# Patient Record
Sex: MALE | Race: WHITE | NOT HISPANIC OR LATINO | Employment: OTHER | URBAN - METROPOLITAN AREA
[De-identification: names, ages, dates, MRNs, and addresses within clinical notes are randomized per-mention and may not be internally consistent; named-entity substitution may affect disease eponyms.]

---

## 2017-01-31 ENCOUNTER — HOSPITAL ENCOUNTER (OUTPATIENT)
Dept: RADIOLOGY | Facility: OTHER | Age: 67
Discharge: HOME | End: 2017-01-31
Attending: ORTHOPAEDIC SURGERY
Payer: MEDICARE

## 2017-01-31 DIAGNOSIS — M17.11 UNILATERAL PRIMARY OSTEOARTHRITIS, RIGHT KNEE: ICD-10-CM

## 2017-01-31 DIAGNOSIS — S83.231A COMPLEX TEAR OF MEDIAL MENISCUS OF RIGHT KNEE AS CURRENT INJURY, INITIAL ENCOUNTER: ICD-10-CM

## 2017-01-31 DIAGNOSIS — M25.561 PAIN IN RIGHT KNEE: ICD-10-CM

## 2017-01-31 DIAGNOSIS — M25.461 EFFUSION, RIGHT KNEE: ICD-10-CM

## 2017-01-31 PROCEDURE — 73721 MRI JNT OF LWR EXTRE W/O DYE: CPT | Mod: TC,RT

## 2017-01-31 PROCEDURE — 73721 MRI JNT OF LWR EXTRE W/O DYE: CPT | Mod: 26,RT,, | Performed by: RADIOLOGY

## 2017-06-02 ENCOUNTER — OFFICE VISIT (OUTPATIENT)
Dept: INTERNAL MEDICINE | Facility: CLINIC | Age: 67
End: 2017-06-02
Payer: MEDICARE

## 2017-06-02 VITALS
SYSTOLIC BLOOD PRESSURE: 112 MMHG | BODY MASS INDEX: 31.04 KG/M2 | HEIGHT: 67 IN | HEART RATE: 70 BPM | DIASTOLIC BLOOD PRESSURE: 67 MMHG | WEIGHT: 197.75 LBS

## 2017-06-02 DIAGNOSIS — G62.9 NEUROPATHY: ICD-10-CM

## 2017-06-02 DIAGNOSIS — M54.50 ACUTE LEFT-SIDED LOW BACK PAIN WITHOUT SCIATICA: ICD-10-CM

## 2017-06-02 DIAGNOSIS — R26.9 ABNORMALITY OF GAIT AND MOBILITY: ICD-10-CM

## 2017-06-02 DIAGNOSIS — M54.9 ACUTE UPPER BACK PAIN: Primary | ICD-10-CM

## 2017-06-02 DIAGNOSIS — R27.9 LACK OF COORDINATION: ICD-10-CM

## 2017-06-02 DIAGNOSIS — Z86.39 PERSONAL HISTORY OF OTHER ENDOCRINE, NUTRITIONAL AND METABOLIC DISEASE: ICD-10-CM

## 2017-06-02 PROBLEM — Z86.19 HISTORY OF HEPATITIS C: Status: ACTIVE | Noted: 2017-06-02

## 2017-06-02 PROBLEM — E78.5 HLD (HYPERLIPIDEMIA): Status: ACTIVE | Noted: 2017-06-02

## 2017-06-02 PROCEDURE — 99999 PR PBB SHADOW E&M-EST. PATIENT-LVL III: CPT | Mod: PBBFAC,,, | Performed by: INTERNAL MEDICINE

## 2017-06-02 PROCEDURE — 99204 OFFICE O/P NEW MOD 45 MIN: CPT | Mod: S$GLB,,, | Performed by: INTERNAL MEDICINE

## 2017-06-02 PROCEDURE — 1159F MED LIST DOCD IN RCRD: CPT | Mod: S$GLB,,, | Performed by: INTERNAL MEDICINE

## 2017-06-02 PROCEDURE — 1125F AMNT PAIN NOTED PAIN PRSNT: CPT | Mod: S$GLB,,, | Performed by: INTERNAL MEDICINE

## 2017-06-02 RX ORDER — TRIAMCINOLONE ACETONIDE 1 MG/ML
LOTION TOPICAL 2 TIMES DAILY
Status: ON HOLD | COMMUNITY
End: 2017-07-07 | Stop reason: HOSPADM

## 2017-06-02 RX ORDER — ASPIRIN 81 MG/1
81 TABLET ORAL DAILY
COMMUNITY
End: 2018-02-19 | Stop reason: SDUPTHER

## 2017-06-02 RX ORDER — ATORVASTATIN CALCIUM 10 MG/1
10 TABLET, FILM COATED ORAL DAILY
COMMUNITY
End: 2018-02-19 | Stop reason: SDUPTHER

## 2017-06-02 RX ORDER — ACETAMINOPHEN 500 MG/1
CAPSULE, LIQUID FILLED ORAL
Status: ON HOLD | COMMUNITY
End: 2017-07-07 | Stop reason: HOSPADM

## 2017-06-02 RX ORDER — IBUPROFEN 400 MG/1
400 TABLET ORAL EVERY 6 HOURS PRN
Status: ON HOLD | COMMUNITY
End: 2017-07-07 | Stop reason: HOSPADM

## 2017-06-02 RX ORDER — CHOLECALCIFEROL (VITAMIN D3) 25 MCG
2000 TABLET ORAL DAILY
COMMUNITY
End: 2018-10-26 | Stop reason: SDUPTHER

## 2017-06-02 RX ORDER — FINASTERIDE 5 MG/1
5 TABLET, FILM COATED ORAL DAILY
COMMUNITY
End: 2018-02-19 | Stop reason: SDUPTHER

## 2017-06-02 RX ORDER — CYCLOBENZAPRINE HCL 10 MG
10 TABLET ORAL 3 TIMES DAILY PRN
COMMUNITY
End: 2017-06-13 | Stop reason: ALTCHOICE

## 2017-06-02 NOTE — Clinical Note
SUSANNA, This is the patient who wants to see you to establish care.  You saw him at [facility] and he loves you.  He called to schedule with you and they told him you do not accept [employer].  He has an intake appointment with a resident next week but wants to see you instead. Maria Esther
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
## 2017-06-05 ENCOUNTER — TELEPHONE (OUTPATIENT)
Dept: INTERNAL MEDICINE | Facility: CLINIC | Age: 67
End: 2017-06-05

## 2017-06-05 DIAGNOSIS — R17 ELEVATED BILIRUBIN: Primary | ICD-10-CM

## 2017-06-05 NOTE — TELEPHONE ENCOUNTER
Pt called back and would like to come in ASAP , Pt is going to call back to see about coming in tomorrow .    Pt has been scheduled for an appt.

## 2017-06-05 NOTE — TELEPHONE ENCOUNTER
----- Message from Jada Spaulding MD sent at 6/2/2017  1:50 PM CDT -----  Could you reach out to this patient to get him on our schedule? It is not urgent, but to establish care. Perhaps in early July would be best, pending the schedule.    Thanks,  KJ

## 2017-06-05 NOTE — TELEPHONE ENCOUNTER
Please call patient and let him know that recent labs showed that one of his liver numbers (bilirubin) is elevated.  Please ask him if he has been told about this in the past.  If not, we should repeat some labs (orders in).

## 2017-06-06 ENCOUNTER — OFFICE VISIT (OUTPATIENT)
Dept: INTERNAL MEDICINE | Facility: CLINIC | Age: 67
End: 2017-06-06
Payer: MEDICARE

## 2017-06-06 ENCOUNTER — LAB VISIT (OUTPATIENT)
Dept: LAB | Facility: HOSPITAL | Age: 67
End: 2017-06-06
Attending: INTERNAL MEDICINE
Payer: MEDICARE

## 2017-06-06 VITALS
DIASTOLIC BLOOD PRESSURE: 72 MMHG | HEIGHT: 67 IN | WEIGHT: 177.25 LBS | HEART RATE: 67 BPM | OXYGEN SATURATION: 99 % | SYSTOLIC BLOOD PRESSURE: 118 MMHG | BODY MASS INDEX: 27.82 KG/M2

## 2017-06-06 DIAGNOSIS — Z11.4 ENCOUNTER FOR SCREENING FOR HIV: ICD-10-CM

## 2017-06-06 DIAGNOSIS — N40.0 BENIGN NON-NODULAR PROSTATIC HYPERPLASIA WITHOUT LOWER URINARY TRACT SYMPTOMS: ICD-10-CM

## 2017-06-06 DIAGNOSIS — G62.9 NEUROPATHY: ICD-10-CM

## 2017-06-06 DIAGNOSIS — E78.5 HYPERLIPIDEMIA, UNSPECIFIED HYPERLIPIDEMIA TYPE: ICD-10-CM

## 2017-06-06 DIAGNOSIS — Z86.19 HISTORY OF HEPATITIS C: ICD-10-CM

## 2017-06-06 DIAGNOSIS — R17 ELEVATED BILIRUBIN: ICD-10-CM

## 2017-06-06 DIAGNOSIS — R26.9 ABNORMALITY OF GAIT AND MOBILITY: ICD-10-CM

## 2017-06-06 DIAGNOSIS — D69.2 SENILE PURPURA: ICD-10-CM

## 2017-06-06 DIAGNOSIS — E55.9 HYPOVITAMINOSIS D: ICD-10-CM

## 2017-06-06 LAB
BILIRUB DIRECT SERPL-MCNC: 0.5 MG/DL
BILIRUB SERPL-MCNC: 1.2 MG/DL
CRP SERPL-MCNC: 1.8 MG/L
ERYTHROCYTE [SEDIMENTATION RATE] IN BLOOD BY WESTERGREN METHOD: 40 MM/HR
VIT B12 SERPL-MCNC: 1219 PG/ML

## 2017-06-06 PROCEDURE — 1126F AMNT PAIN NOTED NONE PRSNT: CPT | Mod: S$GLB,,, | Performed by: INTERNAL MEDICINE

## 2017-06-06 PROCEDURE — 1159F MED LIST DOCD IN RCRD: CPT | Mod: S$GLB,,, | Performed by: INTERNAL MEDICINE

## 2017-06-06 PROCEDURE — 99215 OFFICE O/P EST HI 40 MIN: CPT | Mod: S$GLB,,, | Performed by: INTERNAL MEDICINE

## 2017-06-06 PROCEDURE — 99999 PR PBB SHADOW E&M-EST. PATIENT-LVL III: CPT | Mod: PBBFAC,,, | Performed by: INTERNAL MEDICINE

## 2017-06-06 NOTE — ASSESSMENT & PLAN NOTE
Patient with elevated bilirubin on 6/6. Treated with ribavarin and interferon in 2012  · Check Hep C viral load
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
## 2017-06-06 NOTE — ASSESSMENT & PLAN NOTE
New onset neuropathy in LE with acute back pain. History of hep C treated in 2009. Differential: HIV, hep C, multiple myeloma, tertiary syphillis  · Check HIV, copper, hep C viral load, SPEP/UPEP, RPR  · F/u bilirubin and inflammatory markers

## 2017-06-07 ENCOUNTER — TELEPHONE (OUTPATIENT)
Dept: INTERNAL MEDICINE | Facility: CLINIC | Age: 67
End: 2017-06-07

## 2017-06-07 NOTE — TELEPHONE ENCOUNTER
Please call patient and let him know that his liver number (bilirubin) is improved from last time we checked.  Please ask him how his back pain is doing.  I see that he has an appointment on Tuesday to establish care with Dr. Spaulding.

## 2017-06-07 NOTE — TELEPHONE ENCOUNTER
Spoke with pt, notified. Pt states that he still is experiencing discomfort in his back. He has MRI scheduled.

## 2017-06-09 ENCOUNTER — TELEPHONE (OUTPATIENT)
Dept: INTERNAL MEDICINE | Facility: CLINIC | Age: 67
End: 2017-06-09

## 2017-06-13 ENCOUNTER — HOSPITAL ENCOUNTER (OUTPATIENT)
Dept: RADIOLOGY | Facility: HOSPITAL | Age: 67
Discharge: HOME | End: 2017-06-13
Attending: INTERNAL MEDICINE
Payer: MEDICARE

## 2017-06-13 ENCOUNTER — OFFICE VISIT (OUTPATIENT)
Dept: INTERNAL MEDICINE | Facility: CLINIC | Age: 67
End: 2017-06-13
Payer: MEDICARE

## 2017-06-13 VITALS — HEART RATE: 78 BPM | DIASTOLIC BLOOD PRESSURE: 70 MMHG | SYSTOLIC BLOOD PRESSURE: 104 MMHG | OXYGEN SATURATION: 98 %

## 2017-06-13 DIAGNOSIS — R74.02 NONSPECIFIC ELEVATION OF LEVELS OF TRANSAMINASE AND LACTIC ACID DEHYDROGENASE (LDH): ICD-10-CM

## 2017-06-13 DIAGNOSIS — M54.50 ACUTE LEFT-SIDED LOW BACK PAIN WITHOUT SCIATICA: ICD-10-CM

## 2017-06-13 DIAGNOSIS — R74.01 NONSPECIFIC ELEVATION OF LEVELS OF TRANSAMINASE AND LACTIC ACID DEHYDROGENASE (LDH): ICD-10-CM

## 2017-06-13 DIAGNOSIS — R29.898 WEAKNESS OF BOTH LOWER EXTREMITIES: ICD-10-CM

## 2017-06-13 DIAGNOSIS — M54.9 ACUTE UPPER BACK PAIN: ICD-10-CM

## 2017-06-13 DIAGNOSIS — G62.9 NEUROPATHY: ICD-10-CM

## 2017-06-13 DIAGNOSIS — Z86.19 HISTORY OF HEPATITIS C: ICD-10-CM

## 2017-06-13 PROCEDURE — 72141 MRI NECK SPINE W/O DYE: CPT | Mod: TC

## 2017-06-13 PROCEDURE — 72141 MRI NECK SPINE W/O DYE: CPT | Mod: 26,,, | Performed by: RADIOLOGY

## 2017-06-13 PROCEDURE — 72148 MRI LUMBAR SPINE W/O DYE: CPT | Mod: TC

## 2017-06-13 PROCEDURE — 72146 MRI CHEST SPINE W/O DYE: CPT | Mod: 26,,, | Performed by: RADIOLOGY

## 2017-06-13 PROCEDURE — 72148 MRI LUMBAR SPINE W/O DYE: CPT | Mod: 26,,, | Performed by: RADIOLOGY

## 2017-06-13 PROCEDURE — 1126F AMNT PAIN NOTED NONE PRSNT: CPT | Mod: S$GLB,,, | Performed by: INTERNAL MEDICINE

## 2017-06-13 PROCEDURE — 99999 PR PBB SHADOW E&M-EST. PATIENT-LVL III: CPT | Mod: PBBFAC,,, | Performed by: INTERNAL MEDICINE

## 2017-06-13 PROCEDURE — 99215 OFFICE O/P EST HI 40 MIN: CPT | Mod: S$GLB,,, | Performed by: INTERNAL MEDICINE

## 2017-06-13 PROCEDURE — 1159F MED LIST DOCD IN RCRD: CPT | Mod: S$GLB,,, | Performed by: INTERNAL MEDICINE

## 2017-06-13 PROCEDURE — 72146 MRI CHEST SPINE W/O DYE: CPT | Mod: TC

## 2017-06-13 NOTE — PATIENT INSTRUCTIONS
TODAY:  - MRI of spine today  - labs & urine studies if MRI normal  - urine studies and labs friday

## 2017-06-13 NOTE — ASSESSMENT & PLAN NOTE
New onset neuropathy in LE with acute back pain. History of hep C treated in 2009. Neg work-up for HIV, hep C viral load, multiple myeloma, tertiary syphillis, copper, B12 defiency. ESR 40, CRP normal  · MRI C, T, L-spine pending  · Further work-up myositis, vasculitis, inflammatory myopathies, connective tissue disease to be performed after MRI  · TISH, anti-SM/RNP Ab, Anti-Ariadne, SSB, SSA, ANCA, cyoglobulins, LDH, CK  · Urinalysis today  · Will order rollator based on results

## 2017-06-13 NOTE — PROGRESS NOTES
"Primary Care Provider Appointment    Subjective:      Patient ID: Anthony Miguel is a 66 y.o. male with LE weakness, h/o hep C    Chief Complaint: Follow-up and Extremity Weakness  Patient with acute complaints of LE weakness and discomfort for 6 weeks (since 5/1/17). Weakness started in hands then progressively spread to shoulders now spread to entire body. He is now feeling tightness at waste that he feels is "affecting my spine."  A MRI of  C,T, L-spines ordered by previous provider at [facility]. He has no recent history of URI one week ago. He does report allergies for the past few months. He also endorses low energy, with small periods of increased injury. Work-up thus far has been negative: neg RPR, neg HIV, no viral load for hep C, normal TSH, neg SPEP, normal copper. B12 was elevated, CMP and CBC were normal.    He has been taking flexeril for muscle stiffening and endorses fatigue associated with this. He is also taking ibuprofen for "discomfort", but has no specific description of the sensation. Otherwise no alcohol or illicit drug use lately.    His colleague is with him who corroborates his story of progressive history of weakness.    Past Surgical History:   Procedure Laterality Date    Larangoscopy         Past Medical History:   Diagnosis Date    Hepatitis C        Review of Systems   Constitutional: Positive for activity change and appetite change.   Cardiovascular: Negative for leg swelling.   Gastrointestinal:        Bilateral flank pain   Musculoskeletal: Positive for gait problem, neck pain and neck stiffness.   Neurological: Positive for weakness and light-headedness.   Psychiatric/Behavioral: Positive for behavioral problems.       Objective:   /70   Pulse 78   SpO2 98%     Physical Exam   Constitutional: He is oriented to person, place, and time. He appears well-developed and well-nourished.   HENT:   Head: Normocephalic and atraumatic.   Eyes: Pupils are equal, round, and reactive " to light. Right eye exhibits no discharge. Left eye exhibits discharge.   Cardiovascular: Normal rate and regular rhythm.    Pulmonary/Chest: Effort normal and breath sounds normal.   Musculoskeletal: He exhibits no edema or deformity.   3/5 strength in RUE and RLE  3/5 strength in LUE  2/5 strength in LLE   Neurological: He is alert and oriented to person, place, and time. He displays abnormal reflex. Coordination and gait abnormal.   Reflex Scores:       Bicep reflexes are 3+ on the right side and 3+ on the left side.       Patellar reflexes are 3+ on the right side and 3+ on the left side.  Hyper-reflexic DTR 2+          Lab Results   Component Value Date    WBC 9.04 06/02/2017    HGB 15.1 06/02/2017    HCT 43.6 06/02/2017     06/02/2017    CHOL 138 06/06/2017    TRIG 118 06/06/2017    HDL 51 06/06/2017    ALT 16 06/02/2017    AST 24 06/02/2017     06/02/2017    K 4.0 06/02/2017     06/02/2017    CREATININE 1.1 06/02/2017    BUN 21 06/02/2017    CO2 23 06/02/2017    TSH 3.029 06/02/2017    HGBA1C 5.3 06/02/2017         Assessment:   66 y.o. male with multiple co-morbid illnesses here to continue work-up of chronic issues notably LE weakness, h/o hep C.     Plan:     Problem List Items Addressed This Visit        Neuro    Neuropathy     New onset neuropathy in LE with acute back pain. History of hep C treated in 2009. Neg work-up for HIV, hep C viral load, multiple myeloma, tertiary syphillis, copper, B12 defiency. ESR 40, CRP normal  · MRI C, T, L-spine pending  · Further work-up myositis, vasculitis, inflammatory myopathies, connective tissue disease to be performed after MRI  · TISH, anti-SM/RNP Ab, Anti-Sánchez, SSB, SSA, ANCA, cyoglobulins, LDH, CK  · Urinalysis today  · Will order rollator based on results         Relevant Orders    TISH    CK isoenzymes    Lactate dehydrogenase    URINALYSIS    ANTI-NEUTROPHILIC CYTOPLASMIC ANTIBODY    CRYOGLOBULIN    Hepatitis panel, acute    ANTI- SÁNCHEZ-1  ANTIBODY    ANTI SM/RNP ANTIBODY    ANTI -SSA ANTIBODY    ANTI-SSB ANTIBODY    Comprehensive metabolic panel    Weakness of both lower extremities     New onset neuropathy in LE with acute back pain. History of hep C treated in 2009. Neg work-up for HIV, hep C viral load, multiple myeloma, tertiary syphillis, copper, B12 defiency. ESR 40, CRP normal  · MRI C, T, L-spine pending  · Further work-up myositis, vasculitis, inflammatory myopathies, connective tissue disease to be performed after MRI  · TISH, anti-SM/RNP Ab, Anti-Ariadne, SSB, SSA, ANCA, cyoglobulins, LDH, CK  · Urinalysis today  · Will order rollator based on results         Relevant Orders    Comprehensive metabolic panel       ID    History of hepatitis C (completed treatment)     Patient with elevated bilirubin on 6/6. Treated with ribavarin and interferon in 2012  · Hep C viral load neg  · monitor         Relevant Orders    CRYOGLOBULIN    Hepatitis panel, acute    Comprehensive metabolic panel      Other Visit Diagnoses     Nonspecific elevation of levels of transaminase and lactic acid dehydrogenase (LDH)         Relevant Orders    Hepatitis panel, acute          Health Maintenance       Date Due Completion Date    Hepatitis C Screening 1950 ---done    TETANUS VACCINE 12/03/1967 ---    Colonoscopy 12/03/1999 ---    Zoster Vaccine 12/03/2009 ---    Pneumococcal (65+) (1 of 2 - PCV13) 12/03/2014 ---    Abdominal Aortic Aneurysm Screening 12/03/2014 ---    Influenza Vaccine 08/01/2017 ---    Lipid Panel 06/06/2022 6/6/2017          Return in about 3 weeks (around 7/4/2017) for established patient follow-up. . One hour spent with this patient today, half of that in counseling.    Jada Spaulding MD/MPH  Internal Medicine  [facility]  [phone]

## 2017-06-13 NOTE — ASSESSMENT & PLAN NOTE
Patient with elevated bilirubin on 6/6. Treated with ribavarin and interferon in 2012  · Hep C viral load neg  · monitor

## 2017-06-14 ENCOUNTER — TELEPHONE (OUTPATIENT)
Dept: INTERNAL MEDICINE | Facility: CLINIC | Age: 67
End: 2017-06-14

## 2017-06-14 DIAGNOSIS — R53.1 WEAKNESS: Primary | ICD-10-CM

## 2017-06-14 NOTE — TELEPHONE ENCOUNTER
Pt has been advised that results are not in yet and that his walker has been set to [facility] .      Thanks Dr. Spaulding

## 2017-06-15 ENCOUNTER — TELEPHONE (OUTPATIENT)
Dept: INTERNAL MEDICINE | Facility: CLINIC | Age: 67
End: 2017-06-15

## 2017-06-15 NOTE — TELEPHONE ENCOUNTER
Please call patient and let him know that recent MRI of his spine showed degenerative changes throughout his spine.  There were some areas of severe narrowing in his cervical spine (C3-C4) and his lumbar spine (L4-L5).  Tell him that I will notify Dr. Spaulding so that they can come up with a plan moving forward.

## 2017-06-16 ENCOUNTER — TELEPHONE (OUTPATIENT)
Dept: INTERNAL MEDICINE | Facility: CLINIC | Age: 67
End: 2017-06-16

## 2017-06-16 DIAGNOSIS — M48.00 SPINAL STENOSIS, UNSPECIFIED SPINAL REGION: Primary | ICD-10-CM

## 2017-06-16 NOTE — TELEPHONE ENCOUNTER
Please let patient know that there is spinal stenosis in his neck and low back spine found on MRI. I placed a referral [facility] and they are able to see him on Tuesday at 9am with JENSEN Ware (but he needs to arrive at 8am to get Xrays). They will make a plan for him at that time. Please provide him with all other clinic logistical information.    He does not need to do the labs we talked about at his last appointment, please cancel those.    Thanks,  KJ

## 2017-06-16 NOTE — TELEPHONE ENCOUNTER
Pt has been advised about MRI and informed about appt at [facility] . Pt verbalized great understanding of appt time and date , I has also been mailed out an copy of appt.      Thanks Dr. Spaulding

## 2017-06-19 NOTE — PROGRESS NOTES
Subjective:      Patient ID: Anthony Miguel is a 66 y.o. male.    Chief Complaint: Low-back Pain and Neck Pain      HPI     Referral from PCP (Chelly) for cervical and lumbar spinal stenosis. Seen recently with complaints of LE weakness and discomfort since 5/1/17. Weakness started in hands then progressively spread to shoulders now spread to entire body. He can no longer support his body weight and cannot walk. Prior to this, he was able to walk and stand on his own without difficulty.     He complains of diffuse weakness, numbness, and tingling. He admits to balance issues, changes in handwriting, problems with hand dexterity, and frequent dropping of items in the last month. He is getting progressively worse. He has minimal pain. He rates his pain as a 4 on a scale of 1-10. He has numbness, tingling, and weakness in both arms. Minimal numbness/tingling in his legs. His arms/hands are worse than his legs. No significant injury prior to onset. He notes a fall in May once symptoms started. History of intermittent LBP in the past. Never seen for his back or neck previously.     History of hep C, BPH.  He was given motrin, trazodone, and flexeril. Some relief with motrin- he takes 1-2 per day. He has not taken trazodone. No PT, injections, or surgery on his spine.     Patient denies fevers, chills, night sweats, nausea, vomiting, and weight loss. He also denies bowel/bladder dysfunction, sexual dysfunction, and any saddle anesthesia.       Review of Systems   Constitution: Positive for weakness and malaise/fatigue. Negative for fever, night sweats, weight gain and weight loss.   HENT: Positive for headaches and tinnitus. Negative for hearing loss, nosebleeds and odynophagia.    Eyes: Negative for blurred vision and double vision.   Cardiovascular: Negative for chest pain, irregular heartbeat and palpitations.   Respiratory: Positive for shortness of breath. Negative for cough, hemoptysis and wheezing.     Endocrine: Negative for cold intolerance and polydipsia.   Hematologic/Lymphatic: Does not bruise/bleed easily.   Skin: Negative for dry skin, poor wound healing, rash and suspicious lesions.   Musculoskeletal: Positive for back pain, muscle cramps and neck pain.        See HPI for pertinent positives.   Gastrointestinal: Negative for bloating, abdominal pain, constipation, diarrhea, hematochezia, melena, nausea and vomiting.   Genitourinary: Negative for bladder incontinence, dysuria, hematuria, hesitancy and incomplete emptying.   Neurological: Positive for numbness and paresthesias. Negative for disturbances in coordination, dizziness, focal weakness, loss of balance and seizures.        Positive for loss of control of arms/legs, poor coordination, loss of muscle mass, abnormal arm/leg sensations.    Psychiatric/Behavioral: Negative for depression and hallucinations. The patient is not nervous/anxious.            Objective:        General: Anthony is well-developed, well-nourished, appears stated age, in no acute distress, alert and oriented to time, place and person.     General    Vitals reviewed.  Constitutional: He is oriented to person, place, and time. He appears well-developed and well-nourished.   Pulmonary/Chest: Effort normal.   Abdominal: He exhibits no distension.   Neurological: He is alert and oriented to person, place, and time.   Psychiatric: He has a normal mood and affect. His behavior is normal. Judgment and thought content normal.           Gait: he is in WC. Gait not tested due to fall risk. Heel/toe/tandem walking not tested. Rombergs not tested.     On exam of the cervical spine, pt has no posterior cervical or bilateral trapezial tenderness.     Skin in cervical region is warm to the touch without visible rashes.     Patient has reasonable ROM of cervical spine with no pain.     Strength Testing of Bilateral UEs shows  Right :  +3/5   Left :  +3/5  Right deltoid:  5/5   Left  deltoid:  5/5  Right biceps:  5/5   Left biceps:  5/5  Right triceps:  5/5   Left triceps:  5/5  Right wrist extension:  4-/5  Left wrist extension:  4-/5  Right wrist flexion:  4-/5  Left wrist flexion:  4-/5  Right interosseus:  +3/5  Left interosseus:  +3/5    Sensation is grossly intact to light touch in C5, C6, C7, C8, T1 distribution.     Right shoulder has no pain with IR/ER. Good ROM of shoulder passively and no tenderness.   Left shoulder has no pain with IR/ER. Good ROM of shoulder passively and no tenderness.     1-2 beats clonus in bilateral LEs.    positive hoffmans bilateral UEs.    DTRs:  Right biceps:  +2     Left biceps:  +2   Right brachioradialis:  +2  Left brachioradialis:  +2    On exam of the lumbar spine, Inspection of back is normal, No tenderness noted    Skin in the lumbar region is warm to the touch without visible rashes.     Strength testing of the bilateral LEs shows  Right hip abduction:  +5/5  Left hip abduction:  +5/5  Right hip flexion:  +5/5  Left hip flexion:  +5/5  Right hip extensors:  +5/5  Left hip extensors:  +5/5  Right quadriceps:  +5/5  Left quadriceps:  +5/5  Right hamstring:  +5/5  Left hamstring:  +5/5  Right dorsiflexion:  +5/5  Left dorsiflexion:  +5/5  Right plantar flexion:  +5/5  Left plantar flexion:  +5/5   Right EHL:  +5/5   Left EHL:  +5/5    negative straight leg raise on bilateral LEs.     DTRs:  Right patellar:  2+     Left patellar:  2+  Right achilles:  2+   Left achilles:  2+    Sensation is grossly intact in L2, L3, L4, L5, and S1 distribution.    Right hip has no pain with IR/ER. Left hip has no pain with IR/ER.        XRAY INTERPRETATION:  MRI of cervical spine dated 6/13/17 is personally reviewed and shows multilevel spondylosis with reversal of normal cervical kyphosis and slip C5-C6 and C6-C7. Spur/disc C2-C3 with mild central stenosis. Spur/disc C3-C4 with severe central stenosis. Mild central stenosis C4-C5 with severe right/mild left foraminal  stenosis. Moderate central stenosis C5-C6 and mild central stenosis C6-C7.     MRI of thoracic spine dated 6/13/17 is personally reviewed and shows no significant central or foraminal stenosis.     MRI of lumbar spine dated 6/13/17 is personally reviewed and shows disc bulge/facet hypertrophy L3-L4 with mild central stenosis. Disc bulge/facet hypertrophy L4-L5 with moderate central and bilateral foraminal stenosis. Retrolisthesis L5-S1 with disc bulge/facet hypertrophy and moderate bilateral foraminal stenosis.         Assessment:       1. Gait disorder    2. Bilateral arm weakness    3. Cervical spondylosis with myelopathy    4. Degeneration of cervical intervertebral disc    5. Cervical stenosis of spinal canal    6. Neck pain    7. Acquired spondylolisthesis    8. Spondylosis without myelopathy    9. DDD (degenerative disc disease), lumbosacral    10. Spinal stenosis of lumbar region without neurogenic claudication           Plan:            1 month history of progressive gait imbalance, falling, and weakness in UEs > LEs. Known severe central stenosis at C3-C4 which is causing myelopathic symptoms. Very little complaints of neck or arm pain. Only discomfort in the neck. He lives alone and a friend has been helping him. He had a bad fall last night. Dr. Malin was in to review treatment options with patient. Above imaging reviewed with patient as well. Following plan made:     - Direct admission to [facility] for tentative plan for posterior cervical laminectomy C3-C4.   - He is unsafe to be at home alone due to weakness and balance issues. He is high risk for falls.   - Dr. Malin reviewed all options with the patient and recommend surgery specifically posterior cervical laminectomy C3-C4.   - Discussed cervical myelopathy with patient at length. He understands he will not see immediate improvement in symptoms after surgery.   - He likely will need rehab or placement postop.     Follow-up: Return if  symptoms worsen or fail to improve. If there are any questions prior to this, the patient was instructed to contact the office.

## 2017-06-20 ENCOUNTER — HOSPITAL ENCOUNTER (INPATIENT)
Facility: HOSPITAL | Age: 67
LOS: 9 days | Discharge: NURSING, REHAB OR LONG-TERM CARE | DRG: 472 | End: 2017-06-29
Attending: NEUROLOGICAL SURGERY | Admitting: NEUROLOGICAL SURGERY
Payer: MEDICARE

## 2017-06-20 ENCOUNTER — ANESTHESIA EVENT (OUTPATIENT)
Dept: SURGERY | Facility: HOSPITAL | Age: 67
DRG: 472 | End: 2017-06-20
Payer: MEDICARE

## 2017-06-20 ENCOUNTER — OFFICE VISIT (OUTPATIENT)
Dept: SPINE | Facility: CLINIC | Age: 67
DRG: 472 | End: 2017-06-20
Payer: MEDICARE

## 2017-06-20 VITALS
HEART RATE: 73 BPM | WEIGHT: 176 LBS | HEIGHT: 67 IN | DIASTOLIC BLOOD PRESSURE: 65 MMHG | SYSTOLIC BLOOD PRESSURE: 134 MMHG | BODY MASS INDEX: 27.62 KG/M2

## 2017-06-20 DIAGNOSIS — Z01.818 PREOPERATIVE EVALUATION TO RULE OUT SURGICAL CONTRAINDICATION: ICD-10-CM

## 2017-06-20 DIAGNOSIS — M47.12 CERVICAL SPONDYLOSIS WITH MYELOPATHY: ICD-10-CM

## 2017-06-20 DIAGNOSIS — M48.02 CERVICAL STENOSIS OF SPINAL CANAL: ICD-10-CM

## 2017-06-20 DIAGNOSIS — M47.819 SPONDYLOSIS WITHOUT MYELOPATHY: ICD-10-CM

## 2017-06-20 DIAGNOSIS — R29.898 WEAKNESS OF BOTH LOWER EXTREMITIES: ICD-10-CM

## 2017-06-20 DIAGNOSIS — M50.30 DEGENERATION OF CERVICAL INTERVERTEBRAL DISC: ICD-10-CM

## 2017-06-20 DIAGNOSIS — M48.061 SPINAL STENOSIS OF LUMBAR REGION WITHOUT NEUROGENIC CLAUDICATION: ICD-10-CM

## 2017-06-20 DIAGNOSIS — N40.0 BENIGN NON-NODULAR PROSTATIC HYPERPLASIA WITHOUT LOWER URINARY TRACT SYMPTOMS: ICD-10-CM

## 2017-06-20 DIAGNOSIS — E78.5 HYPERLIPIDEMIA, UNSPECIFIED HYPERLIPIDEMIA TYPE: ICD-10-CM

## 2017-06-20 DIAGNOSIS — R29.898 BILATERAL ARM WEAKNESS: ICD-10-CM

## 2017-06-20 DIAGNOSIS — M43.10 ACQUIRED SPONDYLOLISTHESIS: ICD-10-CM

## 2017-06-20 DIAGNOSIS — G95.9 CERVICAL MYELOPATHY: ICD-10-CM

## 2017-06-20 DIAGNOSIS — R26.9 GAIT DISORDER: Primary | ICD-10-CM

## 2017-06-20 DIAGNOSIS — M51.37 DDD (DEGENERATIVE DISC DISEASE), LUMBOSACRAL: ICD-10-CM

## 2017-06-20 DIAGNOSIS — M54.2 NECK PAIN: ICD-10-CM

## 2017-06-20 PROCEDURE — 4A11X4G MONITORING OF PERIPHERAL NERVOUS ELECTRICAL ACTIVITY, INTRAOPERATIVE, EXTERNAL APPROACH: ICD-10-PCS | Performed by: NEUROLOGICAL SURGERY

## 2017-06-20 PROCEDURE — 0RG20Z1: ICD-10-PCS | Performed by: NEUROLOGICAL SURGERY

## 2017-06-20 PROCEDURE — 1125F AMNT PAIN NOTED PAIN PRSNT: CPT | Mod: S$GLB,,, | Performed by: PHYSICIAN ASSISTANT

## 2017-06-20 PROCEDURE — 99999 PR PBB SHADOW E&M-EST. PATIENT-LVL III: CPT | Mod: PBBFAC,,, | Performed by: PHYSICIAN ASSISTANT

## 2017-06-20 PROCEDURE — 99222 1ST HOSP IP/OBS MODERATE 55: CPT | Mod: GC,,, | Performed by: INTERNAL MEDICINE

## 2017-06-20 PROCEDURE — 1159F MED LIST DOCD IN RCRD: CPT | Mod: S$GLB,,, | Performed by: PHYSICIAN ASSISTANT

## 2017-06-20 PROCEDURE — 20600001 HC STEP DOWN PRIVATE ROOM

## 2017-06-20 PROCEDURE — 25000003 PHARM REV CODE 250: Performed by: STUDENT IN AN ORGANIZED HEALTH CARE EDUCATION/TRAINING PROGRAM

## 2017-06-20 PROCEDURE — 99204 OFFICE O/P NEW MOD 45 MIN: CPT | Mod: S$GLB,,, | Performed by: PHYSICIAN ASSISTANT

## 2017-06-20 PROCEDURE — 99223 1ST HOSP IP/OBS HIGH 75: CPT | Mod: ,,, | Performed by: PHYSICIAN ASSISTANT

## 2017-06-20 PROCEDURE — 00NW0ZZ RELEASE CERVICAL SPINAL CORD, OPEN APPROACH: ICD-10-PCS | Performed by: NEUROLOGICAL SURGERY

## 2017-06-20 RX ORDER — GLUCAGON 1 MG
1 KIT INJECTION
Status: DISCONTINUED | OUTPATIENT
Start: 2017-06-20 | End: 2017-06-29 | Stop reason: HOSPADM

## 2017-06-20 RX ORDER — IBUPROFEN 200 MG
16 TABLET ORAL
Status: DISCONTINUED | OUTPATIENT
Start: 2017-06-20 | End: 2017-06-29 | Stop reason: HOSPADM

## 2017-06-20 RX ORDER — HYDROCODONE BITARTRATE AND ACETAMINOPHEN 5; 325 MG/1; MG/1
1 TABLET ORAL EVERY 4 HOURS PRN
Status: DISCONTINUED | OUTPATIENT
Start: 2017-06-20 | End: 2017-06-21

## 2017-06-20 RX ORDER — FINASTERIDE 5 MG/1
5 TABLET, FILM COATED ORAL DAILY
Status: DISCONTINUED | OUTPATIENT
Start: 2017-06-21 | End: 2017-06-29 | Stop reason: HOSPADM

## 2017-06-20 RX ORDER — IBUPROFEN 200 MG
24 TABLET ORAL
Status: DISCONTINUED | OUTPATIENT
Start: 2017-06-20 | End: 2017-06-29 | Stop reason: HOSPADM

## 2017-06-20 RX ORDER — ATORVASTATIN CALCIUM 10 MG/1
10 TABLET, FILM COATED ORAL DAILY
Status: DISCONTINUED | OUTPATIENT
Start: 2017-06-21 | End: 2017-06-29 | Stop reason: HOSPADM

## 2017-06-20 RX ORDER — TRAZODONE HYDROCHLORIDE 50 MG/1
50 TABLET ORAL NIGHTLY PRN
Status: DISCONTINUED | OUTPATIENT
Start: 2017-06-20 | End: 2017-06-29 | Stop reason: HOSPADM

## 2017-06-20 RX ADMIN — HYDROCODONE BITARTRATE AND ACETAMINOPHEN 1 TABLET: 5; 325 TABLET ORAL at 08:06

## 2017-06-20 NOTE — ASSESSMENT & PLAN NOTE
- Neurologically stable with s/s of myelopathy on exam.  -  consulted for preoperative clearance. Appreciate clearance.  - Will obtain c spine xrays with flexion extension and CT c spine for surgical planning.  - OR tomorrow for posterior cervical fusion.  - NPO at midnight.

## 2017-06-20 NOTE — HOSPITAL COURSE
6/20: Directly admitted from [facility].  6/21: OR for C3-6 posterior fusion with Dr. Wolff  6/22: POD#1, hemovac drain in place  6/23: drain remains in place, xrays look appropriate  6/24: NAEON  6/26: Drain Dc'd  6/27: Pt remains stable with improving weakness. Rehab denied. Awaiting peer to peer. Will begin SNF process   6/28: [employer] approved SNF. Plan for DC today to [facility]

## 2017-06-20 NOTE — HPI
Mr. Anthony Miguel is a 67 yo pleasant gentleman with PMHx of HCV (s/p IFN tx completed in 2013) and newly dx severe cervical and lumbar spinal stenosis who is admitted due to worsening lower and upper extremity weakness. Medicine is consulted for pre-op evaluation in anticipation for posterior cervical laminectomy on 6/21/17.     He reports bilateral upper extremity weakness starting in May 2017. Weakness first began in hands and then rapidly spread to bilateral arms. He has been a  for 40 years and noticed worsening weakness/numbness/tingling, worsening handwriting, decreasing  strength and dexterity rapidly in May; he was fully functional in April 2017. Denies any arm pain at all, only paresthesias and paresis. Also noticed decreased lower extremity strength with weakness worse on the right leg compared to the left. He had been falling since then, including two falls last week. Last night, he fell and could no longer get back up himself. He had to call his friend to help him up as he lives alone. Again denies bilateral leg pain, only paresthesias and paresis.     Denies any recent chest pain on exertion, chest pain at rest, chest pressure, tightness, shortness of breath with activity, palpitations, sudden onset unilateral weakness or numbness, or syncope.     Denies any personal history of heart attacks, heart failure or any cardiac history. No family history of heart disease or heart attacks. No history of diabetes mellitus, hypertension, or impaired renal function. Uses 1-2 ibuprofen pills daily for back pain regularly. Remote history of smoking (8 years smoking 1 pack per week, quit > 20 years ago). Very rare alcohol use. 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

## 2017-06-20 NOTE — NURSING
Received patient as a direct admit per wheelchair. Alert and oriented x 4. Denies pain, appears comfortable.  Oriented to room/ unit. Call light placed within easy reach.

## 2017-06-20 NOTE — ANESTHESIA PREPROCEDURE EVALUATION
06/20/2017    Pre-operative evaluation for LAMINECTOMY-CERVICAL/FUSION-POSTERIOR/depuy (N/A)    Chief Complaint:    Anthony Miguel is a 66 y.o. male with a pmhx of hepatitis C who presented for cervical and lumbar spinal stenosis. Weakness started in hands then progressively spread to shoulders now spread to entire body. He can no longer support his body weight and cannot walk. Prior to this, he was able to walk and stand on his own without difficulty.     Past Medical History:   Diagnosis Date    Hepatitis C      Past Surgical History:   Procedure Laterality Date    Larangoscopy       Vital Signs Range (Last 24H):  Temp:  [36.4 °C (97.6 °F)]   Pulse:  [70-73]   Resp:  [16]   BP: (121-134)/(65-67)   SpO2:  [97 %]       CBC:     Recent Labs  Lab 06/02/17  1155   WBC 9.04   RBC 4.78   HGB 15.1   HCT 43.6      MCV 91   MCH 31.6*   MCHC 34.6       CMP:   Recent Labs  Lab 06/02/17  1155 06/06/17  1027     --    K 4.0  --      --    CO2 23  --    BUN 21  --    CREATININE 1.1  --    *  --    CALCIUM 9.6  --    ALBUMIN 4.2  --    PROT 8.1  --    ALKPHOS 87  --    ALT 16  --    AST 24  --    BILITOT 2.4* 1.2*       INR:  No results for input(s): INR, PROTIME, APTT in the last 720 hours.    Invalid input(s): PT      Anesthesia Evaluation    I have reviewed the Patient Summary Reports.     I have reviewed the Medications.     Review of Systems  Anesthesia Hx:  No problems with previous Anesthesia    Cardiovascular:  Cardiovascular Normal     Pulmonary:  Pulmonary Normal    Renal/:  Renal/ Normal     Hepatic/GI:   Liver Disease, Hepatitis, C HepC cleared with medical therapy years ago   Neurological:  Neurology Normal    Endocrine:  Endocrine Normal        Physical Exam  General:  Well nourished    Airway/Jaw/Neck:  Airway Findings: Mouth Opening: Normal Tongue: Normal  Mallampati: I   TM Distance: Normal, at least 6 cm     Eyes/Ears/Nose:  EYES/EARS/NOSE FINDINGS: Normal   Dental:  Dental Findings: In tact   Chest/Lungs:  Chest/Lungs Findings: Normal Respiratory Rate     Heart/Vascular:  Heart Findings: Rate: Normal  Rhythm: Regular Rhythm        Mental Status:  Mental Status Findings: Normal        Anesthesia Plan  Type of Anesthesia, risks & benefits discussed:  Anesthesia Type:  general  Patient's Preference:   Intra-op Monitoring Plan: standard ASA monitors  Intra-op Monitoring Plan Comments:   Post Op Pain Control Plan:   Post Op Pain Control Plan Comments:   Induction:   IV  Beta Blocker:  Patient is not currently on a Beta-Blocker (No further documentation required).       Informed Consent: Patient understands risks and agrees with Anesthesia plan.  Questions answered. Anesthesia consent signed with patient.  ASA Score: 3     Day of Surgery Review of History & Physical:    H&P update referred to the surgeon.         Ready For Surgery From Anesthesia Perspective.

## 2017-06-20 NOTE — CONSULTS
[facility] Hospital Medicine  Consult Note    Patient Name: Anthony Miguel  MRN: [id number]  Admission Date: 6/20/2017  Hospital Length of Stay: 0 days  Attending Physician: Dr. Pierson  Primary Care Provider: Jada Spaulding MD     [facility] Medicine Team: Networked reference to record PCT  Ktahleen Quijano MD      Patient information was obtained from patient, past medical records and ER records.     Inpatient consult to Hospital Medicine-General  Consult performed by: KATHLEEN QUIJANO  Consult ordered by: JAYCEE SAM        Subjective:     Principal Problem: Cervical myelopathy    Chief Complaint: weakness of hands and legs    HPI: Mr. Anthony Miguel is a 67 yo pleasant gentleman with PMHx of HCV (s/p IFN tx completed in 2013) and newly dx severe cervical and lumbar spinal stenosis who is admitted due to worsening lower and upper extremity weakness. Medicine is consulted for pre-op evaluation in anticipation for posterior cervical laminectomy on 6/21/17.     He reports bilateral upper extremity weakness starting in May 2017. Weakness first began in hands and then rapidly spread to bilateral arms. He has been a  for 40 years and noticed worsening weakness/numbness/tingling, worsening handwriting, decreasing  strength and dexterity rapidly in May; he was fully functional in April 2017. Denies any arm pain at all, only paresthesias and paresis. Also noticed decreased lower extremity strength with weakness worse on the right leg compared to the left. He had been falling since then, including two falls last week. Last night, he fell and could no longer get back up himself. He had to call his friend to help him up as he lives alone. Again denies bilateral leg pain, only paresthesias and paresis.     Denies any recent chest pain on exertion, chest pain at rest, chest pressure, tightness, shortness of breath with activity, palpitations, sudden onset unilateral weakness  or numbness, or syncope.     Denies any personal history of heart attacks, heart failure or any cardiac history. No family history of heart disease or heart attacks. No history of diabetes mellitus, hypertension, or impaired renal function. Uses 1-2 ibuprofen pills daily for back pain regularly. Remote history of smoking (8 years smoking 1 pack per week, quit > 20 years ago). Very rare alcohol use. 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     Past Medical History:   Diagnosis Date    Hepatitis C        Past Surgical History:   Procedure Laterality Date    Larangoscopy         Review of patient's allergies indicates:  No Known Allergies    No current facility-administered medications on file prior to encounter.      Current Outpatient Prescriptions on File Prior to Encounter   Medication Sig    acetaminophen (TYLENOL) 500 mg Cap Take by mouth.    aspirin (ECOTRIN) 81 MG EC tablet Take 81 mg by mouth once daily.    atorvastatin (LIPITOR) 10 MG tablet Take 10 mg by mouth once daily.    finasteride (PROSCAR) 5 mg tablet Take 5 mg by mouth once daily.    ibuprofen (ADVIL,MOTRIN) 400 MG tablet Take 400 mg by mouth every 6 (six) hours as needed for Other.    loratadine (CLARITIN) 10 mg tablet Take 10 mg by mouth once daily.    TRAZODONE HCL (TRAZODONE ORAL) Take by mouth.    triamcinolone acetonide 0.1% (KENALOG) 0.1 % Lotn Apply topically 2 (two) times daily.    vitamin D 1000 units Tab Take 1,000 Units by mouth once daily.    walker (ULTRA-LIGHT ROLLATOR) Misc 1 Units by Misc.(Non-Drug; Combo Route) route once daily at 6am.     Family History     Problem Relation (Age of Onset)    Cancer Mother    No Known Problems Father        Social History Main Topics    Smoking status: Former Smoker     Packs/day: 1.00     Quit date: 02/2000    Smokeless tobacco: Not on file    Alcohol use No    Drug  use: No    Sexual activity: Not Currently     Review of Systems   Constitutional: Positive for activity change. Negative for fever.   HENT: Negative for sore throat and trouble swallowing.    Eyes: Negative for visual disturbance.   Respiratory: Negative for chest tightness and shortness of breath.    Cardiovascular: Negative for chest pain.   Gastrointestinal: Negative for abdominal pain, nausea and vomiting.   Genitourinary: Negative for decreased urine volume.   Musculoskeletal: Positive for back pain, gait problem and neck pain. Negative for neck stiffness.   Neurological: Positive for weakness and numbness. Negative for dizziness, syncope and light-headedness.   Psychiatric/Behavioral: Negative for dysphoric mood. The patient is not nervous/anxious.      Objective:     Vital Signs (Most Recent):  Temp: 97.6 °F (36.4 °C) (06/20/17 1422)  Pulse: 70 (06/20/17 1422)  Resp: 16 (06/20/17 1422)  BP: 121/67 (06/20/17 1422)  SpO2: 97 % (06/20/17 1422) Vital Signs (24h Range):  Temp:  [97.6 °F (36.4 °C)] 97.6 °F (36.4 °C)  Pulse:  [70-73] 70  Resp:  [16] 16  SpO2:  [97 %] 97 %  BP: (121-134)/(65-67) 121/67     Weight: 77.2 kg (170 lb 2 oz)  Body mass index is 26.65 kg/m².    Physical Exam   Constitutional: He is oriented to person, place, and time. No distress.   HENT:   Head: Normocephalic and atraumatic.   Eyes: EOM are normal. Pupils are equal, round, and reactive to light. No scleral icterus.   Neck: No JVD present.   Cardiovascular: Normal rate, regular rhythm, normal heart sounds and intact distal pulses.  Exam reveals no gallop and no friction rub.    No murmur heard.  Pulmonary/Chest: Effort normal and breath sounds normal. No respiratory distress. He has no wheezes. He has no rales. He exhibits no tenderness.   Abdominal: Soft. Bowel sounds are normal. He exhibits no distension and no mass. There is no tenderness. There is no guarding.   Musculoskeletal: Normal range of motion. He exhibits no edema, tenderness  or deformity.   Neurological: He is alert and oriented to person, place, and time. He displays no tremor.   Reflex Scores:       Patellar reflexes are 3+ on the right side and 3+ on the left side.  Skin: Skin is warm and dry. Capillary refill takes less than 2 seconds. He is not diaphoretic.   Psychiatric: He has a normal mood and affect. His behavior is normal. Judgment and thought content normal.       Significant Labs: BMP: No results for input(s): GLU, NA, K, CL, CO2, BUN, CREATININE, CALCIUM, MG in the last 48 hours.  CBC: No results for input(s): WBC, HGB, HCT, PLT in the last 48 hours.  CMP: No results for input(s): NA, K, CL, CO2, GLU, BUN, CREATININE, CALCIUM, PROT, ALBUMIN, BILITOT, ALKPHOS, AST, ALT, ANIONGAP, EGFRNONAA in the last 48 hours.    Invalid input(s): ESTGFAFR[place]    Significant Imaging: I have reviewed all pertinent imaging results/findings within the past 24 hours.      MRI Cervical, Thoracic and Lumbar Spine Without Contrast  Impression       1. Multilevel degenerative change of the cervical spine with varying degrees of spinal canal and neural foraminal narrowing, most pronounced at the C3-C4 level there is there is severe spinal canal stenosis as detailed above.    2.  Mild degenerative change of the thoracic spine without evidence of significant spinal canal or neural foraminal narrowing.    3.  Multilevel degenerative change of the lumbar spine resulting in varying degrees of spinal canal or neuroforaminal narrowing, most pronounced at the L4-L5 level where there is severe spinal canal stenosis and moderate bilateral neural foraminal narrowing.         Assessment/Plan:     65 yo man with severe cervical spinal and lumbar spinal stenosis admitted after recent multiple falls due to myelopathy in preparation for posterior cervical laminectomy C3-4.     Recommendations:     1. Obtain baseline EKG   2. Repeat BMP to monitor renal function given his regular use of NSAIDs for chronic back pain.    3. RCRI score of 0 points which places him at 0.4% risk of perioperative cardiac events. No further testing is recommended, proceed with surgery.       Revised Cardiac Risk Index   High risk surgery (intraperitoneal, intrathoracic, or suprainguinal vascular): No  History of ischemic heart disease: No  History of congestive heart failure: No  History of stroke: No  Insulin dependent diabetes: No  Creatinine > 2: No  0 points, class I risk, 0.4% risk of cardiac event   2014 ACC/AHA Perioperative Guidelines  Known or risk factors for CAD: Yes  High risk of MACE: No  Functional capacity < 4 METs: No, proceed without further testing       VTE Risk Mitigation         Ordered     Medium Risk of VTE  Once      06/20/17 1427     Place DENEEN hose  Until discontinued      06/20/17 1427     Place sequential compression device  Until discontinued      06/20/17 1427        Thank you for your consult. I will sign off. Please contact us if you have any additional questions.    Deirdre Torres MD  Department of Hospital Medicine   [facility]

## 2017-06-20 NOTE — PLAN OF CARE
Problem: Patient Care Overview  Goal: Plan of Care Review  Patient resting in bed. Alert and oriented x 4. Able to turn/ reposition in bed. Unable to bear weight to BLE. Fall precautions maintained at all times.  NPO post MN instructed to patient for surgery tomorrow.

## 2017-06-20 NOTE — HPI
Anthony Miguel is a 66 y.o. male directly admitted from [facility] PA-C's clinic for cervical myelopathy. He has experienced progressive weakness and paresthesias in his arms and legs for approximately one month. He is typically independent with ADLs, but has recently been unable to ambulate and has experienced difficulty with fine motor movements, such as playing piano, writing, etc. He endorses paresthesias throughout his upper extremities, and occasionally in his lower extremities. He has minimal neck pain. He denies b/b dysfunction or saddle anesthesia.

## 2017-06-20 NOTE — LETTER
June 20, 2017      Jada Spaulding MD  [address]  [facility] [address]           [facility]  [address], [address]  [facility] [address]  Phone: [phone]  Fax: [phone]          Patient: Anthony Miguel   MR Number: [id number]   YOB: 1950   Date of Visit: 6/20/2017       Dear Dr. Jada Spaulding:    Thank you for referring Anthony Miguel to me for evaluation. Attached you will find relevant portions of my assessment and plan of care.    If you have questions, please do not hesitate to call me. I look forward to following Anthony Miguel along with you.    Sincerely,    NEERAJ Ramososure  CC:  No Recipients    If you would like to receive this communication electronically, please contact [email or web address] or [phone] to request more information on [facility] Link access.    For providers and/or their staff who would like to refer a patient to [facility], please contact us through our one-stop-shop provider referral line, [facility], at [phone].    If you feel you have received this communication in error or would no longer like to receive these types of communications, please e-mail [email or web address]

## 2017-06-20 NOTE — H&P
[facility]  History and Physical     Patient Name: Anthony Miguel  MRN: [id number]  Admission Date: 6/20/2017  Attending Physician: David Wolff MD   Primary Care Physician: Jada Spaulding MD    Patient information was obtained from patient.     Subjective:     Chief Complaint/Reason for Admission: cervical myelopathy    HPI:  Anthony Miugel is a 66 y.o. male directly admitted from Daiys Rivera PA-C's clinic for cervical myelopathy. He has experienced progressive weakness and paresthesias in his arms and legs for approximately one month. He is typically independent with ADLs, but has recently been unable to ambulate and has experienced difficulty with fine motor movements, such as playing piano, writing, etc. He endorses paresthesias throughout his upper extremities, and occasionally in his lower extremities. He has minimal neck pain. He denies b/b dysfunction or saddle anesthesia.     Prescriptions Prior to Admission   Medication Sig Dispense Refill Last Dose    acetaminophen (TYLENOL) 500 mg Cap Take by mouth.   Not Taking    aspirin (ECOTRIN) 81 MG EC tablet Take 81 mg by mouth once daily.   Taking    atorvastatin (LIPITOR) 10 MG tablet Take 10 mg by mouth once daily.   Not Taking    finasteride (PROSCAR) 5 mg tablet Take 5 mg by mouth once daily.   Taking    ibuprofen (ADVIL,MOTRIN) 400 MG tablet Take 400 mg by mouth every 6 (six) hours as needed for Other.   Taking    loratadine (CLARITIN) 10 mg tablet Take 10 mg by mouth once daily.   Not Taking    TRAZODONE HCL (TRAZODONE ORAL) Take by mouth.   Taking    triamcinolone acetonide 0.1% (KENALOG) 0.1 % Lotn Apply topically 2 (two) times daily.   Not Taking    vitamin D 1000 units Tab Take 1,000 Units by mouth once daily.   Not Taking    walker (ULTRA-LIGHT ROLLATOR) Misc 1 Units by Misc.(Non-Drug; Combo Route) route once daily at 6am. 1 each 0 Not Taking       Review of patient's allergies indicates:  No  Known Allergies    Past Medical History:   Diagnosis Date    Hepatitis C      Past Surgical History:   Procedure Laterality Date    Larangoscopy       Family History     Problem Relation (Age of Onset)    Cancer Mother    No Known Problems Father        Social History Main Topics    Smoking status: Former Smoker     Packs/day: 1.00     Quit date: 2000    Smokeless tobacco: Not on file    Alcohol use No    Drug use: No    Sexual activity: Not Currently     Review of Systems  Objective:     Weight: 77.2 kg (170 lb 2 oz)  Body mass index is 26.65 kg/m².  Vital Signs (Most Recent):  Temp: 97.6 °F (36.4 °C) (17 1422)  Pulse: 70 (17 1422)  Resp: 16 (17 1422)  BP: 121/67 (17 1422)  SpO2: 97 % (17 1422) Vital Signs (24h Range):  Temp:  [97.6 °F (36.4 °C)] 97.6 °F (36.4 °C)  Pulse:  [70-73] 70  Resp:  [16] 16  SpO2:  [97 %] 97 %  BP: (121-134)/(65-67) 121/67            Temp (24hrs), Av.6 °F (36.4 °C), Min:97.6 °F (36.4 °C), Max:97.6 °F (36.4 °C)                 Neurosurgery Physical Exam  General: well developed, well nourished, no distress.   Head: normocephalic, atraumatic  Neurologic: Alert and oriented. Thought content appropriate.  GCS: Motor: 6/Verbal: 5/Eyes: 4 GCS Total: 15  Mental Status: Awake, Alert, Oriented x 4  Language: No aphasia  Speech: No dysarthria  Cranial nerves: face symmetric, tongue midline, CN II-XII grossly intact.   Eyes: pupils equal, round, reactive to light with accomodation, EOMI.  Pulmonary: normal respirations, no signs of respiratory distress  Abdomen: soft, non-distended, not tender to palpation  Sensory: intact to light touch throughout    Motor Strength: Moves all extremities spontaneously with good tone.  Full strength upper and lower extremities. No abnormal movements seen.     Strength  Deltoids Triceps Biceps Wrist Extension Wrist Flexion Hand    Upper: R 4+/5 4-/5 4-/5 4-/5 4-/5 4-/5    L      Iliopsoas  Quadriceps Knee  Flexion Tibialis  anterior Gastro- cnemius EHL   Lower: R 3/5 4-/5 4-/5 5/5 5/5 5/5    L 4-/5 4/5 4/5 4+/5 5/5 5/5     DTR's: 4 + and symmetric in UE and LE  Pronator Drift: no drift noted  Finger-to-nose: Intact bilaterally  Mcdonough: present on the left  Clonus: sustained on right, 8 beats on left  Babinski: absent  Pulses: 2+ and symmetric radial and dorsalis pedis.  Skin: Skin is warm, dry and intact.    Significant Labs:  No results for input(s): GLU, NA, K, CL, CO2, BUN, CREATININE, CALCIUM, MG in the last 48 hours.  No results for input(s): WBC, HGB, HCT, PLT in the last 48 hours.  No results for input(s): LABPT, INR, APTT in the last 48 hours.  Microbiology Results (last 7 days)     ** No results found for the last 168 hours. **        Significant Diagnostics:  MRI C spine reviewed. There is multilevel spondylosis with multiple disc herniations, namely of C3-4 which results in severe central and nf stenosis.     Assessment and Plan:     * Cervical myelopathy    - Neurologically stable with s/s of myelopathy on exam.  - HM consulted for preoperative clearance. Appreciate clearance.  - Will obtain c spine xrays with flexion extension and CT c spine for surgical planning.  - OR tomorrow for posterior cervical fusion.  - NPO at midnight.        HLD (hyperlipidemia)    - Restarted home atorvastatin.            Discussed with Dr. Wolff.    JENSEN Metz-C  Neurosurgery  [facility]

## 2017-06-20 NOTE — SUBJECTIVE & OBJECTIVE
Prescriptions Prior to Admission   Medication Sig Dispense Refill Last Dose    acetaminophen (TYLENOL) 500 mg Cap Take by mouth.   Not Taking    aspirin (ECOTRIN) 81 MG EC tablet Take 81 mg by mouth once daily.   Taking    atorvastatin (LIPITOR) 10 MG tablet Take 10 mg by mouth once daily.   Not Taking    finasteride (PROSCAR) 5 mg tablet Take 5 mg by mouth once daily.   Taking    ibuprofen (ADVIL,MOTRIN) 400 MG tablet Take 400 mg by mouth every 6 (six) hours as needed for Other.   Taking    loratadine (CLARITIN) 10 mg tablet Take 10 mg by mouth once daily.   Not Taking    TRAZODONE HCL (TRAZODONE ORAL) Take by mouth.   Taking    triamcinolone acetonide 0.1% (KENALOG) 0.1 % Lotn Apply topically 2 (two) times daily.   Not Taking    vitamin D 1000 units Tab Take 1,000 Units by mouth once daily.   Not Taking    walker (ULTRA-LIGHT ROLLATOR) Misc 1 Units by Misc.(Non-Drug; Combo Route) route once daily at 6am. 1 each 0 Not Taking       Review of patient's allergies indicates:  No Known Allergies    Past Medical History:   Diagnosis Date    Hepatitis C      Past Surgical History:   Procedure Laterality Date    Larangoscopy       Family History     Problem Relation (Age of Onset)    Cancer Mother    No Known Problems Father        Social History Main Topics    Smoking status: Former Smoker     Packs/day: 1.00     Quit date: 2000    Smokeless tobacco: Not on file    Alcohol use No    Drug use: No    Sexual activity: Not Currently     Review of Systems  Objective:     Weight: 77.2 kg (170 lb 2 oz)  Body mass index is 26.65 kg/m².  Vital Signs (Most Recent):  Temp: 97.6 °F (36.4 °C) (17 1422)  Pulse: 70 (17 1422)  Resp: 16 (17 1422)  BP: 121/67 (17 1422)  SpO2: 97 % (17 1422) Vital Signs (24h Range):  Temp:  [97.6 °F (36.4 °C)] 97.6 °F (36.4 °C)  Pulse:  [70-73] 70  Resp:  [16] 16  SpO2:  [97 %] 97 %  BP: (121-134)/(65-67) 121/67            Temp (24hrs), Av.6 °F (36.4  °C), Min:97.6 °F (36.4 °C), Max:97.6 °F (36.4 °C)                 Neurosurgery Physical Exam  General: well developed, well nourished, no distress.   Head: normocephalic, atraumatic  Neurologic: Alert and oriented. Thought content appropriate.  GCS: Motor: 6/Verbal: 5/Eyes: 4 GCS Total: 15  Mental Status: Awake, Alert, Oriented x 4  Language: No aphasia  Speech: No dysarthria  Cranial nerves: face symmetric, tongue midline, CN II-XII grossly intact.   Eyes: pupils equal, round, reactive to light with accomodation, EOMI.  Pulmonary: normal respirations, no signs of respiratory distress  Abdomen: soft, non-distended, not tender to palpation  Sensory: intact to light touch throughout    Motor Strength: Moves all extremities spontaneously with good tone.  Full strength upper and lower extremities. No abnormal movements seen.     Strength  Deltoids Triceps Biceps Wrist Extension Wrist Flexion Hand    Upper: R 4+/5 4-/5 4-/5 4-/5 4-/5 4-/5    L 5/5 4/5 4/5 4/5 4/5 4/5     Iliopsoas Quadriceps Knee  Flexion Tibialis  anterior Gastro- cnemius EHL   Lower: R 3/5 4-/5 4-/5 5/5 5/5 5/5    L 4-/5 4/5 4/5 4+/5 5/5 5/5     DTR's: 4 + and symmetric in UE and LE  Pronator Drift: no drift noted  Finger-to-nose: Intact bilaterally  Mcdonough: present on the left  Clonus: sustained on right, 8 beats on left  Babinski: absent  Pulses: 2+ and symmetric radial and dorsalis pedis.  Skin: Skin is warm, dry and intact.    Significant Labs:  No results for input(s): GLU, NA, K, CL, CO2, BUN, CREATININE, CALCIUM, MG in the last 48 hours.  No results for input(s): WBC, HGB, HCT, PLT in the last 48 hours.  No results for input(s): LABPT, INR, APTT in the last 48 hours.  Microbiology Results (last 7 days)     ** No results found for the last 168 hours. **        Significant Diagnostics:  MRI C spine reviewed. There is multilevel spondylosis with multiple disc herniations, namely of C3-4 which results in severe central and nf stenosis.

## 2017-06-20 NOTE — SUBJECTIVE & OBJECTIVE
Past Medical History:   Diagnosis Date    Hepatitis C        Past Surgical History:   Procedure Laterality Date    Larangoscopy         Review of patient's allergies indicates:  No Known Allergies    No current facility-administered medications on file prior to encounter.      Current Outpatient Prescriptions on File Prior to Encounter   Medication Sig    acetaminophen (TYLENOL) 500 mg Cap Take by mouth.    aspirin (ECOTRIN) 81 MG EC tablet Take 81 mg by mouth once daily.    atorvastatin (LIPITOR) 10 MG tablet Take 10 mg by mouth once daily.    finasteride (PROSCAR) 5 mg tablet Take 5 mg by mouth once daily.    ibuprofen (ADVIL,MOTRIN) 400 MG tablet Take 400 mg by mouth every 6 (six) hours as needed for Other.    loratadine (CLARITIN) 10 mg tablet Take 10 mg by mouth once daily.    TRAZODONE HCL (TRAZODONE ORAL) Take by mouth.    triamcinolone acetonide 0.1% (KENALOG) 0.1 % Lotn Apply topically 2 (two) times daily.    vitamin D 1000 units Tab Take 1,000 Units by mouth once daily.    walker (ULTRA-LIGHT ROLLATOR) Misc 1 Units by Misc.(Non-Drug; Combo Route) route once daily at 6am.     Family History     Problem Relation (Age of Onset)    Cancer Mother    No Known Problems Father        Social History Main Topics    Smoking status: Former Smoker     Packs/day: 1.00     Quit date: 02/2000    Smokeless tobacco: Not on file    Alcohol use No    Drug use: No    Sexual activity: Not Currently     Review of Systems   Constitutional: Positive for activity change. Negative for fever.   HENT: Negative for sore throat and trouble swallowing.    Eyes: Negative for visual disturbance.   Respiratory: Negative for chest tightness and shortness of breath.    Cardiovascular: Negative for chest pain.   Gastrointestinal: Negative for abdominal pain, nausea and vomiting.   Genitourinary: Negative for decreased urine volume.   Musculoskeletal: Positive for back pain, gait problem and neck pain. Negative for neck  stiffness.   Neurological: Positive for weakness and numbness. Negative for dizziness, syncope and light-headedness.   Psychiatric/Behavioral: Negative for dysphoric mood. The patient is not nervous/anxious.      Objective:     Vital Signs (Most Recent):  Temp: 97.6 °F (36.4 °C) (06/20/17 1422)  Pulse: 70 (06/20/17 1422)  Resp: 16 (06/20/17 1422)  BP: 121/67 (06/20/17 1422)  SpO2: 97 % (06/20/17 1422) Vital Signs (24h Range):  Temp:  [97.6 °F (36.4 °C)] 97.6 °F (36.4 °C)  Pulse:  [70-73] 70  Resp:  [16] 16  SpO2:  [97 %] 97 %  BP: (121-134)/(65-67) 121/67     Weight: 77.2 kg (170 lb 2 oz)  Body mass index is 26.65 kg/m².    Physical Exam   Constitutional: He is oriented to person, place, and time. No distress.   HENT:   Head: Normocephalic and atraumatic.   Eyes: EOM are normal. Pupils are equal, round, and reactive to light. No scleral icterus.   Neck: No JVD present.   Cardiovascular: Normal rate, regular rhythm, normal heart sounds and intact distal pulses.  Exam reveals no gallop and no friction rub.    No murmur heard.  Pulmonary/Chest: Effort normal and breath sounds normal. No respiratory distress. He has no wheezes. He has no rales. He exhibits no tenderness.   Abdominal: Soft. Bowel sounds are normal. He exhibits no distension and no mass. There is no tenderness. There is no guarding.   Musculoskeletal: Normal range of motion. He exhibits no edema, tenderness or deformity.   Neurological: He is alert and oriented to person, place, and time. He displays no tremor.   Reflex Scores:       Patellar reflexes are 3+ on the right side and 3+ on the left side.  Skin: Skin is warm and dry. Capillary refill takes less than 2 seconds. He is not diaphoretic.   Psychiatric: He has a normal mood and affect. His behavior is normal. Judgment and thought content normal.       Significant Labs: BMP: No results for input(s): GLU, NA, K, CL, CO2, BUN, CREATININE, CALCIUM, MG in the last 48 hours.  CBC: No results for input(s):  WBC, HGB, HCT, PLT in the last 48 hours.  CMP: No results for input(s): NA, K, CL, CO2, GLU, BUN, CREATININE, CALCIUM, PROT, ALBUMIN, BILITOT, ALKPHOS, AST, ALT, ANIONGAP, EGFRNONAA in the last 48 hours.    Invalid input(s): ESTGFAFRICA    Significant Imaging: I have reviewed all pertinent imaging results/findings within the past 24 hours.

## 2017-06-21 ENCOUNTER — ANESTHESIA (OUTPATIENT)
Dept: SURGERY | Facility: HOSPITAL | Age: 67
DRG: 472 | End: 2017-06-21
Payer: MEDICARE

## 2017-06-21 LAB
ABO + RH BLD: NORMAL
ANION GAP SERPL CALC-SCNC: 9 MMOL/L
BASOPHILS # BLD AUTO: 0.03 K/UL
BASOPHILS NFR BLD: 0.4 %
BLD GP AB SCN CELLS X3 SERPL QL: NORMAL
BUN SERPL-MCNC: 21 MG/DL
CALCIUM SERPL-MCNC: 8.5 MG/DL
CHLORIDE SERPL-SCNC: 108 MMOL/L
CO2 SERPL-SCNC: 23 MMOL/L
CREAT SERPL-MCNC: 0.9 MG/DL
DIFFERENTIAL METHOD: ABNORMAL
EOSINOPHIL # BLD AUTO: 0.5 K/UL
EOSINOPHIL NFR BLD: 6.8 %
ERYTHROCYTE [DISTWIDTH] IN BLOOD BY AUTOMATED COUNT: 12.9 %
EST. GFR  (AFRICAN AMERICAN): >60 ML/MIN/1.73 M^2
EST. GFR  (NON AFRICAN AMERICAN): >60 ML/MIN/1.73 M^2
GLUCOSE SERPL-MCNC: 90 MG/DL
HCT VFR BLD AUTO: 39.3 %
HGB BLD-MCNC: 13.5 G/DL
INR PPP: 1
LYMPHOCYTES # BLD AUTO: 2.1 K/UL
LYMPHOCYTES NFR BLD: 29.1 %
MCH RBC QN AUTO: 31.5 PG
MCHC RBC AUTO-ENTMCNC: 34.4 %
MCV RBC AUTO: 92 FL
MONOCYTES # BLD AUTO: 0.6 K/UL
MONOCYTES NFR BLD: 8.8 %
NEUTROPHILS # BLD AUTO: 4 K/UL
NEUTROPHILS NFR BLD: 54.9 %
PLATELET # BLD AUTO: 265 K/UL
PMV BLD AUTO: 11.2 FL
POTASSIUM SERPL-SCNC: 3 MMOL/L
PROTHROMBIN TIME: 10.8 SEC
RBC # BLD AUTO: 4.29 M/UL
SODIUM SERPL-SCNC: 140 MMOL/L
WBC # BLD AUTO: 7.31 K/UL

## 2017-06-21 PROCEDURE — 63600175 PHARM REV CODE 636 W HCPCS: Performed by: NURSE ANESTHETIST, CERTIFIED REGISTERED

## 2017-06-21 PROCEDURE — 85025 COMPLETE CBC W/AUTO DIFF WBC: CPT

## 2017-06-21 PROCEDURE — 63600175 PHARM REV CODE 636 W HCPCS: Performed by: STUDENT IN AN ORGANIZED HEALTH CARE EDUCATION/TRAINING PROGRAM

## 2017-06-21 PROCEDURE — D9220A PRA ANESTHESIA: Mod: ANES,,, | Performed by: ANESTHESIOLOGY

## 2017-06-21 PROCEDURE — 27000221 HC OXYGEN, UP TO 24 HOURS

## 2017-06-21 PROCEDURE — 85610 PROTHROMBIN TIME: CPT

## 2017-06-21 PROCEDURE — C1713 ANCHOR/SCREW BN/BN,TIS/BN: HCPCS | Performed by: NEUROLOGICAL SURGERY

## 2017-06-21 PROCEDURE — 25000003 PHARM REV CODE 250: Performed by: ANESTHESIOLOGY

## 2017-06-21 PROCEDURE — 27201423 OPTIME MED/SURG SUP & DEVICES STERILE SUPPLY: Performed by: NEUROLOGICAL SURGERY

## 2017-06-21 PROCEDURE — 37000008 HC ANESTHESIA 1ST 15 MINUTES: Performed by: NEUROLOGICAL SURGERY

## 2017-06-21 PROCEDURE — 25000003 PHARM REV CODE 250: Performed by: STUDENT IN AN ORGANIZED HEALTH CARE EDUCATION/TRAINING PROGRAM

## 2017-06-21 PROCEDURE — 27100025 HC TUBING, SET FLUID WARMER: Performed by: ANESTHESIOLOGY

## 2017-06-21 PROCEDURE — 36000710: Performed by: NEUROLOGICAL SURGERY

## 2017-06-21 PROCEDURE — 99024 POSTOP FOLLOW-UP VISIT: CPT | Mod: ,,, | Performed by: PHYSICIAN ASSISTANT

## 2017-06-21 PROCEDURE — 63600175 PHARM REV CODE 636 W HCPCS: Performed by: ANESTHESIOLOGY

## 2017-06-21 PROCEDURE — 27200677 HC TRANSDUCER MONITOR KIT SINGLE: Performed by: ANESTHESIOLOGY

## 2017-06-21 PROCEDURE — C1751 CATH, INF, PER/CENT/MIDLINE: HCPCS | Performed by: ANESTHESIOLOGY

## 2017-06-21 PROCEDURE — 25000003 PHARM REV CODE 250: Performed by: NURSE ANESTHETIST, CERTIFIED REGISTERED

## 2017-06-21 PROCEDURE — C1729 CATH, DRAINAGE: HCPCS | Performed by: NEUROLOGICAL SURGERY

## 2017-06-21 PROCEDURE — 37000009 HC ANESTHESIA EA ADD 15 MINS: Performed by: NEUROLOGICAL SURGERY

## 2017-06-21 PROCEDURE — 71000033 HC RECOVERY, INTIAL HOUR: Performed by: NEUROLOGICAL SURGERY

## 2017-06-21 PROCEDURE — 63600175 PHARM REV CODE 636 W HCPCS: Performed by: NEUROLOGICAL SURGERY

## 2017-06-21 PROCEDURE — 71000039 HC RECOVERY, EACH ADD'L HOUR: Performed by: NEUROLOGICAL SURGERY

## 2017-06-21 PROCEDURE — 25000003 PHARM REV CODE 250: Performed by: PHYSICIAN ASSISTANT

## 2017-06-21 PROCEDURE — 86901 BLOOD TYPING SEROLOGIC RH(D): CPT

## 2017-06-21 PROCEDURE — 86900 BLOOD TYPING SEROLOGIC ABO: CPT

## 2017-06-21 PROCEDURE — 36000711: Performed by: NEUROLOGICAL SURGERY

## 2017-06-21 PROCEDURE — 36415 COLL VENOUS BLD VENIPUNCTURE: CPT

## 2017-06-21 PROCEDURE — 80048 BASIC METABOLIC PNL TOTAL CA: CPT

## 2017-06-21 PROCEDURE — 20600001 HC STEP DOWN PRIVATE ROOM

## 2017-06-21 PROCEDURE — 25000003 PHARM REV CODE 250: Performed by: NEUROLOGICAL SURGERY

## 2017-06-21 PROCEDURE — D9220A PRA ANESTHESIA: Mod: CRNA,,, | Performed by: NURSE ANESTHETIST, CERTIFIED REGISTERED

## 2017-06-21 PROCEDURE — 94761 N-INVAS EAR/PLS OXIMETRY MLT: CPT

## 2017-06-21 PROCEDURE — 27201037 HC PRESSURE MONITORING SET UP

## 2017-06-21 DEVICE — SET SCREW SPINAL INNER NUT: Type: IMPLANTABLE DEVICE | Site: SPINE CERVICAL | Status: FUNCTIONAL

## 2017-06-21 DEVICE — SCREW BONE SPINAL 3.5 X 14MM: Type: IMPLANTABLE DEVICE | Site: SPINE CERVICAL | Status: FUNCTIONAL

## 2017-06-21 DEVICE — NUT SPINAL OUTER CONNECTOR: Type: IMPLANTABLE DEVICE | Site: SPINE CERVICAL | Status: FUNCTIONAL

## 2017-06-21 DEVICE — ROD SPINAL OCT 3.5 X 60MM: Type: IMPLANTABLE DEVICE | Site: SPINE CERVICAL | Status: FUNCTIONAL

## 2017-06-21 DEVICE — IMPLANTABLE DEVICE: Type: IMPLANTABLE DEVICE | Site: SPINE CERVICAL | Status: FUNCTIONAL

## 2017-06-21 DEVICE — SET SCREW BONE SPINAL CROSS: Type: IMPLANTABLE DEVICE | Site: SPINE CERVICAL | Status: FUNCTIONAL

## 2017-06-21 DEVICE — ROD SPINAL MOUNTAINEER 3.5X55: Type: IMPLANTABLE DEVICE | Site: SPINE CERVICAL | Status: FUNCTIONAL

## 2017-06-21 RX ORDER — KETAMINE HYDROCHLORIDE 100 MG/ML
INJECTION, SOLUTION INTRAMUSCULAR; INTRAVENOUS
Status: DISCONTINUED | OUTPATIENT
Start: 2017-06-21 | End: 2017-06-21

## 2017-06-21 RX ORDER — REMIFENTANIL HYDROCHLORIDE 1 MG/ML
INJECTION, POWDER, LYOPHILIZED, FOR SOLUTION INTRAVENOUS CONTINUOUS PRN
Status: DISCONTINUED | OUTPATIENT
Start: 2017-06-21 | End: 2017-06-21

## 2017-06-21 RX ORDER — ONDANSETRON 2 MG/ML
4 INJECTION INTRAMUSCULAR; INTRAVENOUS DAILY PRN
Status: DISCONTINUED | OUTPATIENT
Start: 2017-06-21 | End: 2017-06-21 | Stop reason: HOSPADM

## 2017-06-21 RX ORDER — HEPARIN SODIUM 5000 [USP'U]/ML
5000 INJECTION, SOLUTION INTRAVENOUS; SUBCUTANEOUS EVERY 8 HOURS
Status: DISCONTINUED | OUTPATIENT
Start: 2017-06-22 | End: 2017-06-29 | Stop reason: HOSPADM

## 2017-06-21 RX ORDER — SODIUM CHLORIDE 0.9 % (FLUSH) 0.9 %
3 SYRINGE (ML) INJECTION EVERY 8 HOURS
Status: DISCONTINUED | OUTPATIENT
Start: 2017-06-21 | End: 2017-06-21 | Stop reason: HOSPADM

## 2017-06-21 RX ORDER — ONDANSETRON 2 MG/ML
INJECTION INTRAMUSCULAR; INTRAVENOUS
Status: DISCONTINUED | OUTPATIENT
Start: 2017-06-21 | End: 2017-06-21

## 2017-06-21 RX ORDER — HYDROMORPHONE HYDROCHLORIDE 1 MG/ML
1 INJECTION, SOLUTION INTRAMUSCULAR; INTRAVENOUS; SUBCUTANEOUS EVERY 4 HOURS PRN
Status: DISCONTINUED | OUTPATIENT
Start: 2017-06-21 | End: 2017-06-29 | Stop reason: HOSPADM

## 2017-06-21 RX ORDER — SODIUM CHLORIDE 0.9 % (FLUSH) 0.9 %
3 SYRINGE (ML) INJECTION
Status: DISCONTINUED | OUTPATIENT
Start: 2017-06-21 | End: 2017-06-29 | Stop reason: HOSPADM

## 2017-06-21 RX ORDER — LABETALOL HYDROCHLORIDE 5 MG/ML
10 INJECTION, SOLUTION INTRAVENOUS EVERY 10 MIN PRN
Status: DISCONTINUED | OUTPATIENT
Start: 2017-06-21 | End: 2017-06-29 | Stop reason: HOSPADM

## 2017-06-21 RX ORDER — PHENYLEPHRINE HYDROCHLORIDE 10 MG/ML
INJECTION INTRAVENOUS
Status: DISCONTINUED | OUTPATIENT
Start: 2017-06-21 | End: 2017-06-21

## 2017-06-21 RX ORDER — LIDOCAINE HCL/PF 100 MG/5ML
SYRINGE (ML) INTRAVENOUS
Status: DISCONTINUED | OUTPATIENT
Start: 2017-06-21 | End: 2017-06-21

## 2017-06-21 RX ORDER — SODIUM CHLORIDE 9 MG/ML
INJECTION, SOLUTION INTRAVENOUS CONTINUOUS PRN
Status: DISCONTINUED | OUTPATIENT
Start: 2017-06-21 | End: 2017-06-21

## 2017-06-21 RX ORDER — BACITRACIN 50000 [IU]/1
INJECTION, POWDER, FOR SOLUTION INTRAMUSCULAR
Status: DISCONTINUED | OUTPATIENT
Start: 2017-06-21 | End: 2017-06-21

## 2017-06-21 RX ORDER — CEFAZOLIN SODIUM 1 G/3ML
INJECTION, POWDER, FOR SOLUTION INTRAMUSCULAR; INTRAVENOUS
Status: DISCONTINUED | OUTPATIENT
Start: 2017-06-21 | End: 2017-06-21

## 2017-06-21 RX ORDER — HYDROMORPHONE HYDROCHLORIDE 1 MG/ML
0.2 INJECTION, SOLUTION INTRAMUSCULAR; INTRAVENOUS; SUBCUTANEOUS EVERY 5 MIN PRN
Status: DISCONTINUED | OUTPATIENT
Start: 2017-06-21 | End: 2017-06-21 | Stop reason: HOSPADM

## 2017-06-21 RX ORDER — LORAZEPAM 2 MG/ML
0.25 INJECTION INTRAMUSCULAR EVERY 10 MIN PRN
Status: DISCONTINUED | OUTPATIENT
Start: 2017-06-21 | End: 2017-06-21 | Stop reason: HOSPADM

## 2017-06-21 RX ORDER — DEXAMETHASONE SODIUM PHOSPHATE 4 MG/ML
INJECTION, SOLUTION INTRA-ARTICULAR; INTRALESIONAL; INTRAMUSCULAR; INTRAVENOUS; SOFT TISSUE
Status: DISCONTINUED | OUTPATIENT
Start: 2017-06-21 | End: 2017-06-21

## 2017-06-21 RX ORDER — DIAZEPAM 5 MG/1
5 TABLET ORAL EVERY 6 HOURS PRN
Status: DISCONTINUED | OUTPATIENT
Start: 2017-06-21 | End: 2017-06-29 | Stop reason: HOSPADM

## 2017-06-21 RX ORDER — GLYCOPYRROLATE 0.2 MG/ML
INJECTION INTRAMUSCULAR; INTRAVENOUS
Status: DISCONTINUED | OUTPATIENT
Start: 2017-06-21 | End: 2017-06-21

## 2017-06-21 RX ORDER — HYDRALAZINE HYDROCHLORIDE 20 MG/ML
20 INJECTION INTRAMUSCULAR; INTRAVENOUS EVERY 6 HOURS PRN
Status: DISCONTINUED | OUTPATIENT
Start: 2017-06-21 | End: 2017-06-29 | Stop reason: HOSPADM

## 2017-06-21 RX ORDER — NICARDIPINE HYDROCHLORIDE 2.5 MG/ML
INJECTION INTRAVENOUS
Status: DISCONTINUED | OUTPATIENT
Start: 2017-06-21 | End: 2017-06-21

## 2017-06-21 RX ORDER — CEFAZOLIN SODIUM 1 G/50ML
1 SOLUTION INTRAVENOUS
Status: DISCONTINUED | OUTPATIENT
Start: 2017-06-21 | End: 2017-06-26

## 2017-06-21 RX ORDER — BUPIVACAINE HYDROCHLORIDE AND EPINEPHRINE 2.5; 5 MG/ML; UG/ML
INJECTION, SOLUTION EPIDURAL; INFILTRATION; INTRACAUDAL; PERINEURAL
Status: DISCONTINUED | OUTPATIENT
Start: 2017-06-21 | End: 2017-06-21

## 2017-06-21 RX ORDER — LIDOCAINE HYDROCHLORIDE AND EPINEPHRINE 10; 10 MG/ML; UG/ML
INJECTION, SOLUTION INFILTRATION; PERINEURAL
Status: DISCONTINUED | OUTPATIENT
Start: 2017-06-21 | End: 2017-06-21

## 2017-06-21 RX ORDER — BACITRACIN ZINC 500 UNIT/G
OINTMENT (GRAM) TOPICAL
Status: DISCONTINUED | OUTPATIENT
Start: 2017-06-21 | End: 2017-06-21

## 2017-06-21 RX ORDER — DIAZEPAM 10 MG/2ML
5 INJECTION INTRAMUSCULAR EVERY 6 HOURS
Status: COMPLETED | OUTPATIENT
Start: 2017-06-21 | End: 2017-06-22

## 2017-06-21 RX ORDER — PROPOFOL 10 MG/ML
VIAL (ML) INTRAVENOUS CONTINUOUS PRN
Status: DISCONTINUED | OUTPATIENT
Start: 2017-06-21 | End: 2017-06-21

## 2017-06-21 RX ORDER — MEPERIDINE HYDROCHLORIDE 50 MG/ML
12.5 INJECTION INTRAMUSCULAR; INTRAVENOUS; SUBCUTANEOUS EVERY 10 MIN PRN
Status: DISCONTINUED | OUTPATIENT
Start: 2017-06-21 | End: 2017-06-21 | Stop reason: HOSPADM

## 2017-06-21 RX ORDER — OXYCODONE AND ACETAMINOPHEN 7.5; 325 MG/1; MG/1
1 TABLET ORAL EVERY 4 HOURS PRN
Status: DISCONTINUED | OUTPATIENT
Start: 2017-06-21 | End: 2017-06-27

## 2017-06-21 RX ORDER — VANCOMYCIN HYDROCHLORIDE 1 G/20ML
INJECTION, POWDER, LYOPHILIZED, FOR SOLUTION INTRAVENOUS
Status: DISCONTINUED | OUTPATIENT
Start: 2017-06-21 | End: 2017-06-21

## 2017-06-21 RX ORDER — HYDROMORPHONE HYDROCHLORIDE 1 MG/ML
0.2 INJECTION, SOLUTION INTRAMUSCULAR; INTRAVENOUS; SUBCUTANEOUS EVERY 5 MIN PRN
Status: DISCONTINUED | OUTPATIENT
Start: 2017-06-21 | End: 2017-06-21

## 2017-06-21 RX ORDER — FENTANYL CITRATE 50 UG/ML
INJECTION, SOLUTION INTRAMUSCULAR; INTRAVENOUS
Status: DISCONTINUED | OUTPATIENT
Start: 2017-06-21 | End: 2017-06-21

## 2017-06-21 RX ORDER — PROPOFOL 10 MG/ML
VIAL (ML) INTRAVENOUS
Status: DISCONTINUED | OUTPATIENT
Start: 2017-06-21 | End: 2017-06-21

## 2017-06-21 RX ADMIN — MIDAZOLAM HYDROCHLORIDE 2 MG: 1 INJECTION, SOLUTION INTRAMUSCULAR; INTRAVENOUS at 11:06

## 2017-06-21 RX ADMIN — ONDANSETRON 4 MG: 2 INJECTION INTRAMUSCULAR; INTRAVENOUS at 03:06

## 2017-06-21 RX ADMIN — ATORVASTATIN CALCIUM 10 MG: 10 TABLET, FILM COATED ORAL at 10:06

## 2017-06-21 RX ADMIN — EPHEDRINE SULFATE 15 MG: 50 INJECTION, SOLUTION INTRAMUSCULAR; INTRAVENOUS; SUBCUTANEOUS at 12:06

## 2017-06-21 RX ADMIN — Medication 0.3 MCG/KG/MIN: at 12:06

## 2017-06-21 RX ADMIN — CEFAZOLIN 2 G: 1 INJECTION, POWDER, FOR SOLUTION INTRAVENOUS at 01:06

## 2017-06-21 RX ADMIN — PROPOFOL 150 MG: 10 INJECTION, EMULSION INTRAVENOUS at 12:06

## 2017-06-21 RX ADMIN — DEXAMETHASONE SODIUM PHOSPHATE 12 MG: 4 INJECTION, SOLUTION INTRAMUSCULAR; INTRAVENOUS at 01:06

## 2017-06-21 RX ADMIN — DIAZEPAM 5 MG: 5 INJECTION, SOLUTION INTRAMUSCULAR; INTRAVENOUS at 06:06

## 2017-06-21 RX ADMIN — HYDROCODONE BITARTRATE AND ACETAMINOPHEN 1 TABLET: 5; 325 TABLET ORAL at 02:06

## 2017-06-21 RX ADMIN — PROPOFOL: 10 INJECTION, EMULSION INTRAVENOUS at 01:06

## 2017-06-21 RX ADMIN — EPHEDRINE SULFATE 10 MG: 50 INJECTION, SOLUTION INTRAMUSCULAR; INTRAVENOUS; SUBCUTANEOUS at 12:06

## 2017-06-21 RX ADMIN — PROPOFOL 50 MG: 10 INJECTION, EMULSION INTRAVENOUS at 01:06

## 2017-06-21 RX ADMIN — PHENYLEPHRINE HYDROCHLORIDE 100 MCG: 10 INJECTION INTRAVENOUS at 01:06

## 2017-06-21 RX ADMIN — FENTANYL CITRATE 50 MCG: 50 INJECTION, SOLUTION INTRAMUSCULAR; INTRAVENOUS at 03:06

## 2017-06-21 RX ADMIN — SODIUM CHLORIDE, SODIUM GLUCONATE, SODIUM ACETATE, POTASSIUM CHLORIDE, MAGNESIUM CHLORIDE, SODIUM PHOSPHATE, DIBASIC, AND POTASSIUM PHOSPHATE: .53; .5; .37; .037; .03; .012; .00082 INJECTION, SOLUTION INTRAVENOUS at 01:06

## 2017-06-21 RX ADMIN — NICARDIPINE HYDROCHLORIDE 100 MCG: 2.5 INJECTION INTRAVENOUS at 01:06

## 2017-06-21 RX ADMIN — FENTANYL CITRATE 150 MCG: 50 INJECTION, SOLUTION INTRAMUSCULAR; INTRAVENOUS at 12:06

## 2017-06-21 RX ADMIN — EPHEDRINE SULFATE 10 MG: 50 INJECTION, SOLUTION INTRAMUSCULAR; INTRAVENOUS; SUBCUTANEOUS at 01:06

## 2017-06-21 RX ADMIN — LIDOCAINE HYDROCHLORIDE 100 MG: 20 INJECTION, SOLUTION INTRAVENOUS at 12:06

## 2017-06-21 RX ADMIN — PROPOFOL 100 MCG/KG/MIN: 10 INJECTION, EMULSION INTRAVENOUS at 12:06

## 2017-06-21 RX ADMIN — FINASTERIDE 5 MG: 5 TABLET, FILM COATED ORAL at 10:06

## 2017-06-21 RX ADMIN — CEFAZOLIN SODIUM 1 G: 1 SOLUTION INTRAVENOUS at 09:06

## 2017-06-21 RX ADMIN — SODIUM CHLORIDE: 0.9 INJECTION, SOLUTION INTRAVENOUS at 12:06

## 2017-06-21 RX ADMIN — PHENYLEPHRINE HYDROCHLORIDE 0.25 MCG/KG/MIN: 10 INJECTION INTRAVENOUS at 01:06

## 2017-06-21 RX ADMIN — GLYCOPYRROLATE 0.2 MG: 0.2 INJECTION, SOLUTION INTRAMUSCULAR; INTRAVENOUS at 12:06

## 2017-06-21 RX ADMIN — OXYCODONE AND ACETAMINOPHEN 1 TABLET: 7.5; 325 TABLET ORAL at 09:06

## 2017-06-21 RX ADMIN — GLYCOPYRROLATE 0.2 MG: 0.2 INJECTION, SOLUTION INTRAMUSCULAR; INTRAVENOUS at 01:06

## 2017-06-21 RX ADMIN — OXYCODONE AND ACETAMINOPHEN 1 TABLET: 7.5; 325 TABLET ORAL at 05:06

## 2017-06-21 RX ADMIN — SODIUM CHLORIDE: 0.9 INJECTION, SOLUTION INTRAVENOUS at 11:06

## 2017-06-21 RX ADMIN — SODIUM CHLORIDE, SODIUM GLUCONATE, SODIUM ACETATE, POTASSIUM CHLORIDE, MAGNESIUM CHLORIDE, SODIUM PHOSPHATE, DIBASIC, AND POTASSIUM PHOSPHATE: .53; .5; .37; .037; .03; .012; .00082 INJECTION, SOLUTION INTRAVENOUS at 03:06

## 2017-06-21 RX ADMIN — DIAZEPAM 5 MG: 5 INJECTION, SOLUTION INTRAMUSCULAR; INTRAVENOUS at 11:06

## 2017-06-21 RX ADMIN — KETAMINE HYDROCHLORIDE 30 MG: 100 INJECTION, SOLUTION, CONCENTRATE INTRAMUSCULAR; INTRAVENOUS at 01:06

## 2017-06-21 NOTE — PLAN OF CARE
Pt lives alone w/ no family close by  Pt scheduled to go to OR today. Explained to pt that Physical Therapy will eval pt post op and determine best option upon d/c   Pt requested SNF/Rehab since he does live alone. Instructed again on PT eval and also Insurance auth.  Pt states understanding    Pt can call a friend for transportation if needed    Jada Spaulding MD    [facility] [employer] [address] [place], [place] - [address] [facility]  [address]  [facility] [address]  Phone: [phone] Fax: [phone]    [employer] [address] [place], [place] - [address]  [address]  [facility] [address]  Phone: [phone] Fax: [phone]       06/21/17 1133   Discharge Assessment   Assessment Type Discharge Planning Assessment   Confirmed/corrected address and phone number on facesheet? Yes   Assessment information obtained from? Patient   Expected Length of Stay (days) 3   Communicated expected length of stay with patient/caregiver yes   Prior to hospitilization cognitive status: Alert/Oriented   Prior to hospitalization functional status: Independent   Current cognitive status: Alert/Oriented   Current Functional Status: Independent   Arrived From home or self-care   Lives With alone   Able to Return to Prior Arrangements unable to determine at this time (comments)   Is patient able to care for self after discharge? Unable to determine at this time (comments)   How many people do you have in your home that can help with your care after discharge? 0   Who are your caregiver(s) and their phone number(s)? Chu Worthington  friend  [phone]   Patient's perception of discharge disposition home health;skilled nursing facility   Readmission Within The Last 30 Days no previous admission in last 30 days   Patient currently being followed by outpatient case management? No   Patient currently receives home  health services? No   Does the patient currently use HME? Yes   Equipment Currently Used at Home cane, straight;rollator;walker, rolling   Do you have any problems affording any of your prescribed medications? No   Is the patient taking medications as prescribed? yes   Do you have any financial concerns preventing you from receiving the healthcare you need? No   Does the patient have transportation to healthcare appointments? Yes   Transportation Available family or friend will provide   On Dialysis? No   Does the patient receive services at the Coumadin Clinic? No   Are there any open cases? No   Discharge Plan A Skilled Nursing Facility   Discharge Plan B Home with family   Patient/Family In Agreement With Plan yes

## 2017-06-21 NOTE — PROGRESS NOTES
[facility]  Neurosurgery  Progress Note    Subjective:     History of Present Illness: Anthony Miguel is a 66 y.o. male directly admitted from Daisy Rivera PA-C's clinic for cervical myelopathy. He has experienced progressive weakness and paresthesias in his arms and legs for approximately one month. He is typically independent with ADLs, but has recently been unable to ambulate and has experienced difficulty with fine motor movements, such as playing piano, writing, etc. He endorses paresthesias throughout his upper extremities, and occasionally in his lower extremities. He has minimal neck pain. He denies b/b dysfunction or saddle anesthesia.     Post-Op Info:  Procedure(s) (LRB):  LAMINECTOMY-CERVICAL/FUSION-POSTERIOR; C3-6 (N/A)   Day of Surgery     Interval History: NAEON. NPO since MN for OR today. Patient continues to have posterior neck discomfort and BUE numbness/tingling. Denies and b/b incontinence.     Medications:  Continuous Infusions:   Scheduled Meds:   atorvastatin  10 mg Oral Daily    finasteride  5 mg Oral Daily     PRN Meds:dextrose 50%, dextrose 50%, glucagon (human recombinant), glucose, glucose, glucose, hydrocodone-acetaminophen 5-325mg, trazodone     Review of Systems   + posterior neck discomfort and BUE numbness/tingling  Objective:     Weight: 77.2 kg (170 lb 2 oz)  Body mass index is 26.65 kg/m².  Vital Signs (Most Recent):  Temp: 97.7 °F (36.5 °C) (17 1117)  Pulse: 67 (17 1117)  Resp: 18 (17 1117)  BP: 114/76 (17 0819)  SpO2: 100 % (17 1117) Vital Signs (24h Range):  Temp:  [96.8 °F (36 °C)-97.9 °F (36.6 °C)] 97.7 °F (36.5 °C)  Pulse:  [62-82] 67  Resp:  [16-18] 18  SpO2:  [96 %-100 %] 100 %  BP: (111-141)/(61-86) 114/76              Temp (24hrs), Av.5 °F (36.4 °C), Min:96.8 °F (36 °C), Max:97.9 °F (36.6 °C)           Date 17 0700 - 17 0659   Shift [id number] 1938-4248 3585-1462 24 Hour Total   I  N  T  A  K  E    I.V.  (mL/kg) 1000  (13)   1000  (13)    Shift Total  (mL/kg) 1000  (13)   1000  (13)   O  U  T  P  U  T   Urine  (mL/kg/hr) 375   375    Shift Total  (mL/kg) 375  (4.9)   375  (4.9)   Weight (kg) 77.2 77.2 77.2 77.2          Neurosurgery Physical Exam  General: well developed, well nourished, no distress.   Head: normocephalic, atraumatic  Neurologic: Alert and oriented. Thought content appropriate.  GCS: Motor: 6/Verbal: 5/Eyes: 4 GCS Total: 15  Mental Status: Awake, Alert, Oriented x 4  Language: No aphasia  Speech: No dysarthria  Cranial nerves: face symmetric, tongue midline, CN II-XII grossly intact.   Eyes: pupils equal, round, reactive to light with accomodation, EOMI.  Pulmonary: normal respirations, no signs of respiratory distress  Abdomen: soft, non-distended, not tender to palpation  Sensory: intact to light touch throughout  Motor Strength: Moves all extremities spontaneously with good tone. Full strength upper and lower extremities. No abnormal movements seen.      Strength   Deltoids Triceps Biceps Wrist Extension Wrist Flexion Hand    Upper: R 4+/5 4-/5 4-/5 4-/5 4-/5 4-/5     L 5/5 4/5 4/5 4/5 4/5 4/5       Iliopsoas Quadriceps Knee  Flexion Tibialis  anterior Gastro- cnemius EHL   Lower: R 3/5 4-/5 4-/5 5/5 5/5 5/5     L 4-/5 4/5 4/5 4+/5 5/5 5/5      DTR's: 4 + and symmetric in UE and LE  Pronator Drift: no drift noted  Finger-to-nose: Intact bilaterally  Mcdonough: present on the left  Clonus: present bilaterally  Babinski: absent  Pulses: palpable distal pulses  Skin: Skin is warm, dry and intact.    Significant Labs:    Recent Labs  Lab 06/21/17  0422   GLU 90      K 3.0*      CO2 23   BUN 21   CREATININE 0.9   CALCIUM 8.5*       Recent Labs  Lab 06/21/17  0422   WBC 7.31   HGB 13.5*   HCT 39.3*          Recent Labs  Lab 06/21/17  1016   INR 1.0     Microbiology Results (last 7 days)     ** No results found for the last 168 hours. **          Significant Diagnostics:  CT C  spine and flex/ex C spine: personally reviewed and agree with findings  Degenerative spondylosis throughout. Worst at C5-7. mild anterolisthesis of C4 on C5 and C7 on T1. Additionally, there is minimal retrolisthesis of C5 on C6 and C6 on C7, likely degenerative in nature    Assessment/Plan:     HLD (hyperlipidemia)    - Restarted home atorvastatin.        * Cervical myelopathy    - Neurologically stable with s/s of myelopathy on exam.  - HM consulted for preoperative clearance. Appreciate clearance.  - Reviewed CT c spine and flex/ex xrays  - OR today for posterior cervical fusion.  - Coags and Type/screen ordered  - Will obtain consents.    Discussed with JENSEN Greenwood-C  Neurosurgery  [facility]

## 2017-06-21 NOTE — SUBJECTIVE & OBJECTIVE
Interval History: NAEON. NPO since MN for OR today. Patient continues to have posterior neck discomfort and BUE numbness/tingling. Denies and b/b incontinence.     Medications:  Continuous Infusions:   Scheduled Meds:   atorvastatin  10 mg Oral Daily    finasteride  5 mg Oral Daily     PRN Meds:dextrose 50%, dextrose 50%, glucagon (human recombinant), glucose, glucose, glucose, hydrocodone-acetaminophen 5-325mg, trazodone     Review of Systems   + posterior neck discomfort and BUE numbness/tingling  Objective:     Weight: 77.2 kg (170 lb 2 oz)  Body mass index is 26.65 kg/m².  Vital Signs (Most Recent):  Temp: 97.7 °F (36.5 °C) (17 1117)  Pulse: 67 (17 111)  Resp: 18 (17 111)  BP: 114/76 (17 0819)  SpO2: 100 % (17 111) Vital Signs (24h Range):  Temp:  [96.8 °F (36 °C)-97.9 °F (36.6 °C)] 97.7 °F (36.5 °C)  Pulse:  [62-82] 67  Resp:  [16-18] 18  SpO2:  [96 %-100 %] 100 %  BP: (111-141)/(61-86) 114/76              Temp (24hrs), Av.5 °F (36.4 °C), Min:96.8 °F (36 °C), Max:97.9 °F (36.6 °C)           Date 17 07 - 17 0659   Shift 4752-5704 3534-8647 1584-7845 24 Hour Total   I  N  T  A  K  E   I.V.  (mL/kg) 1000  (13)   1000  (13)    Shift Total  (mL/kg) 1000  (13)   1000  (13)   O  U  T  P  U  T   Urine  (mL/kg/hr) 375   375    Shift Total  (mL/kg) 375  (4.9)   375  (4.9)   Weight (kg) 77.2 77.2 77.2 77.2          Neurosurgery Physical Exam  General: well developed, well nourished, no distress.   Head: normocephalic, atraumatic  Neurologic: Alert and oriented. Thought content appropriate.  GCS: Motor: 6/Verbal: 5/Eyes: 4 GCS Total: 15  Mental Status: Awake, Alert, Oriented x 4  Language: No aphasia  Speech: No dysarthria  Cranial nerves: face symmetric, tongue midline, CN II-XII grossly intact.   Eyes: pupils equal, round, reactive to light with accomodation, EOMI.  Pulmonary: normal respirations, no signs of respiratory distress  Abdomen: soft, non-distended, not  tender to palpation  Sensory: intact to light touch throughout  Motor Strength: Moves all extremities spontaneously with good tone. Full strength upper and lower extremities. No abnormal movements seen.      Strength   Deltoids Triceps Biceps Wrist Extension Wrist Flexion Hand    Upper: R 4+/5 4-/5 4-/5 4-/5 4-/5 4-/5     L 5/5 4/5 4/5 4/5 4/5 4/5       Iliopsoas Quadriceps Knee  Flexion Tibialis  anterior Gastro- cnemius EHL   Lower: R 3/5 4-/5 4-/5 5/5 5/5 5/5     L 4-/5 4/5 4/5 4+/5 5/5 5/5      DTR's: 4 + and symmetric in UE and LE  Pronator Drift: no drift noted  Finger-to-nose: Intact bilaterally  Mcdonough: present on the left  Clonus: present bilaterally  Babinski: absent  Pulses: palpable distal pulses  Skin: Skin is warm, dry and intact.    Significant Labs:    Recent Labs  Lab 06/21/17  0422   GLU 90      K 3.0*      CO2 23   BUN 21   CREATININE 0.9   CALCIUM 8.5*       Recent Labs  Lab 06/21/17  0422   WBC 7.31   HGB 13.5*   HCT 39.3*          Recent Labs  Lab 06/21/17  1016   INR 1.0     Microbiology Results (last 7 days)     ** No results found for the last 168 hours. **          Significant Diagnostics:  CT C spine and flex/ex C spine: personally reviewed and agree with findings  Degenerative spondylosis throughout. Worst at C5-7. mild anterolisthesis of C4 on C5 and C7 on T1. Additionally, there is minimal retrolisthesis of C5 on C6 and C6 on C7, likely degenerative in nature

## 2017-06-21 NOTE — TRANSFER OF CARE
"Anesthesia Transfer of Care Note    Patient: Anthony Miguel    Procedure(s) Performed: Procedure(s) (LRB):  LAMINECTOMY-CERVICAL/FUSION-POSTERIOR; C3-6 (N/A)    Patient location: PACU    Anesthesia Type: general    Transport from OR: Transported from OR on 6-10 L/min O2 by face mask with adequate spontaneous ventilation    Post pain: adequate analgesia    Post assessment: no apparent anesthetic complications and tolerated procedure well    Post vital signs: stable    Level of consciousness: awake and alert    Nausea/Vomiting: no nausea/vomiting    Complications: none    Transfer of care protocol was followed      Last vitals:   Visit Vitals  /61 (BP Location: Right arm, Patient Position: Lying, BP Method: Automatic)   Pulse 97   Temp 36.5 °C (97.7 °F) (Axillary)   Resp 16   Ht 5' 7" (1.702 m)   Wt 77.2 kg (170 lb 2 oz)   SpO2 99%   BMI 26.65 kg/m²     "

## 2017-06-21 NOTE — ANESTHESIA POSTPROCEDURE EVALUATION
"Anesthesia Post Evaluation    Patient: Anthony Miguel    Procedure(s) Performed: Procedure(s) (LRB):  LAMINECTOMY-CERVICAL/FUSION-POSTERIOR; C3-6 (N/A)    Final Anesthesia Type: general  Patient location during evaluation: PACU  Patient participation: Yes- Able to Participate  Level of consciousness: awake and alert  Post-procedure vital signs: reviewed and stable  Pain management: adequate  Airway patency: patent  PONV status at discharge: No PONV  Anesthetic complications: no      Cardiovascular status: hemodynamically stable  Respiratory status: unassisted, spontaneous ventilation and room air  Hydration status: euvolemic  Follow-up not needed.        Visit Vitals  /75 (BP Location: Right arm, Patient Position: Lying, BP Method: Automatic)   Pulse 90   Temp 36.5 °C (97.7 °F) (Axillary)   Resp 18   Ht 5' 7" (1.702 m)   Wt 77.2 kg (170 lb 2 oz)   SpO2 98%   BMI 26.65 kg/m²       Pain/Paulette Score: Pain Assessment Performed: Yes (6/21/2017  4:30 PM)  Presence of Pain: complains of pain/discomfort (6/21/2017  4:30 PM)  Pain Rating Prior to Med Admin: 4 (6/21/2017  5:03 PM)  Pain Rating Post Med Admin: 0 (6/20/2017  9:14 PM)  Paulette Score: 10 (6/21/2017  4:30 PM)      "

## 2017-06-21 NOTE — PROGRESS NOTES
Pt arrived back from surgery. C-Collar in place. Dressing CDI. Hemovac drain to full suction. Pt resting comfortably in bed. VSS. NAD.

## 2017-06-21 NOTE — ANESTHESIA RELEASE NOTE
"Anesthesia Release from PACU Note    Patient: Anthony Miguel    Procedure(s) Performed: Procedure(s) (LRB):  LAMINECTOMY-CERVICAL/FUSION-POSTERIOR; C3-6 (N/A)    Anesthesia type: general    Post pain: Adequate analgesia    Post assessment: no apparent anesthetic complications, tolerated procedure well and no evidence of recall    Last Vitals:   Visit Vitals  /75 (BP Location: Right arm, Patient Position: Lying, BP Method: Automatic)   Pulse 90   Temp 36.5 °C (97.7 °F) (Axillary)   Resp 18   Ht 5' 7" (1.702 m)   Wt 77.2 kg (170 lb 2 oz)   SpO2 98%   BMI 26.65 kg/m²       Post vital signs: stable    Level of consciousness: awake, alert  and oriented    Nausea/Vomiting: no nausea/no vomiting    Complications: none    Airway Patency: patent    Respiratory: unassisted    Cardiovascular: stable and blood pressure at baseline    Hydration: euvolemic  "

## 2017-06-21 NOTE — ASSESSMENT & PLAN NOTE
- Neurologically stable with s/s of myelopathy on exam.  -  consulted for preoperative clearance. Appreciate clearance.  - Reviewed CT c spine and flex/ex xrays  - OR today for posterior cervical fusion.  - Coags and Type/screen ordered  - Will obtain consents.    Discussed with Dr. Wolff

## 2017-06-21 NOTE — ANESTHESIA PROCEDURE NOTES
Arterial    Diagnosis: cervical fusion    Patient location during procedure: done in OR  Procedure start time: 6/21/2017 12:15 PM  Timeout: 6/21/2017 12:15 PM  Procedure end time: 6/21/2017 12:20 PM  Staffing  Anesthesiologist: MARTINEZ MCCRAY  Performed: anesthesiologist   Anesthesiologist was present at the time of the procedure.Arterial  Skin Prep: chlorhexidine gluconate  Orientation: left  Location: radial  Catheter Size: 20 GInsertion Attempts: 2  Assessment  Patient: Tolerated well

## 2017-06-22 LAB
ANION GAP SERPL CALC-SCNC: 9 MMOL/L
BASOPHILS # BLD AUTO: 0 K/UL
BASOPHILS NFR BLD: 0 %
BUN SERPL-MCNC: 13 MG/DL
CALCIUM SERPL-MCNC: 8.4 MG/DL
CHLORIDE SERPL-SCNC: 109 MMOL/L
CO2 SERPL-SCNC: 20 MMOL/L
CREAT SERPL-MCNC: 0.8 MG/DL
DIFFERENTIAL METHOD: ABNORMAL
EOSINOPHIL # BLD AUTO: 0 K/UL
EOSINOPHIL NFR BLD: 0 %
ERYTHROCYTE [DISTWIDTH] IN BLOOD BY AUTOMATED COUNT: 12.9 %
EST. GFR  (AFRICAN AMERICAN): >60 ML/MIN/1.73 M^2
EST. GFR  (NON AFRICAN AMERICAN): >60 ML/MIN/1.73 M^2
GLUCOSE SERPL-MCNC: 120 MG/DL
HCT VFR BLD AUTO: 36.5 %
HGB BLD-MCNC: 12.4 G/DL
LYMPHOCYTES # BLD AUTO: 0.8 K/UL
LYMPHOCYTES NFR BLD: 6.4 %
MCH RBC QN AUTO: 31.1 PG
MCHC RBC AUTO-ENTMCNC: 34 %
MCV RBC AUTO: 92 FL
MONOCYTES # BLD AUTO: 1 K/UL
MONOCYTES NFR BLD: 7.3 %
NEUTROPHILS # BLD AUTO: 11.2 K/UL
NEUTROPHILS NFR BLD: 86.1 %
PLATELET # BLD AUTO: 270 K/UL
PMV BLD AUTO: 11.4 FL
POTASSIUM SERPL-SCNC: 3.7 MMOL/L
RBC # BLD AUTO: 3.99 M/UL
SODIUM SERPL-SCNC: 138 MMOL/L
WBC # BLD AUTO: 13.03 K/UL

## 2017-06-22 PROCEDURE — 20600001 HC STEP DOWN PRIVATE ROOM

## 2017-06-22 PROCEDURE — G8978 MOBILITY CURRENT STATUS: HCPCS | Mod: CK

## 2017-06-22 PROCEDURE — 36415 COLL VENOUS BLD VENIPUNCTURE: CPT

## 2017-06-22 PROCEDURE — G8980 MOBILITY D/C STATUS: HCPCS | Mod: CK

## 2017-06-22 PROCEDURE — 25000003 PHARM REV CODE 250: Performed by: PHYSICIAN ASSISTANT

## 2017-06-22 PROCEDURE — 63600175 PHARM REV CODE 636 W HCPCS: Performed by: STUDENT IN AN ORGANIZED HEALTH CARE EDUCATION/TRAINING PROGRAM

## 2017-06-22 PROCEDURE — 97165 OT EVAL LOW COMPLEX 30 MIN: CPT

## 2017-06-22 PROCEDURE — 97116 GAIT TRAINING THERAPY: CPT

## 2017-06-22 PROCEDURE — 25000003 PHARM REV CODE 250: Performed by: ANESTHESIOLOGY

## 2017-06-22 PROCEDURE — G8979 MOBILITY GOAL STATUS: HCPCS | Mod: CK

## 2017-06-22 PROCEDURE — 85025 COMPLETE CBC W/AUTO DIFF WBC: CPT

## 2017-06-22 PROCEDURE — 99024 POSTOP FOLLOW-UP VISIT: CPT | Mod: ,,, | Performed by: PHYSICIAN ASSISTANT

## 2017-06-22 PROCEDURE — 97161 PT EVAL LOW COMPLEX 20 MIN: CPT

## 2017-06-22 PROCEDURE — 25000003 PHARM REV CODE 250: Performed by: STUDENT IN AN ORGANIZED HEALTH CARE EDUCATION/TRAINING PROGRAM

## 2017-06-22 PROCEDURE — 80048 BASIC METABOLIC PNL TOTAL CA: CPT

## 2017-06-22 RX ORDER — MIDAZOLAM HYDROCHLORIDE 1 MG/ML
INJECTION, SOLUTION INTRAMUSCULAR; INTRAVENOUS
Status: DISCONTINUED | OUTPATIENT
Start: 2017-06-21 | End: 2017-06-22

## 2017-06-22 RX ORDER — DEXAMETHASONE SODIUM PHOSPHATE 4 MG/ML
4 INJECTION, SOLUTION INTRA-ARTICULAR; INTRALESIONAL; INTRAMUSCULAR; INTRAVENOUS; SOFT TISSUE EVERY 6 HOURS
Status: COMPLETED | OUTPATIENT
Start: 2017-06-22 | End: 2017-06-23

## 2017-06-22 RX ADMIN — OXYCODONE AND ACETAMINOPHEN 1 TABLET: 7.5; 325 TABLET ORAL at 12:06

## 2017-06-22 RX ADMIN — OXYCODONE AND ACETAMINOPHEN 1 TABLET: 7.5; 325 TABLET ORAL at 05:06

## 2017-06-22 RX ADMIN — DOCUSATE SODIUM 50 MG: 50 CAPSULE, LIQUID FILLED ORAL at 09:06

## 2017-06-22 RX ADMIN — OXYCODONE AND ACETAMINOPHEN 1 TABLET: 7.5; 325 TABLET ORAL at 09:06

## 2017-06-22 RX ADMIN — HEPARIN SODIUM 5000 UNITS: 5000 INJECTION, SOLUTION INTRAVENOUS; SUBCUTANEOUS at 05:06

## 2017-06-22 RX ADMIN — DOCUSATE SODIUM 50 MG: 50 CAPSULE, LIQUID FILLED ORAL at 11:06

## 2017-06-22 RX ADMIN — CEFAZOLIN SODIUM 1 G: 1 SOLUTION INTRAVENOUS at 12:06

## 2017-06-22 RX ADMIN — OXYCODONE AND ACETAMINOPHEN 1 TABLET: 7.5; 325 TABLET ORAL at 01:06

## 2017-06-22 RX ADMIN — FINASTERIDE 5 MG: 5 TABLET, FILM COATED ORAL at 09:06

## 2017-06-22 RX ADMIN — DIAZEPAM 5 MG: 5 INJECTION, SOLUTION INTRAMUSCULAR; INTRAVENOUS at 05:06

## 2017-06-22 RX ADMIN — ATORVASTATIN CALCIUM 10 MG: 10 TABLET, FILM COATED ORAL at 09:06

## 2017-06-22 RX ADMIN — HEPARIN SODIUM 5000 UNITS: 5000 INJECTION, SOLUTION INTRAVENOUS; SUBCUTANEOUS at 09:06

## 2017-06-22 RX ADMIN — DEXAMETHASONE SODIUM PHOSPHATE 4 MG: 4 INJECTION, SOLUTION INTRAMUSCULAR; INTRAVENOUS at 11:06

## 2017-06-22 RX ADMIN — CEFAZOLIN SODIUM 1 G: 1 SOLUTION INTRAVENOUS at 05:06

## 2017-06-22 RX ADMIN — CEFAZOLIN SODIUM 1 G: 1 SOLUTION INTRAVENOUS at 09:06

## 2017-06-22 RX ADMIN — DIAZEPAM 5 MG: 5 INJECTION, SOLUTION INTRAMUSCULAR; INTRAVENOUS at 11:06

## 2017-06-22 RX ADMIN — DEXAMETHASONE SODIUM PHOSPHATE 4 MG: 4 INJECTION, SOLUTION INTRAMUSCULAR; INTRAVENOUS at 09:06

## 2017-06-22 RX ADMIN — SODIUM CHLORIDE, PRESERVATIVE FREE 3 ML: 5 INJECTION INTRAVENOUS at 05:06

## 2017-06-22 RX ADMIN — HEPARIN SODIUM 5000 UNITS: 5000 INJECTION, SOLUTION INTRAVENOUS; SUBCUTANEOUS at 01:06

## 2017-06-22 RX ADMIN — OXYCODONE AND ACETAMINOPHEN 1 TABLET: 7.5; 325 TABLET ORAL at 06:06

## 2017-06-22 RX ADMIN — DEXAMETHASONE SODIUM PHOSPHATE 4 MG: 4 INJECTION, SOLUTION INTRAMUSCULAR; INTRAVENOUS at 05:06

## 2017-06-22 NOTE — PT/OT/SLP EVAL
Physical Therapy  Evaluation    Anthony Miguel   MRN: [id number]   Admitting Diagnosis: Cervical myelopathy    PT Received On: 06/22/17  PT Start Time: 0800     PT Stop Time: 0821    PT Total Time (min): 21 min       Billable Minutes:  Evaluation 10 Min and Gait [id number] Min    Diagnosis: Cervical myelopathy      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    Overview:  Right side dx update      Past Surgical History:   Procedure Laterality Date    Larangoscopy         Referring physician: Sandor Walker MD  Date referred to PT: 6/21/17    General Precautions: Standard, fall  Orthopedic Precautions: N/A   Braces: Cervical collar       Do you have any cultural, spiritual, Mormon conflicts, given your current situation?:  (none stated)    Patient History:  Lives With: alone  Living Arrangements: house  Home Accessibility: stairs to enter home  Number of Stairs to Enter Home: 4  Stair Railings at Home: outside, present at both sides  Transportation Available: family or friend will provide (Patient unable to drive. He has occasional help from neighbors when available. )  Living Environment Comment: Patient lives alone in a 1 story home w/ 4STE. Patient states that prior to admission, he was unable to move or transfer w/o the assist of 1-2 people. He states that hsi closes relatives are in [place] and that his neighbors help him when available. Prior to his onset of symptoms before admission, he was independent in all activities.   Equipment Currently Used at Home: wheelchair, walker, rolling, rollator, cane, straight, bath bench  DME owned (not currently used): rolling walker, single point cane, shower  chair, transfer tub bench and wheelchair and rollator    Previous Level of Function:  Ambulation Skills: needs assist  Transfer Skills: needs assist  ADL Skills: needs assist  Work/Leisure Activity: needs assist    Subjective:  Communicated with nurse prior to session.    Chief Complaint: Decreased gross and fine motor coordination of limbs prior to surgery. Weakness, decreased coordination and decreased functional mobility following surgery.   Patient goals: Return to PLOF as described prior to onset of symptoms, play the piano again.    Pain/Comfort  Pain Rating 1: 2/10  Location - Orientation 1: posterior  Location 1: neck (incision site)      Objective:   Patient found with: SCD     Cognitive Exam:  Oriented to: Person, Place, Time and Situation    Follows Commands/attention: Follows multistep  commands  Communication: clear/fluent  Safety awareness/insight to disability: intact    Physical Exam:  Postural examination/scapula alignment: No postural abnormalities identified    Skin integrity: Visible skin intact  Edema: None noted     Sensation:   Intact    Lower Extremity Range of Motion:  Right Lower Extremity: WFL  Left Lower Extremity: WFL    Lower Extremity Strength: Grossly 3+/5  Right Lower Extremity: WFL  Left Lower Extremity: WFL     Fine motor coordination:  Intact   Patient able to perform finger to thumb opposition sequence.     Gross motor coordination: WFL    Functional Mobility:  Bed Mobility:  Rolling/Turning to Left:  (not performed)  Rolling/Turning Right: Minimum assistance  Scooting/Bridging: Contact Guard Assistance (increased time)  Supine to Sit: Minimum Assistance (from R sidelying)  Sit to Supine:  (patient left up in chair)    Transfers:  Sit <> Stand Assistance: Minimum Assistance  Sit <> Stand Assistive Device: Rolling Walker  Bed <> Chair Technique: Stand Pivot  Bed <> Chair Assistance: Contact Guard Assistance    Gait:   Gait Distance: ~60' (Patient displays decreased foot clearance  of RLE. His steps are also small and slow. )  Assistance 1: Contact Guard Assistance  Gait Assistive Device: Rolling walker  Gait Pattern: swing-to gait  Gait Deviation(s): decreased sean, increased time in double stance, decreased velocity of limb motion, decreased step length, decreased stride length, decreased toe-to-floor clearance, decreased weight-shifting ability    Stairs:  Did not perform today    Balance:   Static Sit: NORMAL: No deviations seen in posture held statically  Dynamic Sit: FAIR+: Maintains balance through MINIMAL excursions of active trunk motion  Static Stand: POOR+: Needs MINIMAL assist to maintain  Dynamic stand: FAIR: Needs CONTACT GUARD during gait    Therapeutic Activities and Exercises:  Patient assisted in supine to sit transfer while wearing cervical collar. Patient then ambulated down the hallway then back to the chair. He was left up in chair.     AM-PAC 6 CLICK MOBILITY  How much help from another person does this patient currently need?   1 = Unable, Total/Dependent Assistance  2 = A lot, Maximum/Moderate Assistance  3 = A little, Minimum/Contact Guard/Supervision  4 = None, Modified [place]itus/Independent    Turning over in bed (including adjusting bedclothes, sheets and blankets)?: 3  Sitting down on and standing up from a chair with arms (e.g., wheelchair, bedside commode, etc.): 3  Moving from lying on back to sitting on the side of the bed?: 3  Moving to and from a bed to a chair (including a wheelchair)?: 3  Need to walk in hospital room?: 3  Climbing 3-5 steps with a railing?: 2  Total Score: 17     AM-PAC Raw Score CMS G-Code Modifier Level of Impairment Assistance   6 % Total / Unable   7 - 9 CM 80 - 100% Maximal Assist   10 - 14 CL 60 - 80% Moderate Assist   15 - 19 CK 40 - 60% Moderate Assist   20 - 22 CJ 20 - 40% Minimal Assist   23 CI 1-20% SBA / CGA   24 CH 0% Independent/ Mod I     Patient left up in chair with all lines intact and call button in  reach.    Assessment:   Anthony Miguel is a 66 y.o. male with a medical diagnosis of Cervical myelopathy and presents with weakness, decreased functional mobility, gait instability and impaired coordination. Patient demonstrates good return of motor control (both gross and fine) following surgery. Patient will benefit from skilled PT services to address the listed impairments and help his return to PLOF. It is my clinical suggestion that this patient would benefit most in the IPR setting.     Rehab identified problem list/impairments: Rehab identified problem list/impairments: weakness, impaired endurance, impaired functional mobilty, gait instability, impaired balance, pain, decreased lower extremity function    Rehab potential is good.    Activity tolerance: Good    Discharge recommendations: Discharge Facility/Level Of Care Needs: rehabilitation facility     Barriers to discharge: Barriers to Discharge: Inaccessible home environment, Decreased caregiver support    Equipment recommendations: Equipment Needed After Discharge: none     GOALS:    Physical Therapy Goals        Problem: Physical Therapy Goal    Goal Priority Disciplines Outcome Goal Variances Interventions   Physical Therapy Goal     PT/OT, PT Ongoing (interventions implemented as appropriate)     Description:  Goals to be met by: 17    Patient will increase functional independence with mobility by performin. Supine to sit with Stand-by Assistance- Not Met  2. Sit to supine with Stand-by Assistance- Not Met  3. Rolling to Left and Right with Stand-by Assistance.-Not Met  4. Sit to stand transfer with Stand-by Assistance-Not Met  5. Bed to chair transfer with Stand-by Assistance using Rolling Walker.Not Met  6. Gait  x 100 feet with Stand-by Assistance using Rolling Walker.-Not Met  7. Ascend/descend 4 stair with bilateral Handrails Contact Guard Assistance using Rolling Walker or no AD.-Not Met  8. Stand for 10 minutes with Stand-by  Assistance using Rolling Walker.-Not Met  9. Lower extremity exercise program x15 reps per handout, with independence.-Not Met    6/22/2017  Cliff Mark, PT                        PLAN:    Patient to be seen 5 x/week to address the above listed problems via gait training, therapeutic activities, therapeutic exercises  Plan of Care expires: 07/22/17  Plan of Care reviewed with: patient          Cliff Mark, PT  06/22/2017

## 2017-06-22 NOTE — PLAN OF CARE
Problem: Physical Therapy Goal  Goal: Physical Therapy Goal  Goals to be met by: 17    Patient will increase functional independence with mobility by performin. Supine to sit with Stand-by Assistance- Not Met  2. Sit to supine with Stand-by Assistance- Not Met  3. Rolling to Left and Right with Stand-by Assistance.-Not Met  4. Sit to stand transfer with Stand-by Assistance-Not Met  5. Bed to chair transfer with Stand-by Assistance using Rolling Walker.Not Met  6. Gait  x 100 feet with Stand-by Assistance using Rolling Walker.-Not Met  7. Ascend/descend 4 stair with bilateral Handrails Contact Guard Assistance using Rolling Walker or no AD.-Not Met  8. Stand for 10 minutes with Stand-by Assistance using Rolling Walker.-Not Met  9. Lower extremity exercise program x15 reps per handout, with independence.-Not Met    2017  Cliff Mark, PT      Outcome: Ongoing (interventions implemented as appropriate)  Goals set as appropriate following initial eval.     2017  Cliff Mark, PT

## 2017-06-22 NOTE — PROGRESS NOTES
[facility]  Neurosurgery  Progress Note    Subjective:     History of Present Illness: Anthony Miguel is a 66 y.o. male directly admitted from [facility] PA-C's clinic for cervical myelopathy. He has experienced progressive weakness and paresthesias in his arms and legs for approximately one month. He is typically independent with ADLs, but has recently been unable to ambulate and has experienced difficulty with fine motor movements, such as playing piano, writing, etc. He endorses paresthesias throughout his upper extremities, and occasionally in his lower extremities. He has minimal neck pain. He denies b/b dysfunction or saddle anesthesia.     Post-Op Info:  Procedure(s) (LRB):  LAMINECTOMY-CERVICAL/FUSION-POSTERIOR; C3-6 (N/A)   1 Day Post-Op     Interval History: MASSIEL. S/p C3-6 PCF, POD#1. Patient reports of significant improvements in BUE strength and paresthesias. He ambulated with PT and rolling walker today. Voiding appropriately. Tolerating diet.     Medications:  Continuous Infusions:   Scheduled Meds:   atorvastatin  10 mg Oral Daily    ceFAZolin (ANCEF) IVPB  1 g Intravenous Q8H    dexamethasone  4 mg Intravenous Q6H    diazePAM  5 mg Intravenous Q6H    finasteride  5 mg Oral Daily    heparin (porcine)  5,000 Units Subcutaneous Q8H     PRN Meds:dextrose 50%, dextrose 50%, diazePAM, glucagon (human recombinant), glucose, glucose, glucose, hydrALAZINE, HYDROmorphone, labetalol, oxycodone-acetaminophen, sodium chloride 0.9%, trazodone     Review of Systems  Objective:     Weight: 77.2 kg (170 lb 2 oz)  Body mass index is 26.65 kg/m².  Vital Signs (Most Recent):  Temp: 97.8 °F (36.6 °C) (06/22/17 0759)  Pulse: 61 (06/22/17 0759)  Resp: 18 (06/22/17 0759)  BP: 131/84 (06/22/17 0759)  SpO2: 97 % (06/22/17 0759) Vital Signs (24h Range):  Temp:  [97.3 °F (36.3 °C)-97.9 °F (36.6 °C)] 97.8 °F (36.6 °C)  Pulse:  [] 61  Resp:  [14-20] 18  SpO2:  [95 %-100 %] 97 %  BP:  (104-142)/(61-88) 131/84              Temp (24hrs), Av.6 °F (36.4 °C), Min:97.3 °F (36.3 °C), Max:97.9 °F (36.6 °C)                 Closed/Suction Drain 17 1514 Posterior Neck Accordion 10 Fr. (Active)   Site Description Unable to view 2017  7:59 AM   Dressing Type [place] 2017  7:59 AM   Dressing Status Clean;Dry;Intact 2017  7:59 AM   Dressing Intervention Other (Comment) 2017  8:04 PM   Drainage Serosanguineous 2017  7:59 AM   Status Other (Comment) 2017  7:59 AM   Output (mL) 30 mL 2017  6:00 AM       Neurosurgery Physical Exam  General: well developed, well nourished, no distress.   Head: normocephalic, atraumatic  Neurologic: Alert and oriented. Thought content appropriate.  GCS: Motor: 6/Verbal: 5/Eyes: 4 GCS Total: 15  Mental Status: Awake, Alert, Oriented x 4  Language: No aphasia  Speech: No dysarthria  Cranial nerves: face symmetric, tongue midline, CN II-XII grossly intact.   Eyes: pupils equal, round, reactive to light with accomodation, EOMI.  Pulmonary: normal respirations, no signs of respiratory distress  Abdomen: soft, non-distended, not tender to palpation  Sensory: intact to light touch throughout  Motor Strength: Moves all extremities spontaneously with good tone. Full strength upper and lower extremities. No abnormal movements seen.      Strength   Deltoids Triceps Biceps Wrist Extension Wrist Flexion Hand    Upper: R 5/5 4+/5 4-/5 4-/5 4+/5 4+/5     L 5/5 4+/5 4/5 4/5 4+/5 4+/5       Iliopsoas Quadriceps Knee  Flexion Tibialis  anterior Gastro- cnemius EHL   Lower: R 4/5 4-/5 4-/5 5/5 5/5 5/5     L 4/5 4/5 4/5 4+/5 5/5 5/5      DTR's: 4 + and symmetric in UE and LE  Mcdonough: present on the left  Clonus: present bilaterally  Babinski: absent  Pulses: palpable distal pulses  Skin: Skin is warm, dry and intact.       Significant Labs:    Recent Labs  Lab 17  0424   *      K 3.7      CO2 20*   BUN 13   CREATININE 0.8    CALCIUM 8.4*       Recent Labs  Lab 06/21/17  0422 06/22/17  0424   WBC 7.31 13.03*   HGB 13.5* 12.4*   HCT 39.3* 36.5*    270       Recent Labs  Lab 06/21/17  1016   INR 1.0     Microbiology Results (last 7 days)     ** No results found for the last 168 hours. **            Significant Diagnostics:  Post-op films personally reviewed and showed good hardware position at C3-6 posteriorly.       Assessment/Plan:     HLD (hyperlipidemia)    - Restarted home atorvastatin.        * Cervical myelopathy    -s/p C3-6 PCF, POD#1  -Neurologically stable with signficiant improvements in BUE strength and gait  -Post xrays with good placement of hardware.  -Pain controlled on current regimen.  -Surgical drain: 180cc last 24 hr. Continue drain at 1/2 suction. Nursing staff to empty and document q4-6h.   -Continue antibiotics while drain in place.   -Bacitracin to incision BID starting tomorrow.   -Cervical collar at all times  -PT/OT/OOB daily  -Patient must be OOB for atleast 6 hours daily (may be in intervals: 2 hours in chair with each meal)  -IS to bed side. Patient to use atleast 10x every hour.  -Continue bowel regimen daily.  -Continue Teds and SCDs for DVTP.  -SQH  -Leukocytosis post op: afebrile, likely d/t to steroids.     Dispo: pending rehab       Discussed with Dr. [place] PAZoranC  Neurosurgery  [facility]

## 2017-06-22 NOTE — PLAN OF CARE
MSW met with pt at [facility] who confirms that he would like [facility].  [facility] is currently reviewing.    Yesenia Loco, HAIR  [phone]

## 2017-06-22 NOTE — CONSULTS
PM&R consult received.    Reason for consult:  Assess rehab needs    Reviewed patient history and current admission.  Full consult to follow.    HEBERT Jones, FNP-C  Physical Medicine & Rehabilitation   06/22/2017

## 2017-06-22 NOTE — PT/OT/SLP EVAL
"Occupational Therapy  Evaluation    Anthony Miguel   MRN: [id number]   Admitting Diagnosis: Cervical myelopathy    OT Date of Treatment: 06/22/17   OT Start Time: 1041  OT Stop Time: 1108  OT Total Time (min): 27 min    Billable Minutes:  Evaluation 27    Diagnosis: Cervical myelopathy   S/p laminectomy cervical fusion-posterior C3-6    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 Pt reported he was (I) until "May" when neuropathy spread gradually and pt required A with all ADLs and mobility. Pt also reported he would require significant time ("almost an hour") for LB tasks PTA. Pt will not have A 24/7 upon DC.  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  "This is a miracle"  Chief Complaint: pain  Patient/Family stated goals: "walk perfectly and play the way I did before (piano)."    Pain/Comfort  Pain Rating 1: 6/10  Location - Side 1: Bilateral  Location - Orientation 1: posterior  Location 1: neck  Pain Addressed 1: Distraction, Reposition, Nurse notified  Pain Rating Post-Intervention 1: 6/10    Objective:  Patient found with: SCD, cervical collar    Cognitive Exam:  Oriented to: Person, Place, Time and Situation  Follows Commands/attention: Follows multistep  commands  Communication: clear/fluent  Memory:  No Deficits noted  Safety awareness/insight to disability: intact  Coping skills/emotional control: Appropriate to situation    Visual/perceptual:  Intact    Physical Exam:  Postural examination/scapula alignment: No postural abnormalities identified  Skin integrity: Visible skin intact  Edema: Mild L hand      Sensation:   Pt reported reduced sensation in all limbs 2* neuropathy    Upper Extremity Range of Motion:  Right Upper Extremity: WFL  Left Upper Extremity: WFL    Upper Extremity Strength:  Right Upper Extremity: WFL  Left Upper Extremity: WFL   Strength: WFL    Fine motor coordination:   Intact    Gross motor coordination: WFL    Functional Mobility:  Bed Mobility:  Scooting/Bridging: Stand by Assistance  Supine to Sit: Stand by Assistance, WIth side rail (HOB slightly elevated)  Sit to Supine: Stand by Assistance, With side rail (HOB slightly elevated)    Transfers:  Sit <> Stand Assistance: Contact Guard Assistance (EOB)  Sit <> Stand Assistive Device: Rolling Walker    Functional Ambulation: Pt performed functional mobility ~20ft within room with CGA and RW.      Activities of Daily Living:     UE Dressing Level of Assistance: Minimum assistance (Min A to don gown like robe while seated and SBA to doff gown like robe while standing with RW for support as needed)  LE Dressing Level of Assistance: Minimum assistance (SBA to doff/don L " "sock, Min A to doff/don R sock )    Therapeutic Activities and Exercises:  Pt educated on safety with daily tasks OOB, and importance of participating in daily ax. Pt whiteboard updated.      AM-PAC 6 CLICK ADL  How much help from another person does this patient currently need?  1 = Unable, Total/Dependent Assistance  2 = A lot, Maximum/Moderate Assistance  3 = A little, Minimum/Contact Guard/Supervision  4 = None, Modified [place]/Independent    Putting on and taking off regular lower body clothing? : 3  Bathing (including washing, rinsing, drying)?: 3  Toileting, which includes using toilet, bedpan, or urinal? : 3  Putting on and taking off regular upper body clothing?: 3  Taking care of personal grooming such as brushing teeth?: 3  Eating meals?: 4  Total Score: 19    AM-PAC Raw Score CMS "G-Code Modifier Level of Impairment Assistance   6 % Total / Unable   7 - 9 CM 80 - 100% Maximal Assist   10-14 CL 60 - 80% Moderate Assist   15 - 19 CK 40 - 60% Moderate Assist   20 - 22 CJ 20 - 40% Minimal Assist   23 CI 1-20% SBA / CGA   24 CH 0% Independent/ Mod I       Patient left supine with all lines intact, call button in reach, bed alarm on and RN notified    Assessment:  Anthony Miguel is a 66 y.o. male with a medical diagnosis of Cervical myelopathy. Pt tolerated session well and put forth good effort to participate. Pt is improving overall function as compared to PLOF and is eager to continue progress. Pt presented with decreased (I), endurance, stability and safety for ADLs, self-care and functional mobility. Pt demonstrated reduced stability for OOB ax, slight impulsivity and reduced safety awareness, as well as reduced RLE dressing ability. Pt will benefit from further OT in order to maximize (I) and safety for functional tasks.      Rehab identified problem list/impairments: Rehab identified problem list/impairments: weakness, impaired self care skills, impaired functional mobilty, impaired " endurance, impaired sensation, gait instability, impaired balance, pain    Rehab potential is good.    Activity tolerance: Good    Discharge recommendations: Discharge Facility/Level Of Care Needs: rehabilitation facility     Barriers to discharge: Barriers to Discharge: Decreased caregiver support, Inaccessible home environment (4 WONG and no A upon DC)    Equipment recommendations: none     GOALS:    Occupational Therapy Goals        Problem: Occupational Therapy Goal    Goal Priority Disciplines Outcome Interventions   Occupational Therapy Goal     OT, PT/OT     Description:  Goals to be met by:  2 Weeks (2017)    Patient will increase functional independence with ADLs by performin. Supine to sit with Supervision with HOB flat.  2. Sit to Stand transfers with Supervision.   3. Toilet transfer to toilet with Supervision.  4. Grooming while standing at sink with Supervision.  5. UE Dressing with Supervision.  6. LE Dressing with Supervision.                         PLAN:  Patient to be seen 4 x/week to address the above listed problems via self-care/home management, community/work re-entry, therapeutic activities, therapeutic exercises  Plan of Care expires: 17  Plan of Care reviewed with: patient         TRISHA Baird  2017

## 2017-06-22 NOTE — OP NOTE
DATE OF PROCEDURE:  06/21/2017    PREOPERATIVE DIAGNOSES:  Cervical stenosis with myelopathy.    POSTOPERATIVE DIAGNOSES:  Cervical stenosis with myelopathy.    SURGERY PERFORMED:  1.  Decompression of thecal sac via laminectomy, C3 to C6.  2.  Posterior cervical fusion, C3 to C6.  3.  Posterior segmental fixation with lateral mass screws and rods, C3 to C6   (DePuy Synthes system).  4.  Local bone grafting.  5.  Use of intraoperative fluoroscopy.  6.  Use of intraoperative neuromonitoring with motor evoked potentials.  7.  Placement of subfascial Hemovac drain tunneled through a separate incision.    SURGEON:  David Wolff M.D.    ASSISTANT:  Sandor Walker M.D. (RES)    ANESTHESIA:  GETA.    BLOOD LOSS:  200 mL.    COMPLICATIONS:  None.    DRAINS:  As specified.    SPECIMEN SENT:  None.    FINDINGS:  Severe stenosis from C3 to C6.    INDICATIONS:  This is a 66-year-old male with a several-month history of   progressive weakness and atrophy of bilateral hands and weakness of the lower   extremities, resulting in loss of ambulation over the past week prior to   surgery.  He was seen by a colleague in clinic and transferred to this hospital for   surgical treatment.  Imaging showed severe stenosis from C3 to C6 with spinal   cord compression.  My recommendation was for a posterior cervical decompression   and fusion from C3 to C6.  Risks, benefits, alternatives, indications, and   methods were reviewed in detail with the patient and he wished to proceed.  All   questions were answered.  No guarantees about the results of the procedure were   made.    OPERATIVE NOTE:  The patient was brought into the Operating Room, where he was   intubated and placed under general anesthesia without difficulty.  All lines   were placed.  A Hsu clamp was placed at the bitemporal skull.  Pre-flip   motors were run and found to be present in all extremities.  He was repositioned   prone on to the operating room table with his  head fixed to the Operating Room   table in  flexion.  Post-flip motors were stable.  Lateral x-ray was   used to localize from C3 to C6 posteriorly.  The posterior neck was marked,   prepped, and draped in the usual sterile fashion.  A timeout was performed prior   to the procedure.  Then, 10 mL of lidocaine with epinephrine were injected into   the skin.  A linear incision was made with a #10 blade.  Supra and subfascial   dissection was carried out with Bovie electrocautery.  Subperiosteal dissection   was carried out with Bovie electrocautery and Wells elevators to expose the   posterior elements from C3 to C6.  Lateral x-rays were used to localize   intraoperatively and confirm levels from C3 to C6.  Lateral mass screw tracts   were prepared using free hand technique and anatomic landmarks from C3 to C6   bilaterally.  A trough laminectomy was then performed from C3 to C6 using the   high-speed drill, Kerrison punches, and upgoing curettes.  Distal decompression   was carried out at the top of the C7 lamina with Kerrison punches.  Adequate   decompression of the thecal sac was seen with a Jose probe.  There was a loss   of motor evoked potentials in bilateral lower extremities at the end of the   laminectomy.  However, strong SSEPs were seen.  Later in the case, at different   times, the lower extremities came in and were lost resulting in an unreliable read.    Somatosensory evoked potentials were stable though.  Lateral mass screws were   then placed into the previously prepared tracts bilaterally from C3 to C6.  AP   and lateral x-ray confirmed excellent position of the hardware.  Titanium rods   were sized, contoured, and reduced into the tulip heads.  Set screws were placed   and tightened.  Crosslink was placed at C4 and tightened.  Final AP and lateral   x-ray confirmed excellent position of the hardware.  The wound was copiously   irrigated with sterile normal saline and dilute Betadine  solution.  The exposed   bony surfaces from C3 to C6 were decorticated with the high-speed drill.    Locally harvested bone from the laminectomy was morcellized and placed   bilaterally over the lateral masses for arthrodesis from C3 to C6.  A gram of   vancomycin powder was placed into the wound.  A subfascial Hemovac drain was   placed and tunneled through a separate incision where it was secured with a   Vicryl suture.  A watertight fascial closure was achieved with interrupted   inverted 0 Vicryl sutures and a running Stratafix suture.  The soft tissues were   closed in layers.  Staples were placed at the skin.  A silver nitrate dressing   was applied.    The patient appeared to tolerate the procedure well from a hemodynamic   standpoint.  Motor evoked potentials and SSEPs were unreliable as mentioned.  He   was removed from the Hsu clamp and repositioned supine onto the hospital   bed where he was extubated and allowed to emerge from anesthesia.  He was sent   to the PACU in stable condition for recovery.  I was present for all critical   portions of the case.  At the end of the case, all counts were correct.      LATOYA  dd: 06/21/2017 15:48:16 (CDT)  td: 06/21/2017 20:16:25 (CDT)  Doc ID   #[id number]  Job ID #[id number]    CC:

## 2017-06-22 NOTE — PLAN OF CARE
Problem: Patient Care Overview  Goal: Plan of Care Review  Outcome: Ongoing (interventions implemented as appropriate)  Safety:  call light in reach, patient oriented to room & instructed how to notify nurse if assistance is needed, questions & concerns addressed, bed in lowest position with wheels locked & side rails up X 2, fall precautions followed, patient free from fall & injury thus far this shift;  VTE/bleeding precautions maintained.  Activity:  patient turned with assistance, weight shifted at least every other hour.  Neurological:  patient A&O X 4 initially but needed frequent re-orientation to situation, seemingly associated with administration of prn pain medication, follows commands, equal  strength & dorsi/plantarflexion with no deficits noted, neuro checks performed as ordered & WDL except pt states he has mild numbness and tingling to bilateral feet and toes.  Respiratory:  patient tolerates room air without distress, denies SOB.  Cardiac:  Denies chest pain, BP stable.  HR stable.  Afebrile this shift.  GI:  Patient tolerates PO intake well, denies nausea,  LBM 6/19/2017.  :  patient voids clear yellow urine with foul odor via FC, will remove per protocol.  Skin:  Surgical dressing to cervical spine CDI, incision with suture is intact and open to air with serosanguinous drainage to hemovac monitored .  Devices:  PIV X 3 CDI, negative for s/sx of infection & infiltration.  Pain:  patient received prn pain medication as ordered.  Will continue to monitor patient.

## 2017-06-22 NOTE — PLAN OF CARE
Problem: Occupational Therapy Goal  Goal: Occupational Therapy Goal  Goals to be met by:  2 Weeks (2017)    Patient will increase functional independence with ADLs by performin. Supine to sit with Supervision with HOB flat.  2. Sit to Stand transfers with Supervision.   3. Toilet transfer to toilet with Supervision.  4. Grooming while standing at sink with Supervision.  5. UE Dressing with Supervision.  6. LE Dressing with Supervision.       OT lindsay completed and goals set.  TRISHA Baird  2017

## 2017-06-22 NOTE — PLAN OF CARE
Problem: Patient Care Overview  Goal: Plan of Care Review  Outcome: Ongoing (interventions implemented as appropriate)  POC reviewed with pt. Pt verbalized understanding. Pt remained free from falls, skin breakdown, and injury. Call light remained within reach and side rails up x2. Neuro checks and vital signs done every 4 hours. Refer to flowsheets for full assessment and VS info. Hemovac drain emptied every 4 hours and output documented in flowsheets. Hemovac drain now to half suction. PRN pain medication controlling pain. C-collar remained in place throughout shift. No acute events. Will continue to monitor.

## 2017-06-22 NOTE — SUBJECTIVE & OBJECTIVE
Interval History: NAEON. S/p C3-6 PCF, POD#1. Patient reports of significant improvements in BUE strength and paresthesias. He ambulated with PT and rolling walker today. Voiding appropriately. Tolerating diet.     Medications:  Continuous Infusions:   Scheduled Meds:   atorvastatin  10 mg Oral Daily    ceFAZolin (ANCEF) IVPB  1 g Intravenous Q8H    dexamethasone  4 mg Intravenous Q6H    diazePAM  5 mg Intravenous Q6H    finasteride  5 mg Oral Daily    heparin (porcine)  5,000 Units Subcutaneous Q8H     PRN Meds:dextrose 50%, dextrose 50%, diazePAM, glucagon (human recombinant), glucose, glucose, glucose, hydrALAZINE, HYDROmorphone, labetalol, oxycodone-acetaminophen, sodium chloride 0.9%, trazodone     Review of Systems  Objective:     Weight: 77.2 kg (170 lb 2 oz)  Body mass index is 26.65 kg/m².  Vital Signs (Most Recent):  Temp: 97.8 °F (36.6 °C) (17 0759)  Pulse: 61 (17 075)  Resp: 18 (17)  BP: 131/84 (17 075)  SpO2: 97 % (17) Vital Signs (24h Range):  Temp:  [97.3 °F (36.3 °C)-97.9 °F (36.6 °C)] 97.8 °F (36.6 °C)  Pulse:  [] 61  Resp:  [14-20] 18  SpO2:  [95 %-100 %] 97 %  BP: (104-142)/(61-88) 131/84              Temp (24hrs), Av.6 °F (36.4 °C), Min:97.3 °F (36.3 °C), Max:97.9 °F (36.6 °C)                 Closed/Suction Drain 17 1514 Posterior Neck Accordion 10 Fr. (Active)   Site Description Unable to view 2017  7:59 AM   Dressing Type [place] 2017  7:59 AM   Dressing Status Clean;Dry;Intact 2017  7:59 AM   Dressing Intervention Other (Comment) 2017  8:04 PM   Drainage Serosanguineous 2017  7:59 AM   Status Other (Comment) 2017  7:59 AM   Output (mL) 30 mL 2017  6:00 AM       Neurosurgery Physical Exam  General: well developed, well nourished, no distress.   Head: normocephalic, atraumatic  Neurologic: Alert and oriented. Thought content appropriate.  GCS: Motor: 6/Verbal: 5/Eyes: 4 GCS Total: 15  Mental  Status: Awake, Alert, Oriented x 4  Language: No aphasia  Speech: No dysarthria  Cranial nerves: face symmetric, tongue midline, CN II-XII grossly intact.   Eyes: pupils equal, round, reactive to light with accomodation, EOMI.  Pulmonary: normal respirations, no signs of respiratory distress  Abdomen: soft, non-distended, not tender to palpation  Sensory: intact to light touch throughout  Motor Strength: Moves all extremities spontaneously with good tone. Full strength upper and lower extremities. No abnormal movements seen.      Strength   Deltoids Triceps Biceps Wrist Extension Wrist Flexion Hand    Upper: R 5/5 4+/5 4-/5 4-/5 4+/5 4+/5     L 5/5 4+/5 4/5 4/5 4+/5 4+/5       Iliopsoas Quadriceps Knee  Flexion Tibialis  anterior Gastro- cnemius EHL   Lower: R 4/5 4-/5 4-/5 5/5 5/5 5/5     L 4/5 4/5 4/5 4+/5 5/5 5/5      DTR's: 4 + and symmetric in UE and LE  Mcdonough: present on the left  Clonus: present bilaterally  Babinski: absent  Pulses: palpable distal pulses  Skin: Skin is warm, dry and intact.       Significant Labs:    Recent Labs  Lab 06/22/17  0424   *      K 3.7      CO2 20*   BUN 13   CREATININE 0.8   CALCIUM 8.4*       Recent Labs  Lab 06/21/17  0422 06/22/17  0424   WBC 7.31 13.03*   HGB 13.5* 12.4*   HCT 39.3* 36.5*    270       Recent Labs  Lab 06/21/17  1016   INR 1.0     Microbiology Results (last 7 days)     ** No results found for the last 168 hours. **            Significant Diagnostics:  Post-op films personally reviewed and showed good hardware position at C3-6 posteriorly.

## 2017-06-22 NOTE — ASSESSMENT & PLAN NOTE
-s/p C3-6 PCF, POD#1  -Neurologically stable with signficiant improvements in BUE strength and gait  -Post xrays with good placement of hardware.  -Pain controlled on current regimen.  -Surgical drain: 180cc last 24 hr. Continue drain at 1/2 suction. Nursing staff to empty and document q4-6h.   -Continue antibiotics while drain in place.   -Bacitracin to incision BID starting tomorrow.   -Cervical collar at all times  -PT/OT/OOB daily  -Patient must be OOB for atleast 6 hours daily (may be in intervals: 2 hours in chair with each meal)  -IS to bed side. Patient to use atleast 10x every hour.  -Continue bowel regimen daily.  -Continue Teds and SCDs for DVTP.  -[facility]    Dispo: pending rehab       Discussed with Dr. Wolff

## 2017-06-23 LAB
ANION GAP SERPL CALC-SCNC: 8 MMOL/L
BASOPHILS # BLD AUTO: 0 K/UL
BASOPHILS NFR BLD: 0 %
BUN SERPL-MCNC: 14 MG/DL
CALCIUM SERPL-MCNC: 9 MG/DL
CHLORIDE SERPL-SCNC: 107 MMOL/L
CO2 SERPL-SCNC: 25 MMOL/L
CREAT SERPL-MCNC: 0.8 MG/DL
DIFFERENTIAL METHOD: ABNORMAL
EOSINOPHIL # BLD AUTO: 0 K/UL
EOSINOPHIL NFR BLD: 0 %
ERYTHROCYTE [DISTWIDTH] IN BLOOD BY AUTOMATED COUNT: 13.4 %
EST. GFR  (AFRICAN AMERICAN): >60 ML/MIN/1.73 M^2
EST. GFR  (NON AFRICAN AMERICAN): >60 ML/MIN/1.73 M^2
GLUCOSE SERPL-MCNC: 115 MG/DL
HCT VFR BLD AUTO: 39.9 %
HGB BLD-MCNC: 13.7 G/DL
LYMPHOCYTES # BLD AUTO: 0.8 K/UL
LYMPHOCYTES NFR BLD: 6.4 %
MCH RBC QN AUTO: 31.9 PG
MCHC RBC AUTO-ENTMCNC: 34.3 %
MCV RBC AUTO: 93 FL
MONOCYTES # BLD AUTO: 0.9 K/UL
MONOCYTES NFR BLD: 6.4 %
NEUTROPHILS # BLD AUTO: 11.4 K/UL
NEUTROPHILS NFR BLD: 86.7 %
PLATELET # BLD AUTO: 298 K/UL
PMV BLD AUTO: 11.3 FL
POTASSIUM SERPL-SCNC: 4.2 MMOL/L
RBC # BLD AUTO: 4.29 M/UL
SODIUM SERPL-SCNC: 140 MMOL/L
WBC # BLD AUTO: 13.19 K/UL

## 2017-06-23 PROCEDURE — 97530 THERAPEUTIC ACTIVITIES: CPT

## 2017-06-23 PROCEDURE — 97535 SELF CARE MNGMENT TRAINING: CPT

## 2017-06-23 PROCEDURE — 63600175 PHARM REV CODE 636 W HCPCS: Performed by: STUDENT IN AN ORGANIZED HEALTH CARE EDUCATION/TRAINING PROGRAM

## 2017-06-23 PROCEDURE — 25000003 PHARM REV CODE 250: Performed by: PHYSICIAN ASSISTANT

## 2017-06-23 PROCEDURE — 80048 BASIC METABOLIC PNL TOTAL CA: CPT

## 2017-06-23 PROCEDURE — 20600001 HC STEP DOWN PRIVATE ROOM

## 2017-06-23 PROCEDURE — 36415 COLL VENOUS BLD VENIPUNCTURE: CPT

## 2017-06-23 PROCEDURE — 25000003 PHARM REV CODE 250: Performed by: STUDENT IN AN ORGANIZED HEALTH CARE EDUCATION/TRAINING PROGRAM

## 2017-06-23 PROCEDURE — 85025 COMPLETE CBC W/AUTO DIFF WBC: CPT

## 2017-06-23 PROCEDURE — 97116 GAIT TRAINING THERAPY: CPT

## 2017-06-23 RX ADMIN — HEPARIN SODIUM 5000 UNITS: 5000 INJECTION, SOLUTION INTRAVENOUS; SUBCUTANEOUS at 06:06

## 2017-06-23 RX ADMIN — FINASTERIDE 5 MG: 5 TABLET, FILM COATED ORAL at 08:06

## 2017-06-23 RX ADMIN — CEFAZOLIN SODIUM 1 G: 1 SOLUTION INTRAVENOUS at 06:06

## 2017-06-23 RX ADMIN — DOCUSATE SODIUM 50 MG: 50 CAPSULE, LIQUID FILLED ORAL at 08:06

## 2017-06-23 RX ADMIN — ATORVASTATIN CALCIUM 10 MG: 10 TABLET, FILM COATED ORAL at 08:06

## 2017-06-23 RX ADMIN — OXYCODONE AND ACETAMINOPHEN 1 TABLET: 7.5; 325 TABLET ORAL at 01:06

## 2017-06-23 RX ADMIN — HEPARIN SODIUM 5000 UNITS: 5000 INJECTION, SOLUTION INTRAVENOUS; SUBCUTANEOUS at 09:06

## 2017-06-23 RX ADMIN — CEFAZOLIN SODIUM 1 G: 1 SOLUTION INTRAVENOUS at 02:06

## 2017-06-23 RX ADMIN — OXYCODONE AND ACETAMINOPHEN 1 TABLET: 7.5; 325 TABLET ORAL at 06:06

## 2017-06-23 RX ADMIN — CEFAZOLIN SODIUM 1 G: 1 SOLUTION INTRAVENOUS at 09:06

## 2017-06-23 RX ADMIN — OXYCODONE AND ACETAMINOPHEN 1 TABLET: 7.5; 325 TABLET ORAL at 09:06

## 2017-06-23 RX ADMIN — HEPARIN SODIUM 5000 UNITS: 5000 INJECTION, SOLUTION INTRAVENOUS; SUBCUTANEOUS at 02:06

## 2017-06-23 RX ADMIN — OXYCODONE AND ACETAMINOPHEN 1 TABLET: 7.5; 325 TABLET ORAL at 02:06

## 2017-06-23 RX ADMIN — DEXAMETHASONE SODIUM PHOSPHATE 4 MG: 4 INJECTION, SOLUTION INTRAMUSCULAR; INTRAVENOUS at 06:06

## 2017-06-23 NOTE — PT/OT/SLP PROGRESS
Physical Therapy  Treatment    Anthony Miguel   MRN: [id number]   Admitting Diagnosis: Cervical myelopathy    PT Received On: 06/23/17  PT Start Time: 1110     PT Stop Time: 1133    PT Total Time (min): 23 min       Billable Minutes:  Gait Kyipikin98 min and Therapeutic Activity 13 min    Treatment Type: Treatment  PT/PTA: PT     PTA Visit Number: 0       General Precautions: Standard, fall  Orthopedic Precautions: N/A   Braces: Cervical collar    Do you have any cultural, spiritual, Jewish conflicts, given your current situation?:  (none stated)    Subjective:  Communicated with RN prior to session. Pt agreeable to participate in therapy session.     Pain/Comfort  Pain Rating 1: 0/10  Pain Rating Post-Intervention 1: 0/10    Objective:   Patient found with: cervical collar, hemovac    Functional Mobility:  Bed Mobility:    N/T 2/2 pt up in chair upon PT arrival     Transfers:  Sit <> Stand Assistance: Contact Guard Assistance  Sit <> Stand Assistive Device: Rolling Walker  Toilet Transfer Assistance: Contact Guard Assistance  Toilet Transfer Assistive Device: Rolling Walker    Gait:   Gait Distance: ~75' with RW and CGA for safety; no LOB  Assistance 1: Contact Guard Assistance  Gait Assistive Device: Rolling walker  Gait Pattern: swing-through gait  Gait Deviation(s): decreased sean, decreased velocity of limb motion, decreased step length, decreased stride length, decreased toe-to-floor clearance, decreased weight-shifting ability, foot flat    Balance:   Static Sit: FAIR+: Able to take MINIMAL challenges from all directions  Dynamic Sit: FAIR+: Maintains balance through MINIMAL excursions of active trunk motion  Static Stand: FAIR+: Takes MINIMAL challenges from all directions  Dynamic stand: FAIR: Needs CONTACT GUARD during gait     Therapeutic Activities and Exercises:  Therapeutic activities aimed to increase pt's independence, safety, and efficiency with functional transfers. See above for assistance  levels. Pt educated on RW management during transfers and gait. Pt able to demonstrate understanding during performance of sit<>stand from chair and toilet. Pt also educated on HEP for BLE strengthening including marching, LAQ and ankle pumps 2x10. Pt able to demonstrate understanding and performance of 1x10 each exercise. Pt educated on recommendations for energy conservation, safety awareness to reduce fall risk.     Therapist facilitated progression of gait training to improve gait stability, endurance, and independence with functional ambulation. See above for details. Pt provided with cueing for postural awareness and increasing toe to floor clearance bilaterally.       AM-PAC 6 CLICK MOBILITY  How much help from another person does this patient currently need?   1 = Unable, Total/Dependent Assistance  2 = A lot, Maximum/Moderate Assistance  3 = A little, Minimum/Contact Guard/Supervision  4 = None, Modified [place]/Independent    Turning over in bed (including adjusting bedclothes, sheets and blankets)?: 3  Sitting down on and standing up from a chair with arms (e.g., wheelchair, bedside commode, etc.): 3  Moving from lying on back to sitting on the side of the bed?: 3  Moving to and from a bed to a chair (including a wheelchair)?: 3  Need to walk in hospital room?: 3  Climbing 3-5 steps with a railing?: 2  Total Score: 17    AM-PAC Raw Score CMS G-Code Modifier Level of Impairment Assistance   6 % Total / Unable   7 - 9 CM 80 - 100% Maximal Assist   10 - 14 CL 60 - 80% Moderate Assist   15 - 19 CK 40 - 60% Moderate Assist   20 - 22 CJ 20 - 40% Minimal Assist   23 CI 1-20% SBA / CGA   24 CH 0% Independent/ Mod I     Patient left up in chair with all lines intact, call button in reach and RN notified.    Assessment:  Anthony Miguel is a 66 y.o. male with a medical diagnosis of Cervical myelopathy. Pt demonstrating progress towards goals, and good effort to participate in therapy. Pt continues  to demonstrate gait instability, impaired dynamic standing balance and BLE weakness. Pt would benefit from continued PT intervention to address below listed deficits and maximize return to PLOF.       Rehab identified problem list/impairments: Rehab identified problem list/impairments: weakness, impaired endurance, impaired functional mobilty, gait instability, impaired balance, impaired sensation, impaired self care skills, decreased lower extremity function, decreased upper extremity function, decreased safety awareness    Rehab potential is good.    Activity tolerance: Good    Discharge recommendations: Discharge Facility/Level Of Care Needs: rehabilitation facility     Barriers to discharge: Barriers to Discharge: Decreased caregiver support, Inaccessible home environment (4 WONG home; pt lives alone)    Equipment recommendations: Equipment Needed After Discharge: none     GOALS:    Physical Therapy Goals        Problem: Physical Therapy Goal    Goal Priority Disciplines Outcome Goal Variances Interventions   Physical Therapy Goal     PT/OT, PT Ongoing (interventions implemented as appropriate)     Description:  Goals to be met by: 17    Patient will increase functional independence with mobility by performin. Supine to sit with Stand-by Assistance- Not Met  2. Sit to supine with Stand-by Assistance- Not Met  3. Rolling to Left and Right with Stand-by Assistance.-Not Met  4. Sit to stand transfer with Stand-by Assistance-Not Met  5. Bed to chair transfer with Stand-by Assistance using Rolling Walker.Not Met  6. Gait  x 100 feet with Stand-by Assistance using Rolling Walker.-Not Met  7. Ascend/descend 4 stair with bilateral Handrails Contact Guard Assistance using Rolling Walker or no AD.-Not Met  8. Stand for 10 minutes with Stand-by Assistance using Rolling Walker.-Not Met  9. Lower extremity exercise program x15 reps per handout, with independence.-Not Met    2017  Cliff Mark, PT                         PLAN:    Patient to be seen 5 x/week  to address the above listed problems via gait training, therapeutic activities, therapeutic exercises, neuromuscular re-education  Plan of Care expires: 07/22/17  Plan of Care reviewed with: patient        Carolyne Mcgowan PT, DPT   6/23/2017  Pager: [phone]

## 2017-06-23 NOTE — PLAN OF CARE
Problem: Physical Therapy Goal  Goal: Physical Therapy Goal  Goals to be met by: 17    Patient will increase functional independence with mobility by performin. Supine to sit with Stand-by Assistance- Not Met  2. Sit to supine with Stand-by Assistance- Not Met  3. Rolling to Left and Right with Stand-by Assistance.-Not Met  4. Sit to stand transfer with Stand-by Assistance-Not Met  5. Bed to chair transfer with Stand-by Assistance using Rolling Walker.Not Met  6. Gait  x 100 feet with Stand-by Assistance using Rolling Walker.-Not Met  7. Ascend/descend 4 stair with bilateral Handrails Contact Guard Assistance using Rolling Walker or no AD.-Not Met  8. Stand for 10 minutes with Stand-by Assistance using Rolling Walker.-Not Met  9. Lower extremity exercise program x15 reps per handout, with independence.-Not Met    2017  Cliff Mark PT       Outcome: Ongoing (interventions implemented as appropriate)  No goals met this visit; Continue current POC.     Carolyne Mcgowan PT, DPT   2017  Pager: [phone]

## 2017-06-23 NOTE — SUBJECTIVE & OBJECTIVE
Interval History: NAEON. S/p C3-6 PCF, POD#2. Voiding appropriately. Tolerating diet.     Medications:  Continuous Infusions:   Scheduled Meds:   atorvastatin  10 mg Oral Daily    ceFAZolin (ANCEF) IVPB  1 g Intravenous Q8H    docusate sodium  50 mg Oral BID    finasteride  5 mg Oral Daily    heparin (porcine)  5,000 Units Subcutaneous Q8H     PRN Meds:dextrose 50%, dextrose 50%, diazePAM, glucagon (human recombinant), glucose, glucose, glucose, hydrALAZINE, HYDROmorphone, labetalol, oxycodone-acetaminophen, sodium chloride 0.9%, trazodone     Review of Systems    Objective:     Weight: 77.2 kg (170 lb 2 oz)  Body mass index is 26.65 kg/m².  Vital Signs (Most Recent):  Temp: 97.5 °F (36.4 °C) (17 0738)  Pulse: 63 (17 07)  Resp: 18 (17 07)  BP: (!) 141/83 (17 0738)  SpO2: 98 % (17 0738) Vital Signs (24h Range):  Temp:  [96.1 °F (35.6 °C)-98 °F (36.7 °C)] 97.5 °F (36.4 °C)  Pulse:  [54-78] 63  Resp:  [18-20] 18  SpO2:  [95 %-98 %] 98 %  BP: (128-157)/(73-83) 141/83              Temp (24hrs), Av.3 °F (36.3 °C), Min:96.1 °F (35.6 °C), Max:98 °F (36.7 °C)                 Closed/Suction Drain 17 1514 Posterior Neck Accordion 10 Fr. (Active)   Site Description Unable to view 2017  7:59 AM   Dressing Type [place] 2017  7:59 AM   Dressing Status Clean;Dry;Intact 2017  7:59 AM   Dressing Intervention Other (Comment) 2017  8:04 PM   Drainage Serosanguineous 2017  7:59 AM   Status Other (Comment) 2017  7:59 AM   Output (mL) 30 mL 2017  6:00 AM       Neurosurgery Physical Exam    General: well developed, well nourished, no distress.   Head: normocephalic, atraumatic  Neurologic: Alert and oriented. Thought content appropriate.  GCS: Motor: 6/Verbal: 5/Eyes: 4 GCS Total: 15  Mental Status: Awake, Alert, Oriented x 4  Language: No aphasia  Speech: No dysarthria  Cranial nerves: face symmetric, tongue midline, CN II-XII grossly intact.   Eyes:  pupils equal, round, reactive to light with accomodation, EOMI.  Pulmonary: normal respirations, no signs of respiratory distress  Abdomen: soft, non-distended, not tender to palpation  Sensory: intact to light touch throughout  Motor Strength: Moves all extremities spontaneously with good tone. Full strength upper and lower extremities. No abnormal movements seen.      Strength   Deltoids Triceps Biceps Wrist Extension Wrist Flexion Hand    Upper: R 5/5 4+/5 4-/5 4-/5 4+/5 4+/5     L 5/5 4+/5 4/5 4/5 4+/5 4+/5       Iliopsoas Quadriceps Knee  Flexion Tibialis  anterior Gastro- cnemius EHL   Lower: R 4/5 4-/5 4-/5 5/5 5/5 5/5     L 4/5 4/5 4/5 4+/5 5/5 5/5      DTR's: 4 + and symmetric in UE and LE  Mcdonough: present on the left  Clonus: present bilaterally  Babinski: absent  Pulses: palpable distal pulses  Skin: Skin is warm, dry and intact.       Significant Labs:    Recent Labs  Lab 06/23/17  0527   *      K 4.2      CO2 25   BUN 14   CREATININE 0.8   CALCIUM 9.0       Recent Labs  Lab 06/22/17  0424 06/23/17  0527   WBC 13.03* 13.19*   HGB 12.4* 13.7*   HCT 36.5* 39.9*    298       Recent Labs  Lab 06/21/17  1016   INR 1.0     Microbiology Results (last 7 days)     ** No results found for the last 168 hours. **            Significant Diagnostics:  Post-op films personally reviewed and showed good hardware position at C3-6 posteriorly.

## 2017-06-23 NOTE — PLAN OF CARE
Problem: Occupational Therapy Goal  Goal: Occupational Therapy Goal  Goals to be met by:  2 Weeks (2017)    Patient will increase functional independence with ADLs by performin. Supine to sit with Supervision with HOB flat.  2. Sit to Stand transfers with Supervision.   3. Toilet transfer to toilet with Supervision.  4. Grooming while standing at sink with Supervision.  5. UE Dressing with Supervision.  6. LE Dressing with Supervision.        Goals remain appropriate.  TRISHA Baird  2017

## 2017-06-23 NOTE — PROGRESS NOTES
[facility]  Neurosurgery  Progress Note    Subjective:     History of Present Illness: Anthony Miguel is a 66 y.o. male directly admitted from [facility] PA-C's clinic for cervical myelopathy. He has experienced progressive weakness and paresthesias in his arms and legs for approximately one month. He is typically independent with ADLs, but has recently been unable to ambulate and has experienced difficulty with fine motor movements, such as playing piano, writing, etc. He endorses paresthesias throughout his upper extremities, and occasionally in his lower extremities. He has minimal neck pain. He denies b/b dysfunction or saddle anesthesia.     Post-Op Info:  Procedure(s) (LRB):  LAMINECTOMY-CERVICAL/FUSION-POSTERIOR; C3-6 (N/A)   2 Days Post-Op     Interval History: NAEON. S/p C3-6 PCF, POD#2. Voiding appropriately. Tolerating diet.     Medications:  Continuous Infusions:   Scheduled Meds:   atorvastatin  10 mg Oral Daily    ceFAZolin (ANCEF) IVPB  1 g Intravenous Q8H    docusate sodium  50 mg Oral BID    finasteride  5 mg Oral Daily    heparin (porcine)  5,000 Units Subcutaneous Q8H     PRN Meds:dextrose 50%, dextrose 50%, diazePAM, glucagon (human recombinant), glucose, glucose, glucose, hydrALAZINE, HYDROmorphone, labetalol, oxycodone-acetaminophen, sodium chloride 0.9%, trazodone     Review of Systems    Objective:     Weight: 77.2 kg (170 lb 2 oz)  Body mass index is 26.65 kg/m².  Vital Signs (Most Recent):  Temp: 97.5 °F (36.4 °C) (17)  Pulse: 63 (17)  Resp: 18 (17)  BP: (!) 141/83 (17)  SpO2: 98 % (17) Vital Signs (24h Range):  Temp:  [96.1 °F (35.6 °C)-98 °F (36.7 °C)] 97.5 °F (36.4 °C)  Pulse:  [54-78] 63  Resp:  [18-20] 18  SpO2:  [95 %-98 %] 98 %  BP: (128-157)/(73-83) 141/83              Temp (24hrs), Av.3 °F (36.3 °C), Min:96.1 °F (35.6 °C), Max:98 °F (36.7 °C)                 Closed/Suction Drain 17 3000  Posterior Neck Accordion 10 Fr. (Active)   Site Description Unable to view 6/22/2017  7:59 AM   Dressing Type [place] 6/22/2017  7:59 AM   Dressing Status Clean;Dry;Intact 6/22/2017  7:59 AM   Dressing Intervention Other (Comment) 6/21/2017  8:04 PM   Drainage Serosanguineous 6/22/2017  7:59 AM   Status Other (Comment) 6/22/2017  7:59 AM   Output (mL) 30 mL 6/22/2017  6:00 AM       Neurosurgery Physical Exam    General: well developed, well nourished, no distress.   Head: normocephalic, atraumatic  Neurologic: Alert and oriented. Thought content appropriate.  GCS: Motor: 6/Verbal: 5/Eyes: 4 GCS Total: 15  Mental Status: Awake, Alert, Oriented x 4  Language: No aphasia  Speech: No dysarthria  Cranial nerves: face symmetric, tongue midline, CN II-XII grossly intact.   Eyes: pupils equal, round, reactive to light with accomodation, EOMI.  Pulmonary: normal respirations, no signs of respiratory distress  Abdomen: soft, non-distended, not tender to palpation  Sensory: intact to light touch throughout  Motor Strength: Moves all extremities spontaneously with good tone. Full strength upper and lower extremities. No abnormal movements seen.      Strength   Deltoids Triceps Biceps Wrist Extension Wrist Flexion Hand    Upper: R 5/5 4+/5 4-/5 4-/5 4+/5 4+/5     L 5/5 4+/5 4/5 4/5 4+/5 4+/5       Iliopsoas Quadriceps Knee  Flexion Tibialis  anterior Gastro- cnemius EHL   Lower: R 4/5 4-/5 4-/5 5/5 5/5 5/5     L 4/5 4/5 4/5 4+/5 5/5 5/5      DTR's: 4 + and symmetric in UE and LE  Mcdonough: present on the left  Clonus: present bilaterally  Babinski: absent  Pulses: palpable distal pulses  Skin: Skin is warm, dry and intact.       Significant Labs:    Recent Labs  Lab 06/23/17  0527   *      K 4.2      CO2 25   BUN 14   CREATININE 0.8   CALCIUM 9.0       Recent Labs  Lab 06/22/17  0424 06/23/17  0527   WBC 13.03* 13.19*   HGB 12.4* 13.7*   HCT 36.5* 39.9*    298       Recent Labs  Lab  06/21/17  1016   INR 1.0     Microbiology Results (last 7 days)     ** No results found for the last 168 hours. **            Significant Diagnostics:  Post-op films personally reviewed and showed good hardware position at C3-6 posteriorly.       Assessment/Plan:     HLD (hyperlipidemia)    - Restarted home atorvastatin.        * Cervical myelopathy    -s/p C3-6 PCF, POD#2  -Neurologically stable with signficiant improvements in BUE strength and gait  -Post xrays with good placement of hardware.  -Pain controlled on current regimen.  -Surgical drain: 127cc last 24 hr. Continue drain at 1/2 suction. Nursing staff to empty and document q4-6h.   -Continue antibiotics while drain in place.   -Bacitracin to incision BID starting tomorrow.   -Cervical collar at all times  -PT/OT/OOB daily  -Patient must be OOB for atleast 6 hours daily (may be in intervals: 2 hours in chair with each meal)  -IS to bed side. Patient to use atleast 10x every hour.  -Continue bowel regimen daily.  -Continue Teds and SCDs for DVTP.  -[facility]    Dispo: pending rehab       Discussed with Dr. New Walker MD  Neurosurgery  [facility]

## 2017-06-23 NOTE — PLAN OF CARE
Marcela at [facility] called questioning pt's medically stability as he still has drains.     MSW spoke to JENSEN Chiang who states the drain is putting out too much at this time but there's a possibility it could improve this afternoon.      BROOKS provided an update to Marcela at [facility], [phone] ext [phone].    Yesenia Loco, HAIR  [id number]

## 2017-06-23 NOTE — PLAN OF CARE
"Problem: Patient Care Overview  Goal: Plan of Care Review  Outcome: Ongoing (interventions implemented as appropriate)  No acute changes or issues overnight. Pt reports improvement to his fine motor strength and dexterity,displays no lower extremity drift. Pain "4/10" controlled with po medications.Brace on AAT. Hemovac to half suction in place ( see flowsheet for output). Tolerating po intake. Voiding per urinal. Fluids and stool softener provided for bowel maintenance. VSS.      "

## 2017-06-23 NOTE — PT/OT/SLP PROGRESS
"Occupational Therapy  Treatment    Anthony Miguel   MRN: [id number]   Admitting Diagnosis: Cervical myelopathy    OT Date of Treatment: 06/23/17   OT Start Time: 0939  OT Stop Time: 0954  OT Total Time (min): 15 min    Billable Minutes:  Self Care/Home Management 15    General Precautions: Standard, fall  Orthopedic Precautions:    Braces: Cervical collar    Do you have any cultural, spiritual, Yazidi conflicts, given your current situation?: no    Subjective:  Communicated with RN prior to session.  "I will call for someone when I want to get back to bed." "Thank you!" "I knew you would catch me.(regarding toilet t/f LOB)"  Pain/Comfort  Pain Rating 1: 5/10  Location - Side 1: Bilateral  Location - Orientation 1: posterior  Location 1: neck  Pain Addressed 1: Reposition, Distraction, Nurse notified  Pain Rating Post-Intervention 1: 5/10    Objective:  Patient found with: cervical collar, hemovac     Functional Mobility:  Bed Mobility:       Transfers:   Sit <> Stand Assistance: Contact Guard Assistance (chair)  Sit <> Stand Assistive Device: Rolling Walker  Toilet Transfer Technique: Stand Pivot  Toilet Transfer Assistance: Minimum Assistance (CGA to sit, Min A to stand)  Toilet Transfer Assistive Device: Rolling Walker, grab bar    Functional Ambulation: Pt performed functional mobility ~20ft within room with CGA and RW.      Activities of Daily Living:     Grooming Position: Standing at sink  Grooming Level of Assistance: Contact guard assistance (wash face, wash hands, brush teeth)     Balance:   Pt performed functional grooming ax standing sink side ~7min with CGA and sink and RW for support. Pt experienced 1 knee buckle, requiring Min A to correct.    Pt performed toilet transfer with CGA to sit, however, during initial attempt to stand, pt unable to fully stand, swinging hips to R side and requiring Max A to return to center and sit down on toilet seat for safety. Pt then provided education for safety, AD " "support and positioning during transitions. Pt required Min A to stand during 2nd toilet sit>stand transfer.     Therapeutic Activities and Exercises:  Pt educated on safety with daily tasks OOB, and importance of participating in daily ax.  Pt educated on safety with AD use for transitions. Pt whiteboard updated.      AM-PAC 6 CLICK ADL   How much help from another person does this patient currently need?   1 = Unable, Total/Dependent Assistance  2 = A lot, Maximum/Moderate Assistance  3 = A little, Minimum/Contact Guard/Supervision  4 = None, Modified [place]/Independent    Putting on and taking off regular lower body clothing? : 3  Bathing (including washing, rinsing, drying)?: 3  Toileting, which includes using toilet, bedpan, or urinal? : 3  Putting on and taking off regular upper body clothing?: 3  Taking care of personal grooming such as brushing teeth?: 3  Eating meals?: 4  Total Score: 19     AM-PAC Raw Score CMS "G-Code Modifier Level of Impairment Assistance   6 % Total / Unable   7 - 8 CM 80 - 100% Maximal Assist   9-13 CL 60 - 80% Moderate Assist   14 - 19 CK 40 - 60% Moderate Assist   20 - 22 CJ 20 - 40% Minimal Assist   23 CI 1-20% SBA / CGA   24 CH 0% Independent/ Mod I       Patient left up in chair with all lines intact, call button in reach and RN notified    ASSESSMENT:  Anthony Miguel is a 66 y.o. male with a medical diagnosis of Cervical myelopathy. Pt tolerated session well and put forth good effort to participate. Pt eager to perform daily tasks with therapy and continue progress. Pt presented with decreased (I), endurance, stability and safety for ADLs, self-care and functional mobility. Pt demonstrated slightly reduced safety awareness and requiring increased cues and A for problem solving during transitions and AD use. Pt experienced occasional knee buckling and demonstrated reduced ability to complete toilet transfer safety requiring increased A physically for safety. Pt " will benefit from further OT in order to maximize (I) and safety for functional tasks.      Rehab identified problem list/impairments: Rehab identified problem list/impairments: weakness, impaired self care skills, impaired balance, impaired functional mobilty, impaired endurance, impaired sensation, gait instability, pain, decreased safety awareness    Rehab potential is good.    Activity tolerance: Good    Discharge recommendations: Discharge Facility/Level Of Care Needs: rehabilitation facility     Barriers to discharge: Barriers to Discharge: Decreased caregiver support, Inaccessible home environment (4 WONG and no A upon DC)    Equipment recommendations: none     GOALS:    Occupational Therapy Goals        Problem: Occupational Therapy Goal    Goal Priority Disciplines Outcome Interventions   Occupational Therapy Goal     OT, PT/OT     Description:  Goals to be met by:  2 Weeks (2017)    Patient will increase functional independence with ADLs by performin. Supine to sit with Supervision with HOB flat.  2. Sit to Stand transfers with Supervision.   3. Toilet transfer to toilet with Supervision.  4. Grooming while standing at sink with Supervision.  5. UE Dressing with Supervision.  6. LE Dressing with Supervision.                         Plan:  Patient to be seen 4 x/week to address the above listed problems via community/work re-entry, therapeutic activities, therapeutic exercises, self-care/home management  Plan of Care expires: 17  Plan of Care reviewed with: patient         TRISHA Baird  2017

## 2017-06-24 LAB
ANION GAP SERPL CALC-SCNC: 7 MMOL/L
BASOPHILS # BLD AUTO: 0.01 K/UL
BASOPHILS NFR BLD: 0.1 %
BUN SERPL-MCNC: 22 MG/DL
CALCIUM SERPL-MCNC: 8.5 MG/DL
CHLORIDE SERPL-SCNC: 108 MMOL/L
CO2 SERPL-SCNC: 25 MMOL/L
CREAT SERPL-MCNC: 0.9 MG/DL
DIFFERENTIAL METHOD: ABNORMAL
EOSINOPHIL # BLD AUTO: 0.2 K/UL
EOSINOPHIL NFR BLD: 1.3 %
ERYTHROCYTE [DISTWIDTH] IN BLOOD BY AUTOMATED COUNT: 13.6 %
EST. GFR  (AFRICAN AMERICAN): >60 ML/MIN/1.73 M^2
EST. GFR  (NON AFRICAN AMERICAN): >60 ML/MIN/1.73 M^2
GLUCOSE SERPL-MCNC: 87 MG/DL
HCT VFR BLD AUTO: 36.7 %
HGB BLD-MCNC: 12.5 G/DL
LYMPHOCYTES # BLD AUTO: 2.8 K/UL
LYMPHOCYTES NFR BLD: 22.4 %
MCH RBC QN AUTO: 32 PG
MCHC RBC AUTO-ENTMCNC: 34.1 %
MCV RBC AUTO: 94 FL
MONOCYTES # BLD AUTO: 1.4 K/UL
MONOCYTES NFR BLD: 11.1 %
NEUTROPHILS # BLD AUTO: 7.9 K/UL
NEUTROPHILS NFR BLD: 65.1 %
PLATELET # BLD AUTO: 242 K/UL
PMV BLD AUTO: 11.4 FL
POTASSIUM SERPL-SCNC: 3.6 MMOL/L
RBC # BLD AUTO: 3.91 M/UL
SODIUM SERPL-SCNC: 140 MMOL/L
WBC # BLD AUTO: 12.3 K/UL

## 2017-06-24 PROCEDURE — 80048 BASIC METABOLIC PNL TOTAL CA: CPT

## 2017-06-24 PROCEDURE — 25000003 PHARM REV CODE 250: Performed by: STUDENT IN AN ORGANIZED HEALTH CARE EDUCATION/TRAINING PROGRAM

## 2017-06-24 PROCEDURE — 25000003 PHARM REV CODE 250: Performed by: PHYSICIAN ASSISTANT

## 2017-06-24 PROCEDURE — 63600175 PHARM REV CODE 636 W HCPCS: Performed by: STUDENT IN AN ORGANIZED HEALTH CARE EDUCATION/TRAINING PROGRAM

## 2017-06-24 PROCEDURE — 20600001 HC STEP DOWN PRIVATE ROOM

## 2017-06-24 PROCEDURE — 85025 COMPLETE CBC W/AUTO DIFF WBC: CPT

## 2017-06-24 PROCEDURE — 97112 NEUROMUSCULAR REEDUCATION: CPT

## 2017-06-24 PROCEDURE — 97116 GAIT TRAINING THERAPY: CPT

## 2017-06-24 PROCEDURE — 36415 COLL VENOUS BLD VENIPUNCTURE: CPT

## 2017-06-24 RX ADMIN — OXYCODONE AND ACETAMINOPHEN 1 TABLET: 7.5; 325 TABLET ORAL at 03:06

## 2017-06-24 RX ADMIN — FINASTERIDE 5 MG: 5 TABLET, FILM COATED ORAL at 08:06

## 2017-06-24 RX ADMIN — HEPARIN SODIUM 5000 UNITS: 5000 INJECTION, SOLUTION INTRAVENOUS; SUBCUTANEOUS at 09:06

## 2017-06-24 RX ADMIN — OXYCODONE AND ACETAMINOPHEN 1 TABLET: 7.5; 325 TABLET ORAL at 08:06

## 2017-06-24 RX ADMIN — CEFAZOLIN SODIUM 1 G: 1 SOLUTION INTRAVENOUS at 05:06

## 2017-06-24 RX ADMIN — HYDROMORPHONE HYDROCHLORIDE 1 MG: 1 INJECTION, SOLUTION INTRAMUSCULAR; INTRAVENOUS; SUBCUTANEOUS at 12:06

## 2017-06-24 RX ADMIN — ATORVASTATIN CALCIUM 10 MG: 10 TABLET, FILM COATED ORAL at 08:06

## 2017-06-24 RX ADMIN — DOCUSATE SODIUM 50 MG: 50 CAPSULE, LIQUID FILLED ORAL at 08:06

## 2017-06-24 RX ADMIN — HEPARIN SODIUM 5000 UNITS: 5000 INJECTION, SOLUTION INTRAVENOUS; SUBCUTANEOUS at 02:06

## 2017-06-24 RX ADMIN — HYDROMORPHONE HYDROCHLORIDE 1 MG: 1 INJECTION, SOLUTION INTRAMUSCULAR; INTRAVENOUS; SUBCUTANEOUS at 09:06

## 2017-06-24 RX ADMIN — HEPARIN SODIUM 5000 UNITS: 5000 INJECTION, SOLUTION INTRAVENOUS; SUBCUTANEOUS at 06:06

## 2017-06-24 RX ADMIN — CEFAZOLIN SODIUM 1 G: 1 SOLUTION INTRAVENOUS at 01:06

## 2017-06-24 RX ADMIN — DOCUSATE SODIUM 50 MG: 50 CAPSULE, LIQUID FILLED ORAL at 09:06

## 2017-06-24 RX ADMIN — OXYCODONE AND ACETAMINOPHEN 1 TABLET: 7.5; 325 TABLET ORAL at 09:06

## 2017-06-24 RX ADMIN — CEFAZOLIN SODIUM 1 G: 1 SOLUTION INTRAVENOUS at 09:06

## 2017-06-24 NOTE — PLAN OF CARE
Problem: Physical Therapy Goal  Goal: Physical Therapy Goal  Goals to be met by: 17    Patient will increase functional independence with mobility by performin. Supine to sit with Stand-by Assistance- Not Met  2. Sit to supine with Stand-by Assistance- Not Met  3. Rolling to Left and Right with Stand-by Assistance.-Not Met  4. Sit to stand transfer with Stand-by Assistance-Not Met  5. Bed to chair transfer with Stand-by Assistance using Rolling Walker.Not Met  6. Gait  x 100 feet with Stand-by Assistance using Rolling Walker.-Not Met  7. Ascend/descend 4 stair with bilateral Handrails Contact Guard Assistance using Rolling Walker or no AD.-Not Met  8. Stand for 10 minutes with Stand-by Assistance using Rolling Walker.-Not Met  9. Lower extremity exercise program x15 reps per handout, with independence.-Not Met         Outcome: Ongoing (interventions implemented as appropriate)  No goals met on today. Goals remain appropriate.    Diana Bustillos, PT, DPT  2017

## 2017-06-24 NOTE — ASSESSMENT & PLAN NOTE
-s/p C3-6 PCF, POD#3  -Neurologically stable with signficiant improvements in BUE strength and gait  -Post xrays with good placement of hardware.  -Pain controlled on current regimen.  -Fu drain output  -Continue antibiotics while drain in place.   -Bacitracin to incision BID starting tomorrow.   -Cervical collar at all times  -PT/OT/OOB daily  -Patient must be OOB for atleast 6 hours daily (may be in intervals: 2 hours in chair with each meal)  -IS to bed side. Patient to use atleast 10x every hour.  -Continue bowel regimen daily.  -Continue Teds and SCDs for DVTP.  -[facility]    Dispo: pending [facility]

## 2017-06-24 NOTE — PROGRESS NOTES
[facility]  Neurosurgery  Progress Note    Subjective:     History of Present Illness: Anthony Miguel is a 66 y.o. male directly admitted from Daisy Rivera PA-C's clinic for cervical myelopathy. He has experienced progressive weakness and paresthesias in his arms and legs for approximately one month. He is typically independent with ADLs, but has recently been unable to ambulate and has experienced difficulty with fine motor movements, such as playing piano, writing, etc. He endorses paresthesias throughout his upper extremities, and occasionally in his lower extremities. He has minimal neck pain. He denies b/b dysfunction or saddle anesthesia.     Post-Op Info:  Procedure(s) (LRB):  LAMINECTOMY-CERVICAL/FUSION-POSTERIOR; C3-6 (N/A)   3 Days Post-Op     Interval History: NAEON    Medications:  Continuous Infusions:   Scheduled Meds:   atorvastatin  10 mg Oral Daily    ceFAZolin (ANCEF) IVPB  1 g Intravenous Q8H    docusate sodium  50 mg Oral BID    finasteride  5 mg Oral Daily    heparin (porcine)  5,000 Units Subcutaneous Q8H     PRN Meds:dextrose 50%, dextrose 50%, diazePAM, glucagon (human recombinant), glucose, glucose, glucose, hydrALAZINE, HYDROmorphone, labetalol, oxycodone-acetaminophen, sodium chloride 0.9%, trazodone     Review of Systems  Objective:     Weight: 77.2 kg (170 lb 2 oz)  Body mass index is 26.65 kg/m².  Vital Signs (Most Recent):  Temp: 97.7 °F (36.5 °C) (17 040)  Pulse: (!) 56 (17 040)  Resp: 19 (17)  BP: (!) 143/76 (17 0400)  SpO2: 99 % (17 0400) Vital Signs (24h Range):  Temp:  [96.3 °F (35.7 °C)-98 °F (36.7 °C)] 97.7 °F (36.5 °C)  Pulse:  [56-80] 56  Resp:  [17-20] 19  SpO2:  [94 %-99 %] 99 %  BP: (132-143)/(73-98) 143/76              Temp (24hrs), Av.2 °F (36.2 °C), Min:96.3 °F (35.7 °C), Max:98 °F (36.7 °C)                 Closed/Suction Drain 17 1514 Posterior Neck Accordion 10 Fr. (Active)   Site Description  Unable to view 6/23/2017  8:00 AM   Dressing Type [place];Gauze 6/23/2017  8:00 AM   Dressing Status Clean;Dry;Intact 6/23/2017  8:00 AM   Dressing Intervention Other (Comment) 6/21/2017  8:04 PM   Drainage Bloody 6/23/2017  8:00 AM   Status Other (Comment) 6/22/2017  7:59 AM   Output (mL) 25 mL 6/24/2017  6:00 AM       Neurosurgery Physical Exam   General: well developed, well nourished, no distress.   Head: normocephalic, atraumatic  Neurologic: Alert and oriented. Thought content appropriate.  GCS: Motor: 6/Verbal: 5/Eyes: 4 GCS Total: 15  Mental Status: Awake, Alert, Oriented x 4  Language: No aphasia  Speech: No dysarthria  Cranial nerves: face symmetric, tongue midline, CN II-XII grossly intact.   Eyes: pupils equal, round, reactive to light with accomodation, EOMI.  Pulmonary: normal respirations, no signs of respiratory distress  Abdomen: soft, non-distended, not tender to palpation  Sensory: intact to light touch throughout  Motor Strength: Moves all extremities spontaneously with good tone. Full strength upper and lower extremities. No abnormal movements seen.      Strength   Deltoids Triceps Biceps Wrist Extension Wrist Flexion Hand    Upper: R 5/5 4+/5 4-/5 4-/5 4+/5 4+/5     L 5/5 4+/5 4/5 4/5 4+/5 4+/5       Iliopsoas Quadriceps Knee  Flexion Tibialis  anterior Gastro- cnemius EHL   Lower: R 4/5 4-/5 4-/5 5/5 5/5 5/5     L 4/5 4/5 4/5 4+/5 5/5 5/5      DTR's: 4 + and symmetric in UE and LE  Mcdonough: present on the left  Clonus: present bilaterally  Babinski: absent  Pulses: palpable distal pulses  Skin: Skin is warm, dry and intact.      Significant Labs:    Recent Labs  Lab 06/24/17  0503   GLU 87      K 3.6      CO2 25   BUN 22   CREATININE 0.9   CALCIUM 8.5*       Recent Labs  Lab 06/23/17  0527 06/24/17  0503   WBC 13.19*  --    HGB 13.7* 12.5*   HCT 39.9* 36.7*    242     No results for input(s): LABPT, INR, APTT in the last 48 hours.  Microbiology Results (last 7 days)      ** No results found for the last 168 hours. **          Significant Diagnostics:      Assessment/Plan:     HLD (hyperlipidemia)    - Restarted home atorvastatin.        * Cervical myelopathy    -s/p C3-6 PCF, POD#3  -Neurologically stable with signficiant improvements in BUE strength and gait  -Post xrays with good placement of hardware.  -Pain controlled on current regimen.  -Fu drain output  -Continue antibiotics while drain in place.   -Bacitracin to incision BID starting tomorrow.   -Cervical collar at all times  -PT/OT/OOB daily  -Patient must be OOB for atleast 6 hours daily (may be in intervals: 2 hours in chair with each meal)  -IS to bed side. Patient to use atleast 10x every hour.  -Continue bowel regimen daily.  -Continue Teds and SCDs for DVTP.  -[facility]    Dispo: pending [facility]                   Kristian Guan, DO  Neurosurgery  [facility]

## 2017-06-24 NOTE — PLAN OF CARE
Problem: Patient Care Overview  Goal: Plan of Care Review  Outcome: Ongoing (interventions implemented as appropriate)  No changes overnight.  Nursing continues to monitor drain output (see flowsheet for volumes). Dressing to site intact. Pt ambulating well with walker and standby assist.Brace on AAT. Pain controlled. Tolerating po intake and voiding appropriately. SCDs and SQ heparin for DVTP. VSS and afebrile.

## 2017-06-24 NOTE — SUBJECTIVE & OBJECTIVE
Interval History: NAEON    Medications:  Continuous Infusions:   Scheduled Meds:   atorvastatin  10 mg Oral Daily    ceFAZolin (ANCEF) IVPB  1 g Intravenous Q8H    docusate sodium  50 mg Oral BID    finasteride  5 mg Oral Daily    heparin (porcine)  5,000 Units Subcutaneous Q8H     PRN Meds:dextrose 50%, dextrose 50%, diazePAM, glucagon (human recombinant), glucose, glucose, glucose, hydrALAZINE, HYDROmorphone, labetalol, oxycodone-acetaminophen, sodium chloride 0.9%, trazodone     Review of Systems  Objective:     Weight: 77.2 kg (170 lb 2 oz)  Body mass index is 26.65 kg/m².  Vital Signs (Most Recent):  Temp: 97.7 °F (36.5 °C) (17 0400)  Pulse: (!) 56 (17 040)  Resp: 19 (17 0400)  BP: (!) 143/76 (17 0400)  SpO2: 99 % (17) Vital Signs (24h Range):  Temp:  [96.3 °F (35.7 °C)-98 °F (36.7 °C)] 97.7 °F (36.5 °C)  Pulse:  [56-80] 56  Resp:  [17-20] 19  SpO2:  [94 %-99 %] 99 %  BP: (132-143)/(73-98) 143/76              Temp (24hrs), Av.2 °F (36.2 °C), Min:96.3 °F (35.7 °C), Max:98 °F (36.7 °C)                 Closed/Suction Drain 17 1514 Posterior Neck Accordion 10 Fr. (Active)   Site Description Unable to view 2017  8:00 AM   Dressing Type [place];Gauze 2017  8:00 AM   Dressing Status Clean;Dry;Intact 2017  8:00 AM   Dressing Intervention Other (Comment) 2017  8:04 PM   Drainage Bloody 2017  8:00 AM   Status Other (Comment) 2017  7:59 AM   Output (mL) 25 mL 2017  6:00 AM       Neurosurgery Physical Exam   General: well developed, well nourished, no distress.   Head: normocephalic, atraumatic  Neurologic: Alert and oriented. Thought content appropriate.  GCS: Motor: 6/Verbal: 5/Eyes: 4 GCS Total: 15  Mental Status: Awake, Alert, Oriented x 4  Language: No aphasia  Speech: No dysarthria  Cranial nerves: face symmetric, tongue midline, CN II-XII grossly intact.   Eyes: pupils equal, round, reactive to light with accomodation,  EOMI.  Pulmonary: normal respirations, no signs of respiratory distress  Abdomen: soft, non-distended, not tender to palpation  Sensory: intact to light touch throughout  Motor Strength: Moves all extremities spontaneously with good tone. Full strength upper and lower extremities. No abnormal movements seen.      Strength   Deltoids Triceps Biceps Wrist Extension Wrist Flexion Hand    Upper: R 5/5 4+/5 4-/5 4-/5 4+/5 4+/5     L 5/5 4+/5 4/5 4/5 4+/5 4+/5       Iliopsoas Quadriceps Knee  Flexion Tibialis  anterior Gastro- cnemius EHL   Lower: R 4/5 4-/5 4-/5 5/5 5/5 5/5     L 4/5 4/5 4/5 4+/5 5/5 5/5      DTR's: 4 + and symmetric in UE and LE  Mcdonough: present on the left  Clonus: present bilaterally  Babinski: absent  Pulses: palpable distal pulses  Skin: Skin is warm, dry and intact.      Significant Labs:    Recent Labs  Lab 06/24/17  0503   GLU 87      K 3.6      CO2 25   BUN 22   CREATININE 0.9   CALCIUM 8.5*       Recent Labs  Lab 06/23/17  0527 06/24/17  0503   WBC 13.19*  --    HGB 13.7* 12.5*   HCT 39.9* 36.7*    242     No results for input(s): LABPT, INR, APTT in the last 48 hours.  Microbiology Results (last 7 days)     ** No results found for the last 168 hours. **          Significant Diagnostics:

## 2017-06-24 NOTE — PT/OT/SLP PROGRESS
"Physical Therapy  Treatment    Anthony Miguel   MRN: [id number]   Admitting Diagnosis: Cervical myelopathy    PT Received On: 06/24/17  PT Start Time: 0920     PT Stop Time: 0951    PT Total Time (min): 31 min       Billable Minutes:  Gait [id number] and Neuromuscular Re-education 16    Treatment Type: Treatment  PT/PTA: PT     PTA Visit Number: 0       General Precautions: Standard, fall  Orthopedic Precautions: N/A   Braces: Cervical collar    Do you have any cultural, spiritual, Shinto conflicts, given your current situation?:  (none stated)    Subjective:  Communicated with nsg prior to session.      Pain/Comfort  Pain Rating 1: 0/10    Pt verbalized agreement to treatment session.    "I feel like I needed more help on today." per pt during session.    Objective:   Patient found with: cervical collar, hemovac; found supine in bed    Functional Mobility:  Bed Mobility:   Scooting/Bridging: Contact Guard Assistance  Supine to Sit: Minimum Assistance, With side rail (HOB elevated)    Transfers:  Sit <> Stand Assistance: Contact Guard Assistance (x8 trials; 1 trial from bedside, 1 trial from toilet, 6 trials from bedside chair; slowed and req incr time to perform)  Sit <> Stand Assistive Device: Rolling Walker    Gait:   Gait Distance: 80 ft; slowed sean with cueing req to remain upright and to further gait. Slight drag of (R) LE while amb.   Assistance 1: Contact Guard Assistance  Gait Assistive Device: Rolling walker  Gait Deviation(s): decreased sean, decreased stride length, decreased step length, decreased weight-shifting ability, forward lean      Balance:   Static Sit: GOOD-: Takes MODERATE challenges from all directions but inconsistently  Dynamic Sit: GOOD-: Maintains balance through MODERATE excursions of active trunk movement,     Static Stand: FAIR+: Takes MINIMAL challenges from all directions  Dynamic stand: FAIR: Needs CONTACT GUARD during gait     Therapeutic Activities and Exercises:  PT " assisted pt with ambulating to bathroom toilet  - req MIN A to return to upright position with RW usage  - stood at bathroom sink and performed self care activity with CGA provided (RW present for safety)    THERAPEUTIC EXERCISE  Pt performed therapeutic exercise standing for 15 reps   - MS, TR, marching    - 5 sit to stands with progression from RW to unilateral hand support; slowed and req CGA for safety while standing     Pt performed unilateral WS and reaching across midline while standing with (B) UE  - incr difficulty with performing reach with R UE    Educated on weekend PT POC and need for RW with mobility as well as assistance. No further questions or concerns denoted.     EDUCATION  Pt educated pt on incr OOB activity including sitting in bedside chair majority of day and amb with nsg and PCT.   Educated pt on being appropriate to transfer with nsg and PCT; safe to transfer with 1 person assistance and RW  **Mobility Technician appropriate to perform: out of bed, up to chair, back to bed, bed exercises, PROM, ambulation in room, ambulation in hallway.  Updated white board with appropriate PT information; notified nsg   Pt verbalized agreement.          AM-PAC 6 CLICK MOBILITY  How much help from another person does this patient currently need?   1 = Unable, Total/Dependent Assistance  2 = A lot, Maximum/Moderate Assistance  3 = A little, Minimum/Contact Guard/Supervision  4 = None, Modified [place]/Independent    Turning over in bed (including adjusting bedclothes, sheets and blankets)?: 3  Sitting down on and standing up from a chair with arms (e.g., wheelchair, bedside commode, etc.): 3  Moving from lying on back to sitting on the side of the bed?: 3  Moving to and from a bed to a chair (including a wheelchair)?: 3  Need to walk in hospital room?: 3  Climbing 3-5 steps with a railing?: 2  Total Score: 17    AM-PAC Raw Score CMS G-Code Modifier Level of Impairment Assistance   6 % Total /  Unable   7 - 9 CM 80 - 100% Maximal Assist   10 - 14 CL 60 - 80% Moderate Assist   15 - 19 CK 40 - 60% Moderate Assist   20 - 22 CJ 20 - 40% Minimal Assist   23 CI 1-20% SBA / CGA   24 CH 0% Independent/ Mod I     Patient left up in chair with all lines intact and call button in reach.    Assessment:  Anthony Miguel is a 66 y.o. male with a medical diagnosis of Cervical myelopathy and presents with improvement with gait distance and quality in today's session. Continues to demonstrate impaired endurance and balance with further activity. Heavily relies on RW for support. Excellent inpatient rehab candidate; able to tolerate 3 hrs of therapy, motivated to improve mobility, great family support, and multiple neurological impairments currently. Pt remains appropriate for continued skilled services and discharge to postacute location to address the below deficits and improve pt return to PLOF.      Rehab identified problem list/impairments: Rehab identified problem list/impairments: weakness, impaired endurance, impaired functional mobilty, gait instability, impaired balance, decreased coordination, decreased safety awareness, decreased upper extremity function, decreased lower extremity function    Rehab potential is good.    Activity tolerance: Good    Discharge recommendations: Discharge Facility/Level Of Care Needs: rehabilitation facility     Barriers to discharge: Barriers to Discharge: Decreased caregiver support, Inaccessible home environment    Equipment recommendations: Equipment Needed After Discharge:  (TBD at next level of care)     GOALS:    Physical Therapy Goals        Problem: Physical Therapy Goal    Goal Priority Disciplines Outcome Goal Variances Interventions   Physical Therapy Goal     PT/OT, PT Ongoing (interventions implemented as appropriate)     Description:  Goals to be met by: 17    Patient will increase functional independence with mobility by performin. Supine to sit with  Stand-by Assistance- Not Met  2. Sit to supine with Stand-by Assistance- Not Met  3. Rolling to Left and Right with Stand-by Assistance.-Not Met  4. Sit to stand transfer with Stand-by Assistance-Not Met  5. Bed to chair transfer with Stand-by Assistance using Rolling Walker.Not Met  6. Gait  x 100 feet with Stand-by Assistance using Rolling Walker.-Not Met  7. Ascend/descend 4 stair with bilateral Handrails Contact Guard Assistance using Rolling Walker or no AD.-Not Met  8. Stand for 10 minutes with Stand-by Assistance using Rolling Walker.-Not Met  9. Lower extremity exercise program x15 reps per handout, with independence.-Not Met                           PLAN:    Patient to be seen 5 x/week  to address the above listed problems via gait training, therapeutic activities, therapeutic exercises, neuromuscular re-education  Plan of Care expires: 07/22/17  Plan of Care reviewed with: patient         Diana Bustillos, PT, DPT  06/24/2017

## 2017-06-25 LAB
ANION GAP SERPL CALC-SCNC: 8 MMOL/L
BASOPHILS # BLD AUTO: 0.03 K/UL
BASOPHILS NFR BLD: 0.3 %
BUN SERPL-MCNC: 23 MG/DL
CALCIUM SERPL-MCNC: 8.5 MG/DL
CHLORIDE SERPL-SCNC: 103 MMOL/L
CO2 SERPL-SCNC: 25 MMOL/L
CREAT SERPL-MCNC: 0.8 MG/DL
DIFFERENTIAL METHOD: ABNORMAL
EOSINOPHIL # BLD AUTO: 0.3 K/UL
EOSINOPHIL NFR BLD: 3 %
ERYTHROCYTE [DISTWIDTH] IN BLOOD BY AUTOMATED COUNT: 13.4 %
EST. GFR  (AFRICAN AMERICAN): >60 ML/MIN/1.73 M^2
EST. GFR  (NON AFRICAN AMERICAN): >60 ML/MIN/1.73 M^2
GLUCOSE SERPL-MCNC: 105 MG/DL
HCT VFR BLD AUTO: 37.5 %
HGB BLD-MCNC: 12.9 G/DL
LYMPHOCYTES # BLD AUTO: 2.5 K/UL
LYMPHOCYTES NFR BLD: 26.4 %
MCH RBC QN AUTO: 31.9 PG
MCHC RBC AUTO-ENTMCNC: 34.4 %
MCV RBC AUTO: 93 FL
MONOCYTES # BLD AUTO: 0.8 K/UL
MONOCYTES NFR BLD: 8.8 %
NEUTROPHILS # BLD AUTO: 5.8 K/UL
NEUTROPHILS NFR BLD: 60.2 %
PLATELET # BLD AUTO: 254 K/UL
PMV BLD AUTO: 11 FL
POTASSIUM SERPL-SCNC: 3.8 MMOL/L
RBC # BLD AUTO: 4.04 M/UL
SODIUM SERPL-SCNC: 136 MMOL/L
WBC # BLD AUTO: 9.57 K/UL

## 2017-06-25 PROCEDURE — 63600175 PHARM REV CODE 636 W HCPCS: Performed by: STUDENT IN AN ORGANIZED HEALTH CARE EDUCATION/TRAINING PROGRAM

## 2017-06-25 PROCEDURE — 25000003 PHARM REV CODE 250: Performed by: PHYSICIAN ASSISTANT

## 2017-06-25 PROCEDURE — 85025 COMPLETE CBC W/AUTO DIFF WBC: CPT

## 2017-06-25 PROCEDURE — 20600001 HC STEP DOWN PRIVATE ROOM

## 2017-06-25 PROCEDURE — 25000003 PHARM REV CODE 250: Performed by: STUDENT IN AN ORGANIZED HEALTH CARE EDUCATION/TRAINING PROGRAM

## 2017-06-25 PROCEDURE — 80048 BASIC METABOLIC PNL TOTAL CA: CPT

## 2017-06-25 PROCEDURE — 36415 COLL VENOUS BLD VENIPUNCTURE: CPT

## 2017-06-25 RX ADMIN — OXYCODONE AND ACETAMINOPHEN 1 TABLET: 7.5; 325 TABLET ORAL at 02:06

## 2017-06-25 RX ADMIN — OXYCODONE AND ACETAMINOPHEN 1 TABLET: 7.5; 325 TABLET ORAL at 03:06

## 2017-06-25 RX ADMIN — HYDROMORPHONE HYDROCHLORIDE 1 MG: 1 INJECTION, SOLUTION INTRAMUSCULAR; INTRAVENOUS; SUBCUTANEOUS at 10:06

## 2017-06-25 RX ADMIN — OXYCODONE AND ACETAMINOPHEN 1 TABLET: 7.5; 325 TABLET ORAL at 11:06

## 2017-06-25 RX ADMIN — DOCUSATE SODIUM 50 MG: 50 CAPSULE, LIQUID FILLED ORAL at 08:06

## 2017-06-25 RX ADMIN — HEPARIN SODIUM 5000 UNITS: 5000 INJECTION, SOLUTION INTRAVENOUS; SUBCUTANEOUS at 09:06

## 2017-06-25 RX ADMIN — FINASTERIDE 5 MG: 5 TABLET, FILM COATED ORAL at 08:06

## 2017-06-25 RX ADMIN — HEPARIN SODIUM 5000 UNITS: 5000 INJECTION, SOLUTION INTRAVENOUS; SUBCUTANEOUS at 05:06

## 2017-06-25 RX ADMIN — ATORVASTATIN CALCIUM 10 MG: 10 TABLET, FILM COATED ORAL at 08:06

## 2017-06-25 RX ADMIN — HYDROMORPHONE HYDROCHLORIDE 1 MG: 1 INJECTION, SOLUTION INTRAMUSCULAR; INTRAVENOUS; SUBCUTANEOUS at 04:06

## 2017-06-25 RX ADMIN — CEFAZOLIN SODIUM 1 G: 1 SOLUTION INTRAVENOUS at 05:06

## 2017-06-25 RX ADMIN — OXYCODONE AND ACETAMINOPHEN 1 TABLET: 7.5; 325 TABLET ORAL at 06:06

## 2017-06-25 RX ADMIN — DIAZEPAM 5 MG: 5 TABLET ORAL at 03:06

## 2017-06-25 RX ADMIN — HEPARIN SODIUM 5000 UNITS: 5000 INJECTION, SOLUTION INTRAVENOUS; SUBCUTANEOUS at 01:06

## 2017-06-25 RX ADMIN — HYDROMORPHONE HYDROCHLORIDE 1 MG: 1 INJECTION, SOLUTION INTRAMUSCULAR; INTRAVENOUS; SUBCUTANEOUS at 08:06

## 2017-06-25 NOTE — ASSESSMENT & PLAN NOTE
-s/p C3-6 PCF, POD#4  -Neurologically stable with signficiant improvements in BUE strength and gait  -Post xrays with good placement of hardware.  -Pain controlled on current regimen.  -DC drain   -Bacitracin to incision BID  -Cervical collar at all times  -PT/OT/OOB daily  -Patient must be OOB for atleast 6 hours daily (may be in intervals: 2 hours in chair with each meal)  -IS to bed side. Patient to use atleast 10x every hour.  -Continue bowel regimen daily.  -Continue Teds and SCDs for DVTP.  -[facility]    Dispo: pending [facility]

## 2017-06-25 NOTE — PLAN OF CARE
Problem: Patient Care Overview  Goal: Individualization & Mutuality  Outcome: Ongoing (interventions implemented as appropriate)  POC reviewed with patient. Verbalized understanding. AAOx4. Pt remained free from falls and injuries this shift. No new skin breakdown noted. VSS. Afebrile. C-collar remained in place. Hemovac drain to half suction. Drained Q 4 hrs. Safety maintained. Posterior neck pain managed with PRN pain meds, rest, and relaxation. Questions and concerns addressed. No acute events overnight. Pt progressing towards goals. Will continue to monitor.

## 2017-06-25 NOTE — SUBJECTIVE & OBJECTIVE
Interval History: NAEON, feels well.    Medications:  Continuous Infusions:   Scheduled Meds:   atorvastatin  10 mg Oral Daily    ceFAZolin (ANCEF) IVPB  1 g Intravenous Q8H    docusate sodium  50 mg Oral BID    finasteride  5 mg Oral Daily    heparin (porcine)  5,000 Units Subcutaneous Q8H     PRN Meds:dextrose 50%, dextrose 50%, diazePAM, glucagon (human recombinant), glucose, glucose, glucose, hydrALAZINE, HYDROmorphone, labetalol, oxycodone-acetaminophen, sodium chloride 0.9%, trazodone     Review of Systems  Objective:     Weight: 77.2 kg (170 lb 2 oz)  Body mass index is 26.65 kg/m².  Vital Signs (Most Recent):  Temp: 97.7 °F (36.5 °C) (17 0400)  Pulse: 70 (17 0400)  Resp: 16 (17 0400)  BP: 115/68 (17 0400)  SpO2: 97 % (17 0400) Vital Signs (24h Range):  Temp:  [96.1 °F (35.6 °C)-98.3 °F (36.8 °C)] 97.7 °F (36.5 °C)  Pulse:  [56-70] 70  Resp:  [16-20] 16  SpO2:  [95 %-98 %] 97 %  BP: (114-139)/(56-79) 115/68              Temp (24hrs), Av.6 °F (36.4 °C), Min:96.1 °F (35.6 °C), Max:98.3 °F (36.8 °C)                 Closed/Suction Drain 17 1514 Posterior Neck Accordion 10 Fr. (Active)   Site Description Unable to view 2017  8:00 PM   Dressing Type [place] 2017  8:00 PM   Dressing Status Clean;Dry;Intact 2017  8:00 PM   Dressing Intervention Other (Comment) 2017  8:04 PM   Drainage Serosanguineous 2017  8:00 PM   Status Other (Comment) 2017  8:00 PM   Output (mL) 1 mL 2017  4:00 AM       Neurosurgery Physical Exam   General: well developed, well nourished, no distress.   Head: normocephalic, atraumatic  Neurologic: Alert and oriented. Thought content appropriate.  GCS: Motor: 6/Verbal: 5/Eyes: 4 GCS Total: 15  Mental Status: Awake, Alert, Oriented x 4  Language: No aphasia  Speech: No dysarthria  Cranial nerves: face symmetric, tongue midline, CN II-XII grossly intact.   Eyes: pupils equal, round, reactive to light with  accomodation, EOMI.  Pulmonary: normal respirations, no signs of respiratory distress  Abdomen: soft, non-distended, not tender to palpation  Sensory: intact to light touch throughout  Motor Strength: Moves all extremities spontaneously with good tone. Full strength upper and lower extremities. No abnormal movements seen.      Strength   Deltoids Triceps Biceps Wrist Extension Wrist Flexion Hand    Upper: R 5/5 4+/5 4-/5 4-/5 4+/5 4+/5     L 5/5 4+/5 4/5 4/5 4+/5 4+/5       Iliopsoas Quadriceps Knee  Flexion Tibialis  anterior Gastro- cnemius EHL   Lower: R 4/5 4-/5 4-/5 5/5 5/5 5/5     L 4/5 4/5 4/5 4+/5 5/5 5/5      DTR's: 4 + and symmetric in UE and LE  Mcdonough: present on the left  Clonus: present bilaterally  Babinski: absent  Pulses: palpable distal pulses  Skin: Skin is warm, dry and intact.      Significant Labs:    Recent Labs  Lab 06/25/17  0425         K 3.8      CO2 25   BUN 23   CREATININE 0.8   CALCIUM 8.5*       Recent Labs  Lab 06/24/17  0503 06/25/17  0425   WBC 12.30 9.57   HGB 12.5* 12.9*   HCT 36.7* 37.5*    254     No results for input(s): LABPT, INR, APTT in the last 48 hours.  Microbiology Results (last 7 days)     ** No results found for the last 168 hours. **            Significant Diagnostics:

## 2017-06-25 NOTE — PROGRESS NOTES
[facility]  Neurosurgery  Progress Note    Subjective:     History of Present Illness: Anthony Miguel is a 66 y.o. male directly admitted from Daisy Rivera PA-C's clinic for cervical myelopathy. He has experienced progressive weakness and paresthesias in his arms and legs for approximately one month. He is typically independent with ADLs, but has recently been unable to ambulate and has experienced difficulty with fine motor movements, such as playing piano, writing, etc. He endorses paresthesias throughout his upper extremities, and occasionally in his lower extremities. He has minimal neck pain. He denies b/b dysfunction or saddle anesthesia.     Post-Op Info:  Procedure(s) (LRB):  LAMINECTOMY-CERVICAL/FUSION-POSTERIOR; C3-6 (N/A)   4 Days Post-Op     Interval History: NAEON, feels well.    Medications:  Continuous Infusions:   Scheduled Meds:   atorvastatin  10 mg Oral Daily    ceFAZolin (ANCEF) IVPB  1 g Intravenous Q8H    docusate sodium  50 mg Oral BID    finasteride  5 mg Oral Daily    heparin (porcine)  5,000 Units Subcutaneous Q8H     PRN Meds:dextrose 50%, dextrose 50%, diazePAM, glucagon (human recombinant), glucose, glucose, glucose, hydrALAZINE, HYDROmorphone, labetalol, oxycodone-acetaminophen, sodium chloride 0.9%, trazodone     Review of Systems  Objective:     Weight: 77.2 kg (170 lb 2 oz)  Body mass index is 26.65 kg/m².  Vital Signs (Most Recent):  Temp: 97.7 °F (36.5 °C) (17 0400)  Pulse: 70 (17 0400)  Resp: 16 (17 0400)  BP: 115/68 (17 0400)  SpO2: 97 % (17 0400) Vital Signs (24h Range):  Temp:  [96.1 °F (35.6 °C)-98.3 °F (36.8 °C)] 97.7 °F (36.5 °C)  Pulse:  [56-70] 70  Resp:  [16-20] 16  SpO2:  [95 %-98 %] 97 %  BP: (114-139)/(56-79) 115/68              Temp (24hrs), Av.6 °F (36.4 °C), Min:96.1 °F (35.6 °C), Max:98.3 °F (36.8 °C)                 Closed/Suction Drain 17 1514 Posterior Neck Accordion 10 Fr. (Active)   Site  Description Unable to view 6/24/2017  8:00 PM   Dressing Type [place] 6/24/2017  8:00 PM   Dressing Status Clean;Dry;Intact 6/24/2017  8:00 PM   Dressing Intervention Other (Comment) 6/21/2017  8:04 PM   Drainage Serosanguineous 6/24/2017  8:00 PM   Status Other (Comment) 6/24/2017  8:00 PM   Output (mL) 1 mL 6/25/2017  4:00 AM       Neurosurgery Physical Exam   General: well developed, well nourished, no distress.   Head: normocephalic, atraumatic  Neurologic: Alert and oriented. Thought content appropriate.  GCS: Motor: 6/Verbal: 5/Eyes: 4 GCS Total: 15  Mental Status: Awake, Alert, Oriented x 4  Language: No aphasia  Speech: No dysarthria  Cranial nerves: face symmetric, tongue midline, CN II-XII grossly intact.   Eyes: pupils equal, round, reactive to light with accomodation, EOMI.  Pulmonary: normal respirations, no signs of respiratory distress  Abdomen: soft, non-distended, not tender to palpation  Sensory: intact to light touch throughout  Motor Strength: Moves all extremities spontaneously with good tone. Full strength upper and lower extremities. No abnormal movements seen.      Strength   Deltoids Triceps Biceps Wrist Extension Wrist Flexion Hand    Upper: R 5/5 4+/5 4-/5 4-/5 4+/5 4+/5     L 5/5 4+/5 4/5 4/5 4+/5 4+/5       Iliopsoas Quadriceps Knee  Flexion Tibialis  anterior Gastro- cnemius EHL   Lower: R 4/5 4-/5 4-/5 5/5 5/5 5/5     L 4/5 4/5 4/5 4+/5 5/5 5/5      DTR's: 4 + and symmetric in UE and LE  Mcdonough: present on the left  Clonus: present bilaterally  Babinski: absent  Pulses: palpable distal pulses  Skin: Skin is warm, dry and intact.      Significant Labs:    Recent Labs  Lab 06/25/17  0425         K 3.8      CO2 25   BUN 23   CREATININE 0.8   CALCIUM 8.5*       Recent Labs  Lab 06/24/17  0503 06/25/17  0425   WBC 12.30 9.57   HGB 12.5* 12.9*   HCT 36.7* 37.5*    254     No results for input(s): LABPT, INR, APTT in the last 48 hours.  Microbiology Results  (last 7 days)     ** No results found for the last 168 hours. **            Significant Diagnostics:      Assessment/Plan:     HLD (hyperlipidemia)    - Restarted home atorvastatin.        * Cervical myelopathy    -s/p C3-6 PCF, POD#4  -Neurologically stable with signficiant improvements in BUE strength and gait  -Post xrays with good placement of hardware.  -Pain controlled on current regimen.  -DC drain   -Bacitracin to incision BID  -Cervical collar at all times  -PT/OT/OOB daily  -Patient must be OOB for atleast 6 hours daily (may be in intervals: 2 hours in chair with each meal)  -IS to bed side. Patient to use atleast 10x every hour.  -Continue bowel regimen daily.  -Continue Teds and SCDs for DVTP.  -[facility]    Dispo: pending [facility]                   Kristian Guan, DO  Neurosurgery  [facility]

## 2017-06-26 LAB
ANION GAP SERPL CALC-SCNC: 8 MMOL/L
BASOPHILS # BLD AUTO: 0.02 K/UL
BASOPHILS NFR BLD: 0.2 %
BUN SERPL-MCNC: 20 MG/DL
CALCIUM SERPL-MCNC: 8.9 MG/DL
CHLORIDE SERPL-SCNC: 103 MMOL/L
CO2 SERPL-SCNC: 24 MMOL/L
CREAT SERPL-MCNC: 0.8 MG/DL
DIFFERENTIAL METHOD: ABNORMAL
EOSINOPHIL # BLD AUTO: 0.4 K/UL
EOSINOPHIL NFR BLD: 4.3 %
ERYTHROCYTE [DISTWIDTH] IN BLOOD BY AUTOMATED COUNT: 13.2 %
EST. GFR  (AFRICAN AMERICAN): >60 ML/MIN/1.73 M^2
EST. GFR  (NON AFRICAN AMERICAN): >60 ML/MIN/1.73 M^2
GLUCOSE SERPL-MCNC: 101 MG/DL
HCT VFR BLD AUTO: 37.8 %
HGB BLD-MCNC: 13.2 G/DL
LYMPHOCYTES # BLD AUTO: 2.2 K/UL
LYMPHOCYTES NFR BLD: 22.2 %
MCH RBC QN AUTO: 32.2 PG
MCHC RBC AUTO-ENTMCNC: 34.9 %
MCV RBC AUTO: 92 FL
MONOCYTES # BLD AUTO: 0.9 K/UL
MONOCYTES NFR BLD: 8.9 %
NEUTROPHILS # BLD AUTO: 6.4 K/UL
NEUTROPHILS NFR BLD: 63.3 %
PLATELET # BLD AUTO: 270 K/UL
PMV BLD AUTO: 10.5 FL
POTASSIUM SERPL-SCNC: 3.8 MMOL/L
RBC # BLD AUTO: 4.1 M/UL
SODIUM SERPL-SCNC: 135 MMOL/L
WBC # BLD AUTO: 10.04 K/UL

## 2017-06-26 PROCEDURE — 20600001 HC STEP DOWN PRIVATE ROOM

## 2017-06-26 PROCEDURE — 80048 BASIC METABOLIC PNL TOTAL CA: CPT

## 2017-06-26 PROCEDURE — 25000003 PHARM REV CODE 250: Performed by: STUDENT IN AN ORGANIZED HEALTH CARE EDUCATION/TRAINING PROGRAM

## 2017-06-26 PROCEDURE — 99222 1ST HOSP IP/OBS MODERATE 55: CPT | Mod: ,,, | Performed by: NURSE PRACTITIONER

## 2017-06-26 PROCEDURE — 25000003 PHARM REV CODE 250: Performed by: PHYSICIAN ASSISTANT

## 2017-06-26 PROCEDURE — 85025 COMPLETE CBC W/AUTO DIFF WBC: CPT

## 2017-06-26 PROCEDURE — 63600175 PHARM REV CODE 636 W HCPCS: Performed by: STUDENT IN AN ORGANIZED HEALTH CARE EDUCATION/TRAINING PROGRAM

## 2017-06-26 PROCEDURE — 97530 THERAPEUTIC ACTIVITIES: CPT

## 2017-06-26 PROCEDURE — 97116 GAIT TRAINING THERAPY: CPT

## 2017-06-26 PROCEDURE — 36415 COLL VENOUS BLD VENIPUNCTURE: CPT

## 2017-06-26 RX ADMIN — HEPARIN SODIUM 5000 UNITS: 5000 INJECTION, SOLUTION INTRAVENOUS; SUBCUTANEOUS at 03:06

## 2017-06-26 RX ADMIN — TRAZODONE HYDROCHLORIDE 50 MG: 50 TABLET ORAL at 08:06

## 2017-06-26 RX ADMIN — OXYCODONE AND ACETAMINOPHEN 1 TABLET: 7.5; 325 TABLET ORAL at 08:06

## 2017-06-26 RX ADMIN — DOCUSATE SODIUM 50 MG: 50 CAPSULE, LIQUID FILLED ORAL at 08:06

## 2017-06-26 RX ADMIN — HEPARIN SODIUM 5000 UNITS: 5000 INJECTION, SOLUTION INTRAVENOUS; SUBCUTANEOUS at 05:06

## 2017-06-26 RX ADMIN — ATORVASTATIN CALCIUM 10 MG: 10 TABLET, FILM COATED ORAL at 08:06

## 2017-06-26 RX ADMIN — OXYCODONE AND ACETAMINOPHEN 1 TABLET: 7.5; 325 TABLET ORAL at 03:06

## 2017-06-26 RX ADMIN — OXYCODONE AND ACETAMINOPHEN 1 TABLET: 7.5; 325 TABLET ORAL at 04:06

## 2017-06-26 RX ADMIN — HYDROMORPHONE HYDROCHLORIDE 1 MG: 1 INJECTION, SOLUTION INTRAMUSCULAR; INTRAVENOUS; SUBCUTANEOUS at 09:06

## 2017-06-26 RX ADMIN — OXYCODONE AND ACETAMINOPHEN 1 TABLET: 7.5; 325 TABLET ORAL at 09:06

## 2017-06-26 RX ADMIN — HEPARIN SODIUM 5000 UNITS: 5000 INJECTION, SOLUTION INTRAVENOUS; SUBCUTANEOUS at 09:06

## 2017-06-26 RX ADMIN — HYDROMORPHONE HYDROCHLORIDE 1 MG: 1 INJECTION, SOLUTION INTRAMUSCULAR; INTRAVENOUS; SUBCUTANEOUS at 05:06

## 2017-06-26 RX ADMIN — FINASTERIDE 5 MG: 5 TABLET, FILM COATED ORAL at 08:06

## 2017-06-26 NOTE — PLAN OF CARE
Problem: Physical Therapy Goal  Goal: Physical Therapy Goal  Goals to be met by: 17    Patient will increase functional independence with mobility by performin. Supine to sit with Stand-by Assistance  2. Sit to supine with Stand-by Assistance  3. Rolling to Left and Right with Stand-by Assistance.  4. Sit to stand transfer with Stand-by Assistance- MET       Updated: with supervision.   5. Bed to chair transfer with Stand-by Assistance using Rolling Walker.  6. Gait  x 100 feet with Stand-by Assistance using Rolling Walker.  7. Ascend/descend 4 stair with bilateral Handrails Contact Guard Assistance using Rolling Walker or no AD.  8. Stand for 10 minutes with Stand-by Assistance using Rolling Walker.  9. Lower extremity exercise program x15 reps per handout, with independence          Outcome: Ongoing (interventions implemented as appropriate)  Pt progressing well with therapy, meeting 1 goal this visit. Continue current POC.     Carolyne Mcgowan PT, DPT   2017  Pager: [phone]

## 2017-06-26 NOTE — HPI
Anthony Miguel is a 66-year-old male with PMHx of Hep C, BPH, HLP and cervical and lumbar stenosis.  Patient was directly admited to [facility] on 6/21 from Daisy Rivera's office for 1 month progressive worsening of gait imbalance, falling, and weakness, numbness, and tingling in his UE> LE. He has experienced difficulty with fine motor movements, such as playing piano, writing, etc. On 6/21, a posterior cervical laminectomy of C3-C6 with drain placement was performed.     Functional History: Patient lives alone in [place] in a single story home with 4 steps to enter.  Prior to admission, he was independent with mobility and transfers up until May and required assistance with ADLs and mobility.  DME: Wheelchair, bath bench, walker, rollator, & straight cane.

## 2017-06-26 NOTE — PT/OT/SLP PROGRESS
Physical Therapy  Treatment    Anthony Miguel   MRN: [id number]   Admitting Diagnosis: Cervical myelopathy    PT Received On: 06/26/17  PT Start Time: 1317     PT Stop Time: 1350    PT Total Time (min): 33 min       Billable Minutes:  Gait [id number] min and Therapeutic Activity 15 min    Treatment Type: Treatment  PT/PTA: PT     PTA Visit Number: 0       General Precautions: Standard, fall  Orthopedic Precautions: N/A   Braces: Cervical collar    Do you have any cultural, spiritual, Judaism conflicts, given your current situation?:  (none stated)    Subjective:  Communicated with RN prior to session. Pt agreeable to participate in therapy session.     Pain/Comfort  Pain Rating 1: 10/10  Location 1: neck  Pain Addressed 1: Reposition, Nurse notified  Pain Rating Post-Intervention 1: 10/10    Objective:   Patient found with: cervical collar    Functional Mobility:  Bed Mobility:   Scooting/Bridging: Contact Guard Assistance  Supine to Sit: Minimum Assistance, With side rail  Sit to Supine: Minimum Assistance, With siderail    Transfers:  Sit <> Stand Assistance: Stand By Assistance  Sit <> Stand Assistive Device: Rolling Walker    Gait:   Gait Distance: ~60' with RW and CGA for safety; no LOB; pt limited by pain  Assistance 1: Contact Guard Assistance  Gait Assistive Device: Rolling walker  Gait Pattern: swing-to gait  Gait Deviation(s): decreased sean, decreased stride length, decreased step length, decreased weight-shifting ability    Balance:   Static Sit: FAIR+: Able to take MINIMAL challenges from all directions  Dynamic Sit: FAIR+: Maintains balance through MINIMAL excursions of active trunk motion  Static Stand: FAIR: Maintains without assist but unable to take challenges  Dynamic stand: FAIR: Needs CONTACT GUARD during gait     Therapeutic Activities and Exercises:  Therapeutic activities aimed to increase pt's independence, safety, and efficiency with bed mobility and functional transfers. See above  for assistance levels. Therapist reinforced education on log rolling, sit<>stand transfers, and RW management. Pt also educated on recommendations for HEP for continued BLE strengthening. Pt verbalized understanding of education provided.     Therapist facilitated progression of gait training to improve gait stability, endurance, and independence with functional ambulation. Pt provided with verbal and tactile cueing to improve postural awareness and heel strike bilaterally.      AM-PAC 6 CLICK MOBILITY  How much help from another person does this patient currently need?   1 = Unable, Total/Dependent Assistance  2 = A lot, Maximum/Moderate Assistance  3 = A little, Minimum/Contact Guard/Supervision  4 = None, Modified [place]/Independent    Turning over in bed (including adjusting bedclothes, sheets and blankets)?: 3  Sitting down on and standing up from a chair with arms (e.g., wheelchair, bedside commode, etc.): 3  Moving from lying on back to sitting on the side of the bed?: 3  Moving to and from a bed to a chair (including a wheelchair)?: 3  Need to walk in hospital room?: 3  Climbing 3-5 steps with a railing?: 2  Total Score: 17    AM-PAC Raw Score CMS G-Code Modifier Level of Impairment Assistance   6 % Total / Unable   7 - 9 CM 80 - 100% Maximal Assist   10 - 14 CL 60 - 80% Moderate Assist   15 - 19 CK 40 - 60% Moderate Assist   20 - 22 CJ 20 - 40% Minimal Assist   23 CI 1-20% SBA / CGA   24 CH 0% Independent/ Mod I     Patient left HOB elevated with all lines intact, call button in reach, bed alarm on and RN notified.    Assessment:  Anthony Miguel is a 66 y.o. male with a medical diagnosis of Cervical myelopathy. Pt remains motivated to return to PLOF, demonstrating good effort during therapy sessions. Pt with reports of increased pain this visit, limiting tolerance for gait. Pt would benefit from continued PT intervention to address below listed deficits and maximize return to PLOF.        Rehab identified problem list/impairments: Rehab identified problem list/impairments: weakness, impaired endurance, impaired functional mobilty, gait instability, impaired balance, decreased lower extremity function, decreased upper extremity function, pain    Rehab potential is good.    Activity tolerance: Good    Discharge recommendations: Discharge Facility/Level Of Care Needs: rehabilitation facility     Barriers to discharge: Barriers to Discharge: Decreased caregiver support, Inaccessible home environment    Equipment recommendations: Equipment Needed After Discharge:  (TBD at next level of care)     GOALS:    Physical Therapy Goals        Problem: Physical Therapy Goal    Goal Priority Disciplines Outcome Goal Variances Interventions   Physical Therapy Goal     PT/OT, PT Ongoing (interventions implemented as appropriate)     Description:  Goals to be met by: 17    Patient will increase functional independence with mobility by performin. Supine to sit with Stand-by Assistance  2. Sit to supine with Stand-by Assistance  3. Rolling to Left and Right with Stand-by Assistance.  4. Sit to stand transfer with Stand-by Assistance- MET       Updated: with supervision.   5. Bed to chair transfer with Stand-by Assistance using Rolling Walker.  6. Gait  x 100 feet with Stand-by Assistance using Rolling Walker.  7. Ascend/descend 4 stair with bilateral Handrails Contact Guard Assistance using Rolling Walker or no AD.  8. Stand for 10 minutes with Stand-by Assistance using Rolling Walker.  9. Lower extremity exercise program x15 reps per handout, with independence                            PLAN:    Patient to be seen 5 x/week  to address the above listed problems via gait training, therapeutic activities, therapeutic exercises, neuromuscular re-education  Plan of Care expires: 17  Plan of Care reviewed with: patient        Carolyne Mcgowan PT, DPT   2017  Pager: [phone]

## 2017-06-26 NOTE — SUBJECTIVE & OBJECTIVE
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    Overview:  Right side dx update     Past Surgical History:   Procedure Laterality Date    Larangoscopy       Review of patient's allergies indicates:  No Known Allergies    Scheduled Medications:    atorvastatin  10 mg Oral Daily    docusate sodium  50 mg Oral BID    finasteride  5 mg Oral Daily    heparin (porcine)  5,000 Units Subcutaneous Q8H       PRN Medications: dextrose 50%, dextrose 50%, diazePAM, glucagon (human recombinant), glucose, glucose, glucose, hydrALAZINE, HYDROmorphone, labetalol, oxycodone-acetaminophen, sodium chloride 0.9%, trazodone    Family History     Problem Relation (Age of Onset)    Cancer Mother    No Known Problems Father        Social History Main Topics    Smoking status: Former Smoker     Packs/day: 1.00     Quit date: 02/2000    Smokeless tobacco: Not on file    Alcohol use No    Drug use: No    Sexual activity: Not Currently     Review of Systems   Constitutional: Negative for chills, fatigue and fever.   HENT: Negative for facial swelling, trouble swallowing and voice change.    Eyes: Negative for photophobia and visual disturbance.   Respiratory: Negative for cough, shortness of breath and wheezing.    Cardiovascular: Negative for chest pain and palpitations.   Gastrointestinal: Negative for abdominal distention, nausea and vomiting.   Genitourinary: Negative for difficulty urinating and flank pain.   Musculoskeletal: Positive for arthralgias, gait problem and neck pain.   Skin: Negative for color change and rash.   Neurological: Positive for weakness and numbness. Negative for speech difficulty and headaches.    Psychiatric/Behavioral: Negative for agitation and confusion.     Objective:     Vital Signs (Most Recent):  Temp: 99 °F (37.2 °C) (06/26/17 1100)  Pulse: 69 (06/26/17 1100)  Resp: 17 (06/26/17 1100)  BP: 132/77 (06/26/17 1100)  SpO2: 97 % (06/26/17 1100)    Vital Signs (24h Range):  Temp:  [97.9 °F (36.6 °C)-99 °F (37.2 °C)] 99 °F (37.2 °C)  Pulse:  [69-86] 69  Resp:  [17-18] 17  SpO2:  [97 %-99 %] 97 %  BP: (112-140)/(70-86) 132/77     Body mass index is 26.65 kg/m².    Physical Exam   Constitutional: He is oriented to person, place, and time. He appears well-developed and well-nourished.   HENT:   Head: Normocephalic and atraumatic.   Eyes: EOM are normal. Pupils are equal, round, and reactive to light.   Neck: Normal range of motion.   Cardiovascular: Normal rate and regular rhythm.    Pulmonary/Chest: Effort normal. No respiratory distress.   Abdominal: Soft. There is no tenderness.   Musculoskeletal:        Cervical back: He exhibits decreased range of motion and tenderness.   C-collar noted   Neurological: He is alert and oriented to person, place, and time. A sensory deficit (decreased sensation to UE. Intact to LE. ) is present. He exhibits normal muscle tone. Coordination normal.   RUE: 4/5, 4/5 .  LUE: 4/5, 4/5 .  RLE: 5/5, DF 5/5, PF 5/5.  LLE: 4/5, DF 5/5, PF 5/5.   Skin: Skin is warm and dry.   Psychiatric: He has a normal mood and affect. His behavior is normal.       Diagnostic Results:   Labs: Reviewed  X-Ray: Reviewed  CT: Reviewed  MRI: Reviewed

## 2017-06-26 NOTE — HOSPITAL COURSE
6/22/17: Evaluated by therapy. Bed mobility CGA-Gustavo.  Sit to stand Gustavo & RW and transfers CGA.  Ambulated 60ft. CGA & RW.  UBD Gustavo and LBD Gustavo.  6/2317: Evaluated by OT. Bed mobility not assessed.  Sit to stand CGA & RW and transfers Gustavo & RW.  Ambulated 20ft CGA & RW.  UBD  and LBD not assessed.  6/24/17: Evaluated by PT. Bed mobility CGA-Gustavo & RW.  Sit to stand CGA.  Ambulated 80 ft. CGA & RW.

## 2017-06-26 NOTE — DISCHARGE INSTRUCTIONS
Please follow ONLY the instructions that are checked below.    Activity Restrictions:  [x]  Return to work will be determined on an individual basis.  [x]  No lifting greater than 10 pounds.  [x]  Avoid bending and twisting the area of your surgery more than 45 degrees from neutral position in any direction.  [x]  No driving or operating machinery:  [x]  until cleared by your surgeon.  [x]  while taking narcotic pain medications or muscle relaxants.  [x]  Wear your brace at all times, except ok to remove while showering. Please also wear your collar when you are sleeping.   [x]  Increase ambulation over the next 2 weeks so that you are walking 2 miles per day at 2 weeks post-operatively.  [x]  Walk on paved surfaces only. It is okay to walk up and down stairs while holding onto a side rail.  [x]  No sexual activity for 2-3 weeks.    Discharge Medication/Follow-up:  [x]  Please refer to discharge medication reconciliation form.  [x]  Do not take ANY non-steroidal anti-inflammatory drugs (NSAIDS), including the following: ibuprofen, naprosyn, Aleve, Advil, Indocin, Mobic, or Celebrex for:  [x]  6 weeks  [x]  Prescriptions for appropriate medication will be given upon discharge.  [x]  Take docusate (Colace 100 mg): take one capsule a day as needed for constipation. You can get this over the counter.  [x]  Follow-up appointment:  [x]  10-14 days post-op for wound check by physician assistant/nurse  [x]  4-6 weeks with MD:  [x]  with x-rays  [x]  An appointment will be mailed to you.    Wound Care:  [x]  No bandage required. Keep your incision open to the air.  [x]  You may shower on the 2nd day after your surgery. Have the force of water hit you opposite from the incision. Pat the incision dry after your shower; do not scrub the incision.  [x]  You cannot take a bath until 8 weeks after surgery.  [x]  Apply bacitracin to incision twice a day for 2 weeks after surgery.     Call your doctor or go to the Emergency Room for  any signs of infection, including: increased redness, drainage, pain, or fever (temperature ?101.5 for 24 hours). Call your doctor or go to the Emergency Room if there are any localized neurological changes; problems with speech, vision, numbness, tingling, weakness, or severe headache; or for other concerns.    Special Instructions:  [x]  No use of tobacco products.  [x]  Diet: Please eat a regular diet as tolerated.      Physicians need 3 days' notice for pain medicine to be refilled. Pain medicine will only be refilled between 8 AM and 5 PM, Monday through Friday, due to Food and Drug Administration regulation of documentation.    If you have any questions about this form, please call [phone].    Form No. 98476 (Revised 10/31/2013)

## 2017-06-26 NOTE — PROGRESS NOTES
[facility]  Physical Medicine & Rehab  Progress Note    Patient Name: Anthony Miguel  MRN: [id number]  Admission Date: 6/20/2017  Length of Stay: 6 days  Collaborating Physician : Sergio Lebron MD    Subjective:     Interval History (06/26/2017):  Patient is seen for follow-up rehab evaluation and recommendations.  No acute events over night.  Participating with therapy. Drain removed on 6/25.  Barriers for discharge/rehab admission: Insurance approval pending for rehab admission.      HPI, Past Medical, Surgical, Family, and Social History remains the same as documented in the initial encounter.    Principal Problem:Cervical myelopathy    Hospital Course:   6/22/17: Evaluated by therapy. Bed mobility CGA-Gustavo.  Sit to stand Gustavo & RW and transfers CGA.  Ambulated 60ft. CGA & RW.  UBD Gustavo and LBD Gustavo.  6/2317: Evaluated by OT. Bed mobility not assessed.  Sit to stand CGA & RW and transfers Gustavo & RW.  Ambulated 20ft CGA & RW.  UBD  and LBD not assessed.  6/24/17: Evaluated by PT. Bed mobility CGA-Gustavo & RW.  Sit to stand CGA.  Ambulated 80 ft. CGA & RW.          Scheduled Medications:    atorvastatin  10 mg Oral Daily    docusate sodium  50 mg Oral BID    finasteride  5 mg Oral Daily    heparin (porcine)  5,000 Units Subcutaneous Q8H       PRN Medications: dextrose 50%, dextrose 50%, diazePAM, glucagon (human recombinant), glucose, glucose, glucose, hydrALAZINE, HYDROmorphone, labetalol, oxycodone-acetaminophen, sodium chloride 0.9%, trazodone    Review of Systems   Constitutional: Negative for chills, fatigue and fever.   HENT: Negative for facial swelling, trouble swallowing and voice change.    Eyes: Negative for photophobia and visual disturbance.   Respiratory: Negative for cough, shortness of breath and wheezing.    Cardiovascular: Negative for chest pain and palpitations.   Gastrointestinal: Negative for abdominal distention, nausea and vomiting.   Genitourinary: Negative for  difficulty urinating and flank pain.   Musculoskeletal: Positive for arthralgias, gait problem and neck pain.   Skin: Negative for color change and rash.   Neurological: Positive for weakness and numbness. Negative for speech difficulty and headaches.   Psychiatric/Behavioral: Negative for agitation and confusion.     Objective:     Vital Signs (Most Recent):  Temp: 99 °F (37.2 °C) (06/26/17 1100)  Pulse: 69 (06/26/17 1100)  Resp: 17 (06/26/17 1100)  BP: 132/77 (06/26/17 1100)  SpO2: 97 % (06/26/17 1100)    Vital Signs (24h Range):  Temp:  [97.9 °F (36.6 °C)-99 °F (37.2 °C)] 99 °F (37.2 °C)  Pulse:  [69-86] 69  Resp:  [17-18] 17  SpO2:  [97 %-99 %] 97 %  BP: (112-140)/(70-86) 132/77     Physical Exam   Constitutional: He is oriented to person, place, and time. He appears well-developed and well-nourished.   HENT:   Head: Normocephalic and atraumatic.   Eyes: EOM are normal. Pupils are equal, round, and reactive to light.   Neck: Normal range of motion.   Cardiovascular: Normal rate and regular rhythm.    Pulmonary/Chest: Effort normal. No respiratory distress.   Abdominal: Soft. There is no tenderness.   Musculoskeletal:        Cervical back: He exhibits decreased range of motion and tenderness.   C-collar noted   Neurological: He is alert and oriented to person, place, and time. A sensory deficit (decreased sensation to UE. Intact to LE. ) is present. He exhibits normal muscle tone. Coordination normal.   RUE: 4/5, 4/5 .  LUE: 4/5, 4/5 .  RLE: 5/5, DF 5/5, PF 5/5.  LLE: 4/5, DF 5/5, PF 5/5.   Skin: Skin is warm and dry.   Psychiatric: He has a normal mood and affect. His behavior is normal.       Diagnostic Results:   Labs: Reviewed  X-Ray: Reviewed  CT: Reviewed  MRI: Reviewed    Assessment/Plan:      Weakness of both lower extremities    -continue PT/OT        Benign non-nodular prostatic hyperplasia without lower urinary tract symptoms    -continue Finasteride        * Cervical myelopathy    -s/p  cervical laminectomy C3-C6 with drain placement  on 6/21          Patient approved for [facility]; however, inpatient rehab stay was denied by his insurance.  Will sign off.  Recommend SNF.     Thank you for your consult.     Hortensia Fernandez NP  Department of Physical Medicine & Rehab   [facility]

## 2017-06-26 NOTE — CONSULTS
[facility]  Physical Medicine & Rehab  Consult Note    Patient Name: Anthony Miguel  MRN: [id number]  Admission Date: 6/20/2017  Hospital Length of Stay: 6 days  Collaborating Physician : Sergio Lebron MD    Consults  Subjective:     Principal Problem: Cervical myelopathy    HPI: Anthony Miguel is a 66-year-old male with PMHx of Hep C, BPH, HLP and cervical and lumbar stenosis.  Patient was directly admited to [facility] on 6/21 from Daisy Rivera's office for 1 month progressive worsening of gait imbalance, falling, and weakness, numbness, and tingling in his UE> LE. He has experienced difficulty with fine motor movements, such as playing piano, writing, etc. On 6/21, a posterior cervical laminectomy of C3-C6 with drain placement was performed.     Functional History: Patient lives alone in [place] in a single story home with 4 steps to enter.  Prior to admission, he was independent with mobility and transfers up until May and required assistance with ADLs and mobility.  DME: Wheelchair, bath bench, walker, rollator, & straight cane.       Hospital Course:   6/22/17: Evaluated by therapy. Bed mobility CGA-Gustavo.  Sit to stand Gustavo & RW and transfers CGA.  Ambulated 60ft. CGA & RW.  UBD Gustavo and LBD Gustavo.  6/2317: Evaluated by OT. Bed mobility not assessed.  Sit to stand CGA & RW and transfers Gustavo & RW.  Ambulated 20ft CGA & RW.  UBD  and LBD not assessed.  6/24/17: Evaluated by PT. Bed mobility CGA-Gustavo & RW.  Sit to stand CGA.  Ambulated 80 ft. CGA & RW.        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    Overview:  Right side dx update     Past Surgical History:   Procedure  Laterality Date    Larangoscopy       Review of patient's allergies indicates:  No Known Allergies    Scheduled Medications:    atorvastatin  10 mg Oral Daily    docusate sodium  50 mg Oral BID    finasteride  5 mg Oral Daily    heparin (porcine)  5,000 Units Subcutaneous Q8H       PRN Medications: dextrose 50%, dextrose 50%, diazePAM, glucagon (human recombinant), glucose, glucose, glucose, hydrALAZINE, HYDROmorphone, labetalol, oxycodone-acetaminophen, sodium chloride 0.9%, trazodone    Family History     Problem Relation (Age of Onset)    Cancer Mother    No Known Problems Father        Social History Main Topics    Smoking status: Former Smoker     Packs/day: 1.00     Quit date: 02/2000    Smokeless tobacco: Not on file    Alcohol use No    Drug use: No    Sexual activity: Not Currently     Review of Systems   Constitutional: Negative for chills, fatigue and fever.   HENT: Negative for facial swelling, trouble swallowing and voice change.    Eyes: Negative for photophobia and visual disturbance.   Respiratory: Negative for cough, shortness of breath and wheezing.    Cardiovascular: Negative for chest pain and palpitations.   Gastrointestinal: Negative for abdominal distention, nausea and vomiting.   Genitourinary: Negative for difficulty urinating and flank pain.   Musculoskeletal: Positive for arthralgias, gait problem and neck pain.   Skin: Negative for color change and rash.   Neurological: Positive for weakness and numbness. Negative for speech difficulty and headaches.   Psychiatric/Behavioral: Negative for agitation and confusion.     Objective:     Vital Signs (Most Recent):  Temp: 99 °F (37.2 °C) (06/26/17 1100)  Pulse: 69 (06/26/17 1100)  Resp: 17 (06/26/17 1100)  BP: 132/77 (06/26/17 1100)  SpO2: 97 % (06/26/17 1100)    Vital Signs (24h Range):  Temp:  [97.9 °F (36.6 °C)-99 °F (37.2 °C)] 99 °F (37.2 °C)  Pulse:  [69-86] 69  Resp:  [17-18] 17  SpO2:  [97 %-99 %] 97 %  BP: (112-140)/(70-86)  132/77     Body mass index is 26.65 kg/m².    Physical Exam   Constitutional: He is oriented to person, place, and time. He appears well-developed and well-nourished.   HENT:   Head: Normocephalic and atraumatic.   Eyes: EOM are normal. Pupils are equal, round, and reactive to light.   Neck: Normal range of motion.   Cardiovascular: Normal rate and regular rhythm.    Pulmonary/Chest: Effort normal. No respiratory distress.   Abdominal: Soft. There is no tenderness.   Musculoskeletal:        Cervical back: He exhibits decreased range of motion and tenderness.   C-collar noted   Neurological: He is alert and oriented to person, place, and time. A sensory deficit (decreased sensation to UE. Intact to LE. ) is present. He exhibits normal muscle tone. Coordination normal.   RUE: 4/5, 4/5 .  LUE: 4/5, 4/5 .  RLE: 5/5, DF 5/5, PF 5/5.  LLE: 4/5, DF 5/5, PF 5/5.   Skin: Skin is warm and dry.   Psychiatric: He has a normal mood and affect. His behavior is normal.       Diagnostic Results:   Labs: Reviewed  X-Ray: Reviewed  CT: Reviewed  MRI: Reviewed    Assessment/Plan:     Weakness of both lower extremities    -continue PT/OT        Benign non-nodular prostatic hyperplasia without lower urinary tract symptoms    -continue Finasteride        * Cervical myelopathy    -s/p cervical laminectomy C3-C6 with drain placement  on 6/21          Drain still in place.  Will follow patient's progress and discuss with rehab team for rehab recommendation.    Thank you for your consult.     Hortensia Fernandez NP  Department of Physical Medicine & Rehab  [facility]

## 2017-06-26 NOTE — PLAN OF CARE
Discharge plan is [facility].  Awaiting insurance authorization.    Yesenia Looc LMSW  [id number]

## 2017-06-26 NOTE — SUBJECTIVE & OBJECTIVE
Scheduled Medications:    atorvastatin  10 mg Oral Daily    docusate sodium  50 mg Oral BID    finasteride  5 mg Oral Daily    heparin (porcine)  5,000 Units Subcutaneous Q8H       PRN Medications: dextrose 50%, dextrose 50%, diazePAM, glucagon (human recombinant), glucose, glucose, glucose, hydrALAZINE, HYDROmorphone, labetalol, oxycodone-acetaminophen, sodium chloride 0.9%, trazodone    Review of Systems   Constitutional: Negative for chills, fatigue and fever.   HENT: Negative for facial swelling, trouble swallowing and voice change.    Eyes: Negative for photophobia and visual disturbance.   Respiratory: Negative for cough, shortness of breath and wheezing.    Cardiovascular: Negative for chest pain and palpitations.   Gastrointestinal: Negative for abdominal distention, nausea and vomiting.   Genitourinary: Negative for difficulty urinating and flank pain.   Musculoskeletal: Positive for arthralgias, gait problem and neck pain.   Skin: Negative for color change and rash.   Neurological: Positive for weakness and numbness. Negative for speech difficulty and headaches.   Psychiatric/Behavioral: Negative for agitation and confusion.     Objective:     Vital Signs (Most Recent):  Temp: 99 °F (37.2 °C) (06/26/17 1100)  Pulse: 69 (06/26/17 1100)  Resp: 17 (06/26/17 1100)  BP: 132/77 (06/26/17 1100)  SpO2: 97 % (06/26/17 1100)    Vital Signs (24h Range):  Temp:  [97.9 °F (36.6 °C)-99 °F (37.2 °C)] 99 °F (37.2 °C)  Pulse:  [69-86] 69  Resp:  [17-18] 17  SpO2:  [97 %-99 %] 97 %  BP: (112-140)/(70-86) 132/77     Physical Exam   Constitutional: He is oriented to person, place, and time. He appears well-developed and well-nourished.   HENT:   Head: Normocephalic and atraumatic.   Eyes: EOM are normal. Pupils are equal, round, and reactive to light.   Neck: Normal range of motion.   Cardiovascular: Normal rate and regular rhythm.    Pulmonary/Chest: Effort normal. No respiratory distress.   Abdominal: Soft. There is  no tenderness.   Musculoskeletal:        Cervical back: He exhibits decreased range of motion and tenderness.   C-collar noted   Neurological: He is alert and oriented to person, place, and time. A sensory deficit (decreased sensation to UE. Intact to LE. ) is present. He exhibits normal muscle tone. Coordination normal.   RUE: 4/5, 4/5 .  LUE: 4/5, 4/5 .  RLE: 5/5, DF 5/5, PF 5/5.  LLE: 4/5, DF 5/5, PF 5/5.   Skin: Skin is warm and dry.   Psychiatric: He has a normal mood and affect. His behavior is normal.

## 2017-06-27 ENCOUNTER — TELEPHONE (OUTPATIENT)
Dept: INTERNAL MEDICINE | Facility: CLINIC | Age: 67
End: 2017-06-27

## 2017-06-27 LAB
ANION GAP SERPL CALC-SCNC: 8 MMOL/L
BASOPHILS # BLD AUTO: 0.01 K/UL
BASOPHILS NFR BLD: 0.1 %
BUN SERPL-MCNC: 18 MG/DL
CALCIUM SERPL-MCNC: 9.1 MG/DL
CHLORIDE SERPL-SCNC: 100 MMOL/L
CO2 SERPL-SCNC: 25 MMOL/L
CREAT SERPL-MCNC: 0.7 MG/DL
DIFFERENTIAL METHOD: ABNORMAL
EOSINOPHIL # BLD AUTO: 0.2 K/UL
EOSINOPHIL NFR BLD: 2.1 %
ERYTHROCYTE [DISTWIDTH] IN BLOOD BY AUTOMATED COUNT: 13.2 %
EST. GFR  (AFRICAN AMERICAN): >60 ML/MIN/1.73 M^2
EST. GFR  (NON AFRICAN AMERICAN): >60 ML/MIN/1.73 M^2
GLUCOSE SERPL-MCNC: 107 MG/DL
HCT VFR BLD AUTO: 38.6 %
HGB BLD-MCNC: 13.5 G/DL
LYMPHOCYTES # BLD AUTO: 1.3 K/UL
LYMPHOCYTES NFR BLD: 11.5 %
MCH RBC QN AUTO: 32.1 PG
MCHC RBC AUTO-ENTMCNC: 35 %
MCV RBC AUTO: 92 FL
MONOCYTES # BLD AUTO: 1 K/UL
MONOCYTES NFR BLD: 9.2 %
NEUTROPHILS # BLD AUTO: 8.2 K/UL
NEUTROPHILS NFR BLD: 77.1 %
PLATELET # BLD AUTO: 265 K/UL
PMV BLD AUTO: 10.9 FL
POTASSIUM SERPL-SCNC: 4 MMOL/L
RBC # BLD AUTO: 4.2 M/UL
SODIUM SERPL-SCNC: 133 MMOL/L
WBC # BLD AUTO: 10.84 K/UL

## 2017-06-27 PROCEDURE — 80048 BASIC METABOLIC PNL TOTAL CA: CPT

## 2017-06-27 PROCEDURE — 36415 COLL VENOUS BLD VENIPUNCTURE: CPT

## 2017-06-27 PROCEDURE — 25000003 PHARM REV CODE 250: Performed by: STUDENT IN AN ORGANIZED HEALTH CARE EDUCATION/TRAINING PROGRAM

## 2017-06-27 PROCEDURE — 85025 COMPLETE CBC W/AUTO DIFF WBC: CPT

## 2017-06-27 PROCEDURE — 20600001 HC STEP DOWN PRIVATE ROOM

## 2017-06-27 PROCEDURE — 25000003 PHARM REV CODE 250: Performed by: PHYSICIAN ASSISTANT

## 2017-06-27 PROCEDURE — 99024 POSTOP FOLLOW-UP VISIT: CPT | Mod: ,,, | Performed by: PHYSICIAN ASSISTANT

## 2017-06-27 PROCEDURE — 97530 THERAPEUTIC ACTIVITIES: CPT

## 2017-06-27 PROCEDURE — 97535 SELF CARE MNGMENT TRAINING: CPT

## 2017-06-27 RX ORDER — OXYCODONE AND ACETAMINOPHEN 10; 325 MG/1; MG/1
1 TABLET ORAL EVERY 4 HOURS PRN
Status: DISCONTINUED | OUTPATIENT
Start: 2017-06-27 | End: 2017-06-29 | Stop reason: HOSPADM

## 2017-06-27 RX ADMIN — OXYCODONE HYDROCHLORIDE AND ACETAMINOPHEN 1 TABLET: 10; 325 TABLET ORAL at 04:06

## 2017-06-27 RX ADMIN — OXYCODONE HYDROCHLORIDE AND ACETAMINOPHEN 1 TABLET: 10; 325 TABLET ORAL at 12:06

## 2017-06-27 RX ADMIN — HEPARIN SODIUM 5000 UNITS: 5000 INJECTION, SOLUTION INTRAVENOUS; SUBCUTANEOUS at 09:06

## 2017-06-27 RX ADMIN — HEPARIN SODIUM 5000 UNITS: 5000 INJECTION, SOLUTION INTRAVENOUS; SUBCUTANEOUS at 01:06

## 2017-06-27 RX ADMIN — FINASTERIDE 5 MG: 5 TABLET, FILM COATED ORAL at 08:06

## 2017-06-27 RX ADMIN — OXYCODONE AND ACETAMINOPHEN 1 TABLET: 7.5; 325 TABLET ORAL at 06:06

## 2017-06-27 RX ADMIN — DOCUSATE SODIUM 50 MG: 50 CAPSULE, LIQUID FILLED ORAL at 08:06

## 2017-06-27 RX ADMIN — TRAZODONE HYDROCHLORIDE 50 MG: 50 TABLET ORAL at 09:06

## 2017-06-27 RX ADMIN — DIAZEPAM 5 MG: 5 TABLET ORAL at 09:06

## 2017-06-27 RX ADMIN — OXYCODONE HYDROCHLORIDE AND ACETAMINOPHEN 1 TABLET: 10; 325 TABLET ORAL at 10:06

## 2017-06-27 RX ADMIN — HEPARIN SODIUM 5000 UNITS: 5000 INJECTION, SOLUTION INTRAVENOUS; SUBCUTANEOUS at 06:06

## 2017-06-27 RX ADMIN — ATORVASTATIN CALCIUM 10 MG: 10 TABLET, FILM COATED ORAL at 08:06

## 2017-06-27 RX ADMIN — DIAZEPAM 5 MG: 5 TABLET ORAL at 04:06

## 2017-06-27 NOTE — SUBJECTIVE & OBJECTIVE
Interval History: NAEON. Pt with uncontrolled pain overnight. He denies new radicular pain or worsening weakness. Tolerating diet, voiding appropriately. Denied by insurance for rehab. Peer to peer planned for today.     Medications:  Continuous Infusions:   Scheduled Meds:   atorvastatin  10 mg Oral Daily    docusate sodium  50 mg Oral BID    finasteride  5 mg Oral Daily    heparin (porcine)  5,000 Units Subcutaneous Q8H     PRN Meds:dextrose 50%, dextrose 50%, diazePAM, glucagon (human recombinant), glucose, glucose, glucose, hydrALAZINE, HYDROmorphone, labetalol, oxycodone-acetaminophen, sodium chloride 0.9%, trazodone     Review of Systems    Objective:     Weight: 77.2 kg (170 lb 2 oz)  Body mass index is 26.65 kg/m².  Vital Signs (Most Recent):  Temp: 96.6 °F (35.9 °C) (17 1200)  Pulse: 85 (17 1200)  Resp: 17 (17 1200)  BP: (!) 133/90 (17 1200)  SpO2: 97 % (17 1200) Vital Signs (24h Range):  Temp:  [96.6 °F (35.9 °C)-98.9 °F (37.2 °C)] 96.6 °F (35.9 °C)  Pulse:  [71-88] 85  Resp:  [17-18] 17  SpO2:  [94 %-99 %] 97 %  BP: (130-163)/(74-90) 133/90              Temp (24hrs), Av.1 °F (36.7 °C), Min:96.6 °F (35.9 °C), Max:98.9 °F (37.2 °C)           Date 17 0700 - 17 0659   Shift 7001-8409 8514-1418 5255-0073 24 Hour Total   I  N  T  A  K  E   Shift Total  (mL/kg)       O  U  T  P  U  T   Urine  (mL/kg/hr) 300   300    Shift Total  (mL/kg) 300  (3.9)   300  (3.9)   Weight (kg) 77.2 77.2 77.2 77.2          Neurosurgery Physical Exam  General: well developed, well nourished, no distress.   Head: normocephalic, atraumatic  Neurologic: Alert and oriented. Thought content appropriate.  GCS: Motor: 6/Verbal: 5/Eyes: 4 GCS Total: 15  Mental Status: Awake, Alert, Oriented x3  Cranial nerves: face symmetric, tongue midline, CN II-XII grossly intact.   Eyes: pupils equal, round, reactive to light with accomodation, EOMI.  Sensory: intact to light touch throughout  Motor  Strength: Moves all extremities spontaneously with good tone.  Full strength upper and lower extremities. No abnormal movements seen.     Strength  Deltoids Triceps Biceps Wrist Extension Wrist Flexion Hand    Upper: R 5/5 4+/5 4+/5 4/5 4+/5 4/5    L 5/5 4+/5 4+/5 4+/5 4+/5 4/5     Iliopsoas Quadriceps Knee  Flexion Tibialis  anterior Gastro- cnemius EHL   Lower: R 5/5 5/5 5/5 5/5 5/5 5/5    L 5/5 5/5 5/5 5/5 5/5 5/5     DTR's - 2 + and symmetric in UE and LE  Pronator Drift: no drift noted  Finger-to-nose: Intact bilaterally  Mcdonough: absent  Clonus: absent  Babinski: absent  Pulses: 2+ and symmetric radial and dorsalis pedis. No lower extremity edema  Incision clean dry and intact with skin edges well approximated with staples. No drainage, edema or erythema     Significant Labs:    Recent Labs  Lab 06/27/17  0602      *   K 4.0      CO2 25   BUN 18   CREATININE 0.7   CALCIUM 9.1       Recent Labs  Lab 06/26/17  0457 06/27/17  0602   WBC 10.04 10.84   HGB 13.2* 13.5*   HCT 37.8* 38.6*    265     No results for input(s): LABPT, INR, APTT in the last 48 hours.  Microbiology Results (last 7 days)     ** No results found for the last 168 hours. **

## 2017-06-27 NOTE — PLAN OF CARE
Patient denied rehab placement by insurance company, initially called Krupa at [facility] and provided JENSEN Chiang information to complete peer to peer, updated Marcela today with JENSEN Colon information to complete peer to peer, will follow and update as needed.    1330 Referral sent to [facility].

## 2017-06-27 NOTE — PT/OT/SLP PROGRESS
Occupational Therapy  Treatment    Anthony Miguel   MRN: [id number]   Admitting Diagnosis: Cervical myelopathy    OT Date of Treatment: 06/27/17   OT Start Time: 1100  OT Stop Time: 1138  OT Total Time (min): 38 min    Billable Minutes:  Self Care/Home Management 15 and Therapeutic Activity 23    General Precautions: Standard, fall  Orthopedic Precautions: N/A  Braces: Cervical collar    Do you have any cultural, spiritual, Orthodox conflicts, given your current situation?: no    Subjective:  Communicated with RN prior to session.    Pain/Comfort  Pain Rating 1: 6/10  Location - Side 1: Bilateral  Location - Orientation 1: posterior  Location 1:  (neck)  Pain Addressed 1: Reposition, Nurse notified  Pain Rating Post-Intervention 1: 6/10    Objective:  Patient found with: cervical collar     Functional Mobility:  Bed Mobility:  Rolling/Turning to Left: Contact guard assistance  Scooting/Bridging: Contact Guard Assistance  Supine to Sit: Minimum Assistance for trunk elevation    Transfers:  Sit <> Stand Assistance: Contact Guard Assistance x 1 trial from EOB, Min A x 1 trial from EOB  -During 1st sit <> stand trial pt demo'd increased weakness in legs and stated he needed to return to seated position.  On 2nd trial pt demo'd increased control.  Toilet Transfer Assistance: Minimum Assistance to sit; Min A to stand    Functional Ambulation: Pt walked 10 ft to bathroom with Min A and RW.  Increased knee flexion noted during 1st portion of task with cues required stand straight.  Upon completion of grooming ADLs pt walked 10 ft back to bedside chair with CGA and RW.  No instances of LOB noted during task.      Activities of Daily Living:     UE Dressing Level of Assistance: Minimum assistance to doff robe while standing.  Pt held onto RW with one hand and then released momentarily to pull sleeve.  Min A to don new gown while standing; pt stated he had some difficulty elevating LUE 2* pain shoulder/neck.  Grooming  Position: Standing at sink  Grooming Level of Assistance: Contact guard assistance to brush teeth and rinse mouth; cues given to remind pt of spinal precautions.    Toileting Where Assessed: Toilet  Toileting Level of Assistance: Stand by assistance    Balance:   Static Sit: GOOD-: Takes MODERATE challenges from all directions but inconsistently  Dynamic Sit: GOOD-: Maintains balance through MODERATE excursions of active trunk movement,     Static Stand: FAIR: Maintains without assist but unable to take challenges  Dynamic stand: FAIR: Needs CONTACT GUARD during gait    Therapeutic Activities and Exercises:  *Pt practiced sit <> stand transfers; see details above.  *Pt ambulated to bathroom for completion of toileting, UE dressing, and grooming while standing.  Pt stood for ~8 minutes total (uninterupted) during UE dressing and grooming.  Pt then ambulated back to bedside chair without taking seated rest break.  *Spinal precautions reviewed with pt with examples of how to adhere to them during ADLs provided.  *Pt performed 2 UE stretches while seated in bedside chair.    -Shoulder flexion to ~90 degrees: 2 sets x 15 reps  -Shoulder adduction/abuction with elbows maintained in 90 degrees of flexion (like a wing): 2 sets x 15 reps  *Education provided on progression of therapy.  *POC reviewed with pt    AM-PAC 6 CLICK ADL   How much help from another person does this patient currently need?   1 = Unable, Total/Dependent Assistance  2 = A lot, Maximum/Moderate Assistance  3 = A little, Minimum/Contact Guard/Supervision  4 = None, Modified [place]/Independent    Putting on and taking off regular lower body clothing? : 3  Bathing (including washing, rinsing, drying)?: 3  Toileting, which includes using toilet, bedpan, or urinal? : 3  Putting on and taking off regular upper body clothing?: 3  Taking care of personal grooming such as brushing teeth?: 3  Eating meals?: 4  Total Score: 19     AM-PAC Raw Score CMS  ""G-Code Modifier Level of Impairment Assistance   6 % Total / Unable   7 - 8 CM 80 - 100% Maximal Assist   9-13 CL 60 - 80% Moderate Assist   14 - 19 CK 40 - 60% Moderate Assist   20 - 22 CJ 20 - 40% Minimal Assist   23 CI 1-20% SBA / CGA   24 CH 0% Independent/ Mod I       Patient left up in chair with all lines intact and call button in reach    ASSESSMENT:  Anthony Miguel is a 66 y.o. male with a medical diagnosis of Cervical myelopathy and presents with decreased endurance, pain in neck, weakness, gait instability, and decreased balance impacting performance with ADLs and mobility.  Pt demo'd improvement with standing tolerance this date completing UE dressing and grooming without rest break or LOB for nearly eight minutes.  Pt is making progress towards goals, but continues to demo weakness in (B) LE while ambulating household distances.  In addition, pt displayed some difficulty elevating UE during dressing 2* pain.  Pt is motivated and would continue to benefit from skilled OT services to address problems listed below and increase independence with ADLs.    Rehab identified problem list/impairments: Rehab identified problem list/impairments: weakness, impaired endurance, impaired functional mobilty, impaired balance, impaired self care skills, gait instability, pain    Rehab potential is good.    Activity tolerance: Good    Discharge recommendations: Discharge Facility/Level Of Care Needs: rehabilitation facility     Barriers to discharge: Barriers to Discharge: Decreased caregiver support, Inaccessible home environment    Equipment recommendations:  (TBD at next level of care)     GOALS:    Occupational Therapy Goals        Problem: Occupational Therapy Goal    Goal Priority Disciplines Outcome Interventions   Occupational Therapy Goal     OT, PT/OT     Description:  Goals to be met by:  2 Weeks (2017)    Patient will increase functional independence with ADLs by performin. Supine to " sit with Supervision with HOB flat.  2. Sit to Stand transfers with Supervision.   3. Toilet transfer to toilet with Supervision.  4. Grooming while standing at sink with Supervision.  5. UE Dressing with Supervision.  6. LE Dressing with Supervision.                         Plan:  Patient to be seen 4 x/week to address the above listed problems via self-care/home management, therapeutic activities, therapeutic exercises  Plan of Care expires: 07/22/17  Plan of Care reviewed with: patient         TRISHA Malave  06/27/2017

## 2017-06-27 NOTE — SUBJECTIVE & OBJECTIVE
Interval History: NAEON. Weakness improved. Tolerating diet, voiding appropriately. Denied by insurance for rehab.     Medications:  Continuous Infusions:   Scheduled Meds:   atorvastatin  10 mg Oral Daily    docusate sodium  50 mg Oral BID    finasteride  5 mg Oral Daily    heparin (porcine)  5,000 Units Subcutaneous Q8H     PRN Meds:dextrose 50%, dextrose 50%, diazePAM, glucagon (human recombinant), glucose, glucose, glucose, hydrALAZINE, HYDROmorphone, labetalol, oxycodone-acetaminophen, sodium chloride 0.9%, trazodone     Review of Systems  Objective:     Weight: 77.2 kg (170 lb 2 oz)  Body mass index is 26.65 kg/m².  Vital Signs (Most Recent):  Temp: 98 °F (36.7 °C) (17)  Pulse: 74 (17)  Resp: 18 (17)  BP: (!) 162/80 (17)  SpO2: 96 % (17) Vital Signs (24h Range):  Temp:  [97.6 °F (36.4 °C)-99 °F (37.2 °C)] 98 °F (36.7 °C)  Pulse:  [69-88] 74  Resp:  [17-18] 18  SpO2:  [94 %-99 %] 96 %  BP: (130-163)/(74-87) 162/80              Temp (24hrs), Av.5 °F (36.9 °C), Min:97.6 °F (36.4 °C), Max:99 °F (37.2 °C)                 Neurosurgery Physical Exam    Significant Labs:    Recent Labs  Lab 17  0602      *   K 4.0      CO2 25   BUN 18   CREATININE 0.7   CALCIUM 9.1       Recent Labs  Lab 17  0457 17  0602   WBC 10.04 10.84   HGB 13.2* 13.5*   HCT 37.8* 38.6*    265     No results for input(s): LABPT, INR, APTT in the last 48 hours.  Microbiology Results (last 7 days)     ** No results found for the last 168 hours. **

## 2017-06-27 NOTE — PLAN OF CARE
Problem: Patient Care Overview  Goal: Plan of Care Review  Outcome: Ongoing (interventions implemented as appropriate)  Plan of care reviewed with pt at bedside. Safety precautions initiated. Bed locked in lowest position, call bell within reach, bed alarm on. [id number]. Cervical collar on at all times. Pt c/o of pain management upon arrival. Pt admitted he refused breakthrough interventions in fear of not completing physical therapy but would like to comply with the regimens in place. Pt now has a 2/10 pain only on activity. Will continue to monitor for changes.

## 2017-06-27 NOTE — PROGRESS NOTES
[facility]  Neurosurgery  Progress Note    Subjective:     History of Present Illness: Anthony Miguel is a 66 y.o. male directly admitted from Daisy Rivera PA-C's clinic for cervical myelopathy. He has experienced progressive weakness and paresthesias in his arms and legs for approximately one month. He is typically independent with ADLs, but has recently been unable to ambulate and has experienced difficulty with fine motor movements, such as playing piano, writing, etc. He endorses paresthesias throughout his upper extremities, and occasionally in his lower extremities. He has minimal neck pain. He denies b/b dysfunction or saddle anesthesia.     Post-Op Info:  Procedure(s) (LRB):  LAMINECTOMY-CERVICAL/FUSION-POSTERIOR; C3-6 (N/A)   6 Days Post-Op     Interval History: NAEON. Weakness improved. Tolerating diet, voiding appropriately. Denied by insurance for rehab.     Medications:  Continuous Infusions:   Scheduled Meds:   atorvastatin  10 mg Oral Daily    docusate sodium  50 mg Oral BID    finasteride  5 mg Oral Daily    heparin (porcine)  5,000 Units Subcutaneous Q8H     PRN Meds:dextrose 50%, dextrose 50%, diazePAM, glucagon (human recombinant), glucose, glucose, glucose, hydrALAZINE, HYDROmorphone, labetalol, oxycodone-acetaminophen, sodium chloride 0.9%, trazodone     Review of Systems  Objective:     Weight: 77.2 kg (170 lb 2 oz)  Body mass index is 26.65 kg/m².  Vital Signs (Most Recent):  Temp: 98 °F (36.7 °C) (17)  Pulse: 74 (17)  Resp: 18 (17)  BP: (!) 162/80 (17)  SpO2: 96 % (17) Vital Signs (24h Range):  Temp:  [97.6 °F (36.4 °C)-99 °F (37.2 °C)] 98 °F (36.7 °C)  Pulse:  [69-88] 74  Resp:  [17-18] 18  SpO2:  [94 %-99 %] 96 %  BP: (130-163)/(74-87) 162/80              Temp (24hrs), Av.5 °F (36.9 °C), Min:97.6 °F (36.4 °C), Max:99 °F (37.2 °C)                 Neurosurgery Physical Exam    Significant Labs:    Recent  Labs  Lab 06/27/17  0602      *   K 4.0      CO2 25   BUN 18   CREATININE 0.7   CALCIUM 9.1       Recent Labs  Lab 06/26/17  0457 06/27/17  0602   WBC 10.04 10.84   HGB 13.2* 13.5*   HCT 37.8* 38.6*    265     No results for input(s): LABPT, INR, APTT in the last 48 hours.  Microbiology Results (last 7 days)     ** No results found for the last 168 hours. **        Assessment/Plan:     * Cervical myelopathy    -s/p C3-6 PCF  -Neurologically stable with signficiant improvements in BUE strength and gait  -Post xrays with good placement of hardware.  -Pain controlled on current regimen.  -DC drain   -Bacitracin to incision BID  -Cervical collar at all times  -PT/OT/OOB daily  -Patient must be OOB for atleast 6 hours daily (may be in intervals: 2 hours in chair with each meal)  -IS to bed side. Patient to use atleast 10x every hour.  -Continue bowel regimen daily.  -Continue Teds and SCDs for DVTP.  -[facility]    Dispo: [employer] denied for rehab. Awaiting peer to peer.               HLD (hyperlipidemia)    - Restarted home atorvastatin.            BURT MetzC  Neurosurgery  [facility]

## 2017-06-27 NOTE — ASSESSMENT & PLAN NOTE
-s/p C3-6 PCF  -Neurologically stable with signficiant improvements in BUE strength and gait  -Post xrays with good placement of hardware.  -Pain controlled on current regimen.  -DC drain   -Bacitracin to incision BID  -Cervical collar at all times  -PT/OT/OOB daily  -Patient must be OOB for atleast 6 hours daily (may be in intervals: 2 hours in chair with each meal)  -IS to bed side. Patient to use atleast 10x every hour.  -Continue bowel regimen daily.  -Continue Teds and SCDs for DVTP.  -[facility]    Dispo: [employer] denied for rehab. Awaiting peer to peer.

## 2017-06-27 NOTE — PT/OT/SLP PROGRESS
"Physical Therapy      Anthony Miguel  MRN: [id number]    Therapist attempted to visit pt for tx session this PM. Pt requested to hold therapy 2/2 just "settling into bed." Pt reported that he had a good therapy session with OT today and is waiting to hear about discharge disposition. Pt educated on benefits of participating in increased activity. Pt verbalized understanding but continued to decline therapy, stating that he will work with therapy in the AM. Will follow up tomorrow.     Carolyne Mcgowan, PT, DPT   6/27/2017  Pager: [phone]    "

## 2017-06-27 NOTE — PROGRESS NOTES
[facility]  Neurosurgery  Progress Note    Subjective:     History of Present Illness: Anthony Miguel is a 66 y.o. male directly admitted from Daisy Rivera PA-C's clinic for cervical myelopathy. He has experienced progressive weakness and paresthesias in his arms and legs for approximately one month. He is typically independent with ADLs, but has recently been unable to ambulate and has experienced difficulty with fine motor movements, such as playing piano, writing, etc. He endorses paresthesias throughout his upper extremities, and occasionally in his lower extremities. He has minimal neck pain. He denies b/b dysfunction or saddle anesthesia.     Post-Op Info:  Procedure(s) (LRB):  LAMINECTOMY-CERVICAL/FUSION-POSTERIOR; C3-6 (N/A)   6 Days Post-Op     Interval History: NAEON. Pt with uncontrolled pain overnight. He denies new radicular pain or worsening weakness. Tolerating diet, voiding appropriately. Denied by insurance for rehab. Peer to peer planned for today.     Medications:  Continuous Infusions:   Scheduled Meds:   atorvastatin  10 mg Oral Daily    docusate sodium  50 mg Oral BID    finasteride  5 mg Oral Daily    heparin (porcine)  5,000 Units Subcutaneous Q8H     PRN Meds:dextrose 50%, dextrose 50%, diazePAM, glucagon (human recombinant), glucose, glucose, glucose, hydrALAZINE, HYDROmorphone, labetalol, oxycodone-acetaminophen, sodium chloride 0.9%, trazodone     Review of Systems    Objective:     Weight: 77.2 kg (170 lb 2 oz)  Body mass index is 26.65 kg/m².  Vital Signs (Most Recent):  Temp: 96.6 °F (35.9 °C) (17 1200)  Pulse: 85 (17 1200)  Resp: 17 (17 1200)  BP: (!) 133/90 (17 1200)  SpO2: 97 % (17 1200) Vital Signs (24h Range):  Temp:  [96.6 °F (35.9 °C)-98.9 °F (37.2 °C)] 96.6 °F (35.9 °C)  Pulse:  [71-88] 85  Resp:  [17-18] 17  SpO2:  [94 %-99 %] 97 %  BP: (130-163)/(74-90) 133/90              Temp (24hrs), Av.1 °F (36.7 °C), Min:96.6  °F (35.9 °C), Max:98.9 °F (37.2 °C)           Date 06/27/17 0700 - 06/28/17 0659   Shift 4281-5200 7646-7869 2820-8966 24 Hour Total   I  N  T  A  K  E   Shift Total  (mL/kg)       O  U  T  P  U  T   Urine  (mL/kg/hr) 300   300    Shift Total  (mL/kg) 300  (3.9)   300  (3.9)   Weight (kg) 77.2 77.2 77.2 77.2          Neurosurgery Physical Exam  General: well developed, well nourished, no distress.   Head: normocephalic, atraumatic  Neurologic: Alert and oriented. Thought content appropriate.  GCS: Motor: 6/Verbal: 5/Eyes: 4 GCS Total: 15  Mental Status: Awake, Alert, Oriented x3  Cranial nerves: face symmetric, tongue midline, CN II-XII grossly intact.   Eyes: pupils equal, round, reactive to light with accomodation, EOMI.  Sensory: intact to light touch throughout  Motor Strength: Moves all extremities spontaneously with good tone.  Full strength upper and lower extremities. No abnormal movements seen.     Strength  Deltoids Triceps Biceps Wrist Extension Wrist Flexion Hand    Upper: R 5/5 4+/5 4+/5 4/5 4+/5 4/5    L 5/5 4+/5 4+/5 4+/5 4+/5 4/5     Iliopsoas Quadriceps Knee  Flexion Tibialis  anterior Gastro- cnemius EHL   Lower: R 5/5 5/5 5/5 5/5 5/5 5/5    L 5/5 5/5 5/5 5/5 5/5 5/5     DTR's - 2 + and symmetric in UE and LE  Pronator Drift: no drift noted  Finger-to-nose: Intact bilaterally  Mcdonough: absent  Clonus: absent  Babinski: absent  Pulses: 2+ and symmetric radial and dorsalis pedis. No lower extremity edema  Incision clean dry and intact with skin edges well approximated with staples. No drainage, edema or erythema     Significant Labs:    Recent Labs  Lab 06/27/17  0602      *   K 4.0      CO2 25   BUN 18   CREATININE 0.7   CALCIUM 9.1       Recent Labs  Lab 06/26/17  0457 06/27/17  0602   WBC 10.04 10.84   HGB 13.2* 13.5*   HCT 37.8* 38.6*    265     No results for input(s): LABPT, INR, APTT in the last 48 hours.  Microbiology Results (last 7 days)     ** No results found  for the last 168 hours. **        Assessment/Plan:     HLD (hyperlipidemia)    - Restarted home atorvastatin.        * Cervical myelopathy    -s/p C3-6 PCF POD#7  -Neurologically stable with signficiant improvements in BUE strength and gait  -Post xrays with good placement of hardware.  -Pain control: percocet increased to 10-325mg, dilaudid IV for breakthrough   -Bacitracin to incision BID  -Cervical collar at all times  -PT/OT/OOB daily  -Patient must be OOB for atleast 6 hours daily (may be in intervals: 2 hours in chair with each meal)  -IS to bed side. Patient to use atleast 10x every hour.  -Continue bowel regimen daily.  -Continue Teds and SCDs for DVTP.  -[facility]    Dispo: [employer] denied for rehab. Spoke with therapy today who feel patient may be appropriate for SNF. Will begin SNF process. Consult placed today.                   Michela Graham PA-C  Neurosurgery  [facility]

## 2017-06-27 NOTE — ASSESSMENT & PLAN NOTE
-s/p C3-6 PCF POD#7  -Neurologically stable with signficiant improvements in BUE strength and gait  -Post xrays with good placement of hardware.  -Pain control: percocet increased to 10-325mg, dilaudid IV for breakthrough   -Bacitracin to incision BID  -Cervical collar at all times  -PT/OT/OOB daily  -Patient must be OOB for atleast 6 hours daily (may be in intervals: 2 hours in chair with each meal)  -IS to bed side. Patient to use atleast 10x every hour.  -Continue bowel regimen daily.  -Continue Teds and SCDs for DVTP.  -[facility]    Dispo: [employer] denied for rehab. Spoke with therapy today who feel patient may be appropriate for SNF. Will begin SNF process. Consult placed today.

## 2017-06-27 NOTE — NURSING
"Called to room by RN caring for pt in semi-private room. RN reported pt seemed frustrated and upset, voicing his lack of pain management, inability to use the restroom independently with walker, and constant emphasis on " not relying on pain medication". Pt given PRN medications. Will continue to monitor.   "

## 2017-06-27 NOTE — PLAN OF CARE
Patient denied rehab by insurance company, awaiting peer to peer, will follow.       06/27/17 3324   Discharge Reassessment   Assessment Type Discharge Planning Reassessment   How does the patient rate their overall health at the present time? Good   Describe the patient's ability to walk at the present time. Minor restrictions or changes   How often would a person be available to care for the patient? Occasionally   During the past month, has the patient often been bothered by feeling down, depressed or hopeless? No   During the past month, has the patient often been bothered by little interest or pleasure in doing things? No   Discharge plan remains the same: Yes   Provided patient/caregiver education on the expected discharge date and the discharge plan Yes   Discharge Plan A Rehab   Discharge Plan B Rehab   Change in patient condition or support system No   Patient choice form signed by patient/caregiver N/A   Involved the patient/caregiver in establishing a new discharge plan: Yes

## 2017-06-27 NOTE — PLAN OF CARE
Problem: Occupational Therapy Goal  Goal: Occupational Therapy Goal  Goals to be met by:  2 Weeks (2017)    Patient will increase functional independence with ADLs by performin. Supine to sit with Supervision with HOB flat.  2. Sit to Stand transfers with Supervision.   3. Toilet transfer to toilet with Supervision.  4. Grooming while standing at sink with Supervision.  5. UE Dressing with Supervision.  6. LE Dressing with Supervision.          POC remains appropriate; pt is making progress towards goals.    TRISHA Malave  2017

## 2017-06-28 ENCOUNTER — TELEPHONE (OUTPATIENT)
Dept: NEUROSURGERY | Facility: CLINIC | Age: 67
End: 2017-06-28

## 2017-06-28 DIAGNOSIS — G95.9 CERVICAL MYELOPATHY: Primary | ICD-10-CM

## 2017-06-28 DIAGNOSIS — Z12.11 COLON CANCER SCREENING: ICD-10-CM

## 2017-06-28 LAB
ANION GAP SERPL CALC-SCNC: 10 MMOL/L
BASOPHILS # BLD AUTO: 0.02 K/UL
BASOPHILS NFR BLD: 0.1 %
BILIRUB UR QL STRIP: NEGATIVE
BUN SERPL-MCNC: 21 MG/DL
CALCIUM SERPL-MCNC: 9.3 MG/DL
CHLORIDE SERPL-SCNC: 103 MMOL/L
CLARITY UR REFRACT.AUTO: CLEAR
CO2 SERPL-SCNC: 22 MMOL/L
COLOR UR AUTO: YELLOW
CREAT SERPL-MCNC: 0.8 MG/DL
DIFFERENTIAL METHOD: ABNORMAL
EOSINOPHIL # BLD AUTO: 0.5 K/UL
EOSINOPHIL NFR BLD: 3.5 %
ERYTHROCYTE [DISTWIDTH] IN BLOOD BY AUTOMATED COUNT: 13.2 %
EST. GFR  (AFRICAN AMERICAN): >60 ML/MIN/1.73 M^2
EST. GFR  (NON AFRICAN AMERICAN): >60 ML/MIN/1.73 M^2
GLUCOSE SERPL-MCNC: 108 MG/DL
GLUCOSE UR QL STRIP: NEGATIVE
HCT VFR BLD AUTO: 38.6 %
HGB BLD-MCNC: 13.5 G/DL
HGB UR QL STRIP: NEGATIVE
KETONES UR QL STRIP: NEGATIVE
LEUKOCYTE ESTERASE UR QL STRIP: NEGATIVE
LYMPHOCYTES # BLD AUTO: 2.1 K/UL
LYMPHOCYTES NFR BLD: 15.4 %
MCH RBC QN AUTO: 31.7 PG
MCHC RBC AUTO-ENTMCNC: 35 %
MCV RBC AUTO: 91 FL
MONOCYTES # BLD AUTO: 1.4 K/UL
MONOCYTES NFR BLD: 10.4 %
NEUTROPHILS # BLD AUTO: 9.3 K/UL
NEUTROPHILS NFR BLD: 70.6 %
NITRITE UR QL STRIP: NEGATIVE
PH UR STRIP: 6 [PH] (ref 5–8)
PLATELET # BLD AUTO: 319 K/UL
PMV BLD AUTO: 10.6 FL
POTASSIUM SERPL-SCNC: 4 MMOL/L
PROT UR QL STRIP: NEGATIVE
RBC # BLD AUTO: 4.26 M/UL
SODIUM SERPL-SCNC: 135 MMOL/L
SP GR UR STRIP: 1.02 (ref 1–1.03)
URN SPEC COLLECT METH UR: NORMAL
UROBILINOGEN UR STRIP-ACNC: NEGATIVE EU/DL
WBC # BLD AUTO: 13.55 K/UL

## 2017-06-28 PROCEDURE — 97116 GAIT TRAINING THERAPY: CPT

## 2017-06-28 PROCEDURE — 20600001 HC STEP DOWN PRIVATE ROOM

## 2017-06-28 PROCEDURE — 25000003 PHARM REV CODE 250: Performed by: PHYSICIAN ASSISTANT

## 2017-06-28 PROCEDURE — 63600175 PHARM REV CODE 636 W HCPCS: Performed by: STUDENT IN AN ORGANIZED HEALTH CARE EDUCATION/TRAINING PROGRAM

## 2017-06-28 PROCEDURE — 80048 BASIC METABOLIC PNL TOTAL CA: CPT

## 2017-06-28 PROCEDURE — 25000003 PHARM REV CODE 250: Performed by: STUDENT IN AN ORGANIZED HEALTH CARE EDUCATION/TRAINING PROGRAM

## 2017-06-28 PROCEDURE — 97530 THERAPEUTIC ACTIVITIES: CPT

## 2017-06-28 PROCEDURE — 36415 COLL VENOUS BLD VENIPUNCTURE: CPT

## 2017-06-28 PROCEDURE — 85025 COMPLETE CBC W/AUTO DIFF WBC: CPT

## 2017-06-28 PROCEDURE — 99024 POSTOP FOLLOW-UP VISIT: CPT | Mod: ,,, | Performed by: PHYSICIAN ASSISTANT

## 2017-06-28 PROCEDURE — 81003 URINALYSIS AUTO W/O SCOPE: CPT

## 2017-06-28 RX ORDER — CHOLECALCIFEROL (VITAMIN D3) 25 MCG
1000 TABLET ORAL DAILY
Status: CANCELLED | OUTPATIENT
Start: 2017-06-28

## 2017-06-28 RX ORDER — HYDROMORPHONE HYDROCHLORIDE 1 MG/ML
1 INJECTION, SOLUTION INTRAMUSCULAR; INTRAVENOUS; SUBCUTANEOUS EVERY 4 HOURS PRN
Status: CANCELLED | OUTPATIENT
Start: 2017-06-28

## 2017-06-28 RX ORDER — IBUPROFEN 200 MG
16 TABLET ORAL
Status: CANCELLED | OUTPATIENT
Start: 2017-06-28

## 2017-06-28 RX ORDER — FINASTERIDE 5 MG/1
5 TABLET, FILM COATED ORAL DAILY
Status: CANCELLED | OUTPATIENT
Start: 2017-06-29

## 2017-06-28 RX ORDER — MAG HYDROX/ALUMINUM HYD/SIMETH 200-200-20
15 SUSPENSION, ORAL (FINAL DOSE FORM) ORAL EVERY 6 HOURS PRN
Status: CANCELLED | OUTPATIENT
Start: 2017-06-28

## 2017-06-28 RX ORDER — RAMELTEON 8 MG/1
8 TABLET ORAL NIGHTLY PRN
Status: CANCELLED | OUTPATIENT
Start: 2017-06-28

## 2017-06-28 RX ORDER — AMOXICILLIN 250 MG
1 CAPSULE ORAL 2 TIMES DAILY
Status: CANCELLED | OUTPATIENT
Start: 2017-06-28

## 2017-06-28 RX ORDER — TRAZODONE HYDROCHLORIDE 50 MG/1
50 TABLET ORAL NIGHTLY PRN
Status: CANCELLED | OUTPATIENT
Start: 2017-06-28

## 2017-06-28 RX ORDER — OXYCODONE AND ACETAMINOPHEN 10; 325 MG/1; MG/1
1 TABLET ORAL EVERY 4 HOURS PRN
Status: CANCELLED | OUTPATIENT
Start: 2017-06-28

## 2017-06-28 RX ORDER — DIAZEPAM 5 MG/1
5 TABLET ORAL EVERY 6 HOURS PRN
Status: CANCELLED | OUTPATIENT
Start: 2017-06-28

## 2017-06-28 RX ORDER — SODIUM CHLORIDE 0.9 % (FLUSH) 0.9 %
3 SYRINGE (ML) INJECTION
Status: CANCELLED | OUTPATIENT
Start: 2017-06-28

## 2017-06-28 RX ORDER — IBUPROFEN 200 MG
24 TABLET ORAL
Status: CANCELLED | OUTPATIENT
Start: 2017-06-28

## 2017-06-28 RX ORDER — ACETAMINOPHEN 325 MG/1
650 TABLET ORAL EVERY 6 HOURS PRN
Status: CANCELLED | OUTPATIENT
Start: 2017-06-28

## 2017-06-28 RX ORDER — FINASTERIDE 5 MG/1
5 TABLET, FILM COATED ORAL DAILY
Status: CANCELLED | OUTPATIENT
Start: 2017-06-28

## 2017-06-28 RX ORDER — GLUCAGON 1 MG
1 KIT INJECTION
Status: CANCELLED | OUTPATIENT
Start: 2017-06-28

## 2017-06-28 RX ORDER — HEPARIN SODIUM 5000 [USP'U]/ML
5000 INJECTION, SOLUTION INTRAVENOUS; SUBCUTANEOUS EVERY 8 HOURS
Status: CANCELLED | OUTPATIENT
Start: 2017-06-28

## 2017-06-28 RX ORDER — ATORVASTATIN CALCIUM 10 MG/1
10 TABLET, FILM COATED ORAL DAILY
Status: CANCELLED | OUTPATIENT
Start: 2017-06-29

## 2017-06-28 RX ORDER — ATORVASTATIN CALCIUM 10 MG/1
10 TABLET, FILM COATED ORAL DAILY
Status: CANCELLED | OUTPATIENT
Start: 2017-06-28

## 2017-06-28 RX ORDER — HYDRALAZINE HYDROCHLORIDE 20 MG/ML
20 INJECTION INTRAMUSCULAR; INTRAVENOUS EVERY 6 HOURS PRN
Status: CANCELLED | OUTPATIENT
Start: 2017-06-28

## 2017-06-28 RX ADMIN — TRAZODONE HYDROCHLORIDE 50 MG: 50 TABLET ORAL at 10:06

## 2017-06-28 RX ADMIN — OXYCODONE HYDROCHLORIDE AND ACETAMINOPHEN 1 TABLET: 10; 325 TABLET ORAL at 09:06

## 2017-06-28 RX ADMIN — HYDROMORPHONE HYDROCHLORIDE 1 MG: 1 INJECTION, SOLUTION INTRAMUSCULAR; INTRAVENOUS; SUBCUTANEOUS at 03:06

## 2017-06-28 RX ADMIN — DOCUSATE SODIUM 50 MG: 50 CAPSULE, LIQUID FILLED ORAL at 09:06

## 2017-06-28 RX ADMIN — FINASTERIDE 5 MG: 5 TABLET, FILM COATED ORAL at 09:06

## 2017-06-28 RX ADMIN — HEPARIN SODIUM 5000 UNITS: 5000 INJECTION, SOLUTION INTRAVENOUS; SUBCUTANEOUS at 10:06

## 2017-06-28 RX ADMIN — ATORVASTATIN CALCIUM 10 MG: 10 TABLET, FILM COATED ORAL at 09:06

## 2017-06-28 RX ADMIN — HEPARIN SODIUM 5000 UNITS: 5000 INJECTION, SOLUTION INTRAVENOUS; SUBCUTANEOUS at 02:06

## 2017-06-28 RX ADMIN — HEPARIN SODIUM 5000 UNITS: 5000 INJECTION, SOLUTION INTRAVENOUS; SUBCUTANEOUS at 06:06

## 2017-06-28 RX ADMIN — OXYCODONE HYDROCHLORIDE AND ACETAMINOPHEN 1 TABLET: 10; 325 TABLET ORAL at 02:06

## 2017-06-28 RX ADMIN — OXYCODONE HYDROCHLORIDE AND ACETAMINOPHEN 1 TABLET: 10; 325 TABLET ORAL at 10:06

## 2017-06-28 NOTE — PLAN OF CARE
Notified by [facility] that patient's WBCs are elevated, they would like to monitor in hospital overnight and transfer tomorrow, will follow and update.

## 2017-06-28 NOTE — TELEPHONE ENCOUNTER
----- Message from Michela Graham PA-C sent at 6/28/2017 11:14 AM CDT -----  Please schedule this pt for 2 week wound check and 6 weeks with Dr. Wolff with cervical xrays. He had a posterior cervical fusion.   Thanks!  Michela

## 2017-06-28 NOTE — DISCHARGE SUMMARY
[facility]  Neurosurgery  Discharge Summary      Patient Name: Anthony Miguel  MRN: [id number]  Admission Date: 6/20/2017   Hospital Length of Stay: 8 days  Discharge Date and Time:  06/29/2017 09:07 AM  Attending Physician: Jaycee Sam MD   Discharging Provider: Michela Graham PA-C  Primary Care Provider: Jada Spaulding MD    HPI:   Anthony Miguel is a 66 y.o. male directly admitted from [facility] Rivera PA-C's clinic for cervical myelopathy. He has experienced progressive weakness and paresthesias in his arms and legs for approximately one month. He is typically independent with ADLs, but has recently been unable to ambulate and has experienced difficulty with fine motor movements, such as playing piano, writing, etc. He endorses paresthesias throughout his upper extremities, and occasionally in his lower extremities. He has minimal neck pain. He denies b/b dysfunction or saddle anesthesia.     Procedure(s) (LRB):  LAMINECTOMY-CERVICAL/FUSION-POSTERIOR; C3-6 (N/A)     Hospital Course: 6/20: Directly admitted from [facility].  6/21: OR for C3-6 posterior fusion with Dr. Sam  6/22: POD#1, hemovac drain in place  6/23: drain remains in place, xrays look appropriate  6/24: NAEON  6/26: Drain Dc'd  6/27: Pt remains stable with improving weakness. Rehab denied. Awaiting peer to peer. Will begin SNF process   6/28: [employer] approved SNF. Plan for DC today to [facility]    6/29: [facility] would not take pt yesterday due to elevated WBC 13. Pt afebrile. CXR, UA negative. Leukocytosis resolved today. Plan for Discharge.    Consults:   Consults         Status Ordering Provider     Inpatient consult to Hospital Medicine-General  Once     Provider:  (Not yet assigned)    Completed DARIANA GARNER     Inpatient consult to Physical Medicine Rehab  Once     Provider:  (Not yet assigned)    Completed JAYCEE SAM     Inpatient consult to SNF [facility]  Once     Provider:  (Not yet assigned)     Acknowledged JAYCEE SAM          Significant Diagnostic Studies:   Cervical xrays  Labs:   BMP:   Recent Labs  Lab 06/27/17  0602 06/28/17  0459    108   * 135*   K 4.0 4.0    103   CO2 25 22*   BUN 18 21   CREATININE 0.7 0.8   CALCIUM 9.1 9.3   , CBC   Recent Labs  Lab 06/27/17  0602 06/28/17  0459   WBC 10.84 13.55*   HGB 13.5* 13.5*   HCT 38.6* 38.6*    319    and INR   Lab Results   Component Value Date    INR 1.0 06/21/2017       Pending Diagnostic Studies:     None        Final Active Diagnoses:    Diagnosis Date Noted POA    PRINCIPAL PROBLEM:  Cervical myelopathy [G95.9] 06/20/2017 Yes    Preoperative evaluation to rule out surgical contraindication [Z01.818] 06/20/2017 Not Applicable    Weakness of both lower extremities [R29.898] 06/13/2017 Yes    Benign non-nodular prostatic hyperplasia without lower urinary tract symptoms [N40.0] 06/06/2017 Yes    HLD (hyperlipidemia) [E78.5] 06/02/2017 Yes    Chronic hoarseness [R49.0] 09/12/2012 Yes      Problems Resolved During this Admission:    Diagnosis Date Noted Date Resolved POA      Discharged Condition: stable    Disposition: Skilled Nursing Facility    Follow Up:    Patient Instructions:   No discharge procedures on file.  Medications:  Transfer Medications (for Discharge Readmit only):   Current Facility-Administered Medications   Medication Dose Route Frequency Provider Last Rate Last Dose    atorvastatin tablet 10 mg  10 mg Oral Daily Rafaela Berkowitz PA-C   10 mg at 06/28/17 0909    dextrose 50% injection 12.5 g  12.5 g Intravenous PRN Rafaela Berkowitz PA-C        dextrose 50% injection 25 g  25 g Intravenous PRN Rafaela Berkowitz PA-C        diazePAM tablet 5 mg  5 mg Oral Q6H PRN Sandor Walker MD   5 mg at 06/27/17 2132    docusate sodium capsule 50 mg  50 mg Oral BID Bob Crow PA-C   50 mg at 06/27/17 0841    finasteride tablet 5 mg  5 mg Oral Daily Rafaela Berkowitz PA-C   5 mg at 06/28/17 0909     glucagon (human recombinant) injection 1 mg  1 mg Intramuscular PRN Rafaela Berkowitz PA-C        glucose chewable tablet 16 g  16 g Oral PRN Rafaela Berkowitz PA-C        glucose chewable tablet 16 g  16 g Oral PRN Rafaela Berkowitz PA-C        glucose chewable tablet 24 g  24 g Oral PRN Rafaela Berkowitz PA-C        heparin (porcine) injection 5,000 Units  5,000 Units Subcutaneous Q8H Sandor Walker MD   5,000 Units at 06/28/17 0614    hydrALAZINE injection 20 mg  20 mg Intravenous Q6H PRN Sandor Walker MD        HYDROmorphone injection 1 mg  1 mg Intravenous Q4H PRN Sandor Walker MD   1 mg at 06/28/17 0343    labetalol injection 10 mg  10 mg Intravenous Q10 Min PRN Sandor Walker MD        oxycodone-acetaminophen  mg per tablet 1 tablet  1 tablet Oral Q4H PRN Michela Graham PA-C   1 tablet at 06/28/17 0908    sodium chloride 0.9% flush 3 mL  3 mL Intravenous PRN Bartloome Newell Jr., MD   3 mL at 06/22/17 0544    trazodone tablet 50 mg  50 mg Oral Nightly PRN Rafaela Berkowitz PA-C   50 mg at 06/27/17 2131       Michela Graham PA-C  Neurosurgery [facility]

## 2017-06-28 NOTE — PT/OT/SLP PROGRESS
Physical Therapy  Treatment    Anthony Miguel   MRN: [id number]   Admitting Diagnosis: Cervical myelopathy    PT Received On: 06/28/17  PT Start Time: 0906     PT Stop Time: 0942    PT Total Time (min): 36 min       Billable Minutes:  Gait [id number] min and Therapeutic Activity 15 min    Treatment Type: Treatment  PT/PTA: PT     PTA Visit Number: 0       General Precautions: Standard, fall  Orthopedic Precautions: N/A   Braces: Cervical collar    Do you have any cultural, spiritual, Jainism conflicts, given your current situation?:  (none stated)    Subjective:  Communicated with RN prior to session. Pt agreeable to participate in therapy session.     Pain/Comfort  Pain Rating 1: 5/10  Location - Orientation 1: generalized  Location 1: neck  Pain Addressed 1: Reposition, Pre-medicate for activity, Nurse notified  Pain Rating Post-Intervention 1: 5/10    Objective:   Patient found with: cervical collar, SCD    Functional Mobility:  Bed Mobility:   Sit to Supine: Minimum Assistance, With siderail    Transfers:  Sit <> Stand Assistance: Contact Guard Assistance (from bedside chair)  Sit <> Stand Assistive Device: Rolling Walker  Toilet Transfer Assistance: Contact Guard Assistance  Toilet Transfer Assistive Device: Rolling Walker    Gait:   Gait Distance: ~65' with RW and CGA for safety; no LOB; pt limited by pain   Assistance 1: Contact Guard Assistance  Gait Assistive Device: Rolling walker  Gait Pattern: reciprocal  Gait Deviation(s): decreased sean, decreased step length, decreased stride length, decreased toe-to-floor clearance    Balance:   Static Sit: FAIR+: Able to take MINIMAL challenges from all directions  Dynamic Sit: FAIR+: Maintains balance through MINIMAL excursions of active trunk motion  Static Stand: FAIR+: Takes MINIMAL challenges from all directions  Dynamic stand: FAIR: Needs CONTACT GUARD during gait     Therapeutic Activities and Exercises:  Therapeutic activities aimed to increase pt's  independence, safety, and efficiency with bed mobility and functional transfers. See above for assistance levels. Therapist reinforced education on log rolling during sit>supine transfer- pt with difficulty maintaining correct body mechanics during transfer- would benefit from reinforcement of education.  Therapist also facilitated practice of sit<>stand transfers, with education on RW management. Pt educated on recommendations for HEP for continued BLE strengthening- pt performed ankle pumps x 15 reps and quad sets x 10 reps bilaterally. Pt verbalized understanding of education provided.      Therapist facilitated progression of gait training to improve gait stability, endurance, and independence with functional ambulation. Pt provided with verbal and tactile cueing to improve postural awareness and heel strike bilaterally.  Pt requires increased time for gait training and mobility tasks 2/2 pain and decreased gait speed.     AM-PAC 6 CLICK MOBILITY  How much help from another person does this patient currently need?   1 = Unable, Total/Dependent Assistance  2 = A lot, Maximum/Moderate Assistance  3 = A little, Minimum/Contact Guard/Supervision  4 = None, Modified [place]/Independent    Turning over in bed (including adjusting bedclothes, sheets and blankets)?: 3  Sitting down on and standing up from a chair with arms (e.g., wheelchair, bedside commode, etc.): 3  Moving from lying on back to sitting on the side of the bed?: 3  Moving to and from a bed to a chair (including a wheelchair)?: 3  Need to walk in hospital room?: 3  Climbing 3-5 steps with a railing?: 3  Total Score: 18    AM-PAC Raw Score CMS G-Code Modifier Level of Impairment Assistance   6 % Total / Unable   7 - 9 CM 80 - 100% Maximal Assist   10 - 14 CL 60 - 80% Moderate Assist   15 - 19 CK 40 - 60% Moderate Assist   20 - 22 CJ 20 - 40% Minimal Assist   23 CI 1-20% SBA / CGA   24 CH 0% Independent/ Mod I     Patient left supine with all  lines intact, call button in reach, bed alarm on and RN notified.    Assessment:  Anthony Miguel is a 66 y.o. male with a medical diagnosis of Cervical myelopathy. Pt demonstrated good effort during therapy session, continues to be highly motivated and cooperative. Pt continues to demonstrate difficulty with sup<>sit transfers and impaired gait quality. Pt unable to tolerate stairs training 2/2 pain and fatigue. Pt would benefit from continued PT intervention to address below listed deficits and maximize return to PLOF.       Rehab identified problem list/impairments: Rehab identified problem list/impairments: weakness, impaired endurance, gait instability, impaired functional mobilty, impaired balance, impaired self care skills, pain    Rehab potential is good.    Activity tolerance: Good    Discharge recommendations: Discharge Facility/Level Of Care Needs: rehabilitation facility     Barriers to discharge: Barriers to Discharge: Decreased caregiver support, Inaccessible home environment    Equipment recommendations: Equipment Needed After Discharge:  (TBD at next level of care)     GOALS:    Physical Therapy Goals        Problem: Physical Therapy Goal    Goal Priority Disciplines Outcome Goal Variances Interventions   Physical Therapy Goal     PT/OT, PT Ongoing (interventions implemented as appropriate)     Description:  Goals to be met by: 17    Patient will increase functional independence with mobility by performin. Supine to sit with Stand-by Assistance  2. Sit to supine with Stand-by Assistance  3. Rolling to Left and Right with Stand-by Assistance.  4. Sit to stand transfer with Stand-by Assistance- MET       Updated: with supervision.   5. Bed to chair transfer with Stand-by Assistance using Rolling Walker.  6. Gait  x 100 feet with Stand-by Assistance using Rolling Walker.  7. Ascend/descend 4 stair with bilateral Handrails Contact Guard Assistance using Rolling Walker or no AD.  8.  Stand for 10 minutes with Stand-by Assistance using Rolling Walker.  9. Lower extremity exercise program x15 reps per handout, with independence                            PLAN:    Patient to be seen 5 x/week  to address the above listed problems via gait training, therapeutic activities, therapeutic exercises, neuromuscular re-education  Plan of Care expires: 07/22/17  Plan of Care reviewed with: patient        Carolyne Juan M PT, DPT   6/28/2017  Pager: [phone]

## 2017-06-28 NOTE — PROGRESS NOTES
[facility]  Neurosurgery  Progress Note    Subjective:     History of Present Illness: Anthony Miguel is a 66 y.o. male directly admitted from Daisy Rivera PA-C's clinic for cervical myelopathy. He has experienced progressive weakness and paresthesias in his arms and legs for approximately one month. He is typically independent with ADLs, but has recently been unable to ambulate and has experienced difficulty with fine motor movements, such as playing piano, writing, etc. He endorses paresthesias throughout his upper extremities, and occasionally in his lower extremities. He has minimal neck pain. He denies b/b dysfunction or saddle anesthesia.     Post-Op Info:  Procedure(s) (LRB):  LAMINECTOMY-CERVICAL/FUSION-POSTERIOR; C3-6 (N/A)   7 Days Post-Op     Interval History: NAEON. Pts pain improved overnight. No new complaints today. He denies new radicular pain or worsening weakness. Tolerating diet, voiding appropriately. [employer] denies rehab but approved SNF.     Medications:  Continuous Infusions:   Scheduled Meds:   atorvastatin  10 mg Oral Daily    docusate sodium  50 mg Oral BID    finasteride  5 mg Oral Daily    heparin (porcine)  5,000 Units Subcutaneous Q8H     PRN Meds:dextrose 50%, dextrose 50%, diazePAM, glucagon (human recombinant), glucose, glucose, glucose, hydrALAZINE, HYDROmorphone, labetalol, oxycodone-acetaminophen, sodium chloride 0.9%, trazodone     Review of Systems    Objective:     Weight: 77.2 kg (170 lb 2 oz)  Body mass index is 26.65 kg/m².  Vital Signs (Most Recent):  Temp: 97.6 °F (36.4 °C) (17)  Pulse: 90 (17)  Resp: 18 (17)  BP: 113/67 (17)  SpO2: 97 % (17) Vital Signs (24h Range):  Temp:  [96.6 °F (35.9 °C)-98.6 °F (37 °C)] 97.6 °F (36.4 °C)  Pulse:  [70-90] 90  Resp:  [17-18] 18  SpO2:  [91 %-97 %] 97 %  BP: (113-136)/(67-90) 113/67   Temp (24hrs), Av.8 °F (36.6 °C), Min:96.6 °F (35.9 °C), Max:98.6 °F  (37 °C)    Neurosurgery Physical Exam  General: well developed, well nourished, no distress.   Head: normocephalic, atraumatic  Neurologic: Alert and oriented. Thought content appropriate.  GCS: Motor: 6/Verbal: 5/Eyes: 4 GCS Total: 15  Mental Status: Awake, Alert, Oriented x3  Cranial nerves: face symmetric, tongue midline, CN II-XII grossly intact.   Eyes: pupils equal, round, reactive to light with accomodation, EOMI.  Sensory: intact to light touch throughout  Motor Strength: Moves all extremities spontaneously with good tone.  Full strength upper and lower extremities. No abnormal movements seen.     Strength  Deltoids Triceps Biceps Wrist Extension Wrist Flexion Hand    Upper: R 5/5 4+/5 4+/5 4/5 4+/5 4/5    L 5/5 4+/5 4+/5 4+/5 4+/5 4/5     Iliopsoas Quadriceps Knee  Flexion Tibialis  anterior Gastro- cnemius EHL   Lower: R 5/5 5/5 5/5 5/5 5/5 5/5    L 5/5 5/5 5/5 5/5 5/5 5/5     DTR's - 2 + and symmetric in UE and LE  Pronator Drift: no drift noted  Finger-to-nose: Intact bilaterally  Mcdonough: absent  Clonus: absent  Babinski: absent  Pulses: 2+ and symmetric radial and dorsalis pedis. No lower extremity edema  Incision clean dry and intact with skin edges well approximated with staples. No drainage, edema or erythema     Significant Labs:    Recent Labs  Lab 06/28/17  0459      *   K 4.0      CO2 22*   BUN 21   CREATININE 0.8   CALCIUM 9.3       Recent Labs  Lab 06/27/17  0602 06/28/17  0459   WBC 10.84 13.55*   HGB 13.5* 13.5*   HCT 38.6* 38.6*    319     No results for input(s): LABPT, INR, APTT in the last 48 hours.  Microbiology Results (last 7 days)     ** No results found for the last 168 hours. **        Assessment/Plan:     HLD (hyperlipidemia)    - Restarted home atorvastatin.        * Cervical myelopathy    -s/p C3-6 PCF POD#7  -Neurologically stable with signficiant improvements in BUE strength and gait  -Post xrays with good placement of hardware.  -Pain control:  percocet 10-325mg q4h prn, dilaudid IV for breakthrough   -Bacitracin to incision BID  -Cervical collar at all times  -PT/OT/OOB daily  -Patient must be OOB for atleast 6 hours daily (may be in intervals: 2 hours in chair with each meal)  -IS to bed side. Patient to use atleast 10x every hour.  -Continue bowel regimen daily.  -Continue Teds and SCDs for DVTP.  -SQH  -Follow up for 2 week wound check and 6 weeks with Dr. Wolff with xrays. Appointments will be scheduled and mailed to the patient.     Dispo: [employer] approved SNF. Plan for DC to [facility] today. DC instructions reviewed with the patient and he expressed understanding.                    Michela Graham PA-C  Neurosurgery  [facility]

## 2017-06-28 NOTE — SUBJECTIVE & OBJECTIVE
Interval History: NAEON. Pts pain improved overnight. No new complaints today. He denies new radicular pain or worsening weakness. Tolerating diet, voiding appropriately. [employer] denies rehab but approved SNF.     Medications:  Continuous Infusions:   Scheduled Meds:   atorvastatin  10 mg Oral Daily    docusate sodium  50 mg Oral BID    finasteride  5 mg Oral Daily    heparin (porcine)  5,000 Units Subcutaneous Q8H     PRN Meds:dextrose 50%, dextrose 50%, diazePAM, glucagon (human recombinant), glucose, glucose, glucose, hydrALAZINE, HYDROmorphone, labetalol, oxycodone-acetaminophen, sodium chloride 0.9%, trazodone     Review of Systems    Objective:     Weight: 77.2 kg (170 lb 2 oz)  Body mass index is 26.65 kg/m².  Vital Signs (Most Recent):  Temp: 97.6 °F (36.4 °C) (17 09)  Pulse: 90 (17 09)  Resp: 18 (17 09)  BP: 113/67 (17 0908)  SpO2: 97 % (17 0908) Vital Signs (24h Range):  Temp:  [96.6 °F (35.9 °C)-98.6 °F (37 °C)] 97.6 °F (36.4 °C)  Pulse:  [70-90] 90  Resp:  [17-18] 18  SpO2:  [91 %-97 %] 97 %  BP: (113-136)/(67-90) 113/67   Temp (24hrs), Av.8 °F (36.6 °C), Min:96.6 °F (35.9 °C), Max:98.6 °F (37 °C)    Neurosurgery Physical Exam  General: well developed, well nourished, no distress.   Head: normocephalic, atraumatic  Neurologic: Alert and oriented. Thought content appropriate.  GCS: Motor: 6/Verbal: 5/Eyes: 4 GCS Total: 15  Mental Status: Awake, Alert, Oriented x3  Cranial nerves: face symmetric, tongue midline, CN II-XII grossly intact.   Eyes: pupils equal, round, reactive to light with accomodation, EOMI.  Sensory: intact to light touch throughout  Motor Strength: Moves all extremities spontaneously with good tone.  Full strength upper and lower extremities. No abnormal movements seen.     Strength  Deltoids Triceps Biceps Wrist Extension Wrist Flexion Hand    Upper: R 5/5 4+/5 4+/5 4/5 4+/5 4/5    L 5/5 4+/5 4+/5 4+/5 4+/5 4/5     Iliopsoas Quadriceps  Knee  Flexion Tibialis  anterior Gastro- cnemius EHL   Lower: R 5/5 5/5 5/5 5/5 5/5 5/5    L 5/5 5/5 5/5 5/5 5/5 5/5     DTR's - 2 + and symmetric in UE and LE  Pronator Drift: no drift noted  Finger-to-nose: Intact bilaterally  Mcdonough: absent  Clonus: absent  Babinski: absent  Pulses: 2+ and symmetric radial and dorsalis pedis. No lower extremity edema  Incision clean dry and intact with skin edges well approximated with staples. No drainage, edema or erythema     Significant Labs:    Recent Labs  Lab 06/28/17  0459      *   K 4.0      CO2 22*   BUN 21   CREATININE 0.8   CALCIUM 9.3       Recent Labs  Lab 06/27/17  0602 06/28/17  0459   WBC 10.84 13.55*   HGB 13.5* 13.5*   HCT 38.6* 38.6*    319     No results for input(s): LABPT, INR, APTT in the last 48 hours.  Microbiology Results (last 7 days)     ** No results found for the last 168 hours. **

## 2017-06-28 NOTE — PLAN OF CARE
Problem: Physical Therapy Goal  Goal: Physical Therapy Goal  Goals to be met by: 17    Patient will increase functional independence with mobility by performin. Supine to sit with Stand-by Assistance  2. Sit to supine with Stand-by Assistance  3. Rolling to Left and Right with Stand-by Assistance.  4. Sit to stand transfer with Stand-by Assistance- MET       Updated: with supervision.   5. Bed to chair transfer with Stand-by Assistance using Rolling Walker.  6. Gait  x 100 feet with Stand-by Assistance using Rolling Walker.  7. Ascend/descend 4 stair with bilateral Handrails Contact Guard Assistance using Rolling Walker or no AD.  8. Stand for 10 minutes with Stand-by Assistance using Rolling Walker.  9. Lower extremity exercise program x15 reps per handout, with independence           Outcome: Ongoing (interventions implemented as appropriate)  No goals met this visit. Continue current POC.     Carolyne Mcgowan PT, DPT   2017  Pager: [phone]

## 2017-06-28 NOTE — PLAN OF CARE
Problem: Patient Care Overview  Goal: Plan of Care Review  Outcome: Ongoing (interventions implemented as appropriate)  Plan of care reviewed with pt at bedside. Safety precautions initiated. Bed locked in lowest position, call bell within reach, Bed alarm on. [id number].Pain management under control and pt verbalized understanding of keeping C- collar on unless flat in bed after speaking with MD. Will continue to monitor for changes.

## 2017-06-28 NOTE — ASSESSMENT & PLAN NOTE
-s/p C3-6 PCF POD#8  -Neurologically stable with signficiant improvements in BUE strength and gait  -Post xrays with good placement of hardware.  -Pain control: percocet 10-325mg q4h prn, dilaudid IV for breakthrough   -Bacitracin to incision BID  -Cervical collar at all times  -PT/OT/OOB daily  -Patient must be OOB for atleast 6 hours daily (may be in intervals: 2 hours in chair with each meal)  -IS to bed side. Patient to use atleast 10x every hour.  -Continue bowel regimen daily.  -Continue Teds and SCDs for DVTP.  -SQH  -Follow up for 2 week wound check and 6 weeks with Dr. Wolff with xrays. Appointments will be scheduled and mailed to the patient.     Dispo: [employer] approved SNF. Plan for DC to [facility] today. DC instructions reviewed with the patient and he expressed understanding.

## 2017-06-29 ENCOUNTER — HOSPITAL ENCOUNTER (INPATIENT)
Facility: HOSPITAL | Age: 67
LOS: 14 days | Discharge: HOME HEALTH | DRG: 950 | End: 2017-07-13
Attending: HOSPITALIST | Admitting: HOSPITALIST
Payer: MEDICARE

## 2017-06-29 VITALS
HEIGHT: 67 IN | TEMPERATURE: 98 F | OXYGEN SATURATION: 95 % | SYSTOLIC BLOOD PRESSURE: 142 MMHG | RESPIRATION RATE: 18 BRPM | WEIGHT: 170.13 LBS | BODY MASS INDEX: 26.7 KG/M2 | HEART RATE: 103 BPM | DIASTOLIC BLOOD PRESSURE: 88 MMHG

## 2017-06-29 DIAGNOSIS — Z86.19 HISTORY OF HEPATITIS C: ICD-10-CM

## 2017-06-29 DIAGNOSIS — R53.81 DEBILITY: ICD-10-CM

## 2017-06-29 DIAGNOSIS — R17 ELEVATED BILIRUBIN: Primary | ICD-10-CM

## 2017-06-29 DIAGNOSIS — E78.5 HYPERLIPIDEMIA, UNSPECIFIED HYPERLIPIDEMIA TYPE: ICD-10-CM

## 2017-06-29 DIAGNOSIS — E55.9 UNSPECIFIED VITAMIN D DEFICIENCY: ICD-10-CM

## 2017-06-29 DIAGNOSIS — G95.9 CERVICAL MYELOPATHY: ICD-10-CM

## 2017-06-29 LAB
ANION GAP SERPL CALC-SCNC: 12 MMOL/L
BASOPHILS # BLD AUTO: 0.02 K/UL
BASOPHILS NFR BLD: 0.2 %
BUN SERPL-MCNC: 20 MG/DL
CALCIUM SERPL-MCNC: 9.1 MG/DL
CHLORIDE SERPL-SCNC: 102 MMOL/L
CO2 SERPL-SCNC: 23 MMOL/L
CREAT SERPL-MCNC: 0.9 MG/DL
DIFFERENTIAL METHOD: ABNORMAL
EOSINOPHIL # BLD AUTO: 0.5 K/UL
EOSINOPHIL NFR BLD: 4 %
ERYTHROCYTE [DISTWIDTH] IN BLOOD BY AUTOMATED COUNT: 13.3 %
EST. GFR  (AFRICAN AMERICAN): >60 ML/MIN/1.73 M^2
EST. GFR  (NON AFRICAN AMERICAN): >60 ML/MIN/1.73 M^2
GLUCOSE SERPL-MCNC: 108 MG/DL
HCT VFR BLD AUTO: 38.5 %
HGB BLD-MCNC: 13.2 G/DL
LYMPHOCYTES # BLD AUTO: 2.2 K/UL
LYMPHOCYTES NFR BLD: 18.7 %
MCH RBC QN AUTO: 32.2 PG
MCHC RBC AUTO-ENTMCNC: 34.3 %
MCV RBC AUTO: 94 FL
MONOCYTES # BLD AUTO: 1.1 K/UL
MONOCYTES NFR BLD: 9.2 %
NEUTROPHILS # BLD AUTO: 7.6 K/UL
NEUTROPHILS NFR BLD: 67.9 %
PLATELET # BLD AUTO: 323 K/UL
PMV BLD AUTO: 10.9 FL
POTASSIUM SERPL-SCNC: 3.8 MMOL/L
RBC # BLD AUTO: 4.1 M/UL
SODIUM SERPL-SCNC: 137 MMOL/L
WBC # BLD AUTO: 11.51 K/UL

## 2017-06-29 PROCEDURE — 85025 COMPLETE CBC W/AUTO DIFF WBC: CPT

## 2017-06-29 PROCEDURE — 12000000 HC SNF SEMI-PRIVATE ROOM

## 2017-06-29 PROCEDURE — 25000003 PHARM REV CODE 250: Performed by: STUDENT IN AN ORGANIZED HEALTH CARE EDUCATION/TRAINING PROGRAM

## 2017-06-29 PROCEDURE — 97530 THERAPEUTIC ACTIVITIES: CPT

## 2017-06-29 PROCEDURE — 25000003 PHARM REV CODE 250: Performed by: PHYSICIAN ASSISTANT

## 2017-06-29 PROCEDURE — 94799 UNLISTED PULMONARY SVC/PX: CPT

## 2017-06-29 PROCEDURE — 63600175 PHARM REV CODE 636 W HCPCS: Performed by: STUDENT IN AN ORGANIZED HEALTH CARE EDUCATION/TRAINING PROGRAM

## 2017-06-29 PROCEDURE — 36415 COLL VENOUS BLD VENIPUNCTURE: CPT

## 2017-06-29 PROCEDURE — 80048 BASIC METABOLIC PNL TOTAL CA: CPT

## 2017-06-29 RX ORDER — OXYCODONE AND ACETAMINOPHEN 10; 325 MG/1; MG/1
1 TABLET ORAL EVERY 4 HOURS PRN
Status: DISCONTINUED | OUTPATIENT
Start: 2017-06-29 | End: 2017-07-13 | Stop reason: HOSPADM

## 2017-06-29 RX ORDER — TRAZODONE HYDROCHLORIDE 50 MG/1
50 TABLET ORAL NIGHTLY PRN
Status: DISCONTINUED | OUTPATIENT
Start: 2017-06-29 | End: 2017-06-30

## 2017-06-29 RX ORDER — ATORVASTATIN CALCIUM 10 MG/1
10 TABLET, FILM COATED ORAL DAILY
Status: DISCONTINUED | OUTPATIENT
Start: 2017-06-30 | End: 2017-06-29

## 2017-06-29 RX ORDER — TRAZODONE HYDROCHLORIDE 50 MG/1
50 TABLET ORAL NIGHTLY PRN
Status: DISCONTINUED | OUTPATIENT
Start: 2017-06-29 | End: 2017-06-29

## 2017-06-29 RX ORDER — ATORVASTATIN CALCIUM 10 MG/1
10 TABLET, FILM COATED ORAL DAILY
Status: DISCONTINUED | OUTPATIENT
Start: 2017-06-30 | End: 2017-07-13 | Stop reason: HOSPADM

## 2017-06-29 RX ORDER — IBUPROFEN 200 MG
24 TABLET ORAL
Status: DISCONTINUED | OUTPATIENT
Start: 2017-06-29 | End: 2017-06-30

## 2017-06-29 RX ORDER — FINASTERIDE 5 MG/1
5 TABLET, FILM COATED ORAL DAILY
Status: DISCONTINUED | OUTPATIENT
Start: 2017-06-30 | End: 2017-06-29

## 2017-06-29 RX ORDER — SODIUM CHLORIDE 0.9 % (FLUSH) 0.9 %
3 SYRINGE (ML) INJECTION
Status: DISCONTINUED | OUTPATIENT
Start: 2017-06-29 | End: 2017-06-30

## 2017-06-29 RX ORDER — MAG HYDROX/ALUMINUM HYD/SIMETH 200-200-20
15 SUSPENSION, ORAL (FINAL DOSE FORM) ORAL EVERY 6 HOURS PRN
Status: DISCONTINUED | OUTPATIENT
Start: 2017-06-29 | End: 2017-07-13 | Stop reason: HOSPADM

## 2017-06-29 RX ORDER — IBUPROFEN 200 MG
16 TABLET ORAL
Status: DISCONTINUED | OUTPATIENT
Start: 2017-06-29 | End: 2017-06-30

## 2017-06-29 RX ORDER — ACETAMINOPHEN 325 MG/1
650 TABLET ORAL EVERY 6 HOURS PRN
Status: DISCONTINUED | OUTPATIENT
Start: 2017-06-29 | End: 2017-06-29

## 2017-06-29 RX ORDER — HYDROMORPHONE HYDROCHLORIDE 1 MG/ML
1 INJECTION, SOLUTION INTRAMUSCULAR; INTRAVENOUS; SUBCUTANEOUS EVERY 4 HOURS PRN
Status: DISCONTINUED | OUTPATIENT
Start: 2017-06-29 | End: 2017-06-29 | Stop reason: HOSPADM

## 2017-06-29 RX ORDER — DIAZEPAM 5 MG/1
5 TABLET ORAL EVERY 6 HOURS PRN
Status: DISCONTINUED | OUTPATIENT
Start: 2017-06-29 | End: 2017-07-13 | Stop reason: HOSPADM

## 2017-06-29 RX ORDER — GLUCAGON 1 MG
1 KIT INJECTION
Status: DISCONTINUED | OUTPATIENT
Start: 2017-06-29 | End: 2017-06-30

## 2017-06-29 RX ORDER — AMOXICILLIN 250 MG
1 CAPSULE ORAL 2 TIMES DAILY
Status: DISCONTINUED | OUTPATIENT
Start: 2017-06-29 | End: 2017-07-13 | Stop reason: HOSPADM

## 2017-06-29 RX ORDER — FINASTERIDE 5 MG/1
5 TABLET, FILM COATED ORAL DAILY
Status: DISCONTINUED | OUTPATIENT
Start: 2017-06-30 | End: 2017-07-13 | Stop reason: HOSPADM

## 2017-06-29 RX ORDER — CHOLECALCIFEROL (VITAMIN D3) 25 MCG
1000 TABLET ORAL DAILY
Status: DISCONTINUED | OUTPATIENT
Start: 2017-06-30 | End: 2017-07-13 | Stop reason: HOSPADM

## 2017-06-29 RX ORDER — HEPARIN SODIUM 5000 [USP'U]/ML
5000 INJECTION, SOLUTION INTRAVENOUS; SUBCUTANEOUS EVERY 8 HOURS
Status: DISCONTINUED | OUTPATIENT
Start: 2017-06-29 | End: 2017-07-13 | Stop reason: HOSPADM

## 2017-06-29 RX ORDER — HYDRALAZINE HYDROCHLORIDE 20 MG/ML
20 INJECTION INTRAMUSCULAR; INTRAVENOUS EVERY 6 HOURS PRN
Status: DISCONTINUED | OUTPATIENT
Start: 2017-06-29 | End: 2017-06-29 | Stop reason: HOSPADM

## 2017-06-29 RX ORDER — RAMELTEON 8 MG/1
8 TABLET ORAL NIGHTLY PRN
Status: DISCONTINUED | OUTPATIENT
Start: 2017-06-29 | End: 2017-07-13 | Stop reason: HOSPADM

## 2017-06-29 RX ADMIN — OXYCODONE HYDROCHLORIDE AND ACETAMINOPHEN 1 TABLET: 10; 325 TABLET ORAL at 10:06

## 2017-06-29 RX ADMIN — ATORVASTATIN CALCIUM 10 MG: 10 TABLET, FILM COATED ORAL at 08:06

## 2017-06-29 RX ADMIN — ALUMINUM HYDROXIDE, MAGNESIUM HYDROXIDE, AND SIMETHICONE 15 ML: 200; 200; 20 SUSPENSION ORAL at 10:06

## 2017-06-29 RX ADMIN — HEPARIN SODIUM 5000 UNITS: 5000 INJECTION, SOLUTION INTRAVENOUS; SUBCUTANEOUS at 10:06

## 2017-06-29 RX ADMIN — DOCUSATE SODIUM 50 MG: 50 CAPSULE, LIQUID FILLED ORAL at 08:06

## 2017-06-29 RX ADMIN — FINASTERIDE 5 MG: 5 TABLET, FILM COATED ORAL at 08:06

## 2017-06-29 RX ADMIN — OXYCODONE HYDROCHLORIDE AND ACETAMINOPHEN 1 TABLET: 10; 325 TABLET ORAL at 01:06

## 2017-06-29 RX ADMIN — OXYCODONE HYDROCHLORIDE AND ACETAMINOPHEN 1 TABLET: 10; 325 TABLET ORAL at 08:06

## 2017-06-29 RX ADMIN — HEPARIN SODIUM 5000 UNITS: 5000 INJECTION, SOLUTION INTRAVENOUS; SUBCUTANEOUS at 04:06

## 2017-06-29 RX ADMIN — STANDARDIZED SENNA CONCENTRATE AND DOCUSATE SODIUM 1 TABLET: 8.6; 5 TABLET, FILM COATED ORAL at 10:06

## 2017-06-29 RX ADMIN — HYDROMORPHONE HYDROCHLORIDE 1 MG: 1 INJECTION, SOLUTION INTRAMUSCULAR; INTRAVENOUS; SUBCUTANEOUS at 04:06

## 2017-06-29 RX ADMIN — OXYCODONE HYDROCHLORIDE AND ACETAMINOPHEN 1 TABLET: 10; 325 TABLET ORAL at 05:06

## 2017-06-29 RX ADMIN — HEPARIN SODIUM 5000 UNITS: 5000 INJECTION, SOLUTION INTRAVENOUS; SUBCUTANEOUS at 01:06

## 2017-06-29 RX ADMIN — DOCUSATE SODIUM 50 MG: 50 CAPSULE, LIQUID FILLED ORAL at 10:06

## 2017-06-29 NOTE — PLAN OF CARE
Problem: Patient Care Overview  Goal: Plan of Care Review  Patient resting in bed. Alert and oriented x 4. Medicated with percocet x 2 for neck pain with good relief reported.  Fall precautions maintained at all times. No falls/ injuries. Posterior neck incision open to air, no S/S of wound complications noted. Skin kept clean and dry.  For discharge to [facility] today.

## 2017-06-29 NOTE — PLAN OF CARE
Patient discharging to [facility], notified nurse to call report, arranged transport with [facility], will follow for additional needs.       06/29/17 1450   Final Note   Assessment Type Final Discharge Note   Discharge Disposition SNF   Discharge planning education complete? Yes   Hospital Follow Up  Appt(s) scheduled? Yes   Discharge plans and expectations educations in teach back method with documentation complete? Yes   Offered [facility] -- Bedside Delivery? n/a   Discharge/Hospital Encounter Summary to ([facility]) PCP n/a   Referral to Outpatient Case Management complete? n/a   Referral to / orders for Home Health Complete? n/a   30 day supply of medicines given at discharge, if documented non-compliance / non-adherence? n/a   Any social issues identified prior to discharge? No   Did you assess the readiness or willingness of the family or caregiver to support self management of care? Yes   Right Care Referral Info   Post Acute Recommendation SNF

## 2017-06-29 NOTE — PT/OT/SLP PROGRESS
Occupational Therapy  Treatment    Anthony Miguel   MRN: [id number]   Admitting Diagnosis: Cervical myelopathy    OT Date of Treatment: 06/29/17   OT Start Time: 1109  OT Stop Time: 1139  OT Total Time (min): 30 min    Billable Minutes:  Therapeutic Activity 30    General Precautions: Standard, fall  Orthopedic Precautions: N/A  Braces: Cervical collar    Do you have any cultural, spiritual, Confucianist conflicts, given your current situation?: no    Subjective:  Communicated with RN prior to session.    Pain/Comfort  Pain Rating 1:  (Pt reported mild pain in neck)    Objective:  Patient found with: cervical collar.  Pt sitting EOB facing window upon arrival.     Functional Mobility:  Bed Mobility:  Rolling/Turning to Left: Stand by assistance  Rolling/Turning Right: Stand by assistance  Scooting/Bridging: Stand by Assistance  Supine to Sit: Stand by Assistance    Transfers:  Sit <> Stand Assistance: Contact Guard Assistance x 2 trials from EOB  Sit <> Stand Assistive Device: Rolling Walker    Functional Ambulation: Pt walked 80 ft with RW and CGA.  Decreased step length with gait instability noted during task    Activities of Daily Living:     Feeding adaptive equipment:   UE Dressing Level of Assistance:   -Maximum assistance to don gown while sitting and doff gown while standing.  Pt demonstrated difficulty elevating (B) UE during task 2* increased pain.      Balance:   Static Sit: GOOD: Takes MODERATE challenges from all directions  Dynamic Sit: GOOD: Maintains balance through MODERATE excursions of active trunk movement  Static Stand: FAIR: Maintains without assist but unable to take challenges  Dynamic stand: FAIR: Needs CONTACT GUARD during gait    Therapeutic Activities and Exercises:  *Pt practiced UE dressing in seated and standing position; see details above.  *Pt ambulated in hallway to address coordination, endurance, and balance needed for mobility and ADLs in standing; see details above.  *Pt performed  "activities to address fine motor coordination in both hands.   -Thumb to finger opposition: 1 set x 10 reps on each hand  -Snapping/unsnapping buttons on gown: 2 sets x 4 pairs of buttons (increased time required for activity)  *Education provided on additional exercises and activities to perform throughout the day to help increase fine motor coordination and hand strength; pt verbalized understanding.  *Pt stood to perform reaching and balance activity to simulate actions needed for ADLs in standing and other IADLS (with RW and SBA).  Pt reached forward to touch two stationary targets 1 set x 5 reps per side; then reached forward to various heights and locations to touch hand of therapist: 1 set x 10 reps per side  *POC reviewed with pt    AM-PAC 6 CLICK ADL   How much help from another person does this patient currently need?   1 = Unable, Total/Dependent Assistance  2 = A lot, Maximum/Moderate Assistance  3 = A little, Minimum/Contact Guard/Supervision  4 = None, Modified [place]/Independent    Putting on and taking off regular lower body clothing? : 2  Bathing (including washing, rinsing, drying)?: 2  Toileting, which includes using toilet, bedpan, or urinal? : 2  Putting on and taking off regular upper body clothing?: 2  Taking care of personal grooming such as brushing teeth?: 3  Eating meals?: 4  Total Score: 15     AM-PAC Raw Score CMS "G-Code Modifier Level of Impairment Assistance   6 % Total / Unable   7 - 8 CM 80 - 100% Maximal Assist   9-13 CL 60 - 80% Moderate Assist   14 - 19 CK 40 - 60% Moderate Assist   20 - 22 CJ 20 - 40% Minimal Assist   23 CI 1-20% SBA / CGA   24 CH 0% Independent/ Mod I       Patient left sitting EOB with call button in reach    ASSESSMENT:  Anthony Miguel is a 66 y.o. male with a medical diagnosis of Cervical myelopathy S/p laminectomy cervical fusion-posterior C3-6 and presents with impaired fine motor coordination, gait instability, impaired balance, and " weakness impacting performance with ADLs and mobility.  Pt demonstrated improvement with activity tolerance ambulating further than previous session with no instances of LOB.  Pt continues to require increased assist for UE dressing 2* increased pain in neck and UE.  Pt is highly motivated and making progress towards goals.  He would continue to benefit from skilled OT services to address problems listed below and increase independence with ADLs.    Rehab identified problem list/impairments: Rehab identified problem list/impairments: weakness, impaired endurance, impaired self care skills, impaired functional mobilty, pain, gait instability, impaired balance    Rehab potential is good.    Activity tolerance: Good    Discharge recommendations: Discharge Facility/Level Of Care Needs: rehabilitation facility     Barriers to discharge: Barriers to Discharge: Decreased caregiver support, Inaccessible home environment    Equipment recommendations:  (TBD at next level of care)     GOALS:    Occupational Therapy Goals        Problem: Occupational Therapy Goal    Goal Priority Disciplines Outcome Interventions   Occupational Therapy Goal     OT, PT/OT     Description:  Goals to be met by:  2 Weeks (2017)    Patient will increase functional independence with ADLs by performin. Supine to sit with Supervision with HOB flat.  2. Sit to Stand transfers with Supervision.   3. Toilet transfer to toilet with Supervision.  4. Grooming while standing at sink with Supervision.  5. UE Dressing with Supervision.  6. LE Dressing with Supervision.                         Plan:  Patient to be seen 4 x/week to address the above listed problems via self-care/home management, therapeutic activities, therapeutic exercises  Plan of Care expires: 17  Plan of Care reviewed with: patient         TRISHA Malave  2017

## 2017-06-29 NOTE — NURSING
Patient discharged to [facility]. Transported per [facility] transportation. Report given to ANNE Toscano.   Discharged patient in stable condition.

## 2017-06-29 NOTE — PLAN OF CARE
Problem: Occupational Therapy Goal  Goal: Occupational Therapy Goal  Goals to be met by:  2 Weeks (2017)    Patient will increase functional independence with ADLs by performin. Supine to sit with Supervision with HOB flat.  2. Sit to Stand transfers with Supervision.   3. Toilet transfer to toilet with Supervision.  4. Grooming while standing at sink with Supervision.  5. UE Dressing with Supervision.  6. LE Dressing with Supervision.          Pt is making progress towards goals.    TRISHA Malave  2017

## 2017-06-30 PROBLEM — R53.81 DEBILITY: Status: ACTIVE | Noted: 2017-06-30

## 2017-06-30 PROCEDURE — 12000000 HC SNF SEMI-PRIVATE ROOM

## 2017-06-30 PROCEDURE — 97116 GAIT TRAINING THERAPY: CPT

## 2017-06-30 PROCEDURE — 97535 SELF CARE MNGMENT TRAINING: CPT

## 2017-06-30 PROCEDURE — 25000003 PHARM REV CODE 250: Performed by: PHYSICIAN ASSISTANT

## 2017-06-30 PROCEDURE — 97161 PT EVAL LOW COMPLEX 20 MIN: CPT

## 2017-06-30 PROCEDURE — 99306 1ST NF CARE HIGH MDM 50: CPT | Mod: ,,, | Performed by: HOSPITALIST

## 2017-06-30 PROCEDURE — 97110 THERAPEUTIC EXERCISES: CPT

## 2017-06-30 PROCEDURE — 97165 OT EVAL LOW COMPLEX 30 MIN: CPT

## 2017-06-30 RX ADMIN — HEPARIN SODIUM 5000 UNITS: 5000 INJECTION, SOLUTION INTRAVENOUS; SUBCUTANEOUS at 01:06

## 2017-06-30 RX ADMIN — HEPARIN SODIUM 5000 UNITS: 5000 INJECTION, SOLUTION INTRAVENOUS; SUBCUTANEOUS at 09:06

## 2017-06-30 RX ADMIN — HEPARIN SODIUM 5000 UNITS: 5000 INJECTION, SOLUTION INTRAVENOUS; SUBCUTANEOUS at 05:06

## 2017-06-30 RX ADMIN — STANDARDIZED SENNA CONCENTRATE AND DOCUSATE SODIUM 1 TABLET: 8.6; 5 TABLET, FILM COATED ORAL at 09:06

## 2017-06-30 RX ADMIN — OXYCODONE HYDROCHLORIDE AND ACETAMINOPHEN 1 TABLET: 10; 325 TABLET ORAL at 05:06

## 2017-06-30 RX ADMIN — STANDARDIZED SENNA CONCENTRATE AND DOCUSATE SODIUM 1 TABLET: 8.6; 5 TABLET, FILM COATED ORAL at 08:06

## 2017-06-30 RX ADMIN — OXYCODONE HYDROCHLORIDE AND ACETAMINOPHEN 1 TABLET: 10; 325 TABLET ORAL at 09:06

## 2017-06-30 RX ADMIN — DOCUSATE SODIUM 50 MG: 50 CAPSULE, LIQUID FILLED ORAL at 08:06

## 2017-06-30 RX ADMIN — FINASTERIDE 5 MG: 5 TABLET, FILM COATED ORAL at 08:06

## 2017-06-30 RX ADMIN — ATORVASTATIN CALCIUM 10 MG: 10 TABLET, FILM COATED ORAL at 08:06

## 2017-06-30 RX ADMIN — OXYCODONE HYDROCHLORIDE AND ACETAMINOPHEN 1 TABLET: 10; 325 TABLET ORAL at 11:06

## 2017-06-30 NOTE — PT/OT/SLP EVAL
PhysicalTherapy   Evaluation    Anthony Miguel   MRN: [id number]     PT Received On: 06/30/17  PT Start Time: 1041     PT Stop Time: 1126    PT Total Time (min): 45 min       Billable Minutes:  Evaluation 10, Gait [id number], Therapeutic Exercise 20 and Total Time 35    Diagnosis: Cervical myelopathy s/p C3-6 laminectomy and posterior fusion  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    Overview:  Right side dx update      Past Surgical History:   Procedure Laterality Date    Larangoscopy       General Precautions: Standard, fall  Orthopedic Precautions: N/A   Braces: Cervical collar    Do you have any cultural, spiritual, Methodist conflicts, given your current situation?: no    Patient History:  Lives With: alone  Living Arrangements: house  Home Accessibility: stairs to enter home  Home Layout: Able to live on 1st floor  Number of Stairs to Enter Home: 5  Stair Railings at Home: outside, present at both sides  Transportation Available: family or friend will provide  Living Environment Comment: Pt lives alone in a 1SH w/ 5 WONG and (B) rails that are too far apart to reach both. Pt prefers to use (L) rail. Pt has a tub/shower combo. Pt has limited assistance from his neighbors available upon D/C.   Equipment Currently Used at Home: cane, straight, rollator, walker, rolling, wheelchair, bath bench  DME owned (not currently used): none    Previous Level of Function: Pt prior to May 1st was IND w/ all mobility, ADLs, and driving w/o an AD. Starting in May pt began to slowly need inc'd assistance from neighbors/friends for transfers and mobility due to weakness and spasticity. Pt has had 2 falls since May  "due to weakness. Pt denies dizziness.  Ambulation Skills: independent  Transfer Skills: independent  ADL Skills: independent  Work/Leisure Activity: independent    Subjective:  "I need this therapy."  Chief Complaint: weakness  Patient goals: to walk and be able to play his piano    Pain/Comfort  Pain Rating 1: 3/10  Location - Side 1: Right  Location - Orientation 1: medial  Location 1: thoracic spine  Pain Addressed 1: Pre-medicate for activity, Reposition  Pain Rating Post-Intervention 1: 3/10    Objective:  Patient found sitting in w/c w/ OT present. Patient found with: cervical collar    Cognitive Exam:  Oriented to: Person, Place, Time and Situation  Follows Commands/attention: Follows multistep  commands  Communication: clear/fluent  Safety awareness/insight to disability: intact    Physical Exam:  Postural examination/scapula alignment: Rounded shoulder    Skin integrity: Visible skin intact  Edema: None noted     Sensation:   Impaired  light/touch hands and feet due to neuropathy     Upper Extremity Range of Motion:  Right Upper Extremity: WFL  Left Upper Extremity: WFL    Upper Extremity Strength:  Right Upper Extremity: WFL  Left Upper Extremity: WFL    Lower Extremity Range of Motion:  Right Lower Extremity: WFL  Left Lower Extremity: WFL    Lower Extremity Strength:  Right Lower Extremity: WFL  Left Lower Extremity: WFL    Functional Status:  MDS G  Bed Mobility Functional Status: S-SBA  Transfer Functional Status: CGA-Min (A)  Walk in Room Functional Status: CGA-Min (A)  Walk in Corridor Functional Status: CGA-Min (A)  Locomotion on Unit Functional Status: CGA-Min (A)  Eval Only: Number of L/E limb <4/5 MMT: 0  Moving from seated to standing position: Not steady, only able to stabilize with staff assistance  Walking (with assistive device if used): Not steady, only able to stabilize with staff assistance  Turning around and facing the opposite direction while walking: Not steady, only able to stabilize " with staff assistance  Surface-to-surface transfer (transfer between bed and chair or wheelchair): Not steady, only able to stabilize with staff assistance    Timed Up & Go Test (TUG)  Instructions to the patient:   From sitting in a chair, stand up, walk 3 meters, turn around, walk back, and sit down    Scoring:   Assistive Device and/or Bracing Used: Rolling Walker  TUG Time: 32.5 seconds, w/ RW and CGA  Community Dwelling Older Adult = > 13.5 sec. are associated with high fall risk     Bed Mobility: vc's for logroll technique  Sit>Supine:on mat w/ SBA  Supine>Sit: on mat w/ SBA    Transfers:  Sit<>Stand: to/from EOM and to/from w/c, all w/ RW and CGA for safety  Stand Pivot Transfer: w/c>EOM w/ RW and CGA  Cueing for hand placement    Gait:  Amb 191ft w/ RW and CGA for safety, cueing for upright posture, limited by fatigue     Balance:  Static stand w/ RW and SBA for safety    Additional Treatment:  Seated UBE x10min to inc BUE strength and endurance    Patient left up in chair with call button in reach.    Assessment:  Anthony Miguel is a 66 y.o. male with a medical diagnosis of Cervical myelopathy.  Pt presents w/ no previous pertinent co-morbidities and personal factors. Pt currently presents w/ the below mentioned deficits requiring CGA for transfers and amb w/ RW. Pt's clinical presentation is evolving and changing w/ improving characteristics. According to the TUG pt is a high fall risk. These factors classify pt as a low complexity evaluation. Pt is appropriate for skilled PT and will begin PT POC.    Rehab identified problem list/impairments: weakness, impaired endurance, impaired sensation, impaired functional mobilty, gait instability, impaired balance, pain    Rehab potential is good.    Activity tolerance: Good    Discharge recommendations: home with home health     Barriers to discharge: Inaccessible home environment, Decreased caregiver support    Equipment recommendations: bedside commode      GOALS:    Physical Therapy Goals        Problem: Physical Therapy Goal    Goal Priority Disciplines Outcome Goal Variances Interventions   Physical Therapy Goal     PT/OT, PT      Description:  Goals to be met by: 9 days     Patient will increase functional independence with mobility by performin. Supine to sit with Modified [place].  2. Sit to supine with Modified [place].  3. Sit to stand transfer with Supervision.  4. Bed to chair transfer with Supervision using Rolling Walker.  5. Gait  x 150 feet with Supervision using Rolling Walker.   6. Ascend/descend 5 stair with left Handrails Stand-by Assistance.   7. Stand for 3 minutes with Stand-by Assistance using Rolling Walker while performing dynamic standing balance activity.  8. Lower extremity exercise program x20 reps per handout, with assistance as needed.  9. Pt will decreased TUB time to <25s w/ RW in order to decrease fall risk.                      PLAN:    Patient to be seen 5 x/week  to address the above listed problems via gait training, therapeutic activities, therapeutic exercises  Plan of Care Expires: 17    Laurence Cobos, PT 2017

## 2017-06-30 NOTE — H&P
[facility]  History & Physical    Patient Name: Anthony Miguel  MRN: [id number]  Code Status: Full Code  Admission Date: 6/29/2017  Attending Physician: Le Olson MD   Primary Care Provider: Jada Spaulding MD    Subjective:     Principal Problem:Cervical myelopathy    No chief complaint on file.       HPI: Chief Complaint/Reason for Admission: Debility    History of Present Illness:  Patient is a 66 y.o. male who has a past medical history of Benign non-nodular prostatic hyperplasia; Hepatitis C; Senile purpura; vitamin D deficiency; and Vocal fold cyst presented with progressive weakness and paresthesias in his arms and legs for approximately one month. MRI of neck showed multilevel spondylosis with multiple disc herniations which results in severe central stenosis. Patient was admitted to neurosurgery and underwent posterior cervical fusion. Patient tolerated procedure well with no significant post operative complications. Patient has been working with PT/OT who recommend rehab for further balance/mobility training. Patient's insurance denied rehab and thus patient being sent to SNF.  Patient lives alone in [place] in a single story home.  Prior to admission, he was independent with mobility and transfers up until May and required assistance with ADLs and mobility. His pain is mild 4/10 and is requiring frequent pain medications. He worked with PT this morning and is fatigued afterwards. His main concern is his ability to play the piano. He states his strength is improved after surgery. Patient currently denies any fever/chills, chest pain, dyspnea, weakness, numbness, abdominal pain, nausea/vomiting, dysuria/hematuria, or weight loss.         Past Medical History:   Diagnosis Date    Benign non-nodular prostatic hyperplasia without lower urinary tract symptoms 6/6/2017    Compliant with finasteride    Hepatitis C     Senile purpura 6/6/2017    Ecchymoses on L UE      Unspecified vitamin D deficiency 6/6/2017    Compliant with daily supplementation of 1000U Vit D    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    Overview:  Right side dx update       Past Surgical History:   Procedure Laterality Date    Larangoscopy         Review of patient's allergies indicates:  No Known Allergies    Current Facility-Administered Medications on File Prior to Encounter   Medication    [DISCONTINUED] atorvastatin tablet 10 mg    [DISCONTINUED] dextrose 50% injection 12.5 g    [DISCONTINUED] dextrose 50% injection 25 g    [DISCONTINUED] diazePAM tablet 5 mg    [DISCONTINUED] docusate sodium capsule 50 mg    [DISCONTINUED] finasteride tablet 5 mg    [DISCONTINUED] glucagon (human recombinant) injection 1 mg    [DISCONTINUED] glucose chewable tablet 16 g    [DISCONTINUED] glucose chewable tablet 16 g    [DISCONTINUED] glucose chewable tablet 24 g    [DISCONTINUED] heparin (porcine) injection 5,000 Units    [DISCONTINUED] hydrALAZINE injection 20 mg    [DISCONTINUED] HYDROmorphone injection 1 mg    [DISCONTINUED] labetalol injection 10 mg    [DISCONTINUED] oxycodone-acetaminophen  mg per tablet 1 tablet    [DISCONTINUED] sodium chloride 0.9% flush 3 mL    [DISCONTINUED] trazodone tablet 50 mg     Current Outpatient Prescriptions on File Prior to Encounter   Medication Sig    acetaminophen (TYLENOL) 500 mg Cap Take by mouth.    aspirin (ECOTRIN) 81 MG EC tablet Take 81 mg by mouth once daily.    atorvastatin (LIPITOR) 10 MG tablet Take 10 mg by mouth once daily.    finasteride (PROSCAR) 5 mg tablet Take 5 mg by mouth once daily.    ibuprofen (ADVIL,MOTRIN) 400 MG tablet Take 400 mg by mouth every 6 (six) hours as needed for Other.    loratadine (CLARITIN) 10 mg tablet Take 10 mg by mouth once daily.    TRAZODONE HCL (TRAZODONE ORAL) Take by mouth.    triamcinolone acetonide 0.1% (KENALOG) 0.1 % Lotn Apply topically 2 (two) times daily.    vitamin D 1000 units Tab Take 1,000 Units by  mouth once daily.    walker (ULTRA-LIGHT ROLLATOR) Misc 1 Units by Misc.(Non-Drug; Combo Route) route once daily at 6am.     Family History     Problem Relation (Age of Onset)    Cancer Mother    No Known Problems Father        Social History Main Topics    Smoking status: Former Smoker     Packs/day: 1.00     Quit date: 02/2000    Smokeless tobacco: Not on file    Alcohol use No    Drug use: No    Sexual activity: Not Currently     Review of Systems   Constitutional: Negative for chills, fatigue and fever.   HENT: Negative for congestion, facial swelling, hearing loss and trouble swallowing.    Eyes: Negative for photophobia and visual disturbance.   Respiratory: Negative for chest tightness, shortness of breath and wheezing.    Cardiovascular: Negative for chest pain, palpitations and leg swelling.   Gastrointestinal: Negative for abdominal pain, blood in stool, constipation, diarrhea, nausea and vomiting.   Endocrine: Negative.    Genitourinary: Negative.    Musculoskeletal: Negative for back pain, joint swelling and myalgias.   Skin: Negative.    Allergic/Immunologic: Negative.    Neurological: Negative for dizziness, facial asymmetry, speech difficulty, weakness and numbness.   Hematological: Negative.    Psychiatric/Behavioral: Negative for agitation, confusion and dysphoric mood. The patient is not nervous/anxious.      Objective:     Vital Signs (Most Recent):  Temp: 97.5 °F (36.4 °C) (06/30/17 0829)  Pulse: 78 (06/30/17 0829)  Resp: 18 (06/30/17 0829)  BP: 128/83 (06/30/17 0829)  SpO2: 99 % (06/30/17 0829) Vital Signs (24h Range):  Temp:  [96.4 °F (35.8 °C)-98.5 °F (36.9 °C)] 97.5 °F (36.4 °C)  Pulse:  [] 78  Resp:  [16-18] 18  SpO2:  [92 %-100 %] 99 %  BP: (123-142)/(81-88) 128/83     Weight: 74 kg (163 lb 2.3 oz)  Body mass index is 25.55 kg/m².    Physical Exam   Constitutional: He is oriented to person, place, and time. Vital signs are normal. He appears well-developed and well-nourished.   Non-toxic appearance. He does not appear ill. No distress.   HENT:   Head: Normocephalic and atraumatic.   Eyes: Conjunctivae and EOM are normal. Pupils are equal, round, and reactive to light. No scleral icterus.   Neck: Normal range of motion and full passive range of motion without pain. Neck supple. No JVD present. Carotid bruit is not present. No thyromegaly present.   Cardiovascular: Normal rate, regular rhythm, S1 normal, S2 normal, normal heart sounds and normal pulses.  Exam reveals no gallop, no S3, no S4 and no friction rub.    No murmur heard.  Pulmonary/Chest: Effort normal and breath sounds normal. No accessory muscle usage. No tachypnea. No respiratory distress. He has no decreased breath sounds. He has no wheezes. He has no rhonchi. He has no rales.   Abdominal: Soft. Normal appearance and bowel sounds are normal. He exhibits no shifting dullness, no distension, no abdominal bruit and no ascites. There is no hepatosplenomegaly. There is no tenderness. There is no rigidity and no guarding.   Musculoskeletal: He exhibits no edema.        Cervical back: He exhibits decreased range of motion and tenderness.        Arms:  Neurological: He is alert and oriented to person, place, and time. He has normal strength. He is not disoriented. No cranial nerve deficit or sensory deficit. GCS eye subscore is 4. GCS verbal subscore is 5. GCS motor subscore is 6.   Skin: Skin is warm, dry and intact. No abrasion and no lesion noted.   Psychiatric: He has a normal mood and affect. His behavior is normal. Judgment and thought content normal. His mood appears not anxious. His speech is not slurred. He is not actively hallucinating. Cognition and memory are normal. He does not exhibit a depressed mood. He is attentive.       Significant Labs: All pertinent labs within the past 24 hours have been reviewed.    Significant Imaging: I have reviewed all pertinent imaging results/findings within the past 24  hours.    Assessment/Plan:     Debility    PT/OT as above.         Benign non-nodular prostatic hyperplasia without lower urinary tract symptoms    Cont with finasteride to treat        Unspecified vitamin D deficiency    Cont with Vit D supplements to treat.         HLD (hyperlipidemia)    Cont with home atorvastatin to treat.         History of hepatitis C (completed treatment)    Treated with ribavarin and interferon in 2012  No signs of liver failure.  Follow up as outpatient         * Cervical myelopathy    S/p posterior cervical fusion  Patient to follow up with neurosurgery for wound check  Bacitracin to incision BID  Neurologically stable  Cervical collar at all times  Cont neuro checks  Cont with current pain control   Cont bowel regimen for opioid induced constipation  Cont with PT/OT for gait training and strengthening and restoration of ADL's   Cont DVT prophylaxis with heparin     Future Appointments  Date Time Provider Department Center   7/6/2017 1:40 PM Michela Graham PA-C [facility] NEUROS7 [facility]   8/3/2017 12:30 PM [id number] LB LIMIT [facility] XRAY OP [facility]   8/3/2017 1:30 PM David Wolff MD [facility] NEUROS[address] [place] [address]                   Le Ly MD  [facility]

## 2017-06-30 NOTE — PLAN OF CARE
Problem: Physical Therapy Goal  Goal: Physical Therapy Goal  Goals to be met by: 9 days     Patient will increase functional independence with mobility by performin. Supine to sit with Modified [place].  2. Sit to supine with Modified [place].  3. Sit to stand transfer with Supervision.  4. Bed to chair transfer with Supervision using Rolling Walker.  5. Gait  x 150 feet with Supervision using Rolling Walker.   6. Ascend/descend 5 stair with left Handrails Stand-by Assistance.   7. Stand for 3 minutes with Stand-by Assistance using Rolling Walker while performing dynamic standing balance activity.  8. Lower extremity exercise program x20 reps per handout, with assistance as needed.  9. Pt will decreased TUB time to <25s w/ RW in order to decrease fall risk.    PT eval completed. Pt will begin PT POC.    Laurence Cobos, PT  2017

## 2017-06-30 NOTE — HPI
Chief Complaint/Reason for Admission: Debility    History of Present Illness:  Patient is a 66 y.o. male who has a past medical history of Benign non-nodular prostatic hyperplasia; Hepatitis C; Senile purpura; vitamin D deficiency; and Vocal fold cyst presented with progressive weakness and paresthesias in his arms and legs for approximately one month. MRI of neck showed multilevel spondylosis with multiple disc herniations which results in severe central stenosis. Patient was admitted to neurosurgery and underwent posterior cervical fusion. Patient tolerated procedure well with no significant post operative complications.     Patient has been working with PT/OT who recommend rehab for further balance/mobility training. Patient's insurance denied rehab and thus patient being sent to SNF.  Patient lives alone in [place] in a single story home.  Prior to admission, he was independent with mobility and transfers up until May and required assistance with ADLs and mobility. Upon admission to SNF, he reported his pain was mild 4/10 and is requiring frequent pain medications. His main concern is his ability to play the piano. He states his strength is improved after surgery. Patient currently denies any fever/chills, chest pain, dyspnea, weakness, numbness, abdominal pain, nausea/vomiting, dysuria/hematuria, or weight loss.

## 2017-06-30 NOTE — PROGRESS NOTES
PATIENT TRANSFERRED TO SNF ROOM 304B PER WHEELCHAIR. ALERT AND ORIENTED. SEE ASSESSMENT FOR DETAILS.

## 2017-06-30 NOTE — PLAN OF CARE
Problem: Fall Risk (Adult)  Goal: Identify Related Risk Factors and Signs and Symptoms  Related risk factors and signs and symptoms are identified upon initiation of Human Response Clinical Practice Guideline (CPG)   Outcome: Ongoing (interventions implemented as appropriate)   06/30/17 0311   Fall Risk   Related Risk Factors (Fall Risk) fatigue/slow reaction;gait/mobility problems

## 2017-06-30 NOTE — PT/OT/SLP EVAL
Occupational Therapy  Evaluation    Anthony Miguel   MRN: [id number]   Admitting Diagnosis: Cervical myelopathy s/p Cs-C6 posterior cervical fusion 6-21     OT Date of Treatment: 06/30/17   OT Start Time: 0938  OT Stop Time: 1045  OT Total Time (min): 67 min    Billable Minutes:  Evaluation 20   Self-care 47    Diagnosis: Cervical myelopathy s/p Cs-C6 posterior cervical fusion 6-21       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    Overview:  Right side dx update      Past Surgical History:   Procedure Laterality Date    Larangoscopy           General Precautions: Standard, fall  Orthopedic Precautions: N/A  Braces: Cervical collar at all times    Do you have any cultural, spiritual, Yarsani conflicts, given your current situation?: none reported     Patient History:  Lives With: alone  Living Arrangements: apartment (studio)  Home Accessibility: stairs to enter home  Home Layout: Able to live on 1st floor  Number of Stairs to Enter Home: 4  Stair Railings at Home: outside, present at both sides  Transportation Available: family or friend will provide  Living Environment Comment: Pt. lives in a SS house with 4 steps to enter.  Pt. was independent with ADL and IADL tasks until May.  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  Pt. was driving and is left handed.  Equipment Currently Used at Home: bath bench, rollator, walker, rolling, cane, straight (all items have been borrowed)    Prior level of function:   Bed Mobility/Transfers: independent (until May)  Grooming: independent  Bathing: independent  Upper Body Dressing: independent  Lower Body Dressing: independent  Toileting: independent  Home Management  Skills: independent  Homemaking Responsibilities: Yes  Meal Prep Responsibility: Primary  Laundry Responsibility: Primary  Cleaning Responsibility: Primary  Bill Paying/Finance Responsibility: Primary  Shopping Responsibility: Primary   Responsibility: No  Driving License: Yes  Occupation: Retired (plays piano volunteer at [employer])  Type of Occupation:  (volunteer)     Dominant hand: left    Subjective:  Communicated with nurse prior to session.    Chief Complaint: weakness   Patient/Family stated goals: 1. To play the piano again  2. Walk again    Pain/Comfort  Pain Rating 1: 4/10  Location - Side 1: Right  Location - Orientation 1: upper  Location 1:  (shoulder)    Objective:   Patient found with:  (supine in bed with cervical collar)    Cognitive Exam:  Oriented to: Person, Place, Time and Situation  Follows Commands/attention: Follows multistep  commands  Communication: clear/fluent  Memory:  No Deficits noted  Safety awareness/insight to disability: intact  Coping skills/emotional control: Appropriate to situation    Visual/perceptual:  Intact    Physical Exam:  Postural examination/scapula alignment: Rounded shoulder  Skin integrity: staples on posterior neck  Edema: None noted in BUE    Sensation:   Impaired in BUE especially distally    Upper Extremity Range of Motion:  Right Upper Extremity: WFL  Left Upper Extremity: WFL    Upper Extremity Strength:  Right Upper Extremity: WFL  Left Upper Extremity: WFL   Strength: fair    Fine motor coordination:   Impaired fine motor     Gross motor coordination: fair     Functional Status:  MDS G  Bed Mobility Functional Status: Min (A) supine to sit with use of bed rail and education on proper technique  Transfer Functional Status: CGA sit to stand from EOB; CGA SPT from bed to w/c;  Min A sit to stand from w/c with cues for proper technique  Dressing Functional Status: 2:Min (A) LBD to don right sock as well as to manage pants over hips in stand; education  providd on technique  Personal Hygiene Functional Status:Min A at sink to shave with electric razor seated  Bathing Functional Status: Min (A) to wash right foot  Sponge bath  Eval Only: Number of U/E limb <4/5 MMT: 0  Moving from seated to standing position: Not steady, only able to stabilize with staff assistance  Surface-to-surface transfer (transfer between bed and chair or wheelchair): Not steady, only able to stabilize with staff assistance           Additional Treatment:  Pt. Educated on safety with ADL tasks  Pt. Educated on safety and technique for transfers  Pt. Educated on role of OT and pOC    Patient left up in chair with with PT  present    Assessment:  Anthony Miguel is a 66 y.o. male with a medical diagnosis of Cervical myelopathy s/p Cs-C6 posterior cervical fusion 6-21 and presents with deficits in self-care skills, transfers and decreased functional use of BUE including ROM deficits as well  as FMC skills.  Pt. Lives alone and would benefit from continued OT services to help maximize independence and safety in home environment and to regain skills to engage in daily roles and routines.  .    Rehab identified problem list/impairments: impaired endurance, impaired sensation, impaired self care skills, impaired functional mobilty, gait instability, decreased coordination, decreased upper extremity function, pain, impaired coordination, impaired fine motor    Rehab potential is good    Activity tolerance: Good    Discharge recommendations: home health OT     Barriers to discharge: Decreased caregiver support, Inaccessible home environment     Equipment recommendations: bedside commode     GOALS:    Occupational Therapy Goals        Problem: Occupational Therapy Goal    Goal Priority Disciplines Outcome Interventions   Occupational Therapy Goal     OT, PT/OT     Description:  Goals to be met by: 11 days     Patient will increase functional independence with ADLs by performing:    Feeding with Mod  I.  UE Dressing with Mod I  LE Dressing with Mod I  Grooming while standing with Mod I  Toileting from bedside commode with Mod I  for hygiene and clothing management.   Bathing from  shower chair/bench with Stand-by Assistance.  Supine to sit with Modified [place].  Stand pivot transfers with Modified [place].  Toilet transfer to bedside commode with Modified [place].   Pt. To be independent with HEP for BUE to improve endurance Pt. To be independent with HEP /activities to improve FMC skills in BUE                    PLAN: Patient to be seen 5 x/week to address the above listed problems via self-care/home management, therapeutic activities, therapeutic exercises  Plan of Care expires: 07/30/17  Plan of Care reviewed with: patient    TRISHA Casillas  06/30/2017

## 2017-06-30 NOTE — ASSESSMENT & PLAN NOTE
S/p posterior cervical fusion  Patient to follow up with neurosurgery for wound check  Bacitracin to incision BID  Neurologically stable  Cervical collar at all times  Cont neuro checks  Cont with current pain control   Cont bowel regimen for opioid induced constipation  Cont with PT/OT for gait training and strengthening and restoration of ADL's   Cont DVT prophylaxis with heparin     Future Appointments  Date Time Provider Department Center   7/6/2017 1:40 PM Michela Graham PA-C [facility] NEUROS7 [facility]   8/3/2017 12:30 PM [facility] [id number] LB LIMIT [facility] XRAY OP [facility]sy   8/3/2017 1:30 PM David Wolff MD [facility] NEUROS7 [facility]

## 2017-06-30 NOTE — CLINICAL REVIEW
Clinical Pharmacy Chart Review Note    Admit Date: 6/29/2017   LOS: 1 day     Anthony Miguel is a 66 y.o. male admitted to SNF for PT/OT after hospitalization for cervical myelopathy.     Active Hospital Problems    Diagnosis  POA    *Cervical myelopathy [G95.9]  Yes    Debility [R53.81]  Yes    Unspecified vitamin D deficiency [E55.9]  Yes     Compliant with daily supplementation of 1000U Vit D      Benign non-nodular prostatic hyperplasia without lower urinary tract symptoms [N40.0]  Yes     Compliant with finasteride      History of hepatitis C (completed treatment) [Z86.19]  Yes     Patient with elevated bilirubin on 6/6. Treated with ribavarin and interferon in 2012      HLD (hyperlipidemia) [E78.5]  Yes     Compliant with atorvastatin 10mg        Resolved Hospital Problems    Diagnosis Date Resolved POA   No resolved problems to display.     Review of patient's allergies indicates:  No Known Allergies  Patient Active Problem List    Diagnosis Date Noted    Debility 06/30/2017    Cervical myelopathy 06/20/2017    Preoperative evaluation to rule out surgical contraindication 06/20/2017    Weakness of both lower extremities 06/13/2017    Neuropathy 06/06/2017    Unspecified vitamin D deficiency 06/06/2017    Benign non-nodular prostatic hyperplasia without lower urinary tract symptoms 06/06/2017    Senile purpura 06/06/2017    History of hepatitis C (completed treatment) 06/02/2017    HLD (hyperlipidemia) 06/02/2017    Chronic hoarseness 09/12/2012    Vocal fold cyst 09/12/2012       Scheduled Meds:    atorvastatin  10 mg Oral Daily    finasteride  5 mg Oral Daily    heparin (porcine)  5,000 Units Subcutaneous Q8H    senna-docusate 8.6-50 mg  1 tablet Oral BID    vitamin D  1,000 Units Oral Daily     Continuous Infusions:    PRN Meds: aluminum-magnesium hydroxide-simethicone, diazePAM, oxycodone-acetaminophen, ramelteon    OBJECTIVE:     Vital Signs (Last 24H)  Temp:  [97.5 °F (36.4  °C)-98.5 °F (36.9 °C)]   Pulse:  []   Resp:  [18]   BP: (128-142)/(81-88)   SpO2:  [92 %-100 %]     Laboratory:  CBC:   Recent Labs  Lab 06/27/17  0602 06/28/17 0459 06/29/17 0432   WBC 10.84 13.55* 11.51   RBC 4.20* 4.26* 4.10*   HGB 13.5* 13.5* 13.2*   HCT 38.6* 38.6* 38.5*    319 323   MCV 92 91 94   MCH 32.1* 31.7* 32.2*   MCHC 35.0 35.0 34.3     BMP:   Recent Labs  Lab 06/27/17  0602 06/28/17 0459 06/29/17 0432    108 108   * 135* 137   K 4.0 4.0 3.8    103 102   CO2 25 22* 23   BUN 18 21 20   CREATININE 0.7 0.8 0.9   CALCIUM 9.1 9.3 9.1     CMP:   Recent Labs  Lab 06/27/17 0602 06/28/17 0459 06/29/17  0432    108 108   CALCIUM 9.1 9.3 9.1   * 135* 137   K 4.0 4.0 3.8   CO2 25 22* 23    103 102   BUN 18 21 20   CREATININE 0.7 0.8 0.9     LFTs: No results for input(s): ALT, AST, ALKPHOS, BILITOT, PROT, ALBUMIN in the last 168 hours.  Coagulation: No results for input(s): INR, APTT in the last 168 hours.    Invalid input(s): PT  Cardiac markers: No results for input(s): CKMB, TROPONINT, MYOGLOBIN in the last 168 hours.  ABGs: No results for input(s): PH, PCO2, PO2, HCO3, POCSATURATED, BE in the last 168 hours.  Microbiology Results (last 7 days)     ** No results found for the last 168 hours. **        Specimen (12h ago through future)    None          Recent Labs  Lab 06/28/17  1800   COLORU Yellow   SPECGRAV 1.025   PHUR 6.0   PROTEINUA Negative   NITRITE Negative   LEUKOCYTESUR Negative   UROBILINOGEN Negative     Others: No results for input(s): [id number], TSH, T4FREE, LABLIPI in the last 168 hours.    Invalid input(s): A1C    ASSESSMENT/PLAN:      Cervical myelopathy/Debility  --s/p C3-6 posterior cervical fusion on 6/21/17  --PT/OT  --pain management: percocet  mg q4h prn moderate pain, diazepam 5 mg q6h prn muscle spasms   --bowel regimen for constipation; hold for loose or frequent stools  --DVT prophylaxis: heparin 5000 units q8h      Unspecified vitamin D deficiency  --vitamin D 1000 units daily     Benign non-nodular prostatic hyperplasia without lower urinary tract symptoms   --finasteride 5 mg daily     History of hepatitis C (completed treatment)   --completed ribavarin and interferon in 2012    HLD (hyperlipidemia)   --atorvastatin 10 mg daily  Lab Results   Component Value Date    CHOL 138 06/06/2017     Lab Results   Component Value Date    HDL 51 06/06/2017     Lab Results   Component Value Date    LDLCALC 63.4 06/06/2017     Lab Results   Component Value Date    TRIG 118 06/06/2017     Lab Results   Component Value Date    CHOLHDL 37.0 06/06/2017         Monitor BMP/CBC/Vitals

## 2017-06-30 NOTE — ASSESSMENT & PLAN NOTE
Treated with ribavarin and interferon in 2012  No signs of liver failure.  Follow up as outpatient

## 2017-06-30 NOTE — PT/OT/SLP DISCHARGE
Physical Therapy Discharge Summary    Anthony Miguel  MRN: [id number]   Cervical myelopathy   Patient Discharged from acute Physical Therapy on 17.  Please refer to prior PT noted date on 17 for functional status.     Assessment:   Goals partially met.  GOALS:    Physical Therapy Goals        Problem: Physical Therapy Goal    Goal Priority Disciplines Outcome Goal Variances Interventions   Physical Therapy Goal     PT/OT, PT Ongoing (interventions implemented as appropriate)     Description:  Goals to be met by: 17    Patient will increase functional independence with mobility by performin. Supine to sit with Stand-by Assistance  2. Sit to supine with Stand-by Assistance  3. Rolling to Left and Right with Stand-by Assistance.  4. Sit to stand transfer with Stand-by Assistance- MET       Updated: with supervision.   5. Bed to chair transfer with Stand-by Assistance using Rolling Walker.  6. Gait  x 100 feet with Stand-by Assistance using Rolling Walker.  7. Ascend/descend 4 stair with bilateral Handrails Contact Guard Assistance using Rolling Walker or no AD.  8. Stand for 10 minutes with Stand-by Assistance using Rolling Walker.  9. Lower extremity exercise program x15 reps per handout, with independence                          Reasons for Discontinuation of Therapy Services  Transfer to alternate level of care.      Plan:  Patient Discharged to: Skilled Nursing Facility.    Carolyne Mcgowan PT, DPT   2017  Pager: [phone]

## 2017-06-30 NOTE — SUBJECTIVE & OBJECTIVE
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    Overview:  Right side dx update       Past Surgical History:   Procedure Laterality Date    Larangoscopy         Review of patient's allergies indicates:  No Known Allergies    Current Facility-Administered Medications on File Prior to Encounter   Medication    [DISCONTINUED] atorvastatin tablet 10 mg    [DISCONTINUED] dextrose 50% injection 12.5 g    [DISCONTINUED] dextrose 50% injection 25 g    [DISCONTINUED] diazePAM tablet 5 mg    [DISCONTINUED] docusate sodium capsule 50 mg    [DISCONTINUED] finasteride tablet 5 mg    [DISCONTINUED] glucagon (human recombinant) injection 1 mg    [DISCONTINUED] glucose chewable tablet 16 g    [DISCONTINUED] glucose chewable tablet 16 g    [DISCONTINUED] glucose chewable tablet 24 g    [DISCONTINUED] heparin (porcine) injection 5,000 Units    [DISCONTINUED] hydrALAZINE injection 20 mg    [DISCONTINUED] HYDROmorphone injection 1 mg    [DISCONTINUED] labetalol injection 10 mg    [DISCONTINUED] oxycodone-acetaminophen  mg per tablet 1 tablet    [DISCONTINUED] sodium chloride 0.9% flush 3 mL    [DISCONTINUED] trazodone tablet 50 mg     Current Outpatient Prescriptions on File Prior to Encounter   Medication Sig    acetaminophen (TYLENOL) 500 mg Cap Take by mouth.    aspirin (ECOTRIN) 81 MG EC tablet Take 81 mg by mouth once daily.    atorvastatin (LIPITOR) 10 MG tablet Take 10 mg by mouth once daily.    finasteride (PROSCAR) 5 mg tablet Take 5 mg by mouth once daily.    ibuprofen (ADVIL,MOTRIN) 400 MG tablet Take 400 mg by mouth every 6 (six) hours as needed for Other.    loratadine (CLARITIN) 10 mg  tablet Take 10 mg by mouth once daily.    TRAZODONE HCL (TRAZODONE ORAL) Take by mouth.    triamcinolone acetonide 0.1% (KENALOG) 0.1 % Lotn Apply topically 2 (two) times daily.    vitamin D 1000 units Tab Take 1,000 Units by mouth once daily.    walker (ULTRA-LIGHT ROLLATOR) Misc 1 Units by Misc.(Non-Drug; Combo Route) route once daily at 6am.     Family History     Problem Relation (Age of Onset)    Cancer Mother    No Known Problems Father        Social History Main Topics    Smoking status: Former Smoker     Packs/day: 1.00     Quit date: 02/2000    Smokeless tobacco: Not on file    Alcohol use No    Drug use: No    Sexual activity: Not Currently     Review of Systems   Constitutional: Negative for chills, fatigue and fever.   HENT: Negative for congestion, facial swelling, hearing loss and trouble swallowing.    Eyes: Negative for photophobia and visual disturbance.   Respiratory: Negative for chest tightness, shortness of breath and wheezing.    Cardiovascular: Negative for chest pain, palpitations and leg swelling.   Gastrointestinal: Negative for abdominal pain, blood in stool, constipation, diarrhea, nausea and vomiting.   Endocrine: Negative.    Genitourinary: Negative.    Musculoskeletal: Negative for back pain, joint swelling and myalgias.   Skin: Negative.    Allergic/Immunologic: Negative.    Neurological: Negative for dizziness, facial asymmetry, speech difficulty, weakness and numbness.   Hematological: Negative.    Psychiatric/Behavioral: Negative for agitation, confusion and dysphoric mood. The patient is not nervous/anxious.      Objective:     Vital Signs (Most Recent):  Temp: 97.5 °F (36.4 °C) (06/30/17 0829)  Pulse: 78 (06/30/17 0829)  Resp: 18 (06/30/17 0829)  BP: 128/83 (06/30/17 0829)  SpO2: 99 % (06/30/17 0829) Vital Signs (24h Range):  Temp:  [96.4 °F (35.8 °C)-98.5 °F (36.9 °C)] 97.5 °F (36.4 °C)  Pulse:  [] 78  Resp:  [16-18] 18  SpO2:  [92 %-100 %] 99 %  BP:  (123-142)/(81-88) 128/83     Weight: 74 kg (163 lb 2.3 oz)  Body mass index is 25.55 kg/m².    Physical Exam   Constitutional: He is oriented to person, place, and time. Vital signs are normal. He appears well-developed and well-nourished.  Non-toxic appearance. He does not appear ill. No distress.   HENT:   Head: Normocephalic and atraumatic.   Eyes: Conjunctivae and EOM are normal. Pupils are equal, round, and reactive to light. No scleral icterus.   Neck: Normal range of motion and full passive range of motion without pain. Neck supple. No JVD present. Carotid bruit is not present. No thyromegaly present.   Cardiovascular: Normal rate, regular rhythm, S1 normal, S2 normal, normal heart sounds and normal pulses.  Exam reveals no gallop, no S3, no S4 and no friction rub.    No murmur heard.  Pulmonary/Chest: Effort normal and breath sounds normal. No accessory muscle usage. No tachypnea. No respiratory distress. He has no decreased breath sounds. He has no wheezes. He has no rhonchi. He has no rales.   Abdominal: Soft. Normal appearance and bowel sounds are normal. He exhibits no shifting dullness, no distension, no abdominal bruit and no ascites. There is no hepatosplenomegaly. There is no tenderness. There is no rigidity and no guarding.   Musculoskeletal: He exhibits no edema.        Cervical back: He exhibits decreased range of motion and tenderness.        Arms:  Neurological: He is alert and oriented to person, place, and time. He has normal strength. He is not disoriented. No cranial nerve deficit or sensory deficit. GCS eye subscore is 4. GCS verbal subscore is 5. GCS motor subscore is 6.   Skin: Skin is warm, dry and intact. No abrasion and no lesion noted.   Psychiatric: He has a normal mood and affect. His behavior is normal. Judgment and thought content normal. His mood appears not anxious. His speech is not slurred. He is not actively hallucinating. Cognition and memory are normal. He does not exhibit  a depressed mood. He is attentive.       Significant Labs: All pertinent labs within the past 24 hours have been reviewed.    Significant Imaging: I have reviewed all pertinent imaging results/findings within the past 24 hours.

## 2017-06-30 NOTE — PLAN OF CARE
Problem: Occupational Therapy Goal  Goal: Occupational Therapy Goal  Pt. Tolerated OT evaluation well.

## 2017-07-01 PROCEDURE — 12000000 HC SNF SEMI-PRIVATE ROOM

## 2017-07-01 PROCEDURE — 25000003 PHARM REV CODE 250: Performed by: PHYSICIAN ASSISTANT

## 2017-07-01 RX ADMIN — HEPARIN SODIUM 5000 UNITS: 5000 INJECTION, SOLUTION INTRAVENOUS; SUBCUTANEOUS at 01:07

## 2017-07-01 RX ADMIN — STANDARDIZED SENNA CONCENTRATE AND DOCUSATE SODIUM 1 TABLET: 8.6; 5 TABLET, FILM COATED ORAL at 08:07

## 2017-07-01 RX ADMIN — OXYCODONE HYDROCHLORIDE AND ACETAMINOPHEN 1 TABLET: 10; 325 TABLET ORAL at 01:07

## 2017-07-01 RX ADMIN — ALUMINUM HYDROXIDE, MAGNESIUM HYDROXIDE, AND SIMETHICONE 15 ML: 200; 200; 20 SUSPENSION ORAL at 09:07

## 2017-07-01 RX ADMIN — STANDARDIZED SENNA CONCENTRATE AND DOCUSATE SODIUM 1 TABLET: 8.6; 5 TABLET, FILM COATED ORAL at 09:07

## 2017-07-01 RX ADMIN — DIAZEPAM 5 MG: 5 TABLET ORAL at 01:07

## 2017-07-01 RX ADMIN — HEPARIN SODIUM 5000 UNITS: 5000 INJECTION, SOLUTION INTRAVENOUS; SUBCUTANEOUS at 09:07

## 2017-07-01 RX ADMIN — ALUMINUM HYDROXIDE, MAGNESIUM HYDROXIDE, AND SIMETHICONE 15 ML: 200; 200; 20 SUSPENSION ORAL at 01:07

## 2017-07-01 RX ADMIN — VITAMIN D, TAB 1000IU (100/BT) 1000 UNITS: 25 TAB at 08:07

## 2017-07-01 RX ADMIN — FINASTERIDE 5 MG: 5 TABLET, FILM COATED ORAL at 08:07

## 2017-07-01 RX ADMIN — OXYCODONE HYDROCHLORIDE AND ACETAMINOPHEN 1 TABLET: 10; 325 TABLET ORAL at 06:07

## 2017-07-01 RX ADMIN — OXYCODONE HYDROCHLORIDE AND ACETAMINOPHEN 1 TABLET: 10; 325 TABLET ORAL at 08:07

## 2017-07-01 RX ADMIN — HEPARIN SODIUM 5000 UNITS: 5000 INJECTION, SOLUTION INTRAVENOUS; SUBCUTANEOUS at 06:07

## 2017-07-02 PROCEDURE — 97110 THERAPEUTIC EXERCISES: CPT

## 2017-07-02 PROCEDURE — 25000003 PHARM REV CODE 250: Performed by: PHYSICIAN ASSISTANT

## 2017-07-02 PROCEDURE — 97116 GAIT TRAINING THERAPY: CPT

## 2017-07-02 PROCEDURE — 97530 THERAPEUTIC ACTIVITIES: CPT

## 2017-07-02 PROCEDURE — 97535 SELF CARE MNGMENT TRAINING: CPT

## 2017-07-02 PROCEDURE — 12000000 HC SNF SEMI-PRIVATE ROOM

## 2017-07-02 RX ADMIN — HEPARIN SODIUM 5000 UNITS: 5000 INJECTION, SOLUTION INTRAVENOUS; SUBCUTANEOUS at 10:07

## 2017-07-02 RX ADMIN — DIAZEPAM 5 MG: 5 TABLET ORAL at 01:07

## 2017-07-02 RX ADMIN — HEPARIN SODIUM 5000 UNITS: 5000 INJECTION, SOLUTION INTRAVENOUS; SUBCUTANEOUS at 02:07

## 2017-07-02 RX ADMIN — STANDARDIZED SENNA CONCENTRATE AND DOCUSATE SODIUM 1 TABLET: 8.6; 5 TABLET, FILM COATED ORAL at 09:07

## 2017-07-02 RX ADMIN — OXYCODONE HYDROCHLORIDE AND ACETAMINOPHEN 1 TABLET: 10; 325 TABLET ORAL at 09:07

## 2017-07-02 RX ADMIN — ATORVASTATIN CALCIUM 10 MG: 10 TABLET, FILM COATED ORAL at 09:07

## 2017-07-02 RX ADMIN — HEPARIN SODIUM 5000 UNITS: 5000 INJECTION, SOLUTION INTRAVENOUS; SUBCUTANEOUS at 05:07

## 2017-07-02 RX ADMIN — OXYCODONE HYDROCHLORIDE AND ACETAMINOPHEN 1 TABLET: 10; 325 TABLET ORAL at 10:07

## 2017-07-02 RX ADMIN — STANDARDIZED SENNA CONCENTRATE AND DOCUSATE SODIUM 1 TABLET: 8.6; 5 TABLET, FILM COATED ORAL at 10:07

## 2017-07-02 RX ADMIN — VITAMIN D, TAB 1000IU (100/BT) 1000 UNITS: 25 TAB at 09:07

## 2017-07-02 RX ADMIN — FINASTERIDE 5 MG: 5 TABLET, FILM COATED ORAL at 09:07

## 2017-07-02 RX ADMIN — OXYCODONE HYDROCHLORIDE AND ACETAMINOPHEN 1 TABLET: 10; 325 TABLET ORAL at 01:07

## 2017-07-02 RX ADMIN — OXYCODONE HYDROCHLORIDE AND ACETAMINOPHEN 1 TABLET: 10; 325 TABLET ORAL at 03:07

## 2017-07-02 NOTE — PT/OT/SLP PROGRESS
"Physical Therapy  Treatment    Anthony Miguel   MRN: [id number]   Admitting Diagnosis: Cervical myelopathy    PT Received On: 07/02/17  Total Time (min): 61       Billable Minutes:61    Gait [id number], Therapeutic Activity 11 and Therapeutic Exercise 35    Treatment Type: Treatment  PT/PTA: PTA     PTA Visit Number: 1       General Precautions: Standard, fall  Orthopedic Precautions: N/A   Braces: Cervical collar    Do you have any cultural, spiritual, Zoroastrian conflicts, given your current situation?: no    Subjective:  "OK" 2nd attempt, had HA earlier      Pain/Comfort  Pain Rating 1: 4/10  Location - Side 1: Right  Location - Orientation 1: lateral  Location 1: neck  Pain Addressed 1: Pre-medicate for activity (decline needing meds)  Pain Rating Post-Intervention 1: 4/10    Objective:   Patient found with:  (in bed with cervical collar)       Functional Status:  MDS G  Bed Mobility Functional Status: S-SBA  Transfer Functional Status: CGA-Min (A)  Walk in Corridor Functional Status: CGA-Min (A)          Bed Mobility:    Supine>Sit: SBA with HOB elev and rail    Transfers:  Sit<>Stand: CGA with RW   Stand Pivot Transfer: CGA with RW EOB>WC      Gait:  Amb with RW CGA ~ 175 ft no LOB, occ standing rest breaks, vcs for taking his time, pt tends to  speed     Therex:  2x10 reps Ap,GS,LAQ,hip flex,add with ball,abd with RTB  Rows 2x25 reps RTB #1 dowel    Additional Treatment:  LBE x 15 min    Patient left up in chair with call button in reach and belongings in reach.    Assessment:  Anthony Miguel is a 66 y.o. male with a medical diagnosis of Cervical myelopathy.  Pt tolerated well, pt would continue to benefit from skilled PT services to improve overall functional mobility, strength and endurance.  .    Rehab identified problem list/impairments: weakness, impaired endurance, impaired sensation, impaired functional mobilty, gait instability, impaired balance, pain    Rehab potential is good.    Activity " tolerance: Fair    Discharge recommendations: home with home health     Barriers to discharge: Inaccessible home environment, Decreased caregiver support    Equipment recommendations: bedside commode     GOALS:    Physical Therapy Goals        Problem: Physical Therapy Goal    Goal Priority Disciplines Outcome Goal Variances Interventions   Physical Therapy Goal     PT/OT, PT Ongoing (interventions implemented as appropriate)     Description:  Goals to be met by: 9 days     Patient will increase functional independence with mobility by performin. Supine to sit with Modified [place].  2. Sit to supine with Modified [place].  3. Sit to stand transfer with Supervision.  4. Bed to chair transfer with Supervision using Rolling Walker.  5. Gait  x 150 feet with Supervision using Rolling Walker.   6. Ascend/descend 5 stair with left Handrails Stand-by Assistance.   7. Stand for 3 minutes with Stand-by Assistance using Rolling Walker while performing dynamic standing balance activity.  8. Lower extremity exercise program x20 reps per handout, with assistance as needed.  9. Pt will decreased TUB time to <25s w/ RW in order to decrease fall risk.                      PLAN:    Patient to be seen 5 x/week  to address the above listed problems via gait training, therapeutic activities, therapeutic exercises  Plan of Care expires: 17  Plan of Care reviewed with: patient    Carrie Hollins, PTA  2017

## 2017-07-02 NOTE — PT/OT/SLP PROGRESS
Occupational Therapy  Treatment    Anthony Miguel   MRN: [id number]   Admitting Diagnosis: Cervical myelopathy    OT Date of Treatment: 07/02/17  Total Time (min): 76 min    Billable Minutes:  Self Care/Home Management 41 and Therapeutic Activity 35    General Precautions: Standard, fall  Orthopedic Precautions: N/A  Braces: Cervical collar    Do you have any cultural, spiritual, Episcopalian conflicts, given your current situation?: none reported    Subjective:  Communicated with patient and nurse prior to session.  I'd like to play the piano in the other room    Pain/Comfort  Pain Rating 1: 0/10  Pain Rating Post-Intervention 1: 0/10    Objective:  Patient found with: cervical collar    Functional Status:  MDS G  Bed Mobility Functional Status: CGA-Min (A)  Transfer Functional Status: CGA-Min (A)  Dressing Functional Status: 2:CGA-Min (A)  Personal Hygiene Functional Status: S-SBA  Bathing Functional Status: CGA-Min (A)              Additional Treatment:  Training and education provided for self-care activity, dressing, bathing and grooming seated and standing at sink. [id number] wc 15 fet with CGA. Sat at piano and played for 15 minutes to increase FMC.      Patient left HOB elevated with call button in reach    ASSESSMENT:  Anthony Miguel is a 66 y.o. male with a medical diagnosis of Cervical myelopathy. Tolerated session well with rest breaks and seated for activity. Continue OT to focus on standing balance and tolerance, FMC and activity tolerance for ADL, functional mobility iin order to participate in meaningful occupations and return home safely.    Rehab identified problem list/impairments: impaired endurance, impaired sensation, impaired self care skills, impaired functional mobilty, gait instability, decreased coordination, decreased upper extremity function, pain, impaired coordination, impaired fine motor    Rehab potential is excellent    Activity tolerance: Fair    Discharge recommendations:  home health OT     Barriers to discharge: Decreased caregiver support, Inaccessible home environment     Equipment recommendations: bedside commode     GOALS:    Occupational Therapy Goals        Problem: Occupational Therapy Goal    Goal Priority Disciplines Outcome Interventions   Occupational Therapy Goal     OT, PT/OT Ongoing (interventions implemented as appropriate)    Description:  Goals to be met by: 11 days     Patient will increase functional independence with ADLs by performing:    Feeding with Mod I.  UE Dressing with Mod I  LE Dressing with Mod I  Grooming while standing with Mod I  Toileting from bedside commode with Mod I  for hygiene and clothing management.   Bathing from  shower chair/bench with Stand-by Assistance.  Supine to sit with Modified [place].  Stand pivot transfers with Modified [place].  Toilet transfer to bedside commode with Modified [place].   Pt. To be independent with HEP for BUE to improve endurance Pt. To be independent with HEP /activities to improve FMC skills in BUE                    Plan:  Patient to be seen 5 x/week to address the above listed problems via self-care/home management, therapeutic activities, therapeutic exercises  Plan of Care expires: 07/30/17  Plan of Care reviewed with: patient    TRISHA Brown  07/02/2017

## 2017-07-02 NOTE — PLAN OF CARE
Problem: Occupational Therapy Goal  Goal: Occupational Therapy Goal  Goals to be met by: 11 days     Patient will increase functional independence with ADLs by performing:    Feeding with Mod I.  UE Dressing with Mod I  LE Dressing with Mod I  Grooming while standing with Mod I  Toileting from bedside commode with Mod I  for hygiene and clothing management.   Bathing from  shower chair/bench with Stand-by Assistance.  Supine to sit with Modified [place].  Stand pivot transfers with Modified [place].  Toilet transfer to bedside commode with Modified [place].   Pt. To be independent with HEP for BUE to improve endurance Pt. To be independent with HEP /activities to improve FMC skills in BUE   Outcome: Ongoing (interventions implemented as appropriate)  Goals appropriate. Continue with current plan of care.  TRISHA Brown  7/2/2017

## 2017-07-02 NOTE — PLAN OF CARE
Problem: Physical Therapy Goal  Goal: Physical Therapy Goal  Goals to be met by: 9 days     Patient will increase functional independence with mobility by performin. Supine to sit with Modified [place].  2. Sit to supine with Modified [place].  3. Sit to stand transfer with Supervision.  4. Bed to chair transfer with Supervision using Rolling Walker.  5. Gait  x 150 feet with Supervision using Rolling Walker.   6. Ascend/descend 5 stair with left Handrails Stand-by Assistance.   7. Stand for 3 minutes with Stand-by Assistance using Rolling Walker while performing dynamic standing balance activity.  8. Lower extremity exercise program x20 reps per handout, with assistance as needed.  9. Pt will decreased TUB time to <25s w/ RW in order to decrease fall risk.     Outcome: Ongoing (interventions implemented as appropriate)  Goals remain appropriate

## 2017-07-02 NOTE — PLAN OF CARE
Problem: Fall Risk (Adult)  Goal: Absence of Falls  Patient will demonstrate the desired outcomes by discharge/transition of care.   Outcome: Ongoing (interventions implemented as appropriate)  Pt lying in bed watching tv, ndn, call light in reach. Pt is free of falls or injuries this shift.

## 2017-07-03 ENCOUNTER — PATIENT OUTREACH (OUTPATIENT)
Dept: ADMINISTRATIVE | Facility: CLINIC | Age: 67
End: 2017-07-03

## 2017-07-03 LAB
ANION GAP SERPL CALC-SCNC: 12 MMOL/L
BASOPHILS # BLD AUTO: 0.03 K/UL
BASOPHILS NFR BLD: 0.3 %
BUN SERPL-MCNC: 22 MG/DL
CALCIUM SERPL-MCNC: 9.6 MG/DL
CHLORIDE SERPL-SCNC: 100 MMOL/L
CO2 SERPL-SCNC: 25 MMOL/L
CREAT SERPL-MCNC: 0.9 MG/DL
DIFFERENTIAL METHOD: ABNORMAL
EOSINOPHIL # BLD AUTO: 0.3 K/UL
EOSINOPHIL NFR BLD: 3 %
ERYTHROCYTE [DISTWIDTH] IN BLOOD BY AUTOMATED COUNT: 13.2 %
EST. GFR  (AFRICAN AMERICAN): >60 ML/MIN/1.73 M^2
EST. GFR  (NON AFRICAN AMERICAN): >60 ML/MIN/1.73 M^2
GLUCOSE SERPL-MCNC: 83 MG/DL
HCT VFR BLD AUTO: 39.5 %
HGB BLD-MCNC: 13 G/DL
LYMPHOCYTES # BLD AUTO: 2.2 K/UL
LYMPHOCYTES NFR BLD: 23.7 %
MAGNESIUM SERPL-MCNC: 2 MG/DL
MCH RBC QN AUTO: 31.3 PG
MCHC RBC AUTO-ENTMCNC: 32.9 %
MCV RBC AUTO: 95 FL
MONOCYTES # BLD AUTO: 0.8 K/UL
MONOCYTES NFR BLD: 8.9 %
NEUTROPHILS # BLD AUTO: 6 K/UL
NEUTROPHILS NFR BLD: 64.1 %
PHOSPHATE SERPL-MCNC: 4 MG/DL
PLATELET # BLD AUTO: 362 K/UL
PMV BLD AUTO: 10.8 FL
POTASSIUM SERPL-SCNC: 4.4 MMOL/L
RBC # BLD AUTO: 4.15 M/UL
SODIUM SERPL-SCNC: 137 MMOL/L
WBC # BLD AUTO: 9.3 K/UL

## 2017-07-03 PROCEDURE — 97530 THERAPEUTIC ACTIVITIES: CPT

## 2017-07-03 PROCEDURE — 36415 COLL VENOUS BLD VENIPUNCTURE: CPT

## 2017-07-03 PROCEDURE — 97110 THERAPEUTIC EXERCISES: CPT

## 2017-07-03 PROCEDURE — 97116 GAIT TRAINING THERAPY: CPT

## 2017-07-03 PROCEDURE — 25000003 PHARM REV CODE 250: Performed by: PHYSICIAN ASSISTANT

## 2017-07-03 PROCEDURE — 84100 ASSAY OF PHOSPHORUS: CPT

## 2017-07-03 PROCEDURE — 25000003 PHARM REV CODE 250: Performed by: NURSE PRACTITIONER

## 2017-07-03 PROCEDURE — 83735 ASSAY OF MAGNESIUM: CPT

## 2017-07-03 PROCEDURE — 97535 SELF CARE MNGMENT TRAINING: CPT

## 2017-07-03 PROCEDURE — 12000000 HC SNF SEMI-PRIVATE ROOM

## 2017-07-03 PROCEDURE — 85025 COMPLETE CBC W/AUTO DIFF WBC: CPT

## 2017-07-03 PROCEDURE — 99309 SBSQ NF CARE MODERATE MDM 30: CPT | Mod: ,,, | Performed by: NURSE PRACTITIONER

## 2017-07-03 PROCEDURE — 80048 BASIC METABOLIC PNL TOTAL CA: CPT

## 2017-07-03 RX ORDER — BACITRACIN 500 [USP'U]/G
OINTMENT TOPICAL 2 TIMES DAILY
Status: DISCONTINUED | OUTPATIENT
Start: 2017-07-03 | End: 2017-07-07

## 2017-07-03 RX ADMIN — HEPARIN SODIUM 5000 UNITS: 5000 INJECTION, SOLUTION INTRAVENOUS; SUBCUTANEOUS at 02:07

## 2017-07-03 RX ADMIN — OXYCODONE HYDROCHLORIDE AND ACETAMINOPHEN 1 TABLET: 10; 325 TABLET ORAL at 02:07

## 2017-07-03 RX ADMIN — OXYCODONE HYDROCHLORIDE AND ACETAMINOPHEN 1 TABLET: 10; 325 TABLET ORAL at 09:07

## 2017-07-03 RX ADMIN — VITAMIN D, TAB 1000IU (100/BT) 1000 UNITS: 25 TAB at 09:07

## 2017-07-03 RX ADMIN — STANDARDIZED SENNA CONCENTRATE AND DOCUSATE SODIUM 1 TABLET: 8.6; 5 TABLET, FILM COATED ORAL at 09:07

## 2017-07-03 RX ADMIN — ATORVASTATIN CALCIUM 10 MG: 10 TABLET, FILM COATED ORAL at 09:07

## 2017-07-03 RX ADMIN — FINASTERIDE 5 MG: 5 TABLET, FILM COATED ORAL at 09:07

## 2017-07-03 RX ADMIN — BACITRACIN: 500 OINTMENT TOPICAL at 09:07

## 2017-07-03 RX ADMIN — HEPARIN SODIUM 5000 UNITS: 5000 INJECTION, SOLUTION INTRAVENOUS; SUBCUTANEOUS at 06:07

## 2017-07-03 RX ADMIN — ALUMINUM HYDROXIDE, MAGNESIUM HYDROXIDE, AND SIMETHICONE 15 ML: 200; 200; 20 SUSPENSION ORAL at 09:07

## 2017-07-03 RX ADMIN — HEPARIN SODIUM 5000 UNITS: 5000 INJECTION, SOLUTION INTRAVENOUS; SUBCUTANEOUS at 09:07

## 2017-07-03 NOTE — PLAN OF CARE
Problem: Physical Therapy Goal  Goal: Physical Therapy Goal  Goals to be met by: 9 days     Patient will increase functional independence with mobility by performin. Supine to sit with Modified [place].  2. Sit to supine with Modified [place].  3. Sit to stand transfer with Supervision.  4. Bed to chair transfer with Supervision using Rolling Walker.  5. Gait  x 150 feet with Supervision using Rolling Walker.   6. Ascend/descend 5 stair with left Handrails Stand-by Assistance.   7. Stand for 3 minutes with Stand-by Assistance using Rolling Walker while performing dynamic standing balance activity. MET 7/3/2017  8. Lower extremity exercise program x20 reps per handout, with assistance as needed.  9. Pt will decreased TUG time to <25s w/ RW in order to decrease fall risk.     LTGs remain appropriate. Pt will continue PT POC.    Laurence Cobos, PT  7/3/2017

## 2017-07-03 NOTE — SUBJECTIVE & OBJECTIVE
Interval History:   7/3/17  Patient seen at bedside, he reports his pain is adequately controlled.  He is concerned about being able to return to play the piano.  He currently lives alone, but has a friend, Heri Worthington ([phone]) who is willing to assist him if needed upon discharge.  He was instructed to keep his cervical collar on at all times until instructed otherwise by NS team.          Review of Systems   Constitutional: Positive for activity change. Negative for appetite change, chills, diaphoresis, fatigue and fever.   Respiratory: Negative for cough, chest tightness and shortness of breath.    Cardiovascular: Negative for chest pain, palpitations and leg swelling.   Gastrointestinal: Negative for abdominal pain, constipation, diarrhea, nausea and vomiting.   Genitourinary: Negative for difficulty urinating.   Musculoskeletal: Positive for neck pain.   Skin: Negative for rash.   Neurological: Positive for numbness.     Objective:     Vital Signs (Most Recent):  Temp: 98.1 °F (36.7 °C) (07/03/17 0800)  Pulse: 76 (07/03/17 0800)  Resp: 18 (07/03/17 0800)  BP: 116/73 (07/03/17 0800)  SpO2: 99 % (07/03/17 0800) Vital Signs (24h Range):  Temp:  [97.9 °F (36.6 °C)-98.1 °F (36.7 °C)] 98.1 °F (36.7 °C)  Pulse:  [74-76] 76  Resp:  [18] 18  SpO2:  [97 %-99 %] 99 %  BP: (116)/(69-73) 116/73     Weight: 74.8 kg (164 lb 14.5 oz)  Body mass index is 25.83 kg/m².    Intake/Output Summary (Last 24 hours) at 07/03/17 1318  Last data filed at 07/02/17 2206   Gross per 24 hour   Intake              240 ml   Output              375 ml   Net             -135 ml      Physical Exam   Constitutional: He is oriented to person, place, and time. He appears well-developed and well-nourished.   Cardiovascular: Normal rate, regular rhythm, normal heart sounds and intact distal pulses.  Exam reveals no gallop and no friction rub.    No murmur heard.  Pulmonary/Chest: Effort normal and breath sounds normal. No respiratory distress. He  has no wheezes. He has no rales. He exhibits no tenderness.   Abdominal: Soft. Bowel sounds are normal. He exhibits no distension. There is no tenderness.   Musculoskeletal: Normal range of motion. He exhibits no edema or tenderness.   Neurological: He is alert and oriented to person, place, and time.   Hard cervical collar in place.   Skin: Skin is warm and dry. Capillary refill takes less than 2 seconds.   Staples intact to posterior neck, no redness or drainage present.   Psychiatric: Thought content normal.       Significant Labs:   BMP:   Recent Labs  Lab 07/03/17  0446   GLU 83      K 4.4      CO2 25   BUN 22   CREATININE 0.9   CALCIUM 9.6   MG 2.0     CBC:   Recent Labs  Lab 07/03/17  0447   WBC 9.30   HGB 13.0*   HCT 39.5*   *       Significant Imaging: n/a

## 2017-07-03 NOTE — ASSESSMENT & PLAN NOTE
S/p posterior cervical fusion  Patient to follow up with neurosurgery for wound check  Bacitracin to incision BID will order on 7/3  Neurologically stable  Cervical collar at all times  Cont neuro checks  Cont with current pain control   Cont bowel regimen for opioid induced constipation  Cont with PT/OT for gait training and strengthening and restoration of ADL's   Cont DVT prophylaxis with heparin     Future Appointments  Date Time Provider Department Center   7/6/2017 1:40 PM Michela Graham PA-C [facility] NEUROS7 [facility]   8/3/2017 12:30 PM [facility] [id number] LB LIMIT [facility] XRAY OP [facility]   8/3/2017 1:30 PM David Wolff MD [facility] NEUROS7 [facility]

## 2017-07-03 NOTE — PROGRESS NOTES
07/03/2017  11:47 AM  Discharge Planning Assessment     Payor: [employer] MEDICARE / Plan: [employer] MEDICARE HMO / Product Type: Capitation /     Expected length of stay:  [] 7 days   [x] 10 days  [] 14 days   [] 21 days   [] > 30 days    Communicated expected length of stay with patient/caregiver:  [x] Yes   [] No    Anticipated discharge date:  7/10/2017    Assessment information obtained from:  [x] Patient   [] Caregiver     Arrived from:   [x] Home   [] Assisted Living    [] Nursing Home   [] SNF   [] Rehab  [] LTACH   [] Group Home   [] Foster Care   [] Psych   [] Shelter   [] Homeless   [] Transfer  [] Correctional Facility  [] Name of Facility:      Patient currently lives with:    [x] Alone   [] Spouse   [] Daughter   [] Son   [] Grandparents   []  Parents   [] Siblings   [] Friends   [] Domestic Partner   [] Facility Resident      [] Foster Home    [] Other:       Extended Emergency Contact Information  Primary Emergency Contact: Chu Worthington   [place]  Mobile Phone: [phone]  Relation: Friend  Secondary Emergency Contact: Nguyen Ramirez   [place]  Mobile Phone: [phone]  Relation: Relative     Prior to hospitalization cognitive status:   [x] Alert/Oriented  [] No Deficits [] Risk of Harm to Self/Others   [] Not Oriented to Person   [] Not Oriented to Place   [] Not Oriented to Time   [] Coma/Sedated/Intubated  [] Judgement Impaired    []  Unable to Assess   [] Inappropriate Behavior  [] Infant/Toddler    Prior to hospitalization functional status:   [x] Independent   [x] Assistive Equipment   [] Assistive Person    [] Completely Dependent  [] Infant/Toddler/Child Appropriate   [] Infant/Toddler/Child Delayed    []  Adolescent     Current cognitive status:   [x] Alert/Oriented  [] No Deficits [] Risk of Harm to Self/Others   [] Not Oriented to Person   [] Not Oriented to Place   [] Not Oriented to Time   [] Coma/Sedated/Intubated  [] Judgement Impaired    []   Unable to Assess   [] Inappropriate Behavior  [] Infant/Toddler    Current functional status:     [] Independent   [x] Assistive Equipment   [x] Assistive Person     [] Completely Dependent   [] Infant/Toddler/Child Appropriate   [] Infant/Toddler/Child Delayed     [] Adolescent     Capacity to Care for Self:   Is patient able to return to prior living arrangements after discharge: [x] Yes  [] No     Is patient able to care for self after discharge?   [] Yes   [x] No     [] Pediatric     Does the patient have family/friends to assist after discharge?:  [x] Yes   [] No    [] N/A   Comments:  Friend/neighbors      Patient/caregiver perception of discharge disposition:   [x] Home   [] Home with Family  [x] Home Health   [] SNF   [] Rehab   [] LTAC    []  New Nursing Home Placement  [] Return to Nursing Home    [] Shelter     [] Assisted Living  [] Foster Home   [] Other:      Readmit:   Has patient christine in the hospital in the last 30 days? [] Yes   [x] No     If YES, was patient admitted for the same reason?  [] Yes   [] No       Home Health:   Patient currently receives home health services?:   [] Yes   [x] No     Patient previously received home health services and would like to resume services if necessary   [] Yes   [x] No     DME:   Patient currently uses DME:   [x] Yes   [] No        List of equipment currently used:     [x] Wheelchair   [] Standard Walker  [x] Rolling Walker  [x]  Rollator    [] Oxygen    [] Portable oxygen   [] Nebulizer    [] Apnea Monitor    [] Crutches  [] Hospital Bed   []  Lift Device   [] Scooter [x] Cane     [] Prosthesis   []  BSC   [x] Tub Bench   [] Catheter Supplies    [] Ostomy Supplies   [] Trach Supplies     [] Suction Machine        [] Home Vent    [] Bipap   [] Other:         Medications:    Can the patient afford all prescribed medications?  [x] Yes   [] No     If NO, what medication:       Is the patient taking medications as prescribed?    [x] Yes   [] No    Financial  Concerns:   Does the patient have any financial concerns?   [] Yes   [x] No      If YES, what are the concerns:      Transportation:   Does the patient have transportation to healthcare appointments?   [x] Yes   [] No     If YES, what means of transportation does the patient have?   [] Car   [x] Family/Friend  [] Bicycle   [] Motorcycle   [] Public Transportation [] Ambulance[] Wheelchair van   [] Name of Provider    Dialysis:   Does the patient currently receive dialysis?   [] Yes   [x] No     Jada Spaulding MD   [address]  [place] [address]  [phone]   [phone]         APS/CPS involved in the case:  [] Yes   [x] No   If YES, name of :     If YES, phone number of :        Discharge Plan A:  [x] Home   [] Home with Family  [x] Home Health   [] SNF   [] Rehab   [] LTAC   []  New Nursing Home Placement  [] Return to Nursing Home    [] Assisted Living    [] Shelter     [] Private Duty Nursing   [] Foster Home    [] Psych    [] Early Steps  [] WIC       [] Home Hospice   [] Inpatient Hospice   [] Other    Discharge Plan B:  [] Home   [] Home with Family  [] Home Health   [] SNF   [] Rehab   [] LTAC  []  New Nursing Home Placement   [] Return to  Nursing Home    [] Assisted Living   [] Shelter  [] Private Duty Nursing   [] Foster Home     [] Psych     [] Early Steps  [] WIC    [] Home Hospice     [] Inpatient Hospice   [] Other     [x] Patient and family in agreement with discharge plan.    Rounded with NP Hernan, pharmacist Tara, and pharmacy student. Patient AAO. Discharge plan is to return home alone. Patient stated assist of friend, Chu Worthington. Informed patient therapy recommends a 10 day SNF stay and that we (MD, nurse, therapy, SW,CM) meet today to review his case and come up with a discharge date.  Patient stated understanding. CASSIDY and SW will continue to follow for any additional needs.    Kamini Rivera RN, CM Skilled  [phone]

## 2017-07-03 NOTE — PLAN OF CARE
Problem: Patient Care Overview  Goal: Plan of Care Review  Outcome: Ongoing (interventions implemented as appropriate)   07/03/17 0646   Coping/Psychosocial   Plan Of Care Reviewed With patient

## 2017-07-03 NOTE — PT/OT/SLP PROGRESS
Physical Therapy  Treatment    Anthony Miguel   MRN: [id number]   Admitting Diagnosis: Cervical myelopathy    PT Received On: 07/03/17  Total Time (min): 56       Billable Minutes:  Gait [id number], Therapeutic Activity 17, Therapeutic Exercise 23 and Total Time 56    Treatment Type: Treatment  PT/PTA: PT     PTA Visit Number: 0       General Precautions: Standard, fall  Orthopedic Precautions: N/A   Braces: Cervical collar    Do you have any cultural, spiritual, Druze conflicts, given your current situation?: no    Subjective:  Pt agreeable to session.    Pain/Comfort  Pain Rating 1: 4/10  Location - Side 1: Bilateral  Location - Orientation 1: generalized  Location 1: neck  Pain Addressed 1: Pre-medicate for activity, Reposition    Objective:  Patient found sitting on bedside commode       Functional Status:  MDS G  Bed Mobility Functional Status: S-SBA  Transfer Functional Status: S-SBA  Walk in Room Functional Status: S-SBA  Walk in Corridor Functional Status: S-SBA  Locomotion on Unit Functional Status: S-SBA  Personal Hygiene Functional Status: S-SBA    Bed Mobility:  Sit>Supine:on bed w/ SPV    Transfers:  Sit<>Stand: to/from w/c (3 trials) w/ RW and close SBA for safety, vc's for hand placement  Stand Pivot Transfer: w/c>EOB w/ RW and SBA    Gait:  Amb 2 trials (205ft and 300ft) w/ RW and close SBA for safety     Advanced Gait:  Stairs: asc/lópez 4 steps w/ BUE on (L) rail and CGA for safety    Therex:  Seated BLE therex 2x15 reps (GS, HF, LAQ)    Balance:  Standing ADLs at sink at start of session including brushing teeth, washing face, and shaving. Pt w/ RW and SPV for safety. Task required ~10min w/o seated rest break.    Additional Treatment:  Seated LBE x15min to inc BLE strength and endurance    Patient left supine with call button in reach.    Assessment:  Anthony Miguel is a 66 y.o. male with a medical diagnosis of Cervical myelopathy.  Pt samantha session well w/ good participation. Pt was able to  inc amb distance and complete stairs today for the first time. Pt is progressing well and will continue PT POC.    Rehab identified problem list/impairments: weakness, impaired endurance, impaired sensation, impaired functional mobilty, gait instability, impaired balance, pain    Rehab potential is good.    Activity tolerance: Good    Discharge recommendations: home with home health     Barriers to discharge: Inaccessible home environment, Decreased caregiver support    Equipment recommendations: bedside commode     GOALS:    Physical Therapy Goals        Problem: Physical Therapy Goal    Goal Priority Disciplines Outcome Goal Variances Interventions   Physical Therapy Goal     PT/OT, PT Ongoing (interventions implemented as appropriate)     Description:  Goals to be met by: 9 days     Patient will increase functional independence with mobility by performin. Supine to sit with Modified [place].  2. Sit to supine with Modified [place].  3. Sit to stand transfer with Supervision.  4. Bed to chair transfer with Supervision using Rolling Walker.  5. Gait  x 150 feet with Supervision using Rolling Walker.   6. Ascend/descend 5 stair with left Handrails Stand-by Assistance.   7. Stand for 3 minutes with Stand-by Assistance using Rolling Walker while performing dynamic standing balance activity. MET 7/3/2017  8. Lower extremity exercise program x20 reps per handout, with assistance as needed.  9. Pt will decreased TUG time to <25s w/ RW in order to decrease fall risk.                       PLAN:    Patient to be seen 5 x/week  to address the above listed problems via gait training, therapeutic activities, therapeutic exercises  Plan of Care expires: 17  Plan of Care reviewed with: patient    Laurence M Papito, PT  2017

## 2017-07-03 NOTE — HOSPITAL COURSE
7/3/17  Patient seen at bedside, he reports his pain is adequately controlled.  He is concerned about being able to return to play the piano.  He currently lives alone, but has a friend, Heri Worthington ([phone]) who is willing to assist him if needed upon discharge.  He was instructed to keep his cervical collar on at all times until instructed otherwise by NS team.      7/7/17  Patient seen at bedside, he reports having one episode of nausea and vomiting today after eating laughlin.  States no more nausea now.  Currently he denies abdominal pain and states he is having bowel movements without issues.  He has no other complaints.  Nursing informed me he is requesting Tylenol for pain rather than narcotics as he feels the narcotics are too strong.  Therapy reported she noted patient coughing when eating lunch and asked if speech should see him.  Discussed this issue with him, he reports this is a chronic issue and has had laryngoscopies in the past and had collagen injected into his vocal cords at [place].      7/13/17  Patient seen at bedside prior to discharge.  He reports last BM was yesterday.  He has no complaints and is looking forward to going home.

## 2017-07-03 NOTE — PROGRESS NOTES
[facility]  Progress Note    Patient Name: Anthony Miguel  MRN: [id number]  Code Status: Full Code  Admission Date: 6/29/2017  Length of Stay: 4 days  Attending Physician: Le Olson MD  Primary Care Provider: Jada Spaulding MD    Subjective:     Principal Problem:Cervical myelopathy    HPI:  Chief Complaint/Reason for Admission: Debility    History of Present Illness:  Patient is a 66 y.o. male who has a past medical history of Benign non-nodular prostatic hyperplasia; Hepatitis C; Senile purpura; vitamin D deficiency; and Vocal fold cyst presented with progressive weakness and paresthesias in his arms and legs for approximately one month. MRI of neck showed multilevel spondylosis with multiple disc herniations which results in severe central stenosis. Patient was admitted to neurosurgery and underwent posterior cervical fusion. Patient tolerated procedure well with no significant post operative complications.     Patient has been working with PT/OT who recommend rehab for further balance/mobility training. Patient's insurance denied rehab and thus patient being sent to SNF.  Patient lives alone in [place] in a single story home.  Prior to admission, he was independent with mobility and transfers up until May and required assistance with ADLs and mobility. Upon admission to SNF, he reported his pain was mild 4/10 and is requiring frequent pain medications. His main concern is his ability to play the piano. He states his strength is improved after surgery. Patient currently denies any fever/chills, chest pain, dyspnea, weakness, numbness, abdominal pain, nausea/vomiting, dysuria/hematuria, or weight loss.         Interval History:   7/3/17  Patient seen at bedside, he reports his pain is adequately controlled.  He is concerned about being able to return to play the piano.  He currently lives alone, but has a friend, Al Ander ([phone]) who is willing to assist him if needed  upon discharge.  He was instructed to keep his cervical collar on at all times until instructed otherwise by NS team.          Review of Systems   Constitutional: Positive for activity change. Negative for appetite change, chills, diaphoresis, fatigue and fever.   Respiratory: Negative for cough, chest tightness and shortness of breath.    Cardiovascular: Negative for chest pain, palpitations and leg swelling.   Gastrointestinal: Negative for abdominal pain, constipation, diarrhea, nausea and vomiting.   Genitourinary: Negative for difficulty urinating.   Musculoskeletal: Positive for neck pain.   Skin: Negative for rash.   Neurological: Positive for numbness.     Objective:     Vital Signs (Most Recent):  Temp: 98.1 °F (36.7 °C) (07/03/17 0800)  Pulse: 76 (07/03/17 0800)  Resp: 18 (07/03/17 0800)  BP: 116/73 (07/03/17 0800)  SpO2: 99 % (07/03/17 0800) Vital Signs (24h Range):  Temp:  [97.9 °F (36.6 °C)-98.1 °F (36.7 °C)] 98.1 °F (36.7 °C)  Pulse:  [74-76] 76  Resp:  [18] 18  SpO2:  [97 %-99 %] 99 %  BP: (116)/(69-73) 116/73     Weight: 74.8 kg (164 lb 14.5 oz)  Body mass index is 25.83 kg/m².    Intake/Output Summary (Last 24 hours) at 07/03/17 1318  Last data filed at 07/02/17 2206   Gross per 24 hour   Intake              240 ml   Output              375 ml   Net             -135 ml      Physical Exam   Constitutional: He is oriented to person, place, and time. He appears well-developed and well-nourished.   Cardiovascular: Normal rate, regular rhythm, normal heart sounds and intact distal pulses.  Exam reveals no gallop and no friction rub.    No murmur heard.  Pulmonary/Chest: Effort normal and breath sounds normal. No respiratory distress. He has no wheezes. He has no rales. He exhibits no tenderness.   Abdominal: Soft. Bowel sounds are normal. He exhibits no distension. There is no tenderness.   Musculoskeletal: Normal range of motion. He exhibits no edema or tenderness.   Neurological: He is alert and  oriented to person, place, and time.   Hard cervical collar in place.   Skin: Skin is warm and dry. Capillary refill takes less than 2 seconds.   Staples intact to posterior neck, no redness or drainage present.   Psychiatric: Thought content normal.       Significant Labs:   BMP:   Recent Labs  Lab 07/03/17  0446   GLU 83      K 4.4      CO2 25   BUN 22   CREATININE 0.9   CALCIUM 9.6   MG 2.0     CBC:   Recent Labs  Lab 07/03/17  0447   WBC 9.30   HGB 13.0*   HCT 39.5*   *       Significant Imaging: n/a    Assessment/Plan:      Debility    PT/OT as above.         Benign non-nodular prostatic hyperplasia without lower urinary tract symptoms    Cont with finasteride to treat        Unspecified vitamin D deficiency    Cont with Vit D supplements to treat.         HLD (hyperlipidemia)    Cont with home atorvastatin to treat.         History of hepatitis C (completed treatment)    Treated with ribavarin and interferon in 2012  No signs of liver failure.  Follow up as outpatient         * Cervical myelopathy    S/p posterior cervical fusion  Patient to follow up with neurosurgery for wound check  Bacitracin to incision BID will order on 7/3  Neurologically stable  Cervical collar at all times  Cont neuro checks  Cont with current pain control   Cont bowel regimen for opioid induced constipation  Cont with PT/OT for gait training and strengthening and restoration of ADL's   Cont DVT prophylaxis with heparin     Future Appointments  Date Time Provider Department Center   7/6/2017 1:40 PM Michela Graham PA-C [facility] NEUROS7 Cristiano Suarez   8/3/2017 12:30 PM [facility] [id number] LB LIMIT [facility] XRAY OP Cristiano Suarez   8/3/2017 1:30 PM David Wolff MD [facility] NEUROS7 Cristiano Briones NP  [facility]

## 2017-07-03 NOTE — PROGRESS NOTES
07/03/2017  3:54 PM  Discharge Planning-Met with patient in room to inform of discharge date of 7/10/2017. Discharge date written on dry erase board in room. Patient stated understanding to discharge date.  Kamini Rivera RN, CM Skilled  [phone]

## 2017-07-03 NOTE — PLAN OF CARE
Problem: Occupational Therapy Goal  Goal: Occupational Therapy Goal  Goals to be met by: 11 days     Patient will increase functional independence with ADLs by performing:    Feeding with Mod I.  UE Dressing with Mod I  LE Dressing with Mod I  Grooming while standing with Mod I  Toileting from bedside commode with Mod I  for hygiene and clothing management.   Bathing from  shower chair/bench with Stand-by Assistance.  Supine to sit with Modified [place].  Stand pivot transfers with Modified [place].  Toilet transfer to bedside commode with Modified [place].   Pt. To be independent with HEP for BUE to improve endurance Pt. To be independent with HEP /activities to improve FMC skills in BUE   Pt tolerated session well.

## 2017-07-03 NOTE — PLAN OF CARE
Problem: Fall Risk (Adult)  Goal: Identify Related Risk Factors and Signs and Symptoms  Related risk factors and signs and symptoms are identified upon initiation of Human Response Clinical Practice Guideline (CPG)   Outcome: Ongoing (interventions implemented as appropriate)   07/03/17 0691   Fall Risk   Related Risk Factors (Fall Risk) age-related changes;fatigue/slow reaction;gait/mobility problems;history of falls   Signs and Symptoms (Fall Risk) presence of risk factors

## 2017-07-03 NOTE — PT/OT/SLP PROGRESS
Occupational Therapy  Treatment    Anthony Miguel   MRN: [id number]   Admitting Diagnosis: Cervical myelopathy    OT Date of Treatment: 07/03/17  Total Time (min): 58 min    Billable Minutes:  Self Care/Home Management 29 and Therapeutic Activity 29    General Precautions: Standard, fall  Orthopedic Precautions: N/A  Braces: Cervical collar    Do you have any cultural, spiritual, Caodaism conflicts, given your current situation?: none reported    Subjective:  Communicated with pt prior to session.  I had a good weekend.     Pain/Comfort  Pain Rating 1: 2/10  Location - Side 1: Bilateral  Location - Orientation 1: generalized  Location 1: neck  Pain Addressed 1: Reposition, Distraction    Objective:  Patient found with: cervical collar (supine in bed)    Functional Status:  MDS G  Bed Mobility Functional Status: SBA supine to sit; SBA sit to suppine  Transfer Functional Status: Min (A) sit to stand from EOB with RW; subsequent sit to stand trials were CGA  Walk in Corridor Functional Status: CGA with RW ~155 feet  Moving from seated to standing position: Not steady, only able to stabilize with staff assistance  Turning around and facing the opposite direction while walking: Not steady, only able to stabilize with staff assistance  Surface-to-surface transfer (transfer between bed and chair or wheelchair): Not steady, only able to stabilize with staff assistance       Additional Treatment:  Pt participated in seated balloon tapping activity with SBA 10 sets x 10 reps.  Pt participate in fine motor control activities while seated at table including:   -fastening buttons, zippers, tieing bows, and amirah   -unscrewing/screwing bolts   -opening/closing screw on caps for containers   -coloring with colored pencils on a color sheet  Pt participated in functional mobility task in hallway. Pt ambulated ~155 feet with RW and CGA.  Pt demonstrated 1 LOB during sit to stand transfer from w/c with RW. Pt was able to sit back  in w/c with assistance from OT. Pt educated on safety with transfers.  Pt demonstrated coughing following a sip from drink at beginning of session, however, pt's HOB was not elevated properly for safe drinking. Pt educated on safety with eating and drinking and NSG notified to look for signs of aspiration.     Patient left supine with call button in reach    ASSESSMENT:  Anthony Miguel is a 66 y.o. male with a medical diagnosis of Cervical myelopathy and demonstrates decreased endurance, self-care skills, and functional mobility. Pt demonstrates good participation in therapy. Pt is a fall risk 2/2 unsteady gait and weakness. Pt would benefit from continued OT services to maximize safety and independence with ADL tasks and increase occupational performance in roles and routines.   .    Rehab identified problem list/impairments: weakness, impaired endurance, impaired self care skills, impaired functional mobilty, gait instability, impaired balance, pain, decreased safety awareness    Rehab potential is good    Activity tolerance: Good    Discharge recommendations: home health OT (with supervision and light assist)     Barriers to discharge: Decreased caregiver support, Inaccessible home environment     Equipment recommendations: bedside commode     GOALS:    Occupational Therapy Goals        Problem: Occupational Therapy Goal    Goal Priority Disciplines Outcome Interventions   Occupational Therapy Goal     OT, PT/OT Ongoing (interventions implemented as appropriate)    Description:  Goals to be met by: 11 days     Patient will increase functional independence with ADLs by performing:    Feeding with Mod I.  UE Dressing with Mod I  LE Dressing with Mod I  Grooming while standing with Mod I  Toileting from bedside commode with Mod I  for hygiene and clothing management.   Bathing from  shower chair/bench with Stand-by Assistance.  Supine to sit with Modified [place].  Stand pivot transfers with Modified  [place].  Toilet transfer to bedside commode with Modified [place].   Pt. To be independent with HEP for BUE to improve endurance Pt. To be independent with HEP /activities to improve FMC skills in BUE                    Plan:  Patient to be seen 5 x/week to address the above listed problems via self-care/home management, therapeutic activities, therapeutic exercises  Plan of Care expires: 07/30/17  Plan of Care reviewed with: patient    Doris Liliana, SOT  07/03/2017

## 2017-07-04 PROCEDURE — 12000000 HC SNF SEMI-PRIVATE ROOM

## 2017-07-04 PROCEDURE — 25000003 PHARM REV CODE 250: Performed by: NURSE PRACTITIONER

## 2017-07-04 PROCEDURE — 25000003 PHARM REV CODE 250: Performed by: PHYSICIAN ASSISTANT

## 2017-07-04 RX ADMIN — OXYCODONE HYDROCHLORIDE AND ACETAMINOPHEN 1 TABLET: 10; 325 TABLET ORAL at 09:07

## 2017-07-04 RX ADMIN — ALUMINUM HYDROXIDE, MAGNESIUM HYDROXIDE, AND SIMETHICONE 15 ML: 200; 200; 20 SUSPENSION ORAL at 10:07

## 2017-07-04 RX ADMIN — HEPARIN SODIUM 5000 UNITS: 5000 INJECTION, SOLUTION INTRAVENOUS; SUBCUTANEOUS at 05:07

## 2017-07-04 RX ADMIN — BACITRACIN: 500 OINTMENT TOPICAL at 09:07

## 2017-07-04 RX ADMIN — STANDARDIZED SENNA CONCENTRATE AND DOCUSATE SODIUM 1 TABLET: 8.6; 5 TABLET, FILM COATED ORAL at 09:07

## 2017-07-04 RX ADMIN — ATORVASTATIN CALCIUM 10 MG: 10 TABLET, FILM COATED ORAL at 09:07

## 2017-07-04 RX ADMIN — DIAZEPAM 5 MG: 5 TABLET ORAL at 01:07

## 2017-07-04 RX ADMIN — FINASTERIDE 5 MG: 5 TABLET, FILM COATED ORAL at 09:07

## 2017-07-04 RX ADMIN — VITAMIN D, TAB 1000IU (100/BT) 1000 UNITS: 25 TAB at 09:07

## 2017-07-04 RX ADMIN — OXYCODONE HYDROCHLORIDE AND ACETAMINOPHEN 1 TABLET: 10; 325 TABLET ORAL at 12:07

## 2017-07-04 RX ADMIN — HEPARIN SODIUM 5000 UNITS: 5000 INJECTION, SOLUTION INTRAVENOUS; SUBCUTANEOUS at 01:07

## 2017-07-04 RX ADMIN — HEPARIN SODIUM 5000 UNITS: 5000 INJECTION, SOLUTION INTRAVENOUS; SUBCUTANEOUS at 09:07

## 2017-07-04 NOTE — PLAN OF CARE
Problem: Fall Risk (Adult)  Goal: Identify Related Risk Factors and Signs and Symptoms  Related risk factors and signs and symptoms are identified upon initiation of Human Response Clinical Practice Guideline (CPG)     07/04/17 0446   Fall Risk   Related Risk Factors (Fall Risk) gait/mobility problems;age-related changes;history of falls;fear of falling   Signs and Symptoms (Fall Risk) presence of risk factors

## 2017-07-04 NOTE — PLAN OF CARE
Problem: Patient Care Overview  Goal: Plan of Care Review  Outcome: Ongoing (interventions implemented as appropriate)   07/04/17 1033   Coping/Psychosocial   Plan Of Care Reviewed With patient

## 2017-07-05 PROCEDURE — 97116 GAIT TRAINING THERAPY: CPT

## 2017-07-05 PROCEDURE — 97110 THERAPEUTIC EXERCISES: CPT

## 2017-07-05 PROCEDURE — 97530 THERAPEUTIC ACTIVITIES: CPT

## 2017-07-05 PROCEDURE — 12000000 HC SNF SEMI-PRIVATE ROOM

## 2017-07-05 PROCEDURE — 25000003 PHARM REV CODE 250: Performed by: PHYSICIAN ASSISTANT

## 2017-07-05 PROCEDURE — 97535 SELF CARE MNGMENT TRAINING: CPT

## 2017-07-05 RX ADMIN — VITAMIN D, TAB 1000IU (100/BT) 1000 UNITS: 25 TAB at 09:07

## 2017-07-05 RX ADMIN — FINASTERIDE 5 MG: 5 TABLET, FILM COATED ORAL at 09:07

## 2017-07-05 RX ADMIN — ATORVASTATIN CALCIUM 10 MG: 10 TABLET, FILM COATED ORAL at 09:07

## 2017-07-05 RX ADMIN — DIAZEPAM 5 MG: 5 TABLET ORAL at 05:07

## 2017-07-05 RX ADMIN — BACITRACIN: 500 OINTMENT TOPICAL at 09:07

## 2017-07-05 RX ADMIN — DIAZEPAM 5 MG: 5 TABLET ORAL at 02:07

## 2017-07-05 RX ADMIN — STANDARDIZED SENNA CONCENTRATE AND DOCUSATE SODIUM 1 TABLET: 8.6; 5 TABLET, FILM COATED ORAL at 09:07

## 2017-07-05 RX ADMIN — ALUMINUM HYDROXIDE, MAGNESIUM HYDROXIDE, AND SIMETHICONE 15 ML: 200; 200; 20 SUSPENSION ORAL at 09:07

## 2017-07-05 RX ADMIN — OXYCODONE HYDROCHLORIDE AND ACETAMINOPHEN 1 TABLET: 10; 325 TABLET ORAL at 09:07

## 2017-07-05 RX ADMIN — HEPARIN SODIUM 5000 UNITS: 5000 INJECTION, SOLUTION INTRAVENOUS; SUBCUTANEOUS at 05:07

## 2017-07-05 RX ADMIN — HEPARIN SODIUM 5000 UNITS: 5000 INJECTION, SOLUTION INTRAVENOUS; SUBCUTANEOUS at 01:07

## 2017-07-05 RX ADMIN — HEPARIN SODIUM 5000 UNITS: 5000 INJECTION, SOLUTION INTRAVENOUS; SUBCUTANEOUS at 09:07

## 2017-07-05 NOTE — PLAN OF CARE
Problem: Physical Therapy Goal  Goal: Physical Therapy Goal  Goals to be met by: 9 days     Patient will increase functional independence with mobility by performin. Supine to sit with Modified [place].  2. Sit to supine with Modified [place].  3. Sit to stand transfer with Supervision.  4. Bed to chair transfer with Supervision using Rolling Walker.  5. Gait  x 150 feet with Supervision using Rolling Walker.   6. Ascend/descend 5 stair with left Handrails Stand-by Assistance.   7. Stand for 3 minutes with Stand-by Assistance using Rolling Walker while performing dynamic standing balance activity. MET 7/3/2017  8. Lower extremity exercise program x20 reps per handout, with assistance as needed.  9. Pt will decreased TUG time to <25s w/ RW in order to decrease fall risk.      LTGs remain appropriate. Pt will continue PT POC.    Laurence Cobos, PT  2017

## 2017-07-05 NOTE — PLAN OF CARE
Problem: Occupational Therapy Goal  Goal: Occupational Therapy Goal  Goals to be met by: 11 days     Patient will increase functional independence with ADLs by performing:    Feeding with Mod I.  UE Dressing with Mod I  LE Dressing with Mod I  Grooming while standing with Mod I  Toileting from bedside commode with Mod I  for hygiene and clothing management.   Bathing from  shower chair/bench with Stand-by Assistance.  Supine to sit with Modified [place].  Stand pivot transfers with Modified [place].  Toilet transfer to bedside commode with Modified [place].   Pt. To be independent with HEP for BUE to improve endurance Pt. To be independent with HEP /activities to improve FMC skills in BUE   Pt tolerated session well.

## 2017-07-05 NOTE — PT/OT/SLP PROGRESS
Physical Therapy  Treatment    Anthony Miguel   MRN: [id number]   Admitting Diagnosis: Cervical myelopathy    PT Received On: 07/05/17  Total Time (min): 44       Billable Minutes:  Gait [id number], Therapeutic Activity 15, Therapeutic Exercise 15 and Total Time 44    Treatment Type: Treatment  PT/PTA: PT     PTA Visit Number: 0       General Precautions: Standard, fall  Orthopedic Precautions: N/A   Braces: Cervical collar    Do you have any cultural, spiritual, Mormonism conflicts, given your current situation?: no    Subjective:  Pt agreeable to session.    Pain/Comfort  Pain Rating 1: 3/10  Location - Side 1: Bilateral  Location - Orientation 1: generalized  Location 1: neck  Pain Addressed 1: Pre-medicate for activity, Reposition    Objective:  Patient found supine in bed. Patient found with: cervical collar     Functional Status:  MDS G  Bed Mobility Functional Status: mod(I) - (I)  Transfer Functional Status: S-SBA  Walk in Room Functional Status: S-SBA  Walk in Corridor Functional Status: S-SBA  Locomotion on Unit Functional Status: S-SBA    Bed Mobility:  Sit>Supine:on flat bed w/o rail, Lisa  Supine>Sit: on flat bed w/o rail, Lisa    Transfers:  Sit<>Stand: to/from w/c (4 trials) w/ RW and SBA for safety  Stand Pivot Transfer: EOB<>w/c w/ RW and SBA  Cueing for hand placement    Gait:  Amb 2 trials (240ft and 120ft) w/ RW and SBA for safety, limited by neck pain and fatigue     Balance:  Standing beach ball tap 2 trials ([id number] each) w/ RW and Min/CGA for stability, pt w/ slight anterior lean, limited in time by fatigue and neck pain    Additional Treatment:  Seated LBE x15min to inc BLE strength and endurance    Patient left supine with call button in reach.    Assessment:  Anthony Miguel is a 66 y.o. male with a medical diagnosis of Cervical myelopathy.  Pt limited t/o session due to neck pain which inc'd during amb trial and standing balance activity. Pt however participated well t/o session and  is motivated. He is progressing well but remains limited by decreased strength, endurance, and balance. Pt will continue to benefit from skilled PT sessions in order to improve safety w/ mobility and decrease fall risk.    Rehab identified problem list/impairments: weakness, impaired endurance, impaired sensation, impaired functional mobilty, gait instability, impaired balance, pain    Rehab potential is good.    Activity tolerance: Good    Discharge recommendations: home with home health     Barriers to discharge: Inaccessible home environment, Decreased caregiver support    Equipment recommendations: bedside commode     GOALS:    Physical Therapy Goals        Problem: Physical Therapy Goal    Goal Priority Disciplines Outcome Goal Variances Interventions   Physical Therapy Goal     PT/OT, PT Ongoing (interventions implemented as appropriate)     Description:  Goals to be met by: 9 days     Patient will increase functional independence with mobility by performin. Supine to sit with Modified [place].  2. Sit to supine with Modified [place].  3. Sit to stand transfer with Supervision.  4. Bed to chair transfer with Supervision using Rolling Walker.  5. Gait  x 150 feet with Supervision using Rolling Walker.   6. Ascend/descend 5 stair with left Handrails Stand-by Assistance.   7. Stand for 3 minutes with Stand-by Assistance using Rolling Walker while performing dynamic standing balance activity. MET 7/3/2017  8. Lower extremity exercise program x20 reps per handout, with assistance as needed.  9. Pt will decreased TUG time to <25s w/ RW in order to decrease fall risk.                       PLAN:    Patient to be seen 5 x/week  to address the above listed problems via gait training, therapeutic activities, therapeutic exercises  Plan of Care expires: 17  Plan of Care reviewed with: patient    Laurence TAWNY Cobos, PT  2017

## 2017-07-05 NOTE — PT/OT/SLP PROGRESS
Occupational Therapy  Treatment    Anthony Miguel   MRN: [id number]   Admitting Diagnosis: Cervical myelopathy    OT Date of Treatment: 07/05/17  Total Time (min): 56 min    Billable Minutes:  Self Care/Home Management 30, Therapeutic Activity 16 and Therapeutic Exercise 10    General Precautions: Standard, fall  Orthopedic Precautions: N/A  Braces: Cervical collar    Do you have any cultural, spiritual, Christian conflicts, given your current situation?: none reported    Subjective:  Communicated with pt prior to session.  I'd like to get cleaned up.    Pain/Comfort  Pain Rating 1: 4/10  Location - Side 1: Bilateral  Location - Orientation 1: generalized  Location 1: neck  Pain Addressed 1: Reposition, Distraction, Pre-medicate for activity    Objective:  Patient found with: cervical collar (supine in bed)    Functional Status:  MDS G  Bed Mobility Functional Status: S supine to sit with HOB elevated  Transfer Functional Status: CGA sit to stand with RW from w/c and EOB  Walk in Room Functional Status: CGA from EOB to bathroom sink with RW  Dressing Functional Status: LBD CGA while standing to manage pants over hips and use of AE for socks; UBD SBA to doff/don shirt over cervical collar  Personal Hygiene Functional Status: CGA to brush teeth while standing at sink  Bathing Functional Status: SBA sponge bath while seated at sink- pt did not stand during this activity  Moving from seated to standing position: Not steady, only able to stabilize with staff assistance  Walking (with assistive device if used): Not steady, only able to stabilize with staff assistance  Turning around and facing the opposite direction while walking: Not steady, only able to stabilize with staff assistance  Surface-to-surface transfer (transfer between bed and chair or wheelchair): Not steady, only able to stabilize with staff assistance          OT Exercises: UE Ergometer ~6 minutes    Additional Treatment:  Pt participated in seated piano  activity x 1 trial for ~10 minutes.   Pt educated on safety with transfers and dressing tasks.   Pt educated on use of sock aide for LBD task.    Patient left up in  b/s chair with call button in reach    ASSESSMENT:  Anthony Miguel is a 66 y.o. male with a medical diagnosis of Cervical myelopathy and demonstrates decreased endurance, self-care skills, and functional mobility. Pt demonstrates willingness to participate in therapy and asks questions about technique and safety regarding self-care activities upon d/c.  Pt is a fall risk 2/2 fatigue and unsteady gait. Pt will be living alone upon d/c and, therefore, would benefit from continued OT services to maximize safety and independence with ADL tasks and increase occupational performance in roles and routines.   .    Rehab identified problem list/impairments: weakness, impaired endurance, impaired self care skills, impaired functional mobilty, impaired balance, pain, decreased safety awareness    Rehab potential is good    Activity tolerance: Good    Discharge recommendations: home health OT (with supervision and light assist)     Barriers to discharge: Inaccessible home environment, Decreased caregiver support     Equipment recommendations: bedside commode     GOALS:    Occupational Therapy Goals        Problem: Occupational Therapy Goal    Goal Priority Disciplines Outcome Interventions   Occupational Therapy Goal     OT, PT/OT Ongoing (interventions implemented as appropriate)    Description:  Goals to be met by: 11 days     Patient will increase functional independence with ADLs by performing:    Feeding with Mod I.  UE Dressing with Mod I  LE Dressing with Mod I  Grooming while standing with Mod I  Toileting from bedside commode with Mod I  for hygiene and clothing management.   Bathing from  shower chair/bench with Stand-by Assistance.  Supine to sit with Modified [place].  Stand pivot transfers with Modified [place].  Toilet transfer to  bedside commode with Modified [place].   Pt. To be independent with HEP for BUE to improve endurance Pt. To be independent with HEP /activities to improve FMC skills in BUE                    Plan:  Patient to be seen 5 x/week to address the above listed problems via self-care/home management, therapeutic activities, therapeutic exercises  Plan of Care expires: 07/30/17  Plan of Care reviewed with: patient    Doris Liliana, SOT  07/05/2017

## 2017-07-06 ENCOUNTER — OFFICE VISIT (OUTPATIENT)
Dept: NEUROSURGERY | Facility: CLINIC | Age: 67
DRG: 950 | End: 2017-07-06
Attending: HOSPITALIST
Payer: MEDICARE

## 2017-07-06 VITALS — DIASTOLIC BLOOD PRESSURE: 77 MMHG | TEMPERATURE: 98 F | SYSTOLIC BLOOD PRESSURE: 123 MMHG | HEART RATE: 84 BPM

## 2017-07-06 DIAGNOSIS — R29.898 WEAKNESS OF BOTH LOWER EXTREMITIES: Primary | ICD-10-CM

## 2017-07-06 DIAGNOSIS — G95.9 CERVICAL MYELOPATHY: ICD-10-CM

## 2017-07-06 LAB
ANION GAP SERPL CALC-SCNC: 15 MMOL/L
BASOPHILS # BLD AUTO: 0.02 K/UL
BASOPHILS NFR BLD: 0.2 %
BUN SERPL-MCNC: 16 MG/DL
CALCIUM SERPL-MCNC: 9.3 MG/DL
CHLORIDE SERPL-SCNC: 103 MMOL/L
CO2 SERPL-SCNC: 21 MMOL/L
CREAT SERPL-MCNC: 0.9 MG/DL
DIFFERENTIAL METHOD: ABNORMAL
EOSINOPHIL # BLD AUTO: 0.3 K/UL
EOSINOPHIL NFR BLD: 3.6 %
ERYTHROCYTE [DISTWIDTH] IN BLOOD BY AUTOMATED COUNT: 13 %
EST. GFR  (AFRICAN AMERICAN): >60 ML/MIN/1.73 M^2
EST. GFR  (NON AFRICAN AMERICAN): >60 ML/MIN/1.73 M^2
GLUCOSE SERPL-MCNC: 68 MG/DL
HCT VFR BLD AUTO: 39 %
HGB BLD-MCNC: 12.9 G/DL
LYMPHOCYTES # BLD AUTO: 1.9 K/UL
LYMPHOCYTES NFR BLD: 20.5 %
MAGNESIUM SERPL-MCNC: 2 MG/DL
MCH RBC QN AUTO: 31.7 PG
MCHC RBC AUTO-ENTMCNC: 33.1 %
MCV RBC AUTO: 96 FL
MONOCYTES # BLD AUTO: 0.8 K/UL
MONOCYTES NFR BLD: 8.7 %
NEUTROPHILS # BLD AUTO: 6.2 K/UL
NEUTROPHILS NFR BLD: 67 %
PHOSPHATE SERPL-MCNC: 4.2 MG/DL
PLATELET # BLD AUTO: 367 K/UL
PMV BLD AUTO: 10.7 FL
POTASSIUM SERPL-SCNC: 4.4 MMOL/L
RBC # BLD AUTO: 4.07 M/UL
SODIUM SERPL-SCNC: 139 MMOL/L
WBC # BLD AUTO: 9.32 K/UL

## 2017-07-06 PROCEDURE — 83735 ASSAY OF MAGNESIUM: CPT

## 2017-07-06 PROCEDURE — 36415 COLL VENOUS BLD VENIPUNCTURE: CPT

## 2017-07-06 PROCEDURE — 97116 GAIT TRAINING THERAPY: CPT

## 2017-07-06 PROCEDURE — 97530 THERAPEUTIC ACTIVITIES: CPT

## 2017-07-06 PROCEDURE — 80048 BASIC METABOLIC PNL TOTAL CA: CPT

## 2017-07-06 PROCEDURE — 84100 ASSAY OF PHOSPHORUS: CPT

## 2017-07-06 PROCEDURE — 99024 POSTOP FOLLOW-UP VISIT: CPT | Mod: S$GLB,,, | Performed by: PHYSICIAN ASSISTANT

## 2017-07-06 PROCEDURE — 25000003 PHARM REV CODE 250: Performed by: PHYSICIAN ASSISTANT

## 2017-07-06 PROCEDURE — 99999 PR PBB SHADOW E&M-EST. PATIENT-LVL II: CPT | Mod: PBBFAC,,, | Performed by: PHYSICIAN ASSISTANT

## 2017-07-06 PROCEDURE — 12000000 HC SNF SEMI-PRIVATE ROOM

## 2017-07-06 PROCEDURE — 97110 THERAPEUTIC EXERCISES: CPT

## 2017-07-06 PROCEDURE — 99900058 HC 022 PAID UNDER SNF PPS

## 2017-07-06 PROCEDURE — 85025 COMPLETE CBC W/AUTO DIFF WBC: CPT

## 2017-07-06 RX ADMIN — ATORVASTATIN CALCIUM 10 MG: 10 TABLET, FILM COATED ORAL at 10:07

## 2017-07-06 RX ADMIN — STANDARDIZED SENNA CONCENTRATE AND DOCUSATE SODIUM 1 TABLET: 8.6; 5 TABLET, FILM COATED ORAL at 10:07

## 2017-07-06 RX ADMIN — FINASTERIDE 5 MG: 5 TABLET, FILM COATED ORAL at 10:07

## 2017-07-06 RX ADMIN — VITAMIN D, TAB 1000IU (100/BT) 1000 UNITS: 25 TAB at 10:07

## 2017-07-06 RX ADMIN — HEPARIN SODIUM 5000 UNITS: 5000 INJECTION, SOLUTION INTRAVENOUS; SUBCUTANEOUS at 05:07

## 2017-07-06 RX ADMIN — OXYCODONE HYDROCHLORIDE AND ACETAMINOPHEN 1 TABLET: 10; 325 TABLET ORAL at 10:07

## 2017-07-06 RX ADMIN — OXYCODONE HYDROCHLORIDE AND ACETAMINOPHEN 1 TABLET: 10; 325 TABLET ORAL at 08:07

## 2017-07-06 RX ADMIN — RAMELTEON 8 MG: 8 TABLET, FILM COATED ORAL at 09:07

## 2017-07-06 RX ADMIN — ALUMINUM HYDROXIDE, MAGNESIUM HYDROXIDE, AND SIMETHICONE 15 ML: 200; 200; 20 SUSPENSION ORAL at 06:07

## 2017-07-06 RX ADMIN — BACITRACIN: 500 OINTMENT TOPICAL at 10:07

## 2017-07-06 RX ADMIN — STANDARDIZED SENNA CONCENTRATE AND DOCUSATE SODIUM 1 TABLET: 8.6; 5 TABLET, FILM COATED ORAL at 09:07

## 2017-07-06 RX ADMIN — HEPARIN SODIUM 5000 UNITS: 5000 INJECTION, SOLUTION INTRAVENOUS; SUBCUTANEOUS at 09:07

## 2017-07-06 RX ADMIN — DIAZEPAM 5 MG: 5 TABLET ORAL at 06:07

## 2017-07-06 NOTE — PLAN OF CARE
Problem: Physical Therapy Goal  Goal: Physical Therapy Goal  Goals to be met by: 9 days     Patient will increase functional independence with mobility by performin. Supine to sit with Modified [place].  2. Sit to supine with Modified [place].  3. Sit to stand transfer with Supervision.  4. Bed to chair transfer with Supervision using Rolling Walker.  5. Gait  x 150 feet with Supervision using Rolling Walker.   6. Ascend/descend 5 stair with left Handrails Stand-by Assistance.   7. Stand for 3 minutes with Stand-by Assistance using Rolling Walker while performing dynamic standing balance activity. MET 7/3/2017  8. Lower extremity exercise program x20 reps per handout, with assistance as needed.  9. Pt will decreased TUG time to <25s w/ RW in order to decrease fall risk.      LTGs remain appropriate. Pt will continue PT POC.    Laurence Cobos, PT  2017

## 2017-07-06 NOTE — PLAN OF CARE
Problem: Fall Risk (Adult)  Goal: Absence of Falls  Patient will demonstrate the desired outcomes by discharge/transition of care.   Outcome: Ongoing (interventions implemented as appropriate)  Pt lying in bed with hob elevated at 30 degrees, pt is free of falls or injuries this shift, call light in reach.neck collar on.

## 2017-07-06 NOTE — PT/OT/SLP PROGRESS
Occupational Therapy  Treatment    Anthony Miguel   MRN: [id number]   Admitting Diagnosis: Cervical myelopathy    OT Date of Treatment: 07/06/17  Total Time (min): 47 min    Billable Minutes:  Therapeutic Activity 30 and Therapeutic Exercise 17    General Precautions: Standard, fall  Orthopedic Precautions: N/A  Braces: Cervical collar    Do you have any cultural, spiritual, Cheondoism conflicts, given your current situation?: none reported    Subjective:  Communicated with pt prior to session.  I'm feeling good.    Pain/Comfort  Pain Rating 1: 5/10  Location - Side 1: Bilateral  Location - Orientation 1: posterior  Location 1: neck    Objective:  Patient found with: cervical collar (up in b/s chair)    Functional Status:  MDS G  Transfer Functional Status: CGA SPT from b/s chair to w/c with RW  Moving from seated to standing position: Not steady, only able to stabilize with staff assistance  Surface-to-surface transfer (transfer between bed and chair or wheelchair): Not steady, only able to stabilize with staff assistance          OT Exercises: AROM chest presses 2# dowel 3 sets x 10 reps, bicep curls 2# dowel 2 sets x 10 reps  UE Ergometer 10 minutes    Additional Treatment:  Pt participated in playing piano x 1 trial while seated ~10 minutes to improve finger dexterity and functional performance in valued occupation.  Pt noted to take deep breaths while speaking. Pt's O2 sats taken and noted to be 90.    Patient left up in chair with call button in reach    ASSESSMENT:  Anthony Miguel is a 66 y.o. male with a medical diagnosis of Cervical myelopathy and demonstrates decreased endurance, self-care skills, and functional mobility. Pt is progressing in therapy and appears pleased with functional progress. Pt is a fall risk 2/2 weakness and unsteady gait. Pt would benefit from continued OT services to maximize safety and independence with ADL tasks and increase occupational performance in roles and routines.        Rehab identified problem list/impairments: weakness, impaired endurance, impaired self care skills, impaired functional mobilty, pain    Rehab potential is good    Activity tolerance: Good    Discharge recommendations: home health OT (with supervision and light assist)     Barriers to discharge: Inaccessible home environment, Decreased caregiver support     Equipment recommendations: bedside commode, hip kit     GOALS:    Occupational Therapy Goals        Problem: Occupational Therapy Goal    Goal Priority Disciplines Outcome Interventions   Occupational Therapy Goal     OT, PT/OT Ongoing (interventions implemented as appropriate)    Description:  Goals to be met by: 11 days     Patient will increase functional independence with ADLs by performing:    Feeding with Mod I.  UE Dressing with Mod I  LE Dressing with Mod I  Grooming while standing with Mod I  Toileting from bedside commode with Mod I  for hygiene and clothing management.   Bathing from  shower chair/bench with Stand-by Assistance.  Supine to sit with Modified [place].  Stand pivot transfers with Modified [place].  Toilet transfer to bedside commode with Modified [place].   Pt. To be independent with HEP for BUE to improve endurance Pt. To be independent with HEP /activities to improve FMC skills in BUE                    Plan:  Patient to be seen 5 x/week to address the above listed problems via self-care/home management, therapeutic activities, therapeutic exercises  Plan of Care expires: 07/30/17  Plan of Care reviewed with: patient    DIPTI Arroyo  07/06/2017

## 2017-07-06 NOTE — PLAN OF CARE
Problem: Occupational Therapy Goal  Goal: Occupational Therapy Goal  Goals to be met by: 11 days     Patient will increase functional independence with ADLs by performing:    Feeding with Mod I.  UE Dressing with Mod I  LE Dressing with Mod I  Grooming while standing with Mod I  Toileting from bedside commode with Mod I  for hygiene and clothing management.   Bathing from  shower chair/bench with Stand-by Assistance.  Supine to sit with Modified [place].  Stand pivot transfers with Modified [place].  Toilet transfer to bedside commode with Modified [place].   Pt. To be independent with HEP for BUE to improve endurance Pt. To be independent with HEP /activities to improve FMC skills in BUE   Pt tolerated session well.

## 2017-07-06 NOTE — PT/OT/SLP PROGRESS
Physical Therapy  Treatment    Anthony Miguel   MRN: [id number]   Admitting Diagnosis: Cervical myelopathy    PT Received On: 07/06/17  Total Time (min): 53       Billable Minutes:  Gait [id number], Therapeutic Activity 15, Therapeutic Exercise 25 and Total Time 53    Treatment Type: Treatment  PT/PTA: PT     PTA Visit Number: 0       General Precautions: Standard, fall  Orthopedic Precautions: N/A   Braces: Cervical collar    Do you have any cultural, spiritual, Orthodoxy conflicts, given your current situation?: no    Subjective:  Pt agreeable to session.    Pain/Comfort  Pain Rating 1: 3/10  Location - Side 1: Bilateral  Location - Orientation 1: posterior  Location 1: neck  Pain Addressed 1: Pre-medicate for activity, Reposition    Objective:  Patient found supine in bed. Patient found with: cervical collar     Functional Status:  MDS G  Bed Mobility Functional Status: S-SBA  Transfer Functional Status: S-SBA  Walk in Room Functional Status: S-SBA  Walk in Corridor Functional Status: S-SBA  Locomotion on Unit Functional Status: S-SBA    Bed Mobility:  Sit>Supine:on mat Lisa  Supine>Sit: on bed w/ rail Lisa, on mat w/ SBA- cueing for logroll technique    Transfers:  Sit<>Stand: to/from w/c (3 trials total) w/ RW and SBA  Stand Pivot Transfer: EOB>w/c, w/c<>EOM, and w/c>bedside chair, all w/ RW and SBA  Min vc's for hand placement     Gait:  Amb 300ft w/ RW and SBA for safety, cueing for upright posture and to widen Joaquim for stability, multiple brief standing rest breaks taken during trial due to BUE fatigue/soreness     Advanced Gait:  Stairs: asc/lópez 4 steps w/ BUE on (L) rail and CGA for safety    Therex:  Supine therex 2x15 reps w/ 1# ankle weights (GS, SAQ, HS, ABd/ADd)    Balance:  Standing beach ball tap x3min w/ RW and close SBA for safety, pt tends to shift weight anteriorly but was w/o LOB today, he remains unsteady    Patient left up in chair with call button in reach.    Assessment:  Anthony Miguel  is a 66 y.o. male with a medical diagnosis of Cervical myelopathy.  Pt samantha session well w/ good participation. Pt remains at a SBA level due to instability during standing activities. He also is limited by neck and BUE pain. Pt will continue to benefit from skilled PT services.    Rehab identified problem list/impairments: weakness, impaired endurance, impaired sensation, impaired functional mobilty, gait instability, impaired balance, pain    Rehab potential is good.    Activity tolerance: Good    Discharge recommendations: home with home health     Barriers to discharge: Inaccessible home environment, Decreased caregiver support    Equipment recommendations: bedside commode     GOALS:    Physical Therapy Goals        Problem: Physical Therapy Goal    Goal Priority Disciplines Outcome Goal Variances Interventions   Physical Therapy Goal     PT/OT, PT Ongoing (interventions implemented as appropriate)     Description:  Goals to be met by: 9 days     Patient will increase functional independence with mobility by performin. Supine to sit with Modified [place].  2. Sit to supine with Modified [place].  3. Sit to stand transfer with Supervision.  4. Bed to chair transfer with Supervision using Rolling Walker.  5. Gait  x 150 feet with Supervision using Rolling Walker.   6. Ascend/descend 5 stair with left Handrails Stand-by Assistance.   7. Stand for 3 minutes with Stand-by Assistance using Rolling Walker while performing dynamic standing balance activity. MET 7/3/2017  8. Lower extremity exercise program x20 reps per handout, with assistance as needed.  9. Pt will decreased TUG time to <25s w/ RW in order to decrease fall risk.                       PLAN:    Patient to be seen 5 x/week  to address the above listed problems via gait training, therapeutic activities, therapeutic exercises  Plan of Care expires: 17  Plan of Care reviewed with: patient    Laurence Piercesayra, PT  2017

## 2017-07-06 NOTE — PLAN OF CARE
Problem: Fall Risk (Adult)  Goal: Identify Related Risk Factors and Signs and Symptoms  Related risk factors and signs and symptoms are identified upon initiation of Human Response Clinical Practice Guideline (CPG)    Outcome: Ongoing (interventions implemented as appropriate)   07/06/17 0615   Fall Risk   Related Risk Factors (Fall Risk) fatigue/slow reaction;gait/mobility problems;history of falls   Signs and Symptoms (Fall Risk) presence of risk factors

## 2017-07-06 NOTE — TREATMENT PLAN
"Rehab Services' DME recommendations    Anthony Miguel  MRN: [id number]    [x] Walker Adult (5'4"-6"6")    Accessories N/A    Wheels Yes  [x] Wheelchair  Number of hours up in a wheelchair per day 8    Style Light weight    Seat Width 18 (Standard adult)    Seat Depth 18    Back Height Standard    Leg Support Standard and Swing Away    Arm Height Full and Swing Away    Cushion Basic    Justification for Cushion maintain skin integrity    Justification for wheelchair order: (Please select all that apply) Caregiver is capable and willing to operate wheelchair safely, Patient's upper body strength is sufficient for propulsion, The patient requires the use of a wheelchair for ADLs within the home and Patient mobility limitations cannot be sufficiently resolved by the use of other ambulatory therapies      [x] 3 in 1 commode Standard    [x] Tub bench Standard (unpadded)  [x] Hip Kit Standard/Short  [x] Home health PT and OT and   Aide for bathing      TRISHA Casillas 7/6/2017        "

## 2017-07-06 NOTE — PROGRESS NOTES
Wound Check   Neurosurgery     Anthony Miguel is a 66 y.o. who presents to clinic today for his/her 2 week wound check s/p C3-6 posterior cervical fusion 6/21. Denies fevers, chills, night sweats or N/V.       Physical Exam:   General: well developed, well nourished, no distress  Neurologic: Alert and oriented. Thought content appropriate.   GCS: Motor: 6/Verbal: 5/Eyes: 4 GCS Total: 15   Mental Status: Awake, Alert, Oriented x3   Cranial nerves: face symmetric, tongue midline, pupils equal, round, reactive to light with accomodation, EOMI.   Motor Strength: moves all extremities with good strength and tone   Sensation: response to light touch throughout  No gait disturbances     Incision is clean, dry and intact with no signs of erythema, swelling or purulent drainage. Staples are intact. All skin edges are completely approximated.       Vitals:    07/06/17 1433   BP: 123/77   Pulse: 84   Temp: 97.9 °F (36.6 °C)             Assessment/Plan:   -Keep incision open to air   -No need to continue putting bacitracin ointment on wound   -Can shower and get incision wet, just pat dry and no vigorous scrubbing. Do not submerge incision for another 4 weeks.   -No lifting more than 10 lbs or excessive bending/twisting.   -Can drive after 4 weeks when no longer taking narcotics   -Follow up with Dr. Wolff in 4 weeks   -Encouraged patient to call if they have any questions or concerns prior to next follow up appt      Michela Graham PA-C  Neurosurgery  [phone]

## 2017-07-06 NOTE — NURSING
Patient transferred to [facility] for nerosurgery post op appointment via [employer] wheelchair transportation. No distress upon time of departure.

## 2017-07-06 NOTE — PLAN OF CARE
Problem: Patient Care Overview  Goal: Plan of Care Review  Outcome: Outcome(s) achieved Date Met: 07/06/17 07/06/17 0622   Coping/Psychosocial   Plan Of Care Reviewed With patient

## 2017-07-06 NOTE — LETTER
July 6, 2017      David Wolff MD  [address]  [facility] [address]           [facility] - Neurosurgery 7th Fl  [address]  [facility] [address]  Phone: [phone]          Patient: Anthony Miguel   MR Number: [id number]   YOB: 1950   Date of Visit: 7/6/2017       Dear Dr. David Wolff:    Thank you for referring Anthony Miguel to me for evaluation. Attached you will find relevant portions of my assessment and plan of care.    If you have questions, please do not hesitate to call me. I look forward to following Anthony Miguel along with you.    Sincerely,    Michela Graham PA-C    Enclosure  CC:  No Recipients    If you would like to receive this communication electronically, please contact [email or web address] or [phone] to request more information on [employer] access.    For providers and/or their staff who would like to refer a patient to [facility], please contact us through our one-stop-shop provider referral line, [facility] , at [phone].    If you feel you have received this communication in error or would no longer like to receive these types of communications, please e-mail [email or web address]

## 2017-07-07 PROBLEM — R11.2 NON-INTRACTABLE VOMITING WITH NAUSEA: Status: ACTIVE | Noted: 2017-07-07

## 2017-07-07 PROCEDURE — 25000003 PHARM REV CODE 250: Performed by: NURSE PRACTITIONER

## 2017-07-07 PROCEDURE — 99308 SBSQ NF CARE LOW MDM 20: CPT | Mod: ,,, | Performed by: NURSE PRACTITIONER

## 2017-07-07 PROCEDURE — 92610 EVALUATE SWALLOWING FUNCTION: CPT

## 2017-07-07 PROCEDURE — 97116 GAIT TRAINING THERAPY: CPT

## 2017-07-07 PROCEDURE — 97535 SELF CARE MNGMENT TRAINING: CPT

## 2017-07-07 PROCEDURE — 97110 THERAPEUTIC EXERCISES: CPT

## 2017-07-07 PROCEDURE — 12000000 HC SNF SEMI-PRIVATE ROOM

## 2017-07-07 PROCEDURE — 97530 THERAPEUTIC ACTIVITIES: CPT

## 2017-07-07 PROCEDURE — 25000003 PHARM REV CODE 250: Performed by: PHYSICIAN ASSISTANT

## 2017-07-07 RX ORDER — OXYCODONE AND ACETAMINOPHEN 10; 325 MG/1; MG/1
1 TABLET ORAL EVERY 4 HOURS PRN
Qty: 35 TABLET | Refills: 0 | Status: SHIPPED | OUTPATIENT
Start: 2017-07-07 | End: 2017-07-19

## 2017-07-07 RX ORDER — ACETAMINOPHEN 325 MG/1
650 TABLET ORAL EVERY 6 HOURS PRN
Status: DISCONTINUED | OUTPATIENT
Start: 2017-07-07 | End: 2017-07-13 | Stop reason: HOSPADM

## 2017-07-07 RX ORDER — DIAZEPAM 5 MG/1
5 TABLET ORAL EVERY 6 HOURS PRN
Qty: 15 TABLET | Refills: 0 | Status: SHIPPED | OUTPATIENT
Start: 2017-07-07 | End: 2017-09-12

## 2017-07-07 RX ORDER — ONDANSETRON 4 MG/1
4 TABLET, ORALLY DISINTEGRATING ORAL EVERY 6 HOURS PRN
Status: DISCONTINUED | OUTPATIENT
Start: 2017-07-07 | End: 2017-07-13 | Stop reason: HOSPADM

## 2017-07-07 RX ORDER — AMOXICILLIN 250 MG
1 CAPSULE ORAL 2 TIMES DAILY
COMMUNITY
Start: 2017-07-07 | End: 2018-10-31 | Stop reason: ALTCHOICE

## 2017-07-07 RX ADMIN — STANDARDIZED SENNA CONCENTRATE AND DOCUSATE SODIUM 1 TABLET: 8.6; 5 TABLET, FILM COATED ORAL at 09:07

## 2017-07-07 RX ADMIN — ONDANSETRON 4 MG: 4 TABLET, ORALLY DISINTEGRATING ORAL at 01:07

## 2017-07-07 RX ADMIN — FINASTERIDE 5 MG: 5 TABLET, FILM COATED ORAL at 09:07

## 2017-07-07 RX ADMIN — HEPARIN SODIUM 5000 UNITS: 5000 INJECTION, SOLUTION INTRAVENOUS; SUBCUTANEOUS at 01:07

## 2017-07-07 RX ADMIN — HEPARIN SODIUM 5000 UNITS: 5000 INJECTION, SOLUTION INTRAVENOUS; SUBCUTANEOUS at 10:07

## 2017-07-07 RX ADMIN — OXYCODONE HYDROCHLORIDE AND ACETAMINOPHEN 1 TABLET: 10; 325 TABLET ORAL at 01:07

## 2017-07-07 RX ADMIN — ATORVASTATIN CALCIUM 10 MG: 10 TABLET, FILM COATED ORAL at 09:07

## 2017-07-07 RX ADMIN — OXYCODONE HYDROCHLORIDE AND ACETAMINOPHEN 1 TABLET: 10; 325 TABLET ORAL at 08:07

## 2017-07-07 RX ADMIN — STANDARDIZED SENNA CONCENTRATE AND DOCUSATE SODIUM 1 TABLET: 8.6; 5 TABLET, FILM COATED ORAL at 08:07

## 2017-07-07 RX ADMIN — OXYCODONE HYDROCHLORIDE AND ACETAMINOPHEN 1 TABLET: 10; 325 TABLET ORAL at 09:07

## 2017-07-07 RX ADMIN — HEPARIN SODIUM 5000 UNITS: 5000 INJECTION, SOLUTION INTRAVENOUS; SUBCUTANEOUS at 05:07

## 2017-07-07 RX ADMIN — VITAMIN D, TAB 1000IU (100/BT) 1000 UNITS: 25 TAB at 09:07

## 2017-07-07 RX ADMIN — ALUMINUM HYDROXIDE, MAGNESIUM HYDROXIDE, AND SIMETHICONE 15 ML: 200; 200; 20 SUSPENSION ORAL at 08:07

## 2017-07-07 RX ADMIN — BACITRACIN: 500 OINTMENT TOPICAL at 09:07

## 2017-07-07 NOTE — PROGRESS NOTES
[facility] Department of Hospital Medicine  Progress Note    Patient Name: Anthony Miguel  MRN: [id number]  Code Status: Full Code  Admission Date: 6/29/2017  Length of Stay: 8 days  Attending Physician: Micheline Flannery MD  Primary Care Provider: Jada Spaulding MD    Subjective:     Principal Problem:Cervical myelopathy    HPI:  Chief Complaint/Reason for Admission: Debility    History of Present Illness:  Patient is a 66 y.o. male who has a past medical history of Benign non-nodular prostatic hyperplasia; Hepatitis C; Senile purpura; vitamin D deficiency; and Vocal fold cyst presented with progressive weakness and paresthesias in his arms and legs for approximately one month. MRI of neck showed multilevel spondylosis with multiple disc herniations which results in severe central stenosis. Patient was admitted to neurosurgery and underwent posterior cervical fusion. Patient tolerated procedure well with no significant post operative complications.     Patient has been working with PT/OT who recommend rehab for further balance/mobility training. Patient's insurance denied rehab and thus patient being sent to SNF.  Patient lives alone in [place] in a single story home.  Prior to admission, he was independent with mobility and transfers up until May and required assistance with ADLs and mobility. Upon admission to SNF, he reported his pain was mild 4/10 and is requiring frequent pain medications. His main concern is his ability to play the piano. He states his strength is improved after surgery. Patient currently denies any fever/chills, chest pain, dyspnea, weakness, numbness, abdominal pain, nausea/vomiting, dysuria/hematuria, or weight loss.         Interval History:   7/3/17  Patient seen at bedside, he reports his pain is adequately controlled.  He is concerned about being able to return to play the piano.  He currently lives alone, but has a friend, Al Ander ([phone]) who  is willing to assist him if needed upon discharge.  He was instructed to keep his cervical collar on at all times until instructed otherwise by NS team.      7/7/17  Patient seen at bedside, he reports having one episode of nausea and vomiting today after eating laughlin.  States no more nausea now.  Currently he denies abdominal pain and states he is having bowel movements without issues.  He has no other complaints.  Nursing informed me he is requesting Tylenol for pain rather than narcotics as he feels the narcotics are too strong.  Therapy reported she noted patient coughing when eating lunch and asked if speech should see him.  Discussed this issue with him, he reports this is a chronic issue and has had laryngoscopies in the past and had collagen injected into his vocal cords at [facility].          Review of Systems   Constitutional: Positive for activity change. Negative for appetite change, chills, diaphoresis, fatigue and fever.   Respiratory: Positive for cough. Negative for chest tightness and shortness of breath.    Cardiovascular: Negative for chest pain, palpitations and leg swelling.   Gastrointestinal: Positive for nausea and vomiting. Negative for abdominal pain, constipation and diarrhea.   Genitourinary: Negative for difficulty urinating.   Musculoskeletal: Positive for neck pain.   Skin: Negative for rash.   Neurological: Positive for numbness.     Objective:     Vital Signs (Most Recent):  Temp: 97.7 °F (36.5 °C) (07/07/17 0735)  Pulse: 78 (07/07/17 0735)  Resp: 18 (07/07/17 0735)  BP: 128/83 (07/07/17 0735)  SpO2: 97 % (07/07/17 0735) Vital Signs (24h Range):  Temp:  [97.7 °F (36.5 °C)-98 °F (36.7 °C)] 97.7 °F (36.5 °C)  Pulse:  [78-85] 78  Resp:  [18] 18  SpO2:  [97 %-99 %] 97 %  BP: (120-128)/(77-83) 128/83     Weight: 74.8 kg (164 lb 14.5 oz)  Body mass index is 25.83 kg/m².    Intake/Output Summary (Last 24 hours) at 07/07/17 1210  Last data filed at 07/07/17 0600   Gross per 24 hour   Intake               240 ml   Output              650 ml   Net             -410 ml      Physical Exam   Constitutional: He is oriented to person, place, and time. He appears well-developed and well-nourished.   Cardiovascular: Normal rate, regular rhythm, normal heart sounds and intact distal pulses.  Exam reveals no gallop and no friction rub.    No murmur heard.  Pulmonary/Chest: Effort normal and breath sounds normal. No respiratory distress. He has no wheezes. He has no rales. He exhibits no tenderness.   Abdominal: Soft. Bowel sounds are normal. He exhibits no distension. There is no tenderness.   Musculoskeletal: Normal range of motion. He exhibits no edema or tenderness.   Neurological: He is alert and oriented to person, place, and time.   Hard cervical collar in place.   Skin: Skin is warm and dry. Capillary refill takes less than 2 seconds.   Psychiatric: Thought content normal.       Significant Labs: All pertinent labs within the past 24 hours have been reviewed.    Significant Imaging: n/a    Assessment/Plan:      Non-intractable vomiting with nausea    Patient with 1 episode of N/V after eating breakfast.  Currently no abdominal pain.  Having BM.  Will order Zofran PRN.          Debility    Continue PT/OT to improve functional strength.           Benign non-nodular prostatic hyperplasia without lower urinary tract symptoms    Cont with finasteride to treat        Unspecified vitamin D deficiency    Cont with Vit D supplements to treat.         HLD (hyperlipidemia)    Cont with home atorvastatin to treat.         History of hepatitis C (completed treatment)    Treated with ribavarin and interferon in 2012  No signs of liver failure.  Follow up as outpatient         * Cervical myelopathy    S/p posterior cervical fusion  Patient to follow up with neurosurgery for wound check  Bacitracin to incision BID will order on 7/3  Neurologically stable  Cervical collar at all times  Cont neuro checks  Cont with current  pain control   Cont bowel regimen for opioid induced constipation  Cont with PT/OT for gait training and strengthening and restoration of ADL's   Cont DVT prophylaxis with heparin   Patient with noted cough while eating, will consult speech for bedside swallow eval.    Future Appointments  Date Time Provider Department Center   8/3/2017 12:30 PM NOM [id number] LB LIMIT [facility] XRAY OP Cristiano Suarez   8/3/2017 1:30 PM David Wolff MD [facility] NEUROS7 Cristiano Briones NP  Department of [facility] Medicine  [facility]

## 2017-07-07 NOTE — PLAN OF CARE
[facility] HOME HEALTH ORDERS  FACE TO FACE ENCOUNTER    Patient Name: Anthony Miguel  YOB: 1950    PCP: Jada Spaulding MD   PCP Address: [address] [facility] [place] [address]  PCP Phone Number: [phone]  PCP Fax: [phone]    Encounter Date: 07/07/2017    Admit to Home Health    Diagnoses:  Active Hospital Problems    Diagnosis  POA    *Cervical myelopathy [G95.9]  Yes    Debility [R53.81]  Yes    Unspecified vitamin D deficiency [E55.9]  Yes     Compliant with daily supplementation of 1000U Vit D      Benign non-nodular prostatic hyperplasia without lower urinary tract symptoms [N40.0]  Yes     Compliant with finasteride      History of hepatitis C (completed treatment) [Z86.19]  Yes     Patient with elevated bilirubin on 6/6. Treated with ribavarin and interferon in 2012      HLD (hyperlipidemia) [E78.5]  Yes     Compliant with atorvastatin 10mg        Resolved Hospital Problems    Diagnosis Date Resolved POA   No resolved problems to display.       Future Appointments  Date Time Provider Department Center   8/3/2017 12:30 PM NOM [id number] LB LIMIT NOM XRAY OP [facility]   8/3/2017 1:30 PM David Wolff MD [facility] NEUROS7 [facility]     Follow-up Information     Jada Spaulding MD. Schedule an appointment as soon as possible for a visit in 2 weeks.    Specialty:  Internal Medicine  Why:  Hospital follow up  Contact information:  [address]  [facility] [address]  [phone]             David Wolff MD. Go on 8/3/2017.    Specialty:  Neurosurgery  Why:  For wound re-check  Contact information:  [address]  [facility] [address]  [phone]                     I have seen and examined this patient face to face today. My clinical findings that support the need for the home health skilled services and home bound status are the following:  Weakness/numbness causing balance and gait disturbance due to Weakness/Debility and Surgery  making it taxing to leave home.    Allergies:Review of patient's allergies indicates:  No Known Allergies    Diet: regular diet    Activities: activity as tolerated and do not lift anything heavier than 10 lbs and avoid excessive twisting and bending until cleared by NS.    Nursing:   SN to complete comprehensive assessment including routine vital signs. Instruct on disease process and s/s of complications to report to MD. Review/verify medication list sent home with the patient at time of discharge  and instruct patient/caregiver as needed. Frequency may be adjusted depending on start of care date.    Notify MD if SBP > 160 or < 90; DBP > 90 or < 50; HR > 120 or < 50; Temp > 101;       CONSULTS:    Physical Therapy to evaluate and treat. Evaluate for home safety and equipment needs; Establish/upgrade home exercise program. Perform / instruct on therapeutic exercises, gait training, transfer training, and Range of Motion.  Occupational Therapy to evaluate and treat. Evaluate home environment for safety and equipment needs. Perform/Instruct on transfers, ADL training, ROM, and therapeutic exercises.  Aide to provide assistance with personal care, ADLs, and vital signs.    MISCELLANEOUS CARE:  N/A    WOUND CARE ORDERS  n/a      Medications: Review discharge medications with patient and family and provide education.      Current Discharge Medication List      START taking these medications    Details   diazePAM (VALIUM) 5 MG tablet Take 1 tablet (5 mg total) by mouth every 6 (six) hours as needed (muscle spasm).  Qty: 15 tablet, Refills: 0      oxycodone-acetaminophen (PERCOCET)  mg per tablet Take 1 tablet by mouth every 4 (four) hours as needed for Pain.  Qty: 35 tablet, Refills: 0      senna-docusate 8.6-50 mg (PERICOLACE) 8.6-50 mg per tablet Take 1 tablet by mouth 2 (two) times daily.         CONTINUE these medications which have NOT CHANGED    Details   aspirin (ECOTRIN) 81 MG EC tablet Take 81 mg by mouth  once daily.      finasteride (PROSCAR) 5 mg tablet Take 5 mg by mouth once daily.      atorvastatin (LIPITOR) 10 MG tablet Take 10 mg by mouth once daily.      loratadine (CLARITIN) 10 mg tablet Take 10 mg by mouth once daily.      vitamin D 1000 units Tab Take 1,000 Units by mouth once daily.      walker (ULTRA-LIGHT ROLLATOR) Misc 1 Units by Misc.(Non-Drug; Combo Route) route once daily at 6am.  Qty: 1 each, Refills: 0    Associated Diagnoses: Weakness         STOP taking these medications       acetaminophen (TYLENOL) 500 mg Cap Comments:   Reason for Stopping:         ibuprofen (ADVIL,MOTRIN) 400 MG tablet Comments:   Reason for Stopping:         TRAZODONE HCL (TRAZODONE ORAL) Comments:   Reason for Stopping:         triamcinolone acetonide 0.1% (KENALOG) 0.1 % Lotn Comments:   Reason for Stopping:               I certify that this patient is confined to his home and needs physical therapy and occupational therapy.

## 2017-07-07 NOTE — PLAN OF CARE
Problem: Patient Care Overview  Goal: Plan of Care Review  Outcome: Ongoing (interventions implemented as appropriate)   07/07/17 0122   Coping/Psychosocial   Plan Of Care Reviewed With patient       Problem: Fall Risk (Adult)  Goal: Absence of Falls  Patient will demonstrate the desired outcomes by discharge/transition of care.   Outcome: Ongoing (interventions implemented as appropriate)   07/07/17 0122   Fall Risk (Adult)   Absence of Falls making progress toward outcome       Comments: Pt assessed and vs taken recorded. Noted no active skin breakdown. Incision site at posterior neck area dry, intact and open to air. AO x 4, afebrile, calm, cooperative with care and complaining of pain. Call bell within reached, pt can independently repositioned himself in bed, urinal and commode chair at bedside and monitor for pain and safety.

## 2017-07-07 NOTE — PT/OT/SLP EVAL
"Speech Language Pathology  Clinical Swallow Evaluation    Anthony Miguel   MRN: [id number]   Admitting Diagnosis: Cervical myelopathy    Diet recommendations: Solid Diet Level: Regular  Liquid Diet Level: Thin HOB to 90 degrees, Small bites/sips, Alternating bites/sips, 1 bite/sip at a time and Avoid talking while eating    SLP Treatment Date: 07/07/17  Speech Start Time: 1215     Speech Stop Time: 1229     Speech Total (min): 14 min       TREATMENT BILLABLE MINUTES:  Eval Swallow and Oral Function 14    Diagnosis: Cervical myelopathy      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 : Yes  Keep patient NPO?: No General Precautions: Standard, aspiration, fall          Social Hx: Patient lives alone.    Prior diet: regular/thins; Pt reports having undergone MBSS at [facility] (unsure of date) which revealed abnormal epiglottic inversion.  Pt stated no diet modifications were recommended after study, but safe swallowing precautions and strategies were recommended to decreased risk of aspiration.      Subjective:  "I don't always follow the recommendations like I should." pt admitted to not strictly following safe swallowing precautions at times which likely contributed to overt s/s of aspiration observed by OT when pt began eating lunch this afternoon.  OT reported pt had taken multiple consistencies at one time, resulting in coughing.         Objective:        Oral Musculature Evaluation  Oral Musculature: WFL  Dentition: present and adequate (few " missing molars)  Mucosal Quality: good  Mandibular Strength and Mobility: WFL (ROM somewhat limited by presence of [place]-J collar)  Oral Labial Strength and Mobility: WFL  Lingual Strength and Mobility: WFL  Velar Elevation: WFL  Buccal Strength and Mobility: WFL  Voice Prior to PO Intake: dry, clear; slightly hoarse and breathy due to h/o vocal cord cyst - pt states he receive collagen injections to treat dysphonia     Bedside Swallow Eval:  Consistencies Assessed: Thin liquids multiple straw sips (approx 2oz), pt observed eating multiple trials from lunch tray which included rice and beans, squash and zucchini, and baked fish  Oral Phase: WFL  Pharyngeal Phase: no overt clinical  signs/symptoms of aspiration    Additional Treatment:  Education provided to pt regarding swallow evaluation and ST POC.  Pt able to provide thorough history regarding vocal cord dysfunction and assessments and procedures performed related to vocal cord and swallowing function.  Pt also demonstrating good awareness of risk of aspiration and safe swallowing strategies he should be following to minimize these risks.  Pt admits, however, sometimes he becomes distracted and does not adhere to these safe swallowing strategies as strictly as he should.  SLP reviewed and reinforced safe swallowing strategies and discussed plan to f/u on Monday to ensure diet tolerance.      Assessment:  Anthony Miguel is a 66 y.o. male with a medical diagnosis of Cervical myelopathy and presents with mild dysphagia.           Discharge recommendations: Discharge Facility/Level Of Care Needs:  (no further ST services upon d/c; SLP will f/u x 1 to ensure diet tolerance)     Diet recommendations: Solid Diet Level: Regular  Liquid Diet Level: Thin     Goals:    SLP Goals        Problem: SLP Goal    Goal Priority Disciplines Outcome   SLP Goal     SLP    Description:  Speech Language Pathology Goals  Goals expected to be met by 7/10:  1. Pt will tolerate regular  consistency diet and thin liquids without s/s of aspiration.                         Plan:   Patient to be seen Therapy Frequency: 1 x/week   Plan of Care expires: 08/05/17  Plan of Care reviewed with: patient  SLP Follow-up?: Yes  SLP - Next Visit Date: 07/10/17           DANILO Hendrix, SILKE-SLP  07/07/2017      DANILO Hendrix, CCC-SLP  Speech Language Pathologist  [phone]  7/7/2017

## 2017-07-07 NOTE — NURSING
Bacitracin ointment D/C by primary provider when pt went for appointment this am as reported by AM nurse.

## 2017-07-07 NOTE — ASSESSMENT & PLAN NOTE
Patient with 1 episode of N/V after eating breakfast.  Currently no abdominal pain.  Having BM.  Will order Zofran PRN.

## 2017-07-07 NOTE — PT/OT/SLP PROGRESS
"Physical Therapy  Treatment    Anthony Miguel   MRN: [id number]   Admitting Diagnosis: Cervical myelopathy    PT Received On: 07/07/17  Total Time (min): 60       Billable Minutes:  Gait [id number], Therapeutic Activity 15 and Therapeutic Exercise 35    Treatment Type: Treatment  PT/PTA: PTA     PTA Visit Number: 1       General Precautions: Standard, fall  Orthopedic Precautions: N/A   Braces: Cervical collar    Do you have any cultural, spiritual, Worship conflicts, given your current situation?: no    Subjective:  "I'm fine."    Pain/Comfort  Pain Rating 1: 0/10  Pain Rating Post-Intervention 1: 0/10    Objective:  Patient found supine in bed      Functional Status:  MDS G  Transfer Functional Status: S-SBA  Walk in Room Functional Status: S-SBA  Walk in Corridor Functional Status: CGA-Min (A)  Locomotion on Unit Functional Status: CGA-Min (A)          Bed Mobility:  Supine > sit with mod I    Transfers:  Sit <> stand from bed, w/c, commode with SBA  SPT from bed > w/c with SBA    Gait:    Pt gait trained 350 feet with RW and CGA for balance and safety.   Assistive Device: rolling walker  Gait Deviation(s): decreased sean;decreased toe-to-floor clearance;decreased weight-shifting ability;decreased velocity of limb motion;decreased step length;decreased stride length;decreased swing-to-stance ratio       Therex:  BLEs standing therex x 25 reps: heels raises, hip flexion/extension/abduction     Balance:  Dynamic standing balance x 9 minutes with RW and CGA for balance and safety    Additional Treatment:  LBE x 15 minutes to increase strength, endurance and coordination      Patient left up in chair with call button in reach.    Assessment:  Anthony Miguel is a 66 y.o. male with a medical diagnosis of Cervical myelopathy.   Pt tolerated tx well today. pt was able to ambulate a longer distance. Pt is able to complete BLEs standing strengthening exercises with no signs of fatigue but requires some VCs for " technique. Goals remain appropriate    Rehab identified problem list/impairments: weakness, impaired endurance, impaired sensation, impaired functional mobilty, gait instability, impaired balance, pain    Rehab potential is fair.    Activity tolerance: Fair    Discharge recommendations: home with home health     Barriers to discharge: Inaccessible home environment, Decreased caregiver support    Equipment recommendations: bedside commode     GOALS:    Physical Therapy Goals        Problem: Physical Therapy Goal    Goal Priority Disciplines Outcome Goal Variances Interventions   Physical Therapy Goal     PT/OT, PT Ongoing (interventions implemented as appropriate)     Description:  Goals to be met by: 9 days     Patient will increase functional independence with mobility by performin. Supine to sit with Modified [place].  2. Sit to supine with Modified [place].  3. Sit to stand transfer with Supervision.  4. Bed to chair transfer with Supervision using Rolling Walker.  5. Gait  x 150 feet with Supervision using Rolling Walker.   6. Ascend/descend 5 stair with left Handrails Stand-by Assistance.   7. Stand for 3 minutes with Stand-by Assistance using Rolling Walker while performing dynamic standing balance activity. MET 7/3/2017  8. Lower extremity exercise program x20 reps per handout, with assistance as needed.  9. Pt will decreased TUG time to <25s w/ RW in order to decrease fall risk.                       PLAN:    Patient to be seen 5 x/week  to address the above listed problems via gait training, therapeutic activities, therapeutic exercises  Plan of Care expires: 17  Plan of Care reviewed with: patient    Faisal NEWBERRY Mignon, PTA  2017

## 2017-07-07 NOTE — PLAN OF CARE
Problem: Occupational Therapy Goal  Goal: Occupational Therapy Goal  Goals to be met by: 11 days     Patient will increase functional independence with ADLs by performing:    Feeding with Mod I.  UE Dressing with Mod I  LE Dressing with Mod I  Grooming while standing with Mod I  Toileting from bedside commode with Mod I  for hygiene and clothing management.   Bathing from  shower chair/bench with Stand-by Assistance.  Supine to sit with Modified [place].  Stand pivot transfers with Modified [place].  Toilet transfer to bedside commode with Modified [place].   Pt. To be independent with HEP for BUE to improve endurance Pt. To be independent with HEP /activities to improve FMC skills in BUE   Pt tolerated session well.

## 2017-07-07 NOTE — PT/OT/SLP PROGRESS
Occupational Therapy  Treatment    Anthony Miguel   MRN: [id number]   Admitting Diagnosis: Cervical myelopathy    OT Date of Treatment: 07/07/17  Total Time (min): 41 min    Billable Minutes:  Self Care/Home Management 30 and Therapeutic Activity 11    General Precautions: Standard, fall (spinal, do not lift more than 10 lbs, per PA-C with NS and NP, may take cervical collar off while laying supine in bed and while in shower if pt maintains chin in neutral)  Orthopedic Precautions: N/A  Braces: Cervical collar    Do you have any cultural, spiritual, Temple conflicts, given your current situation?: none reported    Subjective:  Communicated with pt prior to session.  I need to figure out how to put my brace on.    Pain/Comfort  Pain Rating 1: 0/10    Objective:  Patient found with:  (up in w/c)    Functional Status:  MDS G  Transfer Functional Status: SBA sit to stand from w/c with RW; SBA sit to stand from BSC with RW  Walk in Room Functional Status: SBA with RW ~10 feet  Dressing Functional Status: LBD CGA to manage pants up over hips while standing  Eating Functional Status: Set up (A)  Toilet Use Functional Status: CGA to manage pants up and down while standing  Personal Hygiene Functional Status: SBA to wash hands while standing at sink  Moving from seated to standing position: Not steady, only able to stabilize with staff assistance  Walking (with assistive device if used): Not steady, but able to stabilize without staff assistance  Turning around and facing the opposite direction while walking: Not steady, only able to stabilize with staff assistance  Moving on and off the toilet: Not steady, only able to stabilize with staff assistance  Surface-to-surface transfer (transfer between bed and chair or wheelchair): Not steady, only able to stabilize with staff assistance      Additional Treatment:  Pt participated in seated piano playing activity x 1 trial ~ 10 minutes.  Pt educated on taking off/putting on  cervical collar while maintaining chin in neutral. Pt able to don/doff cervical collar independently.  Pt observed taking a few bites/sips of food and noted to cough after swallowing. NP notified and speech eval orders placed.     Patient left up in chair with call button in reach    ASSESSMENT:  Anthony Miguel is a 66 y.o. male with a medical diagnosis of Cervical myelopathy and demonstrates decreased endurance, self-care skills, and functional mobility. Pt has progressed during transfers and is willing to participate in therapy. Pt is a fall risk 2/2 pain, fatigue, and cervical collar. Pt would benefit from continued OT services to maximize safety and independence with ADL tasks and increase occupational performance in roles and routines.     Rehab identified problem list/impairments: weakness, impaired endurance, impaired self care skills, impaired functional mobilty, pain    Rehab potential is good    Activity tolerance: Good    Discharge recommendations: home health OT (with supervision and light assist)     Barriers to discharge: Inaccessible home environment, Decreased caregiver support     Equipment recommendations: bedside commode, hip kit     GOALS:    Occupational Therapy Goals        Problem: Occupational Therapy Goal    Goal Priority Disciplines Outcome Interventions   Occupational Therapy Goal     OT, PT/OT Ongoing (interventions implemented as appropriate)    Description:  Goals to be met by: 11 days     Patient will increase functional independence with ADLs by performing:    Feeding with Mod I.  UE Dressing with Mod I  LE Dressing with Mod I  Grooming while standing with Mod I  Toileting from bedside commode with Mod I  for hygiene and clothing management.   Bathing from  shower chair/bench with Stand-by Assistance.  Supine to sit with Modified [place].  Stand pivot transfers with Modified [place].  Toilet transfer to bedside commode with Modified [place].   Pt. To be independent  with HEP for BUE to improve endurance Pt. To be independent with HEP /activities to improve FMC skills in BUE                    Plan:  Patient to be seen 5 x/week to address the above listed problems via self-care/home management, therapeutic activities, therapeutic exercises  Plan of Care expires: 07/30/17  Plan of Care reviewed with: patient    Doris Ford, SOROHITH  07/07/2017

## 2017-07-07 NOTE — PLAN OF CARE
Problem: Patient Care Overview  Goal: Plan of Care Review  Outcome: Ongoing (interventions implemented as appropriate)   07/07/17 1556   Coping/Psychosocial   Plan Of Care Reviewed With patient       Problem: Fall Risk (Adult)  Goal: Absence of Falls  Patient will demonstrate the desired outcomes by discharge/transition of care.   Outcome: Ongoing (interventions implemented as appropriate)   07/07/17 1556   Fall Risk (Adult)   Absence of Falls making progress toward outcome       Problem: Pressure Ulcer Risk (Candelario Scale) (Adult,Obstetrics,Pediatric)  Goal: Skin Integrity  Patient will demonstrate the desired outcomes by discharge/transition of care.   Outcome: Ongoing (interventions implemented as appropriate)   07/07/17 1556   Pressure Ulcer Risk (Candelario Scale) (Adult,Obstetrics,Pediatric)   Skin Integrity making progress toward outcome       Comments: Patient monitored every 1 to 2 hours for pain and safety.  Safety maintained.   Safety maintained.Patient instructed to call for assistance.Call  Light and persoanl items in reach.

## 2017-07-07 NOTE — SUBJECTIVE & OBJECTIVE
Interval History:   7/3/17  Patient seen at bedside, he reports his pain is adequately controlled.  He is concerned about being able to return to play the piano.  He currently lives alone, but has a friend, Heri Worthington ([phone]) who is willing to assist him if needed upon discharge.  He was instructed to keep his cervical collar on at all times until instructed otherwise by NS team.      7/7/17  Patient seen at bedside, he reports having one episode of nausea and vomiting today after eating laughlin.  States no more nausea now.  Currently he denies abdominal pain and states he is having bowel movements without issues.  He has no other complaints.  Nursing informed me he is requesting Tylenol for pain rather than narcotics as he feels the narcotics are too strong.  Therapy reported she noted patient coughing when eating lunch and asked if speech should see him.  Discussed this issue with him, he reports this is a chronic issue and has had laryngoscopies in the past and had collagen injected into his vocal cords at [place].          Review of Systems   Constitutional: Positive for activity change. Negative for appetite change, chills, diaphoresis, fatigue and fever.   Respiratory: Positive for cough. Negative for chest tightness and shortness of breath.    Cardiovascular: Negative for chest pain, palpitations and leg swelling.   Gastrointestinal: Positive for nausea and vomiting. Negative for abdominal pain, constipation and diarrhea.   Genitourinary: Negative for difficulty urinating.   Musculoskeletal: Positive for neck pain.   Skin: Negative for rash.   Neurological: Positive for numbness.     Objective:     Vital Signs (Most Recent):  Temp: 97.7 °F (36.5 °C) (07/07/17 0735)  Pulse: 78 (07/07/17 0735)  Resp: 18 (07/07/17 0735)  BP: 128/83 (07/07/17 0735)  SpO2: 97 % (07/07/17 0735) Vital Signs (24h Range):  Temp:  [97.7 °F (36.5 °C)-98 °F (36.7 °C)] 97.7 °F (36.5 °C)  Pulse:  [78-85] 78  Resp:  [18] 18  SpO2:   [97 %-99 %] 97 %  BP: (120-128)/(77-83) 128/83     Weight: 74.8 kg (164 lb 14.5 oz)  Body mass index is 25.83 kg/m².    Intake/Output Summary (Last 24 hours) at 07/07/17 1210  Last data filed at 07/07/17 0600   Gross per 24 hour   Intake              240 ml   Output              650 ml   Net             -410 ml      Physical Exam   Constitutional: He is oriented to person, place, and time. He appears well-developed and well-nourished.   Cardiovascular: Normal rate, regular rhythm, normal heart sounds and intact distal pulses.  Exam reveals no gallop and no friction rub.    No murmur heard.  Pulmonary/Chest: Effort normal and breath sounds normal. No respiratory distress. He has no wheezes. He has no rales. He exhibits no tenderness.   Abdominal: Soft. Bowel sounds are normal. He exhibits no distension. There is no tenderness.   Musculoskeletal: Normal range of motion. He exhibits no edema or tenderness.   Neurological: He is alert and oriented to person, place, and time.   Hard cervical collar in place.   Skin: Skin is warm and dry. Capillary refill takes less than 2 seconds.   Psychiatric: Thought content normal.       Significant Labs: All pertinent labs within the past 24 hours have been reviewed.    Significant Imaging: n/a

## 2017-07-07 NOTE — ASSESSMENT & PLAN NOTE
S/p posterior cervical fusion  Patient to follow up with neurosurgery for wound check  Bacitracin to incision BID will order on 7/3  Neurologically stable  Cervical collar at all times  Cont neuro checks  Cont with current pain control   Cont bowel regimen for opioid induced constipation  Cont with PT/OT for gait training and strengthening and restoration of ADL's   Cont DVT prophylaxis with heparin   Patient with noted cough while eating, will consult speech for bedside swallow eval.    Future Appointments  Date Time Provider Department Center   8/3/2017 12:30 PM [facility] [id number] LB LIMIT [facility] XRAY OP Cristiano Suarez   8/3/2017 1:30 PM David Wolff MD [facility] NEUROS7 Cristiano Suarez

## 2017-07-07 NOTE — TREATMENT PLAN
Spoke with JENSEN Erazo in [facility] this morning.  Ok to remove collar to shower and while in bed.  Must keep head in neutral position when collar is off.

## 2017-07-08 PROCEDURE — 97535 SELF CARE MNGMENT TRAINING: CPT

## 2017-07-08 PROCEDURE — 97116 GAIT TRAINING THERAPY: CPT

## 2017-07-08 PROCEDURE — 97530 THERAPEUTIC ACTIVITIES: CPT

## 2017-07-08 PROCEDURE — 97110 THERAPEUTIC EXERCISES: CPT

## 2017-07-08 PROCEDURE — 12000000 HC SNF SEMI-PRIVATE ROOM

## 2017-07-08 PROCEDURE — 25000003 PHARM REV CODE 250: Performed by: PHYSICIAN ASSISTANT

## 2017-07-08 RX ADMIN — DIAZEPAM 5 MG: 5 TABLET ORAL at 09:07

## 2017-07-08 RX ADMIN — HEPARIN SODIUM 5000 UNITS: 5000 INJECTION, SOLUTION INTRAVENOUS; SUBCUTANEOUS at 01:07

## 2017-07-08 RX ADMIN — STANDARDIZED SENNA CONCENTRATE AND DOCUSATE SODIUM 1 TABLET: 8.6; 5 TABLET, FILM COATED ORAL at 09:07

## 2017-07-08 RX ADMIN — OXYCODONE HYDROCHLORIDE AND ACETAMINOPHEN 1 TABLET: 10; 325 TABLET ORAL at 04:07

## 2017-07-08 RX ADMIN — HEPARIN SODIUM 5000 UNITS: 5000 INJECTION, SOLUTION INTRAVENOUS; SUBCUTANEOUS at 05:07

## 2017-07-08 RX ADMIN — RAMELTEON 8 MG: 8 TABLET, FILM COATED ORAL at 09:07

## 2017-07-08 RX ADMIN — VITAMIN D, TAB 1000IU (100/BT) 1000 UNITS: 25 TAB at 09:07

## 2017-07-08 RX ADMIN — ATORVASTATIN CALCIUM 10 MG: 10 TABLET, FILM COATED ORAL at 09:07

## 2017-07-08 RX ADMIN — HEPARIN SODIUM 5000 UNITS: 5000 INJECTION, SOLUTION INTRAVENOUS; SUBCUTANEOUS at 09:07

## 2017-07-08 RX ADMIN — FINASTERIDE 5 MG: 5 TABLET, FILM COATED ORAL at 09:07

## 2017-07-08 RX ADMIN — OXYCODONE HYDROCHLORIDE AND ACETAMINOPHEN 1 TABLET: 10; 325 TABLET ORAL at 09:07

## 2017-07-08 NOTE — PLAN OF CARE
Problem: Physical Therapy Goal  Goal: Physical Therapy Goal  Goals to be met by: 9 days     Patient will increase functional independence with mobility by performin. Supine to sit with Modified [place].  2. Sit to supine with Modified [place].  3. Sit to stand transfer with Supervision.  4. Bed to chair transfer with Supervision using Rolling Walker.  5. Gait  x 150 feet with Supervision using Rolling Walker.   6. Ascend/descend 5 stair with left Handrails Stand-by Assistance.   7. Stand for 3 minutes with Stand-by Assistance using Rolling Walker while performing dynamic standing balance activity. MET 7/3/2017  8. Lower extremity exercise program x20 reps per handout, with assistance as needed.  9. Pt will decreased TUG time to <25s w/ RW in order to decrease fall risk.      Goals remain appropriate at time. Continue with PT POC as indicated.

## 2017-07-08 NOTE — PLAN OF CARE
Problem: Fall Risk (Adult)  Goal: Absence of Falls  Patient will demonstrate the desired outcomes by discharge/transition of care.   Outcome: Ongoing (interventions implemented as appropriate)   07/07/17 2000   Fall Risk (Adult)   Absence of Falls making progress toward outcome   Pt remain free from fall injury. Call light w/i reach. Will monitor

## 2017-07-08 NOTE — PLAN OF CARE
Problem: Patient Care Overview  Goal: Plan of Care Review  Outcome: Ongoing (interventions implemented as appropriate)   07/08/17 1055   Coping/Psychosocial   Plan Of Care Reviewed With patient       Problem: Fall Risk (Adult)  Goal: Absence of Falls  Patient will demonstrate the desired outcomes by discharge/transition of care.   Outcome: Ongoing (interventions implemented as appropriate)   07/08/17 1055   Fall Risk (Adult)   Absence of Falls making progress toward outcome       Problem: Pressure Ulcer Risk (Candelario Scale) (Adult,Obstetrics,Pediatric)  Goal: Skin Integrity  Patient will demonstrate the desired outcomes by discharge/transition of care.   Outcome: Ongoing (interventions implemented as appropriate)   07/08/17 1055   Pressure Ulcer Risk (Candelario Scale) (Adult,Obstetrics,Pediatric)   Skin Integrity making progress toward outcome       Comments: Patient monitored every 1 to 2 hours for pain and safety.  Safety maintained.  Patient instructed to call for assistance.Call  Light and persoanl items in reach.

## 2017-07-08 NOTE — PT/OT/SLP PROGRESS
"Occupational Therapy  Treatment    Anthony Miguel   MRN: [id number]   Admitting Diagnosis: Cervical myelopathy    OT Date of Treatment: 07/08/17  Total Time (min): 40 min    Billable Minutes:  Self Care/Home Management 15, Therapeutic Activity 15 and Therapeutic Exercise 10    General Precautions: Standard, fall (spinal, do not lift more than 10 lbs, may take cervical)  Orthopedic Precautions: N/A  Braces: Cervical collar    Do you have any cultural, spiritual, Roman Catholic conflicts, given your current situation?: none reported    Subjective:  "I might go home on Monday."    Pain/Comfort  Pain Rating 1: 0/10  Pain Rating Post-Intervention 1: 0/10    Objective:  Patient found supine in bed.    Functional Status:  MDS G  Bed Mobility Functional Status: S for sup to sit  Transfer Functional Status: SBA for sit to stand from EOB  Walk in Room Functional Status: SBA w/ RW  Dressing Functional Status: LBD - Min (A)  Personal Hygiene Functional Status: Set up in sitting    OT Exercises: UE Ergometer x10 min    Additional Treatment:  Pt performed several FM tasks while seated in WC.    Patient left up in chair with call button in reach    ASSESSMENT:  Anthony Miguel is a 66 y.o. male with a medical diagnosis of Cervical myelopathy. Pt req'd min (A) for LBD but may be able to perform w/ mod (I) w/ use of long handle shoe horn.    Rehab identified problem list/impairments: weakness, impaired endurance, impaired self care skills, impaired functional mobilty, pain    Rehab potential is good    Activity tolerance: Good    Discharge recommendations: home health OT (with supervision and light assist)     Barriers to discharge: Inaccessible home environment, Decreased caregiver support     Equipment recommendations: bedside commode, hip kit     GOALS:    Occupational Therapy Goals        Problem: Occupational Therapy Goal    Goal Priority Disciplines Outcome Interventions   Occupational Therapy Goal     OT, PT/OT Ongoing " (interventions implemented as appropriate)    Description:  Goals to be met by: 11 days     Patient will increase functional independence with ADLs by performing:    Feeding with Mod I.  UE Dressing with Mod I  LE Dressing with Mod I  Grooming while standing with Mod I  Toileting from bedside commode with Mod I  for hygiene and clothing management.   Bathing from  shower chair/bench with Stand-by Assistance.  Supine to sit with Modified [place].  Stand pivot transfers with Modified [place].  Toilet transfer to bedside commode with Modified [place].   Pt. To be independent with HEP for BUE to improve endurance Pt. To be independent with HEP /activities to improve FMC skills in BUE                Plan:  Patient to be seen 5 x/week to address the above listed problems via self-care/home management, therapeutic activities, therapeutic exercises  Plan of Care expires: 07/30/17  Plan of Care reviewed with: patient    TRISHA Florentino  07/08/2017

## 2017-07-08 NOTE — PT/OT/SLP PROGRESS
Physical Therapy  Treatment    Anthony Miguel   MRN: [id number]   Admitting Diagnosis: Cervical myelopathy    PT Received On: 07/08/17  Total Time (min): 45       Billable Minutes:  Gait [id number], Therapeutic Activity 15 and Therapeutic Exercise 20    Treatment Type: Treatment  PT/PTA: PTA     PTA Visit Number: 2       General Precautions: Standard, aspiration, fall  Orthopedic Precautions: N/A   Braces: Cervical collar    Do you have any cultural, spiritual, Spiritism conflicts, given your current situation?: no    Subjective:  Communicated with nursing prior to session.  Pt agreed to work with therapy.     Pain/Comfort  Pain Rating 1: 0/10  Pain Rating Post-Intervention 1: 0/10    Objective:  Patient found seated with  cervical collar.        Functional Status:      Bed Mobility:  Sit>Supine:NP  Supine>Sit: NP    Transfers:  Sit<>Stand: to/from w/c with RW and SBA  Stand Pivot Transfer: NP    Gait:  Amb 150 feet with RW and CGA for safety.     Therex:  B LE exercises x25 reps:    -AP   -LAQ   -Hip Flexion   -GS   -Hip Abd/Add    B UE exercises w/ yellow dowel 2x25 reps: chest press and bicep curls    Additional Treatment:  LBE x15 min for BLE strengthening and endurance.     Patient left up in chair with call button in reach.    Assessment:  Anthony Miguel is a 66 y.o. male with a medical diagnosis of Cervical myelopathy.  Pt tolerated treatment well, and will continue to benefit from PT intervention at this time. Continue with PT POC as indicated. .    Rehab identified problem list/impairments: weakness, impaired endurance, impaired self care skills, impaired functional mobilty, pain    Rehab potential is fair.    Activity tolerance: Fair    Discharge recommendations: home with home health     Barriers to discharge: Inaccessible home environment, Decreased caregiver support    Equipment recommendations: bedside commode, hip kit     GOALS:    Physical Therapy Goals        Problem: Physical Therapy Goal     Goal Priority Disciplines Outcome Goal Variances Interventions   Physical Therapy Goal     PT/OT, PT Ongoing (interventions implemented as appropriate)     Description:  Goals to be met by: 9 days     Patient will increase functional independence with mobility by performin. Supine to sit with Modified [place].  2. Sit to supine with Modified [place].  3. Sit to stand transfer with Supervision.  4. Bed to chair transfer with Supervision using Rolling Walker.  5. Gait  x 150 feet with Supervision using Rolling Walker.   6. Ascend/descend 5 stair with left Handrails Stand-by Assistance.   7. Stand for 3 minutes with Stand-by Assistance using Rolling Walker while performing dynamic standing balance activity. MET 7/3/2017  8. Lower extremity exercise program x20 reps per handout, with assistance as needed.  9. Pt will decreased TUG time to <25s w/ RW in order to decrease fall risk.                       PLAN:    Patient to be seen 5 x/week  to address the above listed problems via gait training, therapeutic activities, therapeutic exercises  Plan of Care expires: 17  Plan of Care reviewed with: patient    Maria Esther Centeno, PTA  2017

## 2017-07-08 NOTE — PLAN OF CARE
Problem: Occupational Therapy Goal  Goal: Occupational Therapy Goal  Goals to be met by: 11 days     Patient will increase functional independence with ADLs by performing:    Feeding with Mod I.  UE Dressing with Mod I  LE Dressing with Mod I  Grooming while standing with Mod I  Toileting from bedside commode with Mod I  for hygiene and clothing management.   Bathing from  shower chair/bench with Stand-by Assistance.  Supine to sit with Modified [place].  Stand pivot transfers with Modified [place].  Toilet transfer to bedside commode with Modified [place].   Pt. To be independent with HEP for BUE to improve endurance Pt. To be independent with HEP /activities to improve FMC skills in BUE   Outcome: Ongoing (interventions implemented as appropriate)  Goals remain appropriate.

## 2017-07-09 PROCEDURE — 25000003 PHARM REV CODE 250: Performed by: PHYSICIAN ASSISTANT

## 2017-07-09 PROCEDURE — 12000000 HC SNF SEMI-PRIVATE ROOM

## 2017-07-09 RX ADMIN — STANDARDIZED SENNA CONCENTRATE AND DOCUSATE SODIUM 1 TABLET: 8.6; 5 TABLET, FILM COATED ORAL at 08:07

## 2017-07-09 RX ADMIN — DIAZEPAM 5 MG: 5 TABLET ORAL at 09:07

## 2017-07-09 RX ADMIN — FINASTERIDE 5 MG: 5 TABLET, FILM COATED ORAL at 08:07

## 2017-07-09 RX ADMIN — ATORVASTATIN CALCIUM 10 MG: 10 TABLET, FILM COATED ORAL at 08:07

## 2017-07-09 RX ADMIN — VITAMIN D, TAB 1000IU (100/BT) 1000 UNITS: 25 TAB at 08:07

## 2017-07-09 RX ADMIN — HEPARIN SODIUM 5000 UNITS: 5000 INJECTION, SOLUTION INTRAVENOUS; SUBCUTANEOUS at 06:07

## 2017-07-09 RX ADMIN — OXYCODONE HYDROCHLORIDE AND ACETAMINOPHEN 1 TABLET: 10; 325 TABLET ORAL at 09:07

## 2017-07-09 RX ADMIN — OXYCODONE HYDROCHLORIDE AND ACETAMINOPHEN 1 TABLET: 10; 325 TABLET ORAL at 08:07

## 2017-07-09 RX ADMIN — STANDARDIZED SENNA CONCENTRATE AND DOCUSATE SODIUM 1 TABLET: 8.6; 5 TABLET, FILM COATED ORAL at 09:07

## 2017-07-09 RX ADMIN — HEPARIN SODIUM 5000 UNITS: 5000 INJECTION, SOLUTION INTRAVENOUS; SUBCUTANEOUS at 09:07

## 2017-07-09 RX ADMIN — HEPARIN SODIUM 5000 UNITS: 5000 INJECTION, SOLUTION INTRAVENOUS; SUBCUTANEOUS at 02:07

## 2017-07-09 RX ADMIN — RAMELTEON 8 MG: 8 TABLET, FILM COATED ORAL at 09:07

## 2017-07-10 ENCOUNTER — TELEPHONE (OUTPATIENT)
Dept: TRANSPLANT | Facility: CLINIC | Age: 67
End: 2017-07-10

## 2017-07-10 LAB
ANION GAP SERPL CALC-SCNC: 9 MMOL/L
BASOPHILS # BLD AUTO: 0.03 K/UL
BASOPHILS NFR BLD: 0.4 %
BUN SERPL-MCNC: 24 MG/DL
CALCIUM SERPL-MCNC: 9.4 MG/DL
CHLORIDE SERPL-SCNC: 102 MMOL/L
CO2 SERPL-SCNC: 26 MMOL/L
CREAT SERPL-MCNC: 0.9 MG/DL
DIFFERENTIAL METHOD: ABNORMAL
EOSINOPHIL # BLD AUTO: 0.3 K/UL
EOSINOPHIL NFR BLD: 3.7 %
ERYTHROCYTE [DISTWIDTH] IN BLOOD BY AUTOMATED COUNT: 12.9 %
EST. GFR  (AFRICAN AMERICAN): >60 ML/MIN/1.73 M^2
EST. GFR  (NON AFRICAN AMERICAN): >60 ML/MIN/1.73 M^2
GLUCOSE SERPL-MCNC: 78 MG/DL
HCT VFR BLD AUTO: 39 %
HGB BLD-MCNC: 12.7 G/DL
LYMPHOCYTES # BLD AUTO: 2 K/UL
LYMPHOCYTES NFR BLD: 25.7 %
MAGNESIUM SERPL-MCNC: 2.1 MG/DL
MCH RBC QN AUTO: 31.2 PG
MCHC RBC AUTO-ENTMCNC: 32.6 %
MCV RBC AUTO: 96 FL
MONOCYTES # BLD AUTO: 0.7 K/UL
MONOCYTES NFR BLD: 8.8 %
NEUTROPHILS # BLD AUTO: 4.7 K/UL
NEUTROPHILS NFR BLD: 61.4 %
PHOSPHATE SERPL-MCNC: 4.5 MG/DL
PLATELET # BLD AUTO: 378 K/UL
PMV BLD AUTO: 10.6 FL
POTASSIUM SERPL-SCNC: 4.2 MMOL/L
RBC # BLD AUTO: 4.07 M/UL
SODIUM SERPL-SCNC: 137 MMOL/L
WBC # BLD AUTO: 7.6 K/UL

## 2017-07-10 PROCEDURE — 84100 ASSAY OF PHOSPHORUS: CPT

## 2017-07-10 PROCEDURE — 97110 THERAPEUTIC EXERCISES: CPT

## 2017-07-10 PROCEDURE — 85025 COMPLETE CBC W/AUTO DIFF WBC: CPT

## 2017-07-10 PROCEDURE — 36415 COLL VENOUS BLD VENIPUNCTURE: CPT

## 2017-07-10 PROCEDURE — 97530 THERAPEUTIC ACTIVITIES: CPT

## 2017-07-10 PROCEDURE — 80048 BASIC METABOLIC PNL TOTAL CA: CPT

## 2017-07-10 PROCEDURE — 97116 GAIT TRAINING THERAPY: CPT

## 2017-07-10 PROCEDURE — 25000003 PHARM REV CODE 250: Performed by: PHYSICIAN ASSISTANT

## 2017-07-10 PROCEDURE — 92526 ORAL FUNCTION THERAPY: CPT

## 2017-07-10 PROCEDURE — 12000000 HC SNF SEMI-PRIVATE ROOM

## 2017-07-10 PROCEDURE — 83735 ASSAY OF MAGNESIUM: CPT

## 2017-07-10 RX ADMIN — ATORVASTATIN CALCIUM 10 MG: 10 TABLET, FILM COATED ORAL at 09:07

## 2017-07-10 RX ADMIN — HEPARIN SODIUM 5000 UNITS: 5000 INJECTION, SOLUTION INTRAVENOUS; SUBCUTANEOUS at 06:07

## 2017-07-10 RX ADMIN — VITAMIN D, TAB 1000IU (100/BT) 1000 UNITS: 25 TAB at 09:07

## 2017-07-10 RX ADMIN — STANDARDIZED SENNA CONCENTRATE AND DOCUSATE SODIUM 1 TABLET: 8.6; 5 TABLET, FILM COATED ORAL at 09:07

## 2017-07-10 RX ADMIN — OXYCODONE HYDROCHLORIDE AND ACETAMINOPHEN 1 TABLET: 10; 325 TABLET ORAL at 02:07

## 2017-07-10 RX ADMIN — HEPARIN SODIUM 5000 UNITS: 5000 INJECTION, SOLUTION INTRAVENOUS; SUBCUTANEOUS at 10:07

## 2017-07-10 RX ADMIN — OXYCODONE HYDROCHLORIDE AND ACETAMINOPHEN 1 TABLET: 10; 325 TABLET ORAL at 08:07

## 2017-07-10 RX ADMIN — STANDARDIZED SENNA CONCENTRATE AND DOCUSATE SODIUM 1 TABLET: 8.6; 5 TABLET, FILM COATED ORAL at 10:07

## 2017-07-10 RX ADMIN — HEPARIN SODIUM 5000 UNITS: 5000 INJECTION, SOLUTION INTRAVENOUS; SUBCUTANEOUS at 02:07

## 2017-07-10 RX ADMIN — FINASTERIDE 5 MG: 5 TABLET, FILM COATED ORAL at 09:07

## 2017-07-10 NOTE — PT/OT/SLP PROGRESS
Occupational Therapy  Treatment    Anthony Miguel   MRN: [id number]   Admitting Diagnosis: Cervical myelopathy    OT Date of Treatment: 07/10/17  Total Time (min): 46 min    Billable Minutes:  Therapeutic Activity 30 and Therapeutic Exercise 16    General Precautions: Standard, aspiration, fall  Orthopedic Precautions: N/A  Braces: Cervical collar    Do you have any cultural, spiritual, Church conflicts, given your current situation?: none reported    Subjective:  Communicated with pt prior to session.  I had a quiet weekend.    Pain/Comfort  Pain Rating 1: 4/10  Location - Side 1: Bilateral  Location - Orientation 1: posterior  Location 1: neck  Pain Addressed 1: Reposition, Distraction    Objective:  Patient found with:  (up in w/c)      OT Exercises: UE Ergometer 10 minutes  Bilateral [place] forward/backward 2 sets x 25 reps; side to side x15 reps    Additional Treatment:  Pt participated in seated FMC activity to address buttoning clothes x 5 trials with supervision.  Pt educated on DME needed for safe showering in preparation for shower during tomorrow's OT session.  Pt participated in seated piano activity x 1 trial ~15 minutes.    Patient left up in chair with staff in activity room for lunch.    ASSESSMENT:  Anthony Miguel is a 66 y.o. male with a medical diagnosis of Cervical myelopathy and demonstrates decreased endurance, self-care skills, fine motor coordination, and functional mobility. Pt is progressing well in therapy, however pt remains a fall risk 2/2 unsteady gait and pain. Pt would benefit from continued OT services to maximize safety and independence with ADL tasks and increase occupational performance in roles and routines.   .    Rehab identified problem list/impairments: weakness, impaired endurance, impaired self care skills, impaired functional mobilty, decreased upper extremity function, pain, impaired fine motor    Rehab potential is good    Activity tolerance: Good    Discharge  recommendations: home health OT (with supervision and light assist)     Barriers to discharge: Inaccessible home environment, Decreased caregiver support     Equipment recommendations: bedside commode, hip kit, bath bench     GOALS:    Occupational Therapy Goals        Problem: Occupational Therapy Goal    Goal Priority Disciplines Outcome Interventions   Occupational Therapy Goal     OT, PT/OT Ongoing (interventions implemented as appropriate)    Description:  Goals to be met by: 11 days     Patient will increase functional independence with ADLs by performing:    Feeding with Mod I.  UE Dressing with Mod I  LE Dressing with Mod I  Grooming while standing with Mod I  Toileting from bedside commode with Mod I  for hygiene and clothing management.   Bathing from  shower chair/bench with Stand-by Assistance.  Supine to sit with Modified [place].  Stand pivot transfers with Modified [place].  Toilet transfer to bedside commode with Modified [place].   Pt. To be independent with HEP for BUE to improve endurance Pt. To be independent with HEP /activities to improve FMC skills in BUE                    Plan:  Patient to be seen 5 x/week to address the above listed problems via self-care/home management, therapeutic activities, therapeutic exercises  Plan of Care expires: 07/30/17  Plan of Care reviewed with: patient    DIPTI Arroyo  07/10/2017

## 2017-07-10 NOTE — PLAN OF CARE
07/10/2017  4:12 PM     07/10/17 1611   Medicare Message   Important Message from Medicare regarding Discharge Appeal Rights Given to patient/caregiver;Explained to patient/caregiver;Signed/date by patient/caregiver   JON served NOMNC to patient notifying of discharge date of 7/13/17 and appeal right.  Patient expressed understanding and satisfaction regarding discharge date.  Patient signed NOMNC, and SW provided patient with copy.  JON faxed copy of signed NOMNC to [facility] (fx [phone]) and placed original in patient's blue chart in nurse's station.    Pt plans to return home upon SNF d/c where he lives alone.  Pt reports having people he can call to assist him if needed.  Pt lives in a H with 5 WONG with BHRs.  Pt has a tub/shower combo and owns a WC, SPC, RW, and rollator.  Pt requesting to have BSC and hip kit ordered.  Pt will let SW know prior to d/c if he would take a TTB as well.  Pt denies having a preference for home health placement.  Pt to be accompanied and transported home by a friend upon SNF d/c.  Pt denies having any questions or concerns regarding d/c at this time.  SW remains available for further d/c planning assistance and will f/u as needed.    Marlon Ahn, HAIR  [phone]

## 2017-07-10 NOTE — PT/OT/SLP PROGRESS
"Speech Language Pathology  Treatment/Discharge    Anthony Miguel   MRN: [id number]   Admitting Diagnosis: Cervical myelopathy    Diet recommendations: Solid Diet Level: Regular  Liquid Diet Level: Thin HOB to 90 degrees, Small bites/sips, Alternating bites/sips, 1 bite/sip at a time and Avoid talking while eating    SLP Treatment Date: 07/10/17  Speech Start Time: 0812     Speech Stop Time: 0839     Speech Total (min): 27 min       TREATMENT BILLABLE MINUTES:  Treatment Swallowing Dysfunction 27    Has the patient been evaluated by SLP for swallowing? : Yes  Keep patient NPO?: No   General Precautions: Standard, aspiration, fall          Subjective  "A couple.  That's 'cause I enjoyed it so much.  I just gotta slow down." pt's response when asked if he had any coughing/choking episodes over the weekend during PO intake.           Objective:      Pt eating breakfast upon entry with HOB reclined at approx 45 degrees.  Pt was agreeable to SLP's suggestion to elevate HOB to closer to 90 degrees.  Pt wearing Lissie-J collar.  Pt observed eating approx 50% of waffles and 2 laughlin strips with straw sips of milk.  Pt with cough x 1 after talking while still managing laughlin in mouth. Mild throat clear on another occasion when talking before fully clearing oral cavity of PO.   SLP encouraged pt to refrain from talking with food in mouth, but pt appeared compelled to talk in SLP's presence.  SLP discussed continuing to adhere to aspiration precautions, specifically sitting upright during all PO intake, slow rate of intake, avoid mixing consistencies (one bite at a time in isolation of other consistencies), and avoid talking before fully clearing oral cavity of food.  Pt verbalizing good understanding of risk of aspiration when not adhering to these precautions.  SLP discussed not recommending further ST services upon d/c today due to known h/o dysphagia (abnormal epiglottic inversion revealed on MBSS 5-6 years ago per pt). Pt's " swallowing function appears to be at baseline with pt demonstrating good tolerance of PO diet when pt adheres to safe swallowing strategies/precautions.  SLP encouraged pt to request an order for ST evaluation from PCP if pt notices a decline in swallowing function and safety upon discharge. Pt verbalized good understanding and agreement with plan.     Assessment:  Anthony Miguel is a 66 y.o. male with a medical diagnosis of Cervical myelopathy and presents with mild dysphagia (premorbid/baseline). Pt also with baseline dsyphonia.    Diet recommendations:   Solid Diet Level: Regular    Liquid Diet Level: Thin        Discharge recommendations: Discharge Facility/Level Of Care Needs:  (no further ST services warranted upon d/c)     Goals:    SLP Goals     Not on file          Multidisciplinary Problems (Resolved)        Problem: SLP Goal    Goal Priority Disciplines Outcome   SLP Goal   (Resolved)     SLP Outcome(s) achieved   Description:  Speech Language Pathology Goals  Goals expected to be met by 7/10:  1. Pt will tolerate regular consistency diet and thin liquids without s/s of aspiration.                         Plan:   Patient to be seen Therapy Frequency: 1 x/week   Plan of Care expires: 08/05/17  Plan of Care reviewed with: patient  SLP Follow-up?: No  SLP - Next Visit Date: 07/10/17           DANILO Hendrix, SILKE-SLP  07/10/2017     DANILO Hendrix, CCC-SLP  Speech Language Pathologist  [phone]  7/10/2017

## 2017-07-10 NOTE — PT/OT/SLP PROGRESS
Physical Therapy  Treatment    Anthony Miguel   MRN: [id number]   Admitting Diagnosis: Cervical myelopathy    PT Received On: 07/10/17  Total Time (min): 53       Billable Minutes:  Gait [id number], Therapeutic Activity 20 and Therapeutic Exercise 23    Treatment Type: Treatment  PT/PTA: PTA     PTA Visit Number: 3       General Precautions: Standard, aspiration, fall  Orthopedic Precautions: N/A   Braces: Cervical collar    Do you have any cultural, spiritual, Yarsani conflicts, given your current situation?: no    Subjective:  Communicated with nursing prior to session.  Pt agreed to work with therapy.     Pain/Comfort  Pain Rating 1: 0/10  Pain Rating Post-Intervention 1: 0/10    Objective:  Patient found supine in bed.          Functional Status:             Bed Mobility:  Sit>Supine:NP  Supine>Sit: Mod I    Transfers:  Sit<>Stand: to/from bed w/ RW and SPV  Stand Pivot Transfer: bed to w/c w/ RW and SPV    Gait:  Amb 180 feet with RW and SBA.      Advanced Gait:  Stairs: ascend/descend 5 steps using left handrail w/ SBA-CGA    Therex:  Standing BLE therex x20 reps using rail for BUE support: HR, Hip Abd/Add, and Mini Squats  Seated BLE therex x20: AP, LAQ, Hip Flexion, and GS    Additional Treatment:  LBE x15 min to improve B LE strength and endurance.     Patient left up in chair with call button in reach.    Assessment:  Anthony Miguel is a 66 y.o. male with a medical diagnosis of Cervical myelopathy.  Pt tolerated treatment well, and will continue to benefit from PT intervention at this time. Continue with PT POC as indicated.    Rehab identified problem list/impairments: weakness, impaired endurance, impaired self care skills, impaired functional mobilty, pain    Rehab potential is fair.    Activity tolerance: Fair    Discharge recommendations: home with home health     Barriers to discharge: Decreased caregiver support, Inaccessible home environment    Equipment recommendations: bedside commode, hip  kit     GOALS:    Physical Therapy Goals        Problem: Physical Therapy Goal    Goal Priority Disciplines Outcome Goal Variances Interventions   Physical Therapy Goal     PT/OT, PT Ongoing (interventions implemented as appropriate)     Description:  Goals to be met by: 9 days     Patient will increase functional independence with mobility by performin. Supine to sit with Modified [place]. MET  2. Sit to supine with Modified [place].   3. Sit to stand transfer with Supervision. MET  4. Bed to chair transfer with Supervision using Rolling Walker.  5. Gait  x 150 feet with Supervision using Rolling Walker.   6. Ascend/descend 5 stair with left Handrails Stand-by Assistance.   7. Stand for 3 minutes with Stand-by Assistance using Rolling Walker while performing dynamic standing balance activity. MET 7/3/2017  8. Lower extremity exercise program x20 reps per handout, with assistance as needed. MET  9. Pt will decreased TUG time to <25s w/ RW in order to decrease fall risk.                        PLAN:    Patient to be seen 5 x/week  to address the above listed problems via gait training, therapeutic activities, therapeutic exercises  Plan of Care expires: 17  Plan of Care reviewed with: patient    Maria Esther Centeno, PTA  07/10/2017

## 2017-07-10 NOTE — PLAN OF CARE
Problem: Physical Therapy Goal  Goal: Physical Therapy Goal  Goals to be met by: 9 days     Patient will increase functional independence with mobility by performin. Supine to sit with Modified [place]. MET  2. Sit to supine with Modified [place].   3. Sit to stand transfer with Supervision. MET  4. Bed to chair transfer with Supervision using Rolling Walker.  5. Gait  x 150 feet with Supervision using Rolling Walker.   6. Ascend/descend 5 stair with left Handrails Stand-by Assistance.   7. Stand for 3 minutes with Stand-by Assistance using Rolling Walker while performing dynamic standing balance activity. MET 7/3/2017  8. Lower extremity exercise program x20 reps per handout, with assistance as needed. MET  9. Pt will decreased TUG time to <25s w/ RW in order to decrease fall risk.      Goals remain appropriate at time. Continue with PT POC as indicated.

## 2017-07-10 NOTE — TELEPHONE ENCOUNTER
----- Message from Kavya Goyal sent at 7/10/2017  3:27 PM CDT -----  Contact: Julie with [facility]   Calling to schedule pt's appt please call [phone]

## 2017-07-10 NOTE — PROGRESS NOTES
07/10/2017  3:59 PM  Discharge Planning-Met with patient in room to inform of discharge date of 7/13/2017. Discharge date written on dry erase board in room. Patient stated understanding to discharge date.    Kamini Rivera RN, CM Skilled  [phone]

## 2017-07-10 NOTE — PLAN OF CARE
Problem: SLP Goal  Goal: SLP Goal  Speech Language Pathology Goals  Goals expected to be met by 7/10:  1. Pt will tolerate regular consistency diet and thin liquids without s/s of aspiration.       Outcome: Outcome(s) achieved Date Met: 07/10/17  Pt discharging to home today. No further ST services warranted at this time as pt's mild dysphagia is baseline.  Pt also with baseline dysphonia.  Recommending continue regular consistency diet and thin liquids with aspiration precautions.  DANILO Hendrix, CCC-SLP  Speech Language Pathologist  [phone]  7/10/2017

## 2017-07-10 NOTE — PROGRESS NOTES
07/10/2017  3:29 PM  Discharge Planning- Message left with Hepatology ([phone]) for a follow up appt for patient.  Kamini Rivera RN, [facility] Skilled  [phone]

## 2017-07-10 NOTE — PLAN OF CARE
Problem: Occupational Therapy Goal  Goal: Occupational Therapy Goal  Goals to be met by: 11 days     Patient will increase functional independence with ADLs by performing:    Feeding with Mod I.  UE Dressing with Mod I  LE Dressing with Mod I  Grooming while standing with Mod I  Toileting from bedside commode with Mod I  for hygiene and clothing management.   Bathing from  shower chair/bench with Stand-by Assistance.  Supine to sit with Modified [place].  Stand pivot transfers with Modified [place].  Toilet transfer to bedside commode with Modified [place].   Pt. To be independent with HEP for BUE to improve endurance Pt. To be independent with HEP /activities to improve FMC skills in BUE   Pt tolerated session well.

## 2017-07-10 NOTE — TELEPHONE ENCOUNTER
Pt records reviewed.   Pt will be referred to General  Hepatology.    Initial referral received  from the [facility]que.   Referring Provider/diagnosis  Dr Alice Brooks    Pt with hx of HCV with negative viral load.   Provider stating no follow up in years,  requesting liver follow up.      Referral letter sent to provider and patient.

## 2017-07-11 ENCOUNTER — TELEPHONE (OUTPATIENT)
Dept: TRANSPLANT | Facility: CLINIC | Age: 67
End: 2017-07-11

## 2017-07-11 PROCEDURE — 97535 SELF CARE MNGMENT TRAINING: CPT

## 2017-07-11 PROCEDURE — 12000000 HC SNF SEMI-PRIVATE ROOM

## 2017-07-11 PROCEDURE — 97116 GAIT TRAINING THERAPY: CPT

## 2017-07-11 PROCEDURE — 97110 THERAPEUTIC EXERCISES: CPT

## 2017-07-11 PROCEDURE — 25000003 PHARM REV CODE 250: Performed by: PHYSICIAN ASSISTANT

## 2017-07-11 RX ADMIN — HEPARIN SODIUM 5000 UNITS: 5000 INJECTION, SOLUTION INTRAVENOUS; SUBCUTANEOUS at 10:07

## 2017-07-11 RX ADMIN — OXYCODONE HYDROCHLORIDE AND ACETAMINOPHEN 1 TABLET: 10; 325 TABLET ORAL at 10:07

## 2017-07-11 RX ADMIN — HEPARIN SODIUM 5000 UNITS: 5000 INJECTION, SOLUTION INTRAVENOUS; SUBCUTANEOUS at 05:07

## 2017-07-11 RX ADMIN — STANDARDIZED SENNA CONCENTRATE AND DOCUSATE SODIUM 1 TABLET: 8.6; 5 TABLET, FILM COATED ORAL at 09:07

## 2017-07-11 RX ADMIN — HEPARIN SODIUM 5000 UNITS: 5000 INJECTION, SOLUTION INTRAVENOUS; SUBCUTANEOUS at 02:07

## 2017-07-11 RX ADMIN — DIAZEPAM 5 MG: 5 TABLET ORAL at 06:07

## 2017-07-11 RX ADMIN — STANDARDIZED SENNA CONCENTRATE AND DOCUSATE SODIUM 1 TABLET: 8.6; 5 TABLET, FILM COATED ORAL at 10:07

## 2017-07-11 RX ADMIN — ATORVASTATIN CALCIUM 10 MG: 10 TABLET, FILM COATED ORAL at 09:07

## 2017-07-11 RX ADMIN — FINASTERIDE 5 MG: 5 TABLET, FILM COATED ORAL at 09:07

## 2017-07-11 RX ADMIN — VITAMIN D, TAB 1000IU (100/BT) 1000 UNITS: 25 TAB at 09:07

## 2017-07-11 RX ADMIN — DIAZEPAM 5 MG: 5 TABLET ORAL at 08:07

## 2017-07-11 NOTE — PLAN OF CARE
Problem: Physical Therapy Goal  Goal: Physical Therapy Goal  Goals to be met by: 9 days     Patient will increase functional independence with mobility by performin. Supine to sit with Modified [place]. MET  2. Sit to supine with Modified [place].   3. Sit to stand transfer with Supervision. MET  4. Bed to chair transfer with Supervision using Rolling Walker.  5. Gait  x 150 feet with Supervision using Rolling Walker.   6. Ascend/descend 5 stair with left Handrails Stand-by Assistance.   7. Stand for 3 minutes with Stand-by Assistance using Rolling Walker while performing dynamic standing balance activity. MET 7/3/2017  8. Lower extremity exercise program x20 reps per handout, with assistance as needed. MET  9. Pt will decreased TUG time to <25s w/ RW in order to decrease fall risk.       LTGs remain appropriate. Pt will continue PT POC.    Laurence Cobos, PT  2017

## 2017-07-11 NOTE — PT/OT/SLP PROGRESS
Occupational Therapy  Treatment    Anthony Miguel   MRN: [id number]   Admitting Diagnosis: Cervical myelopathy    OT Date of Treatment: 07/11/17  Total Time (min): 38 min    Billable Minutes:  Self Care/Home Management 38    General Precautions: Standard, aspiration, fall  Orthopedic Precautions: N/A  Braces: Cervical collar    Do you have any cultural, spiritual, L[employer] day conflicts, given your current situation?: none reported    Subjective:  Communicated with pt, NSG prior to session.  Pt agreeable to shower session.     Pain/Comfort  Pain Rating 1: 0/10    Objective:  Patient found with: cervical collar (supine in bed)    Functional Status:  MDS G  Bed Mobility Functional Status: mod(I) supine to sit  Transfer Functional Status: CGA SPT from TTB to w/c with grab bars in shower; sit to stand from EOB with RW SBA  Walk in Room Functional Status: CGA with RW  Walk in Corridor Functional Status: CGA with RW from pt's room to amaral bathroom ~15 feet  Dressing Functional Status: LBD Min (A) to don R sock without AE (pt will have AE upon d/c); UBD Set up (A) to don/doff shirt  Eating Functional Status: Set up (A)  Personal Hygiene Functional Status: Set up (A)  Bathing Functional Status: SBA shower on TTB with grab bars  Moving from seated to standing position: Not steady, but able to stabilize without staff assistance  Walking (with assistive device if used): Not steady, only able to stabilize with staff assistance  Turning around and facing the opposite direction while walking: Not steady, but able to stabilize without staff assistance  Surface-to-surface transfer (transfer between bed and chair or wheelchair): Not steady, but able to stabilize without staff assistance      Additional Treatment:  Pt educated on safety with transfers into/out of tub using TTB. Pt educated on safety with shower including use of grab bars and need to shower upper body without cervical collar while maintaining chin in neutral, drying  upper body, and then placing cervical collar back on before showering lower body.     Pt asked OT to speak to  to confirm pt's need for TTB upon d/c.    During functional ambulation to bathroom with RW, pt encountered difficulty raising RLE over threshold to enter bathroom. Pt educated on need to pick feet up when going over thresholds.     Pt instructed on how to use both feet and arms to propel himself in his w/c around his home.    Patient left up in chair with call button in reach and eating breakfast.     ASSESSMENT:  Anthony Miguel is a 66 y.o. male with a medical diagnosis of Cervical myelopathy and demonstrates decreased endurance, self-care skills, and functional mobility. Pt tolerated shower session well on this date. Pt is a fall risk 2/2 unsteady gait, particularly in the morning, and occasional difficulty picking up RLE over thresholds. Pt would benefit from continued OT services to maximize safety and independence with ADL tasks and increase occupational performance in roles and routines.   .    Rehab identified problem list/impairments: weakness, impaired endurance, impaired self care skills, impaired functional mobilty, decreased upper extremity function, decreased lower extremity function, pain, decreased safety awareness, impaired fine motor    Rehab potential is good    Activity tolerance: Good    Discharge recommendations: home health OT (with supervision and light assist)     Barriers to discharge: Inaccessible home environment, Decreased caregiver support     Equipment recommendations: bedside commode, bath bench, hip kit     GOALS:    Occupational Therapy Goals        Problem: Occupational Therapy Goal    Goal Priority Disciplines Outcome Interventions   Occupational Therapy Goal     OT, PT/OT Ongoing (interventions implemented as appropriate)    Description:  Goals to be met by: 11 days     Patient will increase functional independence with ADLs by performing:    Feeding with  Mod I.  UE Dressing with Mod I  LE Dressing with Mod I  Grooming while standing with Mod I  Toileting from bedside commode with Mod I  for hygiene and clothing management.   Bathing from  shower chair/bench with Stand-by Assistance. MET 7-11-17  Supine to sit with Modified [place]. MET 7-11-17  Stand pivot transfers with Modified [place].  Toilet transfer to bedside commode with Modified [place].   Pt. To be independent with HEP for BUE to improve endurance Pt. To be independent with HEP /activities to improve FMC skills in BUE                     Plan:  Patient to be seen 5 x/week to address the above listed problems via self-care/home management, therapeutic exercises, therapeutic activities  Plan of Care expires: 07/30/17  Plan of Care reviewed with: patient    Doris Frod, SOT  07/11/2017   I certify that I was present in the room directing the student in service delivery and guiding them using my skilled judgment. As the co-signing therapist I have reviewed the students documentation and am responsible for the treatment, assessment, and plan.

## 2017-07-11 NOTE — TELEPHONE ENCOUNTER
----- Message from Diana Granda sent at 7/10/2017  5:58 PM CDT -----       KYLE BOLTON MRN: [id number]  Date: 7/19/2017 Status: Jonn  Appt Time: 8:00 AM Length: 40  Visit Type: CONSULT - HEPATOLOGY ([facility]) [678]       ----- Message -----  From: Katrina Sultana  Sent: 7/10/2017   4:26 PM  To: Hepatology Scheduling    Pt records reviewed.   Pt will be referred to General  Hepatology.    Initial referral received  from the [facility].   Referring Provider/diagnosis  Dr Alice Brooks    Pt with hx of HCV with negative viral load.   Provider stating no follow up in years,  requesting liver follow up.      Referral letter sent to provider and patient.

## 2017-07-11 NOTE — PLAN OF CARE
Problem: Occupational Therapy Goal  Goal: Occupational Therapy Goal  Goals to be met by: 11 days     Patient will increase functional independence with ADLs by performing:    Feeding with Mod I.  UE Dressing with Mod I  LE Dressing with Mod I  Grooming while standing with Mod I  Toileting from bedside commode with Mod I  for hygiene and clothing management.   Bathing from  shower chair/bench with Stand-by Assistance. MET 7-11-17  Supine to sit with Modified [place]. MET 7-11-17  Stand pivot transfers with Modified [place].  Toilet transfer to bedside commode with Modified [place].   Pt. To be independent with HEP for BUE to improve endurance Pt. To be independent with HEP /activities to improve FMC skills in BUE   Pt tolerated session well.

## 2017-07-11 NOTE — PT/OT/SLP PROGRESS
Physical Therapy  Treatment    Anthony Miguel   MRN: [id number]   Admitting Diagnosis: Cervical myelopathy    PT Received On: 07/11/17  Total Time (min): 41       Billable Minutes:  Gait [id number], Therapeutic Exercise 28 and Total Time 41    Treatment Type: Treatment  PT/PTA: PT     PTA Visit Number: 0       General Precautions: Standard, aspiration, fall  Orthopedic Precautions: N/A   Braces: Cervical collar    Do you have any cultural, spiritual, Anabaptism conflicts, given your current situation?: no    Subjective:  Pt agreeable to session.    Pain/Comfort  Pain Rating 1: 5/10  Location - Side 1: Bilateral  Location - Orientation 1: posterior  Location 1: neck  Pain Addressed 1: Pre-medicate for activity, Reposition    Objective:  Patient found supine in bed. Patient found with: cervical collar     Functional Status:  MDS G  Bed Mobility Functional Status: mod(I) - (I)  Transfer Functional Status: S-SBA  Walk in Room Functional Status: S-SBA  Walk in Corridor Functional Status: S-SBA  Locomotion on Unit Functional Status: S-SBA  Moving from seated to standing position: Not steady, but able to stabilize without staff assistance  Walking (with assistive device if used): Not steady, but able to stabilize without staff assistance  Turning around and facing the opposite direction while walking: Not steady, but able to stabilize without staff assistance  Surface-to-surface transfer (transfer between bed and chair or wheelchair): Not steady, but able to stabilize without staff assistance    Bed Mobility:  Supine>Sit: on bed Lisa w/ HOB elevated and w/ side rail    Transfers:  Sit<>Stand: to/from w/c (2 trials) w/ RW and SBA/SPV  Stand Pivot Transfer: EOB>w/c w/ RW and SBA  Cueing for hand placement and safety w/ pivoting    Gait:  Amb 2 trials (220ft each) w/ RW and SBA/SPV for safety, slight instability noted w/ turns, cueing to remain inside RW and to inc step height for floor clearance     Therex:  Standing BLE  therex 2x15 reps (HF, Heel Raises, Hip ABd) w/ BUE support on table and SBA for safety, cueing for correct form    Balance:  Sitting balance EOB while donning shoes, SPV for safety w/ balance, Gustavo to don shoes    Additional Treatment:  Seated LBE x15min to inc BLE strength and endurance    Patient left up in chair with call button in reach.    Assessment:  Anthony Miguel is a 66 y.o. male with a medical diagnosis of Cervical myelopathy.  Pt samantha session well w/ good participation. Pt remains at a SBA/SPV level for transfers and amb w/ RW. He continues to demo slight instability during ambulation- especially w/ turns. He is progressing well and will continue PT POC.    Rehab identified problem list/impairments: weakness, impaired endurance, impaired self care skills, impaired functional mobilty, pain    Rehab potential is good.    Activity tolerance: Good    Discharge recommendations: home with home health     Barriers to discharge: Decreased caregiver support, Inaccessible home environment    Equipment recommendations: bedside commode, hip kit     GOALS:    Physical Therapy Goals        Problem: Physical Therapy Goal    Goal Priority Disciplines Outcome Goal Variances Interventions   Physical Therapy Goal     PT/OT, PT Ongoing (interventions implemented as appropriate)     Description:  Goals to be met by: 9 days     Patient will increase functional independence with mobility by performin. Supine to sit with Modified [place]. MET  2. Sit to supine with Modified [place].   3. Sit to stand transfer with Supervision. MET  4. Bed to chair transfer with Supervision using Rolling Walker.  5. Gait  x 150 feet with Supervision using Rolling Walker.   6. Ascend/descend 5 stair with left Handrails Stand-by Assistance.   7. Stand for 3 minutes with Stand-by Assistance using Rolling Walker while performing dynamic standing balance activity. MET 7/3/2017  8. Lower extremity exercise program x20 reps per  handout, with assistance as needed. MET  9. Pt will decreased TUG time to <25s w/ RW in order to decrease fall risk.                        PLAN:    Patient to be seen 5 x/week  to address the above listed problems via gait training, therapeutic activities, therapeutic exercises  Plan of Care expires: 07/30/17  Plan of Care reviewed with: patient    Laurence HECTOR Papito, PT  07/11/2017

## 2017-07-12 PROCEDURE — 25000003 PHARM REV CODE 250: Performed by: PHYSICIAN ASSISTANT

## 2017-07-12 PROCEDURE — 25000003 PHARM REV CODE 250: Performed by: NURSE PRACTITIONER

## 2017-07-12 PROCEDURE — 12000000 HC SNF SEMI-PRIVATE ROOM

## 2017-07-12 PROCEDURE — 97535 SELF CARE MNGMENT TRAINING: CPT

## 2017-07-12 RX ORDER — POLYETHYLENE GLYCOL 3350 17 G/17G
17 POWDER, FOR SOLUTION ORAL 2 TIMES DAILY PRN
Status: DISCONTINUED | OUTPATIENT
Start: 2017-07-12 | End: 2017-07-13 | Stop reason: HOSPADM

## 2017-07-12 RX ORDER — DOCUSATE SODIUM 283 MG/5ML
1 LIQUID RECTAL DAILY PRN
Status: DISCONTINUED | OUTPATIENT
Start: 2017-07-12 | End: 2017-07-13 | Stop reason: HOSPADM

## 2017-07-12 RX ADMIN — POLYETHYLENE GLYCOL 3350 17 G: 17 POWDER, FOR SOLUTION ORAL at 06:07

## 2017-07-12 RX ADMIN — FINASTERIDE 5 MG: 5 TABLET, FILM COATED ORAL at 08:07

## 2017-07-12 RX ADMIN — OXYCODONE HYDROCHLORIDE AND ACETAMINOPHEN 1 TABLET: 10; 325 TABLET ORAL at 11:07

## 2017-07-12 RX ADMIN — HEPARIN SODIUM 5000 UNITS: 5000 INJECTION, SOLUTION INTRAVENOUS; SUBCUTANEOUS at 01:07

## 2017-07-12 RX ADMIN — ACETAMINOPHEN 650 MG: 325 TABLET ORAL at 06:07

## 2017-07-12 RX ADMIN — HEPARIN SODIUM 5000 UNITS: 5000 INJECTION, SOLUTION INTRAVENOUS; SUBCUTANEOUS at 06:07

## 2017-07-12 RX ADMIN — STANDARDIZED SENNA CONCENTRATE AND DOCUSATE SODIUM 1 TABLET: 8.6; 5 TABLET, FILM COATED ORAL at 11:07

## 2017-07-12 RX ADMIN — STANDARDIZED SENNA CONCENTRATE AND DOCUSATE SODIUM 1 TABLET: 8.6; 5 TABLET, FILM COATED ORAL at 08:07

## 2017-07-12 RX ADMIN — VITAMIN D, TAB 1000IU (100/BT) 1000 UNITS: 25 TAB at 08:07

## 2017-07-12 RX ADMIN — ATORVASTATIN CALCIUM 10 MG: 10 TABLET, FILM COATED ORAL at 08:07

## 2017-07-12 RX ADMIN — DIAZEPAM 5 MG: 5 TABLET ORAL at 08:07

## 2017-07-12 RX ADMIN — HEPARIN SODIUM 5000 UNITS: 5000 INJECTION, SOLUTION INTRAVENOUS; SUBCUTANEOUS at 10:07

## 2017-07-12 NOTE — PT/OT/SLP PROGRESS
Physical Therapy      Anthony MARYJO Miguel  MRN: [id number]    Patient not seen today secondary to refusal due to constipation  . Will follow-up tomorrow.    Laurence Cobos, PT   7/12/2017

## 2017-07-12 NOTE — PLAN OF CARE
Problem: Occupational Therapy Goal  Goal: Occupational Therapy Goal  Goals to be met by: 11 days     Patient will increase functional independence with ADLs by performing:    Feeding with Mod I.  UE Dressing with Mod I  LE Dressing with Mod I  Grooming while standing with Mod I  Toileting from bedside commode with Mod I  for hygiene and clothing management.   Bathing from  shower chair/bench with Stand-by Assistance. MET 7-11-17  Supine to sit with Modified [place]. MET 7-11-17  Stand pivot transfers with Modified [place].  Toilet transfer to bedside commode with Modified [place].   Pt. To be independent with HEP for BUE to improve endurance Pt. To be independent with HEP /activities to improve FMC skills in BUE    Pt. Tolerated session well.

## 2017-07-12 NOTE — PT/OT/SLP PROGRESS
Occupational Therapy  Treatment    Anthony Miguel   MRN: [id number]   Admitting Diagnosis: Cervical myelopathy    OT Date of Treatment: 07/12/17  Total Time (min): 40 min    Billable Minutes:  Self Care/Home Management 40    General Precautions: Standard, aspiration, fall (do not lift more than 10 lbs, Per NS and NP, pt. may take cervical collar off while supine in bed and in shower if maintains chin in neutral)  Orthopedic Precautions: N/A  Braces: Cervical collar    Do you have any cultural, spiritual, Worship conflicts, given your current situation?: none reported    Subjective:  Communicated with pt prior to session.  I had a good night.    Pain/Comfort  Pain Rating 1: 4/10  Location - Side 1: Bilateral  Location - Orientation 1: posterior  Location 1: neck  Pain Addressed 1: Reposition, Distraction    Objective:  Patient found with: cervical collar (supine in bed)    Functional Status:  MDS G  Bed Mobility Functional Status: mod(I) supine to sit  Transfer Functional Status: SBA sit to stand from EOB with RW and sit to stand from w/c to sink  Walk in Room Functional Status: SBA with RW from EOB to bathroom sink  Dressing Functional Status: LBD SBA to don socks/pants with AE; UBD Set up (A) to don/doff shirt  Eating Functional Status: Set up (A)  Personal Hygiene Functional Status: Set up (A) to brush teeth/ comb hair  Bathing Functional Status: SBA sponge bath seated at sink  Moving from seated to standing position: Not steady, but able to stabilize without staff assistance  Walking (with assistive device if used): Not steady, but able to stabilize without staff assistance  Turning around and facing the opposite direction while walking: Not steady, but able to stabilize without staff assistance  Surface-to-surface transfer (transfer between bed and chair or wheelchair): Not steady, but able to stabilize without staff assistance        Additional Treatment:  Pt educated on safety with transfers, use of AE, and  w/c management.    Patient left up in chair with call button in reach and NSG present.    ASSESSMENT:  Anthony Miguel is a 66 y.o. male with a medical diagnosis of Cervical myelopathy and demonstrates decreased endurance, self-care skills, and functional mobility. Pt has progressed well during ADL tasks. Pt is a fall risk 2/2 pain and fatigue. Pt would benefit from continued OT services to maximize safety and independence with ADL tasks and increase occupational performance in roles and routines.       Rehab identified problem list/impairments: weakness, impaired endurance, impaired self care skills, impaired functional mobilty, decreased upper extremity function, pain, impaired fine motor    Rehab potential is good    Activity tolerance: Good    Discharge recommendations: home health OT (with supervision and light assist)     Barriers to discharge: Inaccessible home environment, Decreased caregiver support     Equipment recommendations: bedside commode, bath bench, hip kit     GOALS:    Occupational Therapy Goals        Problem: Occupational Therapy Goal    Goal Priority Disciplines Outcome Interventions   Occupational Therapy Goal     OT, PT/OT Ongoing (interventions implemented as appropriate)    Description:  Goals to be met by: 11 days     Patient will increase functional independence with ADLs by performing:    Feeding with Mod I.  UE Dressing with Mod I  LE Dressing with Mod I  Grooming while standing with Mod I  Toileting from bedside commode with Mod I  for hygiene and clothing management.   Bathing from  shower chair/bench with Stand-by Assistance. MET 7-11-17  Supine to sit with Modified [place]. MET 7-11-17  Stand pivot transfers with Modified [place].  Toilet transfer to bedside commode with Modified [place].   Pt. To be independent with HEP for BUE to improve endurance Pt. To be independent with HEP /activities to improve FMC skills in BUE                     Plan:  Patient to be  seen 5 x/week to address the above listed problems via self-care/home management, therapeutic activities, therapeutic exercises  Plan of Care expires: 07/30/17  Plan of Care reviewed with: patient    DIPTI Arroyo     I certify that I was present in the room directing the student in service delivery and guiding them using my skilled judgment. As the co-signing therapist I have reviewed the students documentation and am responsible for the treatment, assessment, and plan.       07/12/2017

## 2017-07-13 ENCOUNTER — PATIENT OUTREACH (OUTPATIENT)
Dept: ADMINISTRATIVE | Facility: CLINIC | Age: 67
End: 2017-07-13

## 2017-07-13 VITALS
DIASTOLIC BLOOD PRESSURE: 60 MMHG | TEMPERATURE: 98 F | HEART RATE: 72 BPM | BODY MASS INDEX: 25.96 KG/M2 | OXYGEN SATURATION: 97 % | HEIGHT: 67 IN | RESPIRATION RATE: 18 BRPM | WEIGHT: 165.38 LBS | SYSTOLIC BLOOD PRESSURE: 102 MMHG

## 2017-07-13 PROBLEM — R11.2 NON-INTRACTABLE VOMITING WITH NAUSEA: Status: RESOLVED | Noted: 2017-07-07 | Resolved: 2017-07-13

## 2017-07-13 LAB
ANION GAP SERPL CALC-SCNC: 10 MMOL/L
BASOPHILS # BLD AUTO: 0.01 K/UL
BASOPHILS NFR BLD: 0.1 %
BUN SERPL-MCNC: 15 MG/DL
CALCIUM SERPL-MCNC: 9.4 MG/DL
CHLORIDE SERPL-SCNC: 104 MMOL/L
CO2 SERPL-SCNC: 25 MMOL/L
CREAT SERPL-MCNC: 0.8 MG/DL
DIFFERENTIAL METHOD: ABNORMAL
EOSINOPHIL # BLD AUTO: 0.2 K/UL
EOSINOPHIL NFR BLD: 1.9 %
ERYTHROCYTE [DISTWIDTH] IN BLOOD BY AUTOMATED COUNT: 12.7 %
EST. GFR  (AFRICAN AMERICAN): >60 ML/MIN/1.73 M^2
EST. GFR  (NON AFRICAN AMERICAN): >60 ML/MIN/1.73 M^2
GLUCOSE SERPL-MCNC: 75 MG/DL
HCT VFR BLD AUTO: 37.5 %
HGB BLD-MCNC: 12.4 G/DL
LYMPHOCYTES # BLD AUTO: 2.1 K/UL
LYMPHOCYTES NFR BLD: 21.6 %
MAGNESIUM SERPL-MCNC: 1.9 MG/DL
MCH RBC QN AUTO: 31.6 PG
MCHC RBC AUTO-ENTMCNC: 33.1 %
MCV RBC AUTO: 96 FL
MONOCYTES # BLD AUTO: 1 K/UL
MONOCYTES NFR BLD: 10.1 %
NEUTROPHILS # BLD AUTO: 6.5 K/UL
NEUTROPHILS NFR BLD: 66.3 %
PHOSPHATE SERPL-MCNC: 3.8 MG/DL
PLATELET # BLD AUTO: 382 K/UL
PMV BLD AUTO: 10.8 FL
POTASSIUM SERPL-SCNC: 4.1 MMOL/L
RBC # BLD AUTO: 3.92 M/UL
SODIUM SERPL-SCNC: 139 MMOL/L
WBC # BLD AUTO: 9.82 K/UL

## 2017-07-13 PROCEDURE — 83735 ASSAY OF MAGNESIUM: CPT

## 2017-07-13 PROCEDURE — 36415 COLL VENOUS BLD VENIPUNCTURE: CPT

## 2017-07-13 PROCEDURE — 99315 NF DSCHRG MGMT 30 MIN/LESS: CPT | Mod: ,,, | Performed by: INTERNAL MEDICINE

## 2017-07-13 PROCEDURE — 97110 THERAPEUTIC EXERCISES: CPT

## 2017-07-13 PROCEDURE — 97530 THERAPEUTIC ACTIVITIES: CPT

## 2017-07-13 PROCEDURE — 97116 GAIT TRAINING THERAPY: CPT

## 2017-07-13 PROCEDURE — 80048 BASIC METABOLIC PNL TOTAL CA: CPT

## 2017-07-13 PROCEDURE — 25000003 PHARM REV CODE 250: Performed by: PHYSICIAN ASSISTANT

## 2017-07-13 PROCEDURE — 85025 COMPLETE CBC W/AUTO DIFF WBC: CPT

## 2017-07-13 PROCEDURE — 97535 SELF CARE MNGMENT TRAINING: CPT

## 2017-07-13 PROCEDURE — 84100 ASSAY OF PHOSPHORUS: CPT

## 2017-07-13 RX ADMIN — ATORVASTATIN CALCIUM 10 MG: 10 TABLET, FILM COATED ORAL at 08:07

## 2017-07-13 RX ADMIN — VITAMIN D, TAB 1000IU (100/BT) 1000 UNITS: 25 TAB at 08:07

## 2017-07-13 RX ADMIN — DIAZEPAM 5 MG: 5 TABLET ORAL at 08:07

## 2017-07-13 RX ADMIN — STANDARDIZED SENNA CONCENTRATE AND DOCUSATE SODIUM 1 TABLET: 8.6; 5 TABLET, FILM COATED ORAL at 08:07

## 2017-07-13 RX ADMIN — ALUMINUM HYDROXIDE, MAGNESIUM HYDROXIDE, AND SIMETHICONE 15 ML: 200; 200; 20 SUSPENSION ORAL at 02:07

## 2017-07-13 RX ADMIN — FINASTERIDE 5 MG: 5 TABLET, FILM COATED ORAL at 08:07

## 2017-07-13 RX ADMIN — HEPARIN SODIUM 5000 UNITS: 5000 INJECTION, SOLUTION INTRAVENOUS; SUBCUTANEOUS at 05:07

## 2017-07-13 NOTE — DISCHARGE SUMMARY
[facility]  Department of Hospital Medicine  Discharge Summary      Patient Name: Anthony Miguel  MRN: [id number]  Admission Date: 6/29/2017  Hospital Length of Stay: 14 days  Discharge Date and Time:  07/13/2017 1:11 PM  Attending Physician: Dung Shook MD   Discharging Provider: Hernan Briones NP  Primary Care Provider: Jada Spaulding MD    HPI:   Chief Complaint/Reason for Admission: Debility    History of Present Illness:  Patient is a 66 y.o. male who has a past medical history of Benign non-nodular prostatic hyperplasia; Hepatitis C; Senile purpura; vitamin D deficiency; and Vocal fold cyst presented with progressive weakness and paresthesias in his arms and legs for approximately one month. MRI of neck showed multilevel spondylosis with multiple disc herniations which results in severe central stenosis. Patient was admitted to neurosurgery and underwent posterior cervical fusion. Patient tolerated procedure well with no significant post operative complications.     Patient has been working with PT/OT who recommend rehab for further balance/mobility training. Patient's insurance denied rehab and thus patient being sent to SNF.  Patient lives alone in [place] in a single story home.  Prior to admission, he was independent with mobility and transfers up until May and required assistance with ADLs and mobility. Upon admission to SNF, he reported his pain was mild 4/10 and is requiring frequent pain medications. His main concern is his ability to play the piano. He states his strength is improved after surgery. Patient currently denies any fever/chills, chest pain, dyspnea, weakness, numbness, abdominal pain, nausea/vomiting, dysuria/hematuria, or weight loss.         * No surgery found *      Hospital Course:   7/3/17  Patient seen at bedside, he reports his pain is adequately controlled.  He is concerned about being able to return to play the piano.  He currently lives  alone, but has a friend, Heri Worthington ([phone]) who is willing to assist him if needed upon discharge.  He was instructed to keep his cervical collar on at all times until instructed otherwise by NS team.      7/7/17  Patient seen at bedside, he reports having one episode of nausea and vomiting today after eating laughlin.  States no more nausea now.  Currently he denies abdominal pain and states he is having bowel movements without issues.  He has no other complaints.  Nursing informed me he is requesting Tylenol for pain rather than narcotics as he feels the narcotics are too strong.  Therapy reported she noted patient coughing when eating lunch and asked if speech should see him.  Discussed this issue with him, he reports this is a chronic issue and has had laryngoscopies in the past and had collagen injected into his vocal cords at [place].      7/13/17  Patient seen at bedside prior to discharge.  He reports last BM was yesterday.  He has no complaints and is looking forward to going home.     Consults         Status Ordering Provider     Inpatient consult to [facility]  Once     Provider:  (Not yet assigned)    Completed ESTEFANI DODSON          Significant Diagnostic Studies: Labs:   BMP:   Recent Labs  Lab 07/13/17  0441   GLU 75      K 4.1      CO2 25   BUN 15   CREATININE 0.8   CALCIUM 9.4   MG 1.9    and CMP   Recent Labs  Lab 07/13/17  0441      K 4.1      CO2 25   GLU 75   BUN 15   CREATININE 0.8   CALCIUM 9.4   ANIONGAP 10   ESTGFRAFRICA >60.0   EGFRNONAA >60.0       Pending Diagnostic Studies:     None        Final Active Diagnoses:    Diagnosis Date Noted POA    PRINCIPAL PROBLEM:  Cervical myelopathy [G95.9] 06/20/2017 Yes    Debility [R53.81] 06/30/2017 Yes    Unspecified vitamin D deficiency [E55.9] 06/06/2017 Yes    Benign non-nodular prostatic hyperplasia without lower urinary tract symptoms [N40.0] 06/06/2017 Yes    History of hepatitis C (completed treatment)  [Z86.19] 06/02/2017 Yes    HLD (hyperlipidemia) [E78.5] 06/02/2017 Yes      Problems Resolved During this Admission:    Diagnosis Date Noted Date Resolved POA    Non-intractable vomiting with nausea [R11.2] 07/07/2017 07/13/2017 No      Debility    Patient participated and progressed with therapy goals.  Therapy is recommended home health at discharge.  Please see therapy notes for full details.             Benign non-nodular prostatic hyperplasia without lower urinary tract symptoms    Cont with finasteride to treat.  Follow up with PCP.        Unspecified vitamin D deficiency    Cont with Vit D supplements to treat.         HLD (hyperlipidemia)    Cont with home atorvastatin to treat.   Follow up with PCP.        History of hepatitis C (completed treatment)    Treated with ribavarin and interferon in 2012  No signs of liver failure.  Follow up as outpatient         * Cervical myelopathy    S/p posterior cervical fusion.  Seen by NS for wound check prior to discharge.  Staples have been removed.  He is able to remove collar while in bed and to take a shower but needs to keep head in neutral position per NS.  He will follow up with NS per recommendations.    Incision is healing appropriately.  Patient was evaluated by speech for suspected dysphagia.  Per Speech he is clear from their standpoint and recommends swallowing exercises.      Future Appointments  Date Time Provider Department Center   7/19/2017 8:00 AM Lilliam Ritter MD [facility] HEPAT [facility]   7/19/2017 9:00 AM Nay Perez NP [facility] IM Crist[facility] betsy PCW   8/3/2017 12:30 PM [facility] [id number] 485 LB LIMIT [facility] XRAY OP Cristiano Suarez   8/3/2017 1:30 PM David Wolff MD [facility] NEUROS7 Cristiano betsy                   Discharged Condition: good    Disposition: Home or Self Care    Follow Up:  Follow-up Information     Jada Spaulding MD. Schedule an appointment as soon as possible for a visit in 2 weeks.    Specialty:  Internal Medicine  Why:  Hospital follow up  Contact  "information:  [address]  [facility] [address]  [phone]             David Wolff MD. Go on 8/3/2017.    Specialty:  Neurosurgery  Why:  For wound re-check  Contact information:  [address]  [facility] [address]  [phone]             [facility].    Specialties:  Home Health Services, DME Provider  Why:  Agency will call pt to schedule a home health assessment.  Contact information:  [address] [place] [address]  [phone]             [facility].    Specialty:  DME Provider  Why:  SW will pull BSC and Hip Kit from SNF discharge depot.  Pt refused w/c and TTB stating that he has both. OK per Jennifer.  Contact information:  [address]  [facility] [address]  [phone]                 Patient Instructions:     WHEELCHAIR FOR HOME USE   Order Specific Question Answer Comments   Hours in W/C per day: 8    Type of Wheelchair: Lightweight    Patient unable to propel in Standard wheelchair? Yes    Size(Width): 18"(STD adult)    Leg Support: STD footrests    Leg Support: Swing Away    Lap Belt: Velcro    Cushion: Basic    Justification for cushion: Prevent pressure ulcers    Height: 5' 7" (1.702 m)    Weight: 74.8 kg (164 lb 14.5 oz)    Does patient have medical equipment at home? bath bench all items have been borrowed / all items have been borrowed / all items have been borrowed / all items have been borrowed   Does patient have medical equipment at home? rollator    Does patient have medical equipment at home? walker, rolling    Does patient have medical equipment at home? cane, straight    Length of need (1-99 months): 99    Please check all that apply: Caregiver is capable and willing to operate wheelchair safely.    Please check all that apply: Patient's upper body strength is sufficient for propulsion.    Please check all that apply: The patient requires the use of a w/c for activities of daily living within the Home.    Please check " "all that apply: Patient mobility limitations cannot be sufficiently resolved by the use of other ambulatory therapies.    Vendor: [facility] to pull from SNF closet   Expected Date of Delivery: 7/13/2017      3 IN 1 COMMODE FOR HOME USE   Order Specific Question Answer Comments   Type: Standard    Height: 5' 7" (1.702 m)    Weight: 74.8 kg (164 lb 14.5 oz)    Does patient have medical equipment at home? bath bench all items have been borrowed / all items have been borrowed / all items have been borrowed / all items have been borrowed   Does patient have medical equipment at home? rollator    Does patient have medical equipment at home? walker, rolling    Does patient have medical equipment at home? cane, straight    Length of need (1-99 months): 99    Vendor: [facility] to pull from SNF closet   Expected Date of Delivery: 7/13/2017      TRANSFER TUB BENCH FOR HOME USE   Order Specific Question Answer Comments   Type of Transfer Tub Bench: Unpadded    Height: 5' 7" (1.702 m)    Weight: 74.8 kg (164 lb 14.5 oz)    Does patient have medical equipment at home? bath bench all items have been borrowed / all items have been borrowed / all items have been borrowed / all items have been borrowed   Does patient have medical equipment at home? rollator    Does patient have medical equipment at home? walker, rolling    Does patient have medical equipment at home? cane, straight    Length of need (1-99 months): 99    Vendor: [facility] to pull from SNF closet   Expected Date of Delivery: 7/13/2017      HIP KIT FOR HOME USE   Order Specific Question Answer Comments   Height: 5' 7" (1.702 m)    Weight: 74.8 kg (164 lb 14.5 oz)    Does patient have medical equipment at home? bath bench all items have been borrowed / all items have been borrowed / all items have been borrowed / all items have been borrowed   Does patient have medical equipment at home? rollator    Does patient have medical equipment at home? " walker, rolling    Does patient have medical equipment at home? cane, straight    Length of need (1-99 months): 99    Type: [place] Hip Kit    Vendor: [facility] to pull from SNF closet   Expected Date of Delivery: 7/13/2017      Ambulatory Referral to Hepatology   Referral Priority: Routine Referral Type: Consultation   Referral Reason: Specialty Services Required    Number of Visits Requested: 1      Diet general     Activity as tolerated     Lifting restrictions   Scheduling Instructions: No lifting anything heavier than 10 lbs or excessive bending or twisting.     Call MD for:  temperature >100.4     Call MD for:  persistent nausea and vomiting or diarrhea     Call MD for:  severe uncontrolled pain     Call MD for:  redness, tenderness, or signs of infection (pain, swelling, redness, odor or green/yellow discharge around incision site)     Call MD for:  difficulty breathing or increased cough     Call MD for:  severe persistent headache     Call MD for:  persistent dizziness, light-headedness, or visual disturbances     Call MD for:  increased confusion or weakness       Medications:  Reconciled Home Medications:   Current Discharge Medication List      START taking these medications    Details   diazePAM (VALIUM) 5 MG tablet Take 1 tablet (5 mg total) by mouth every 6 (six) hours as needed (muscle spasm).  Qty: 15 tablet, Refills: 0      oxycodone-acetaminophen (PERCOCET)  mg per tablet Take 1 tablet by mouth every 4 (four) hours as needed for Pain.  Qty: 35 tablet, Refills: 0      senna-docusate 8.6-50 mg (PERICOLACE) 8.6-50 mg per tablet Take 1 tablet by mouth 2 (two) times daily.         CONTINUE these medications which have NOT CHANGED    Details   aspirin (ECOTRIN) 81 MG EC tablet Take 81 mg by mouth once daily.      finasteride (PROSCAR) 5 mg tablet Take 5 mg by mouth once daily.      atorvastatin (LIPITOR) 10 MG tablet Take 10 mg by mouth once daily.      loratadine (CLARITIN) 10 mg  tablet Take 10 mg by mouth once daily.      vitamin D 1000 units Tab Take 1,000 Units by mouth once daily.      walker (ULTRA-LIGHT ROLLATOR) Misc 1 Units by Misc.(Non-Drug; Combo Route) route once daily at 6am.  Qty: 1 each, Refills: 0    Associated Diagnoses: Weakness         STOP taking these medications       acetaminophen (TYLENOL) 500 mg Cap Comments:   Reason for Stopping:         ibuprofen (ADVIL,MOTRIN) 400 MG tablet Comments:   Reason for Stopping:         TRAZODONE HCL (TRAZODONE ORAL) Comments:   Reason for Stopping:         triamcinolone acetonide 0.1% (KENALOG) 0.1 % Lotn Comments:   Reason for Stopping:             Time spent on the discharge of patient: 30 minutes    Hernan Briones NP  Department of Hospital Medicine  [facility]

## 2017-07-13 NOTE — PLAN OF CARE
Problem: Fall Risk (Adult)  Goal: Identify Related Risk Factors and Signs and Symptoms  Related risk factors and signs and symptoms are identified upon initiation of Human Response Clinical Practice Guideline (CPG)     07/13/17 0434   Fall Risk   Related Risk Factors (Fall Risk) age-related changes;fatigue/slow reaction;gait/mobility problems;fear of falling;history of falls   Signs and Symptoms (Fall Risk) presence of risk factors

## 2017-07-13 NOTE — PLAN OF CARE
Patient to discharge home today with assist of friends. Patient set up with Tristan Richardson.     Future Appointments  Date Time Provider Department Center   7/19/2017 8:00 AM Lilliam Ritter MD [facility] HEPAT [facility]   7/19/2017 9:00 AM Nay Perez NP [facility] IM [facility] PCW   8/3/2017 12:30 PM [facility] [id number] 485 LB LIMIT [facility] XRAY OP [facility]   8/3/2017 1:30 PM David Wolff MD [facility] NEUROS7 [facility]        07/13/17 1027   Final Note   Assessment Type Final Discharge Note   Discharge Disposition Home   Discharge planning education complete? Yes   What phone number can be called within the next 1-3 days to see how you are doing after discharge? [phone]   Hospital Follow Up  Appt(s) scheduled? Yes   Discharge plans and expectations educations in teach back method with documentation complete? Yes   Discharge/Hospital Encounter Summary to (non[facility]) PCP n/a   Referral to / orders for Home Health Complete? Yes   Any social issues identified prior to discharge? No   Did you assess the readiness or willingness of the family or caregiver to support self management of care? Yes     Kamini Rivera RN, CM Skilled  [id number]

## 2017-07-13 NOTE — PLAN OF CARE
Problem: Physical Therapy Goal  Goal: Physical Therapy Goal  Goals to be met by: 9 days     Patient will increase functional independence with mobility by performin. Supine to sit with Modified [place]. MET  2. Sit to supine with Modified [place]. Not met  3. Sit to stand transfer with Supervision. MET  4. Bed to chair transfer with Supervision using Rolling Walker. MET  5. Gait  x 150 feet with Supervision using Rolling Walker. MET  6. Ascend/descend 5 stair with left Handrails Stand-by Assistance. MET  7. Stand for 3 minutes with Stand-by Assistance using Rolling Walker while performing dynamic standing balance activity. MET 7/3/2017  8. Lower extremity exercise program x20 reps per handout, with assistance as needed. MET  9. Pt will decreased TUG time to <25s w/ RW in order to decrease fall risk. MET       Pt has met 8/ LTGs. Pt is scheduled for D/C this PM.    Laurence Cobos, PT  2017

## 2017-07-13 NOTE — PROGRESS NOTES
Pt. d/c to home accompanied by friend. D/c instructions with f/u care and wound care explained to pt.  and pt. verbalized understanding.copy of d/c instructions sheets given to pt.pt.escorted to front entrance and assisted into friend vehicle.pt.personal belongingaccompanied pt.

## 2017-07-13 NOTE — PLAN OF CARE
Problem: Patient Care Overview  Goal: Plan of Care Review   07/13/17 0434   Coping/Psychosocial   Plan Of Care Reviewed With patient

## 2017-07-13 NOTE — PT/OT/SLP PROGRESS
Occupational Therapy  Treatment/d/c    Anthony Miguel   MRN: [id number]   Admitting Diagnosis: Cervical myelopathy    OT Date of Treatment: 07/13/17  Total Time (min): 38 min    Billable Minutes:  Self Care/Home Management 38    General Precautions: Standard, aspiration, fall  Orthopedic Precautions: N/A  Braces: Cervical collar    Do you have any cultural, spiritual, Yarsani conflicts, given your current situation?: none reported    Subjective:  Communicated with pt prior to session.  I'm ready to go home.     Pain/Comfort  Pain Rating 1: 4/10  Location - Side 1: Bilateral  Location - Orientation 1: posterior  Location 1: neck  Pain Addressed 1: Distraction    Objective:  Patient found with: cervical collar (seated EOB, pharmacist in room)    Functional Status:  MDS G  Bed Mobility Functional Status: mod(I)   Transfer Functional Status: SBA SPT from EOB to w/c with RW; SBA sit to stand from w/c to sink  Dressing Functional Status: LBD Min (A) for R sock without AE (pt able to don L sock and pants); UBD Mod(I) to don/doff shirt  Eating Functional Status: Set up (A)  Personal Hygiene Functional Status: Set up (A)  Bathing Functional Status: SBA sponge bath seated at sink   Moving from seated to standing position: Not steady, but able to stabilize without staff assistance  Turning around and facing the opposite direction while walking: Not steady, but able to stabilize without staff assistance  Surface-to-surface transfer (transfer between bed and chair or wheelchair): Not steady, but able to stabilize without staff assistance      Additional Treatment:  Pt educated on safety with maneuvering w/c around the home to gather clothing for dressing. Pt educated on safety with transfers.     Patient left up in chair with call button in reach    ASSESSMENT:  Anthony Miguel is a 66 y.o. male with a medical diagnosis of Cervical myelopathy and demonstrates decreased endurance, self-care skills, and functional mobility.  Pt has progressed well in therapy and would benefit from continued home health OT services to maximize safety and independence with ADL tasks and increase occupational performance in roles and routines in his home environment.        Rehab identified problem list/impairments: weakness, impaired endurance, impaired functional mobilty, impaired self care skills, decreased safety awareness, pain    Rehab potential is good    Activity tolerance: Good    Discharge recommendations: home health OT (with supervision and light assist)     Barriers to discharge: Inaccessible home environment, Decreased caregiver support     Equipment recommendations: bedside commode, bath bench, hip kit     GOALS:    Occupational Therapy Goals        Problem: Occupational Therapy Goal    Goal Priority Disciplines Outcome Interventions   Occupational Therapy Goal     OT, PT/OT Ongoing (interventions implemented as appropriate)    Description:  Goals to be met by: 11 days     Patient will increase functional independence with ADLs by performing:    Feeding with Mod I. MET 7-13-17  UE Dressing with Mod I - MET 7-13-17  LE Dressing with Mod I - MET 7-13-17  Grooming while standing with Mod I - NOT MET  Toileting from bedside commode with Mod I  for hygiene and clothing management. - MET 7-13-17  Bathing from  shower chair/bench with Stand-by Assistance. MET 7-11-17  Supine to sit with Modified [place]. MET 7-11-17  Stand pivot transfers with Modified [place]. NOT MET   Toilet transfer to bedside commode with Modified [place]. NOT MET    Pt. To be independent with HEP for BUE to improve endurance NOT MET 7-13-17  Pt. To be independent with HEP /activities to improve FMC skills in BUE  MET 7-13-17                    Plan:  Patient to be seen 5 x/week to address the above listed problems via self-care/home management, therapeutic activities, therapeutic exercises  Plan of Care expires: 07/30/17  Plan of Care reviewed with:  patient    DIPTI Arroyo     I certify that I was present in the room directing the student in service delivery and guiding them using my skilled judgment. As the co-signing therapist I have reviewed the students documentation and am responsible for the treatment, assessment, and plan.       07/13/2017

## 2017-07-13 NOTE — PLAN OF CARE
Problem: Fall Risk (Adult)  Goal: Absence of Falls  Patient will demonstrate the desired outcomes by discharge/transition of care.   Outcome: Ongoing (interventions implemented as appropriate)  Pt. had no falls at this time. Will continue to monitor.

## 2017-07-13 NOTE — ASSESSMENT & PLAN NOTE
Patient participated and progressed with therapy goals.  Therapy is recommended home health at discharge.  Please see therapy notes for full details.

## 2017-07-13 NOTE — PT/OT/SLP PROGRESS
Physical Therapy  Treatment/Discharge Summary    Anthony Miguel   MRN: [id number]   Admitting Diagnosis: Cervical myelopathy    PT Received On: 07/13/17  Total Time (min): 68       Billable Minutes:  Gait [id number], Therapeutic Activity 38, Therapeutic Exercise 15 and Total Time 68    Treatment Type: Treatment  PT/PTA: PT     PTA Visit Number: 0       General Precautions: Standard, aspiration, fall  Orthopedic Precautions: N/A   Braces: Cervical collar    Do you have any cultural, spiritual, Mandaen conflicts, given your current situation?: no    Subjective:  Pt agreeable to session.    Pain/Comfort  Pain Rating 1: 4/10  Location - Side 1: Bilateral  Location - Orientation 1: posterior  Location 1: neck  Pain Addressed 1: Pre-medicate for activity, Reposition    Objective:  Patient found sitting in w/cPatient found with: cervical collar     Functional Status:  MDS G  Transfer Functional Status: S-SBA  Walk in Room Functional Status: S-SBA  Walk in Corridor Functional Status: S-SBA  Locomotion on Unit Functional Status: S-SBA  Moving from seated to standing position: Steady at all times  Walking (with assistive device if used): Steady at all times  Turning around and facing the opposite direction while walking: Steady at all times  Surface-to-surface transfer (transfer between bed and chair or wheelchair): Steady at all times    Bed Mobility:  Activity not performed    Transfers:  Sit<>Stand: to/from w/c (3 trials) w/ RW and SPV    Gait:  Amb 300ft w/ RW and SPV, no LOB     Advanced Gait:  Stairs: asc/lópez 5 steps w/ BUE on (L) rail and SBA for safety (2 trials- 2nd trial w/ friend present)    Additional Treatment:  Seated LBE x15min to inc BLE strength and endurance    Addendum: Pt's friend came to  pt to assist him in to his house. Pt's friend very concerned about pt walking up stairs to enter home and about pt's ability to care for himself. PT assisted pt w/ stair activity w/ friend observing. PT demo'd  safe guarding instructions for pt's friend to see in order to increase his comfort level. PT also reviewed all safety instructions w/ pt and pt's friend for home safety including using RW at all times and taking his time w/ mobility to prevent falls. Pt's friend had numerous other questions regarding what to do if pt falls- pt educated on safely rising from floor using stool method and that if that does not work to call 911. Pt's friend verb understanding. Education also provided on the role of home health therapy in order to ease transition to home environment safely.    Timed Up & Go Test (TUG)  Instructions to the patient:   From sitting in a chair, stand up, walk 3 meters, turn around, walk back, and sit down    Scoring:   Assistive Device and/or Bracing Used: Rolling Walker  TUG Time: 23 seconds   Community Dwelling Older Adult = > 13.5 sec. are associated with high fall risk     Patient left up in chair with call button in reach.    Assessment:  Anthony Miguel is a 66 y.o. male with a medical diagnosis of Cervical myelopathy.  Pt is scheduled for D/C this PM. He has met 8/9 LTGs and has overall shown good improvements in strength, endurance, balance, and functional mobility. He is at a SBA/SPV level for functional mobility w/ a RW. Pt is appropriate for D/C at this time. He is recommended to have light assist from friend for safety along w/ home health for continued progress.    Rehab identified problem list/impairments: weakness, impaired endurance, impaired self care skills, impaired functional mobilty, pain    Rehab potential is good.    Activity tolerance: Good    Discharge recommendations: home with home health     Barriers to discharge: Decreased caregiver support, Inaccessible home environment    Equipment recommendations: bedside commode, hip kit     GOALS:    Physical Therapy Goals        Problem: Physical Therapy Goal    Goal Priority Disciplines Outcome Goal Variances Interventions   Physical  Therapy Goal     PT/OT, PT Ongoing (interventions implemented as appropriate)     Description:  Goals to be met by: 9 days     Patient will increase functional independence with mobility by performin. Supine to sit with Modified [place]. MET  2. Sit to supine with Modified [place]. Not met  3. Sit to stand transfer with Supervision. MET  4. Bed to chair transfer with Supervision using Rolling Walker. MET  5. Gait  x 150 feet with Supervision using Rolling Walker. MET  6. Ascend/descend 5 stair with left Handrails Stand-by Assistance. MET  7. Stand for 3 minutes with Stand-by Assistance using Rolling Walker while performing dynamic standing balance activity. MET 7/3/2017  8. Lower extremity exercise program x20 reps per handout, with assistance as needed. MET  9. Pt will decreased TUG time to <25s w/ RW in order to decrease fall risk. MET                         PLAN:    D/C to home w/ home health and light assistance recommended.  Plan of Care expires: 17  Plan of Care reviewed with: patient    Laurence Cobos, PT  2017

## 2017-07-13 NOTE — PROGRESS NOTES
Pt to discharge home with [facility] and assistance of friends.  SW pulled BSC and hip kit from dep[facility].  Pt discharged with friend.

## 2017-07-13 NOTE — PLAN OF CARE
Problem: Occupational Therapy Goal  Goal: Occupational Therapy Goal  Goals to be met by: 11 days     Patient will increase functional independence with ADLs by performing:    Feeding with Mod I. MET 7-13-17  UE Dressing with Mod I - MET 7-13-17  LE Dressing with Mod I - MET 7-13-17  Grooming while standing with Mod I - NOT MET  Toileting from bedside commode with Mod I  for hygiene and clothing management. - MET 7-13-17  Bathing from  shower chair/bench with Stand-by Assistance. MET 7-11-17  Supine to sit with Modified [place]. MET 7-11-17  Stand pivot transfers with Modified [place]. NOT MET   Toilet transfer to bedside commode with Modified [place]. NOT MET    Pt. To be independent with HEP for BUE to improve endurance NOT MET 7-13-17  Pt. To be independent with HEP /activities to improve FMC skills in BUE  MET 7-13-17  Pt tolerated session well.

## 2017-07-13 NOTE — ASSESSMENT & PLAN NOTE
S/p posterior cervical fusion.  Seen by NS for wound check prior to discharge.  Staples have been removed.  He is able to remove collar while in bed and to take a shower but needs to keep head in neutral position per NS.  He will follow up with NS per recommendations.    Incision is healing appropriately.  Patient was evaluated by speech for suspected dysphagia.  Per Speech he is clear from their standpoint and recommends swallowing exercises.      Future Appointments  Date Time Provider Department Center   7/19/2017 8:00 AM Lilliam Ritter MD [facility] HEPAT [facility]   7/19/2017 9:00 AM Nay Perez NP [facility] IM [facility] PCW   8/3/2017 12:30 PM [facility] [id number] LB LIMIT [facility] XRAY OP [facility]   8/3/2017 1:30 PM David Wolff MD [facility] NEUROS7 [facility]

## 2017-07-14 ENCOUNTER — PATIENT OUTREACH (OUTPATIENT)
Dept: ADMINISTRATIVE | Facility: CLINIC | Age: 67
End: 2017-07-14

## 2017-07-14 NOTE — PATIENT INSTRUCTIONS
Fall Prevention  Falls often occur due to slipping, tripping or losing your balance. Millions of people fall every year and injure themselves. Here are ways to reduce your risk of falling again.  · Think about your fall, was there anything that caused your fall that can be fixed, removed, or replaced?  · Make your home safe by keeping walkways clear of objects you may trip over.  · Use non-slip pads under rugs. Do not use area rugs or small throw rugs.  · Use non-slip mats in bathtubs and showers.  · Install handrails and lights on staircases.  · Do not walk in poorly lit areas.  · Do not stand on chairs or wobbly ladders.  · Use caution when reaching overhead or looking upward. This position can cause a loss of balance.  · Be sure your shoes fit properly, have non-slip bottoms and are in good condition.   · Wear shoes both inside and out. Avoid going barefoot or wearing slippers.  · Be cautious when going up and down stairs, curbs, and when walking on uneven sidewalks.  · If your balance is poor, consider using a cane or walker.  · If your fall was related to alcohol use, stop or limit alcohol intake.   · If your fall was related to use of sleeping medicines, talk to your doctor about this. You may need to reduce your dosage at bedtime if you awaken during the night to go to the bathroom.    · To reduce the need for nighttime bathroom trips:  ¨ Avoid drinking fluids for several hours before going to bed  ¨ Empty your bladder before going to bed  ¨ Men can keep a urinal at the bedside  · Stay as active as you can. Balance, flexibility, strength, and endurance all come from exercise. They all play a role in preventing falls. Ask your healthcare provider which types of activity are right for you.  · Get your vision checked on a regular basis.  · If you have pets, know where they are before you stand up or walk so you don't trip over them.  · Use night lights.  Date Last Reviewed: 11/5/2015  © 0729-4927 [employer]. [address] [place] [address], [place] [address]. All rights reserved. This information is not intended as a substitute for professional medical care. Always follow your healthcare professional's instructions.

## 2017-07-19 ENCOUNTER — OFFICE VISIT (OUTPATIENT)
Dept: HEPATOLOGY | Facility: CLINIC | Age: 67
End: 2017-07-19
Payer: MEDICARE

## 2017-07-19 ENCOUNTER — OFFICE VISIT (OUTPATIENT)
Dept: INTERNAL MEDICINE | Facility: CLINIC | Age: 67
End: 2017-07-19
Payer: MEDICARE

## 2017-07-19 ENCOUNTER — PROCEDURE VISIT (OUTPATIENT)
Dept: HEPATOLOGY | Facility: CLINIC | Age: 67
End: 2017-07-19
Attending: INTERNAL MEDICINE
Payer: MEDICARE

## 2017-07-19 VITALS
BODY MASS INDEX: 27.16 KG/M2 | DIASTOLIC BLOOD PRESSURE: 62 MMHG | SYSTOLIC BLOOD PRESSURE: 101 MMHG | HEIGHT: 67 IN | OXYGEN SATURATION: 98 % | HEART RATE: 81 BPM | WEIGHT: 173.06 LBS

## 2017-07-19 VITALS
HEIGHT: 67 IN | HEART RATE: 69 BPM | BODY MASS INDEX: 26.99 KG/M2 | TEMPERATURE: 97 F | RESPIRATION RATE: 18 BRPM | OXYGEN SATURATION: 98 % | SYSTOLIC BLOOD PRESSURE: 120 MMHG | DIASTOLIC BLOOD PRESSURE: 76 MMHG | WEIGHT: 171.94 LBS

## 2017-07-19 DIAGNOSIS — K74.00 HEPATIC FIBROSIS: ICD-10-CM

## 2017-07-19 DIAGNOSIS — N40.0 BENIGN NON-NODULAR PROSTATIC HYPERPLASIA WITHOUT LOWER URINARY TRACT SYMPTOMS: ICD-10-CM

## 2017-07-19 DIAGNOSIS — Z86.19 HX OF HEPATITIS C: Primary | ICD-10-CM

## 2017-07-19 DIAGNOSIS — E55.9 UNSPECIFIED VITAMIN D DEFICIENCY: ICD-10-CM

## 2017-07-19 DIAGNOSIS — G62.9 NEUROPATHY: ICD-10-CM

## 2017-07-19 DIAGNOSIS — Z86.19 HISTORY OF HEPATITIS C: ICD-10-CM

## 2017-07-19 DIAGNOSIS — G95.9 CERVICAL MYELOPATHY: ICD-10-CM

## 2017-07-19 DIAGNOSIS — E78.5 HYPERLIPIDEMIA, UNSPECIFIED HYPERLIPIDEMIA TYPE: ICD-10-CM

## 2017-07-19 DIAGNOSIS — Z86.19 HX OF HEPATITIS C: ICD-10-CM

## 2017-07-19 DIAGNOSIS — M47.812 CERVICAL ARTHRITIS: ICD-10-CM

## 2017-07-19 DIAGNOSIS — Z12.5 ENCOUNTER FOR SCREENING FOR MALIGNANT NEOPLASM OF PROSTATE: ICD-10-CM

## 2017-07-19 PROBLEM — R53.81 DEBILITY: Status: RESOLVED | Noted: 2017-06-30 | Resolved: 2017-07-19

## 2017-07-19 PROCEDURE — 1125F AMNT PAIN NOTED PAIN PRSNT: CPT | Mod: S$GLB,,, | Performed by: INTERNAL MEDICINE

## 2017-07-19 PROCEDURE — 1159F MED LIST DOCD IN RCRD: CPT | Mod: S$GLB,,, | Performed by: INTERNAL MEDICINE

## 2017-07-19 PROCEDURE — 99999 PR PBB SHADOW E&M-EST. PATIENT-LVL IV: CPT | Mod: PBBFAC,,, | Performed by: INTERNAL MEDICINE

## 2017-07-19 PROCEDURE — 99214 OFFICE O/P EST MOD 30 MIN: CPT | Mod: S$GLB,,, | Performed by: INTERNAL MEDICINE

## 2017-07-19 PROCEDURE — 91200 LIVER ELASTOGRAPHY: CPT | Mod: S$GLB,,, | Performed by: INTERNAL MEDICINE

## 2017-07-19 PROCEDURE — 99215 OFFICE O/P EST HI 40 MIN: CPT | Mod: S$GLB,,, | Performed by: INTERNAL MEDICINE

## 2017-07-19 PROCEDURE — 99999 PR PBB SHADOW E&M-EST. PATIENT-LVL III: CPT | Mod: PBBFAC,,, | Performed by: INTERNAL MEDICINE

## 2017-07-19 NOTE — ASSESSMENT & PLAN NOTE
Neuropathy in LE with acute back pain caused by cervical stenosis, treated with C3-6 fusion on 6/21  · Symptoms improving  · Start gabapentin 100mg TID  · Titrate up as needed  · Start after valium script is completed  · Advised to be mindful to prevent falls

## 2017-07-19 NOTE — ASSESSMENT & PLAN NOTE
Compliant with finasteride, USS of 6 (mild) and good quality of life  · Continue therapy  · Check PSA

## 2017-07-19 NOTE — ASSESSMENT & PLAN NOTE
Patient with elevated bilirubin on 6/6. Treated with ribavarin and interferon in 2012. Fibroscan F 0-1. Per hepatology, no increased risk of portal hypertension or HCC associated with achievement of cure of viral hepatitis in minimal fibrosis.   · Liver US and monitor labs with Hepatology  · F/U Hepatology

## 2017-07-19 NOTE — LETTER
July 19, 2017      Le Olson MD  [address]  [facility] [address]           [facility]  [address]  [facility] [address]  Phone: [phone]  Fax: [phone]          Patient: Anthony Miguel   MR Number: [id number]   YOB: 1950   Date of Visit: 7/19/2017       Dear Dr. Le Oslon:    Thank you for referring Anthony Miguel to me for evaluation. Attached you will find relevant portions of my assessment and plan of care.    If you have questions, please do not hesitate to call me. I look forward to following Anthony Miguel along with you.    Sincerely,    Lilliam Ritter MD    Enclosure  CC:  No Recipients    If you would like to receive this communication electronically, please contact [email or web address] or [phone] to request more information on [employer] Link access.    For providers and/or their staff who would like to refer a patient to [facility], please contact us through our one-stop-shop provider referral line, [facility], at [phone].    If you feel you have received this communication in error or would no longer like to receive these types of communications, please e-mail [email or web address]

## 2017-07-19 NOTE — PROGRESS NOTES
Hepatology Consult Note    Referring provider: Dr. Le Olson    HPI:  Anthony Miguel is a 66 y.o. male that presents to hepatology clinic for consultation of hepatitis C infection.  He is accompanied by his friend.      The patient reports being diagnosed with hepatitis C in approximately 2007 based on routine blood testing.  He states that he is unclear of exposure but report cleaning an apartment when he was younger and had a needle stick.  He denies any IV/IN drug use, early blood transfusion, or other high risk activities.  Subsequently underwent treatment with interferon and ribavirin for 1.5-2 years.  Tolerated medication and achieved cure.  RNA from 6/2017 confirms absence of viremia.  The patient reports that a liver biopsy was performed in 2008 at [facility] and there was some fibrosis present but unclear of staging.    The patient has not experienced clinical symptoms of chronic liver disease at diagnosis or recently.  Biochemical testing has been unremarkable with exception of slightly elevated bilirubin on most recent labs.  Patient with normal platelet count, INR and albumin.       Patient has had recent spinal surgery for cervical myelopathy with [place] J collar in place today.      Patient Active Problem List   Diagnosis    History of hepatitis C (completed treatment)    HLD (hyperlipidemia)    Neuropathy    Unspecified vitamin D deficiency    Benign non-nodular prostatic hyperplasia without lower urinary tract symptoms    Senile purpura    Weakness of both lower extremities    Cervical myelopathy    Chronic hoarseness    Vocal fold cyst    Preoperative evaluation to rule out surgical contraindication    Debility       Past Medical History:   Diagnosis Date    Benign non-nodular prostatic hyperplasia without lower urinary tract symptoms 6/6/2017    Compliant with finasteride    Hepatitis C     Senile purpura 6/6/2017    Ecchymoses on L UE     Unspecified vitamin D  deficiency 6/6/2017    Compliant with daily supplementation of 1000U Vit D    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    Overview:  Right side dx update       Past Surgical History:   Procedure Laterality Date    Larangoscopy         Family History   Problem Relation Age of Onset    Cancer Mother     No Known Problems Father        Social History     Social History    Marital status: Single     Spouse name: N/A    Number of children: N/A    Years of education: N/A     Social History Main Topics    Smoking status: Former Smoker     Packs/day: 1.00     Quit date: 02/2000    Smokeless tobacco: Not on file    Alcohol use No    Drug use: No    Sexual activity: Not Currently     Other Topics Concern    Not on file     Social History Narrative    No narrative on file       Current Outpatient Prescriptions   Medication Sig Dispense Refill    aspirin (ECOTRIN) 81 MG EC tablet Take 81 mg by mouth once daily.      atorvastatin (LIPITOR) 10 MG tablet Take 10 mg by mouth once daily.      diazePAM (VALIUM) 5 MG tablet Take 1 tablet (5 mg total) by mouth every 6 (six) hours as needed (muscle spasm). 15 tablet 0    finasteride (PROSCAR) 5 mg tablet Take 5 mg by mouth once daily.      loratadine (CLARITIN) 10 mg tablet Take 10 mg by mouth once daily.      oxycodone-acetaminophen (PERCOCET)  mg per tablet Take 1 tablet by mouth every 4 (four) hours as needed for Pain. 35 tablet 0    senna-docusate 8.6-50 mg (PERICOLACE) 8.6-50 mg per tablet Take 1 tablet by mouth 2 (two) times daily.      vitamin D 1000 units Tab Take 1,000 Units by mouth once daily.      walker (ULTRA-LIGHT ROLLATOR) Misc 1 Units by Misc.(Non-Drug; Combo Route) route once daily at 6am. 1 each 0     No current facility-administered medications for this visit.        Review of patient's allergies indicates:  No Known Allergies    Review of Systems   Constitutional: Negative for chills, fever, malaise/fatigue and weight loss.   Eyes: Negative.   "  Respiratory: Negative for cough and shortness of breath.    Cardiovascular: Negative for chest pain and leg swelling.   Gastrointestinal: Negative for abdominal pain, heartburn, nausea and vomiting.   Musculoskeletal: Negative for joint pain and myalgias.   Skin: Negative for itching and rash.   Neurological: Positive for focal weakness. Negative for dizziness and headaches.   Endo/Heme/Allergies: Does not bruise/bleed easily.   Psychiatric/Behavioral: Negative for depression.       Vitals:    07/19/17 0819   BP: 120/76   Pulse: 69   Resp: 18   Temp: 96.7 °F (35.9 °C)   TempSrc: Oral   SpO2: 98%   Weight: 78 kg (171 lb 15.3 oz)   Height: 5' 7" (1.702 m)       Physical Exam   Constitutional: He is oriented to person, place, and time. He appears well-developed and well-nourished. No distress.   HENT:   Head: Normocephalic and atraumatic.   Mouth/Throat: Oropharynx is clear and moist.   Eyes: Conjunctivae are normal. Pupils are equal, round, and reactive to light. No scleral icterus.   Neck:   Ida J collar in place    Cardiovascular: Normal rate, regular rhythm and normal heart sounds.  Exam reveals no gallop and no friction rub.    No murmur heard.  Pulmonary/Chest: Effort normal and breath sounds normal. No respiratory distress. He has no wheezes. He has no rales.   Abdominal: Soft. Bowel sounds are normal. He exhibits no distension. There is no tenderness.   Musculoskeletal: Normal range of motion. He exhibits no edema.   Neurological: He is alert and oriented to person, place, and time. No cranial nerve deficit.   Skin: Skin is warm and dry. No erythema.   Psychiatric: He has a normal mood and affect. His behavior is normal.   Vitals reviewed.      Lab Results   Component Value Date    ALT 16 06/02/2017    AST 24 06/02/2017    ALKPHOS 87 06/02/2017    BILITOT 1.2 (H) 06/06/2017       Lab Results   Component Value Date    WBC 9.82 07/13/2017    HGB 12.4 (L) 07/13/2017    HCT 37.5 (L) 07/13/2017    MCV 96 " 07/13/2017     (H) 07/13/2017       Lab Results   Component Value Date     07/13/2017    K 4.1 07/13/2017     07/13/2017    CO2 25 07/13/2017    BUN 15 07/13/2017    CREATININE 0.8 07/13/2017    CALCIUM 9.4 07/13/2017    ANIONGAP 10 07/13/2017    ESTGFRAFRICA >60.0 07/13/2017    EGFRNONAA >60.0 07/13/2017       Imaging: No recent abdominal imaging available  Diagnostics: path report from prior liver biopsy not currently available     Assessment:  66 y.o. male presenting with history of hepatitis C treatment with confirmation of achieved cure.  Patient does not have significant alcohol use or other factors to suggest chronic liver disease.  This is supported by normal liver tests.  It is also reassuring that there is no biochemical evidence of complications of portal hypertension.      H/o hepatitis C infection:  Fibroscan performed to determine fibrosis staging.  Found to have minimal fibrosis at 4.8kPa, F0-1.  Therefore in the absence of other etiologies for chronic liver disease, the patient does not require long term follow-up.  No increased risk of portal hypertension or HCC associated with achievement of cure of viral hepatitis in minimal fibrosis.  Patient encouraged to continue healthy lifestyle measures.  Will perform lab testing and US liver doppler to confirm absence of concerning results seen on fibroscan.    RTC prn basis

## 2017-07-19 NOTE — PROGRESS NOTES
Primary Care Provider Appointment    Subjective:      Patient ID: Anthony Miguel is a 66 y.o. male with cervical myelopathy with fusion on 6/21    Chief Complaint: Hospital Follow Up (Pt is here hospital follow up appt.); Labs Only (Patient would liek to get PSA drawn ); and Other (Patient would like to get medication for his hands pt still has neuropathy)    Patient with C3-6 fusion on 6/21 to treat cervical myelopathy due to severe cervical stenosis. Patient was in rehab for 2 weeks with significant improvement in symptoms of bilateral UE weakness. He complains that his neck brace is too big. He is interested in starting gabapentin for neuropathic pain. He is already valium once daily, and oxycontin for pain.     He is concerned about his BPH. His USS is 6 (which scores as mild), with good quality of life. Patient interested in PSA screening.    Patient had colonoscopy in 2009 with repeat at [facility] on unknown year. He reports all normal.    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   Respiratory: Negative for cough and shortness of breath.    Cardiovascular: Negative for leg swelling.   Gastrointestinal: Negative for constipation and diarrhea.   Genitourinary: Positive for frequency and urgency. Negative for difficulty urinating and hematuria.   Musculoskeletal: Positive for back pain, gait problem and neck pain. Negative for joint swelling.   Psychiatric/Behavioral: Positive for sleep  "disturbance. Negative for behavioral problems, decreased concentration and dysphoric mood. The patient is nervous/anxious.        Objective:   /62 (BP Location: Right arm, Patient Position: Sitting, BP Method: Manual)   Pulse 81   Ht 5' 7" (1.702 m)   Wt 78.5 kg (173 lb 1 oz)   SpO2 98%   BMI 27.11 kg/m²     Physical Exam   Constitutional: He is oriented to person, place, and time. He appears well-developed and well-nourished.   HENT:   Head: Normocephalic.   Wearing neck brace  Neck immobilized   Eyes: EOM are normal.   Cardiovascular: Normal rate and regular rhythm.    Pulmonary/Chest: Effort normal.   Musculoskeletal: He exhibits tenderness and deformity.   Neck immobilized  Using rolling walker  Increased sensation in hands bilaterally   Neurological: He is alert and oriented to person, place, and time. He displays normal reflexes. Coordination normal.   Psychiatric: He has a normal mood and affect. His behavior is normal. Thought content normal.       Lab Results   Component Value Date    WBC 9.82 07/13/2017    HGB 12.4 (L) 07/13/2017    HCT 37.5 (L) 07/13/2017     (H) 07/13/2017    CHOL 138 06/06/2017    TRIG 118 06/06/2017    HDL 51 06/06/2017    ALT 16 06/02/2017    AST 24 06/02/2017     07/13/2017    K 4.1 07/13/2017     07/13/2017    CREATININE 0.8 07/13/2017    BUN 15 07/13/2017    CO2 25 07/13/2017    TSH 3.029 06/02/2017    INR 1.0 06/21/2017    HGBA1C 5.3 06/02/2017         Assessment:   66 y.o. male with multiple co-morbid illnesses here to continue work-up of chronic issues notably cervical stenosis, HLD, hep C.     Plan:     Problem List Items Addressed This Visit        Neuro    Neuropathy     Neuropathy in LE with acute back pain caused by cervical stenosis, treated with C3-6 fusion on 6/21  · Symptoms improving  · Start gabapentin 100mg TID  · Titrate up as needed  · Start after valium script is completed  · Advised to be mindful to prevent falls         Cervical " myelopathy     Severe cervical stenosis with myelopathy with fusion of C3-6.  · F/U NS   · Start gabapentin 100mg TID  · Titrate up  · Start after valium script is completed  · MA to call NS office for better fitting neck brace            Renal    Benign non-nodular prostatic hyperplasia without lower urinary tract symptoms     Compliant with finasteride, USS of 6 (mild) and good quality of life  · Continue therapy  · Check PSA         Relevant Orders    PSA, Screening       ID    History of hepatitis C (completed treatment)     Patient with elevated bilirubin on 6/6. Treated with ribavarin and interferon in 2012. Fibroscan F 0-1. Per hepatology, no increased risk of portal hypertension or HCC associated with achievement of cure of viral hepatitis in minimal fibrosis.   · Liver US and monitor labs with Hepatology  · F/U Hepatology            Fluids/Electrolytes/Nutrition/GI    HLD (hyperlipidemia)     Compliant with atorvastatin 10mg. ASCVD risk of 7.8%, mod-intensity recommended  · Continue current therapy   · Continue ASA         Unspecified vitamin D deficiency     Compliant with daily supplementation of 1000U Vit D  · Continue therapy            Musculoskeletal and Integument    Cervical arthritis     Patient with neck pain following cervical fusion surgery  · F/U NS           Other Visit Diagnoses     Encounter for screening for malignant neoplasm of prostate         Relevant Orders    PSA, Screening          Health Maintenance       Date Due Completion Date    TETANUS VACCINE 12/03/1967 ---REQUEST RECORDS    Colonoscopy 12/03/1999 ---REQUEST RECORDS    Zoster Vaccine 12/03/2009 ---    Pneumococcal (65+) (1 of 2 - PCV13) 12/03/2014 ---REQUEST RECORDS    Abdominal Aortic Aneurysm Screening 12/03/2014 ---REQUEST RECORDS    Influenza Vaccine 08/01/2017 ---    Lipid Panel 06/06/2022 6/6/2017          Return in about 6 weeks (around 8/30/2017). . One hour spent with this patient today, half of that in  counseling.    Jada Spaulding MD/MPH  Internal Medicine  [facility]  [phone]

## 2017-07-19 NOTE — PATIENT INSTRUCTIONS
You are cured of hepatitis C.    We will do fibroscan to assess the amount of scar tissue present.     Based on these results will determine if further liver clinic follow-up is required.    Labs and US to be scheduled.    Return to clinic based on results.

## 2017-07-19 NOTE — ASSESSMENT & PLAN NOTE
Compliant with atorvastatin 10mg. ASCVD risk of 7.8%, mod-intensity recommended  · Continue current therapy   · Continue ASA

## 2017-07-19 NOTE — ASSESSMENT & PLAN NOTE
Severe cervical stenosis with myelopathy with fusion of C3-6.  · F/U NS   · Start gabapentin 100mg TID  · Titrate up  · Start after valium script is completed  · MA to call NS office for better fitting neck brace

## 2017-07-19 NOTE — PATIENT INSTRUCTIONS
TODAY:  - request records from [facility] for colonoscopy  - check PSA on 7/26  - start gabapentin when valium script is complete

## 2017-07-20 ENCOUNTER — TELEPHONE (OUTPATIENT)
Dept: NEUROSURGERY | Facility: CLINIC | Age: 67
End: 2017-07-20

## 2017-07-20 NOTE — TELEPHONE ENCOUNTER
----- Message from Irasema Moreno sent at 7/20/2017 10:18 AM CDT -----  Contact: Kerrie naik/ internal medicine @ ext [phone]  Kerrie is calling to speak with nurse regarding prescription for brace for pt. Please call.

## 2017-07-21 NOTE — ADDENDUM NOTE
Addendum  created 07/21/17 1525 by Yves Bower MD    Anesthesia Intra Blocks edited, Sign clinical note

## 2017-07-26 ENCOUNTER — HOSPITAL ENCOUNTER (OUTPATIENT)
Dept: RADIOLOGY | Facility: HOSPITAL | Age: 67
Discharge: HOME | End: 2017-07-26
Attending: INTERNAL MEDICINE
Payer: MEDICARE

## 2017-07-26 ENCOUNTER — TELEPHONE (OUTPATIENT)
Dept: HEPATOLOGY | Facility: CLINIC | Age: 67
End: 2017-07-26

## 2017-07-26 DIAGNOSIS — Z86.19 HX OF HEPATITIS C: ICD-10-CM

## 2017-07-26 PROCEDURE — 76705 ECHO EXAM OF ABDOMEN: CPT | Mod: 26,59,, | Performed by: RADIOLOGY

## 2017-07-26 PROCEDURE — 93975 VASCULAR STUDY: CPT | Mod: TC

## 2017-07-26 PROCEDURE — 93975 VASCULAR STUDY: CPT | Mod: 26,,, | Performed by: RADIOLOGY

## 2017-08-02 ENCOUNTER — TELEPHONE (OUTPATIENT)
Dept: HEPATOLOGY | Facility: CLINIC | Age: 67
End: 2017-08-02

## 2017-08-02 NOTE — TELEPHONE ENCOUNTER
----- Message from Lilliam Ritter MD sent at 8/1/2017  9:40 PM CDT -----  Please inform patient that liver ultrasound is unremarkable.  Nothing to do at this time

## 2017-08-03 ENCOUNTER — HOSPITAL ENCOUNTER (OUTPATIENT)
Dept: RADIOLOGY | Facility: HOSPITAL | Age: 67
Discharge: HOME | End: 2017-08-03
Attending: NEUROLOGICAL SURGERY
Payer: MEDICARE

## 2017-08-03 ENCOUNTER — TELEPHONE (OUTPATIENT)
Dept: INTERNAL MEDICINE | Facility: CLINIC | Age: 67
End: 2017-08-03

## 2017-08-03 ENCOUNTER — OFFICE VISIT (OUTPATIENT)
Dept: NEUROSURGERY | Facility: CLINIC | Age: 67
End: 2017-08-03
Payer: MEDICARE

## 2017-08-03 ENCOUNTER — TELEPHONE (OUTPATIENT)
Dept: NEUROSURGERY | Facility: CLINIC | Age: 67
End: 2017-08-03

## 2017-08-03 ENCOUNTER — TELEPHONE (OUTPATIENT)
Dept: SPINE | Facility: CLINIC | Age: 67
End: 2017-08-03

## 2017-08-03 VITALS
HEIGHT: 67 IN | TEMPERATURE: 98 F | WEIGHT: 169.19 LBS | BODY MASS INDEX: 26.55 KG/M2 | SYSTOLIC BLOOD PRESSURE: 127 MMHG | RESPIRATION RATE: 13 BRPM | DIASTOLIC BLOOD PRESSURE: 79 MMHG | HEART RATE: 67 BPM

## 2017-08-03 DIAGNOSIS — Z98.1 HISTORY OF FUSION OF CERVICAL SPINE: Primary | ICD-10-CM

## 2017-08-03 DIAGNOSIS — G95.9 CERVICAL MYELOPATHY: ICD-10-CM

## 2017-08-03 PROCEDURE — 72040 X-RAY EXAM NECK SPINE 2-3 VW: CPT | Mod: 26,,, | Performed by: RADIOLOGY

## 2017-08-03 PROCEDURE — 99024 POSTOP FOLLOW-UP VISIT: CPT | Mod: S$GLB,,, | Performed by: NEUROLOGICAL SURGERY

## 2017-08-03 PROCEDURE — 99999 PR PBB SHADOW E&M-EST. PATIENT-LVL III: CPT | Mod: PBBFAC,,, | Performed by: NEUROLOGICAL SURGERY

## 2017-08-03 RX ORDER — GABAPENTIN 100 MG/1
100 CAPSULE ORAL 3 TIMES DAILY PRN
Qty: 90 CAPSULE | Refills: 1 | Status: SHIPPED | OUTPATIENT
Start: 2017-08-03 | End: 2017-11-18 | Stop reason: SDUPTHER

## 2017-08-03 NOTE — TELEPHONE ENCOUNTER
Called Pt today  3 times all times no answer. After third time I left an message,letting him know that appointment for today was canceled and that I would call later and give him new appointment day.

## 2017-08-03 NOTE — TELEPHONE ENCOUNTER
Patient would like a mild dosage of gabapentin for pain .  Patient has ran out of valium  .  Pt would like for it to be sent into [facility]'s on file

## 2017-08-03 NOTE — PROGRESS NOTES
He is doing well 6 weeks after a posterior cervical decompression and instrumented fusion for myelopathy. He's had significant increase in his bilateral hand strength and improvement in his ambulation.  He is now walking with a walker. His incision is well-healed.  His cervical x-rays show good spinal alignment and hardware position.  I will reevaluate him in 3 months with a new CT of the cervical spine to assess for bony fusion.  I will also provide a new prescription for a rigid cervical collar as he is in need of a size adjustment.

## 2017-08-03 NOTE — TELEPHONE ENCOUNTER
An gabapentin order for lowest dose of 100mg was sent to his pharmacy. He can take it up to 3 times daily. He can take increase the dose to 200mg (take 2 capsules up to 3 times daily) in a few days.  He should not take so much that it makes him fall or get into an accident. He does not have to take it 3 times a day, if 2 times or once daily works for him to reduce the neuropathic pain, he can take it less frequently.    Let me know if he has questions.    Thanks,  NAVID

## 2017-08-28 ENCOUNTER — TELEPHONE (OUTPATIENT)
Dept: INTERNAL MEDICINE | Facility: CLINIC | Age: 67
End: 2017-08-28

## 2017-08-28 NOTE — TELEPHONE ENCOUNTER
----- Message from Jess Black sent at 8/28/2017 12:45 PM CDT -----  Contact: Self [phone]  Pt has a appt on Wednesday and will need to r/s but no available time came up,please call pt to r/s

## 2017-09-12 ENCOUNTER — OFFICE VISIT (OUTPATIENT)
Dept: INTERNAL MEDICINE | Facility: CLINIC | Age: 67
End: 2017-09-12
Payer: MEDICARE

## 2017-09-12 ENCOUNTER — LAB VISIT (OUTPATIENT)
Dept: LAB | Facility: HOSPITAL | Age: 67
End: 2017-09-12
Attending: INTERNAL MEDICINE
Payer: MEDICARE

## 2017-09-12 VITALS
BODY MASS INDEX: 27.3 KG/M2 | WEIGHT: 173.94 LBS | HEART RATE: 58 BPM | DIASTOLIC BLOOD PRESSURE: 78 MMHG | OXYGEN SATURATION: 98 % | HEIGHT: 67 IN | SYSTOLIC BLOOD PRESSURE: 126 MMHG

## 2017-09-12 DIAGNOSIS — F15.182 CAFFEINE-INDUCED SLEEP DISORDER WITH MILD USE DISORDER, INSOMNIA TYPE: ICD-10-CM

## 2017-09-12 DIAGNOSIS — M47.812 CERVICAL ARTHRITIS: ICD-10-CM

## 2017-09-12 DIAGNOSIS — R74.02 NONSPECIFIC ELEVATION OF LEVELS OF TRANSAMINASE AND LACTIC ACID DEHYDROGENASE (LDH): ICD-10-CM

## 2017-09-12 DIAGNOSIS — G95.9 CERVICAL MYELOPATHY: ICD-10-CM

## 2017-09-12 DIAGNOSIS — Z12.5 ENCOUNTER FOR SCREENING FOR MALIGNANT NEOPLASM OF PROSTATE: ICD-10-CM

## 2017-09-12 DIAGNOSIS — E55.9 UNSPECIFIED VITAMIN D DEFICIENCY: ICD-10-CM

## 2017-09-12 DIAGNOSIS — G62.9 NEUROPATHY: ICD-10-CM

## 2017-09-12 DIAGNOSIS — E78.5 HYPERLIPIDEMIA, UNSPECIFIED HYPERLIPIDEMIA TYPE: ICD-10-CM

## 2017-09-12 DIAGNOSIS — R74.01 NONSPECIFIC ELEVATION OF LEVELS OF TRANSAMINASE AND LACTIC ACID DEHYDROGENASE (LDH): ICD-10-CM

## 2017-09-12 DIAGNOSIS — N40.0 BENIGN NON-NODULAR PROSTATIC HYPERPLASIA WITHOUT LOWER URINARY TRACT SYMPTOMS: ICD-10-CM

## 2017-09-12 DIAGNOSIS — Z86.19 HISTORY OF HEPATITIS C: ICD-10-CM

## 2017-09-12 DIAGNOSIS — R29.898 WEAKNESS OF BOTH LOWER EXTREMITIES: ICD-10-CM

## 2017-09-12 LAB
25(OH)D3+25(OH)D2 SERPL-MCNC: 28 NG/ML
ALBUMIN SERPL BCP-MCNC: 3.7 G/DL
ALP SERPL-CCNC: 72 U/L
ALT SERPL W/O P-5'-P-CCNC: 11 U/L
ANION GAP SERPL CALC-SCNC: 10 MMOL/L
AST SERPL-CCNC: 17 U/L
BILIRUB SERPL-MCNC: 1.2 MG/DL
BILIRUB UR QL STRIP: NEGATIVE
BUN SERPL-MCNC: 18 MG/DL
CALCIUM SERPL-MCNC: 9.1 MG/DL
CHLORIDE SERPL-SCNC: 106 MMOL/L
CLARITY UR REFRACT.AUTO: CLEAR
CO2 SERPL-SCNC: 25 MMOL/L
COLOR UR AUTO: NORMAL
COMPLEXED PSA SERPL-MCNC: 2 NG/ML
CREAT SERPL-MCNC: 0.8 MG/DL
EST. GFR  (AFRICAN AMERICAN): >60 ML/MIN/1.73 M^2
EST. GFR  (NON AFRICAN AMERICAN): >60 ML/MIN/1.73 M^2
GLUCOSE SERPL-MCNC: 96 MG/DL
GLUCOSE UR QL STRIP: NEGATIVE
HAV IGM SERPL QL IA: NEGATIVE
HBV CORE IGM SERPL QL IA: NEGATIVE
HBV SURFACE AG SERPL QL IA: NEGATIVE
HCV AB SERPL QL IA: POSITIVE
HGB UR QL STRIP: NEGATIVE
KETONES UR QL STRIP: NEGATIVE
LDH SERPL L TO P-CCNC: 174 U/L
LEUKOCYTE ESTERASE UR QL STRIP: NEGATIVE
NITRITE UR QL STRIP: NEGATIVE
PH UR STRIP: 8 [PH] (ref 5–8)
POTASSIUM SERPL-SCNC: 3.7 MMOL/L
PROT SERPL-MCNC: 7.4 G/DL
PROT UR QL STRIP: NEGATIVE
SODIUM SERPL-SCNC: 141 MMOL/L
SP GR UR STRIP: 1.01 (ref 1–1.03)
URN SPEC COLLECT METH UR: NORMAL
UROBILINOGEN UR STRIP-ACNC: NEGATIVE EU/DL

## 2017-09-12 PROCEDURE — 99215 OFFICE O/P EST HI 40 MIN: CPT | Mod: S$GLB,,, | Performed by: INTERNAL MEDICINE

## 2017-09-12 PROCEDURE — 80074 ACUTE HEPATITIS PANEL: CPT

## 2017-09-12 PROCEDURE — 81003 URINALYSIS AUTO W/O SCOPE: CPT

## 2017-09-12 PROCEDURE — 99999 PR PBB SHADOW E&M-EST. PATIENT-LVL III: CPT | Mod: PBBFAC,,, | Performed by: INTERNAL MEDICINE

## 2017-09-12 PROCEDURE — 86235 NUCLEAR ANTIGEN ANTIBODY: CPT | Mod: 59

## 2017-09-12 PROCEDURE — 1126F AMNT PAIN NOTED NONE PRSNT: CPT | Mod: S$GLB,,, | Performed by: INTERNAL MEDICINE

## 2017-09-12 PROCEDURE — 86255 FLUORESCENT ANTIBODY SCREEN: CPT

## 2017-09-12 PROCEDURE — 82550 ASSAY OF CK (CPK): CPT

## 2017-09-12 PROCEDURE — 84153 ASSAY OF PSA TOTAL: CPT

## 2017-09-12 PROCEDURE — 3008F BODY MASS INDEX DOCD: CPT | Mod: S$GLB,,, | Performed by: INTERNAL MEDICINE

## 2017-09-12 PROCEDURE — 86235 NUCLEAR ANTIGEN ANTIBODY: CPT

## 2017-09-12 PROCEDURE — 86038 ANTINUCLEAR ANTIBODIES: CPT

## 2017-09-12 PROCEDURE — 83615 LACTATE (LD) (LDH) ENZYME: CPT

## 2017-09-12 PROCEDURE — 80053 COMPREHEN METABOLIC PANEL: CPT

## 2017-09-12 PROCEDURE — 1159F MED LIST DOCD IN RCRD: CPT | Mod: S$GLB,,, | Performed by: INTERNAL MEDICINE

## 2017-09-12 PROCEDURE — 82306 VITAMIN D 25 HYDROXY: CPT

## 2017-09-12 PROCEDURE — 82595 ASSAY OF CRYOGLOBULIN: CPT

## 2017-09-12 RX ORDER — TRAZODONE HYDROCHLORIDE 50 MG/1
50 TABLET ORAL NIGHTLY
Qty: 30 TABLET | Refills: 11 | Status: SHIPPED | OUTPATIENT
Start: 2017-09-12 | End: 2017-11-18 | Stop reason: SDUPTHER

## 2017-09-12 RX ORDER — DIAZEPAM 5 MG/1
5 TABLET ORAL EVERY 6 HOURS PRN
Qty: 15 TABLET | Refills: 0 | Status: CANCELLED | OUTPATIENT
Start: 2017-09-12 | End: 2017-10-12

## 2017-09-12 NOTE — PROGRESS NOTES
"Primary Care Provider Appointment    Subjective:      Patient ID: Anthony Miguel is a 66 y.o. male with cervical spine myelopathy, HLD, LE weakness    Chief Complaint: Follow-up (Patient is here for a 6 week over all check up ) and Urinary Frequency (Patient stated that he has been urinating alot)    Patient with recent cervical spine fusion to treat acute compression. He completed home OT sessions. He is able to play piano, but reports "it's not the same. My arms feel stiff, like they're not connected." He participated in rehab for 10 days at [place] following discharge from the hospital. 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 He plans to start driving in 2 weeks.     He is interested in the medical fitness program at [place], but can not drive a car at this time. His friend, Al, drives him to appointments.    Patient is taking his atorvastatin 10mg every 3 days. He is concerned about his prostate. He has no LUTS, but reports a history of UTI and now has polyuria, with no dysuria.    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   Respiratory: Negative for cough and shortness of breath.    Cardiovascular: Negative for leg swelling.   Gastrointestinal: Negative for constipation and diarrhea.   Genitourinary: Positive for frequency and urgency. Negative for difficulty urinating and hematuria.   Musculoskeletal: Positive for back pain, gait problem and neck " "pain. Negative for joint swelling.   Psychiatric/Behavioral: Positive for sleep disturbance. Negative for behavioral problems, decreased concentration and dysphoric mood. The patient is nervous/anxious.        Objective:   /78 (BP Location: Left arm, Patient Position: Sitting, BP Method: Medium (Manual))   Pulse (!) 58   Ht 5' 7" (1.702 m)   Wt 78.9 kg (173 lb 15.1 oz)   SpO2 98%   BMI 27.24 kg/m²     Physical Exam   Constitutional: He is oriented to person, place, and time. He appears well-developed and well-nourished.   HENT:   Head: Normocephalic.   Neck no longer immobilized   Eyes: EOM are normal.   Cardiovascular: Normal rate and regular rhythm.    Pulmonary/Chest: Effort normal.   Musculoskeletal: He exhibits tenderness and deformity.   Using rolling walker  Increased sensation in hands bilaterally (from last exam)   Neurological: He is alert and oriented to person, place, and time. He displays normal reflexes. Coordination normal.   Psychiatric: He has a normal mood and affect. His behavior is normal. Thought content normal.       Lab Results   Component Value Date    WBC 9.82 07/13/2017    HGB 12.4 (L) 07/13/2017    HCT 37.5 (L) 07/13/2017     (H) 07/13/2017    CHOL 138 06/06/2017    TRIG 118 06/06/2017    HDL 51 06/06/2017    ALT 10 07/26/2017    AST 14 07/26/2017     07/13/2017    K 4.1 07/13/2017     07/13/2017    CREATININE 0.8 07/13/2017    BUN 15 07/13/2017    CO2 25 07/13/2017    TSH 3.029 06/02/2017    INR 1.0 06/21/2017    HGBA1C 5.3 06/02/2017         Assessment:   66 y.o. male with multiple co-morbid illnesses here to continue work-up of chronic issues notably cervical spine myelopathy, HLD, LE weakness.     Plan:     Problem List Items Addressed This Visit        Neuro    Neuropathy     Neuropathy in LE with acute back pain caused by cervical stenosis, treated with C3-6 fusion on 6/21  · Symptoms improving  · Continue gabapentin 100mg TID  · Titrate up as " needed  · Will not refill valium  · Advised to be mindful to prevent falls  · Re-order home health OT  · Will transfer to outpatient OT once patient can drive         Cervical myelopathy     Severe cervical stenosis with myelopathy with fusion of C3-6.  · F/U NS   · Continue gabapentin 100mg TID  · Titrate up  · Will not refill valium             Cardiac/Vascular    HLD (hyperlipidemia)     Takes atorvastatin 10mg every 3 days. ASCVD risk of 7.8%, mod-intensity recommended  · Continue current therapy   · Advised to take every day  · Continue ASA            Renal/    Benign non-nodular prostatic hyperplasia without lower urinary tract symptoms     Compliant with finasteride, USS of 6 (mild) and good quality of life  · Continue therapy  · Check PSA  · Avoid water at bedtime            Endocrine    Unspecified vitamin D deficiency     Compliant with daily supplementation of 1000U Vit D  · Continue therapy            Orthopedic    Weakness of both lower extremities     New onset neuropathy in LE with acute back pain. History of hep C treated in 2009. Neg work-up for HIV, hep C viral load, multiple myeloma, tertiary syphillis, copper, B12 defiency. ESR 40, CRP normal. MRI showed cervical compression treated surgically  · Continue rollator use PRN  · F/U NS          Cervical arthritis     Patient with neck pain following cervical fusion surgery  · F/U NS  · Continue home health OT  · Subsequent home health order placed           Other Visit Diagnoses    None.         Health Maintenance       Date Due Completion Date    TETANUS VACCINE 12/03/1967 ---    Abdominal Aortic Aneurysm Screening 12/03/2014 ---    Pneumococcal (65+) (2 of 2 - PPSV23) 04/15/2016 4/15/2015- NEXT    Influenza Vaccine 08/01/2017 10/6/2016- TODAY    Lipid Panel 06/06/2018 6/6/2017    Colonoscopy 04/16/2026 4/16/2016 (Done)    Override on 4/16/2016: Done (Repeat 7-10 years - diverticulosis - [facility] Dr. Quiles - [phone])          No Follow-up on  file. . One hour spent with this patient today, half of that in counseling.    Jada Spaulding MD/MPH  Internal Medicine  [facility]  [phone]

## 2017-09-12 NOTE — ASSESSMENT & PLAN NOTE
New onset neuropathy in LE with acute back pain. History of hep C treated in 2009. Neg work-up for HIV, hep C viral load, multiple myeloma, tertiary syphillis, copper, B12 defiency. ESR 40, CRP normal. MRI showed cervical compression treated surgically  · Continue rollator use PRN  · F/U NS

## 2017-09-12 NOTE — ASSESSMENT & PLAN NOTE
Drinking caffeinated soda at bedtime, coffee in AM  · Advised to cut back on soda at bedtime  · Start trazodone PRN

## 2017-09-12 NOTE — ASSESSMENT & PLAN NOTE
Neuropathy in LE with acute back pain caused by cervical stenosis, treated with C3-6 fusion on 6/21  · Symptoms improving  · Continue gabapentin 100mg TID  · Titrate up as needed  · Will not refill valium  · Advised to be mindful to prevent falls  · Re-order home health OT  · Will transfer to outpatient OT once patient can drive

## 2017-09-12 NOTE — ASSESSMENT & PLAN NOTE
Patient with neck pain following cervical fusion surgery  · F/U NS  · Continue home health OT  · Subsequent home health order placed

## 2017-09-12 NOTE — ASSESSMENT & PLAN NOTE
Compliant with finasteride, USS of 6 (mild) and good quality of life  · Continue therapy  · Check PSA  · Avoid water at bedtime

## 2017-09-12 NOTE — PATIENT INSTRUCTIONS
TODAY:  - continue gabapentin   - take statin every day (if you can)  - start OT in the home until you have a car   + then refer to [facility] for OT  - flu vaccine at pharmacy  - check labs: PSA, vitamin D  - trazodone for sleep at [facility]  - avoid sodas and water at bedtime

## 2017-09-12 NOTE — ASSESSMENT & PLAN NOTE
Severe cervical stenosis with myelopathy with fusion of C3-6.  · F/U NS   · Continue gabapentin 100mg TID  · Titrate up  · Will not refill valium

## 2017-09-13 ENCOUNTER — TELEPHONE (OUTPATIENT)
Dept: INTERNAL MEDICINE | Facility: CLINIC | Age: 67
End: 2017-09-13

## 2017-09-13 LAB — ANA SER QL IF: NORMAL

## 2017-09-13 NOTE — TELEPHONE ENCOUNTER
Please let patient know that his prostate enzyme (PSA) was normal, urine studies were normal. His vitamin D was low, could you order him 5000U daily from [employer] (if he agrees). Otherwise his labs were all normal.    Thanks,  NAVID

## 2017-09-14 LAB
ANCA AB TITR SER IF: NORMAL TITER
ANTI SM/RNP ANTIBODY: 4.9 EU
ANTI-SM/RNP INTERPRETATION: NEGATIVE
ANTI-SSA ANTIBODY: 12.92 EU
ANTI-SSA INTERPRETATION: NEGATIVE
ANTI-SSB ANTIBODY: 4.02 EU
ANTI-SSB INTERPRETATION: NEGATIVE
P-ANCA TITR SER IF: NORMAL TITER

## 2017-09-15 ENCOUNTER — TELEPHONE (OUTPATIENT)
Dept: INTERNAL MEDICINE | Facility: CLINIC | Age: 67
End: 2017-09-15

## 2017-09-15 LAB — ENA JO1 AB SER IA-ACNC: <1 INDEX

## 2017-09-15 NOTE — TELEPHONE ENCOUNTER
Patient has been advised that home health has been re ordered for him to establish care again , I have also re faxed out the order .    Patient has been advised that he was up to date with all of his hepatitis vaccines , upon his records form [facility] , Patient stated that he has gotten his flu shot already and just needs to get his PNA dose#2 , patient stated to let him know when he can go .  Patient stated that he received a letter from  [facility] and they informed him that he he needs to get the hep A vaccine.    Patient has been advised that he has had a MRI of his lumbar spine in 6/2017, there is no indication to repeat that with CT now. The reason for the upcoming CT of the neck is to follow-up with his surgery.     Patient verbalized great understanding .    Thanks Dr. Spaulding

## 2017-09-15 NOTE — TELEPHONE ENCOUNTER
I reordered home health at his last appt, have they reached out to him to establish care again?     From what I remember at [facility], he was up to date with all of his hepatitis vaccines, but I can reach out to them to confirm. He does need flu and PNA dose #2. He can come in at any time to get the flu, and I can send him to pharmacy for the PNA #2.     He had an MRI of the lumbar spine in 6/2017, there is no indication to repeat that with CT now. The reason for the upcoming CT of the neck is to follow-up with his surgery.     Thanks,  NAVID

## 2017-09-15 NOTE — TELEPHONE ENCOUNTER
Patient has been advised that his prostate enzyme are normal, urine studies were normal. Patient has been advised that his vitamin D was low, patient would also like for me to  order him 5000U daily from [employer].    *Patient also wanted to let Dr. Spaulding know that he needs a Hep A vaccine along with pneumonia and flu . Also that he will be having a CAT SCAN on December 6th and he wanted to know if it will cover his whole lumbar because he is worried about it . Also patient stated that when he was last here you wanted to know who was his home health company and he stated that [facility].    Thanks Dr. Spaulding.

## 2017-09-16 NOTE — TELEPHONE ENCOUNTER
Kayla liao [place]  states pt can not be admitted until orders are placed for OT and HH aid/nurse. Please advise.

## 2017-09-16 NOTE — TELEPHONE ENCOUNTER
Please confirm when and where he got his flu shot, was in within the last few months? If it was earlier than June, 2017 then it was last year's shot and he will need a new one.    Hepatitis A and PNAvax 23 (the second dose) are ordered. He can get them at the [facility].    Thanks,  NAVID

## 2017-09-18 NOTE — TELEPHONE ENCOUNTER
Patient stated that on his last visit 09/16 he received his Flu shot in the pharmacy , and that he could also go back to our pharmacy and get his Hep A & PNA 23 shot .    Patient verbalized great understanding .    *Patient also informed me that he has concerns about his back , patient is very nervous and worried about his back getting damaged , patient is experiencing , stiffness & tightness almost like it is compressed .    Patient stated that its going to be about 7 weeks until he sees someone and he is just very worried and doesn't know if his is to see ortho or spine provider so that they can expedite this issue.    Please advise

## 2017-09-19 ENCOUNTER — TELEPHONE (OUTPATIENT)
Dept: INTERNAL MEDICINE | Facility: CLINIC | Age: 67
End: 2017-09-19

## 2017-09-19 NOTE — TELEPHONE ENCOUNTER
Please let patient know that the MRI performed on 6/13 did show some spinal abnormalities. He would need to follow-up with the back/spine center to see if there are other interventions that can be done for it. Likely the occupational therapy will help him tremendously. Can you get him a back/spine appt for eval of this new issue? Do I need to place another order?    Has he been able to start home health OT? There's a message from 9/16 stating that I need to order again, Kerrie can you follow-up with that?    Thanks,  KJ

## 2017-09-19 NOTE — TELEPHONE ENCOUNTER
Patient has christine scheduled an appt for next Friday and informed of time and date .    Patient has informed me that no one has reached out to him .    *Called [facility] and they informed me that patient information is being processed and that they will reach out to patient .

## 2017-09-20 ENCOUNTER — TELEPHONE (OUTPATIENT)
Dept: INTERNAL MEDICINE | Facility: CLINIC | Age: 67
End: 2017-09-20

## 2017-09-20 DIAGNOSIS — M47.812 CERVICAL ARTHRITIS: Primary | ICD-10-CM

## 2017-09-20 DIAGNOSIS — R29.898 WEAKNESS OF BOTH LOWER EXTREMITIES: ICD-10-CM

## 2017-09-20 DIAGNOSIS — G95.9 CERVICAL MYELOPATHY: ICD-10-CM

## 2017-09-20 LAB — CRYOGLOB SER QL: NORMAL

## 2017-09-20 NOTE — TELEPHONE ENCOUNTER
Kayla with [facility] called to inform us that patient can not be admitted for OT due to it not being disciplined .    Patient either can be admitted Skilled nursing or speech therapy , or physical therapy.

## 2017-09-22 LAB
CK MB CFR SERPL ELPH: 0 % (ref 0–3.3)
CK SERPL-CCNC: 95 U/L (ref 30–223)
CK-BB: 0 %
CK-MM: 100 % (ref 96.7–100)

## 2017-09-28 ENCOUNTER — TELEPHONE (OUTPATIENT)
Dept: SPINE | Facility: CLINIC | Age: 67
End: 2017-09-28

## 2017-09-28 DIAGNOSIS — M54.5 LOW BACK PAIN, UNSPECIFIED BACK PAIN LATERALITY, UNSPECIFIED CHRONICITY, WITH SCIATICA PRESENCE UNSPECIFIED: Primary | ICD-10-CM

## 2017-09-29 ENCOUNTER — TELEPHONE (OUTPATIENT)
Dept: INTERNAL MEDICINE | Facility: CLINIC | Age: 67
End: 2017-09-29

## 2017-09-29 ENCOUNTER — OFFICE VISIT (OUTPATIENT)
Dept: SPINE | Facility: CLINIC | Age: 67
End: 2017-09-29
Payer: MEDICARE

## 2017-09-29 ENCOUNTER — HOSPITAL ENCOUNTER (OUTPATIENT)
Dept: RADIOLOGY | Facility: OTHER | Age: 67
Discharge: HOME | End: 2017-09-29
Attending: PHYSICIAN ASSISTANT
Payer: MEDICARE

## 2017-09-29 VITALS
SYSTOLIC BLOOD PRESSURE: 139 MMHG | BODY MASS INDEX: 27.15 KG/M2 | WEIGHT: 173 LBS | HEART RATE: 65 BPM | DIASTOLIC BLOOD PRESSURE: 69 MMHG | HEIGHT: 67 IN

## 2017-09-29 DIAGNOSIS — G89.29 CHRONIC BILATERAL LOW BACK PAIN WITHOUT SCIATICA: ICD-10-CM

## 2017-09-29 DIAGNOSIS — M54.50 CHRONIC BILATERAL LOW BACK PAIN WITHOUT SCIATICA: ICD-10-CM

## 2017-09-29 DIAGNOSIS — M51.37 DDD (DEGENERATIVE DISC DISEASE), LUMBOSACRAL: ICD-10-CM

## 2017-09-29 DIAGNOSIS — M54.5 LOW BACK PAIN, UNSPECIFIED BACK PAIN LATERALITY, UNSPECIFIED CHRONICITY, WITH SCIATICA PRESENCE UNSPECIFIED: ICD-10-CM

## 2017-09-29 DIAGNOSIS — M47.819 SPONDYLOSIS WITHOUT MYELOPATHY: ICD-10-CM

## 2017-09-29 PROCEDURE — 1159F MED LIST DOCD IN RCRD: CPT | Mod: S$GLB,,, | Performed by: PHYSICIAN ASSISTANT

## 2017-09-29 PROCEDURE — 99999 PR PBB SHADOW E&M-EST. PATIENT-LVL III: CPT | Mod: PBBFAC,,, | Performed by: PHYSICIAN ASSISTANT

## 2017-09-29 PROCEDURE — 72120 X-RAY BEND ONLY L-S SPINE: CPT | Mod: TC

## 2017-09-29 PROCEDURE — 72120 X-RAY BEND ONLY L-S SPINE: CPT | Mod: 26,,, | Performed by: RADIOLOGY

## 2017-09-29 PROCEDURE — 72100 X-RAY EXAM L-S SPINE 2/3 VWS: CPT | Mod: 26,,, | Performed by: RADIOLOGY

## 2017-09-29 PROCEDURE — 1125F AMNT PAIN NOTED PAIN PRSNT: CPT | Mod: S$GLB,,, | Performed by: PHYSICIAN ASSISTANT

## 2017-09-29 PROCEDURE — 99214 OFFICE O/P EST MOD 30 MIN: CPT | Mod: S$GLB,,, | Performed by: PHYSICIAN ASSISTANT

## 2017-09-29 PROCEDURE — 3008F BODY MASS INDEX DOCD: CPT | Mod: S$GLB,,, | Performed by: PHYSICIAN ASSISTANT

## 2017-09-29 NOTE — TELEPHONE ENCOUNTER
----- Message from Kimber Palmer MA sent at 9/29/2017  4:22 PM CDT -----  Contact: Silvina naik/[place] [facility] - [phone]   Patient is refusing home health nursing visit. Thanks!

## 2017-09-29 NOTE — LETTER
September 29, 2017      Jada Spaulding MD  [address]  [facility] [address]           [facility]  [address], [address]  [facility] [address]  Phone: [phone]  Fax: [phone]          Patient: Anthony Miguel   MR Number: [id number]   YOB: 1950   Date of Visit: 9/29/2017       Dear Dr. Jada Spaulding:    Thank you for referring Anthony Miguel to me for evaluation. Attached you will find relevant portions of my assessment and plan of care.    If you have questions, please do not hesitate to call me. I look forward to following Anthony Miguel along with you.    Sincerely,    Adele Vela PA-C    Enclosure  CC:  No Recipients    If you would like to receive this communication electronically, please contact [email or web address] or [phone] to request more information on [employer] access.    For providers and/or their staff who would like to refer a patient to [facility], please contact us through our one-stop-shop provider referral line, [facility], at [phone].    If you feel you have received this communication in error or would no longer like to receive these types of communications, please e-mail [email or web address]

## 2017-09-29 NOTE — PROGRESS NOTES
"Subjective:     Patient ID:  Anthony Miguel is a 66 y.o. male.      Chief Complaint: Low back pain and bilateral feet numbness    HPI    Anthony Miguel is a 66 y.o. male who presents for follow up.  Patient is about three months po for C3-6 posterior cervical fusion and laminectomy with Dr. Wolff.  He saw Dr. Wolff in August for his neck fu and is scheduled to have a CT with fu in November for his neck.  He states he was supposed to get a smaller neck brace after his last appointment but never received one.  He would still like a smaller neck brace.    He is here today for low back pain that started in may 2017.  Pain is constant across the low back rated 6/10 and is worse with standing and walking and better with sitting.  He has constant numbness in the bottom of the feet that has gotten worse since his neck surgery.  No legs pain or paresthesias.  He feels like his knees amirah at times.    He has been going HHPT and is using a walker.    Patient has not had PT or ESIs for his low back.  No lumbar spine surgery.  Patient is currently taking Neurontin.    Patient denies any recent accidents or trauma, no saddle anesthesias, and no bowel or bladder incontinence.      Review of Systems:  Constitution: Negative for chills, fever, night sweats and weight loss.   Musculoskeletal: Negative for falls.   Gastrointestinal: Negative for bowel incontinence, nausea and vomiting.   Genitourinary: Negative for bladder incontinence.   Neurological: Negative for disturbances in coordination and loss of balance.     Objective:      Vitals:    09/29/17 0918   BP: 139/69   Pulse: 65   Weight: 78.5 kg (173 lb)   Height: 5' 7" (1.702 m)   PainSc:   6   PainLoc: Back         Physical Exam:    General:  Anthony Miguel is well-developed, well-nourished, appears stated age, in no acute distress, alert and oriented to person, place, and time.    Pulmonary/Chest:  Respiratory effort normal  Abdominal: Exhibits no " distension  Psychiatric:  Normal mood and affect.  Behavior is normal.  Judgement and thought content normal    Musculoskeletal: (Exam done in the chair as he is too unstable to get on the exam table)    Patient arises from a sitting to standing position without difficulty.  Patient walks to the door with his walker off balance.    Lumbar ROM:   Pain in lumbar flexion, extension, right lateral bending, and left lateral bending.    Lumbar Spine Inspection:  Normal with no surgical scars with no visible rashes.    Lumbar Spine Palpation:  Mild tenderness to low back palpation.    SI Joint Palpation:  No tenderness to SI Joint palpation.    Straight Leg Raise:  Negative right and left SLR.    Neurological:  Alert and oriented to person, place, and time    Muscle strength against resistance:     Right Left   Hip flexion  5 / 5 5 / 5   Hip extension 5 / 5 5 / 5   Hip abduction 5 / 5 5 / 5   Hip adduction  5 / 5 5 / 5   Knee extension  5 / 5 5 / 5   Knee flexion 5 / 5 5 / 5   Dorsiflexion  5 / 5 5 / 5   EHL  5 / 5 5 / 5   Plantar flexion  5 / 5 5 / 5   Inversion of the feet 5 / 5 5 / 5   Eversion of the feet  5 / 5 5 / 5     Reflexes:     Right Left   Patellar 3+ 3+   Achilles 2+ 2+     Clonus:  ? Slight two beats bilaterally    On gross examination of the bilateral upper extremities, patient has full painfree ROM with no signs of clubbing, cyanosis, edema, or weakness.     XRAY/MRI Interpretation:     Lumbar spine ap/lateral/flexion/extension xrays were personally reviewed today.  No fractures.  No movement on flexion and extension.  Multilevel DDD and spondylosis worse at L5-S1.    Lumbar spine MRI was personally reviewed today from June 2017.  Multilevel DDD and spondylosis.  Mild to moderate L4-5 central and bilateral NFS at L4-5.      Assessment:          1. Spondylosis without myelopathy    2. DDD (degenerative disc disease), lumbosacral    3. Chronic bilateral low back pain without sciatica             Plan:              Multilevel DDD and spondylosis worse at L5-S1 with mild to moderate spinal stenosis at L4-5    -He states he already has HHPT scheduled to start to work on his low back and ambulation.  He will let me know if he needs outpatient PT orders in the future for his low back  -Could consider spinal injections for his low back in the future  -Will order him a new miami j collar at this time from [facility]  -FU with Dr. Wolff in November with ct cervical spine beforehand      Follow-Up:  Return if symptoms worsen or fail to improve. If there are any questions prior to this, the patient was instructed to contact the office.       Adele Vela, YESENIA, PA-C  Neurosurgery  [facility]  [facility]

## 2017-11-03 ENCOUNTER — TELEPHONE (OUTPATIENT)
Dept: NEUROSURGERY | Facility: CLINIC | Age: 67
End: 2017-11-03

## 2017-11-06 ENCOUNTER — HOSPITAL ENCOUNTER (OUTPATIENT)
Dept: RADIOLOGY | Facility: OTHER | Age: 67
Discharge: HOME | End: 2017-11-06
Attending: NEUROLOGICAL SURGERY
Payer: MEDICARE

## 2017-11-06 ENCOUNTER — OFFICE VISIT (OUTPATIENT)
Dept: SPINE | Facility: CLINIC | Age: 67
End: 2017-11-06
Payer: MEDICARE

## 2017-11-06 VITALS
BODY MASS INDEX: 26.68 KG/M2 | WEIGHT: 170 LBS | RESPIRATION RATE: 18 BRPM | SYSTOLIC BLOOD PRESSURE: 152 MMHG | HEART RATE: 65 BPM | DIASTOLIC BLOOD PRESSURE: 74 MMHG | HEIGHT: 67 IN | TEMPERATURE: 98 F

## 2017-11-06 DIAGNOSIS — Z98.1 HISTORY OF FUSION OF CERVICAL SPINE: ICD-10-CM

## 2017-11-06 DIAGNOSIS — Z98.1 HISTORY OF FUSION OF CERVICAL SPINE: Primary | ICD-10-CM

## 2017-11-06 DIAGNOSIS — M48.062 SPINAL STENOSIS OF LUMBAR REGION WITH NEUROGENIC CLAUDICATION: ICD-10-CM

## 2017-11-06 PROCEDURE — 72125 CT NECK SPINE W/O DYE: CPT | Mod: TC

## 2017-11-06 PROCEDURE — 99213 OFFICE O/P EST LOW 20 MIN: CPT | Mod: S$GLB,,, | Performed by: NEUROLOGICAL SURGERY

## 2017-11-06 PROCEDURE — 99999 PR PBB SHADOW E&M-EST. PATIENT-LVL III: CPT | Mod: PBBFAC,,, | Performed by: NEUROLOGICAL SURGERY

## 2017-11-06 PROCEDURE — 72125 CT NECK SPINE W/O DYE: CPT | Mod: 26,,, | Performed by: RADIOLOGY

## 2017-11-06 NOTE — PROGRESS NOTES
He's doing well 5 months after PCF for severe cervical myelopathy. He has recovered the ability to ambulate, and he has regained strength and coordination in his hands. 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 He still has some mild decrease in sensation in all finger tips. He also reports some chronic low back pain and neurogenic claudication symptoms.    On exam he is ambulating with a cane with mild ataxia. He has trace weakness symmetrically in distal BUE. His incision is well healed.    CT shows bony fusion from C3-6 posteriorly. His lumbar MRI shows diffuse spondylosis, worse at L4-5 and L5-S1    AP: I recommend discontinuation of the cervical collar. He should continue with PT/OT to improve strength, balance and coordination. Regarding his lumbar spondylosis, we will try lumbar ESIs. He will follow up with me in 6 months.

## 2017-11-08 ENCOUNTER — TELEPHONE (OUTPATIENT)
Dept: PAIN MEDICINE | Facility: CLINIC | Age: 67
End: 2017-11-08

## 2017-11-15 ENCOUNTER — TELEPHONE (OUTPATIENT)
Dept: PAIN MEDICINE | Facility: CLINIC | Age: 67
End: 2017-11-15

## 2017-11-15 NOTE — TELEPHONE ENCOUNTER
Left message asking for a return call to schedule procedure with Pain Management. Referred by Dr. Wolff.

## 2017-11-18 ENCOUNTER — OFFICE VISIT (OUTPATIENT)
Dept: INTERNAL MEDICINE | Facility: CLINIC | Age: 67
End: 2017-11-18
Payer: MEDICARE

## 2017-11-18 VITALS
HEIGHT: 67 IN | WEIGHT: 169.56 LBS | HEART RATE: 70 BPM | OXYGEN SATURATION: 98 % | DIASTOLIC BLOOD PRESSURE: 88 MMHG | BODY MASS INDEX: 26.61 KG/M2 | SYSTOLIC BLOOD PRESSURE: 128 MMHG

## 2017-11-18 DIAGNOSIS — E78.5 HYPERLIPIDEMIA, UNSPECIFIED HYPERLIPIDEMIA TYPE: ICD-10-CM

## 2017-11-18 DIAGNOSIS — G95.9 LUMBAR MYELOPATHY: ICD-10-CM

## 2017-11-18 DIAGNOSIS — F15.182 CAFFEINE-INDUCED SLEEP DISORDER WITH MILD USE DISORDER, INSOMNIA TYPE: ICD-10-CM

## 2017-11-18 DIAGNOSIS — E55.9 VITAMIN D DEFICIENCY: ICD-10-CM

## 2017-11-18 DIAGNOSIS — G95.9 CERVICAL MYELOPATHY: ICD-10-CM

## 2017-11-18 PROCEDURE — 99999 PR PBB SHADOW E&M-EST. PATIENT-LVL III: CPT | Mod: PBBFAC,,, | Performed by: INTERNAL MEDICINE

## 2017-11-18 PROCEDURE — 99215 OFFICE O/P EST HI 40 MIN: CPT | Mod: S$GLB,,, | Performed by: INTERNAL MEDICINE

## 2017-11-18 RX ORDER — TRAZODONE HYDROCHLORIDE 50 MG/1
50 TABLET ORAL NIGHTLY PRN
Qty: 90 TABLET | Refills: 3 | Status: SHIPPED | OUTPATIENT
Start: 2017-11-18 | End: 2018-05-21 | Stop reason: SDUPTHER

## 2017-11-18 RX ORDER — GABAPENTIN 100 MG/1
100 CAPSULE ORAL 3 TIMES DAILY PRN
Qty: 90 CAPSULE | Refills: 3 | Status: SHIPPED | OUTPATIENT
Start: 2017-11-18 | End: 2018-02-19 | Stop reason: SDUPTHER

## 2017-11-18 NOTE — ASSESSMENT & PLAN NOTE
Severe cervical stenosis with myelopathy with fusion of C3-6.  · F/U NS   · Continue gabapentin 100mg TID  · Titrate up  · Will not refill valium   · Start PT/OT at [facility]

## 2017-11-18 NOTE — PATIENT INSTRUCTIONS
TODAY:  - start outpatient PT immediately   + referral placed for [facility]  - reorder vit D 1000U from [employer] [facility]  - if undergoing spinal injection, then stop ASA one week prior  - start taking prostate meds as directed    NEXT:  - abdominal aortic aneurysm screening

## 2017-11-18 NOTE — ASSESSMENT & PLAN NOTE
Seen on CT, followed by pain management. Plan for lumbar KAYLEY, has not completed outpatient PT  · PT/OT referral   · [facility] in [facility]  · Advised to postpone KAYLEY until after PT trial

## 2017-11-18 NOTE — PROGRESS NOTES
Primary Care Provider Appointment    Subjective:      Patient ID: Anthony Miguel is a 66 y.o. male cervical stenosis, chronic hep C, vocal cord cyst    Chief Complaint: Follow-up    Patient with recent surgery (PCF) for cervical myelopathy. Last seen by Neurosurgery on 11/6/17, with discontinuation of cervical collar and contiuation of PT/OT. His next follow-up is 6 mos. 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 He has improvement in hand dexterity doing this. CT scan on 11/6/17 with evidence of stable hardware.    He completed home PT/OT and was discharged. He has an outpatient PT facility 3 blocks from his house, but he is not motivated to pursue.     He also has lumbar spondylosis. He plans to see pain management at [facility] on 11/28 with Dr Zhao to have lumbar ESIs. He is open to doing PT for this before the KAYLEY. He is performing yardwork with some difficulty.    He continues to have LUTS. He is not compliant with prostate meds.     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   Respiratory: Negative for cough and shortness of breath.    Cardiovascular: Negative for leg swelling.   Gastrointestinal: Negative for constipation and diarrhea.   Genitourinary: Positive for frequency and urgency. Negative for difficulty urinating and hematuria.   Musculoskeletal: Positive for back pain, gait problem and neck pain. Negative for joint swelling.  "       LE pain   Psychiatric/Behavioral: Positive for sleep disturbance. Negative for behavioral problems, decreased concentration and dysphoric mood. The patient is nervous/anxious.        Objective:   /88   Pulse 70   Ht 5' 7" (1.702 m)   Wt 76.9 kg (169 lb 8.5 oz)   SpO2 98%   BMI 26.55 kg/m²     Physical Exam   Constitutional: He is oriented to person, place, and time. He appears well-developed and well-nourished.   HENT:   Head: Normocephalic.   Neck no longer immobilized   Eyes: EOM are normal.   Cardiovascular: Normal rate and regular rhythm.    Pulmonary/Chest: Effort normal.   Musculoskeletal: He exhibits tenderness and deformity.   Ambulating with cane today  Increased sensation in hands bilaterally (from last exam)   Neurological: He is alert and oriented to person, place, and time. He displays normal reflexes. Coordination normal.   Psychiatric: He has a normal mood and affect. His behavior is normal. Thought content normal.       Lab Results   Component Value Date    WBC 9.82 07/13/2017    HGB 12.4 (L) 07/13/2017    HCT 37.5 (L) 07/13/2017     (H) 07/13/2017    CHOL 138 06/06/2017    TRIG 118 06/06/2017    HDL 51 06/06/2017    ALT 11 09/12/2017    AST 17 09/12/2017     09/12/2017    K 3.7 09/12/2017     09/12/2017    CREATININE 0.8 09/12/2017    BUN 18 09/12/2017    CO2 25 09/12/2017    TSH 3.029 06/02/2017    PSA 2.0 09/12/2017    INR 1.0 06/21/2017    HGBA1C 5.3 06/02/2017         Assessment:   66 y.o. male with multiple co-morbid illnesses here to continue work-up of chronic issues notably cervical stenosis, chronic hep C, vocal cord cyst    Plan:     Problem List Items Addressed This Visit        Neuro    Cervical myelopathy     Severe cervical stenosis with myelopathy with fusion of C3-6.  · F/U NS   · Continue gabapentin 100mg TID  · Titrate up  · Will not refill valium   · Start PT/OT at [facility]         Relevant Medications    gabapentin (NEURONTIN) 100 MG capsule "    Other Relevant Orders    Ambulatory Referral to Physical/Occupational Therapy    Lumbar myelopathy     Seen on CT, followed by pain management. 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 ASCVD risk of 7.8%, mod-intensity recommended  · Continue current therapy   · Advised to take every day  · Continue ASA            Endocrine    Vitamin D deficiency     Compliant with daily supplementation of 1000U Vit D  · Continue therapy  · [facility]               Health Maintenance       Date Due Completion Date    TETANUS VACCINE 12/03/1967 ---    Abdominal Aortic Aneurysm Screening 12/03/2014 ---NEXT    Lipid Panel 06/06/2018 6/6/2017    Colonoscopy 04/16/2026 4/16/2016 (Done)    Override on 4/16/2016: Done (Repeat 7-10 years - diverticulosis - [facility] Dr. Quiles - [phone])          Return in about 2 months (around 1/18/2018). . One hour spent with this patient today, half of that in counseling.    Jada Spaulding MD/MPH  Internal Medicine  [facility]  [phone]

## 2017-11-18 NOTE — ASSESSMENT & PLAN NOTE
Compliant with daily supplementation of 1000U Vit D  · Continue therapy  · Reorder with [employer] [facility] mag

## 2017-11-18 NOTE — ASSESSMENT & PLAN NOTE
Takes atorvastatin 10mg every 3 days. ASCVD risk of 7.8%, mod-intensity recommended  · Continue current therapy   · Advised to take every day  · Continue ASA

## 2017-11-21 ENCOUNTER — TELEPHONE (OUTPATIENT)
Dept: PAIN MEDICINE | Facility: CLINIC | Age: 67
End: 2017-11-21

## 2017-11-21 NOTE — TELEPHONE ENCOUNTER
"----- Message from Lisa Villalta sent at 11/21/2017  7:26 AM CST -----  Contact: Patient himself  X 1st Request  _  2nd Request  _  3rd Request    Who: Anthony Mazaabbey (mrn# [id number])    Why: Patient called to say, "he would like to cancel his procedure schedule for Tuesday 11/28/17."  Please give a call back at your earliest convenience.     THANKS!    What Number to Call Back:  [phone]    When to Expect a call back: (Before the end of the day)   -- if the call is after 12:00, the call back will be tomorrow.                          "

## 2017-12-05 ENCOUNTER — HOSPITAL ENCOUNTER (OUTPATIENT)
Dept: RADIOLOGY | Facility: HOSPITAL | Age: 67
Discharge: HOME | End: 2017-12-05
Attending: INTERNAL MEDICINE
Payer: MEDICARE

## 2017-12-05 ENCOUNTER — OFFICE VISIT (OUTPATIENT)
Dept: INTERNAL MEDICINE | Facility: CLINIC | Age: 67
End: 2017-12-05
Payer: MEDICARE

## 2017-12-05 VITALS
WEIGHT: 164.88 LBS | OXYGEN SATURATION: 98 % | HEART RATE: 85 BPM | HEIGHT: 67 IN | BODY MASS INDEX: 25.88 KG/M2 | DIASTOLIC BLOOD PRESSURE: 84 MMHG | SYSTOLIC BLOOD PRESSURE: 122 MMHG

## 2017-12-05 DIAGNOSIS — S09.90XA TRAUMATIC INJURY OF HEAD, INITIAL ENCOUNTER: ICD-10-CM

## 2017-12-05 PROCEDURE — 99215 OFFICE O/P EST HI 40 MIN: CPT | Mod: S$GLB,,, | Performed by: INTERNAL MEDICINE

## 2017-12-05 PROCEDURE — 99999 PR PBB SHADOW E&M-EST. PATIENT-LVL IV: CPT | Mod: PBBFAC,,, | Performed by: INTERNAL MEDICINE

## 2017-12-05 PROCEDURE — 70450 CT HEAD/BRAIN W/O DYE: CPT | Mod: 26,,, | Performed by: RADIOLOGY

## 2017-12-05 PROCEDURE — 70450 CT HEAD/BRAIN W/O DYE: CPT | Mod: TC

## 2017-12-05 NOTE — PROGRESS NOTES
"Primary Care Provider Appointment    Subjective:      Patient ID: Anthony Miguel is a 67 y.o. male with head trauma, cervical/lumbar myelopathy, HLD, h/o hep C    Chief Complaint: Fall    Patient with a mechanical fall on Saturday at 4pm while getting out of the car while attempting to use a cane. He hit his posterior head on the street. He experienced blood loss and (per his friend) hallucinations of a woman with a bouquet in his window.     He is able to walk, but is now using a walker. He was seen by PT twice and was informed by that group to postpone restarting sessions until PCP evaluation.      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   HENT:        Head trauma  Healing lesion on posterior scalp   Respiratory: Negative for cough and shortness of breath.    Cardiovascular: Negative for leg swelling.   Gastrointestinal: Negative for constipation and diarrhea.   Genitourinary: Positive for frequency and urgency. Negative for difficulty urinating and hematuria.   Musculoskeletal: Positive for back pain, gait problem and neck pain. Negative for joint swelling.        LE pain   Psychiatric/Behavioral: Positive for sleep disturbance. Negative for behavioral problems, decreased concentration and dysphoric mood. The patient is nervous/anxious.        Objective:   /84   Pulse 85   Ht 5' 7" (1.702 m)   Wt 74.8 kg (164 lb 14.5 oz)   SpO2 98%   BMI 25.83 kg/m²     Physical Exam "   Constitutional: He is oriented to person, place, and time. He appears well-developed and well-nourished.   HENT:   Head: Normocephalic.   Neck no longer immobilized  Trauma to posterior skull   Eyes: EOM are normal.   Cardiovascular: Normal rate and regular rhythm.    Pulmonary/Chest: Effort normal.   Musculoskeletal: He exhibits tenderness and deformity.   Ambulating with walker today  Increased sensation in hands bilaterally (from last exam)   Neurological: He is alert and oriented to person, place, and time. He displays normal reflexes. Coordination normal.   Psychiatric: He has a normal mood and affect. His behavior is normal. Thought content normal.       Lab Results   Component Value Date    WBC 9.82 07/13/2017    HGB 12.4 (L) 07/13/2017    HCT 37.5 (L) 07/13/2017     (H) 07/13/2017    CHOL 138 06/06/2017    TRIG 118 06/06/2017    HDL 51 06/06/2017    ALT 11 09/12/2017    AST 17 09/12/2017     09/12/2017    K 3.7 09/12/2017     09/12/2017    CREATININE 0.8 09/12/2017    BUN 18 09/12/2017    CO2 25 09/12/2017    TSH 3.029 06/02/2017    PSA 2.0 09/12/2017    INR 1.0 06/21/2017    HGBA1C 5.3 06/02/2017               Assessment:   67 y.o. male with multiple co-morbid illnesses here to continue work-up of chronic issues notably head trauma, cervical/lumbar myelopathy, HLD, h/o hep C     Plan:     Problem List Items Addressed This Visit        Neuro    Head trauma     Acute head trauma on 12/2 with mentation changes following  · STAT head CT  · Will restart PT based on results  · Advised to not drive, nor use a single-pronged cane         Relevant Orders    CT Head Without Contrast          Health Maintenance       Date Due Completion Date    TETANUS VACCINE 12/03/1967 ---    Abdominal Aortic Aneurysm Screening 12/03/2014 ---    Lipid Panel 06/06/2018 6/6/2017    Colonoscopy 04/16/2026 4/16/2016 (Done)    Override on 4/16/2016: Done (Repeat 7-10 years - diverticulosis - [facility] Dr. Quiles -  [phone])          Return if symptoms worsen or fail to improve, for already has appt. One hour spent with this patient today, half of that in counseling.    Jada Spaulding MD/MPH  Internal Medicine  [facility]  [phone]

## 2017-12-05 NOTE — PATIENT INSTRUCTIONS
TODAY:  · STAT head CT  · Will restart PT based on results  · Advised to not drive, nor use a single-pronged cane  · If informed by technicians of any abnormalities, please present to [facility]

## 2017-12-06 ENCOUNTER — TELEPHONE (OUTPATIENT)
Dept: INTERNAL MEDICINE | Facility: CLINIC | Age: 67
End: 2017-12-06

## 2017-12-06 NOTE — TELEPHONE ENCOUNTER
Patient has been advised that his head CT showed no brain abnormalities, but it did show swelling on the scalp.     Patient has been informed that if he experiences any changes in mentation to please reach out to our office .

## 2017-12-06 NOTE — TELEPHONE ENCOUNTER
Please let patient know that his head CT showed no brain abnormalities, but it did show swelling on the scalp.     Please advise him to call the office if he experiences any changes in mentation.    Thanks,  KJ

## 2018-01-26 ENCOUNTER — TELEPHONE (OUTPATIENT)
Dept: INTERNAL MEDICINE | Facility: CLINIC | Age: 68
End: 2018-01-26

## 2018-01-26 NOTE — TELEPHONE ENCOUNTER
Called pt to offer Sat appt on 1/27/18 due to previous appt on 1/18 being canceled for weather issues. No answer. Left voicemail to return office call. F/u appt with PCP scheduled for 2/22/18 and appointment reminder mailed out.

## 2018-02-19 ENCOUNTER — OFFICE VISIT (OUTPATIENT)
Dept: INTERNAL MEDICINE | Facility: CLINIC | Age: 68
End: 2018-02-19
Payer: MEDICARE

## 2018-02-19 VITALS
WEIGHT: 161.63 LBS | BODY MASS INDEX: 25.37 KG/M2 | SYSTOLIC BLOOD PRESSURE: 130 MMHG | HEART RATE: 52 BPM | HEIGHT: 67 IN | DIASTOLIC BLOOD PRESSURE: 78 MMHG

## 2018-02-19 DIAGNOSIS — F15.182 CAFFEINE-INDUCED SLEEP DISORDER WITH MILD USE DISORDER, INSOMNIA TYPE: ICD-10-CM

## 2018-02-19 DIAGNOSIS — E78.5 HYPERLIPIDEMIA, UNSPECIFIED HYPERLIPIDEMIA TYPE: ICD-10-CM

## 2018-02-19 DIAGNOSIS — Z86.19 HISTORY OF HEPATITIS C: ICD-10-CM

## 2018-02-19 DIAGNOSIS — E55.9 VITAMIN D DEFICIENCY: ICD-10-CM

## 2018-02-19 DIAGNOSIS — G95.9 CERVICAL MYELOPATHY: ICD-10-CM

## 2018-02-19 DIAGNOSIS — G95.9 LUMBAR MYELOPATHY: ICD-10-CM

## 2018-02-19 DIAGNOSIS — D69.2 SENILE PURPURA: ICD-10-CM

## 2018-02-19 DIAGNOSIS — N40.0 BENIGN NON-NODULAR PROSTATIC HYPERPLASIA WITHOUT LOWER URINARY TRACT SYMPTOMS: ICD-10-CM

## 2018-02-19 DIAGNOSIS — M47.812 CERVICAL ARTHRITIS: ICD-10-CM

## 2018-02-19 PROCEDURE — 3008F BODY MASS INDEX DOCD: CPT | Mod: S$GLB,,, | Performed by: INTERNAL MEDICINE

## 2018-02-19 PROCEDURE — 1159F MED LIST DOCD IN RCRD: CPT | Mod: S$GLB,,, | Performed by: INTERNAL MEDICINE

## 2018-02-19 PROCEDURE — 99215 OFFICE O/P EST HI 40 MIN: CPT | Mod: S$GLB,,, | Performed by: INTERNAL MEDICINE

## 2018-02-19 PROCEDURE — 99999 PR PBB SHADOW E&M-EST. PATIENT-LVL III: CPT | Mod: PBBFAC,,, | Performed by: INTERNAL MEDICINE

## 2018-02-19 PROCEDURE — 1125F AMNT PAIN NOTED PAIN PRSNT: CPT | Mod: S$GLB,,, | Performed by: INTERNAL MEDICINE

## 2018-02-19 RX ORDER — ASPIRIN 81 MG/1
81 TABLET ORAL DAILY
Qty: 90 TABLET | Refills: 3 | Status: SHIPPED | OUTPATIENT
Start: 2018-02-19 | End: 2018-10-26

## 2018-02-19 RX ORDER — ATORVASTATIN CALCIUM 10 MG/1
10 TABLET, FILM COATED ORAL DAILY
Qty: 90 TABLET | Refills: 3 | Status: SHIPPED | OUTPATIENT
Start: 2018-02-19 | End: 2018-05-21 | Stop reason: SDUPTHER

## 2018-02-19 RX ORDER — FINASTERIDE 5 MG/1
5 TABLET, FILM COATED ORAL DAILY
Qty: 90 TABLET | Refills: 3 | Status: SHIPPED | OUTPATIENT
Start: 2018-02-19 | End: 2018-10-26 | Stop reason: SDUPTHER

## 2018-02-19 RX ORDER — GABAPENTIN 100 MG/1
100 CAPSULE ORAL 3 TIMES DAILY PRN
Qty: 270 CAPSULE | Refills: 3 | Status: SHIPPED | OUTPATIENT
Start: 2018-02-19 | End: 2018-10-26 | Stop reason: SDUPTHER

## 2018-02-19 NOTE — ASSESSMENT & PLAN NOTE
Severe cervical stenosis with myelopathy with fusion of C3-6.  · F/U NS   · Continue gabapentin 100mg TID  · Titrate up as neeed  · Will not refill valium   · Follow-up with [facility]  · Restart PT/OT at [facility]

## 2018-02-19 NOTE — ASSESSMENT & PLAN NOTE
Drinking caffeinated soda at bedtime, coffee in AM  · Advised to cut back on soda at bedtime  · Continue trazodone PRN

## 2018-02-19 NOTE — PROGRESS NOTES
Primary Care Provider Appointment    Subjective:      Patient ID: Anthony Migule is a 67 y.o. male with cervical myelopathy, HLD, insomnia    Chief Complaint: Follow-up (3 month )    Surgery for PCF for severe cervical myelopathy in 6/2018. Needs F/U with NS in 5/2018. Has not been participating in PT, but no longer attends. Was seen in [facility], but he does not want to return because he was billed for canceling his appt less than 24 hours ahead of time, which he felt was unprofessional. Otherwise he felt that the clinic was somewhat helpful. He wants to hold off on continuing with PT/OT (closest locations are [facility], [place]).     He is taking gabapentin 100mg TID. 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    Head scalp injury at last appt, which has healed. His CT following the injury was negative for brain abnormalities.    His last Vit D was low, but he is supplementing Vit D3 1000U daily.    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   Respiratory: Negative for cough and shortness of breath.    Cardiovascular: Negative for leg swelling.   Gastrointestinal: Negative for constipation and diarrhea.   Genitourinary: Positive for frequency and urgency. Negative for difficulty urinating and hematuria.   Musculoskeletal: Positive for  "back pain, gait problem and neck pain. Negative for joint swelling.        LE weakness   Psychiatric/Behavioral: Positive for sleep disturbance. Negative for behavioral problems, decreased concentration and dysphoric mood. The patient is nervous/anxious.        Objective:   /78 (BP Location: Right arm, Patient Position: Sitting, BP Method: Medium (Manual))   Pulse (!) 52   Ht 5' 7" (1.702 m)   Wt 73.3 kg (161 lb 9.6 oz)   BMI 25.31 kg/m²     Physical Exam   Constitutional: He is oriented to person, place, and time. He appears well-developed and well-nourished.   HENT:   Head: Normocephalic.   Eyes: EOM are normal.   Cardiovascular: Normal rate and regular rhythm.    Pulmonary/Chest: Effort normal.   Musculoskeletal: He exhibits tenderness and deformity.   Ambulating with single-point cane  Increased sensation in hands bilaterally (from last exam)   Neurological: He is alert and oriented to person, place, and time. He displays normal reflexes. Coordination normal.   Psychiatric: He has a normal mood and affect. His behavior is normal. Thought content normal.       Lab Results   Component Value Date    WBC 9.82 07/13/2017    HGB 12.4 (L) 07/13/2017    HCT 37.5 (L) 07/13/2017     (H) 07/13/2017    CHOL 138 06/06/2017    TRIG 118 06/06/2017    HDL 51 06/06/2017    ALT 11 09/12/2017    AST 17 09/12/2017     09/12/2017    K 3.7 09/12/2017     09/12/2017    CREATININE 0.8 09/12/2017    BUN 18 09/12/2017    CO2 25 09/12/2017    TSH 3.029 06/02/2017    PSA 2.0 09/12/2017    INR 1.0 06/21/2017    HGBA1C 5.3 06/02/2017         Assessment:   67 y.o. male with multiple co-morbid illnesses here to continue work-up of chronic issues notably cervical myelopathy, HLD, insomnia     Plan:     Problem List Items Addressed This Visit        Neuro    Cervical myelopathy     Severe cervical stenosis with myelopathy with fusion of C3-6.  · F/U NS   · Continue gabapentin 100mg TID  · Titrate up as neeed  · Will " not refill valium   · Follow-up with Back/Spine  · Restart PT/OT at [facility] facility         Relevant Medications    gabapentin (NEURONTIN) 100 MG capsule    Lumbar myelopathy     Seen on CT, followed by pain management. 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 ASCVD risk of 7.8%, mod-intensity recommended  · Continue current therapy   · Advised to take every day  · Continue ASA         Relevant Medications    atorvastatin (LIPITOR) 10 MG tablet    aspirin (ECOTRIN) 81 MG EC tablet       Renal/    Benign non-nodular prostatic hyperplasia without lower urinary tract symptoms     Compliant with finasteride, USS of 6 (mild) and good quality of life  · Continue therapy  · Check PSA  · Avoid water at bedtime         Relevant Medications    finasteride (PROSCAR) 5 mg tablet       ID    History of hepatitis C (completed treatment)     Patient with elevated bilirubin on 6/6. Treated with ribavarin and interferon in 2012. Fibroscan F 0-1. Per hepatology, no increased risk of portal hypertension or HCC associated with achievement of cure of viral hepatitis in minimal fibrosis.   · Liver US and monitor labs with Hepatology  · F/U Hepatology            Hematology    Senile purpura     Ecchymoses on L UE   · monitor            Endocrine    Vitamin D deficiency     Compliant with daily supplementation of 1000U Vit D  · Continue therapy            Orthopedic    Cervical arthritis     Patient with neck pain following cervical fusion surgery  · F/U  NS  · Restart PT/OT               Health Maintenance       Date Due Completion Date    TETANUS VACCINE 12/03/1967 ---    Abdominal Aortic Aneurysm Screening 12/03/2014 ---NEXT    Lipid Panel 06/06/2018 6/6/2017    Colonoscopy 04/16/2026 4/16/2016 (Done)    Override on 4/16/2016: Done (Repeat 7-10 years - diverticulosis - [facility] Dr. Quiles - [phone])          Follow-up in about 3 months (around 5/19/2018). . One hour spent with this patient today, half of that in counseling.    Jada Spaulding MD/MPH  Internal Medicine  [facility]  [phone]

## 2018-02-19 NOTE — ASSESSMENT & PLAN NOTE
Seen on CT, followed by pain management. Plan for lumbar KAYLEY, has not completed outpatient PT  · PT/OT referral   · Change facility to [facility]  · Advised to postpone KAYLEY until after PT trial

## 2018-05-03 ENCOUNTER — TELEPHONE (OUTPATIENT)
Dept: INTERNAL MEDICINE | Facility: CLINIC | Age: 68
End: 2018-05-03

## 2018-05-03 NOTE — TELEPHONE ENCOUNTER
Yes, please get details of what he needs stated in the letter. Does it need to say his specific health issues, or that he has health issues that prevent him from doing a specific task?    Thanks,  KJ

## 2018-05-21 ENCOUNTER — OFFICE VISIT (OUTPATIENT)
Dept: PRIMARY CARE CLINIC | Facility: CLINIC | Age: 68
End: 2018-05-21
Payer: MEDICARE

## 2018-05-21 ENCOUNTER — LAB VISIT (OUTPATIENT)
Dept: LAB | Facility: HOSPITAL | Age: 68
End: 2018-05-21
Attending: INTERNAL MEDICINE
Payer: MEDICARE

## 2018-05-21 VITALS
HEIGHT: 67 IN | WEIGHT: 165.81 LBS | BODY MASS INDEX: 26.02 KG/M2 | OXYGEN SATURATION: 98 % | HEART RATE: 59 BPM | SYSTOLIC BLOOD PRESSURE: 120 MMHG | DIASTOLIC BLOOD PRESSURE: 84 MMHG

## 2018-05-21 DIAGNOSIS — E78.5 HYPERLIPIDEMIA, UNSPECIFIED HYPERLIPIDEMIA TYPE: ICD-10-CM

## 2018-05-21 DIAGNOSIS — E55.9 VITAMIN D DEFICIENCY: ICD-10-CM

## 2018-05-21 DIAGNOSIS — J30.1 SEASONAL ALLERGIC RHINITIS DUE TO POLLEN: ICD-10-CM

## 2018-05-21 DIAGNOSIS — G95.9 LUMBAR MYELOPATHY: ICD-10-CM

## 2018-05-21 DIAGNOSIS — F15.182 CAFFEINE-INDUCED SLEEP DISORDER WITH MILD USE DISORDER, INSOMNIA TYPE: ICD-10-CM

## 2018-05-21 DIAGNOSIS — D53.9 NUTRITIONAL ANEMIA: ICD-10-CM

## 2018-05-21 DIAGNOSIS — M47.812 CERVICAL ARTHRITIS: ICD-10-CM

## 2018-05-21 DIAGNOSIS — D50.0 IRON DEFICIENCY ANEMIA DUE TO CHRONIC BLOOD LOSS: ICD-10-CM

## 2018-05-21 DIAGNOSIS — N40.0 BENIGN NON-NODULAR PROSTATIC HYPERPLASIA WITHOUT LOWER URINARY TRACT SYMPTOMS: ICD-10-CM

## 2018-05-21 LAB
ANION GAP SERPL CALC-SCNC: 10 MMOL/L
BASOPHILS # BLD AUTO: 0.02 K/UL
BASOPHILS NFR BLD: 0.2 %
BUN SERPL-MCNC: 18 MG/DL
CALCIUM SERPL-MCNC: 9.7 MG/DL
CHLORIDE SERPL-SCNC: 105 MMOL/L
CHOLEST SERPL-MCNC: 140 MG/DL
CHOLEST/HDLC SERPL: 2.5 {RATIO}
CO2 SERPL-SCNC: 24 MMOL/L
CREAT SERPL-MCNC: 0.8 MG/DL
DIFFERENTIAL METHOD: ABNORMAL
EOSINOPHIL # BLD AUTO: 0.3 K/UL
EOSINOPHIL NFR BLD: 3.2 %
ERYTHROCYTE [DISTWIDTH] IN BLOOD BY AUTOMATED COUNT: 13.5 %
EST. GFR  (AFRICAN AMERICAN): >60 ML/MIN/1.73 M^2
EST. GFR  (NON AFRICAN AMERICAN): >60 ML/MIN/1.73 M^2
FERRITIN SERPL-MCNC: 16 NG/ML
GLUCOSE SERPL-MCNC: 87 MG/DL
HCT VFR BLD AUTO: 41.2 %
HDLC SERPL-MCNC: 57 MG/DL
HDLC SERPL: 40.7 %
HGB BLD-MCNC: 13.9 G/DL
IRON SERPL-MCNC: 76 UG/DL
LDLC SERPL CALC-MCNC: 67.2 MG/DL
LYMPHOCYTES # BLD AUTO: 1.9 K/UL
LYMPHOCYTES NFR BLD: 23.7 %
MCH RBC QN AUTO: 30.5 PG
MCHC RBC AUTO-ENTMCNC: 33.7 G/DL
MCV RBC AUTO: 91 FL
MONOCYTES # BLD AUTO: 0.6 K/UL
MONOCYTES NFR BLD: 7.2 %
NEUTROPHILS # BLD AUTO: 5.3 K/UL
NEUTROPHILS NFR BLD: 64.6 %
NONHDLC SERPL-MCNC: 83 MG/DL
PLATELET # BLD AUTO: 297 K/UL
PMV BLD AUTO: 10.7 FL
POTASSIUM SERPL-SCNC: 4.3 MMOL/L
RBC # BLD AUTO: 4.55 M/UL
SATURATED IRON: 17 %
SODIUM SERPL-SCNC: 139 MMOL/L
TOTAL IRON BINDING CAPACITY: 459 UG/DL
TRANSFERRIN SERPL-MCNC: 310 MG/DL
TRIGL SERPL-MCNC: 79 MG/DL
VIT B12 SERPL-MCNC: 563 PG/ML
WBC # BLD AUTO: 8.18 K/UL

## 2018-05-21 PROCEDURE — 85025 COMPLETE CBC W/AUTO DIFF WBC: CPT

## 2018-05-21 PROCEDURE — 83540 ASSAY OF IRON: CPT

## 2018-05-21 PROCEDURE — 82607 VITAMIN B-12: CPT

## 2018-05-21 PROCEDURE — 36415 COLL VENOUS BLD VENIPUNCTURE: CPT

## 2018-05-21 PROCEDURE — 80061 LIPID PANEL: CPT

## 2018-05-21 PROCEDURE — 82728 ASSAY OF FERRITIN: CPT

## 2018-05-21 PROCEDURE — 99215 OFFICE O/P EST HI 40 MIN: CPT | Mod: S$GLB,,, | Performed by: INTERNAL MEDICINE

## 2018-05-21 PROCEDURE — 80048 BASIC METABOLIC PNL TOTAL CA: CPT

## 2018-05-21 RX ORDER — TRAZODONE HYDROCHLORIDE 50 MG/1
50 TABLET ORAL NIGHTLY PRN
Qty: 90 TABLET | Refills: 3 | Status: SHIPPED | OUTPATIENT
Start: 2018-05-21 | End: 2019-01-28 | Stop reason: SDUPTHER

## 2018-05-21 RX ORDER — CETIRIZINE HYDROCHLORIDE 10 MG/1
10 TABLET ORAL DAILY
Qty: 90 TABLET | Refills: 3 | Status: SHIPPED | OUTPATIENT
Start: 2018-05-21 | End: 2018-10-31

## 2018-05-21 RX ORDER — ATORVASTATIN CALCIUM 10 MG/1
10 TABLET, FILM COATED ORAL DAILY
Qty: 90 TABLET | Refills: 3 | Status: SHIPPED | OUTPATIENT
Start: 2018-05-21 | End: 2018-10-26 | Stop reason: SDUPTHER

## 2018-05-21 RX ORDER — TAMSULOSIN HYDROCHLORIDE 0.4 MG/1
0.4 CAPSULE ORAL DAILY
Qty: 90 CAPSULE | Refills: 3 | Status: SHIPPED | OUTPATIENT
Start: 2018-05-21 | End: 2018-10-26 | Stop reason: SDUPTHER

## 2018-05-21 NOTE — PROGRESS NOTES
Primary Care Provider Appointment    Subjective:      Patient ID: Anthony Miguel is a 67 y.o. male with HLD, prostate hyperplasia, h/o hep C, LE weakness, cervical arthritis    Chief Complaint: Back Pain and Follow-up    Patient has numerous questions about meds. He is taking benadryl for allergies, but is interested in cetirizine.    He takes proscar for BPH with occasional control of LUTS. He occasionally takes tamsulosin when his symptoms aren't controlled (an old script).    He underwent spinal surgery, but has been lost to follow-up by PT. He is interested in [facility]. He is playing music in a salsa band, and plays from muscle memory as he can not sense the keys with his fingertips. He has concerns about a bone protruding from his surgical spine. There is no TTP, has limited ROM but stable.    He has been taking vit D3 2000 IU. Has not had any falls recently.     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   Respiratory: Negative for cough and shortness of breath.    Cardiovascular: Negative for leg swelling.   Gastrointestinal: Negative for constipation and diarrhea.   Genitourinary: Positive for frequency and urgency. Negative for difficulty urinating and hematuria.   Musculoskeletal: Positive for back pain, gait problem and neck pain. Negative for joint swelling.        LE weakness   Psychiatric/Behavioral: Positive for sleep disturbance. Negative for behavioral problems, decreased  "concentration and dysphoric mood. The patient is nervous/anxious.        Objective:   /84 (BP Location: Left arm, Patient Position: Sitting, BP Method: Medium (Manual))   Pulse (!) 59   Ht 5' 7" (1.702 m)   Wt 75.2 kg (165 lb 12.6 oz)   SpO2 98%   BMI 25.97 kg/m²     Physical Exam   Constitutional: He is oriented to person, place, and time. He appears well-developed and well-nourished.   HENT:   Head: Normocephalic.   Eyes: EOM are normal.   Cardiovascular: Normal rate and regular rhythm.    Pulmonary/Chest: Effort normal.   Musculoskeletal: He exhibits tenderness and deformity.   Ambulating with single-point cane  Increased sensation in hands bilaterally (from last exam)  Cervical vertebrae protruding from low neck   Neurological: He is alert and oriented to person, place, and time. He displays normal reflexes. Coordination normal.   Psychiatric: He has a normal mood and affect. His behavior is normal. Thought content normal.       Lab Results   Component Value Date    WBC 9.82 07/13/2017    HGB 12.4 (L) 07/13/2017    HCT 37.5 (L) 07/13/2017     (H) 07/13/2017    CHOL 138 06/06/2017    TRIG 118 06/06/2017    HDL 51 06/06/2017    ALT 11 09/12/2017    AST 17 09/12/2017     09/12/2017    K 3.7 09/12/2017     09/12/2017    CREATININE 0.8 09/12/2017    BUN 18 09/12/2017    CO2 25 09/12/2017    TSH 3.029 06/02/2017    PSA 2.0 09/12/2017    INR 1.0 06/21/2017    HGBA1C 5.3 06/02/2017         Assessment:   67 y.o. male with multiple co-morbid illnesses here to continue work-up of chronic issues notably HLD, prostate hyperplasia, h/o hep C, LE weakness, cervical arthritis    Plan:     Problem List Items Addressed This Visit        Neuro    Lumbar myelopathy     Seen on CT, followed by pain management. Plan for lumbar KAYLEY, has not completed outpatient PT  · PT/OT referral   · Change facility to [facility]  · Advised to postpone KAYLEY until after PT trial             Psychiatric    Caffeine-induced " sleep disorder with mild use disorder, insomnia type    Relevant Medications    traZODone (DESYREL) 50 MG tablet       Cardiac/Vascular    HLD (hyperlipidemia)    Relevant Medications    atorvastatin (LIPITOR) 10 MG tablet    Other Relevant Orders    Lipid panel    Basic metabolic panel       Renal/    Benign non-nodular prostatic hyperplasia without lower urinary tract symptoms     Compliant with finasteride, USS of 6 (mild) and good quality of life  · Continue finasteride  · Start tamsulosin  · Monitor PSA  · Avoid water at bedtime         Relevant Medications    tamsulosin (FLOMAX) 0.4 mg Cp24       Oncology    Iron deficiency anemia due to chronic blood loss     Stable anemia, last colonoscopy in 2016 (next due in 7-10 years)  · Monitor  · Check anemia labs         Relevant Orders    CBC auto differential    Ferritin    Iron and TIBC    Vitamin B12       Endocrine    Vitamin D deficiency     Compliant with daily supplementation of 2000U Vit D  · Continue therapy            Orthopedic    Cervical arthritis     Patient with neck pain following cervical fusion surgery  · F/U NS  · Discharged from PT/OT due to no-showing to appts            Other    Seasonal allergic rhinitis due to pollen     Seasonal allergies, taking benadryl at bedtime. Willing to switch to 2nd gen antihistamine  · Start ceterizine  · Advised to avoid benadryl due to fall risk         Relevant Medications    cetirizine (ZYRTEC) 10 MG tablet      Other Visit Diagnoses     Nutritional anemia         Relevant Orders    Vitamin B12          Health Maintenance       Date Due Completion Date    TETANUS VACCINE 12/03/1967 ---    Abdominal Aortic Aneurysm Screening 12/03/2014 ---TODAY, ALREADY DONE    Lipid Panel 06/06/2018 6/6/2017- TODAY    Influenza Vaccine 08/01/2018 9/12/2017    Override on 9/12/2017: Done (PER PT)    Colonoscopy 04/16/2026 4/16/2016 (Done)    Override on 4/16/2016: Done (Repeat 7-10 years - diverticulosis - [facility] Dr. Quiles -  [phone])          Follow-up in about 3 months (around 8/21/2018). . One hour spent with this patient today, half of that in counseling.    Jada Spaulding MD/MPH  Internal Medicine  [facility]  [phone]

## 2018-05-21 NOTE — ASSESSMENT & PLAN NOTE
Patient with neck pain following cervical fusion surgery  · F/U NS  · Discharged from PT/OT due to no-showing to appts

## 2018-05-21 NOTE — ASSESSMENT & PLAN NOTE
Seen on CT, followed by pain management. Plan for lumbar KAYLEY, has not completed outpatient PT  · PT/OT referral   · Change facility to [place]  · Advised to postpone KAYLEY until after PT trial

## 2018-05-21 NOTE — PATIENT INSTRUCTIONS
TODAY:  - CD of spine imaging (MRI, CT, XR)  - use cetirizine instead of benadryl (benadryl increases risk of falls)  - for prostate: continue proscar and start flomax daily  - start PT at [facility]  - continue vitamin D 2000U

## 2018-05-21 NOTE — ASSESSMENT & PLAN NOTE
Compliant with finasteride, USS of 6 (mild) and good quality of life  · Continue finasteride  · Start tamsulosin  · Monitor PSA  · Avoid water at bedtime

## 2018-05-21 NOTE — ASSESSMENT & PLAN NOTE
Seasonal allergies, taking benadryl at bedtime. Willing to switch to 2nd gen antihistamine  · Start ceterizine  · Advised to avoid benadryl due to fall risk

## 2018-05-22 ENCOUNTER — TELEPHONE (OUTPATIENT)
Dept: INTERNAL MEDICINE | Facility: CLINIC | Age: 68
End: 2018-05-22

## 2018-05-22 DIAGNOSIS — D50.0 IRON DEFICIENCY ANEMIA DUE TO CHRONIC BLOOD LOSS: Primary | ICD-10-CM

## 2018-05-22 RX ORDER — FERROUS SULFATE 325(65) MG
325 TABLET, DELAYED RELEASE (ENTERIC COATED) ORAL
Qty: 90 TABLET | Refills: 3 | Status: SHIPPED | OUTPATIENT
Start: 2018-05-22 | End: 2018-10-26 | Stop reason: SDUPTHER

## 2018-05-22 NOTE — TELEPHONE ENCOUNTER
Please let patient know that he has iron-deficiency anemia. He should take iron supplements. I sent a script to [facility]  [address].    Otherwise his labs looked good, cholesterol better controlled.    Thanks,  KJ

## 2018-06-11 ENCOUNTER — OFFICE VISIT (OUTPATIENT)
Dept: SPINE | Facility: CLINIC | Age: 68
End: 2018-06-11
Payer: MEDICARE

## 2018-06-11 VITALS
HEART RATE: 56 BPM | WEIGHT: 165 LBS | SYSTOLIC BLOOD PRESSURE: 131 MMHG | HEIGHT: 67 IN | BODY MASS INDEX: 25.9 KG/M2 | DIASTOLIC BLOOD PRESSURE: 74 MMHG

## 2018-06-11 DIAGNOSIS — Z98.1 HISTORY OF FUSION OF CERVICAL SPINE: Primary | ICD-10-CM

## 2018-06-11 DIAGNOSIS — M54.16 RADICULOPATHY OF LUMBAR REGION: ICD-10-CM

## 2018-06-11 PROCEDURE — 99214 OFFICE O/P EST MOD 30 MIN: CPT | Mod: S$GLB,,, | Performed by: NEUROLOGICAL SURGERY

## 2018-06-11 PROCEDURE — 99999 PR PBB SHADOW E&M-EST. PATIENT-LVL III: CPT | Mod: PBBFAC,,, | Performed by: NEUROLOGICAL SURGERY

## 2018-06-11 NOTE — PROGRESS NOTES
CHIEF COMPLAINT:  Follow up evaluation of low back pain     I, Boyd Santana, attest that this documentation has been prepared under the direction and in the presence of David Wolff MD.    HPI:  Anthony Miguel is a 67 y.o.  male, who presents today for follow up evaluation of low back pain. Pt is s/p C3-6 laminectomy and fusion on 6/21/2017. Pt reports he is doing well and continues to play piano. He notes continues neuropathy in the bilateral hands. He did not receive an KAYLEY. Pt was told by his PCP to get into a regular exercise regimen. Pt reports low back pain and BLE localized to his lateral calves. His back pain is worse with sitting. Pt's BLE pain is better with walking. He has not noticed any weakness of the feet, but does note that he does not walk normally. Pt presents today using a cane.        Review of patient's allergies indicates:  No Known Allergies    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    Overview:  Right side dx update     Past Surgical History:   Procedure Laterality Date    Larangoscopy       Family History   Problem Relation Age of Onset    Cancer Mother     No Known Problems Father      Social History   Substance Use Topics    Smoking status: Former Smoker     Packs/day: 1.00     Quit date: 02/2000    Smokeless tobacco: Former User    Alcohol use No        Review of Systems   Constitutional: Negative.    HENT: Negative.    Eyes: Negative.    Respiratory: Negative.    Cardiovascular: Negative.    Gastrointestinal: Negative.    Endocrine: Negative.    Genitourinary: Negative.    Musculoskeletal: Positive for back pain (low back pain), gait problem (cane) and myalgias  (BLE pain). Negative for neck pain.   Skin: Negative.    Allergic/Immunologic: Negative.    Neurological: Negative for weakness, light-headedness, numbness and headaches.   Hematological: Negative.    Psychiatric/Behavioral: Negative.        OBJECTIVE:   Vital Signs:  Pulse: (!) 56 (06/11/18 1504)  BP: 131/74 (06/11/18 1504)    Physical Exam:    Vital signs: All nursing notes and vital signs reviewed -- afebrile, vital signs stable.  Constitutional: Patient sitting comfortably in chair. Appears well developed and well nourished.  Skin: Exposed areas are intact without abnormal markings, rashes or other lesions.  HEENT: Normocephalic. Normal conjunctivae.  Cardiovascular: Normal rate and regular rhythm.  Respiratory: Chest wall rises and falls symmetrically, without signs of respiratory distress.  Abdomen: Soft and non-tender.  Extremities: Warm and without edema. Calves supple, non-tender.  Psych/Behavior: Normal affect.    Neurological:    Mental status: Alert and oriented. Conversational and appropriate.       Cranial Nerves: Grossly intact.     Motor:    Upper:  Deltoids Triceps Biceps WE WF     R 5/5 5/5 5/5 5/5 5/5 5/5    L 5/5 5/5 5/5 5/5 5/5 5/5      Lower:  HF KE KF DF PF EHL    R 5/5 5/5 5/5 5/5 5/5 5/5    L 5/5 5/5 5/5 5/5 5/5 5/5     Sensory: Intact sensation to light touch in all extremities. Romberg negative.    Reflexes:          DTR: 2+ symmetrically throughout.     Mcdonough's: Negative.     Babinski's: Negative.     Clonus: Negative.    Cerebellar: Finger-to-nose and rapid alternating movements normal. Gait stable, fluid.    Spine:    Posture: Head well aligned over pelvis in front and side views.  No focal or global spinal deformity visible on inspection. Shoulders and hips even. No obvious leg length discrepancy. No scapula winging.    Bending: Full ROM with forward, back and lateral bending. No rib prominence with forward bend.    Cervical:      ROM: Full with flexion, extension, lateral  rotation and ear-to-shoulder bend.      Midline TTP: Negative.     Spurling's test: Negative.     Lhermitte's: Negative.    Thoracic:     Midline TTP: Negative    Lumbar:     Midline TTP: Negative     Straight Leg Test: Negative     Crossed Straight Leg Test: Negative     Sciatic notch tenderness: Negative.    Other:     SI joint TTP: Negative.     Greater trochanter TTP: Negative.     Tenderness with external/internal hip rotation: Negative.    Diagnostic Results:  All imaging was independently reviewed by me.    No new imaging for my review.      ASSESSMENT/PLAN:     Anthony Miguel is doing very well 1 year after PCF for myelopathy. 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 He continues to have chronic low back pain and bilateral radicular leg pain in the setting of diffuse lumbar spondylosis. I have recommended PT for core stretching and strengthening, without restriction. I have also recommended that he use the Pain Management referal that I gave him last time. L4-5 and L5-S1 lumbar ESIs +/- facet injections.    The patient understands and agrees with the plan of care. All questions were answered.     1. Referral to Physical Therapy   2. RTC PRN      I, Dr. David Wolff personally performed the services described in this documentation. All medical record entries made by the scribe, Boyd Santana, were at my direction and in my presence.  I have reviewed the chart and agree that the record reflects my personal performance and is accurate and complete.      David Wolff M.D.  Department of Neurosurgery  [facility]      .

## 2018-07-19 ENCOUNTER — CLINICAL SUPPORT (OUTPATIENT)
Dept: REHABILITATION | Facility: HOSPITAL | Age: 68
End: 2018-07-19
Attending: NEUROLOGICAL SURGERY
Payer: MEDICARE

## 2018-07-19 DIAGNOSIS — R26.2 DIFFICULTY WALKING: ICD-10-CM

## 2018-07-19 DIAGNOSIS — M54.41 CHRONIC RIGHT-SIDED LOW BACK PAIN WITH RIGHT-SIDED SCIATICA: ICD-10-CM

## 2018-07-19 DIAGNOSIS — G89.29 CHRONIC RIGHT-SIDED LOW BACK PAIN WITH RIGHT-SIDED SCIATICA: ICD-10-CM

## 2018-07-19 PROCEDURE — 97161 PT EVAL LOW COMPLEX 20 MIN: CPT | Mod: PO

## 2018-07-19 PROCEDURE — 97110 THERAPEUTIC EXERCISES: CPT | Mod: PO

## 2018-07-19 PROCEDURE — G8981 BODY POS CURRENT STATUS: HCPCS | Mod: CL,PO

## 2018-07-19 PROCEDURE — G8982 BODY POS GOAL STATUS: HCPCS | Mod: CI,PO

## 2018-07-19 NOTE — PLAN OF CARE
"[facility]  Physical Therapy Initial Evaluation    Name: Anthony Miguel  Clinic Number: [id number]    Therapy Diagnosis: No diagnosis found.  Physician: David Wolff MD    Physician Orders: PT Eval and Treat neck and back  Medical Diagnosis: Z98.1 (ICD-10-CM) - History of fusion of cervical spine  M54.16 (ICD-10-CM) - Radiculopathy of lumbar region  Evaluation Date: 7/19/2018  Authorization period Expiration: 6/11/2019  Plan of Care Certification Period: 9/13/2018    Visit #: 1/ Visits authorized: 25  Time In:0815  Time Out: 0912  Total Billable Time: 50 minutes    Precautions: Fall  Subjective   Date of onset:  May 2017  History of current condition - Anthony reports that in May 2017 he started having neuropathy in his hands that worked it way up his arms and then "moved through his whole body".  Anhtony reports that he had a cervical fusion in June 2017 with subsequent PT following and reports that he feels okay in terms of his neck.  Anthony reports that he started having low back pain beginning around June 2017 that also causes cramping and weakness in the legs.  Anthony now walks with a cane and has since the neck surgery.  Anthony returned to MD who performed the neck surgery and was given a prescription for PT for the low back.  Anthony reports that the low back is his biggest problem.       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    Anthony has a current medication list which includes the following prescription(s): aspirin, atorvastatin, cetirizine, " ferrous sulfate, finasteride, gabapentin, senna-docusate 8.6-50 mg, tamsulosin, trazodone, vitamin d, and walker.    Review of patient's allergies indicates:  No Known Allergies     Imaging x-ray:  (+) multilevel degenerative changes in the lumbar spine most significant at L4-5L5 & L5-S1    Prior Therapy: previous PT for the neck in Summer 2017  Social History:  Patient lives in a 1 story house lives alone with 5 stairs to enter the house with 2 railings (can only hold 1 at a time).  Patient has a bathtub with a shower chair but he does use it.  Regular toilet present but no grab bars present around toilet or in shower.    Occupation: Retired Teacher  Prior Level of Function: (I) will all ADLs prior to onset of symptoms in May 2017  Current Level of Function:  Limited tolerance standing (5 minutes), limited tolerance for walking (10 minutes), limited tolerance for sitting (10 minutes), difficulty performing the stairs, difficulty getting in and out of the car, difficulty with sit to stand, increased time for household activities, slight difficulty sleeping, sitting for dressing the lower body, difficulty with bending/squatting, limited tolerance for lifting/carrying.    Pain:  Current 1/10, worst 6/10, best 0/10   Location: back , lower legs and upper legs bilateral  Description: Aching, Throbbing, Numb and Sharp, (numbness & tingling in both legs)  Aggravating Factors: Sitting and Standing  Easing Factors: pain medication, rest and walking    Pts goals: Patient wants to have decreased pain and improved ability to walk.      Objective     Observation: Unremarkable    Posture:  Forward head, rounded shoulders, increased thoracic kyphosis, decreased cervical and lumbar lordosis.    Lumbar Range of Motion:    Degrees Pain   Flexion 40   yes        Extension 10   yes        Left Side Bending Greater trochanter yes        Right Side Bending Greater trochanter yes        Left rotation   40 no   Right Rotation   40 no    "          Lower Extremity Strength  Right LE  Left LE    Knee extension: 2/5 Knee extension: 3+/5   Knee flexion: 3-/5 Knee flexion: 3+/5   Hip flexion: 3-/5 Hip flexion: 4-/5   Hip abduction: 3+/5 Hip abduction: 4-/5   Hip adduction: 3/5 Hip adduction 3+/5   Ankle dorsiflexion: 4-/5 Ankle dorsiflexion: 4/5   Ankle plantarflexion: 4-/5 Ankle plantarflexion: 4/5         Balance/Function:  SLS R:  Poor  SLS L:  Poor  Tandem R:  Poor  Tandem L:  Poor  Static Standing:  Minimal sway  Static standing eyes closed:  Moderate sway    Gait Analysis:  Patient ambulates with SPC,ataxic gait with improper heel strike/toe off, unsteady with ambulation    Flexibility:    Limited piriformis flexibility present most significantly on the R         CMS Impairment/Limitation/Restriction for FOTO Intake Survey    Therapist reviewed FOTO scores for Anthony Miguel on 7/19/2018.   FOTO documents entered into EPIC - see Media section.    Limitation Score: 60%  Category: Body Position    Current : CL = least 60% but < 80% impaired, limited or restricted  Goal: CI = at least 1% but < 20% impaired, limited or restricted      PT Evaluation Completed? Yes  Discussed Plan of Care with patient: Yes    TREATMENT   Treatment Time In: 0815  Treatment Time Out: 0912  Total Treatment time separate from Evaluation time:  31    Anthony received therapeutic exercises to develop strength, endurance, ROM, flexibility, posture and core stabilization for 16 minutes including:  Forward flexion with PB 5" x 12  LAQ 3 x 10  Seated alternating marching 3 x 10  Seated hip abduction with OTB 3 x 10  Seated piriformis stretch 3 x 30"      Home Exercises and Patient Education Provided    Education provided re: Extensive education provided to patient about the possibility of falling.  Patient needed to be educated on the fact that he has decreased balance and needs to slow down his movements throughout the day to avoid any possibility of falling.  Patient also " educated on the importance of establishing an exercise program and ensuring he is safe using machines in PT while being supervised before beginning a gym program.  Patient also advised to possibly use the shower chair that is available in his shower while bathing to avoid any fatigue and possibility of falling while showering.    - progress towards goals   - role of therapy in multi - disciplinary team, goals for therapy  No spiritual or educational barriers to learning provided    Written Home Exercises Provided: See scanned document in EMR.  Exercises were reviewed and Anthony was able to demonstrate them prior to the end of the session.   Pt received a written copy of exercises to perform at home. Anthony demonstrated fair  understanding of the education provided.       Assessment     Anthony is a 67 y.o. male referred to outpatient Physical Therapy with a medical diagnosis of radiculopathy of the lumbar region. Pt presents with limited lumbar mobility, poor balance reactions, and significant weakness of BLE R>L.  Functionally, Anthony has difficulty with standing, walking, stair negotiation, bending, squatting, dressing, and transfers including sit to stand and car transfers.    Pt prognosis is Fair.   Pt will benefit from skilled outpatient Physical Therapy to address the deficits stated above and in the chart below, provide pt/family education, and to maximize pt's level of independence.     Plan of care discussed with patient: Yes  Pt's spiritual, cultural and educational needs considered and pt agreeable to plan of care and goals as stated below:     Anticipated Barriers for therapy: none    Medical Necessity is demonstrated by the following  History  Co-morbidities and personal factors that may impact the plan of care Examination  Body Structures and Functions, activity limitations and participation restrictions that may impact the plan of care    Clinical Presentation   Co-morbidities:    hepatitis        Personal Factors:   age  attitudes Body Regions:   back  lower extremities    Body Systems:    gross symmetry  ROM  strength  gross coordinated movement  balance  gait  transfers            Participation Restrictions:   none     Activity limitations:   Learning and applying knowledge  no deficits    General Tasks and Commands  no deficits    Communication  no deficits    Mobility  lifting and carrying objects  walking  moving around using equipment (WC)    Self care  washing oneself (bathing, drying, washing hands)  dressing    Domestic Life  shopping  cooking  doing house work (cleaning house, washing dishes, laundry)    Interactions/Relationships  no deficits    Life Areas  no deficits    Community and Social Life  no deficits         stable and uncomplicated                      low   low  low Decision Making/ Complexity Score:  low       GOALS:   Short Term Goals:  4 weeks  1.  Anthony will report 3/10 pain at worst with prolonged standing.  2.  Anthony will be able to walk safely with cane for 10 minutes with minimal pain and no LOB.  3. Anthony will demonstrate increased MMT to 3+/5 throughout LEs to increase tolerance for ADL.  4. Dung will demonstrate poor to fair single limb balance to decreased chance of falls.  5. Anthony to tolerate HEP to improve ROM and independence with ADL's.    Long Term Goals: 8 weeks  1.  Anthony will report 1-2/10 pain at worst with prolonged sitting.  2.   Anthony will be able to walk safely with a cane for 15-20 minutes with minimal pain and no LOB.  3.  Anthony will demonstrate increased MMT to 4/5 throughout LEs to increase tolerance for ADL.  4.Pt will report at CI level (1-20% impaired) on FOTO lumbar score for low back pain disability to demonstrate decrease in disability and improvement in back pain.  5. Pt to be Independent with HEP to improve ROM and independence with ADL's      Plan     Certification Period: 7/19/2018 to 9/13/2018.    Outpatient  Physical Therapy 2 times weekly for 8 weeks to include the following interventions: Aquatic Therapy, Cervical/Lumbar Traction, Gait Training, Manual Therapy, Moist Heat/ Ice, Neuromuscular Re-ed, Patient Education, Therapeutic Activites and Therapeutic Exercise.     Sherri Cisneros, PT, DPT

## 2018-07-24 ENCOUNTER — TELEPHONE (OUTPATIENT)
Dept: INTERNAL MEDICINE | Facility: CLINIC | Age: 68
End: 2018-07-24

## 2018-07-24 NOTE — TELEPHONE ENCOUNTER
----- Message from Bibi Joya sent at 7/24/2018  3:50 PM CDT -----  Contact: self [phone]  Patient requesting a call from the office in regards to losing his handicap sticker . Please call and advise , Thanks

## 2018-07-27 ENCOUNTER — CLINICAL SUPPORT (OUTPATIENT)
Dept: REHABILITATION | Facility: HOSPITAL | Age: 68
End: 2018-07-27
Attending: NEUROLOGICAL SURGERY
Payer: MEDICARE

## 2018-07-27 DIAGNOSIS — R26.2 DIFFICULTY WALKING: ICD-10-CM

## 2018-07-27 DIAGNOSIS — M54.41 CHRONIC RIGHT-SIDED LOW BACK PAIN WITH RIGHT-SIDED SCIATICA: ICD-10-CM

## 2018-07-27 DIAGNOSIS — G89.29 CHRONIC RIGHT-SIDED LOW BACK PAIN WITH RIGHT-SIDED SCIATICA: ICD-10-CM

## 2018-07-27 PROCEDURE — 97110 THERAPEUTIC EXERCISES: CPT | Mod: PO

## 2018-07-27 PROCEDURE — 97112 NEUROMUSCULAR REEDUCATION: CPT | Mod: PO

## 2018-07-27 NOTE — PROGRESS NOTES
"Physical Therapy Daily Treatment Note    Name: Anthony Miguel  Clinic Number: [id number]  Date of Treatment: 07/27/2018   Diagnosis:   Encounter Diagnoses   Name Primary?    Chronic right-sided low back pain with right-sided sciatica     Difficulty walking        Physician: David Wolff MD    Medical Diagnosis: Z98.1 (ICD-10-CM) - History of fusion of cervical spine  M54.16 (ICD-10-CM) - Radiculopathy of lumbar region  Evaluation Date: 7/19/2018  Authorization period Expiration: 6/11/2019  Plan of Care Certification Period: 9/13/2018     Visit #: 2/ Visits authorized: 25  Time In: 1212  Time Out:  0103  Total Billable Time: 44 minutes    Precautions:  Fall    Subjective     Anthony reports that he has been trying to walk more slowly and deliberately and has been generally been walking more recently.  Anthony denies any issues with the exercises he is performing at home.  Anthony is hoping the exercises are helping him but doesn't know if they are.    Pain: 1/10  Location: bilateral back      Objective     Anthony received therapeutic exercises to develop strength, endurance, ROM, flexibility and posture for 36 minutes including:     Recumbent bicycle 8' L2  LAQ 1# 3 x 10  Hip adduction with ball squeeze 10 x 10"  Hip abduction GTB 3 x 10  PB flexion 10 x 10"  Seated marching 2 x 15  Stair negotiation (4 step staircase) reciprocal gait pattern 5 x 2  Seated piriformis stretch 3 x 30" each side  Seated hamstring stretch with step stool 3 x 30"      Robertoparticipated in neuromuscular re-education activities to improve: Balance, Coordination and Proprioception for 8 minutes. The following activities were included:    Static standing 30"  Static standing eyes closed 30"  Standing on airex 30"  Standing on airex with eyes closed 30"  SLS 3 x 30" each side    Written Home Exercises Provided: No new exercises provided today    Pt educated on energy conservation and use of recumbent bicycle at home. Pt demo fair " understanding of the education provided. Anthony demonstrated fair return demonstration of activities.     Assessment   Anthony presented with improved balance with tandem stance and static standing today.  Patient continues to have unsteady gait even with use of SPC.  Generalized fatigue present following treatment session.  Anthony is progressing well towards his goals.   Pt prognosis is Good.     Pt will continue to benefit from skilled PT intervention. Medical Necessity is demonstrated by:  Pain limits function of effected part for some activities, Unable to participate fully in daily activities, Requires skilled supervision to complete and progress HEP and Weakness.    New/Revised goals:  No new goals established today      Plan       Continue with established Plan of Care towards PT goals.     Sherri Cisneros, PT

## 2018-10-26 ENCOUNTER — TELEPHONE (OUTPATIENT)
Dept: INTERNAL MEDICINE | Facility: CLINIC | Age: 68
End: 2018-10-26

## 2018-10-26 ENCOUNTER — OFFICE VISIT (OUTPATIENT)
Dept: PRIMARY CARE CLINIC | Facility: CLINIC | Age: 68
End: 2018-10-26
Payer: MEDICARE

## 2018-10-26 ENCOUNTER — IMMUNIZATION (OUTPATIENT)
Dept: INTERNAL MEDICINE | Facility: CLINIC | Age: 68
End: 2018-10-26
Payer: MEDICARE

## 2018-10-26 ENCOUNTER — HOSPITAL ENCOUNTER (OUTPATIENT)
Dept: RADIOLOGY | Facility: HOSPITAL | Age: 68
Discharge: HOME | End: 2018-10-26
Attending: INTERNAL MEDICINE
Payer: MEDICARE

## 2018-10-26 VITALS
OXYGEN SATURATION: 98 % | BODY MASS INDEX: 26.33 KG/M2 | HEART RATE: 72 BPM | HEIGHT: 67 IN | DIASTOLIC BLOOD PRESSURE: 70 MMHG | WEIGHT: 167.75 LBS | SYSTOLIC BLOOD PRESSURE: 108 MMHG

## 2018-10-26 DIAGNOSIS — Z91.199 NONCOMPLIANCE: ICD-10-CM

## 2018-10-26 DIAGNOSIS — N40.0 BENIGN NON-NODULAR PROSTATIC HYPERPLASIA WITHOUT LOWER URINARY TRACT SYMPTOMS: ICD-10-CM

## 2018-10-26 DIAGNOSIS — H90.6 MIXED CONDUCTIVE AND SENSORINEURAL HEARING LOSS OF BOTH EARS: ICD-10-CM

## 2018-10-26 DIAGNOSIS — S59.901A INJURY OF RIGHT ELBOW, INITIAL ENCOUNTER: ICD-10-CM

## 2018-10-26 DIAGNOSIS — E55.9 VITAMIN D DEFICIENCY: ICD-10-CM

## 2018-10-26 DIAGNOSIS — R79.9 ABNORMAL FINDING OF BLOOD CHEMISTRY: ICD-10-CM

## 2018-10-26 DIAGNOSIS — Z12.5 ENCOUNTER FOR SCREENING FOR MALIGNANT NEOPLASM OF PROSTATE: ICD-10-CM

## 2018-10-26 DIAGNOSIS — G95.9 CERVICAL MYELOPATHY: ICD-10-CM

## 2018-10-26 DIAGNOSIS — E78.5 HYPERLIPIDEMIA, UNSPECIFIED HYPERLIPIDEMIA TYPE: ICD-10-CM

## 2018-10-26 DIAGNOSIS — D50.0 IRON DEFICIENCY ANEMIA DUE TO CHRONIC BLOOD LOSS: ICD-10-CM

## 2018-10-26 DIAGNOSIS — Z01.00 ENCOUNTER FOR ROUTINE EYE AND VISION EXAMINATION: ICD-10-CM

## 2018-10-26 DIAGNOSIS — M47.812 CERVICAL ARTHRITIS: Primary | ICD-10-CM

## 2018-10-26 DIAGNOSIS — G95.9 LUMBAR MYELOPATHY: ICD-10-CM

## 2018-10-26 LAB
BILIRUB UR QL STRIP: NEGATIVE
CLARITY UR REFRACT.AUTO: ABNORMAL
COLOR UR AUTO: YELLOW
GLUCOSE UR QL STRIP: NEGATIVE
HGB UR QL STRIP: NEGATIVE
KETONES UR QL STRIP: NEGATIVE
LEUKOCYTE ESTERASE UR QL STRIP: NEGATIVE
NITRITE UR QL STRIP: NEGATIVE
PH UR STRIP: 7 [PH] (ref 5–8)
PROT UR QL STRIP: NEGATIVE
SP GR UR STRIP: 1.02 (ref 1–1.03)
URN SPEC COLLECT METH UR: ABNORMAL

## 2018-10-26 PROCEDURE — 73080 X-RAY EXAM OF ELBOW: CPT | Mod: TC,HCNC,RT

## 2018-10-26 PROCEDURE — 1101F PT FALLS ASSESS-DOCD LE1/YR: CPT | Mod: CPTII,HCNC,S$GLB, | Performed by: INTERNAL MEDICINE

## 2018-10-26 PROCEDURE — 99215 OFFICE O/P EST HI 40 MIN: CPT | Mod: HCNC,S$GLB,, | Performed by: INTERNAL MEDICINE

## 2018-10-26 PROCEDURE — 81003 URINALYSIS AUTO W/O SCOPE: CPT | Mod: HCNC

## 2018-10-26 PROCEDURE — 73080 X-RAY EXAM OF ELBOW: CPT | Mod: 26,HCNC,RT, | Performed by: RADIOLOGY

## 2018-10-26 PROCEDURE — 90662 IIV NO PRSV INCREASED AG IM: CPT | Mod: PBBFAC,HCNC

## 2018-10-26 RX ORDER — TAMSULOSIN HYDROCHLORIDE 0.4 MG/1
0.4 CAPSULE ORAL DAILY
Qty: 90 CAPSULE | Refills: 3 | Status: SHIPPED | OUTPATIENT
Start: 2018-10-26 | End: 2019-01-28 | Stop reason: SDUPTHER

## 2018-10-26 RX ORDER — FERROUS SULFATE 325(65) MG
325 TABLET, DELAYED RELEASE (ENTERIC COATED) ORAL
Qty: 90 TABLET | Refills: 3 | Status: SHIPPED | OUTPATIENT
Start: 2018-10-26 | End: 2019-01-28

## 2018-10-26 RX ORDER — GABAPENTIN 100 MG/1
100 CAPSULE ORAL 3 TIMES DAILY PRN
Qty: 270 CAPSULE | Refills: 3 | Status: SHIPPED | OUTPATIENT
Start: 2018-10-26 | End: 2019-01-28

## 2018-10-26 RX ORDER — FINASTERIDE 5 MG/1
5 TABLET, FILM COATED ORAL DAILY
Qty: 90 TABLET | Refills: 3 | Status: SHIPPED | OUTPATIENT
Start: 2018-10-26 | End: 2019-01-28 | Stop reason: SDUPTHER

## 2018-10-26 RX ORDER — ATORVASTATIN CALCIUM 10 MG/1
10 TABLET, FILM COATED ORAL DAILY
Qty: 90 TABLET | Refills: 3 | Status: SHIPPED | OUTPATIENT
Start: 2018-10-26 | End: 2019-01-28 | Stop reason: SDUPTHER

## 2018-10-26 RX ORDER — ACETAMINOPHEN 500 MG
2000 TABLET ORAL DAILY
Qty: 90 CAPSULE | Refills: 0 | Status: SHIPPED | OUTPATIENT
Start: 2018-10-26 | End: 2019-01-28 | Stop reason: SDUPTHER

## 2018-10-26 NOTE — TELEPHONE ENCOUNTER
Please let patient know taht all labs and XR were normal. His urine studies are still pending.    NAVID Carter

## 2018-10-26 NOTE — Clinical Note
Please pill pack for 3 mos. Any prn meds separate (gabapentin, zyrtec, etc).Only pack the meds I sent you today: atorvastatin, proscar, flomax, vitamin d, iron.Thanks,KJ

## 2018-10-26 NOTE — PROGRESS NOTES
"Primary Care Provider Appointment    Subjective:      Patient ID: Anthony Miguel is a 67 y.o. 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 He is now doing his own self-directed OT (jazz piano, housework). He is "avoiding" pain management clinc. He did have an injury to his R elbow at the end of August. He now has swelling in the area, but did not get care urgently during that situation. 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 They pick him up for the shows, and are a social support for him.    He has BPH is semi-compliant with finasteride and tamsulosin, frequently skips days. He is very worried about his kidney function with his BPH.    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   Respiratory: Negative for cough and shortness of breath.    Cardiovascular: Negative for leg swelling.   Gastrointestinal: Negative for constipation and diarrhea.   Genitourinary: Positive for frequency and urgency. Negative for difficulty urinating and hematuria.   Musculoskeletal: Positive for back pain, gait problem and neck pain. Negative for joint swelling.        LE weakness " "  Psychiatric/Behavioral: Positive for sleep disturbance. Negative for behavioral problems, decreased concentration and dysphoric mood. The patient is nervous/anxious.        Objective:   /70 (BP Location: Right arm, Patient Position: Sitting, BP Method: Medium (Manual))   Pulse 72   Ht 5' 7" (1.702 m)   Wt 76.1 kg (167 lb 12.3 oz)   SpO2 98%   BMI 26.28 kg/m²     Physical Exam   Constitutional: He is oriented to person, place, and time. He appears well-developed and well-nourished.   HENT:   Head: Normocephalic.   Eyes: EOM are normal.   Cardiovascular: Normal rate and regular rhythm.   Pulmonary/Chest: Effort normal.   Musculoskeletal: He exhibits tenderness and deformity.   Ambulating with single-point cane  Increased sensation in hands bilaterally (from last exam)  Cervical vertebrae protruding from low neck   Neurological: He is alert and oriented to person, place, and time. He displays normal reflexes. Coordination normal.   Psychiatric: He has a normal mood and affect. His behavior is normal. Thought content normal.       Lab Results   Component Value Date    WBC 7.95 10/26/2018    HGB 14.3 10/26/2018    HCT 44.0 10/26/2018     10/26/2018    CHOL 140 05/21/2018    TRIG 79 05/21/2018    HDL 57 05/21/2018    ALT 25 10/26/2018    AST 27 10/26/2018     10/26/2018    K 4.3 10/26/2018     10/26/2018    CREATININE 0.8 10/26/2018    BUN 18 10/26/2018    CO2 27 10/26/2018    TSH 3.029 06/02/2017    PSA 1.1 10/26/2018    INR 1.0 06/21/2017    HGBA1C 5.1 10/26/2018         Assessment:   67 y.o. male with multiple co-morbid illnesses here to continue work-up of chronic issues notably  cervical myelopathy, HLD, vision changes     Plan:     Problem List Items Addressed This Visit        Neuro    Cervical myelopathy    Relevant Medications    gabapentin (NEURONTIN) 100 MG capsule    Lumbar myelopathy    Relevant Medications    gabapentin (NEURONTIN) 100 MG capsule       Psychiatric    " Noncompliance     Noncompliant with all meds, has basic regimen but skips doses has decreased access to pharmacy due to transportation barrier  · Pill packing today  · 3 mos  · PRNs separate            Ophtho    Encounter for routine eye and vision examination     Needs routine eye care, has chronic vision changes  · Refer to optometry         Relevant Orders    Ambulatory Referral to Optometry       ENT    Mixed conductive and sensorineural hearing loss of both ears     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  · ENT referral  · Also needs ear cleaning         Relevant Orders    Ambulatory Referral to ENT       Cardiac/Vascular    HLD (hyperlipidemia)    Relevant Medications    atorvastatin (LIPITOR) 10 MG tablet    Other Relevant Orders    Hemoglobin A1c (Completed)    Comprehensive metabolic panel (Completed)       Renal/    Benign non-nodular prostatic hyperplasia without lower urinary tract symptoms    Relevant Medications    finasteride (PROSCAR) 5 mg tablet    tamsulosin (FLOMAX) 0.4 mg Cap    Other Relevant Orders    PSA, Screening (Completed)    URINALYSIS       Oncology    Iron deficiency anemia due to chronic blood loss    Relevant Medications    ferrous sulfate 325 (65 FE) MG EC tablet    Other Relevant Orders    CBC auto differential (Completed)       Endocrine    Vitamin D deficiency     Compliant with daily supplementation of 1000U Vit D  · Continue therapy         Relevant Medications    cholecalciferol, vitamin D3, (VITAMIN D3) 2,000 unit Cap       Orthopedic    Cervical arthritis - Primary     Patient with neck pain following cervical fusion surgery  · F/U NS  · Continue PT/OT homework         Injury of right elbow     Fall in 8/2018  · XR R elbow         Relevant Orders    X-Ray Elbow Complete 3 view Right (Completed)      Other Visit Diagnoses     Encounter for screening for malignant neoplasm of prostate         Relevant Orders    PSA, Screening (Completed)    Abnormal finding of  blood chemistry         Relevant Orders    Hemoglobin A1c (Completed)          Health Maintenance       Date Due Completion Date    TETANUS VACCINE 12/03/1967 ---    Influenza Vaccine 08/01/2018 9/12/2017- TODAY    Override on 9/12/2017: Done (PER PT)    Lipid Panel 05/21/2019 5/21/2018    Colonoscopy 04/16/2026 4/16/2016 (Done)    Override on 4/16/2016: Done (Repeat 7-10 years - diverticulosis - [facility] Dr. Quiles - [phone])          Follow-up in about 3 months (around 1/26/2019). One hour spent with this patient today, half of that in counseling.    Jada Spaulding MD/MPH  Internal Medicine  [facility]  [phone]

## 2018-10-26 NOTE — PROGRESS NOTES
Adherence package packed for 90 days using the monthly cards (3 cards)    Each Card:    Tamsulosin 0.4 mg  Atrovastatin 10 mg  Viramin D3 2000 Unit  Finasteride 5 mg  Ferrous Sulfate 325 mg (one tablet)    As needed medication, gave patient bottle   Gabapentin 100 mg  Ferrous Sulfate 325 (instructed patient to take one at noon, and one at evening time with food)    35'

## 2018-10-26 NOTE — PATIENT INSTRUCTIONS
TODAY:  - flu vaccine  - optometry (Dr Sultana)  - labs today  - ENT appointment  - pill packing at pharmacy

## 2018-10-26 NOTE — ASSESSMENT & PLAN NOTE
Noncompliant with all meds, has basic regimen but skips doses has decreased access to pharmacy due to transportation barrier  · Pill packing today  · 3 mos  · PRNs separate

## 2018-10-29 ENCOUNTER — TELEPHONE (OUTPATIENT)
Dept: INTERNAL MEDICINE | Facility: CLINIC | Age: 68
End: 2018-10-29

## 2018-10-31 ENCOUNTER — OFFICE VISIT (OUTPATIENT)
Dept: OPTOMETRY | Facility: CLINIC | Age: 68
End: 2018-10-31
Payer: COMMERCIAL

## 2018-10-31 DIAGNOSIS — H02.401 PTOSIS OF EYELID, RIGHT: ICD-10-CM

## 2018-10-31 DIAGNOSIS — H25.13 NUCLEAR SCLEROSIS OF BOTH EYES: ICD-10-CM

## 2018-10-31 DIAGNOSIS — H52.13 MYOPIA OF BOTH EYES WITH ASTIGMATISM AND PRESBYOPIA: Primary | ICD-10-CM

## 2018-10-31 DIAGNOSIS — H40.013 OAG (OPEN ANGLE GLAUCOMA) SUSPECT, LOW RISK, BILATERAL: ICD-10-CM

## 2018-10-31 DIAGNOSIS — H52.203 MYOPIA OF BOTH EYES WITH ASTIGMATISM AND PRESBYOPIA: Primary | ICD-10-CM

## 2018-10-31 DIAGNOSIS — H52.4 MYOPIA OF BOTH EYES WITH ASTIGMATISM AND PRESBYOPIA: Primary | ICD-10-CM

## 2018-10-31 PROCEDURE — 99999 PR PBB SHADOW E&M-EST. PATIENT-LVL II: CPT | Mod: PBBFAC,,, | Performed by: OPTOMETRIST

## 2018-10-31 PROCEDURE — 92015 DETERMINE REFRACTIVE STATE: CPT | Mod: S$GLB,,, | Performed by: OPTOMETRIST

## 2018-10-31 PROCEDURE — 92004 COMPRE OPH EXAM NEW PT 1/>: CPT | Mod: S$GLB,,, | Performed by: OPTOMETRIST

## 2018-10-31 RX ORDER — MELOXICAM 15 MG/1
15 TABLET ORAL
COMMUNITY
Start: 2014-10-01 | End: 2019-01-28

## 2018-10-31 RX ORDER — TRIAMCINOLONE ACETONIDE 1 MG/G
15 CREAM TOPICAL 2 TIMES DAILY PRN
COMMUNITY
Start: 2015-04-15 | End: 2022-06-30 | Stop reason: SDUPTHER

## 2018-10-31 NOTE — LETTER
October 31, 2018      Jada Spaulding MD  [address]  [facility] [address]           [facility]  [address]  [facility] [address]  Phone: [phone]  Fax: [phone]          Patient: Anthony Miguel   MR Number: [id number]   YOB: 1950   Date of Visit: 10/31/2018       Dear Dr. Jada Spaulding:    Thank you for referring Anthony Miguel to me for evaluation. Attached you will find relevant portions of my assessment and plan of care.    If you have questions, please do not hesitate to call me. I look forward to following Anthony Miguel along with you.    Sincerely,    Jennifer Sultana, OD    Enclosure  CC:  No Recipients    If you would like to receive this communication electronically, please contact [email or web address] or [phone] to request more information on [employer] access.    For providers and/or their staff who would like to refer a patient to [facility], please contact us through our one-stop-shop provider referral line, [facility], at [phone].    If you feel you have received this communication in error or would no longer like to receive these types of communications, please e-mail [email or web address]

## 2018-11-14 ENCOUNTER — OFFICE VISIT (OUTPATIENT)
Dept: OPTOMETRY | Facility: CLINIC | Age: 68
End: 2018-11-14
Payer: MEDICARE

## 2018-11-14 ENCOUNTER — CLINICAL SUPPORT (OUTPATIENT)
Dept: OPHTHALMOLOGY | Facility: CLINIC | Age: 68
End: 2018-11-14
Payer: MEDICARE

## 2018-11-14 DIAGNOSIS — H40.013 OAG (OPEN ANGLE GLAUCOMA) SUSPECT, LOW RISK, BILATERAL: Primary | ICD-10-CM

## 2018-11-14 DIAGNOSIS — H40.013 OAG (OPEN ANGLE GLAUCOMA) SUSPECT, LOW RISK, BILATERAL: ICD-10-CM

## 2018-11-14 DIAGNOSIS — H11.002 PTERYGIUM EYE, LEFT: ICD-10-CM

## 2018-11-14 PROCEDURE — 99999 PR PBB SHADOW E&M-EST. PATIENT-LVL III: CPT | Mod: PBBFAC,HCNC,, | Performed by: OPTOMETRIST

## 2018-11-14 PROCEDURE — 92133 CPTRZD OPH DX IMG PST SGM ON: CPT | Mod: HCNC,S$GLB,, | Performed by: OPTOMETRIST

## 2018-11-14 PROCEDURE — 92083 EXTENDED VISUAL FIELD XM: CPT | Mod: HCNC,S$GLB,, | Performed by: OPTOMETRIST

## 2018-11-14 PROCEDURE — 76514 ECHO EXAM OF EYE THICKNESS: CPT | Mod: S$GLB,,, | Performed by: OPTOMETRIST

## 2018-11-14 PROCEDURE — 92012 INTRM OPH EXAM EST PATIENT: CPT | Mod: HCNC,S$GLB,, | Performed by: OPTOMETRIST

## 2018-11-14 NOTE — PROGRESS NOTES
HPI     Pt sts vision is good. 2wk check     Last edited by Violet Lind on 11/14/2018  9:43 AM. (History)        ROS     Negative for: Constitutional, Gastrointestinal, Neurological, Skin,   Genitourinary, Musculoskeletal, HENT, Endocrine, Cardiovascular, Eyes,   Respiratory, Psychiatric, Allergic/Imm, Heme/Lymph    Last edited by Jennifer Sultana, OD on 11/14/2018  9:56 AM. (History)        Assessment /Plan     For exam results, see Encounter Report.    OAG (open angle glaucoma) suspect, low risk, bilateral  -     Richardson Visual Field - OU - Extended - Both Eyes; Future; Expected date: 02/14/2019    Pterygium eye, left    1. Prev POAG OU suspect at [facility]. Last IOP at 22 OD, 20 OS on 10/31/18. Today IOP 11 OD, 13 OS. Pachys are thick 554 (-1) OD, 579 (-2) OS. C/d 0.8 OD, OS w/thin rim OU. (-) FHx. (-) HTN. (-) DM.   11/14/18 OCT OD WNL, OS Borderline TS  11/14/18 HVF OD sup arcuate (due to lid?), OS sup arcuate (due to lid?), early inf arcuate? Doesn't correlate w/OCT.   Educated patient on findings today. No tx at this time. Would like to see if defect is repeatable. Consider taping lids OU.   RTC 3 mo IOP and repeat HVF 24-2 SHYANN STD    2. Not visually significant. Monitor.

## 2019-01-02 ENCOUNTER — TELEPHONE (OUTPATIENT)
Dept: PAIN MEDICINE | Facility: CLINIC | Age: 69
End: 2019-01-02

## 2019-01-02 NOTE — TELEPHONE ENCOUNTER
Contacted pt regarding procedure. Pt was called twice. Pt's phone went to voicemail,and the box was too full to leave a message.    Pt need to have ordering provider put in an order for injection, as we have never seen in the pt in clinic.

## 2019-01-02 NOTE — TELEPHONE ENCOUNTER
----- Message from Jared Feldman sent at 1/2/2019  9:07 AM CST -----  Contact: pt            Name of Who is Calling: pt      What is the request in detail: is requesting to reschedule a procedure that he cancelled back in November. Pt states he is now needing the injection. Pt states to please allow the phone to ring at least 3 times      Can the clinic reply by [facility]: no      What Number to Call Back if not in [facility]: [phone]

## 2019-01-08 ENCOUNTER — TELEPHONE (OUTPATIENT)
Dept: SPINE | Facility: CLINIC | Age: 69
End: 2019-01-08

## 2019-01-08 DIAGNOSIS — M54.50 LUMBAR PAIN: Primary | ICD-10-CM

## 2019-01-15 ENCOUNTER — HOSPITAL ENCOUNTER (OUTPATIENT)
Dept: RADIOLOGY | Facility: OTHER | Age: 69
Discharge: HOME | End: 2019-01-15
Attending: PHYSICIAN ASSISTANT
Payer: MEDICARE

## 2019-01-15 ENCOUNTER — OFFICE VISIT (OUTPATIENT)
Dept: SPINE | Facility: CLINIC | Age: 69
End: 2019-01-15
Payer: MEDICARE

## 2019-01-15 VITALS
SYSTOLIC BLOOD PRESSURE: 128 MMHG | BODY MASS INDEX: 26.21 KG/M2 | HEIGHT: 67 IN | HEART RATE: 65 BPM | DIASTOLIC BLOOD PRESSURE: 66 MMHG | WEIGHT: 167 LBS

## 2019-01-15 DIAGNOSIS — M51.36 DDD (DEGENERATIVE DISC DISEASE), LUMBAR: ICD-10-CM

## 2019-01-15 DIAGNOSIS — M43.16 SPONDYLOLISTHESIS, LUMBAR REGION: ICD-10-CM

## 2019-01-15 DIAGNOSIS — G89.29 CHRONIC BILATERAL LOW BACK PAIN WITHOUT SCIATICA: Primary | ICD-10-CM

## 2019-01-15 DIAGNOSIS — M54.50 CHRONIC BILATERAL LOW BACK PAIN WITHOUT SCIATICA: Primary | ICD-10-CM

## 2019-01-15 DIAGNOSIS — M47.26 OTHER SPONDYLOSIS WITH RADICULOPATHY, LUMBAR REGION: ICD-10-CM

## 2019-01-15 DIAGNOSIS — M54.50 LUMBAR PAIN: ICD-10-CM

## 2019-01-15 PROCEDURE — 72100 XR LUMBAR SPINE AP AND LAT WITH FLEX/EXT: ICD-10-PCS | Mod: 26,HCNC,, | Performed by: RADIOLOGY

## 2019-01-15 PROCEDURE — 1101F PT FALLS ASSESS-DOCD LE1/YR: CPT | Mod: CPTII,HCNC,S$GLB, | Performed by: PHYSICIAN ASSISTANT

## 2019-01-15 PROCEDURE — 72100 X-RAY EXAM L-S SPINE 2/3 VWS: CPT | Mod: 26,HCNC,, | Performed by: RADIOLOGY

## 2019-01-15 PROCEDURE — 72100 X-RAY EXAM L-S SPINE 2/3 VWS: CPT | Mod: TC,FY,HCNC

## 2019-01-15 PROCEDURE — 99999 PR PBB SHADOW E&M-EST. PATIENT-LVL III: CPT | Mod: PBBFAC,HCNC,, | Performed by: PHYSICIAN ASSISTANT

## 2019-01-15 PROCEDURE — 99214 OFFICE O/P EST MOD 30 MIN: CPT | Mod: HCNC,S$GLB,, | Performed by: PHYSICIAN ASSISTANT

## 2019-01-15 PROCEDURE — 72120 XR LUMBAR SPINE AP AND LAT WITH FLEX/EXT: ICD-10-PCS | Mod: 26,HCNC,, | Performed by: RADIOLOGY

## 2019-01-15 PROCEDURE — 1101F PR PT FALLS ASSESS DOC 0-1 FALLS W/OUT INJ PAST YR: ICD-10-PCS | Mod: CPTII,HCNC,S$GLB, | Performed by: PHYSICIAN ASSISTANT

## 2019-01-15 PROCEDURE — 99999 PR PBB SHADOW E&M-EST. PATIENT-LVL III: ICD-10-PCS | Mod: PBBFAC,HCNC,, | Performed by: PHYSICIAN ASSISTANT

## 2019-01-15 PROCEDURE — 72120 X-RAY BEND ONLY L-S SPINE: CPT | Mod: 26,HCNC,, | Performed by: RADIOLOGY

## 2019-01-15 PROCEDURE — 99214 PR OFFICE/OUTPT VISIT, EST, LEVL IV, 30-39 MIN: ICD-10-PCS | Mod: HCNC,S$GLB,, | Performed by: PHYSICIAN ASSISTANT

## 2019-01-15 RX ORDER — DICLOFENAC SODIUM 75 MG/1
75 TABLET, DELAYED RELEASE ORAL 2 TIMES DAILY PRN
Qty: 60 TABLET | Refills: 2 | Status: SHIPPED | OUTPATIENT
Start: 2019-01-15 | End: 2019-05-03 | Stop reason: SDUPTHER

## 2019-01-15 NOTE — PROGRESS NOTES
"Subjective:     Patient ID:  Anthony Miguel is a 68 y.o. male.    New    Chief Complaint: Low back pain and bilateral leg numbness    HPI    Anthony Miguel is a 68 y.o. male who presents for follow up.  Patient has PCF with Dr. Wolff on 06/21/2017.  Overall doing well and walking with a walker.  He went to PT for his low back which helped some.  He had injections scheduled but canceled them.    Has chronic low back pain that is worse with sitting too long and with standing.  Has numbness in the bilateral posterior legs to the ankles and in the lateral legs also.  Worse in the left leg than the right leg.  No leg pain.    Takes Neurontin 100mg PRN.  OTC ibuprofen PRN.    Patient denies any recent accidents or trauma, no saddle anesthesias, and no bowel or bladder incontinence.      Review of Systems:  Constitution: Negative for chills, fever, night sweats and weight loss.   Musculoskeletal: Negative for falls.   Gastrointestinal: Negative for bowel incontinence, nausea and vomiting.   Genitourinary: Negative for bladder incontinence.   Neurological: Negative for disturbances in coordination and loss of balance.      Objective:      Vitals:    01/15/19 1100   BP: 128/66   Pulse: 65   Weight: 75.8 kg (167 lb)   Height: 5' 7" (1.702 m)   PainSc:   5   PainLoc: Back         Physical Exam:    General:  Anthony Miguel is well-developed, well-nourished, appears stated age, in no acute distress, alert and oriented to person, place, and time.    Pulmonary/Chest:  Respiratory effort normal  Abdominal: Exhibits no distension  Psychiatric:  Normal mood and affect.  Behavior is normal.  Judgement and thought content normal    Musculoskeletal: (Exam done today in the chair)    Patient arises from a sitting to standing position without difficulty.  Patient walks to the door without evidence of limp, pain, or abnormality of gait. Patient is able to walk on heels and toes without difficulty.    Lumbar ROM:   Pain in " lumbar flexion, extension, right lateral bending, and left lateral bending.    Lumbar Spine Inspection:  Normal with no surgical scars with no visible rashes.    Lumbar Spine Palpation:  No tenderness to low back palpation.    SI Joint Palpation:  No tenderness to SI Joint palpation.    Straight Leg Raise:  Cannot assess    Neurological:  Alert and oriented to person, place, and time    Muscle strength against resistance:     Right Left   Hip flexion  5 / 5 5 / 5   Hip extension 5 / 5 5 / 5   Hip abduction 5 / 5 5 / 5   Hip adduction  5 / 5 5 / 5   Knee extension  5 / 5 5 / 5   Knee flexion 5 / 5 5 / 5   Dorsiflexion  5 / 5 5 / 5   EHL  5 / 5 5 / 5   Plantar flexion  5 / 5 5 / 5   Inversion of the feet 5 / 5 5 / 5   Eversion of the feet  5 / 5 5 / 5     Reflexes:     Right Left   Patellar 3+ 3+   Achilles 2+ 2+     Clonus:  Negative bilaterally  Negative clonus bilaterally    On gross examination of the bilateral upper extremities, patient has full painfree ROM with no signs of clubbing, cyanosis, edema, or weakness.     XRAY Interpretation:     Lumbar spine ap/lateral/flexion/extension xrays were personally reviewed today.  No fractures.  No movement on flexion and extension.  Grade one spondylolisthesis at L4-5.  DDD at L4-5 and L5-S1 worse at L5-S1.    MRI lumbar spine sagittal views personally reviewed from 2017.  Cannot see axials views.    Assessment:          1. Chronic bilateral low back pain without sciatica    2. DDD (degenerative disc disease), lumbar    3. Other spondylosis with radiculopathy, lumbar region    4. Spondylolisthesis, lumbar region            Plan:          Orders Placed This Encounter    Procedure Order to [facility]    MRI Lumbar Spine Without Contrast    diclofenac (VOLTAREN) 75 MG EC tablet       Grade one spondylolisthesis L4-5 with L4-5, L5-S1 DDD/spondylosis    -DC OTC NSAIDs  -Diclofenac PRN with food  -Start taking Neurontin from another provider as prescribed.   100mg TID.  Can go up to 300mg TID if needed.  -MRI lumbar spine  -Will call patient with results  -Will most likely order bilateral L4-5 and L5-S1 facet injections through pain management as the next step    Follow-Up:  Follow-up if symptoms worsen or fail to improve. If there are any questions prior to this, the patient was instructed to contact the office.       YESENIA Lorenzo PA-C  Neurosurgery  [facility]  [facility]      Addendum:  01/25/19      MRI lumbar spine shows arthritis at L4-5 and L5-S1 and severe narrowing around the nerves at L4-5.     I recommend L5-S1 IL KAYLEY through the pain clinic.     Injection ordered.    FU in two months.     YESENIA Lorenzo, NEERAJ  Neurosurgery  [facility]  [facility]

## 2019-01-21 ENCOUNTER — HOSPITAL ENCOUNTER (OUTPATIENT)
Dept: RADIOLOGY | Facility: HOSPITAL | Age: 69
Discharge: HOME | End: 2019-01-21
Attending: PHYSICIAN ASSISTANT
Payer: MEDICARE

## 2019-01-21 DIAGNOSIS — M43.16 SPONDYLOLISTHESIS, LUMBAR REGION: ICD-10-CM

## 2019-01-21 DIAGNOSIS — M47.26 OTHER SPONDYLOSIS WITH RADICULOPATHY, LUMBAR REGION: ICD-10-CM

## 2019-01-21 DIAGNOSIS — M51.36 DDD (DEGENERATIVE DISC DISEASE), LUMBAR: ICD-10-CM

## 2019-01-21 DIAGNOSIS — M54.50 CHRONIC BILATERAL LOW BACK PAIN WITHOUT SCIATICA: ICD-10-CM

## 2019-01-21 DIAGNOSIS — G89.29 CHRONIC BILATERAL LOW BACK PAIN WITHOUT SCIATICA: ICD-10-CM

## 2019-01-21 PROCEDURE — 72148 MRI LUMBAR SPINE W/O DYE: CPT | Mod: TC,HCNC

## 2019-01-21 PROCEDURE — 72148 MRI LUMBAR SPINE W/O DYE: CPT | Mod: 26,HCNC,, | Performed by: RADIOLOGY

## 2019-01-21 PROCEDURE — 72148 MRI LUMBAR SPINE WITHOUT CONTRAST: ICD-10-PCS | Mod: 26,HCNC,, | Performed by: RADIOLOGY

## 2019-01-23 ENCOUNTER — TELEPHONE (OUTPATIENT)
Dept: SPINE | Facility: CLINIC | Age: 69
End: 2019-01-23

## 2019-01-23 NOTE — TELEPHONE ENCOUNTER
Please call patient and let him know that his MRI lumbar spine shows arthritis at L4-5 and L5-S1 and severe narrowing around the nerves at L4-5.    I recommend L5-S1 IL KAYLEY through the pain clinic.    Let me know if he wants that ordered.    Adele Vela, [facility], PA-C  Neurosurgery  [facility]  [facility]

## 2019-01-24 ENCOUNTER — TELEPHONE (OUTPATIENT)
Dept: SPINE | Facility: CLINIC | Age: 69
End: 2019-01-24

## 2019-01-24 NOTE — TELEPHONE ENCOUNTER
----- Message from Xochitl Gonzalez sent at 1/24/2019 12:40 PM CST -----  Contact: pt  Name of Who is Calling: Anthony      What is the request in detail: pt returning missed phone call from yesterday. Pt is asking that you please return his call.       Can the clinic reply by [facility]: no      [facility]: [phone]

## 2019-01-24 NOTE — TELEPHONE ENCOUNTER
Called and spoke with patient.  Informed patient that MRI lumbar spine shows arthritis at L4-5 and L5-S1 and severe narrowing around the nerves at L4-5.     Adele recommends L5-S1 IL KAYLEY through the pain clinic.     Patient would like to proceed with the injection.

## 2019-01-25 ENCOUNTER — TELEPHONE (OUTPATIENT)
Dept: SPINE | Facility: CLINIC | Age: 69
End: 2019-01-25

## 2019-01-25 NOTE — TELEPHONE ENCOUNTER
L5-S1 IL KAYLEY ordered with Dr. Zhao.    Make him a fu in two months.    Adele Vela, YESENIA, PA-C  Neurosurgery  [facility]  [facility]

## 2019-01-25 NOTE — TELEPHONE ENCOUNTER
----- Message from Camelia Ortiz sent at 1/25/2019  3:47 PM CST -----  Pt. Schedule with Dr. Adkins 2/6/19.

## 2019-01-28 ENCOUNTER — OFFICE VISIT (OUTPATIENT)
Dept: PRIMARY CARE CLINIC | Facility: CLINIC | Age: 69
End: 2019-01-28
Payer: MEDICARE

## 2019-01-28 VITALS
WEIGHT: 175.69 LBS | OXYGEN SATURATION: 98 % | BODY MASS INDEX: 27.57 KG/M2 | SYSTOLIC BLOOD PRESSURE: 130 MMHG | DIASTOLIC BLOOD PRESSURE: 80 MMHG | HEIGHT: 67 IN | HEART RATE: 79 BPM

## 2019-01-28 DIAGNOSIS — H90.6 MIXED CONDUCTIVE AND SENSORINEURAL HEARING LOSS OF BOTH EARS: ICD-10-CM

## 2019-01-28 DIAGNOSIS — E55.9 VITAMIN D DEFICIENCY: ICD-10-CM

## 2019-01-28 DIAGNOSIS — G95.9 LUMBAR MYELOPATHY: ICD-10-CM

## 2019-01-28 DIAGNOSIS — G95.9 CERVICAL MYELOPATHY: ICD-10-CM

## 2019-01-28 DIAGNOSIS — D50.0 IRON DEFICIENCY ANEMIA DUE TO CHRONIC BLOOD LOSS: ICD-10-CM

## 2019-01-28 DIAGNOSIS — N40.0 BENIGN NON-NODULAR PROSTATIC HYPERPLASIA WITHOUT LOWER URINARY TRACT SYMPTOMS: ICD-10-CM

## 2019-01-28 DIAGNOSIS — F15.182 CAFFEINE-INDUCED SLEEP DISORDER WITH MILD USE DISORDER, INSOMNIA TYPE: ICD-10-CM

## 2019-01-28 DIAGNOSIS — D69.2 SENILE PURPURA: ICD-10-CM

## 2019-01-28 DIAGNOSIS — Z79.1 NSAID LONG-TERM USE: ICD-10-CM

## 2019-01-28 DIAGNOSIS — M47.812 CERVICAL ARTHRITIS: ICD-10-CM

## 2019-01-28 DIAGNOSIS — E78.5 HYPERLIPIDEMIA, UNSPECIFIED HYPERLIPIDEMIA TYPE: ICD-10-CM

## 2019-01-28 PROCEDURE — 99215 OFFICE O/P EST HI 40 MIN: CPT | Mod: S$GLB,,, | Performed by: INTERNAL MEDICINE

## 2019-01-28 PROCEDURE — 3288F PR FALLS RISK ASSESSMENT DOCUMENTED: ICD-10-PCS | Mod: CPTII,S$GLB,, | Performed by: INTERNAL MEDICINE

## 2019-01-28 PROCEDURE — 1100F PR PT FALLS ASSESS DOC 2+ FALLS/FALL W/INJURY/YR: ICD-10-PCS | Mod: CPTII,S$GLB,, | Performed by: INTERNAL MEDICINE

## 2019-01-28 PROCEDURE — 3288F FALL RISK ASSESSMENT DOCD: CPT | Mod: CPTII,S$GLB,, | Performed by: INTERNAL MEDICINE

## 2019-01-28 PROCEDURE — 1100F PTFALLS ASSESS-DOCD GE2>/YR: CPT | Mod: CPTII,S$GLB,, | Performed by: INTERNAL MEDICINE

## 2019-01-28 PROCEDURE — 99215 PR OFFICE/OUTPT VISIT, EST, LEVL V, 40-54 MIN: ICD-10-PCS | Mod: S$GLB,,, | Performed by: INTERNAL MEDICINE

## 2019-01-28 RX ORDER — FERROUS SULFATE 325(65) MG
325 TABLET, DELAYED RELEASE (ENTERIC COATED) ORAL DAILY
Qty: 90 TABLET | Refills: 3 | Status: SHIPPED | OUTPATIENT
Start: 2019-01-28 | End: 2019-01-28 | Stop reason: SDUPTHER

## 2019-01-28 RX ORDER — FERROUS SULFATE 325(65) MG
325 TABLET, DELAYED RELEASE (ENTERIC COATED) ORAL
Qty: 90 TABLET | Refills: 3 | Status: CANCELLED | OUTPATIENT
Start: 2019-01-28

## 2019-01-28 RX ORDER — FINASTERIDE 5 MG/1
5 TABLET, FILM COATED ORAL DAILY
Qty: 90 TABLET | Refills: 3 | Status: SHIPPED | OUTPATIENT
Start: 2019-01-28 | End: 2019-05-20 | Stop reason: SDUPTHER

## 2019-01-28 RX ORDER — ATORVASTATIN CALCIUM 10 MG/1
10 TABLET, FILM COATED ORAL DAILY
Qty: 90 TABLET | Refills: 3 | Status: SHIPPED | OUTPATIENT
Start: 2019-01-28 | End: 2020-02-11 | Stop reason: SDUPTHER

## 2019-01-28 RX ORDER — GABAPENTIN 100 MG/1
100 CAPSULE ORAL 3 TIMES DAILY PRN
Qty: 270 CAPSULE | Refills: 3 | Status: CANCELLED | OUTPATIENT
Start: 2019-01-28 | End: 2020-01-28

## 2019-01-28 RX ORDER — FERROUS SULFATE 325(65) MG
325 TABLET, DELAYED RELEASE (ENTERIC COATED) ORAL DAILY
Qty: 90 TABLET | Refills: 3 | Status: SHIPPED | OUTPATIENT
Start: 2019-01-28 | End: 2020-02-11 | Stop reason: SDUPTHER

## 2019-01-28 RX ORDER — TRAZODONE HYDROCHLORIDE 50 MG/1
100 TABLET ORAL NIGHTLY PRN
Qty: 180 TABLET | Refills: 3 | Status: SHIPPED | OUTPATIENT
Start: 2019-01-28 | End: 2019-05-30 | Stop reason: SDUPTHER

## 2019-01-28 RX ORDER — ACETAMINOPHEN 500 MG
2000 TABLET ORAL DAILY
Qty: 90 CAPSULE | Refills: 0 | Status: CANCELLED | OUTPATIENT
Start: 2019-01-28

## 2019-01-28 RX ORDER — GABAPENTIN 100 MG/1
200 CAPSULE ORAL 3 TIMES DAILY PRN
Qty: 270 CAPSULE | Refills: 3
Start: 2019-01-28 | End: 2019-06-03 | Stop reason: SDUPTHER

## 2019-01-28 RX ORDER — TAMSULOSIN HYDROCHLORIDE 0.4 MG/1
0.4 CAPSULE ORAL DAILY
Qty: 90 CAPSULE | Refills: 3 | Status: SHIPPED | OUTPATIENT
Start: 2019-01-28 | End: 2019-05-06

## 2019-01-28 RX ORDER — ACETAMINOPHEN 500 MG
2000 TABLET ORAL DAILY
Qty: 90 CAPSULE | Refills: 0 | Status: SHIPPED | OUTPATIENT
Start: 2019-01-28 | End: 2020-02-11 | Stop reason: SDUPTHER

## 2019-01-28 NOTE — ASSESSMENT & PLAN NOTE
Patient with neck pain following cervical fusion surgery  · F/U NS  · Discharged from PT/OT  · F/U Back/Spine  · Plan for KAYLEY

## 2019-01-28 NOTE — ASSESSMENT & PLAN NOTE
Compliant with finasteride, USS of 6 (mild) and good quality of life  · Continue finasteride and tamsulosin  · Monitor PSA  · Avoid water at bedtime

## 2019-01-28 NOTE — PROGRESS NOTES
Primary Care Provider Appointment    Subjective:      Patient ID: nAthony Miguel is a 68 y.o. 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 He is taking mobic 15mg TID (although prescribed BID). He was previously crushing ibuprofen then taking them for pain control. Was warned about GI bleed by pain management. Started on voltaren 75mg EC. He takes gabapentin 100mg BID. Plan for lumbar KAYLEY on 2/6/19, L5-S1 IL KAYLEY ordered with Dr. Zhao.    Seen by Optho on 11/14/19, due for follow-up, already scheduled.    He is now compliant with his BPH meds, LUTS improved but he continues to wake up 2-3 times nightly. HIs PSA is trending down. Patien'ts brother had prostate CA at age 74. Last MARTHA in 2012.    He is taking trazodone 50mg, but he wants to double the dose. He is taking naps during the day which disrupts his sleep at night.    Last audiology exam was in 2016. Was lost to follow-up, wiling to be seen again. 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    He was pill packed for 90-d supply, is compliant with meds. Will run out soon.  Adherence package packed for 90 days using the monthly cards (3 cards)     Each Card:     Tamsulosin 0.4 mg  Atrovastatin 10 mg  Viramin D3 2000 Unit  Finasteride 5 mg  Ferrous Sulfate 325 mg (one tablet)     As needed medication, gave patient bottle   Gabapentin 100 mg  Ferrous Sulfate 325 (instructed patient to take one at noon, and one at evening time with food)     35'                Electronically signed by Diogo Bustamante PharmD at 10/26/2018  2:57 PM      Past Surgical History:   Procedure Laterality Date    LAMINECTOMY-CERVICAL/FUSION-POSTERIOR; C3-6 N/A 6/21/2017    Performed by [facility]ff [facility] at [facility] OR [address] [place] [address]     "Larangoscopy         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   Respiratory: Negative for cough and shortness of breath.    Cardiovascular: Negative for leg swelling.   Gastrointestinal: Negative for constipation and diarrhea.   Genitourinary: Positive for frequency and urgency. Negative for difficulty urinating and hematuria.   Musculoskeletal: Positive for back pain, gait problem and neck pain. Negative for joint swelling.        LE weakness   Psychiatric/Behavioral: Positive for sleep disturbance. Negative for behavioral problems, decreased concentration and dysphoric mood. The patient is nervous/anxious.        Objective:   /80 (BP Location: Left arm, Patient Position: Sitting, BP Method: Medium (Manual))   Pulse 79   Ht 5' 7" (1.702 m)   Wt 79.7 kg (175 lb 11.3 oz)   SpO2 98%   BMI 27.52 kg/m²     Physical Exam   Constitutional: He is oriented to person, place, and time. He appears well-developed and well-nourished.   HENT:   Head: Normocephalic.   Eyes: EOM are normal.   Cardiovascular: Normal rate and regular rhythm.   Pulmonary/Chest: Effort normal.   Musculoskeletal: He exhibits tenderness and deformity.   Ambulating with single-point cane  Increased sensation in hands bilaterally (from last exam)  Cervical vertebrae protruding from low neck   Neurological: He is alert and oriented to person, place, and time. He displays normal reflexes. Coordination normal.   Psychiatric: He has a normal mood and affect. His behavior is normal. Thought content " normal.       Lab Results   Component Value Date    WBC 7.95 10/26/2018    HGB 14.3 10/26/2018    HCT 44.0 10/26/2018     10/26/2018    CHOL 140 05/21/2018    TRIG 79 05/21/2018    HDL 57 05/21/2018    ALT 25 10/26/2018    AST 27 10/26/2018     10/26/2018    K 4.3 10/26/2018     10/26/2018    CREATININE 0.8 10/26/2018    BUN 18 10/26/2018    CO2 27 10/26/2018    TSH 3.029 06/02/2017    PSA 1.1 10/26/2018    INR 1.0 06/21/2017    HGBA1C 5.1 10/26/2018         Assessment:   68 y.o. male with multiple co-morbid illnesses here to continue work-up of chronic issues notably  with neuropathy in UE, HLD, Vit D def     Plan:     Problem List Items Addressed This Visit        Neuro    Cervical myelopathy     Severe cervical stenosis with myelopathy with fusion of C3-6.  · F/U NS   · Continue gabapentin 100mg TID  · Titrate up as neeed  · Increase to 200mg TID  · Will not refill valium   · Follow-up with Back/Spine  · Plan for KAYLEY in lumbar spine  · Did not complete PT/OT          Relevant Medications    gabapentin (NEURONTIN) 100 MG capsule    Lumbar myelopathy     Seen on CT, followed by pain management. 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 ASCVD risk of 7.8%, mod-intensity recommended  · Continue current therapy    · Advised to take every day  · Stop ASA due to NSAID overuse         Relevant Medications    atorvastatin (LIPITOR) 10 MG tablet    Other Relevant Orders    Lipid panel       Renal/    Benign non-nodular prostatic hyperplasia without lower urinary tract symptoms     Compliant with finasteride, USS of 6 (mild) and good quality of life  · Continue finasteride and tamsulosin  · Monitor PSA  · Avoid water at bedtime         Relevant Medications    finasteride (PROSCAR) 5 mg tablet    tamsulosin (FLOMAX) 0.4 mg Cap    Other Relevant Orders    Ambulatory Referral to Urology       Hematology    Senile purpura     Ecchymoses on L UE   · monitor         Relevant Orders    CBC auto differential       Oncology    Iron deficiency anemia due to chronic blood loss    Relevant Medications    ferrous sulfate 325 (65 FE) MG EC tablet       Endocrine    Vitamin D deficiency    Relevant Medications    cholecalciferol, vitamin D3, (VITAMIN D3) 2,000 unit Cap       Orthopedic    Cervical arthritis     Patient with neck pain following cervical fusion surgery  · F/U NS  · Discharged from PT/OT  · F/U Back/Spine  · Plan for KAYLEY            Other    NSAID long-term use     Using voltaren TID (although prescribed BID) for chronic pain  · Monitor labs for GI bleed and JORGE  · Increase gabapentin to 200mg TID         Relevant Orders    CBC auto differential    Comprehensive metabolic panel          Health Maintenance       Date Due Completion Date    TETANUS VACCINE 12/03/1967 ---    Lipid Panel 05/21/2019 5/21/2018- TODAY    Colonoscopy 04/16/2026 4/16/2016 (Done)    Override on 4/16/2016: Done (Repeat 7-10 years - diverticulosis - [facility] Dr. Quiles - [phone])          Follow-up in about 3 months (around 4/28/2019).  One hour spent with this patient today, half of that in counseling.    Jada Spaulding MD/MPH  Internal Medicine  [facility]  [phone]

## 2019-01-28 NOTE — ASSESSMENT & PLAN NOTE
Seen on CT, followed by pain management. Plan for lumbar KAYLEY, does not want to continue outpatient PT  · PT/OT not completed   · Plan for KAYLEY until after PT trial

## 2019-01-28 NOTE — MEDICAL/APP STUDENT
"Subjective:       Patient ID: Anthony Miguel is a 68 y.o. male.    Chief Complaint: Hyperlipidemia; Extremity Weakness (lower extremities); and Follow-up    Had MRI last Monday. Is going to pain management for epidural injection. Worse at L3-4. Was recently prescribed diclofenac, taking medication 3 to 4 times per day. Pain intensity is 8-9 without diclofenac. Pain intensity is 3-4 with diclofenac.     Patient is wondering about his diabetic status. Patient asked if the clinic will take his finger stick blood glucose taken and we discussed finger stick blood glucose vs HbA1c. Showed patient his A1c from 10/18 which was 5.1 I explained the significance of his A1.  Patient is also worried about the status of his kidneys because his brother has kidney disease and the patient does not want to ever have dialysis. Showed patient's creatinine from 10/18 which was 5.1     Patient is not getting good rest. Does not take trazodone. Feels like he needs trazodone to fall asleep. Is wondering if he can increase dose.     Concerned about bruise over the dorsal aspect of the left hand that "comes and goes."        Review of Systems   Constitutional: Negative for appetite change, chills, diaphoresis, fever and unexpected weight change.   HENT: Positive for congestion.    Respiratory: Positive for shortness of breath. Negative for cough.    Gastrointestinal: Positive for abdominal pain.   Skin: Positive for color change.   Neurological: Negative for dizziness, light-headedness and headaches.   Psychiatric/Behavioral: Negative.        Objective:      Physical Exam   Constitutional: He is oriented to person, place, and time. He appears well-developed and well-nourished.   Cardiovascular: Normal rate, regular rhythm, normal heart sounds and intact distal pulses. Exam reveals no gallop and no friction rub.   No murmur heard.  Pulmonary/Chest: Effort normal and breath sounds normal.   Abdominal: Soft. Bowel sounds are normal. "   Neurological: He is alert and oriented to person, place, and time.   Skin: Skin is warm and dry. Capillary refill takes less than 2 seconds. Purpura: senile purpura dorsal aspect of left hand.        Psychiatric: He has a normal mood and affect.       Assessment:       68 y.o. M with a PMHx of   Plan:       Lumbar myelopathy  -Continue diclofenac, gabapentin  -f/u with spine service    HLD  -continue atorvastatin    Anemia  -continue ferrous sulfate    Insomnia  -continue trazodone

## 2019-01-28 NOTE — ASSESSMENT & PLAN NOTE
Using voltaren TID (although prescribed BID) for chronic pain  · Monitor labs for GI bleed and JORGE  · Increase gabapentin to 200mg TID

## 2019-01-28 NOTE — ASSESSMENT & PLAN NOTE
Takes atorvastatin 10mg every 3 days. ASCVD risk of 7.8%, mod-intensity recommended  · Continue current therapy   · Advised to take every day  · Stop ASA due to NSAID overuse

## 2019-01-28 NOTE — PATIENT INSTRUCTIONS
TODAY:  - order tylenol 500mg from [employer] [facility]   + start tylenol for pain  - only take voltaren (no other NSAIDs, like ibuprofen, aleve, aspirin)  - try to avoid naps in order to sleep better at night  - increase gabapentin to 200mg three times daily for pain  - pill packs for 1 month  - Urology appointment

## 2019-01-28 NOTE — ASSESSMENT & PLAN NOTE
Severe cervical stenosis with myelopathy with fusion of C3-6.  · F/U NS   · Continue gabapentin 100mg TID  · Titrate up as neeed  · Increase to 200mg TID  · Will not refill valium   · Follow-up with Back/Spine  · Plan for KAYLEY in lumbar spine  · Did not complete PT/OT

## 2019-01-29 NOTE — TELEPHONE ENCOUNTER
Please let patietn know that his labs were normal, and cholesterol better controlled.    Good job!    Thanks,  KJ

## 2019-02-06 ENCOUNTER — HOSPITAL ENCOUNTER (OUTPATIENT)
Facility: OTHER | Age: 69
Discharge: HOME | End: 2019-02-06
Attending: ANESTHESIOLOGY | Admitting: ANESTHESIOLOGY
Payer: MEDICARE

## 2019-02-06 VITALS
HEIGHT: 67 IN | DIASTOLIC BLOOD PRESSURE: 76 MMHG | HEART RATE: 60 BPM | TEMPERATURE: 98 F | OXYGEN SATURATION: 100 % | BODY MASS INDEX: 27.94 KG/M2 | RESPIRATION RATE: 18 BRPM | SYSTOLIC BLOOD PRESSURE: 127 MMHG | WEIGHT: 178 LBS

## 2019-02-06 DIAGNOSIS — M54.16 LUMBAR RADICULOPATHY: Primary | ICD-10-CM

## 2019-02-06 DIAGNOSIS — M51.36 DDD (DEGENERATIVE DISC DISEASE), LUMBAR: ICD-10-CM

## 2019-02-06 PROCEDURE — 62323 NJX INTERLAMINAR LMBR/SAC: CPT | Mod: HCNC,,, | Performed by: ANESTHESIOLOGY

## 2019-02-06 PROCEDURE — 25500020 PHARM REV CODE 255: Mod: HCNC | Performed by: ANESTHESIOLOGY

## 2019-02-06 PROCEDURE — 25000003 PHARM REV CODE 250: Mod: HCNC | Performed by: PHYSICAL MEDICINE & REHABILITATION

## 2019-02-06 PROCEDURE — 63600175 PHARM REV CODE 636 W HCPCS: Mod: HCNC | Performed by: ANESTHESIOLOGY

## 2019-02-06 PROCEDURE — 62323 PR INJ LUMBAR/SACRAL, W/IMAGING GUIDANCE: ICD-10-PCS | Mod: HCNC,,, | Performed by: ANESTHESIOLOGY

## 2019-02-06 PROCEDURE — 62323 NJX INTERLAMINAR LMBR/SAC: CPT | Mod: HCNC | Performed by: ANESTHESIOLOGY

## 2019-02-06 PROCEDURE — 25000003 PHARM REV CODE 250: Mod: HCNC | Performed by: ANESTHESIOLOGY

## 2019-02-06 RX ORDER — SODIUM CHLORIDE 9 MG/ML
500 INJECTION, SOLUTION INTRAVENOUS CONTINUOUS
Status: DISCONTINUED | OUTPATIENT
Start: 2019-02-06 | End: 2019-02-06 | Stop reason: HOSPADM

## 2019-02-06 RX ORDER — DEXAMETHASONE SODIUM PHOSPHATE 4 MG/ML
INJECTION, SOLUTION INTRA-ARTICULAR; INTRALESIONAL; INTRAMUSCULAR; INTRAVENOUS; SOFT TISSUE
Status: DISCONTINUED | OUTPATIENT
Start: 2019-02-06 | End: 2019-02-06 | Stop reason: HOSPADM

## 2019-02-06 RX ORDER — LIDOCAINE HYDROCHLORIDE 10 MG/ML
INJECTION INFILTRATION; PERINEURAL
Status: DISCONTINUED | OUTPATIENT
Start: 2019-02-06 | End: 2019-02-06 | Stop reason: HOSPADM

## 2019-02-06 RX ORDER — LIDOCAINE HYDROCHLORIDE 5 MG/ML
INJECTION, SOLUTION INFILTRATION; INTRAVENOUS
Status: DISCONTINUED | OUTPATIENT
Start: 2019-02-06 | End: 2019-02-06 | Stop reason: HOSPADM

## 2019-02-06 RX ORDER — MIDAZOLAM HYDROCHLORIDE 1 MG/ML
INJECTION INTRAMUSCULAR; INTRAVENOUS
Status: DISCONTINUED | OUTPATIENT
Start: 2019-02-06 | End: 2019-02-06 | Stop reason: HOSPADM

## 2019-02-06 RX ADMIN — SODIUM CHLORIDE 500 ML: 0.9 INJECTION, SOLUTION INTRAVENOUS at 02:02

## 2019-02-06 NOTE — H&P
"HPI  Anthony Miguel is a 68 y.o. male DDD worst at L5/S1 presents today for KAYLEY.           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    Larangoscopy       Review of patient's allergies indicates:  No Known Allergies     PMHx, PSHx, Allergies, Medications reviewed in epic      ROS negative except pain complaints in HPI    OBJECTIVE:    /72   Pulse 67   Temp 98.1 °F (36.7 °C) (Oral)   Resp 18   Ht 5' 7" (1.702 m)   Wt 80.7 kg (178 lb)   SpO2 99%   BMI 27.88 kg/m²     PHYSICAL EXAMINATION:    GENERAL: Well appearing, in no acute distress, alert and oriented x3.  PSYCH:  Mood and affect appropriate.  SKIN: Skin color, texture, turgor normal, no rashes or lesions.  CV: RRR with palpation of the radial artery.  PULM: No evidence of respiratory difficulty, symmetric chest rise. Clear to auscultation.  NEURO: Cranial nerves grossly intact.    Plan:    Proceed with procedure as planned    Vy Gong  02/06/2019  PGY3  "

## 2019-02-06 NOTE — DISCHARGE SUMMARY
Discharge Note  Short Stay      SUMMARY     Admit Date: 2/6/2019    Attending Physician: Marcel Adkins      Discharge Physician: Marcel Adkins      Discharge Date: 2/6/2019 3:24 PM    Procedure(s) (LRB):  INJECTION, STEROID, EPIDURAL, L45-S1 IL (N/A)    Final Diagnosis: DDD (degenerative disc disease), lumbar [M51.36]  Spondylolisthesis of lumbar region [M43.16]  Chronic bilateral low back pain without sciatica [M54.5, G89.29]    Disposition: Home or self care    Patient Instructions:   Current Discharge Medication List      CONTINUE these medications which have NOT CHANGED    Details   atorvastatin (LIPITOR) 10 MG tablet Take 1 tablet (10 mg total) by mouth once daily.  Qty: 90 tablet, Refills: 3    Associated Diagnoses: Hyperlipidemia, unspecified hyperlipidemia type      cholecalciferol, vitamin D3, (VITAMIN D3) 2,000 unit Cap Take 1 capsule (2,000 Units total) by mouth once daily.  Qty: 90 capsule, Refills: 0    Associated Diagnoses: Vitamin D deficiency      diclofenac (VOLTAREN) 75 MG EC tablet Take 1 tablet (75 mg total) by mouth 2 (two) times daily as needed.  Qty: 60 tablet, Refills: 2      ferrous sulfate 325 (65 FE) MG EC tablet Take 1 tablet (325 mg total) by mouth once daily.  Qty: 90 tablet, Refills: 3    Associated Diagnoses: Iron deficiency anemia due to chronic blood loss      finasteride (PROSCAR) 5 mg tablet Take 1 tablet (5 mg total) by mouth once daily.  Qty: 90 tablet, Refills: 3    Associated Diagnoses: Benign non-nodular prostatic hyperplasia without lower urinary tract symptoms      gabapentin (NEURONTIN) 100 MG capsule Take 2 capsules (200 mg total) by mouth 3 (three) times daily as needed.  Qty: 270 capsule, Refills: 3    Associated Diagnoses: Cervical myelopathy; Lumbar myelopathy      tamsulosin (FLOMAX) 0.4 mg Cap Take 1 capsule (0.4 mg total) by mouth once daily.  Qty: 90 capsule, Refills: 3    Associated Diagnoses: Benign non-nodular prostatic hyperplasia without lower urinary  tract symptoms      traZODone (DESYREL) 50 MG tablet Take 2 tablets (100 mg total) by mouth nightly as needed for Insomnia.  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      walker (ULTRA-LIGHT ROLLATOR) Misc 1 Units by Misc.(Non-Drug; Combo Route) route once daily at 6am.  Qty: 1 each, Refills: 0    Associated Diagnoses: Weakness                 Discharge Diagnosis: DDD (degenerative disc disease), lumbar [M51.36]  Spondylolisthesis of lumbar region [M43.16]  Chronic bilateral low back pain without sciatica [M54.5, G89.29]  Condition on Discharge: Stable with no complications to procedure   Diet on Discharge: Same as before.  Activity: as per instruction sheet.  Discharge to: Home with a responsible adult.  Follow up: 2-4 weeks

## 2019-02-06 NOTE — DISCHARGE INSTRUCTIONS
Thank you for allowing us to care for you today. You may receive a survey about the care we provided. Your feedback is valuable and helps us provide excellent care throughout the community.     Home Care Instructions for Pain Management:    1. DIET:   You may resume your normal diet today.   2. BATHING:   You may shower with luke warm water. No tub baths or anything that will soak injection sites under water for the next 24 hours.  3. DRESSING:   You may remove your bandage today.   4. ACTIVITY LEVEL:   You may resume your normal activities 24 hrs after your procedure. Nothing strenuous today.  5. MEDICATIONS:   You may resume your normal medications today. To restart blood thinners, ask your doctor.  6. DRIVING    If you have received any sedatives by mouth today, you may not drive for 12 hours.    If you have received any sedation through your IV, you may not drive for 24 hrs.   7. SPECIAL INSTRUCTIONS:   No heat to the injection site for 24 hrs including, hot bath or shower, heating pad, moist heat, or hot tubs.    Use ice pack to injection site for any pain or discomfort.  Apply ice packs for 20 minute intervals as needed.    IF you have diabetes, be sure to monitor your blood sugar more closely. IF your injection contained steroids your blood sugar levels may become higher than normal.    If you are still having pain upon discharge:  Your pain may improve over the next 48 hours. The anesthetic (numbing medication) works immediately to 48 hours. IF your injection contained a steroid (anti-inflammatory medication), it takes approximately 3 days to start feeling relief and 7-10 days to see your greatest results from the medication. It is possible you may need subsequent injections. This would be discussed at your follow up appointment with pain management or your referring doctor.      PLEASE CALL YOUR DOCTOR IF:  1. Redness or swelling around the injection site.  2. Fever of 101 degrees or more  3. Drainage  (pus) from the injection site.  4. For any continuous bleeding (some dried blood over the incision is normal.)    FOR EMERGENCIES:   If any unusual problems or difficulties occur during clinic hours, call [phone] or 613. Adult Procedural Sedation Instructions    Recovery After Procedural Sedation (Adult)  You have been given medicine by vein to make you sleep during your surgery. This may have included both a pain medicine and sleeping medicine. Most of the effects have worn off. But you may still have some drowsiness for the next 6 to 8 hours.  Home care  Follow these guidelines when you get home:  · For the next 8 hours, you should be watched by a responsible adult. This person should make sure your condition is not getting worse.  · Don't drink any alcohol for the next 24 hours.  · Don't drive, operate dangerous machinery, or make important business or personal decisions during the next 24 hours.  Note: Your healthcare provider may tell you not to take any medicine by mouth for pain or sleep in the next 4 hours. These medicines may react with the medicines you were given in the hospital. This could cause a much stronger response than usual.  Follow-up care  Follow up with your healthcare provider if you are not alert and back to your usual level of activity within 12 hours.  When to seek medical advice  Call your healthcare provider right away if any of these occur:  · Drowsiness gets worse  · Weakness or dizziness gets worse  · Repeated vomiting  · You can't be awakened   Date Last Reviewed: 10/18/2016  © 3032-5780 [employer]. [address] [place] [address], [place] [address]. All rights reserved. This information is not intended as a substitute for professional medical care. Always follow your healthcare professional's instructions.

## 2019-02-06 NOTE — OP NOTE
"Patient Name: Atnhony Miguel  MRN: [id number]    INFORMED CONSENT: The procedure, risks, benefits and options were discussed with patient. There are no contraindications to the procedure. The patient expressed understanding and agreed to proceed. The personnel performing the procedure was discussed. I verify that I personally obtained Anthony's consent prior to the start of the procedure and the signed consent can be found on the patient's chart.    Procedure Date: 02/06/2019    Anesthesia: Topical    Pre Procedure diagnosis: DDD (degenerative disc disease), lumbar [M51.36]  Spondylolisthesis of lumbar region [M43.16]  Chronic bilateral low back pain without sciatica [M54.5, G89.29]  1. Lumbar radiculopathy    2. DDD (degenerative disc disease), lumbar      Post-Procedure diagnosis: SAME      Moderate Sedation: None    PROCEDURE: L5-S1 INTERLAMINAR EPIDURAL STEROID INJECTION - LUMBAR      DESCRIPTION OF PROCEDURE: The patient was brought to the procedure room.  After performing time out IV access was obtained prior to the procedure. The patient was positioned prone on the fluoroscopy table. Continuous hemodynamic monitoring was initiated including blood pressure, EKG, and pulse oximetry.   The area of the spine was prepped with chlorhexidine three times and draped in a sterile fashion.  Skin anesthesia was achieved using 3 mL of lidocaine 1% over the respective injection site. The L5-S1 interspace was visualized under fluoroscopic imaging. An 18 gauge 3 1/2" Tuohy needle was slowly inserted and advanced using loss of resistance to saline with AP and lateral fluoroscopic imaging for needle guidance. Negative aspiration for blood or CSF was confirmed. Epidural contrast spread was confirmed using 2mL of Omnipaque 300 contrast spreading in the lumbar epidural space.6 mL of Lidocaine 0.5% and 10 mg decadron was injected. The needle was removed and bleeding was nil.  A sterile dressing was applied. Anthony was taken back " to the recovery room for further observation.     Blood Loss: Nill  Specimen: None      Marcel Adkins MD

## 2019-02-18 ENCOUNTER — TELEPHONE (OUTPATIENT)
Dept: OPTOMETRY | Facility: CLINIC | Age: 69
End: 2019-02-18

## 2019-02-18 NOTE — TELEPHONE ENCOUNTER
----- Message from Senia De Leon sent at 2/18/2019 10:09 AM CST -----  Contact: Anthony  Patient Requesting Sooner Appointment.     Reason for sooner appt.:Pt is rescheduling appts for hvf/ep.  When is the first available appointment?Late March  Communication Preference:[phone]  Additional Information:appt times times are too gapped out.

## 2019-02-18 NOTE — TELEPHONE ENCOUNTER
Calling pt to reschedule HVF and follow up appointment with Dr. Sultana per pt's request. First available appointment isn't until 3/14/19 at 1:00 for HVF and 2:30 for follow up visit. Will try contacting patient again later.

## 2019-02-20 ENCOUNTER — CLINICAL SUPPORT (OUTPATIENT)
Dept: OPHTHALMOLOGY | Facility: CLINIC | Age: 69
End: 2019-02-20
Attending: OPTOMETRIST
Payer: MEDICARE

## 2019-02-20 ENCOUNTER — OFFICE VISIT (OUTPATIENT)
Dept: OPTOMETRY | Facility: CLINIC | Age: 69
End: 2019-02-20
Payer: MEDICARE

## 2019-02-20 DIAGNOSIS — H40.013 OAG (OPEN ANGLE GLAUCOMA) SUSPECT, LOW RISK, BILATERAL: ICD-10-CM

## 2019-02-20 DIAGNOSIS — H40.013 OAG (OPEN ANGLE GLAUCOMA) SUSPECT, LOW RISK, BILATERAL: Primary | ICD-10-CM

## 2019-02-20 PROCEDURE — 99999 PR PBB SHADOW E&M-EST. PATIENT-LVL II: CPT | Mod: PBBFAC,HCNC,, | Performed by: OPTOMETRIST

## 2019-02-20 PROCEDURE — 92083 HUMPHREY VISUAL FIELD - OU - BOTH EYES: ICD-10-PCS | Mod: HCNC,S$GLB,, | Performed by: OPTOMETRIST

## 2019-02-20 PROCEDURE — 92012 PR EYE EXAM, EST PATIENT,INTERMED: ICD-10-PCS | Mod: HCNC,S$GLB,, | Performed by: OPTOMETRIST

## 2019-02-20 PROCEDURE — 92012 INTRM OPH EXAM EST PATIENT: CPT | Mod: HCNC,S$GLB,, | Performed by: OPTOMETRIST

## 2019-02-20 PROCEDURE — 99999 PR PBB SHADOW E&M-EST. PATIENT-LVL II: ICD-10-PCS | Mod: PBBFAC,HCNC,, | Performed by: OPTOMETRIST

## 2019-02-20 PROCEDURE — 92083 EXTENDED VISUAL FIELD XM: CPT | Mod: HCNC,S$GLB,, | Performed by: OPTOMETRIST

## 2019-02-20 NOTE — PROGRESS NOTES
HPI     Pt here for a glaucoma work up. Pt denies any eye complaints today.    Last edited by Jose Mcgee on 2/20/2019  9:48 AM. (History)        ROS     Negative for: Constitutional, Gastrointestinal, Neurological, Skin,   Genitourinary, Musculoskeletal, HENT, Endocrine, Cardiovascular, Eyes,   Respiratory, Psychiatric, Allergic/Imm, Heme/Lymph    Last edited by Jennifer Sultana, OD on 2/20/2019 10:16 AM. (History)        Assessment /Plan     For exam results, see Encounter Report.    OAG (open angle glaucoma) suspect, low risk, bilateral       (-) FHx. IOP 15 OD, OS. Last IOP at 11 OD, 13 OS. Pachys are thick 554 (-1) OD, 579 (-2) OS. C/d 0.8 OD, OS w/thin rim OU. (-) FHx. (-) HTN. (-) DM. Prev POAG OU suspect at [facility].  11/14/18 OCT OD WNL, OS Borderline TS  11/14/18 HVF OD sup arcuate (due to lid?), OS sup arcuate (due to lid?), early inf arcuate? Doesn't correlate w/OCT.   02/20/19 HVF OD General reduction of sensitivity, WnL. OS ONL- scattered defects but nonspecific. Perhaps early sup temp defect.   Educated patient on findings today. No tx at this time. Would like monitor OS.    RTC 9 mo routine.

## 2019-03-11 ENCOUNTER — OFFICE VISIT (OUTPATIENT)
Dept: UROLOGY | Facility: CLINIC | Age: 69
End: 2019-03-11
Payer: MEDICARE

## 2019-03-11 VITALS
HEART RATE: 67 BPM | HEIGHT: 67 IN | WEIGHT: 172 LBS | TEMPERATURE: 98 F | SYSTOLIC BLOOD PRESSURE: 148 MMHG | DIASTOLIC BLOOD PRESSURE: 88 MMHG | BODY MASS INDEX: 27 KG/M2

## 2019-03-11 DIAGNOSIS — N40.2 PROSTATE NODULE: ICD-10-CM

## 2019-03-11 DIAGNOSIS — N40.1 BENIGN NON-NODULAR PROSTATIC HYPERPLASIA WITH LOWER URINARY TRACT SYMPTOMS: Primary | ICD-10-CM

## 2019-03-11 PROCEDURE — 1101F PR PT FALLS ASSESS DOC 0-1 FALLS W/OUT INJ PAST YR: ICD-10-PCS | Mod: HCNC,CPTII,S$GLB, | Performed by: UROLOGY

## 2019-03-11 PROCEDURE — 99204 OFFICE O/P NEW MOD 45 MIN: CPT | Mod: HCNC,S$GLB,, | Performed by: UROLOGY

## 2019-03-11 PROCEDURE — 99204 PR OFFICE/OUTPT VISIT, NEW, LEVL IV, 45-59 MIN: ICD-10-PCS | Mod: HCNC,S$GLB,, | Performed by: UROLOGY

## 2019-03-11 PROCEDURE — 99999 PR PBB SHADOW E&M-EST. PATIENT-LVL III: ICD-10-PCS | Mod: PBBFAC,HCNC,, | Performed by: UROLOGY

## 2019-03-11 PROCEDURE — 99999 PR PBB SHADOW E&M-EST. PATIENT-LVL III: CPT | Mod: PBBFAC,HCNC,, | Performed by: UROLOGY

## 2019-03-11 PROCEDURE — 1101F PT FALLS ASSESS-DOCD LE1/YR: CPT | Mod: HCNC,CPTII,S$GLB, | Performed by: UROLOGY

## 2019-03-11 RX ORDER — CIPROFLOXACIN 500 MG/1
TABLET ORAL
Qty: 4 TABLET | Refills: 0 | Status: SHIPPED | OUTPATIENT
Start: 2019-03-11 | End: 2019-05-10

## 2019-03-11 NOTE — PROGRESS NOTES
Subjective:      Patient ID: Anthony Miguel is a 68 y.o. male.    Chief Complaint: Benign Prostatic Hypertrophy; Urinary Frequency; and Nocturia    Patient is a 68 y.o. male who is new to our clinic and referred by their PCP, Dr. Spaulding for evaluation of LUTs.     HPI  Benign Prostatic Hypertrophy  Patient complains of lower urinary tract symptoms. He reports frequency, incomplete emptying, intermittency, nocturia four times a night, straining, urgency and weak stream. He denies hematuria. Patient states symptoms are of severe severity. Onset of symptoms was a few years ago and was gradual in onset but has worsened in the last month.  He is currently getting epidural treatments for degenerative spine disease---these started ~ 1 month ago. His AUA Symptom Score is, 23/4 . He has no personal history and a family history of prostate cancer. He reports a history of no complicating symptoms. He denies flank pain, gross hematuria, kidney stones and recurrent UTI.  He is on finasteride and tamsulosin.  Has a half-brother who was diagnosed with prostate cancer at age 88.     IPSS Questionnaire (AUA-7):  Over the past month    1)  How often have you had a sensation of not emptying your bladder completely after you finish urinating?  3 - About half the time   2)  How often have you had to urinate again less than two hours after you finished urinating? 3 - About half the time   3)  How often have you found you stopped and started again several times when you urinated?  3 - About half the time   4) How difficult have you found it to postpone urination?  4 - More than half the time   5) How often have you had a weak urinary stream?  3 - About half the time   6) How often have you had to push or strain to begin urination?  3 - About half the time   7) How many times did you most typically get up to urinate from the time you went to bed until the time you got up in the morning?  4 - 4 times   Total score:  0-7 mildly  symptomatic    8-19 moderately symptomatic    20-35 severely symptomatic         Lab Results   Component Value Date    PSA 1.1 10/26/2018    PSA 2.0 09/12/2017         Review of Systems   All other systems reviewed and negative except pertinent positives noted in HPI.    Objective:     Physical Exam   Nursing note and vitals reviewed.  Constitutional: He is oriented to person, place, and time. Vital signs are normal. He appears well-developed and well-nourished. He is cooperative.   HENT:   Head: Normocephalic.   Neck: No tracheal deviation present.   Cardiovascular: Normal rate and intact distal pulses.    Pulmonary/Chest: Effort normal. No accessory muscle usage. No tachypnea. No respiratory distress.   Abdominal: Soft. Normal appearance. He exhibits no distension, no fluid wave, no ascites and no mass. There is no tenderness. There is no rebound and no CVA tenderness. No hernia. Hernia confirmed negative in the right inguinal area and confirmed negative in the left inguinal area.   Liver/spleen non-palpable.   Genitourinary: Prostate normal, testes normal and penis normal. Rectal exam shows no external hemorrhoid, no mass, no tenderness and anal tone normal. Prostate is not tender. Right testis shows no mass and no tenderness. Right testis is descended. Left testis shows no mass and no tenderness. Left testis is descended. Circumcised.   Genitourinary Comments: Scrotum showed no rashes or lesions.  Anus/perineum without lesion.  Epididymis showed no masses or tenderness. No meatal stenosis, meatus in normal location. No penile discharge/plaques. Prostate smooth, ++right sided nodule, 30 grams.  Seminal vesicles non-palpable.  Normal sphincter tone.        Musculoskeletal: Normal range of motion.   Lymphadenopathy: No inguinal adenopathy noted on the right or left side.        Right: No inguinal adenopathy present.        Left: No inguinal adenopathy present.   Neurological: He is alert and oriented to person,  place, and time. He has normal strength.   Skin: No bruising and no rash noted.     Psychiatric: He has a normal mood and affect. His speech is normal and behavior is normal. Thought content normal.     Assessment:     1. Benign non-nodular prostatic hyperplasia with lower urinary tract symptoms    2. Prostate nodule      Plan:     1. Benign non-nodular prostatic hyperplasia with lower urinary tract symptoms    2. Prostate nodule        Orders Placed This Encounter   Procedures    Transrectal Ultrasound w/ Biopsy     Standing Status:   Future     Standing Expiration Date:   3/10/2020     1. BPH:  -A post void residual bladder scan was performed immediately after voiding. The patient's PVR was noted to be 36cc.  -Avoid bladder irritants including but not limited to caffeine, alcohol, smoking, spicy foods, acidic foods, tomato-based products, citrus, artificial sweeteners, chocolate, coffee or tea.  -urine dip: pH 7, Leukocytes neg, nitrite neg, blood neg  -prostate meds: flomax and finasteride; continue     -refer to Dr. Sow for possible SUDS/cystoscopy after prostate biopsy is done. .      2. Prostate nodule:  -plan for TRUS biopsy  -instructions given.

## 2019-03-11 NOTE — H&P (VIEW-ONLY)
Subjective:      Patient ID: Atnhony Miguel is a 68 y.o. male.    Chief Complaint: Benign Prostatic Hypertrophy; Urinary Frequency; and Nocturia    Patient is a 68 y.o. male who is new to our clinic and referred by their PCP, Dr. Spaulding for evaluation of LUTs.     HPI  Benign Prostatic Hypertrophy  Patient complains of lower urinary tract symptoms. He reports frequency, incomplete emptying, intermittency, nocturia four times a night, straining, urgency and weak stream. He denies hematuria. Patient states symptoms are of severe severity. Onset of symptoms was a few years ago and was gradual in onset but has worsened in the last month.  He is currently getting epidural treatments for degenerative spine disease---these started ~ 1 month ago. His AUA Symptom Score is, 23/4 . He has no personal history and a family history of prostate cancer. He reports a history of no complicating symptoms. He denies flank pain, gross hematuria, kidney stones and recurrent UTI.  He is on finasteride and tamsulosin.  Has a half-brother who was diagnosed with prostate cancer at age 88.     IPSS Questionnaire (AUA-7):  Over the past month    1)  How often have you had a sensation of not emptying your bladder completely after you finish urinating?  3 - About half the time   2)  How often have you had to urinate again less than two hours after you finished urinating? 3 - About half the time   3)  How often have you found you stopped and started again several times when you urinated?  3 - About half the time   4) How difficult have you found it to postpone urination?  4 - More than half the time   5) How often have you had a weak urinary stream?  3 - About half the time   6) How often have you had to push or strain to begin urination?  3 - About half the time   7) How many times did you most typically get up to urinate from the time you went to bed until the time you got up in the morning?  4 - 4 times   Total score:  0-7 mildly  symptomatic    8-19 moderately symptomatic    20-35 severely symptomatic         Lab Results   Component Value Date    PSA 1.1 10/26/2018    PSA 2.0 09/12/2017         Review of Systems   All other systems reviewed and negative except pertinent positives noted in HPI.    Objective:     Physical Exam   Nursing note and vitals reviewed.  Constitutional: He is oriented to person, place, and time. Vital signs are normal. He appears well-developed and well-nourished. He is cooperative.   HENT:   Head: Normocephalic.   Neck: No tracheal deviation present.   Cardiovascular: Normal rate and intact distal pulses.    Pulmonary/Chest: Effort normal. No accessory muscle usage. No tachypnea. No respiratory distress.   Abdominal: Soft. Normal appearance. He exhibits no distension, no fluid wave, no ascites and no mass. There is no tenderness. There is no rebound and no CVA tenderness. No hernia. Hernia confirmed negative in the right inguinal area and confirmed negative in the left inguinal area.   Liver/spleen non-palpable.   Genitourinary: Prostate normal, testes normal and penis normal. Rectal exam shows no external hemorrhoid, no mass, no tenderness and anal tone normal. Prostate is not tender. Right testis shows no mass and no tenderness. Right testis is descended. Left testis shows no mass and no tenderness. Left testis is descended. Circumcised.   Genitourinary Comments: Scrotum showed no rashes or lesions.  Anus/perineum without lesion.  Epididymis showed no masses or tenderness. No meatal stenosis, meatus in normal location. No penile discharge/plaques. Prostate smooth, ++right sided nodule, 30 grams.  Seminal vesicles non-palpable.  Normal sphincter tone.        Musculoskeletal: Normal range of motion.   Lymphadenopathy: No inguinal adenopathy noted on the right or left side.        Right: No inguinal adenopathy present.        Left: No inguinal adenopathy present.   Neurological: He is alert and oriented to person,  place, and time. He has normal strength.   Skin: No bruising and no rash noted.     Psychiatric: He has a normal mood and affect. His speech is normal and behavior is normal. Thought content normal.     Assessment:     1. Benign non-nodular prostatic hyperplasia with lower urinary tract symptoms    2. Prostate nodule      Plan:     1. Benign non-nodular prostatic hyperplasia with lower urinary tract symptoms    2. Prostate nodule        Orders Placed This Encounter   Procedures    Transrectal Ultrasound w/ Biopsy     Standing Status:   Future     Standing Expiration Date:   3/10/2020     1. BPH:  -A post void residual bladder scan was performed immediately after voiding. The patient's PVR was noted to be 36cc.  -Avoid bladder irritants including but not limited to caffeine, alcohol, smoking, spicy foods, acidic foods, tomato-based products, citrus, artificial sweeteners, chocolate, coffee or tea.  -urine dip: pH 7, Leukocytes neg, nitrite neg, blood neg  -prostate meds: flomax and finasteride; continue     -refer to Dr. Sow for possible SUDS/cystoscopy after prostate biopsy is done. .      2. Prostate nodule:  -plan for TRUS biopsy  -instructions given.

## 2019-03-11 NOTE — LETTER
March 11, 2019      Jada Spaulding MD  [address]  [facility] [address]           [facility]  [address]  [facility] [address]  Phone: [phone]          Patient: Anthony Miguel   MR Number: [id number]   YOB: 1950   Date of Visit: 3/11/2019       Dear Dr. Jada Spaulding:    Thank you for referring Anthony Miguel to me for evaluation. Attached you will find relevant portions of my assessment and plan of care.    If you have questions, please do not hesitate to call me. I look forward to following Anthony Miguel along with you.    Sincerely,    Sergio Dixon MD    Enclosure  CC:  No Recipients    If you would like to receive this communication electronically, please contact [email or web address] or [phone] to request more information on [employer] Link access.    For providers and/or their staff who would like to refer a patient to [facility], please contact us through our one-stop-shop provider referral line, [facility], at [phone].    If you feel you have received this communication in error or would no longer like to receive these types of communications, please e-mail [email or web address]

## 2019-03-19 ENCOUNTER — HOSPITAL ENCOUNTER (OUTPATIENT)
Dept: UROLOGY | Facility: HOSPITAL | Age: 69
Discharge: HOME | End: 2019-03-19
Attending: UROLOGY
Payer: MEDICARE

## 2019-03-19 VITALS
SYSTOLIC BLOOD PRESSURE: 133 MMHG | WEIGHT: 177.94 LBS | DIASTOLIC BLOOD PRESSURE: 78 MMHG | HEIGHT: 67 IN | HEART RATE: 72 BPM | BODY MASS INDEX: 27.93 KG/M2 | RESPIRATION RATE: 18 BRPM | TEMPERATURE: 98 F

## 2019-03-19 DIAGNOSIS — N40.2 PROSTATE NODULE: ICD-10-CM

## 2019-03-19 PROCEDURE — 55700 PR BIOPSY OF PROSTATE,NEEDLE/PUNCH: CPT | Mod: HCNC,,, | Performed by: UROLOGY

## 2019-03-19 PROCEDURE — 55700 PR BIOPSY OF PROSTATE,NEEDLE/PUNCH: ICD-10-PCS | Mod: HCNC,,, | Performed by: UROLOGY

## 2019-03-19 PROCEDURE — 76872 US TRANSRECTAL: CPT | Mod: 26,HCNC,, | Performed by: UROLOGY

## 2019-03-19 PROCEDURE — 76942 PR U/S GUIDANCE FOR NEEDLE GUIDANCE: ICD-10-PCS | Mod: 26,HCNC,59, | Performed by: UROLOGY

## 2019-03-19 PROCEDURE — 55700 HC BIOPSY OF PROSTATE - NEEDLE OR PUNCH: CPT | Mod: HCNC

## 2019-03-19 PROCEDURE — 76942 ECHO GUIDE FOR BIOPSY: CPT | Mod: HCNC,59

## 2019-03-19 PROCEDURE — 88305 TISSUE SPECIMEN TO PATHOLOGY, UROLOGY: ICD-10-PCS | Mod: 26,HCNC,, | Performed by: PATHOLOGY

## 2019-03-19 PROCEDURE — 76872 PR US TRANSRECTAL: ICD-10-PCS | Mod: 26,HCNC,, | Performed by: UROLOGY

## 2019-03-19 PROCEDURE — 76872 US TRANSRECTAL: CPT | Mod: HCNC

## 2019-03-19 PROCEDURE — 88305 TISSUE EXAM BY PATHOLOGIST: CPT | Mod: HCNC | Performed by: PATHOLOGY

## 2019-03-19 PROCEDURE — 88305 TISSUE EXAM BY PATHOLOGIST: CPT | Mod: 26,HCNC,, | Performed by: PATHOLOGY

## 2019-03-19 PROCEDURE — 76942 ECHO GUIDE FOR BIOPSY: CPT | Mod: 26,HCNC,59, | Performed by: UROLOGY

## 2019-03-19 RX ORDER — LIDOCAINE HYDROCHLORIDE 10 MG/ML
1 INJECTION INFILTRATION; PERINEURAL
Status: COMPLETED | OUTPATIENT
Start: 2019-03-19 | End: 2019-03-19

## 2019-03-19 RX ORDER — LIDOCAINE HYDROCHLORIDE 20 MG/ML
JELLY TOPICAL
Status: COMPLETED | OUTPATIENT
Start: 2019-03-19 | End: 2019-03-19

## 2019-03-19 RX ADMIN — LIDOCAINE HYDROCHLORIDE 1 ML: 10 INJECTION INFILTRATION; PERINEURAL at 08:03

## 2019-03-19 RX ADMIN — LIDOCAINE HYDROCHLORIDE 20 ML: 20 JELLY TOPICAL at 08:03

## 2019-03-19 NOTE — PROCEDURES
Pre-procedure diagnosis: right prostate nodule  Lab Results   Component Value Date    PSA 1.1 10/26/2018    PSA 2.0 09/12/2017       Post-procedure diagnosis: same    Procedure: Transrectal ultrasound guided prostate biopsy    Complications: none    Blood loss: minimal    Surgeon: Sergio Dixon MD    Anesthesia: 10cc lidocaine jelly, 20cc 1% lidocaine for prostatic block    TRUS size: 22.8 cc    Procedure: The risks and benefits of the procedure were explained to the patient and consent was obtained.  The patient laid in the lateral decubitus position.  10cc of lidocaine jelly was used for local analgesia in the rectum.  The ultrasound probe was advanced into the rectum. The prostate was visualized.  There was not a median lobe.  20cc of 1% lidocaine without epi was used for a prostatic nerve block.  12 biopsies were then takes, 2 from the apex, mid and base from the right and left side.    The patient tolerated the procedure well and was discharged home.  We will call him when the results are available for review.  He will finish the course of antibiotics.

## 2019-03-19 NOTE — PATIENT INSTRUCTIONS

## 2019-03-29 ENCOUNTER — OFFICE VISIT (OUTPATIENT)
Dept: SPINE | Facility: CLINIC | Age: 69
End: 2019-03-29
Payer: MEDICARE

## 2019-03-29 VITALS
WEIGHT: 177.94 LBS | HEART RATE: 62 BPM | TEMPERATURE: 98 F | HEIGHT: 67 IN | DIASTOLIC BLOOD PRESSURE: 74 MMHG | BODY MASS INDEX: 27.93 KG/M2 | SYSTOLIC BLOOD PRESSURE: 123 MMHG

## 2019-03-29 DIAGNOSIS — G89.29 CHRONIC BILATERAL LOW BACK PAIN WITHOUT SCIATICA: Primary | ICD-10-CM

## 2019-03-29 DIAGNOSIS — M47.816 SPONDYLOSIS OF LUMBAR REGION WITHOUT MYELOPATHY OR RADICULOPATHY: ICD-10-CM

## 2019-03-29 DIAGNOSIS — M51.36 DDD (DEGENERATIVE DISC DISEASE), LUMBAR: ICD-10-CM

## 2019-03-29 DIAGNOSIS — M54.50 CHRONIC BILATERAL LOW BACK PAIN WITHOUT SCIATICA: Primary | ICD-10-CM

## 2019-03-29 DIAGNOSIS — M43.16 SPONDYLOLISTHESIS, LUMBAR REGION: ICD-10-CM

## 2019-03-29 PROCEDURE — 1100F PTFALLS ASSESS-DOCD GE2>/YR: CPT | Mod: HCNC,CPTII,S$GLB, | Performed by: PHYSICIAN ASSISTANT

## 2019-03-29 PROCEDURE — 1100F PR PT FALLS ASSESS DOC 2+ FALLS/FALL W/INJURY/YR: ICD-10-PCS | Mod: HCNC,CPTII,S$GLB, | Performed by: PHYSICIAN ASSISTANT

## 2019-03-29 PROCEDURE — 99214 OFFICE O/P EST MOD 30 MIN: CPT | Mod: HCNC,S$GLB,, | Performed by: PHYSICIAN ASSISTANT

## 2019-03-29 PROCEDURE — 99214 PR OFFICE/OUTPT VISIT, EST, LEVL IV, 30-39 MIN: ICD-10-PCS | Mod: HCNC,S$GLB,, | Performed by: PHYSICIAN ASSISTANT

## 2019-03-29 PROCEDURE — 3288F PR FALLS RISK ASSESSMENT DOCUMENTED: ICD-10-PCS | Mod: HCNC,CPTII,S$GLB, | Performed by: PHYSICIAN ASSISTANT

## 2019-03-29 PROCEDURE — 99999 PR PBB SHADOW E&M-EST. PATIENT-LVL IV: CPT | Mod: PBBFAC,HCNC,, | Performed by: PHYSICIAN ASSISTANT

## 2019-03-29 PROCEDURE — 3288F FALL RISK ASSESSMENT DOCD: CPT | Mod: HCNC,CPTII,S$GLB, | Performed by: PHYSICIAN ASSISTANT

## 2019-03-29 PROCEDURE — 99999 PR PBB SHADOW E&M-EST. PATIENT-LVL IV: ICD-10-PCS | Mod: PBBFAC,HCNC,, | Performed by: PHYSICIAN ASSISTANT

## 2019-03-29 RX ORDER — GABAPENTIN 300 MG/1
300 CAPSULE ORAL 3 TIMES DAILY
Qty: 270 CAPSULE | Refills: 0 | Status: SHIPPED | OUTPATIENT
Start: 2019-03-29 | End: 2020-06-17

## 2019-03-29 NOTE — PROGRESS NOTES
"Subjective:     Patient ID:  Anthony Miguel is a 68 y.o. male.    [facility]    Chief Complaint: Back pain and bilateral leg numbness    HPI    Anthony Miguel is a 68 y.o. male who presents for follow up.  He had the L5-S1 IL KAYLEY done and it helped his back pain about 80% for 4 weeks and then the pain has gradually returned.  He has pain across the low back that gets better with walking.  He has numbness in the bilateral lateral legs that comes and goes and is better with walking.  No leg pain.    He has increased the Neurontin to 200mg BID and has not really noticed a change.    Patient denies any recent accidents or trauma, no saddle anesthesias, and no bowel or bladder incontinence.      Review of Systems:  Constitution: Negative for chills, fever, night sweats and weight loss.   Musculoskeletal: Negative for falls.   Gastrointestinal: Negative for bowel incontinence, nausea and vomiting.   Genitourinary: Negative for bladder incontinence.   Neurological: Negative for disturbances in coordination and loss of balance.      Objective:      Vitals:    03/29/19 1045   BP: 123/74   Pulse: 62   Temp: 97.5 °F (36.4 °C)   TempSrc: Oral   Weight: 80.7 kg (177 lb 14.6 oz)   Height: 5' 7" (1.702 m)   PainSc:   3   PainLoc: Back         Physical Exam:    General:  Anthony Miguel is well-developed, well-nourished, appears stated age, in no acute distress, alert and oriented to person, place, and time.    Patient sits comfortably in the exam room and answers questions appropriately. Grossly patient is able to move bilateral lower extremities without difficulty.     XRAY Interpretation:      Lumbar spine ap/lateral/flexion/extension xrays were personally reviewed today.  No fractures.  No movement on flexion and extension.  Grade one spondylolisthesis at L4-5.  DDD at L4-5 and L5-S1 worse at L5-S1.     MRI Interpretation:     Lumbar spine MRI was personally reviewed today.  Grade one spondylolisthesis L4-5.  DDD L4-5 " and L5-S1.  Severe central spinal stenosis at L4-5 and bilateral NFS at L4-5 and L5-S1.    Assessment:          1. Chronic bilateral low back pain without sciatica    2. DDD (degenerative disc disease), lumbar    3. Spondylosis of lumbar region without myelopathy or radiculopathy    4. Spondylolisthesis, lumbar region            Plan:          Orders Placed This Encounter    Procedure Order to [facility]    gabapentin (NEURONTIN) 300 MG capsule       Grade one spondylolisthesis L4-5.  DDD L4-5 and L5-S1.  Severe central spinal stenosis at L4-5 and bilateral NFS at L4-5 and L5-S1.    -Diclofenac PRN with food  -Start taking Neurontin 300mg TID.  New RX for this.  -Repeat L5-S1 IL KAYLEY through the pain clinic  -FU in two months.  Consider facet injections versus referral to Dr. Wolff for surgery options.      Follow-Up:  Follow up in about 2 months (around 5/29/2019). If there are any questions prior to this, the patient was instructed to contact the office.       Adele Vela [facility], PA-C  Neurosurgery  [facility]  [facility]

## 2019-03-29 NOTE — H&P (VIEW-ONLY)
"Subjective:     Patient ID:  Anthony Miguel is a 68 y.o. male.    [facility]    Chief Complaint: Back pain and bilateral leg numbness    HPI    Anthony Miguel is a 68 y.o. male who presents for follow up.  He had the L5-S1 IL KAYLEY done and it helped his back pain about 80% for 4 weeks and then the pain has gradually returned.  He has pain across the low back that gets better with walking.  He has numbness in the bilateral lateral legs that comes and goes and is better with walking.  No leg pain.    He has increased the Neurontin to 200mg BID and has not really noticed a change.    Patient denies any recent accidents or trauma, no saddle anesthesias, and no bowel or bladder incontinence.      Review of Systems:  Constitution: Negative for chills, fever, night sweats and weight loss.   Musculoskeletal: Negative for falls.   Gastrointestinal: Negative for bowel incontinence, nausea and vomiting.   Genitourinary: Negative for bladder incontinence.   Neurological: Negative for disturbances in coordination and loss of balance.      Objective:      Vitals:    03/29/19 1045   BP: 123/74   Pulse: 62   Temp: 97.5 °F (36.4 °C)   TempSrc: Oral   Weight: 80.7 kg (177 lb 14.6 oz)   Height: 5' 7" (1.702 m)   PainSc:   3   PainLoc: Back         Physical Exam:    General:  Anthony Miguel is well-developed, well-nourished, appears stated age, in no acute distress, alert and oriented to person, place, and time.    Patient sits comfortably in the exam room and answers questions appropriately. Grossly patient is able to move bilateral lower extremities without difficulty.     XRAY Interpretation:      Lumbar spine ap/lateral/flexion/extension xrays were personally reviewed today.  No fractures.  No movement on flexion and extension.  Grade one spondylolisthesis at L4-5.  DDD at L4-5 and L5-S1 worse at L5-S1.     MRI Interpretation:     Lumbar spine MRI was personally reviewed today.  Grade one spondylolisthesis L4-5.  DDD L4-5 " and L5-S1.  Severe central spinal stenosis at L4-5 and bilateral NFS at L4-5 and L5-S1.    Assessment:          1. Chronic bilateral low back pain without sciatica    2. DDD (degenerative disc disease), lumbar    3. Spondylosis of lumbar region without myelopathy or radiculopathy    4. Spondylolisthesis, lumbar region            Plan:          Orders Placed This Encounter    Procedure Order to [facility]    gabapentin (NEURONTIN) 300 MG capsule       Grade one spondylolisthesis L4-5.  DDD L4-5 and L5-S1.  Severe central spinal stenosis at L4-5 and bilateral NFS at L4-5 and L5-S1.    -Diclofenac PRN with food  -Start taking Neurontin 300mg TID.  New RX for this.  -Repeat L5-S1 IL KAYLEY through the pain clinic  -FU in two months.  Consider facet injections versus referral to Dr. Wolff for surgery options.      Follow-Up:  Follow up in about 2 months (around 5/29/2019). If there are any questions prior to this, the patient was instructed to contact the office.       Adele Vela [facility], PA-C  Neurosurgery  [facility]  [facility]

## 2019-04-01 ENCOUNTER — TELEPHONE (OUTPATIENT)
Dept: PAIN MEDICINE | Facility: CLINIC | Age: 69
End: 2019-04-01

## 2019-04-01 DIAGNOSIS — G89.29 CHRONIC BILATERAL LOW BACK PAIN WITHOUT SCIATICA: ICD-10-CM

## 2019-04-01 DIAGNOSIS — M51.36 DDD (DEGENERATIVE DISC DISEASE), LUMBAR: Primary | ICD-10-CM

## 2019-04-01 DIAGNOSIS — M54.50 CHRONIC BILATERAL LOW BACK PAIN WITHOUT SCIATICA: ICD-10-CM

## 2019-04-02 ENCOUNTER — TELEPHONE (OUTPATIENT)
Dept: UROLOGY | Facility: CLINIC | Age: 69
End: 2019-04-02

## 2019-04-02 DIAGNOSIS — D49.59 NEOPLASM OF PROSTATE: Primary | ICD-10-CM

## 2019-04-03 ENCOUNTER — TELEPHONE (OUTPATIENT)
Dept: UROLOGY | Facility: CLINIC | Age: 69
End: 2019-04-03

## 2019-04-10 ENCOUNTER — HOSPITAL ENCOUNTER (OUTPATIENT)
Facility: OTHER | Age: 69
Discharge: HOME | End: 2019-04-10
Attending: ANESTHESIOLOGY | Admitting: ANESTHESIOLOGY
Payer: MEDICARE

## 2019-04-10 VITALS
HEART RATE: 57 BPM | DIASTOLIC BLOOD PRESSURE: 72 MMHG | WEIGHT: 175 LBS | BODY MASS INDEX: 27.47 KG/M2 | TEMPERATURE: 98 F | HEIGHT: 67 IN | RESPIRATION RATE: 18 BRPM | OXYGEN SATURATION: 94 % | SYSTOLIC BLOOD PRESSURE: 135 MMHG

## 2019-04-10 DIAGNOSIS — G89.29 CHRONIC PAIN: ICD-10-CM

## 2019-04-10 DIAGNOSIS — M54.16 LUMBAR RADICULOPATHY: Primary | ICD-10-CM

## 2019-04-10 PROCEDURE — 25000003 PHARM REV CODE 250: Mod: HCNC | Performed by: ANESTHESIOLOGY

## 2019-04-10 PROCEDURE — 62323 PR INJ LUMBAR/SACRAL, W/IMAGING GUIDANCE: ICD-10-PCS | Mod: HCNC,,, | Performed by: ANESTHESIOLOGY

## 2019-04-10 PROCEDURE — 62323 NJX INTERLAMINAR LMBR/SAC: CPT | Mod: HCNC | Performed by: ANESTHESIOLOGY

## 2019-04-10 PROCEDURE — 25500020 PHARM REV CODE 255: Mod: HCNC | Performed by: ANESTHESIOLOGY

## 2019-04-10 PROCEDURE — 25000003 PHARM REV CODE 250: Mod: HCNC | Performed by: STUDENT IN AN ORGANIZED HEALTH CARE EDUCATION/TRAINING PROGRAM

## 2019-04-10 PROCEDURE — 62323 NJX INTERLAMINAR LMBR/SAC: CPT | Mod: HCNC,,, | Performed by: ANESTHESIOLOGY

## 2019-04-10 PROCEDURE — 63600175 PHARM REV CODE 636 W HCPCS: Mod: HCNC | Performed by: ANESTHESIOLOGY

## 2019-04-10 RX ORDER — SODIUM CHLORIDE 9 MG/ML
500 INJECTION, SOLUTION INTRAVENOUS CONTINUOUS
Status: DISCONTINUED | OUTPATIENT
Start: 2019-04-10 | End: 2019-04-10 | Stop reason: HOSPADM

## 2019-04-10 RX ORDER — DEXAMETHASONE SODIUM PHOSPHATE 4 MG/ML
INJECTION, SOLUTION INTRA-ARTICULAR; INTRALESIONAL; INTRAMUSCULAR; INTRAVENOUS; SOFT TISSUE
Status: DISCONTINUED | OUTPATIENT
Start: 2019-04-10 | End: 2019-04-10 | Stop reason: HOSPADM

## 2019-04-10 RX ORDER — LIDOCAINE HYDROCHLORIDE 10 MG/ML
INJECTION INFILTRATION; PERINEURAL
Status: DISCONTINUED | OUTPATIENT
Start: 2019-04-10 | End: 2019-04-10 | Stop reason: HOSPADM

## 2019-04-10 RX ORDER — MIDAZOLAM HYDROCHLORIDE 1 MG/ML
INJECTION INTRAMUSCULAR; INTRAVENOUS
Status: DISCONTINUED | OUTPATIENT
Start: 2019-04-10 | End: 2019-04-10 | Stop reason: HOSPADM

## 2019-04-10 RX ORDER — LIDOCAINE HYDROCHLORIDE 5 MG/ML
INJECTION, SOLUTION INFILTRATION; INTRAVENOUS
Status: DISCONTINUED | OUTPATIENT
Start: 2019-04-10 | End: 2019-04-10 | Stop reason: HOSPADM

## 2019-04-10 RX ADMIN — SODIUM CHLORIDE 500 ML: 0.9 INJECTION, SOLUTION INTRAVENOUS at 11:04

## 2019-04-10 NOTE — PLAN OF CARE
Problem: Adult Inpatient Plan of Care  Goal: Plan of Care Review  Pt tolerated procedure well. Pt reports 1/10 pain after procedure. Assisted pt up for first time. Steady on feet. All discharge instructions given.

## 2019-04-10 NOTE — DISCHARGE INSTRUCTIONS
Thank you for allowing us to care for you today. You may receive a survey about the care we provided. Your feedback is valuable and helps us provide excellent care throughout the community.     Home Care Instructions for Pain Management:    1. DIET:   You may resume your normal diet today.   2. BATHING:   You may shower with luke warm water. No tub baths or anything that will soak injection sites under water for the next 24 hours.  3. DRESSING:   You may remove your bandage today.   4. ACTIVITY LEVEL:   You may resume your normal activities 24 hrs after your procedure. Nothing strenuous today.  5. MEDICATIONS:   You may resume your normal medications today. To restart blood thinners, ask your doctor.  6. DRIVING    If you have received any sedatives by mouth today, you may not drive for 12 hours.    If you have received any sedation through your IV, you may not drive for 24 hrs.   7. SPECIAL INSTRUCTIONS:   No heat to the injection site for 24 hrs including, hot bath or shower, heating pad, moist heat, or hot tubs.    Use ice pack to injection site for any pain or discomfort.  Apply ice packs for 20 minute intervals as needed.    IF you have diabetes, be sure to monitor your blood sugar more closely. IF your injection contained steroids your blood sugar levels may become higher than normal.    If you are still having pain upon discharge:  Your pain may improve over the next 48 hours. The anesthetic (numbing medication) works immediately to 48 hours. IF your injection contained a steroid (anti-inflammatory medication), it takes approximately 3 days to start feeling relief and 7-10 days to see your greatest results from the medication. It is possible you may need subsequent injections. This would be discussed at your follow up appointment with pain management or your referring doctor.      PLEASE CALL YOUR DOCTOR IF:  1. Redness or swelling around the injection site.  2. Fever of 101 degrees or more  3. Drainage  (pus) from the injection site.  4. For any continuous bleeding (some dried blood over the incision is normal.)    FOR EMERGENCIES:   If any unusual problems or difficulties occur during clinic hours, call [phone] or [phone].

## 2019-04-10 NOTE — OP NOTE
"Patient Name: Anthony Miguel  MRN: [id number]    INFORMED CONSENT: The procedure, risks, benefits and options were discussed with patient. There are no contraindications to the procedure. The patient expressed understanding and agreed to proceed. The personnel performing the procedure was discussed. I verify that I personally obtained Anthony's consent prior to the start of the procedure and the signed consent can be found on the patient's chart.    Procedure Date: 04/10/2019    Anesthesia: Topical    Pre Procedure diagnosis: DDD (degenerative disc disease), lumbar [M51.36]  Chronic bilateral low back pain without sciatica [M54.5, G89.29]  1. Lumbar radiculopathy    2. Chronic pain      Post-Procedure diagnosis: SAME      Moderate Sedation: None    PROCEDURE: L5-S1 INTERLAMINAR EPIDURAL STEROID INJECTION - LUMBAR      DESCRIPTION OF PROCEDURE: The patient was brought to the procedure room.  After performing time out IV access was obtained prior to the procedure. The patient was positioned prone on the fluoroscopy table. Continuous hemodynamic monitoring was initiated including blood pressure, EKG, and pulse oximetry.   The area of the spine was prepped with chlorhexidine three times and draped in a sterile fashion.  Skin anesthesia was achieved using 3 mL of lidocaine 1% over the respective injection site. The L5-S1 interspace was visualized under fluoroscopic imaging. An 18 gauge 3 1/2" Tuohy needle was slowly inserted and advanced using loss of resistance to saline with AP and lateral fluoroscopic imaging for needle guidance. Negative aspiration for blood or CSF was confirmed. Epidural contrast spread was confirmed using 2mL of Omnipaque 300 contrast spreading in the lumbar epidural space.6 mL of Lidocaine 0.5% and 10 mg decadron was injected. The needle was removed and bleeding was nil.  A sterile dressing was applied. Anthony was taken back to the recovery room for further observation.     Blood Loss: " Nill  Specimen: None      Marcel Adkins MD

## 2019-04-10 NOTE — DISCHARGE SUMMARY
Discharge Note  Short Stay      SUMMARY     Admit Date: 4/10/2019    Attending Physician: Marcel Adkins      Discharge Physician: Marcel Adkins      Discharge Date: 4/10/2019 12:14 PM    Procedure(s) (LRB):  Injection, Steroid, Epidural LUMBAR/CAUDAL L5-S1 INTERLAMINAR KAYLEY (N/A)    Final Diagnosis: DDD (degenerative disc disease), lumbar [M51.36]  Chronic bilateral low back pain without sciatica [M54.5, G89.29]    Disposition: Home or self care    Patient Instructions:   Current Discharge Medication List      CONTINUE these medications which have NOT CHANGED    Details   ciprofloxacin HCl (CIPRO) 500 MG tablet 1 pill pm before procedure ,1 pill am of procedure, 1 pill pm after procedure and 1 pill am after procedure.  Qty: 4 tablet, Refills: 0      atorvastatin (LIPITOR) 10 MG tablet Take 1 tablet (10 mg total) by mouth once daily.  Qty: 90 tablet, Refills: 3    Associated Diagnoses: Hyperlipidemia, unspecified hyperlipidemia type      cholecalciferol, vitamin D3, (VITAMIN D3) 2,000 unit Cap Take 1 capsule (2,000 Units total) by mouth once daily.  Qty: 90 capsule, Refills: 0    Associated Diagnoses: Vitamin D deficiency      diclofenac (VOLTAREN) 75 MG EC tablet Take 1 tablet (75 mg total) by mouth 2 (two) times daily as needed.  Qty: 60 tablet, Refills: 2      ferrous sulfate 325 (65 FE) MG EC tablet Take 1 tablet (325 mg total) by mouth once daily.  Qty: 90 tablet, Refills: 3    Associated Diagnoses: Iron deficiency anemia due to chronic blood loss      finasteride (PROSCAR) 5 mg tablet Take 1 tablet (5 mg total) by mouth once daily.  Qty: 90 tablet, Refills: 3    Associated Diagnoses: Benign non-nodular prostatic hyperplasia without lower urinary tract symptoms      !! gabapentin (NEURONTIN) 100 MG capsule Take 2 capsules (200 mg total) by mouth 3 (three) times daily as needed.  Qty: 270 capsule, Refills: 3    Associated Diagnoses: Cervical myelopathy; Lumbar myelopathy      !! gabapentin (NEURONTIN) 300 MG  capsule Take 1 capsule (300 mg total) by mouth 3 (three) times daily.  Qty: 270 capsule, Refills: 0      tamsulosin (FLOMAX) 0.4 mg Cap Take 1 capsule (0.4 mg total) by mouth once daily.  Qty: 90 capsule, Refills: 3    Associated Diagnoses: Benign non-nodular prostatic hyperplasia without lower urinary tract symptoms      traZODone (DESYREL) 50 MG tablet Take 2 tablets (100 mg total) by mouth nightly as needed for Insomnia.  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      walker (ULTRA-LIGHT ROLLATOR) Misc 1 Units by Misc.(Non-Drug; Combo Route) route once daily at 6am.  Qty: 1 each, Refills: 0    Associated Diagnoses: Weakness       !! - Potential duplicate medications found. Please discuss with provider.              Discharge Diagnosis: DDD (degenerative disc disease), lumbar [M51.36]  Chronic bilateral low back pain without sciatica [M54.5, G89.29]  Condition on Discharge: Stable with no complications to procedure   Diet on Discharge: Same as before.  Activity: as per instruction sheet.  Discharge to: Home with a responsible adult.  Follow up: 2-4 weeks

## 2019-04-10 NOTE — INTERVAL H&P NOTE
The patient has been examined and the H&P has been reviewed:    I concur with the findings and no changes have occurred since H&P was written. Mr. Wells presents for lumbar interlaminar epidural. He denies changes in his health history.     Anesthesia/Surgery risks, benefits and alternative options discussed and understood by patient/family.          Active Hospital Problems    Diagnosis  POA    Chronic pain [G89.29]  Yes      Resolved Hospital Problems   No resolved problems to display.

## 2019-04-15 ENCOUNTER — HOSPITAL ENCOUNTER (OUTPATIENT)
Dept: RADIOLOGY | Facility: HOSPITAL | Age: 69
Discharge: HOME | End: 2019-04-15
Attending: UROLOGY
Payer: MEDICARE

## 2019-04-15 DIAGNOSIS — D49.59 NEOPLASM OF PROSTATE: ICD-10-CM

## 2019-04-15 PROCEDURE — 72197 MRI PROSTATE W W/O CONTRAST: ICD-10-PCS | Mod: 26,HCNC,, | Performed by: RADIOLOGY

## 2019-04-15 PROCEDURE — A9585 GADOBUTROL INJECTION: HCPCS | Mod: HCNC | Performed by: UROLOGY

## 2019-04-15 PROCEDURE — 78306 BONE IMAGING WHOLE BODY: CPT | Mod: 26,HCNC,, | Performed by: RADIOLOGY

## 2019-04-15 PROCEDURE — 25500020 PHARM REV CODE 255: Mod: HCNC | Performed by: UROLOGY

## 2019-04-15 PROCEDURE — 72197 MRI PELVIS W/O & W/DYE: CPT | Mod: TC,HCNC

## 2019-04-15 PROCEDURE — A9503 TC99M MEDRONATE: HCPCS | Mod: HCNC

## 2019-04-15 PROCEDURE — 78306 NM BONE SCAN WHOLE BODY: ICD-10-PCS | Mod: 26,HCNC,, | Performed by: RADIOLOGY

## 2019-04-15 PROCEDURE — 72197 MRI PELVIS W/O & W/DYE: CPT | Mod: 26,HCNC,, | Performed by: RADIOLOGY

## 2019-04-15 RX ORDER — GADOBUTROL 604.72 MG/ML
10 INJECTION INTRAVENOUS
Status: COMPLETED | OUTPATIENT
Start: 2019-04-15 | End: 2019-04-15

## 2019-04-15 RX ADMIN — GADOBUTROL 10 ML: 604.72 INJECTION INTRAVENOUS at 05:04

## 2019-04-22 ENCOUNTER — OFFICE VISIT (OUTPATIENT)
Dept: UROLOGY | Facility: CLINIC | Age: 69
End: 2019-04-22
Payer: MEDICARE

## 2019-04-22 VITALS
HEART RATE: 71 BPM | DIASTOLIC BLOOD PRESSURE: 65 MMHG | BODY MASS INDEX: 28.3 KG/M2 | HEIGHT: 67 IN | WEIGHT: 180.31 LBS | SYSTOLIC BLOOD PRESSURE: 138 MMHG

## 2019-04-22 DIAGNOSIS — C61 PROSTATE CANCER: Primary | ICD-10-CM

## 2019-04-22 DIAGNOSIS — R39.9 LOWER URINARY TRACT SYMPTOMS: ICD-10-CM

## 2019-04-22 PROCEDURE — 1101F PT FALLS ASSESS-DOCD LE1/YR: CPT | Mod: HCNC,CPTII,S$GLB, | Performed by: UROLOGY

## 2019-04-22 PROCEDURE — 99214 PR OFFICE/OUTPT VISIT, EST, LEVL IV, 30-39 MIN: ICD-10-PCS | Mod: HCNC,S$GLB,, | Performed by: UROLOGY

## 2019-04-22 PROCEDURE — 1101F PR PT FALLS ASSESS DOC 0-1 FALLS W/OUT INJ PAST YR: ICD-10-PCS | Mod: HCNC,CPTII,S$GLB, | Performed by: UROLOGY

## 2019-04-22 PROCEDURE — 99999 PR PBB SHADOW E&M-EST. PATIENT-LVL III: CPT | Mod: PBBFAC,HCNC,, | Performed by: UROLOGY

## 2019-04-22 PROCEDURE — 99214 OFFICE O/P EST MOD 30 MIN: CPT | Mod: HCNC,S$GLB,, | Performed by: UROLOGY

## 2019-04-22 PROCEDURE — 99999 PR PBB SHADOW E&M-EST. PATIENT-LVL III: ICD-10-PCS | Mod: PBBFAC,HCNC,, | Performed by: UROLOGY

## 2019-04-22 NOTE — PROGRESS NOTES
Subjective:      Patient ID: Anthony Miguel is a 68 y.o. male.    Chief Complaint: Follow-up (MRI/bone scan results)    Patient is a 68 y.o. male who is established to our clinic and referred by their PCP, Dr. Spaulding for evaluation of LUTs.     HPI  Benign Prostatic Hypertrophy  Patient complains of lower urinary tract symptoms. He reports frequency, incomplete emptying, intermittency, nocturia four times a night, straining, urgency and weak stream. He denies hematuria. Patient states symptoms are of severe severity. Onset of symptoms was a few years ago and was gradual in onset but has worsened in the last month.  He is currently getting epidural treatments for degenerative spine disease---these started ~ 1 month ago. His AUA Symptom Score is, 23/4 . He has no personal history and a family history of prostate cancer. He reports a history of no complicating symptoms. He denies flank pain, gross hematuria, kidney stones and recurrent UTI.  He is on finasteride and tamsulosin.  Has a half-brother who was diagnosed with prostate cancer at age 88.   IPSS Questionnaire (AUA-7):  Over the past month    1)  How often have you had a sensation of not emptying your bladder completely after you finish urinating?  3 - About half the time   2)  How often have you had to urinate again less than two hours after you finished urinating? 3 - About half the time   3)  How often have you found you stopped and started again several times when you urinated?  3 - About half the time   4) How difficult have you found it to postpone urination?  4 - More than half the time   5) How often have you had a weak urinary stream?  3 - About half the time   6) How often have you had to push or strain to begin urination?  3 - About half the time   7) How many times did you most typically get up to urinate from the time you went to bed until the time you got up in the morning?  4 - 4 times   Total score:  0-7 mildly symptomatic    8-19  moderately symptomatic    20-35 severely symptomatic       He was found on exam to have right-sided prostate nodules.  He subsequently underwent a transrectal ultrasound-guided prostate biopsy.  Pathology is shown below.    SPECIMEN  1) Prostate, left apex.  2) Prostate, left middle.  3) Prostate, left base.  4) Prostate, right apex.  5) Prostate, right middle.  6) Prostate, right base.  FINAL PATHOLOGIC DIAGNOSIS  PROSTATE BIOPSIES 1. LEFT APEX, 2. LEFT MIDDLE, 3. LEFT BASE, 4. RIGHT APEX, 5. RIGHT MIDDLE,  6. RIGHT BASE OF THE PROSTATE:  ADENOCARCINOMA TOTAL GLOBAL BRUNO SCORE 9 (4+ 5)--SEE DESCRIPTION  Diagnosed by: Eduardo Foster M.D.  (Electronically Signed: 2019-03-29 10:39:01)  Microscopic Examination  1. The picture here is that of atypical hyperplasia suspicious for areas of carcinoma, but without definitive histologic  picture seen in the other portions of this case. There are however at least a few glands of grade 3 [place] carcinoma  present.  2. 5% involvement of the total specimen carcinoma. Both cores involved. Bruno grade 3.  3. 5% involvement. Both cores involved. Predominant Bruno grade 3 but 10% grade 4.  4. 65% involvement. Both cores involved. [place] grade 4 predominates with [place] grade 5 and Bruno grade 3  as additional components.  5. 95% involvement. Both cores involved. Bruno grade 3 barely predominates with almost equal volumes of  [place] grade 5 a Bruno grade 4.  6. 95% involvement. Both cores involved. Bruno grade 4 slightly predominates over [place] grade 5.  The [place] grade 5 component in these biopsies consists of complex proliferations with central necrosis giving the  comido appearance.    Lab Results   Component Value Date    PSA 1.1 10/26/2018    PSA 2.0 09/12/2017         Review of Systems   All other systems reviewed and negative except pertinent positives noted in HPI.    Objective:     Physical Exam   Constitutional: He is oriented to person, place, and  time. He appears well-developed and well-nourished. No distress.   HENT:   Head: Normocephalic and atraumatic.   Eyes: No scleral icterus.   Neck: No tracheal deviation present.   Pulmonary/Chest: Effort normal. No respiratory distress.   Neurological: He is alert and oriented to person, place, and time.   Psychiatric: He has a normal mood and affect. His behavior is normal. Judgment and thought content normal.     Assessment:     1. Prostate cancer    2. Lower urinary tract symptoms      Plan:     1. Prostate cancer    2. Lower urinary tract symptoms        Orders Placed This Encounter   Procedures    Ambulatory referral to Radiation Oncology     Referral Priority:   Routine     Referral Type:   Consultation     Referral Reason:   Specialty Services Required     Referred to Provider:   Abrahan Sevilla Jr., MD     Requested Specialty:   Radiation Oncology     Number of Visits Requested:   1     1. BPH:  -minimal pvr at last visit (36cc)  -Avoid bladder irritants including but not limited to caffeine, alcohol, smoking, spicy foods, acidic foods, tomato-based products, citrus, artificial sweeteners, chocolate, coffee or tea.  -prostate meds: flomax and finasteride; continue         2. Prostate cancer:  -a long conversation was had regarding management of his high risk prostate cancer all options were explained to the patient. I explained that I did not feel that surveillance unless he were to opt for watchful waiting would be a reasonable option.  Therefore, we discussed surgical removal of the prostate as well as radiation therapy in its various delivery methods.  Also discussed the concept of androgen deprivation therapy in that he would likely need require a prolonged course of androgen deprivation therapy if he were to choose radiation.  We discussed the difficulties that might arise as result of his prostate size which was by a MRI measured as 15 cc.    We will refer him to Dr. Sevilla for a discussion  regarding radiation therapy.  He will return in approximately 3-4 weeks to revisit which option he prefers.

## 2019-05-03 DIAGNOSIS — N40.0 BENIGN NON-NODULAR PROSTATIC HYPERPLASIA WITHOUT LOWER URINARY TRACT SYMPTOMS: ICD-10-CM

## 2019-05-03 RX ORDER — DICLOFENAC SODIUM 75 MG/1
TABLET, DELAYED RELEASE ORAL
Qty: 60 TABLET | Refills: 2 | Status: SHIPPED | OUTPATIENT
Start: 2019-05-03 | End: 2019-09-01 | Stop reason: SDUPTHER

## 2019-05-05 RX ORDER — FINASTERIDE 5 MG/1
TABLET, FILM COATED ORAL
Qty: 90 TABLET | Refills: 0 | OUTPATIENT
Start: 2019-05-05

## 2019-05-06 ENCOUNTER — TELEPHONE (OUTPATIENT)
Dept: INTERNAL MEDICINE | Facility: CLINIC | Age: 69
End: 2019-05-06

## 2019-05-06 NOTE — TELEPHONE ENCOUNTER
Called and spoke to pt and he stated he has been refilling his meds with his local [facility] pharmacy but he would be fine doing a month's supply of the pill packs at the [facility]. Went over pt's med list and he stated he is no longer taking Flomax 0.4mg.

## 2019-05-06 NOTE — TELEPHONE ENCOUNTER
Please advise discuss with patient that his Urologist advised for him to take both flomax and finasteride at his last appointment on 4/22/19. The medical assistance relayed to me that he is no longer taking the flomax. How are his urine symptoms off of this med?    I can pack his pills at pharmacy with both the flomax and finasteride if he wants.    Thanks,  NAVID

## 2019-05-10 ENCOUNTER — INITIAL CONSULT (OUTPATIENT)
Dept: RADIATION ONCOLOGY | Facility: CLINIC | Age: 69
End: 2019-05-10
Payer: MEDICARE

## 2019-05-10 VITALS
SYSTOLIC BLOOD PRESSURE: 123 MMHG | TEMPERATURE: 98 F | WEIGHT: 178.69 LBS | HEART RATE: 67 BPM | HEIGHT: 67 IN | RESPIRATION RATE: 16 BRPM | DIASTOLIC BLOOD PRESSURE: 77 MMHG | BODY MASS INDEX: 28.04 KG/M2

## 2019-05-10 DIAGNOSIS — C61 CANCER OF PROSTATE: Primary | ICD-10-CM

## 2019-05-10 PROCEDURE — 3288F FALL RISK ASSESSMENT DOCD: CPT | Mod: HCNC,CPTII,S$GLB, | Performed by: RADIOLOGY

## 2019-05-10 PROCEDURE — 3288F PR FALLS RISK ASSESSMENT DOCUMENTED: ICD-10-PCS | Mod: HCNC,CPTII,S$GLB, | Performed by: RADIOLOGY

## 2019-05-10 PROCEDURE — 1100F PTFALLS ASSESS-DOCD GE2>/YR: CPT | Mod: HCNC,CPTII,S$GLB, | Performed by: RADIOLOGY

## 2019-05-10 PROCEDURE — 1100F PR PT FALLS ASSESS DOC 2+ FALLS/FALL W/INJURY/YR: ICD-10-PCS | Mod: HCNC,CPTII,S$GLB, | Performed by: RADIOLOGY

## 2019-05-10 PROCEDURE — 99999 PR PBB SHADOW E&M-EST. PATIENT-LVL III: ICD-10-PCS | Mod: PBBFAC,HCNC,, | Performed by: RADIOLOGY

## 2019-05-10 PROCEDURE — 99999 PR PBB SHADOW E&M-EST. PATIENT-LVL III: CPT | Mod: PBBFAC,HCNC,, | Performed by: RADIOLOGY

## 2019-05-10 PROCEDURE — 99204 PR OFFICE/OUTPT VISIT, NEW, LEVL IV, 45-59 MIN: ICD-10-PCS | Mod: HCNC,S$GLB,, | Performed by: RADIOLOGY

## 2019-05-10 PROCEDURE — 99204 OFFICE O/P NEW MOD 45 MIN: CPT | Mod: HCNC,S$GLB,, | Performed by: RADIOLOGY

## 2019-05-10 NOTE — PROGRESS NOTES
HISTORY OF PRESENT ILLNESS:   This patient is referred for discussion of treatment options for a recently diagnosed prostate cancer.       Mr. Miguel has a history of BPH with LUTS currently on finasteride and tamsulosin.  His current AUA score is 23.  He was noted to have a palpable nodule in the Rt. lobe of the prostate on MARTHA in March of this year. PSA in October of 2018 was 1.1 ng/ml.  Subsequent TRUS and biopsy on 3/19/19 revealed a 22.8 cc gland.  Biopsies from the Lt. apex and Rt. base revealed [place] 9 (4+5) adenocarcinoma involving 45% of the specimen at the apex and 65% of the specimen at the Rt. base.  There was [place] 8 (3+5) disease at the Rt. mid gland involving 95% of the specimen.  The Lt. apex, Lt. mid gland and Lt. base revealed small amounts of Bruno score 6 (3+3) to 7 (3+4) disease.  Further work up with bone scan on 4/15/19 was negative for metastatic disease.  MRI on 4/15/19 revealed a 4.1 x 2.9 x 2.7 cm prostate corresponding to a 15.7 cc gland.  Thre was a 2.1 cm Rt. peripheral zone T2 hypointense nodule, Pi-RADS 5.  There was thought to be extraprostatic extension given the capsular abutment of greater than 1 cm with contour bulge.  There was extension of the lesion towards the base of the Rt. seminal vesicle.  The Rt. neurovascular bundle was likely involved.  There was no adenopathy.  The patient presents for discussion of treatment options.  Today the patient feels fairly well.  His PMHx is also significant for ongoing neuropathy which requires him to ambulate with a walker.       REVIEW OF SYSTEMS:   Review of Systems   Constitutional: Negative for chills, fever, malaise/fatigue and weight loss.   Respiratory: Negative for cough, sputum production and shortness of breath.    Cardiovascular: Negative for chest pain and palpitations.   Gastrointestinal: Negative for abdominal pain, diarrhea, nausea and vomiting.   Genitourinary: Positive for frequency and urgency. Negative for  dysuria and hematuria.   Musculoskeletal: Positive for back pain. Negative for joint pain and neck pain.         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    Overview:  Right side dx update       PAST SURGICAL HISTORY:  Past Surgical History:   Procedure Laterality Date    BACK SURGERY      Injection, Steroid, Epidural LUMBAR/CAUDAL L5-S1 INTERLAMINAR KAYLEY N/A 4/10/2019    [facility] at [facility]    INJECTION, STEROID, EPIDURAL, L45-S1 [place] N/A 2/6/2019    [facility] at [facility]    LAMINECTOMY-CERVICAL/FUSION-POSTERIOR; C3-6 N/A 6/21/2017    [facility] at [facility]    Larangoscopy         ALLERGIES:   Review of patient's allergies indicates:  No Known Allergies    MEDICATIONS:  Current Outpatient Medications   Medication Sig    gabapentin (NEURONTIN) 100 MG capsule Take 2 capsules (200 mg total) by mouth 3 (three) times daily as needed.    traZODone (DESYREL) 50 MG tablet Take 2 tablets (100 mg total) by mouth nightly as needed for Insomnia.    triamcinolone acetonide 0.1% (KENALOG) 0.1 % cream Apply 15 g topically.    walker (ULTRA-LIGHT ROLLATOR) Misc 1 Units by Misc.(Non-Drug; Combo Route) route once daily at 6am.    atorvastatin (LIPITOR) 10 MG tablet Take 1 tablet (10 mg total) by mouth once daily.    cholecalciferol, vitamin D3, (VITAMIN D3) 2,000 unit Cap Take 1 capsule (2,000 Units total) by mouth once daily.    diclofenac (VOLTAREN) 75 MG EC tablet TAKE 1 TABLET(75 MG) BY MOUTH TWICE DAILY AS NEEDED    ferrous sulfate 325 (65 FE) MG EC tablet Take 1 tablet (325 mg total) by mouth  once daily.    finasteride (PROSCAR) 5 mg tablet Take 1 tablet (5 mg total) by mouth once daily.    gabapentin (NEURONTIN) 300 MG capsule Take 1 capsule (300 mg total) by mouth 3 (three) times daily.     No current facility-administered medications for this visit.        SOCIAL HISTORY:  Social History     Socioeconomic History    Marital status: Single     Spouse name: Not on file    Number of children: Not on file    Years of education: Not on file    Highest education level: Not on file   Occupational History    Not on file   Social Needs    Financial resource strain: Not on file    Food insecurity:     Worry: Not on file     Inability: Not on file    Transportation needs:     Medical: Not on file     Non-medical: Not on file   Tobacco Use    Smoking status: Former Smoker     Packs/day: 1.00     Last attempt to quit: 2000     Years since quittin.2    Smokeless tobacco: Former User   Substance and Sexual Activity    Alcohol use: No    Drug use: No    Sexual activity: Not Currently   Lifestyle    Physical activity:     Days per week: Not on file     Minutes per session: Not on file    Stress: Not on file   Relationships    Social connections:     Talks on phone: Not on file     Gets together: Not on file     Attends Advent service: Not on file     Active member of club or organization: Not on file     Attends meetings of clubs or organizations: Not on file     Relationship status: Not on file   Other Topics Concern    Not on file   Social History Narrative    Not on file       FAMILY HISTORY:  Family History   Problem Relation Age of Onset    Cancer Mother     No Known Problems Father     Keratoconus Brother          PHYSICAL EXAMINATION:  Vitals:    05/10/19 0944   BP: 123/77   Pulse: 67   Resp: 16   Temp: 98.2 °F (36.8 °C)     Physical Exam   Constitutional: He is oriented to person, place, and time and well-developed, well-nourished, and in no distress.   Neck: Normal range of  motion. Neck supple.   Pulmonary/Chest: Effort normal. No respiratory distress.   Abdominal: Soft. He exhibits no distension.   Genitourinary:   Genitourinary Comments: normal rectal tone, prostate with large nodule in the Rt. mid gland.  No palpable ASIM.    Lymphadenopathy:     He has no cervical adenopathy.   Neurological: He is alert and oriented to person, place, and time.   Psychiatric: Affect and judgment normal.       ASSESSMENT/PLAN:  Stage IIIC (cT3a, cN0, cM0, PSA: 2.2, Grade Group: 5) adenocarcinoma of the prostate.     ECO    I had a long discussion with the patient.  We reviewed the concepts of low, intermediate and high risk prostate cancer.  Explained that based on his Bruno score and clinical stage he is considered to have high risk disease.  We discussed the implications of this classification and the need for active therapy for his disease.  Explained his two options would be surgical resection with RALP with likely adjuvant radiotherapy and hormonal therapy or definitive radiotherapy with IMRT / IGRT techniques combined with hormonal therapy for some period of time.  We discussed the procedures, risks and benefits of each approach.  Explained the advantage of initial surgery would be to provide information about his disease and determine what further therapy is needed.  It would also have the advantage of removing his prostate and hopefully improving his LUTS.  Explained that following surgery it is likely he will need hormonal therapy for some period of time with radiotherapy to the prostate bed beginning some 3 - 4 months following surgery.  The disadvantage of surgery is the possibility of long term urinary incontinence.  His other option of definitive radiotherapy would involve the use of hormonal therapy for some 1 - 3 years depending on his tolerance.  The disadvantage of this could be worsening of his ability to ambulate given the loss of muscle mass associated with hormonal therapy.    The patient is considering his options.  I discussed the case with Dr. Dixon who is planning to follow up with the patient next week.  We will await his final decision.  Thank you for allowing us to participate in the care of this patient.      I spent approximately 60 minutes reviewing the available records and evaluating the patient, out of which over 50% of the time was spent face to face with the patient in counseling and coordinating this patient's care.

## 2019-05-10 NOTE — LETTER
May 10, 2019      Sergio Dixon MD  [address] [facility] [address]           [facility]  [address]  [facility] [address]  Phone: [phone]          Patient: Anthony Miguel   MR Number: [id number]   YOB: 1950   Date of Visit: 5/10/2019       Dear Dr. Sergio Dixon:    Thank you for referring Anthony Miguel to me for evaluation. Attached you will find relevant portions of my assessment and plan of care.    If you have questions, please do not hesitate to call me. I look forward to following Anthony Miguel along with you.    Sincerely,    Abrahan Sevilla Jr., MD    Enclosure  CC:  No Recipients    If you would like to receive this communication electronically, please contact [email or web address] or [phone] to request more information on [facility] access.    For providers and/or their staff who would like to refer a patient to [facility], please contact us through our one-stop-shop provider referral line, [facility], at [phone].    If you feel you have received this communication in error or would no longer like to receive these types of communications, please e-mail [email or web address]

## 2019-05-15 ENCOUNTER — OFFICE VISIT (OUTPATIENT)
Dept: UROLOGY | Facility: CLINIC | Age: 69
End: 2019-05-15
Payer: MEDICARE

## 2019-05-15 VITALS
HEART RATE: 68 BPM | BODY MASS INDEX: 28.19 KG/M2 | DIASTOLIC BLOOD PRESSURE: 76 MMHG | TEMPERATURE: 98 F | WEIGHT: 180 LBS | SYSTOLIC BLOOD PRESSURE: 139 MMHG

## 2019-05-15 DIAGNOSIS — R39.9 LOWER URINARY TRACT SYMPTOMS: Primary | ICD-10-CM

## 2019-05-15 DIAGNOSIS — C61 PROSTATE CANCER: ICD-10-CM

## 2019-05-15 PROCEDURE — 99999 PR PBB SHADOW E&M-EST. PATIENT-LVL III: ICD-10-PCS | Mod: PBBFAC,HCNC,, | Performed by: UROLOGY

## 2019-05-15 PROCEDURE — 99214 PR OFFICE/OUTPT VISIT, EST, LEVL IV, 30-39 MIN: ICD-10-PCS | Mod: HCNC,S$GLB,, | Performed by: UROLOGY

## 2019-05-15 PROCEDURE — 1101F PT FALLS ASSESS-DOCD LE1/YR: CPT | Mod: HCNC,CPTII,S$GLB, | Performed by: UROLOGY

## 2019-05-15 PROCEDURE — 1101F PR PT FALLS ASSESS DOC 0-1 FALLS W/OUT INJ PAST YR: ICD-10-PCS | Mod: HCNC,CPTII,S$GLB, | Performed by: UROLOGY

## 2019-05-15 PROCEDURE — 99214 OFFICE O/P EST MOD 30 MIN: CPT | Mod: HCNC,S$GLB,, | Performed by: UROLOGY

## 2019-05-15 PROCEDURE — 99999 PR PBB SHADOW E&M-EST. PATIENT-LVL III: CPT | Mod: PBBFAC,HCNC,, | Performed by: UROLOGY

## 2019-05-15 NOTE — H&P (VIEW-ONLY)
Subjective:      Patient ID: Anthony Miguel is a 68 y.o. male.    Chief Complaint: Other (discuss options)    Patient is a 68 y.o. male who is established to our clinic and referred by their PCP, Dr. Spaulding for evaluation of LUTs.     HPI  Benign Prostatic Hypertrophy  Patient complains of lower urinary tract symptoms. He reports frequency, incomplete emptying, intermittency, nocturia four times a night, straining, urgency and weak stream. He denies hematuria. Patient states symptoms are of severe severity. Onset of symptoms was a few years ago and was gradual in onset but has worsened in the last month.  He is currently getting epidural treatments for degenerative spine disease---these started ~ 1 month ago. His AUA Symptom Score is, 23/4 . He has no personal history and a family history of prostate cancer. He reports a history of no complicating symptoms. He denies flank pain, gross hematuria, kidney stones and recurrent UTI.  He is on finasteride and tamsulosin.  Has a half-brother who was diagnosed with prostate cancer at age 88.   IPSS Questionnaire (AUA-7):  Over the past month    1)  How often have you had a sensation of not emptying your bladder completely after you finish urinating?  3 - About half the time   2)  How often have you had to urinate again less than two hours after you finished urinating? 3 - About half the time   3)  How often have you found you stopped and started again several times when you urinated?  3 - About half the time   4) How difficult have you found it to postpone urination?  4 - More than half the time   5) How often have you had a weak urinary stream?  3 - About half the time   6) How often have you had to push or strain to begin urination?  3 - About half the time   7) How many times did you most typically get up to urinate from the time you went to bed until the time you got up in the morning?  4 - 4 times   Total score:  0-7 mildly symptomatic    8-19 moderately  symptomatic    20-35 severely symptomatic       He was found on exam to have right-sided prostate nodules.  He subsequently underwent a transrectal ultrasound-guided prostate biopsy.  Pathology is shown below.    SPECIMEN  1) Prostate, left apex.  2) Prostate, left middle.  3) Prostate, left base.  4) Prostate, right apex.  5) Prostate, right middle.  6) Prostate, right base.  FINAL PATHOLOGIC DIAGNOSIS  PROSTATE BIOPSIES 1. LEFT APEX, 2. LEFT MIDDLE, 3. LEFT BASE, 4. RIGHT APEX, 5. RIGHT MIDDLE,  6. RIGHT BASE OF THE PROSTATE:  ADENOCARCINOMA TOTAL GLOBAL BRUNO SCORE 9 (4+ 5)--SEE DESCRIPTION  Diagnosed by: Eduardo Foster M.D.  (Electronically Signed: 2019-03-29 10:39:01)  Microscopic Examination  1. The picture here is that of atypical hyperplasia suspicious for areas of carcinoma, but without definitive histologic  picture seen in the other portions of this case. There are however at least a few glands of grade 3 [place] carcinoma  present.  2. 5% involvement of the total specimen carcinoma. Both cores involved. [place] grade 3.  3. 5% involvement. Both cores involved. Predominant [place] grade 3 but 10% grade 4.  4. 65% involvement. Both cores involved. Bruno grade 4 predominates with Bruno grade 5 and [place] grade 3  as additional components.  5. 95% involvement. Both cores involved. [place] grade 3 barely predominates with almost equal volumes of  [place] grade 5 a [place] grade 4.  6. 95% involvement. Both cores involved. [place] grade 4 slightly predominates over Bruno grade 5.  The [place] grade 5 component in these biopsies consists of complex proliferations with central necrosis giving the  comido appearance.    Lab Results   Component Value Date    PSA 1.1 10/26/2018    PSA 2.0 09/12/2017         Review of Systems   All other systems reviewed and negative except pertinent positives noted in HPI.    Objective:     Physical Exam   Constitutional: He is oriented to person, place, and time. He  appears well-developed and well-nourished. No distress.   HENT:   Head: Normocephalic and atraumatic.   Eyes: No scleral icterus.   Neck: No tracheal deviation present.   Pulmonary/Chest: Effort normal. No respiratory distress.   Neurological: He is alert and oriented to person, place, and time.   Psychiatric: He has a normal mood and affect. His behavior is normal. Judgment and thought content normal.     Assessment:     1. Lower urinary tract symptoms    2. Prostate cancer      Plan:     1. Lower urinary tract symptoms    2. Prostate cancer        Orders Placed This Encounter   Procedures    Simple Urodynamics     Standing Status:   Future     Standing Expiration Date:   8/15/2019     Scheduling Instructions:      Reduce cystocele     1. BPH:  -discussed that radiation could worsen his lower urinary tract symptoms.  In addition, upfront surgery may decrease the need for androgen deprivation therapy.  This could have benefits to his musculoskeletal issues.  -Avoid bladder irritants including but not limited to caffeine, alcohol, smoking, spicy foods, acidic foods, tomato-based products, citrus, artificial sweeteners, chocolate, coffee or tea.  -prostate meds: flomax and finasteride; continue         2. Prostate cancer:  -a long conversation was had regarding management of his high risk prostate cancer all options were explained to the patient. I explained that I did not feel that surveillance unless he were to opt for watchful waiting would be a reasonable option.  Therefore, we discussed surgical removal of the prostate as well as radiation therapy in its various delivery methods.  Also discussed the concept of androgen deprivation therapy in that he would likely require a prolonged course of androgen deprivation therapy if he were to choose radiation.  We discussed the difficulties that might arise as result of his prostate size which was by a MRI measured as 15 cc.  We also discussed that surgery very likely  would be an initial therapy rather than solitary therapy.  He would be very likely to need adjuvant radiation treatment.  However, that may be advisable given his lower urinary tract symptoms.  We will however get a urodynamic study to evaluate his bladder preoperatively to help with counseling and expectations.    -will keep Dr. wyman performed to the patient's progress

## 2019-05-15 NOTE — PROGRESS NOTES
Subjective:      Patient ID: Anthony Miguel is a 68 y.o. male.    Chief Complaint: Other (discuss options)    Patient is a 68 y.o. male who is established to our clinic and referred by their PCP, Dr. Spaulding for evaluation of LUTs.     HPI  Benign Prostatic Hypertrophy  Patient complains of lower urinary tract symptoms. He reports frequency, incomplete emptying, intermittency, nocturia four times a night, straining, urgency and weak stream. He denies hematuria. Patient states symptoms are of severe severity. Onset of symptoms was a few years ago and was gradual in onset but has worsened in the last month.  He is currently getting epidural treatments for degenerative spine disease---these started ~ 1 month ago. His AUA Symptom Score is, 23/4 . He has no personal history and a family history of prostate cancer. He reports a history of no complicating symptoms. He denies flank pain, gross hematuria, kidney stones and recurrent UTI.  He is on finasteride and tamsulosin.  Has a half-brother who was diagnosed with prostate cancer at age 88.   IPSS Questionnaire (AUA-7):  Over the past month    1)  How often have you had a sensation of not emptying your bladder completely after you finish urinating?  3 - About half the time   2)  How often have you had to urinate again less than two hours after you finished urinating? 3 - About half the time   3)  How often have you found you stopped and started again several times when you urinated?  3 - About half the time   4) How difficult have you found it to postpone urination?  4 - More than half the time   5) How often have you had a weak urinary stream?  3 - About half the time   6) How often have you had to push or strain to begin urination?  3 - About half the time   7) How many times did you most typically get up to urinate from the time you went to bed until the time you got up in the morning?  4 - 4 times   Total score:  0-7 mildly symptomatic    8-19 moderately  symptomatic    20-35 severely symptomatic       He was found on exam to have right-sided prostate nodules.  He subsequently underwent a transrectal ultrasound-guided prostate biopsy.  Pathology is shown below.    SPECIMEN  1) Prostate, left apex.  2) Prostate, left middle.  3) Prostate, left base.  4) Prostate, right apex.  5) Prostate, right middle.  6) Prostate, right base.  FINAL PATHOLOGIC DIAGNOSIS  PROSTATE BIOPSIES 1. LEFT APEX, 2. LEFT MIDDLE, 3. LEFT BASE, 4. RIGHT APEX, 5. RIGHT MIDDLE,  6. RIGHT BASE OF THE PROSTATE:  ADENOCARCINOMA TOTAL GLOBAL BRUNO SCORE 9 (4+ 5)--SEE DESCRIPTION  Diagnosed by: Eduardo Foster M.D.  (Electronically Signed: 2019-03-29 10:39:01)  Microscopic Examination  1. The picture here is that of atypical hyperplasia suspicious for areas of carcinoma, but without definitive histologic  picture seen in the other portions of this case. There are however at least a few glands of grade 3 Clear carcinoma  present.  2. 5% involvement of the total specimen carcinoma. Both cores involved. Clear grade 3.  3. 5% involvement. Both cores involved. Predominant Clear grade 3 but 10% grade 4.  4. 65% involvement. Both cores involved. [place] grade 4 predominates with Bruno grade 5 and Clear grade 3  as additional components.  5. 95% involvement. Both cores involved. Clear grade 3 barely predominates with almost equal volumes of  Clear grade 5 a Clear grade 4.  6. 95% involvement. Both cores involved. Clear grade 4 slightly predominates over Bruno grade 5.  The Clear grade 5 component in these biopsies consists of complex proliferations with central necrosis giving the  comido appearance.    Lab Results   Component Value Date    PSA 1.1 10/26/2018    PSA 2.0 09/12/2017         Review of Systems   All other systems reviewed and negative except pertinent positives noted in HPI.    Objective:     Physical Exam   Constitutional: He is oriented to person, place, and time. He  appears well-developed and well-nourished. No distress.   HENT:   Head: Normocephalic and atraumatic.   Eyes: No scleral icterus.   Neck: No tracheal deviation present.   Pulmonary/Chest: Effort normal. No respiratory distress.   Neurological: He is alert and oriented to person, place, and time.   Psychiatric: He has a normal mood and affect. His behavior is normal. Judgment and thought content normal.     Assessment:     1. Lower urinary tract symptoms    2. Prostate cancer      Plan:     1. Lower urinary tract symptoms    2. Prostate cancer        Orders Placed This Encounter   Procedures    Simple Urodynamics     Standing Status:   Future     Standing Expiration Date:   8/15/2019     Scheduling Instructions:      Reduce cystocele     1. BPH:  -discussed that radiation could worsen his lower urinary tract symptoms.  In addition, upfront surgery may decrease the need for androgen deprivation therapy.  This could have benefits to his musculoskeletal issues.  -Avoid bladder irritants including but not limited to caffeine, alcohol, smoking, spicy foods, acidic foods, tomato-based products, citrus, artificial sweeteners, chocolate, coffee or tea.  -prostate meds: flomax and finasteride; continue         2. Prostate cancer:  -a long conversation was had regarding management of his high risk prostate cancer all options were explained to the patient. I explained that I did not feel that surveillance unless he were to opt for watchful waiting would be a reasonable option.  Therefore, we discussed surgical removal of the prostate as well as radiation therapy in its various delivery methods.  Also discussed the concept of androgen deprivation therapy in that he would likely require a prolonged course of androgen deprivation therapy if he were to choose radiation.  We discussed the difficulties that might arise as result of his prostate size which was by a MRI measured as 15 cc.  We also discussed that surgery very likely  would be an initial therapy rather than solitary therapy.  He would be very likely to need adjuvant radiation treatment.  However, that may be advisable given his lower urinary tract symptoms.  We will however get a urodynamic study to evaluate his bladder preoperatively to help with counseling and expectations.    -will keep Dr. wyman performed to the patient's progress

## 2019-05-20 DIAGNOSIS — N40.0 BENIGN NON-NODULAR PROSTATIC HYPERPLASIA WITHOUT LOWER URINARY TRACT SYMPTOMS: ICD-10-CM

## 2019-05-20 RX ORDER — FINASTERIDE 5 MG/1
TABLET, FILM COATED ORAL
Qty: 90 TABLET | Refills: 3 | Status: SHIPPED | OUTPATIENT
Start: 2019-05-20 | End: 2019-08-20

## 2019-05-20 NOTE — TELEPHONE ENCOUNTER
Proscar sent to [facility] on [address]. Please verify that jose guadalupe wants his script to go here.    Thanks,  KJ

## 2019-05-21 ENCOUNTER — PATIENT OUTREACH (OUTPATIENT)
Dept: ADMINISTRATIVE | Facility: HOSPITAL | Age: 69
End: 2019-05-21

## 2019-05-28 ENCOUNTER — PROCEDURE VISIT (OUTPATIENT)
Dept: UROLOGY | Facility: CLINIC | Age: 69
End: 2019-05-28
Payer: MEDICARE

## 2019-05-28 VITALS
SYSTOLIC BLOOD PRESSURE: 133 MMHG | WEIGHT: 180 LBS | HEART RATE: 68 BPM | TEMPERATURE: 98 F | HEIGHT: 67 IN | DIASTOLIC BLOOD PRESSURE: 78 MMHG | BODY MASS INDEX: 28.25 KG/M2

## 2019-05-28 DIAGNOSIS — R39.9 LOWER URINARY TRACT SYMPTOMS: ICD-10-CM

## 2019-05-28 PROCEDURE — 52000 CYSTOURETHROSCOPY: CPT | Mod: 51,HCNC,S$GLB, | Performed by: UROLOGY

## 2019-05-28 PROCEDURE — 51784 PR ANAL/URINARY MUSCLE STUDY: ICD-10-PCS | Mod: 26,51,HCNC,S$GLB | Performed by: UROLOGY

## 2019-05-28 PROCEDURE — 51741 PR UROFLOWMETRY, COMPLEX: ICD-10-PCS | Mod: 26,51,HCNC,S$GLB | Performed by: UROLOGY

## 2019-05-28 PROCEDURE — 51728 CYSTOMETROGRAM W/VP: CPT | Mod: 26,HCNC,S$GLB, | Performed by: UROLOGY

## 2019-05-28 PROCEDURE — 51741 ELECTRO-UROFLOWMETRY FIRST: CPT | Mod: 26,51,HCNC,S$GLB | Performed by: UROLOGY

## 2019-05-28 PROCEDURE — 52000 PR CYSTOURETHROSCOPY: ICD-10-PCS | Mod: 51,HCNC,S$GLB, | Performed by: UROLOGY

## 2019-05-28 PROCEDURE — 51784 ANAL/URINARY MUSCLE STUDY: CPT | Mod: 26,51,HCNC,S$GLB | Performed by: UROLOGY

## 2019-05-28 PROCEDURE — 51728 PR COMPLEX CYSTOMETROGRAM VOIDING PRESSURE STUDIES: ICD-10-PCS | Mod: 26,HCNC,S$GLB, | Performed by: UROLOGY

## 2019-05-28 RX ORDER — LIDOCAINE HYDROCHLORIDE 20 MG/ML
JELLY TOPICAL ONCE
Status: COMPLETED | OUTPATIENT
Start: 2019-05-28 | End: 2019-05-28

## 2019-05-28 RX ORDER — CIPROFLOXACIN 250 MG/1
500 TABLET, FILM COATED ORAL ONCE
Status: COMPLETED | OUTPATIENT
Start: 2019-05-28 | End: 2019-05-28

## 2019-05-28 RX ADMIN — CIPROFLOXACIN 500 MG: 250 TABLET, FILM COATED ORAL at 02:05

## 2019-05-28 RX ADMIN — LIDOCAINE HYDROCHLORIDE: 20 JELLY TOPICAL at 02:05

## 2019-05-28 NOTE — PROCEDURES
Simple Urodynamics  Date/Time: 5/28/2019 2:19 PM  Performed by: Vazquez Sow MD  Authorized by: Sergio Dixon MD   Comments: Procedure Date:  05/28/2019    Procedure:   Diagnostic Cystourethroscopy   Complex Cystometrogram   Voiding / Pressure Study with Intrarectal Balloon   Complex Uroflow   Electromyogram of Anal Sphincter.     Pre-OP Diagnosis:   Incomplete bladder emptying, weak urine flow, newly diagnosed prostate cancer   Post-OP Diagnosis:   same   Anesthesia:   Anesthesia Administered:   Intraurethral instillation of 10 mL 2% lidocaine (Xylocaine) jelly.   Findings:   --- Bladder ---   CYSTOMETROGRAM ( Filling Phase ):   Cystometric Numeric Data:   - First Desire (Sensation): 173 mL at 8cm of water.   - Normal Desire: 241mL at 9 cm of water.   - Strong Desire: 272 mL at 17 cm of water.   - Urgency (Imminent Void) : 308 mL at 26cm of water.   - Maximum Cystometric Capacity: 309 mL.   Compliance:   - low.   Leak Point Pressure:   - Valsalva ( Abdominal ) Leak Point Pressure: none.   UROFLOW:   - Voided Volume: 60 mL.   - Residual Urine: 250 mL.   - Maximum Flow Rate: 13 mL/sec.   - Flow Pattern: intermittent, weak urine flow  VOIDING PRESSURE STUDY ( Voiding Phase ):   Detrusor Pressure:   - Maximum Detrusor Pressure: 83cm of water.   - Detrusor Pressure at Maximum Flow: 66 cm of water.   - Detrusor Contraction Characteristics: Sustained contraction(s).   ELECTROMYOGRAM:   - normal.     ---Diagnostic Cystourethroscopy ---   Normal urethra.    Ext. Sphincter: able to coapt well upon commands  Prostate 4 cm with trilobar obstruction with high bladder neck with mild intravesical lobe obstruction.  Width of Bladder Neck Opening: Approximately 18 Fr.   Normal bladder with 2 + trabeculation.   Normal ureteral orifices bilaterally.       Description of Procedure:   Informed Consent:   - Risks, benefits and alternatives of procedure discussed with   patient and informed consent obtained.   Patient Position:    - Supine.   --- Bladder ---   Prep and Drape:   - Patient prepped and draped in usual sterile fashion using povidone   iodine (Betadine).   --- Diagnostic Cystourethroscopy ---   Instruments:   - 16 Fr flexible cystoscope with 0 degree lens.   Procedure Details:   - Cystoscope passed under vision into bladder.   - Bladder and urethra examined in their entirety with findings as   above.   --- Urodynamic Studies ---   Procedure Details:   Cystometrogram:   - Catheter(s) passed into the bladder.   - Rectal balloon inserted.   - Catheter(s) connected to infusion medium and to pressure recording   device.   - Infusion Rate: 30 mL / min.   Electromyogram:   - Perineal electromyogram pad placed and connected to electromyogram   recording device.   Equipment:   - Catheters: Double lumen catheter.   - Medium: Liquid.   - Pressure Recording Device: Calibrated electronic equipment.   Complications:   No immediate complications.    CONCLUSIONS:   1. BENJAMIN with incomplete bladder emptying  Given his LUTS and findings of newly diagnosed prostate cancer, I think he will be better off with radical prostatectomy rather than XRT.    2. LUTS  Mild components of sensory urgency and detrusor instability near his bladder capacity ( 250 ml capacity).  May be benefited by OAB meds after radical prostatectomy depending on his overall LUTS.    3. Prostate cancer    Post-OP Plan:   Patient was discharged home in a stable condition.  Medications: cipro  Follow up:  Dr. Dixon to discuss radical prostatectomy for his prostate cancer.

## 2019-05-28 NOTE — PATIENT INSTRUCTIONS
SIMPLE URODYNAMIC STUDY (SUDS) & CYSTOSCOPY  UROLOGY CLINIC DISCHARGE INSTRUCTIONS    You have had a procedure that will require time to properly heal. Follow the instructions you have been given on how to care for yourself once you are home. Below is additional information to help in your recovery.    ACTIVITY  · There are no restrictions in activity. Start doing again the things you did before the procedure.  · You may experience a slight burning sensation. You may notice a small amount of blood in your urine. This will clear up within a day. Call the clinic if this continues beyond 48 hours.    DIET  · Continue your normal diet. You may eat the same foods you ate before your procedure.  · Drink plenty of fluids during the first 24-48 hours following your procedure.    MEDICATIONS  · Resume all other previous medications from your prescribing physician.  · Continue any pre=procedure antibiotics until they are all gone.    SIGNS AND SYMPTOMS TO REPORT TO THE DOCTOR  · Chills or fever greater than 101° F within 24 hours of procedure.  · Changes in urination, such as increased bleeding, foul smell, cloudy urine, or painful urination.  · Call your doctor with any questions or concerns.    For any emergency situation, call [phone] immediately or go to your nearest emergency room.    [facility]  [phone]    _                                                                                                                                                                                             If any problems after hours or weekends, you may call [phone] and ask for the urology resident on call.

## 2019-05-29 ENCOUNTER — OFFICE VISIT (OUTPATIENT)
Dept: SPINE | Facility: CLINIC | Age: 69
End: 2019-05-29
Payer: MEDICARE

## 2019-05-29 VITALS
BODY MASS INDEX: 28.25 KG/M2 | HEART RATE: 66 BPM | SYSTOLIC BLOOD PRESSURE: 115 MMHG | TEMPERATURE: 98 F | WEIGHT: 180 LBS | DIASTOLIC BLOOD PRESSURE: 73 MMHG | HEIGHT: 67 IN

## 2019-05-29 DIAGNOSIS — M54.50 CHRONIC BILATERAL LOW BACK PAIN WITHOUT SCIATICA: Primary | ICD-10-CM

## 2019-05-29 DIAGNOSIS — M51.36 DDD (DEGENERATIVE DISC DISEASE), LUMBAR: ICD-10-CM

## 2019-05-29 DIAGNOSIS — M47.816 SPONDYLOSIS OF LUMBAR REGION WITHOUT MYELOPATHY OR RADICULOPATHY: ICD-10-CM

## 2019-05-29 DIAGNOSIS — M43.16 SPONDYLOLISTHESIS, LUMBAR REGION: ICD-10-CM

## 2019-05-29 DIAGNOSIS — G89.29 CHRONIC BILATERAL LOW BACK PAIN WITHOUT SCIATICA: Primary | ICD-10-CM

## 2019-05-29 PROCEDURE — 99999 PR PBB SHADOW E&M-EST. PATIENT-LVL IV: CPT | Mod: PBBFAC,HCNC,, | Performed by: PHYSICIAN ASSISTANT

## 2019-05-29 PROCEDURE — 99999 PR PBB SHADOW E&M-EST. PATIENT-LVL IV: ICD-10-PCS | Mod: PBBFAC,HCNC,, | Performed by: PHYSICIAN ASSISTANT

## 2019-05-29 PROCEDURE — 99214 PR OFFICE/OUTPT VISIT, EST, LEVL IV, 30-39 MIN: ICD-10-PCS | Mod: HCNC,S$GLB,, | Performed by: PHYSICIAN ASSISTANT

## 2019-05-29 PROCEDURE — 1100F PTFALLS ASSESS-DOCD GE2>/YR: CPT | Mod: HCNC,CPTII,S$GLB, | Performed by: PHYSICIAN ASSISTANT

## 2019-05-29 PROCEDURE — 3288F PR FALLS RISK ASSESSMENT DOCUMENTED: ICD-10-PCS | Mod: HCNC,CPTII,S$GLB, | Performed by: PHYSICIAN ASSISTANT

## 2019-05-29 PROCEDURE — 1100F PR PT FALLS ASSESS DOC 2+ FALLS/FALL W/INJURY/YR: ICD-10-PCS | Mod: HCNC,CPTII,S$GLB, | Performed by: PHYSICIAN ASSISTANT

## 2019-05-29 PROCEDURE — 99214 OFFICE O/P EST MOD 30 MIN: CPT | Mod: HCNC,S$GLB,, | Performed by: PHYSICIAN ASSISTANT

## 2019-05-29 PROCEDURE — 3288F FALL RISK ASSESSMENT DOCD: CPT | Mod: HCNC,CPTII,S$GLB, | Performed by: PHYSICIAN ASSISTANT

## 2019-05-29 RX ORDER — TAMSULOSIN HYDROCHLORIDE 0.4 MG/1
0.4 CAPSULE ORAL DAILY
COMMUNITY
End: 2019-08-20

## 2019-05-29 NOTE — PROGRESS NOTES
"Subjective:     Patient ID:  Anthony Miguel is a 68 y.o. male.    [facility]    Chief Complaint: Low back pain and bilateral leg numbness    HPI    Anthony Miguel is a 68 y.o. male who presents for follow up.  He has recently been diagnosed with prostate cancer and will be having that removed soon.    The L5-S1 IL KAYLEY has helped more than the last injection.  He still has some back pain that comes and goes and some numbness in the bilateral anterior and lateral leg and lateral thighs.  No leg pain.  He walks with a can and a walker.  No radiating leg pain.    He takes diclofenac TID-QID and Neurontin 300, 600, 300.    Patient denies any recent accidents or trauma, no saddle anesthesias, and no bowel or bladder incontinence.      Review of Systems:  Constitution: Negative for chills, fever, night sweats and weight loss.   Musculoskeletal: Negative for falls.   Gastrointestinal: Negative for bowel incontinence, nausea and vomiting.   Genitourinary: Negative for bladder incontinence.   Neurological: Negative for disturbances in coordination and loss of balance.      Objective:      Vitals:    05/29/19 0835   BP: 115/73   Pulse: 66   Temp: 97.7 °F (36.5 °C)   Weight: 81.6 kg (180 lb)   Height: 5' 7" (1.702 m)   PainSc:   5   PainLoc: Back         Physical Exam:    General:  Anthony Miguel is well-developed, well-nourished, appears stated age, in no acute distress, alert and oriented to person, place, and time.    Patient sits comfortably in the exam room and answers questions appropriately. Grossly patient is able to move bilateral lower extremities without difficulty.     XRAY Interpretation:      Lumbar spine ap/lateral/flexion/extension xrays were personally reviewed today.  No fractures.  No movement on flexion and extension.  Grade one spondylolisthesis at L4-5.  DDD at L4-5 and L5-S1 worse at L5-S1.     MRI Interpretation:      Lumbar spine MRI was personally reviewed today.  Grade one spondylolisthesis " L4-5.  DDD L4-5 and L5-S1.  Severe central spinal stenosis at L4-5 and bilateral NFS at L4-5 and L5-S1.    Assessment:          1. Chronic bilateral low back pain without sciatica    2. DDD (degenerative disc disease), lumbar    3. Spondylosis of lumbar region without myelopathy or radiculopathy    4. Spondylolisthesis, lumbar region            Plan:          Orders Placed This Encounter    Procedure Order to [facility]        Grade one spondylolisthesis L4-5.  DDD L4-5 and L5-S1.  Severe central spinal stenosis at L4-5 and bilateral NFS at L4-5 and L5-S1.     -Diclofenac PRN only to take BID  -Continue Neurontin 300, 600, 300  -He was asking about pain medication which he would need to discuss with PCP or pain management  -Would try bilateral L4-5 and L5-S1 facet injections with Dr. Adkins in the future if needed  -He will call back after he has recovered from prostate surgery   -Could see Dr. Wolff in the future if needed for lumbar spine surgery consult      Follow-Up:  Follow up if symptoms worsen or fail to improve. If there are any questions prior to this, the patient was instructed to contact the office.       YESENIA Lorenzo PA-C  Neurosurgery  [facility]  [facility]    Addendum: 07/29/19    Bilateral L4-5 and L5-S1 facet injections ordered through the pain clinic.    FU in two months.    YESENIA Lorenzo PA-C  Neurosurgery  [facility]  [facility]

## 2019-05-30 ENCOUNTER — OFFICE VISIT (OUTPATIENT)
Dept: PRIMARY CARE CLINIC | Facility: CLINIC | Age: 69
End: 2019-05-30
Payer: MEDICARE

## 2019-05-30 ENCOUNTER — TELEPHONE (OUTPATIENT)
Dept: UROLOGY | Facility: CLINIC | Age: 69
End: 2019-05-30

## 2019-05-30 VITALS
BODY MASS INDEX: 28.22 KG/M2 | DIASTOLIC BLOOD PRESSURE: 80 MMHG | HEART RATE: 73 BPM | OXYGEN SATURATION: 98 % | WEIGHT: 179.81 LBS | HEIGHT: 67 IN | SYSTOLIC BLOOD PRESSURE: 134 MMHG

## 2019-05-30 DIAGNOSIS — F15.182 CAFFEINE-INDUCED SLEEP DISORDER WITH MILD USE DISORDER, INSOMNIA TYPE: ICD-10-CM

## 2019-05-30 DIAGNOSIS — M54.41 CHRONIC RIGHT-SIDED LOW BACK PAIN WITH RIGHT-SIDED SCIATICA: ICD-10-CM

## 2019-05-30 DIAGNOSIS — C61 CANCER OF PROSTATE: ICD-10-CM

## 2019-05-30 DIAGNOSIS — C61 PROSTATE CANCER: Primary | ICD-10-CM

## 2019-05-30 DIAGNOSIS — G89.29 CHRONIC RIGHT-SIDED LOW BACK PAIN WITH RIGHT-SIDED SCIATICA: ICD-10-CM

## 2019-05-30 DIAGNOSIS — Z91.199 NONCOMPLIANCE: ICD-10-CM

## 2019-05-30 PROCEDURE — 99215 PR OFFICE/OUTPT VISIT, EST, LEVL V, 40-54 MIN: ICD-10-PCS | Mod: HCNC,S$GLB,, | Performed by: INTERNAL MEDICINE

## 2019-05-30 PROCEDURE — 1101F PT FALLS ASSESS-DOCD LE1/YR: CPT | Mod: HCNC,CPTII,S$GLB, | Performed by: INTERNAL MEDICINE

## 2019-05-30 PROCEDURE — 99215 OFFICE O/P EST HI 40 MIN: CPT | Mod: HCNC,S$GLB,, | Performed by: INTERNAL MEDICINE

## 2019-05-30 PROCEDURE — 1101F PR PT FALLS ASSESS DOC 0-1 FALLS W/OUT INJ PAST YR: ICD-10-PCS | Mod: HCNC,CPTII,S$GLB, | Performed by: INTERNAL MEDICINE

## 2019-05-30 RX ORDER — TRAZODONE HYDROCHLORIDE 50 MG/1
100 TABLET ORAL NIGHTLY PRN
Qty: 180 TABLET | Refills: 3 | Status: SHIPPED | OUTPATIENT
Start: 2019-05-30 | End: 2020-05-11 | Stop reason: SDUPTHER

## 2019-05-30 NOTE — ASSESSMENT & PLAN NOTE
Noncompliant with all meds, has basic regimen but skips doses has decreased access to pharmacy due to transportation barrier  · Pill packing in one week   · Needs it for 3 mos

## 2019-05-30 NOTE — PATIENT INSTRUCTIONS
TODAY:  - continue finasteride and tamsulosin daily   + may need to stop after your surgery  - once you get your check, we can restart pill packs  - trazadone increased to 100mg

## 2019-05-30 NOTE — ASSESSMENT & PLAN NOTE
Proastatectomy on 6/4/19  · F/U with Urology for surgery  · Many not need finasteride and tamsulosin

## 2019-05-30 NOTE — PROGRESS NOTES
Primary Care Provider Appointment    Subjective:      Patient ID: Anthony Miguel is a 68 y.o. male with hearing loss, HLD, lumbar myelopathy    Chief Complaint: Follow-up (lunbar pain) and Low-back Pain    Patient underwent 2 spinal injections this week. Takes gabapentin TID and diclofenac BID for lumbar hien. He may see Dr. Wolff in the future if needed for lumbar spine surgery consult, is having KAYLEY. Has plans for bilateral L4-5 and L5-S1 facet injections with Dr. Adkins.    Plans for prostatectomy on 6/4/19. Has prostate CA on bx. Had NM scan with no evidence of metastatic disease.    He is taking double the dose of trazadone for sleep.    He is due for pill packing, but does not have money until 6/1 for his chronic meds.    Last packed in 11/2018, no longer compliant with all meds. He is taking finasteride and tamsulosin daily.   Adherence package packed for 90 days using the monthly cards (3 cards)     Each Card:     Tamsulosin 0.4 mg  Atrovastatin 10 mg  Viramin D3 2000 Unit  Finasteride 5 mg  Ferrous Sulfate 325 mg (one tablet)     As needed medication, gave patient bottle   Gabapentin 100 mg  Ferrous Sulfate 325 (instructed patient to take one at noon, and one at evening time with food)     35'    Past Surgical History:   Procedure Laterality Date    BACK SURGERY      Injection, Steroid, Epidural LUMBAR/CAUDAL L5-S1 INTERLAMINAR KAYLEY N/A 4/10/2019    Performed [facility]    INJECTION, STEROID, EPIDURAL, L45-S1 IL N/A 2/6/2019    Performed by Marcel [facility] at [facility]    LAMINECTOMY-CERVICAL/FUSION-POSTERIOR; C3-6 N/A 6/21/2017    Performed by [facility] at [facility] OR 2ND FLR    Larangoscopy         Past Medical History:   Diagnosis Date    Benign non-nodular prostatic hyperplasia without lower urinary tract symptoms 6/6/2017    Compliant with finasteride    Hepatitis C     Senile purpura 6/6/2017    Ecchymoses on L UE     Unspecified vitamin D  deficiency 2017    Compliant with daily supplementation of 1000U Vit D    Vocal fold cyst 2012    Overview:  Right side dx update       Social History     Socioeconomic History    Marital status: Single     Spouse name: Not on file    Number of children: Not on file    Years of education: Not on file    Highest education level: Not on file   Occupational History    Not on file   Social Needs    Financial resource strain: Not on file    Food insecurity:     Worry: Not on file     Inability: Not on file    Transportation needs:     Medical: Not on file     Non-medical: Not on file   Tobacco Use    Smoking status: Former Smoker     Packs/day: 1.00     Last attempt to quit: 2000     Years since quittin.3    Smokeless tobacco: Former User   Substance and Sexual Activity    Alcohol use: No    Drug use: No    Sexual activity: Not Currently   Lifestyle    Physical activity:     Days per week: Not on file     Minutes per session: Not on file    Stress: Not on file   Relationships    Social connections:     Talks on phone: Not on file     Gets together: Not on file     Attends Sabianist service: Not on file     Active member of club or organization: Not on file     Attends meetings of clubs or organizations: Not on file     Relationship status: Not on file   Other Topics Concern    Not on file   Social History Narrative    Not on file       Review of Systems   Constitutional: Negative for activity change, appetite change and fatigue.   Respiratory: Negative for cough and shortness of breath.    Cardiovascular: Negative for leg swelling.   Gastrointestinal: Negative for constipation and diarrhea.   Genitourinary: Positive for frequency and urgency. Negative for difficulty urinating and hematuria.   Musculoskeletal: Positive for back pain, gait problem and neck pain. Negative for joint swelling.        LE weakness   Psychiatric/Behavioral: Positive for sleep disturbance. Negative for  "behavioral problems, decreased concentration and dysphoric mood. The patient is nervous/anxious.        Objective:   /80   Pulse 73   Ht 5' 7" (1.702 m)   Wt 81.5 kg (179 lb 12.6 oz)   SpO2 98%   BMI 28.16 kg/m²     Physical Exam   Constitutional: He is oriented to person, place, and time. He appears well-developed and well-nourished.   HENT:   Head: Normocephalic.   Eyes: EOM are normal.   Cardiovascular: Normal rate and regular rhythm.   Pulmonary/Chest: Effort normal.   Musculoskeletal: He exhibits tenderness and deformity.   Ambulating with rolling walker  Increased sensation in hands bilaterally (from last exam)  Cervical vertebrae protruding from low neck   Neurological: He is alert and oriented to person, place, and time. He displays normal reflexes. Coordination normal.   Psychiatric: He has a normal mood and affect. His behavior is normal. Thought content normal.       Lab Results   Component Value Date    WBC 9.39 01/28/2019    HGB 14.4 01/28/2019    HCT 43.6 01/28/2019     01/28/2019    CHOL 116 (L) 01/28/2019    TRIG 73 01/28/2019    HDL 55 01/28/2019    ALT 17 01/28/2019    AST 19 01/28/2019     01/28/2019    K 4.5 01/28/2019     01/28/2019    CREATININE 1.0 01/28/2019    BUN 23 01/28/2019    CO2 27 01/28/2019    TSH 3.029 06/02/2017    PSA 1.1 10/26/2018    INR 1.0 06/21/2017    HGBA1C 5.1 10/26/2018       RESULTS: Reviewed labs and images today    Assessment:   68 y.o. 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, insomnia type     Drinking caffeinated soda at bedtime, coffee in AM  · Advised to cut back on soda at bedtime  · Continue trazodone PRN  · Increase dose to 100mg qhs         Relevant Medications    traZODone (DESYREL) 50 MG tablet    Noncompliance "     Noncompliant with all meds, has basic regimen but skips doses has decreased access to pharmacy due to transportation barrier  · Pill packing in one week   · Needs it for 3 mos            Oncology    Cancer of prostate     Proastatectomy on 6/4/19  · F/U with Urology for surgery  · Many not need finasteride and tamsulosin            Orthopedic    Chronic right-sided low back pain with right-sided sciatica     Bilateral L4-5 and L5-S1 facet injections with Dr. Adkins  · F/U pain mgt               Health Maintenance       Date Due Completion Date    TETANUS VACCINE 12/03/1967 ---    Influenza Vaccine 08/01/2019 10/26/2018    Override on 9/12/2017: Done (PER PT)    Lipid Panel 01/28/2020 1/28/2019    Colonoscopy 04/15/2026 4/15/2016          Follow up in about 1 month (around 6/27/2019). Total face-to-face time was 60 min, 50% of this was spent on counseling and coordination of care. The following issues were discussed: with hearing loss, HLD, lumbar myelopathy    Jada Spaulding MD/MPH  Internal Medicine  [facility]  [phone]

## 2019-05-30 NOTE — ASSESSMENT & PLAN NOTE
Drinking caffeinated soda at bedtime, coffee in AM  · Advised to cut back on soda at bedtime  · Continue trazodone PRN  · Increase dose to 100mg qhs

## 2019-06-03 ENCOUNTER — ANESTHESIA EVENT (OUTPATIENT)
Dept: SURGERY | Facility: HOSPITAL | Age: 69
DRG: 708 | End: 2019-06-03
Payer: MEDICARE

## 2019-06-03 NOTE — ANESTHESIA PREPROCEDURE EVALUATION
[facility]  Anesthesia Pre-Operative Evaluation         Patient Name: Anthony Miguel  YOB: 1950  MRN: [id number]    SUBJECTIVE:     Pre-operative evaluation for Procedure(s) (LRB):  XI ROBOTIC PROSTATECTOMY (N/A)  LYMPHADENECTOMY, PELVIS (Bilateral)     06/03/2019    Anthony Miguel is a 68 y.o. male w/ a significant PMHx of Hep C, HLD, lumbar myellopathy, prostate cancer.    Patient now presents for the above procedure(s).    Prev airway:   Present Prior to Hospital Arrival?: No; Method of Intubation: S[place]; Inserted by: Anesthesia Resident; Airway Device: Endotracheal Tube; Mask Ventilation: Easy - oral; Intubated: Postinduction; Blade: Ovi #3; Airway Device Size: 8.0; Style: Cuffed; Cuff Inflation: Minimal occlusive pressure; Placement Verified By: Auscultation, Capnometry; Grade: Grade I; Complicating Factors: None; Intubation Findings: Positive EtCO2, Bilateral breath sounds;  Depth of Insertion: 24; Securment: Lips; Complications: None; Breath Sounds: Equal Bilateral; Insertion Attempts: 1; Removal Date: 06/21/17;  Removal Time: 1614      Patient Active Problem List   Diagnosis    History of hepatitis C (completed treatment)    HLD (hyperlipidemia)    Neuropathy    Vitamin D deficiency    Benign non-nodular prostatic hyperplasia without lower urinary tract symptoms    Senile purpura    Weakness of both lower extremities    Cervical myelopathy    Chronic hoarseness    Vocal fold cyst    Cervical arthritis    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, insomnia type    Lumbar myelopathy    Head trauma    Seasonal allergic rhinitis due to pollen    Iron deficiency anemia due to chronic blood loss    Chronic right-sided low back pain with right-sided sciatica    Difficulty walking    Encounter for routine eye and vision examination    Mixed conductive and sensorineural hearing loss of both ears    Noncompliance    Injury of right  elbow    OAG (open angle glaucoma) suspect, low risk, bilateral    Nuclear sclerosis of both eyes    Ptosis of eyelid, right    NSAID long-term use    Lumbar radiculopathy    Chronic pain    Cancer of prostate       Review of patient's allergies indicates:  No Known Allergies    Current Inpatient Medications:      No current facility-administered medications on file prior to encounter.      Current Outpatient Medications on File Prior to Encounter   Medication Sig Dispense Refill    atorvastatin (LIPITOR) 10 MG tablet Take 1 tablet (10 mg total) by mouth once daily. 90 tablet 3    cholecalciferol, vitamin D3, (VITAMIN D3) 2,000 unit Cap Take 1 capsule (2,000 Units total) by mouth once daily. 90 capsule 0    diclofenac (VOLTAREN) 75 MG EC tablet TAKE 1 TABLET(75 MG) BY MOUTH TWICE DAILY AS NEEDED 60 tablet 2    ferrous sulfate 325 (65 FE) MG EC tablet Take 1 tablet (325 mg total) by mouth once daily. 90 tablet 3    finasteride (PROSCAR) 5 mg tablet TAKE 1 TABLET(5 MG) BY MOUTH EVERY DAY 90 tablet 3    gabapentin (NEURONTIN) 300 MG capsule Take 1 capsule (300 mg total) by mouth 3 (three) times daily. 270 capsule 0    tamsulosin (FLOMAX) 0.4 mg Cap Take 0.4 mg by mouth once daily.      traZODone (DESYREL) 50 MG tablet Take 2 tablets (100 mg total) by mouth nightly as needed for Insomnia. 180 tablet 3    triamcinolone acetonide 0.1% (KENALOG) 0.1 % cream Apply 15 g topically.      walker (ULTRA-LIGHT ROLLATOR) Misc 1 Units by Misc.(Non-Drug; Combo Route) route once daily at 6am. 1 each 0       Past Surgical History:   Procedure Laterality Date    BACK SURGERY      Injection, Steroid, Epidural LUMBAR/CAUDAL L5-S1 INTERLAMINAR KAYLEY N/A 4/10/2019    [facility]    INJECTION, STEROID, EPIDURAL, L45-S1 IL N/A 2/6/2019    Performed by [facility] MD at [facility] PAIN T    LAMINECTOMY-CERVICAL/FUSION-POSTERIOR; C3-6 N/A 6/21/2017    Performed by David Wolff MD  at [facility] OR 2ND FLR    Larangoscopy         Social History     Socioeconomic History    Marital status: Single     Spouse name: Not on file    Number of children: Not on file    Years of education: Not on file    Highest education level: Not on file   Occupational History    Not on file   Social Needs    Financial resource strain: Not on file    Food insecurity:     Worry: Not on file     Inability: Not on file    Transportation needs:     Medical: Not on file     Non-medical: Not on file   Tobacco Use    Smoking status: Former Smoker     Packs/day: 1.00     Last attempt to quit: 2000     Years since quittin.3    Smokeless tobacco: Former User   Substance and Sexual Activity    Alcohol use: No    Drug use: No    Sexual activity: Not Currently   Lifestyle    Physical activity:     Days per week: Not on file     Minutes per session: Not on file    Stress: Not on file   Relationships    Social connections:     Talks on phone: Not on file     Gets together: Not on file     Attends Nondenominational service: Not on file     Active member of club or organization: Not on file     Attends meetings of clubs or organizations: Not on file     Relationship status: Not on file   Other Topics Concern    Not on file   Social History Narrative    Not on file       OBJECTIVE:     Vital Signs Range (Last 24H):         Significant Labs:  Lab Results   Component Value Date    WBC 9.39 2019    HGB 14.4 2019    HCT 43.6 2019     2019    CHOL 116 (L) 2019    TRIG 73 2019    HDL 55 2019    ALT 17 2019    AST 19 2019     2019    K 4.5 2019     2019    CREATININE 1.0 2019    BUN 23 2019    CO2 27 2019    TSH 3.029 2017    PSA 1.1 10/26/2018    INR 1.0 2017    HGBA1C 5.1 10/26/2018       Diagnostic Studies: No relevant studies.    EKG: No recent studies available.    2D ECHO:  No results found for this or  any previous visit.      ASSESSMENT/PLAN:         Anesthesia Evaluation    I have reviewed the Patient Summary Reports.     I have reviewed the Medications.     Review of Systems  Anesthesia Hx:  History of prior surgery of interest to airway management or planning:  Denies Personal Hx of Anesthesia complications.   Hematology/Oncology:        Current/Recent Cancer.   Cardiovascular:   hyperlipidemia    Hepatic/GI:   Hepatitis, C    Neurological:   Neuromuscular Disease,           Anesthesia Plan  Type of Anesthesia, risks & benefits discussed:  Anesthesia Type:  general  Patient's Preference:   Intra-op Monitoring Plan: standard ASA monitors  Intra-op Monitoring Plan Comments:   Post Op Pain Control Plan: multimodal analgesia, IV/PO Opioids PRN and per primary service following discharge from PACU  Post Op Pain Control Plan Comments:   Induction:   IV  Beta Blocker:  Patient is not currently on a Beta-Blocker (No further documentation required).       Informed Consent: Patient understands risks and agrees with Anesthesia plan.  Questions answered. Anesthesia consent signed with patient.  ASA Score: 3     Day of Surgery Review of History & Physical:            Ready For Surgery From Anesthesia Perspective.

## 2019-06-03 NOTE — PRE-PROCEDURE INSTRUCTIONS
Preop instructions: NPO solids/ milk products after midnight and clears up to 7am (clear liquids are: water, apple juice, Gatorade & Jell-O, black coffee/no milk, cream or creamer), shower instructions, directions, leave all valuables at home, medication instructions for PM prior & am of procedure explained. Patient stated an understanding.     Patient denies any side effects or issues with anesthesia or sedation.

## 2019-06-04 ENCOUNTER — HOSPITAL ENCOUNTER (INPATIENT)
Facility: HOSPITAL | Age: 69
LOS: 1 days | Discharge: HOME | DRG: 708 | End: 2019-06-05
Attending: UROLOGY | Admitting: UROLOGY
Payer: MEDICARE

## 2019-06-04 ENCOUNTER — ANESTHESIA (OUTPATIENT)
Dept: SURGERY | Facility: HOSPITAL | Age: 69
DRG: 708 | End: 2019-06-04
Payer: MEDICARE

## 2019-06-04 DIAGNOSIS — C61 PROSTATE CANCER: Primary | ICD-10-CM

## 2019-06-04 LAB
ABO + RH BLD: NORMAL
BLD GP AB SCN CELLS X3 SERPL QL: NORMAL

## 2019-06-04 PROCEDURE — 63600175 PHARM REV CODE 636 W HCPCS: Mod: HCNC | Performed by: STUDENT IN AN ORGANIZED HEALTH CARE EDUCATION/TRAINING PROGRAM

## 2019-06-04 PROCEDURE — 63600175 PHARM REV CODE 636 W HCPCS: Mod: HCNC | Performed by: NURSE ANESTHETIST, CERTIFIED REGISTERED

## 2019-06-04 PROCEDURE — 55866 LAPS SURG PRST8ECT RPBIC RAD: CPT | Mod: HCNC,,, | Performed by: UROLOGY

## 2019-06-04 PROCEDURE — D9220A PRA ANESTHESIA: ICD-10-PCS | Mod: HCNC,ANES,, | Performed by: ANESTHESIOLOGY

## 2019-06-04 PROCEDURE — 55866 PR LAP,PROSTATECTOMY,RADICAL,W/NERVE SPARE,INCL ROBOTIC: ICD-10-PCS | Mod: AS,HCNC,, | Performed by: PHYSICIAN ASSISTANT

## 2019-06-04 PROCEDURE — 88305 TISSUE EXAM BY PATHOLOGIST: CPT | Mod: HCNC | Performed by: PATHOLOGY

## 2019-06-04 PROCEDURE — 88305 TISSUE SPECIMEN TO PATHOLOGY - SURGERY: ICD-10-PCS | Mod: 26,HCNC,, | Performed by: PATHOLOGY

## 2019-06-04 PROCEDURE — 27000221 HC OXYGEN, UP TO 24 HOURS: Mod: HCNC

## 2019-06-04 PROCEDURE — 37000008 HC ANESTHESIA 1ST 15 MINUTES: Mod: HCNC | Performed by: UROLOGY

## 2019-06-04 PROCEDURE — 25000003 PHARM REV CODE 250: Mod: HCNC | Performed by: STUDENT IN AN ORGANIZED HEALTH CARE EDUCATION/TRAINING PROGRAM

## 2019-06-04 PROCEDURE — 36000712 HC OR TIME LEV V 1ST 15 MIN: Mod: HCNC | Performed by: UROLOGY

## 2019-06-04 PROCEDURE — 38571 LAPAROSCOPY LYMPHADENECTOMY: CPT | Mod: 51,HCNC,, | Performed by: UROLOGY

## 2019-06-04 PROCEDURE — 88309 TISSUE EXAM BY PATHOLOGIST: CPT | Mod: 26,HCNC,, | Performed by: PATHOLOGY

## 2019-06-04 PROCEDURE — 27201423 OPTIME MED/SURG SUP & DEVICES STERILE SUPPLY: Mod: HCNC | Performed by: UROLOGY

## 2019-06-04 PROCEDURE — 88309 TISSUE SPECIMEN TO PATHOLOGY - SURGERY: ICD-10-PCS | Mod: 26,HCNC,, | Performed by: PATHOLOGY

## 2019-06-04 PROCEDURE — 94799 UNLISTED PULMONARY SVC/PX: CPT | Mod: HCNC

## 2019-06-04 PROCEDURE — 55866 PR LAP,PROSTATECTOMY,RADICAL,W/NERVE SPARE,INCL ROBOTIC: ICD-10-PCS | Mod: HCNC,,, | Performed by: UROLOGY

## 2019-06-04 PROCEDURE — 37000009 HC ANESTHESIA EA ADD 15 MINS: Mod: HCNC | Performed by: UROLOGY

## 2019-06-04 PROCEDURE — 86850 RBC ANTIBODY SCREEN: CPT | Mod: HCNC

## 2019-06-04 PROCEDURE — 38571 PR LAP,PELVIC LYMPHADENECTOMY: ICD-10-PCS | Mod: 51,AS,HCNC, | Performed by: PHYSICIAN ASSISTANT

## 2019-06-04 PROCEDURE — 55866 LAPS SURG PRST8ECT RPBIC RAD: CPT | Mod: AS,HCNC,, | Performed by: PHYSICIAN ASSISTANT

## 2019-06-04 PROCEDURE — D9220A PRA ANESTHESIA: Mod: HCNC,ANES,, | Performed by: ANESTHESIOLOGY

## 2019-06-04 PROCEDURE — 94761 N-INVAS EAR/PLS OXIMETRY MLT: CPT | Mod: HCNC

## 2019-06-04 PROCEDURE — 71000039 HC RECOVERY, EACH ADD'L HOUR: Mod: HCNC | Performed by: UROLOGY

## 2019-06-04 PROCEDURE — 38571 PR LAP,PELVIC LYMPHADENECTOMY: ICD-10-PCS | Mod: 51,HCNC,, | Performed by: UROLOGY

## 2019-06-04 PROCEDURE — 38571 LAPAROSCOPY LYMPHADENECTOMY: CPT | Mod: 51,AS,HCNC, | Performed by: PHYSICIAN ASSISTANT

## 2019-06-04 PROCEDURE — 88305 TISSUE EXAM BY PATHOLOGIST: CPT | Mod: 26,HCNC,, | Performed by: PATHOLOGY

## 2019-06-04 PROCEDURE — 36000713 HC OR TIME LEV V EA ADD 15 MIN: Mod: HCNC | Performed by: UROLOGY

## 2019-06-04 PROCEDURE — 11000001 HC ACUTE MED/SURG PRIVATE ROOM: Mod: HCNC

## 2019-06-04 PROCEDURE — 25000003 PHARM REV CODE 250: Mod: HCNC | Performed by: NURSE ANESTHETIST, CERTIFIED REGISTERED

## 2019-06-04 PROCEDURE — 71000033 HC RECOVERY, INTIAL HOUR: Mod: HCNC | Performed by: UROLOGY

## 2019-06-04 RX ORDER — POLYETHYLENE GLYCOL 3350 17 G/17G
17 POWDER, FOR SOLUTION ORAL DAILY
Status: DISCONTINUED | OUTPATIENT
Start: 2019-06-05 | End: 2019-06-05 | Stop reason: HOSPADM

## 2019-06-04 RX ORDER — FENTANYL CITRATE 50 UG/ML
25 INJECTION, SOLUTION INTRAMUSCULAR; INTRAVENOUS EVERY 5 MIN PRN
Status: DISCONTINUED | OUTPATIENT
Start: 2019-06-04 | End: 2019-06-04 | Stop reason: HOSPADM

## 2019-06-04 RX ORDER — TRAZODONE HYDROCHLORIDE 100 MG/1
100 TABLET ORAL NIGHTLY PRN
Status: DISCONTINUED | OUTPATIENT
Start: 2019-06-04 | End: 2019-06-05 | Stop reason: HOSPADM

## 2019-06-04 RX ORDER — FENTANYL CITRATE 50 UG/ML
25 INJECTION, SOLUTION INTRAMUSCULAR; INTRAVENOUS EVERY 5 MIN PRN
Status: DISCONTINUED | OUTPATIENT
Start: 2019-06-04 | End: 2019-06-04

## 2019-06-04 RX ORDER — ROCURONIUM BROMIDE 10 MG/ML
INJECTION, SOLUTION INTRAVENOUS
Status: DISCONTINUED | OUTPATIENT
Start: 2019-06-04 | End: 2019-06-04

## 2019-06-04 RX ORDER — ONDANSETRON 8 MG/1
8 TABLET, ORALLY DISINTEGRATING ORAL EVERY 6 HOURS PRN
Status: DISCONTINUED | OUTPATIENT
Start: 2019-06-04 | End: 2019-06-05 | Stop reason: HOSPADM

## 2019-06-04 RX ORDER — SODIUM CHLORIDE 0.9 % (FLUSH) 0.9 %
10 SYRINGE (ML) INJECTION
Status: DISCONTINUED | OUTPATIENT
Start: 2019-06-04 | End: 2019-06-04

## 2019-06-04 RX ORDER — KETAMINE HCL IN 0.9 % NACL 50 MG/5 ML
SYRINGE (ML) INTRAVENOUS
Status: DISCONTINUED | OUTPATIENT
Start: 2019-06-04 | End: 2019-06-04

## 2019-06-04 RX ORDER — GABAPENTIN 300 MG/1
300 CAPSULE ORAL 3 TIMES DAILY
Status: DISCONTINUED | OUTPATIENT
Start: 2019-06-04 | End: 2019-06-05 | Stop reason: HOSPADM

## 2019-06-04 RX ORDER — LIDOCAINE HCL/PF 100 MG/5ML
SYRINGE (ML) INTRAVENOUS
Status: DISCONTINUED | OUTPATIENT
Start: 2019-06-04 | End: 2019-06-04

## 2019-06-04 RX ORDER — SODIUM CHLORIDE 0.9 % (FLUSH) 0.9 %
10 SYRINGE (ML) INJECTION
Status: DISCONTINUED | OUTPATIENT
Start: 2019-06-04 | End: 2019-06-05 | Stop reason: HOSPADM

## 2019-06-04 RX ORDER — ONDANSETRON 2 MG/ML
INJECTION INTRAMUSCULAR; INTRAVENOUS
Status: DISCONTINUED | OUTPATIENT
Start: 2019-06-04 | End: 2019-06-04

## 2019-06-04 RX ORDER — FERROUS SULFATE 325(65) MG
325 TABLET, DELAYED RELEASE (ENTERIC COATED) ORAL DAILY
Status: DISCONTINUED | OUTPATIENT
Start: 2019-06-05 | End: 2019-06-05 | Stop reason: HOSPADM

## 2019-06-04 RX ORDER — SODIUM CHLORIDE 9 MG/ML
INJECTION, SOLUTION INTRAVENOUS CONTINUOUS
Status: DISCONTINUED | OUTPATIENT
Start: 2019-06-04 | End: 2019-06-05

## 2019-06-04 RX ORDER — SODIUM CHLORIDE 9 MG/ML
INJECTION, SOLUTION INTRAVENOUS CONTINUOUS PRN
Status: DISCONTINUED | OUTPATIENT
Start: 2019-06-04 | End: 2019-06-04

## 2019-06-04 RX ORDER — OXYCODONE AND ACETAMINOPHEN 5; 325 MG/1; MG/1
1 TABLET ORAL EVERY 4 HOURS PRN
Status: DISCONTINUED | OUTPATIENT
Start: 2019-06-04 | End: 2019-06-05 | Stop reason: HOSPADM

## 2019-06-04 RX ORDER — CEFAZOLIN SODIUM 1 G/3ML
2 INJECTION, POWDER, FOR SOLUTION INTRAMUSCULAR; INTRAVENOUS
Status: COMPLETED | OUTPATIENT
Start: 2019-06-04 | End: 2019-06-04

## 2019-06-04 RX ORDER — ATORVASTATIN CALCIUM 10 MG/1
10 TABLET, FILM COATED ORAL DAILY
Status: DISCONTINUED | OUTPATIENT
Start: 2019-06-05 | End: 2019-06-05 | Stop reason: HOSPADM

## 2019-06-04 RX ORDER — SUCCINYLCHOLINE CHLORIDE 20 MG/ML
INJECTION INTRAMUSCULAR; INTRAVENOUS
Status: DISCONTINUED | OUTPATIENT
Start: 2019-06-04 | End: 2019-06-04

## 2019-06-04 RX ORDER — PANTOPRAZOLE SODIUM 40 MG/1
40 TABLET, DELAYED RELEASE ORAL DAILY
Status: DISCONTINUED | OUTPATIENT
Start: 2019-06-05 | End: 2019-06-05 | Stop reason: HOSPADM

## 2019-06-04 RX ORDER — DEXAMETHASONE SODIUM PHOSPHATE 4 MG/ML
INJECTION, SOLUTION INTRA-ARTICULAR; INTRALESIONAL; INTRAMUSCULAR; INTRAVENOUS; SOFT TISSUE
Status: DISCONTINUED | OUTPATIENT
Start: 2019-06-04 | End: 2019-06-04

## 2019-06-04 RX ORDER — HEPARIN SODIUM 5000 [USP'U]/ML
5000 INJECTION, SOLUTION INTRAVENOUS; SUBCUTANEOUS EVERY 8 HOURS
Status: DISCONTINUED | OUTPATIENT
Start: 2019-06-04 | End: 2019-06-04 | Stop reason: HOSPADM

## 2019-06-04 RX ORDER — PROPOFOL 10 MG/ML
VIAL (ML) INTRAVENOUS
Status: DISCONTINUED | OUTPATIENT
Start: 2019-06-04 | End: 2019-06-04

## 2019-06-04 RX ORDER — MIDAZOLAM HYDROCHLORIDE 1 MG/ML
0.5 INJECTION INTRAMUSCULAR; INTRAVENOUS
Status: DISCONTINUED | OUTPATIENT
Start: 2019-06-04 | End: 2019-06-04

## 2019-06-04 RX ORDER — OXYCODONE AND ACETAMINOPHEN 10; 325 MG/1; MG/1
1 TABLET ORAL EVERY 4 HOURS PRN
Status: DISCONTINUED | OUTPATIENT
Start: 2019-06-04 | End: 2019-06-05 | Stop reason: HOSPADM

## 2019-06-04 RX ORDER — ACETAMINOPHEN 10 MG/ML
INJECTION, SOLUTION INTRAVENOUS
Status: DISCONTINUED | OUTPATIENT
Start: 2019-06-04 | End: 2019-06-04

## 2019-06-04 RX ADMIN — HEPARIN SODIUM 5000 UNITS: 5000 INJECTION, SOLUTION INTRAVENOUS; SUBCUTANEOUS at 09:06

## 2019-06-04 RX ADMIN — GABAPENTIN 300 MG: 300 CAPSULE ORAL at 08:06

## 2019-06-04 RX ADMIN — OXYCODONE AND ACETAMINOPHEN 1 TABLET: 10; 325 TABLET ORAL at 04:06

## 2019-06-04 RX ADMIN — SODIUM CHLORIDE: 0.9 INJECTION, SOLUTION INTRAVENOUS at 04:06

## 2019-06-04 RX ADMIN — ROCURONIUM BROMIDE 30 MG: 10 INJECTION, SOLUTION INTRAVENOUS at 12:06

## 2019-06-04 RX ADMIN — LIDOCAINE HYDROCHLORIDE 100 MG: 20 INJECTION, SOLUTION INTRAVENOUS at 11:06

## 2019-06-04 RX ADMIN — ROCURONIUM BROMIDE 10 MG: 10 INJECTION, SOLUTION INTRAVENOUS at 01:06

## 2019-06-04 RX ADMIN — CEFAZOLIN 2 G: 330 INJECTION, POWDER, FOR SOLUTION INTRAMUSCULAR; INTRAVENOUS at 12:06

## 2019-06-04 RX ADMIN — SODIUM CHLORIDE, SODIUM GLUCONATE, SODIUM ACETATE, POTASSIUM CHLORIDE, MAGNESIUM CHLORIDE, SODIUM PHOSPHATE, DIBASIC, AND POTASSIUM PHOSPHATE: .53; .5; .37; .037; .03; .012; .00082 INJECTION, SOLUTION INTRAVENOUS at 01:06

## 2019-06-04 RX ADMIN — ROCURONIUM BROMIDE 20 MG: 10 INJECTION, SOLUTION INTRAVENOUS at 01:06

## 2019-06-04 RX ADMIN — ROCURONIUM BROMIDE 10 MG: 10 INJECTION, SOLUTION INTRAVENOUS at 02:06

## 2019-06-04 RX ADMIN — Medication 10 MG: at 01:06

## 2019-06-04 RX ADMIN — SODIUM CHLORIDE, SODIUM GLUCONATE, SODIUM ACETATE, POTASSIUM CHLORIDE, MAGNESIUM CHLORIDE, SODIUM PHOSPHATE, DIBASIC, AND POTASSIUM PHOSPHATE: .53; .5; .37; .037; .03; .012; .00082 INJECTION, SOLUTION INTRAVENOUS at 12:06

## 2019-06-04 RX ADMIN — SUCCINYLCHOLINE CHLORIDE 140 MG: 20 INJECTION, SOLUTION INTRAMUSCULAR; INTRAVENOUS at 11:06

## 2019-06-04 RX ADMIN — ACETAMINOPHEN 1000 MG: 10 INJECTION, SOLUTION INTRAVENOUS at 12:06

## 2019-06-04 RX ADMIN — ROCURONIUM BROMIDE 10 MG: 10 INJECTION, SOLUTION INTRAVENOUS at 12:06

## 2019-06-04 RX ADMIN — SODIUM CHLORIDE: 0.9 INJECTION, SOLUTION INTRAVENOUS at 11:06

## 2019-06-04 RX ADMIN — ROCURONIUM BROMIDE 10 MG: 10 INJECTION, SOLUTION INTRAVENOUS at 03:06

## 2019-06-04 RX ADMIN — FENTANYL CITRATE 100 MCG: 50 INJECTION, SOLUTION INTRAMUSCULAR; INTRAVENOUS at 11:06

## 2019-06-04 RX ADMIN — Medication 30 MG: at 12:06

## 2019-06-04 RX ADMIN — ONDANSETRON 4 MG: 2 INJECTION INTRAMUSCULAR; INTRAVENOUS at 03:06

## 2019-06-04 RX ADMIN — PROPOFOL 150 MG: 10 INJECTION, EMULSION INTRAVENOUS at 11:06

## 2019-06-04 RX ADMIN — SUGAMMADEX 161 MG: 100 INJECTION, SOLUTION INTRAVENOUS at 03:06

## 2019-06-04 RX ADMIN — Medication 10 MG: at 02:06

## 2019-06-04 RX ADMIN — DEXAMETHASONE SODIUM PHOSPHATE 8 MG: 4 INJECTION, SOLUTION INTRAMUSCULAR; INTRAVENOUS at 12:06

## 2019-06-04 NOTE — TRANSFER OF CARE
"Anesthesia Transfer of Care Note    Patient: Anthony Miguel    Procedure(s) Performed: Procedure(s) (LRB):  XI ROBOTIC PROSTATECTOMY (N/A)  LYMPHADENECTOMY, PELVIS (Bilateral)    Patient location: PACU    Anesthesia Type: general    Transport from OR: Transported from OR on 6-10 L/min O2 by face mask with adequate spontaneous ventilation    Post pain: adequate analgesia    Post assessment: no apparent anesthetic complications and tolerated procedure well    Post vital signs: stable    Level of consciousness: responds to stimulation and lethargic    Nausea/Vomiting: no nausea/vomiting    Complications: none    Transfer of care protocol was followed      Last vitals:   Visit Vitals  /78 (BP Location: Left arm, Patient Position: Lying)   Pulse 63   Temp 35.9 °C (96.6 °F) (Core ([place]))   Resp (!) 27   Ht 5' 7" (1.702 m)   Wt 80.7 kg (178 lb)   SpO2 100%   BMI 27.88 kg/m²     "

## 2019-06-04 NOTE — NURSING TRANSFER
Nursing Transfer Note      6/4/2019     Transfer To: 543 A    Transfer via stretcher    Transported by PCT    Medicines sent: NS gtt    Chart send with patient: yes    Notified: ANNE Renner

## 2019-06-04 NOTE — NURSING
Pt admitted to room. Transferred to bed from stretcher. Tolerated well. Will continue to monitor. Bed in lowest position. Call bell intact and in reach.

## 2019-06-04 NOTE — PROGRESS NOTES
Patient reports having coffee with milk this AM at 0600- Dr. Hanna and Dr. Yu notified. Patient not to roll to OR until 1200 per anesthesia.

## 2019-06-04 NOTE — OP NOTE
[facility]  Operative Note    Date: 06/04/2019    Pre-Op Diagnosis:  high risk [id number] [place] 4+5=9 prostate adenocarcinoma    Post-Op Diagnosis: same    Procedure(s) Performed:   1.  Robot-assisted laparoscopic radical prostatectomy  2.  Bilateral pelvic lymph node dissection    Staff Surgeon: Sergio Dixon MD    Assistant Surgeon: Kathleen Yu MD    Bedside Assistant:  JENSEN Alvarez (no qualified resident was available to assist with the case)    Anesthesia:  General endotracheal anesthesia    Indications: Anthony Miguel is a 68 y.o. male with  high risk [id number] [place] 4+5=9 prostate adenocarcinoma.  He has been counseled on the treatment for prostate cancer and has elected to pursue surgical intervention.          Findings:    - Robotic-assisted laparoscopic prostatectomy performed without complication, bilateral wide excision  - Bilateral PLND performed   - Vesicourethral anastomosis confirmed water tight with negative leak test        Estimated Blood Loss: 100 mL    IVF:  Per anesthesia    Drains:   1.  18 Fr spears catheter with 10 mL sterile water in the balloon  2.  10 mm Joaquin to LLQ    Specimen(s):   1.  Right pelvic lymph nodes  2.  Left pelvic lymph nodes  3.  Prostate and seminal vesicles  4.  Periprostatic lymph nodes    Complications:  None apparent    Procedure in Detail:  After general endotracheal anesthesia, the patient was carefully positioned, padded, prepped and draped.  Positioning and padding was checked by the surgeon and the circulating nurse.  IV antibiotics were administered within 1 hour prior to making initial skin incision.  An OG tube was placed.  A Spears catheter was placed.  A timeout was called. The patient's identity was confirmed with 2 identifiers.  The correct procedure, allergies, blood products were verified by the entire operative team.                                                                        A Veress needle was placed at the  supraumbilical position, and with aspiration and drop test the abdomen was insufflated. The initial pressures were < 10 mm Hg, confirming peritoneal location. The abdomen insufflated to 15 mmHg.  A 8 mm trocar was placed at this site and a camera was introduced.  The abdominal cavity was carefully inspected. There was no evidence of trauma to the peritoneal contents, nor any evidence of injury to the retroperitoneum.     Four more trocars were placed under direct vision. We turned our attention first towards the patient's right side.  We identified an area 8cm lateral to the umbilicus and placed an 8mm trocar under direct vision. An 12 mm air seal assistant trocar was then placed at least 8cm lateral to this and 2 finger breadths off the ASIS. We then our attention towards the patient's left. An 8mm trocar was placed 8cm lateral to the umbilicus. At least 8cm lateral to this and above the ASIS we placed another 8mm trocar. The 5mm trocar was then placed under direct vision inferior to the costal margin between the supraumbilical port and 8mm working trocar on the right. The robot was docked.                                                                                                      The peritoneum posterior to the bladder was incised, the left vas deferens was identified, dissected laterally and divided.  The left seminal vesicle was gently dissected away from surrounding tissue with care being taken not to cause stretch or thermal injury to the lateral pedicle.  Similar procedure was performed  on  the right side.  Both seminal vesicles were retracted anteriorly. Denonvilliers fascia was incised and this plane of dissection carried down to the level of the urethra.  A posterior release was performed bilaterally, with care to protect the rectum.                                                                                                                                           An anterior bladder drop  was performed.  The endopelvic fascia was  gently dissected from the base of the prostate to the apex on both sides, completing the lateral release. A 0- vicryl suture with lapry ties was then used to ligate and divide the dorsal venous complex.  The bladder neck was then divided.  A bladder neck sparing approach was utilized.  Excellent preservation of the internal sphincter was achieved circumferentially.  There was a small median lobe.          After dividing the posterior bladder neck, the seminal vesicles were revisualized and retracted anteriorly.   The lateral pedicle on the left was controlled with Weck clips.  The same was used for the right pedicle.  A wide excision was performed without nerve sparing.     The urethra was divided using cold scissors, the pelvis irrigated and carefully inspected.  There was no evidence of rectal trauma or bleeding.      We then performed a right followed by left pelvic lymph node dissection, with care to avoid injury to the pelvic vessels as well as the obturator nerves.    A vesicourethral anastomosis was then performed with a running suture of 3-0 Monocryl.  After completing the anastomosis, a fresh Park catheter was advanced into the bladder and the balloon  inflated.  The bladder irrigated, there was noted to be no extravasation with 120 mL sterile saline. The specimen was placed in an EndoCatch bag, and a drain was placed through the LLQ port. The supraumbilical port site fascia was widened under direct vision, for specimen extraction.  Specimen was removed from the field and port sites were closed. The extraction site was closed using #1 PDS sutures.  Dermabond was applied to the incisions. Needle and sponge counts were correct.  The patient was transferred to the Recovery Room in stable condition.                                                            Disposition: observation overnight

## 2019-06-04 NOTE — INTERVAL H&P NOTE
The patient has been examined and the H&P has been reviewed:    I concur with the findings and no changes have occurred since H&P was written.    Anesthesia/Surgery risks, benefits and alternative options discussed and understood by patient/family.    To OR today for robotic radical prostatectomy.      Active Hospital Problems    Diagnosis  POA    Prostate cancer [C61]  Yes      Resolved Hospital Problems   No resolved problems to display.

## 2019-06-04 NOTE — OP NOTE
Certification of Assistant at Surgery       Surgery Date: 6/4/2019     Participating Surgeons:  Surgeon(s) and Role:     * Sergio Dixon MD - Primary     * Kathleen Yu MD - Resident - Assisting    Procedures:  Procedure(s) (LRB):  XI ROBOTIC PROSTATECTOMY (N/A)  LYMPHADENECTOMY, PELVIS (Bilateral)    Assistant Surgeon's Certification of Necessity:  I understand that section 1842 (b) (6) (d) of the Social Security Act generally prohibits Medicare Part B reasonable charge payment for the services of assistants at surgery in teaching hospitals when qualified residents are available to furnish such services. I certify that the services for which payment is claimed were medically necessary, and that no qualified resident was available to perform the services. I further understand that these services are subject to post-payment review by the Medicare carrier.      Nkechi Hernandez PA-C    06/04/2019  4:28 PM

## 2019-06-05 VITALS
TEMPERATURE: 96 F | HEIGHT: 67 IN | SYSTOLIC BLOOD PRESSURE: 111 MMHG | OXYGEN SATURATION: 97 % | BODY MASS INDEX: 28 KG/M2 | DIASTOLIC BLOOD PRESSURE: 70 MMHG | HEART RATE: 50 BPM | RESPIRATION RATE: 18 BRPM | WEIGHT: 178.38 LBS

## 2019-06-05 PROCEDURE — 25000003 PHARM REV CODE 250: Mod: HCNC | Performed by: STUDENT IN AN ORGANIZED HEALTH CARE EDUCATION/TRAINING PROGRAM

## 2019-06-05 PROCEDURE — 94799 UNLISTED PULMONARY SVC/PX: CPT | Mod: HCNC

## 2019-06-05 PROCEDURE — 94761 N-INVAS EAR/PLS OXIMETRY MLT: CPT | Mod: HCNC

## 2019-06-05 RX ORDER — OXYCODONE AND ACETAMINOPHEN 5; 325 MG/1; MG/1
1 TABLET ORAL EVERY 4 HOURS PRN
Qty: 11 TABLET | Refills: 0 | Status: SHIPPED | OUTPATIENT
Start: 2019-06-05 | End: 2019-08-20

## 2019-06-05 RX ORDER — POLYETHYLENE GLYCOL 3350 17 G/17G
17 POWDER, FOR SOLUTION ORAL DAILY
Qty: 30 PACKET | Refills: 0 | Status: SHIPPED | OUTPATIENT
Start: 2019-06-05 | End: 2020-11-20

## 2019-06-05 RX ADMIN — ATORVASTATIN CALCIUM 10 MG: 10 TABLET, FILM COATED ORAL at 08:06

## 2019-06-05 RX ADMIN — OXYCODONE HYDROCHLORIDE AND ACETAMINOPHEN 1 TABLET: 5; 325 TABLET ORAL at 11:06

## 2019-06-05 RX ADMIN — GABAPENTIN 300 MG: 300 CAPSULE ORAL at 08:06

## 2019-06-05 RX ADMIN — POLYETHYLENE GLYCOL 3350 17 G: 17 POWDER, FOR SOLUTION ORAL at 08:06

## 2019-06-05 RX ADMIN — OXYCODONE HYDROCHLORIDE AND ACETAMINOPHEN 1 TABLET: 5; 325 TABLET ORAL at 12:06

## 2019-06-05 RX ADMIN — FERROUS SULFATE TAB EC 325 MG (65 MG FE EQUIVALENT) 325 MG: 325 (65 FE) TABLET DELAYED RESPONSE at 08:06

## 2019-06-05 RX ADMIN — PANTOPRAZOLE SODIUM 40 MG: 40 TABLET, DELAYED RELEASE ORAL at 08:06

## 2019-06-05 RX ADMIN — OXYCODONE AND ACETAMINOPHEN 1 TABLET: 10; 325 TABLET ORAL at 05:06

## 2019-06-05 NOTE — SUBJECTIVE & OBJECTIVE
Interval History:   No acute events overnight  Pain is well controlled  No nausea  Tolerating regular diet  Ambulating well    Review of Systems  Objective:     Temp:  [96.1 °F (35.6 °C)-98.4 °F (36.9 °C)] 97.5 °F (36.4 °C)  Pulse:  [62-71] 62  Resp:  [16-29] 17  SpO2:  [94 %-100 %] 97 %  BP: (110-164)/(59-78) 110/59     Body mass index is 27.93 kg/m².           Drains     Drain                 Urethral Catheter 06/04/19 1220 Straight-tip;Latex 18 Fr. less than 1 day                Physical Exam   Constitutional: He is oriented to person, place, and time. He appears well-developed and well-nourished. No distress.   HENT:   Head: Normocephalic and atraumatic.   Eyes: Conjunctivae are normal. No scleral icterus.   Neck: Normal range of motion. No JVD present.   Cardiovascular: Normal rate and regular rhythm.    Pulmonary/Chest: Effort normal. No respiratory distress.   Abdominal: Soft. He exhibits no distension. There is tenderness (appropriate). There is no rebound and no guarding.   Incisions c/d/i  Park draining clear yellow   Musculoskeletal: Normal range of motion.   SCDs in place   Neurological: He is alert and oriented to person, place, and time.   Skin: Skin is warm and dry. He is not diaphoretic. No pallor.     Psychiatric: He has a normal mood and affect. His behavior is normal.       Significant Labs:    BMP:  No results for input(s): NA, K, CL, CO2, BUN, CREATININE, LABGLOM, GLUCOSE, CALCIUM in the last 168 hours.    CBC:   No results for input(s): WBC, HGB, HCT, PLT in the last 168 hours.    All pertinent labs results from the past 24 hours have been reviewed.    Significant Imaging:  All pertinent imaging results/findings from the past 24 hours have been reviewed.

## 2019-06-05 NOTE — PLAN OF CARE
06/05/19 1435   Final Note   Assessment Type Final Discharge Note   Anticipated Discharge Disposition Home   What phone number can be called within the next 1-3 days to see how you are doing after discharge? [phone]   Hospital Follow Up  Appt(s) scheduled? Yes   Discharge plans and expectations educations in teach back method with documentation complete? Yes   Right Care Referral Info   Post Acute Recommendation No Care     Pt discharge to home prior to  seeing patient n care of family. No needs per Urology service. Follow up appt to be scheduled by clinic staff.    Future Appointments   Date Time Provider Department Center   6/26/2019 12:30 PM Jada Spaulding MD [facility] MED CLN [facility]

## 2019-06-05 NOTE — NURSING
Pt in bed resting with family at bedside. Pt denies c/o pain or n/v. D/c orders and prescriptions given to pt. Pt verbalized understanding. Switched spears drainage to leg bag. Instructed pt on switching to spears bag. Verbalized understanding. Removed PIV and pt tolerated well. Pt waiting for transport to vehicle via w/c.

## 2019-06-05 NOTE — PROGRESS NOTES
[facility]  Urology  Progress Note    Patient Name: Anthony Miguel  MRN: [id number]  Admission Date: 6/4/2019  Hospital Length of Stay: 1 days  Code Status: Full Code   Attending Provider: Sergio Dixon MD   Primary Care Physician: Jada Spaulding MD    Subjective:     HPI:  Anthony Miguel is a 68 y.o. male s/p RALP on 6/4/19    Interval History:   No acute events overnight  Pain is well controlled  No nausea  Tolerating regular diet  Ambulating well    Review of Systems  Objective:     Temp:  [96.1 °F (35.6 °C)-98.4 °F (36.9 °C)] 97.5 °F (36.4 °C)  Pulse:  [62-71] 62  Resp:  [16-29] 17  SpO2:  [94 %-100 %] 97 %  BP: (110-164)/(59-78) 110/59     Body mass index is 27.93 kg/m².           Drains     Drain                 Urethral Catheter 06/04/19 1220 Straight-tip;Latex 18 Fr. less than 1 day                Physical Exam   Constitutional: He is oriented to person, place, and time. He appears well-developed and well-nourished. No distress.   HENT:   Head: Normocephalic and atraumatic.   Eyes: Conjunctivae are normal. No scleral icterus.   Neck: Normal range of motion. No JVD present.   Cardiovascular: Normal rate and regular rhythm.    Pulmonary/Chest: Effort normal. No respiratory distress.   Abdominal: Soft. He exhibits no distension. There is tenderness (appropriate). There is no rebound and no guarding.   Incisions c/d/i  Park draining clear yellow   Musculoskeletal: Normal range of motion.   SCDs in place   Neurological: He is alert and oriented to person, place, and time.   Skin: Skin is warm and dry. He is not diaphoretic. No pallor.     Psychiatric: He has a normal mood and affect. His behavior is normal.       Significant Labs:    BMP:  No results for input(s): NA, K, CL, CO2, BUN, CREATININE, LABGLOM, GLUCOSE, CALCIUM in the last 168 hours.    CBC:   No results for input(s): WBC, HGB, HCT, PLT in the last 168 hours.    All pertinent labs results from the past 24 hours  have been reviewed.    Significant Imaging:  All pertinent imaging results/findings from the past 24 hours have been reviewed.      Assessment/Plan:     * Prostate cancer  Anthony Miguel is a 68 y.o. male s/p RALP on 6/4/19    - PO tylenol, PO oxycodone for pain control  - regular diet  - SLIV  - Ambulate pm of surgery and qid  - Restarted home meds  - Drains: Home with spears, leg bag,  bag, and teaching  - Prophylaxis: IS, SCDs, GI ppx    DC home this am          VTE Risk Mitigation (From admission, onward)        Ordered     Place DENEEN hose  Until discontinued      06/04/19 0850     Place sequential compression device  Until discontinued      06/04/19 0850          Kathleen Yu MD  Urology  [facility]

## 2019-06-05 NOTE — DISCHARGE SUMMARY
[facility]  Urology  Discharge Summary      Patient Name: Anthony Miguel  MRN: [id number]  Admission Date: 6/4/2019  Hospital Length of Stay: 1 days  Discharge Date and Time:  06/05/2019 9:15 AM  Attending Physician: Sergio Dixon MD   Discharging Provider: Kathleen Yu MD  Primary Care Physician: Jada Spaulding MD    HPI:   Anthony Miguel is a 68 y.o. male s/p RALP on 6/4/19.    Procedure(s) (LRB):  XI ROBOTIC PROSTATECTOMY (N/A)  LYMPHADENECTOMY, PELVIS (Bilateral)     Indwelling Lines/Drains at time of discharge:   Lines/Drains/Airways     Drain                 Urethral Catheter 06/04/19 1220 Straight-tip;Latex 18 Fr. less than 1 day                Hospital Course (synopsis of major diagnoses, care, treatment, and services provided during the course of the hospital stay): Patient admitted following RALP. He tolerated the procedure well with no complications. On POD 1 his pain was well controlled, he was ambulating and tolerating a regular diet. He was discharged home with spears in place.     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] 10/26/2018 Yes    Difficulty walking [R26.2] 07/19/2018 Yes    Chronic right-sided low back pain with right-sided sciatica [M54.41, G89.29] 07/19/2018 Yes    Iron deficiency anemia due to chronic blood loss [D50.0] 05/21/2018 Yes    Head trauma [S09.90XA] 12/05/2017 Yes    Lumbar myelopathy [G95.9] 11/18/2017 Yes    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, 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] 09/12/2017 Yes    Cervical arthritis [M47.812] 07/19/2017 Yes     Cervical myelopathy [G95.9] 06/20/2017 Yes    Weakness of both lower extremities [R29.898] 06/13/2017 Yes    Neuropathy [G62.9] 06/06/2017 Yes    Vitamin D deficiency [E55.9] 06/06/2017 Yes    Benign non-nodular prostatic hyperplasia without lower urinary tract symptoms [N40.0] 06/06/2017 Yes    Senile purpura [D69.2] 06/06/2017 Yes    History of hepatitis C (completed treatment) [Z86.19] 06/02/2017 Yes    HLD (hyperlipidemia) [E78.5] 06/02/2017 Yes    Chronic hoarseness [R49.0] 09/12/2012 Yes    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    Specialty:  Urology  Why:  spears removal  Contact information:  [address] [place] [id number]  [phone]                 Patient Instructions:      Notify your health care provider if you experience any of the following:  temperature >100.4     Notify your health care provider if you experience any of the following:  persistent nausea and vomiting or diarrhea     Notify your health care provider if you experience any of the following:  severe uncontrolled pain     Notify your health care provider if you experience any of the following:  redness, tenderness, or signs of infection (pain, swelling, redness, odor or green/yellow discharge around incision site)     Notify your health care provider if you experience any of the following:  persistent dizziness, light-headedness, or visual disturbances     No dressing needed     Medications:  Reconciled Home Medications:      Medication List      START taking these medications    oxyCODONE-acetaminophen 5-325 mg per tablet  Commonly known as:  PERCOCET  Take 1 tablet by mouth every 4 (four) hours as needed.     polyethylene glycol 17 gram Pwpk  Commonly known as:  GLYCOLAX  Take 17 g by mouth once daily.        CONTINUE taking  these medications    atorvastatin 10 MG tablet  Commonly known as:  LIPITOR  Take 1 tablet (10 mg total) by mouth once daily.     cholecalciferol (vitamin D3) 2,000 unit Cap  Commonly known as:  VITAMIN D3  Take 1 capsule (2,000 Units total) by mouth once daily.     diclofenac 75 MG EC tablet  Commonly known as:  VOLTAREN  TAKE 1 TABLET(75 MG) BY MOUTH TWICE DAILY AS NEEDED     ferrous sulfate 325 (65 FE) MG EC tablet  Take 1 tablet (325 mg total) by mouth once daily.     finasteride 5 mg tablet  Commonly known as:  PROSCAR  TAKE 1 TABLET(5 MG) BY MOUTH EVERY DAY     gabapentin 300 MG capsule  Commonly known as:  NEURONTIN  Take 1 capsule (300 mg total) by mouth 3 (three) times daily.     tamsulosin 0.4 mg Cap  Commonly known as:  FLOMAX  Take 0.4 mg by mouth once daily.     traZODone 50 MG tablet  Commonly known as:  DESYREL  Take 2 tablets (100 mg total) by mouth nightly as needed for Insomnia.     triamcinolone acetonide 0.1% 0.1 % cream  Commonly known as:  KENALOG  Apply 15 g topically.     walker Misc  Commonly known as:  ULTRA-LIGHT ROLLATOR  1 Units by Misc.(Non-Drug; Combo Route) route once daily at 6am.            Time spent on the discharge of patient: 20 minutes    Kathleen Yu MD  Urology  [facility]

## 2019-06-05 NOTE — DISCHARGE INSTRUCTIONS
What to expect with your Robotic Assisted Laparoscopic Prostatectomy.  [facility]  Updated 06/10/2011   Surgery  o Your surgery will last between 1.5 - 3 hours.   After surgery  o You may or may not have a drain that is shaped like a grenade and put to suction  - This drain usually comes out on Post Op Day (POD) 1. It will remain if the output is high and the nurses will teach you how to record the output and you will come back a few days after you leave to have the drain removed  o You will have a catheter after your surgery.  - This catheter protects your tissues to allow for healing between the bladder neck and the urethra now that the prostate has been removed.  - NO ONE IS TO REMOVE THIS CATHETER OR CHANGE THE CATHETER OTHER THAN SOMEONE FROM [facility].  - If you have to go to the ER because your catheter is not draining, come to the [facility] if possible and they will call us if they are not able to irrigate your catheter.  - If you live out of town and have to go to a local ER, then DO NOT let that doctor remove your catheter. If they are unable to irrigate the catheter or are having troubles with it, have them page the [facility] resident on call immediately at [phone] to help answer any questions.  - The catheter should come out in 7-10 days.   - You will have a midline incision after surgery where the prostate was removed. This will be sewn with absorbable suture so you do not need to worry about having the sutures removed.  - You will have smaller incisions where the instruments were inserted that are also sewn closed with absorbable sutures.  o The night of surgery we expect and hope that you will:  - Walk - walking helps get the bowels moving. Also after your surgery, you are at a risk for a deep venous thrombosis (which is a clot in the legs that can form by remaining inactive or still for extended periods of time) and this can travel to your lungs and make you  feel short of breath. This is a very serious condition. Walking helps prevent a DVT from occurring.  - Eat - you do not have to eat a whole meal, but we want to make sure you can tolerate liquid and/or solid food without nausea and vomiting  - Use your incentive spirometer - this is the breathing apparatus that helps you expand your lungs. If and when you have pain you will not want to take deep breaths. But if you dont take deep breaths, you are at risk for pneumonia. The incentive spirometer will help prevent this from occurring by expanding your lungs.  o Symptoms you may experience immediately post-op:  - Bloating and/or shoulder pain - when we do this operation, we fill up your abdominal cavity with gas to better help us visualize the organs and allow our instruments to fit. After the surgery, not all the air can be removed and your body will eventually absorb this small amount of air. However this can make you feel bloated. In addition, when you sit up, the air can sit right under a muscle (the diaphragm) which has connecting nerves to the shoulders, which could explain why you have shoulder pain.  - Do not expect necessarily to have gas or to have a bowel movement - this goes along with the bloating, you may feel like you want to pass gas or have a bowel movement but you cant. This is normal and you will feel like this for a couple days. There are no pills to help with this. Small walks throughout the day should help with this.  - Pain - your pain should be able to be controlled with medicines by mouth that we prescribe. It is important for you to tell us if you are on any pain medications at home before the surgery as you may need stronger pain meds while in the hospital.  - Bladder spasms - this feels like you have to urinate but cant. Sometimes it can be due to clots clogging up your catheter. The nurse will irrigate the catheter, if there are no clots we can prescribe you an anti-spasmodic. We can also  send you home with a prescription for one.   If you go home on anti-spasmodics, do not take one the morning of your appointment to have your catheter removed or you may not be able to void.   You can go home when:  o Pain is controlled with medicines by mouth  o You are able to walk without difficulty or pain  o You are tolerating a regulating diet  o Your catheter is draining freely   When you go home:  o Catheter care  - Blood in your urine (hematuria) is normal. However if you are having clots or your catheter stops draining and you are experiencing abdominal pain, go to the ER.  - If the tip of your penis hurts with the catheter in place, you can put some Vaseline at the end of the catheter to help lubricate the catheter.  o Activity  - Continue to walk - small walks throughout the day are better than one long walk.   - Do not lift anything greater than 8 pounds for 6 weeks - we want your abdominal wall muscles to heal.  o Bowel Movements - Do not strain to have a bowel movement - the pain medicines will make you constipated. That is why we also ask you to take colace 2-3 x per day to help keep your bowels regular. If you are still having trouble, then you can also add Miralax once a day. Do not take any stool softeners if you are having diarrhea.  o Smoking - If you smoke, we encourage you to STOP. Smoking interferes with the healing process and will prolong your healing with continued smoking.  o Driving - Do not drive while you are on pain meds or with your catheter in place.  o Bathing - If you do not have a drain, you can shower 48 hours after your surgery. If you do have a drain, sponge bathe only until the drain is out.  o Dressing - you can remove the dressings if there is no drainage or change them as needed if there is. The little sterile band-aid strips will fall off on their own in 10-14 days. If they have not fallen off then you can remove them yourself after 14 days.  o Kegels - do not start  your Kegels until after your catheter is out.  o Restarting medicines -especially blood thinners (Coumadin, ASA,Plavix), Fish Oil. Discuss this with your physician prior to discharge   When to return to the ER  o Fever - If you have a fever >101.5, this could be due to a number of reasons such as infection of the urine or incision. If your catheter has been removed, you could possibly have a leak. It would be best to come to the ER so they can better evaluate you.  o Severe pain - pain is expected, but severe or new onset of pain is not normal.   o Inability to tolerate food or liquid with nausea and vomiting - it would be best to go to the ER for them to better evaluate you.

## 2019-06-05 NOTE — ASSESSMENT & PLAN NOTE
Anthony Miguel is a 68 y.o. male s/p RALP on 6/4/19    - PO tylenol, PO oxycodone for pain control  - regular diet  - SLIV  - Ambulate pm of surgery and qid  - Restarted home meds  - Drains: Home with spears, leg bag,  bag, and teaching  - Prophylaxis: IS, SCDs, GI ppx    DC home this am

## 2019-06-10 PROBLEM — Z90.79 HISTORY OF ROBOT-ASSISTED LAPAROSCOPIC RADICAL PROSTATECTOMY: Status: ACTIVE | Noted: 2019-06-10

## 2019-06-10 NOTE — PROGRESS NOTES
CHIEF COMPLAINT:    Mr. Miguel is a 68 y.o. male presenting for post-op f/u and spears catheter removal.    PRESENTING ILLNESS:    Anthony Miguel is a 68 y.o. male new patient to me (records of past medical, family and social history personally reviewed by me), w/ h/o prostate CA high risk [id number] [place] 4+5=9 prostate adenocarcinoma. Pt is s/p RALP w/ BPLND on 6/4/19 w/ Dr. Dixon.    Drains:   1.  18 Fr spears catheter with 10 mL sterile water in the balloon    Final pathology pending.    Today pt returns to clinic for spears catheter removal. He reports feeling well. Denies pain, f/c, n/v, abd/bladder/flank pain. Reports spears catheter draining appropriately, denies GH/clots.    REVIEW OF SYSTEMS:    Anthony Miguel denies headache, blurred vision, fever, nausea, vomiting, chills, abdominal pain, bleeding per rectum, cough, SOB, recent loss of consciousness, recent mental status changes, seizures, dizziness, or upper or lower extremity weakness.    MORIAH (prior to RALP)  1. 3  2. 3  3. 3  4. 3  5. 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    Overview:  Right side dx update       Past Surgical History:   Procedure Laterality Date    BACK SURGERY      Injection, Steroid, Epidural LUMBAR/CAUDAL L5-S1 INTERLAMINAR KAYLEY N/A 4/10/2019    [facility] PA[facility] T    INJECTION, STEROID, EPIDURAL, L45-S1 [place] N/A 2/6/2019    [facility] at [facility] PAIN [facility]    LAMINECTOMY-CERVICAL/FUSION-POSTERIOR; C3-6 N/A 6/21/2017    [facility] OR 2ND FLR    Larangoscopy      LYMPHADENECTOMY, PELVIS Bilateral 6/4/2019     [facility] at [facility] OR 2ND FLR    XI ROBOTIC PROSTATECTOMY N/A 2019    Performed by [facility] at [facility] OR 2ND FLR       Family History   Problem Relation Age of Onset    Cancer Mother     No Known Problems Father     Keratoconus Brother        Social History     Socioeconomic History    Marital status: Single     Spouse name: Not on file    Number of children: Not on file    Years of education: Not on file    Highest education level: Not on file   Occupational History    Not on file   Social Needs    Financial resource strain: Not on file    Food insecurity:     Worry: Not on file     Inability: Not on file    Transportation needs:     Medical: Not on file     Non-medical: Not on file   Tobacco Use    Smoking status: Former Smoker     Packs/day: 1.00     Last attempt to quit: 2000     Years since quittin.3    Smokeless tobacco: Former User   Substance and Sexual Activity    Alcohol use: No    Drug use: No    Sexual activity: Not Currently   Lifestyle    Physical activity:     Days per week: Not on file     Minutes per session: Not on file    Stress: Not on file   Relationships    Social connections:     Talks on phone: Not on file     Gets together: Not on file     Attends Shinto service: Not on file     Active member of club or organization: Not on file     Attends meetings of clubs or organizations: Not on file     Relationship status: Not on file   Other Topics Concern    Not on file   Social History Narrative    Not on file       Allergies:  Patient has no known allergies.    Medications:    Current Outpatient Medications:     atorvastatin (LIPITOR) 10 MG tablet, Take 1 tablet (10 mg total) by mouth once daily., Disp: 90 tablet, Rfl: 3    cholecalciferol, vitamin D3, (VITAMIN D3) 2,000 unit Cap, Take 1 capsule (2,000 Units total) by mouth once daily., Disp: 90 capsule, Rfl: 0    diclofenac (VOLTAREN) 75 MG EC tablet, TAKE 1 TABLET(75  MG) BY MOUTH TWICE DAILY AS NEEDED, Disp: 60 tablet, Rfl: 2    ferrous sulfate 325 (65 FE) MG EC tablet, Take 1 tablet (325 mg total) by mouth once daily., Disp: 90 tablet, Rfl: 3    finasteride (PROSCAR) 5 mg tablet, TAKE 1 TABLET(5 MG) BY MOUTH EVERY DAY, Disp: 90 tablet, Rfl: 3    gabapentin (NEURONTIN) 300 MG capsule, Take 1 capsule (300 mg total) by mouth 3 (three) times daily., Disp: 270 capsule, Rfl: 0    oxyCODONE-acetaminophen (PERCOCET) 5-325 mg per tablet, Take 1 tablet by mouth every 4 (four) hours as needed., Disp: 11 tablet, Rfl: 0    polyethylene glycol (GLYCOLAX) 17 gram PwPk, Take 17 g by mouth once daily., Disp: 30 packet, Rfl: 0    tamsulosin (FLOMAX) 0.4 mg Cap, Take 0.4 mg by mouth once daily., Disp: , Rfl:     traZODone (DESYREL) 50 MG tablet, Take 2 tablets (100 mg total) by mouth nightly as needed for Insomnia., Disp: 180 tablet, Rfl: 3    triamcinolone acetonide 0.1% (KENALOG) 0.1 % cream, Apply 15 g topically., Disp: , Rfl:     walker (ULTRA-LIGHT ROLLATOR) Misc, 1 Units by Misc.(Non-Drug; Combo Route) route once daily at 6am., Disp: 1 each, Rfl: 0    PHYSICAL EXAMINATION:    The patient generally appears in good health, is appropriately interactive, and is in no apparent distress.     Eyes: anicteric sclerae, moist conjunctivae; no lid-lag; PERRLA     HENT: Atraumatic; oropharynx clear with moist mucous membranes and no mucosal ulcerations;normal hard and soft palate.  No evidence of lymphadenopathy.    Neck: Trachea midline.  No thyromegaly.    Musculoskeletal: No abnormal gait.    Skin: No lesions.    Mental: Cooperative with normal affect.  Is oriented to time, place, and person.    Neuro: Grossly intact.    Chest: Normal inspiratory effort.   No accessory muscles.  No audible wheezes.  Respirations symmetric on inspiration and expiration.    Heart: Regular rhythm.      Abdomen:  Soft, non-tender. No masses or organomegaly. Bladder is not palpable. No evidence of flank  discomfort. No evidence of inguinal hernia.    Genitourinary: The penis is circumcised with no evidence of plaques or induration. The urethral meatus is normal. The testes, epididymides, and cord structures are normal in size and contour bilaterally. The scrotum is normal in size and contour.    Extremities: No clubbing, cyanosis, or edema.    LABS:    Lab Results   Component Value Date    CREATININE 1.0 01/28/2019     Lab Results   Component Value Date    PSA 1.1 10/26/2018    PSA 2.0 09/12/2017     Hemoglobin A1C   Date Value Ref Range Status   10/26/2018 5.1 4.0 - 5.6 % Final     Comment:     ADA Screening Guidelines:  5.7-6.4%  Consistent with prediabetes  >or=6.5%  Consistent with diabetes  High levels of fetal hemoglobin interfere with the HbA1C  assay. Heterozygous hemoglobin variants (HbS, HgC, etc)do  not significantly interfere with this assay.   However, presence of multiple variants may affect accuracy.     06/02/2017 5.3 4.5 - 6.2 % Final     Comment:     According to ADA guidelines, hemoglobin A1C <7.0% represents  optimal control in non-pregnant diabetic patients.  Different  metrics may apply to specific populations.   Standards of Medical Care in Diabetes - 2016.  For the purpose of screening for the presence of diabetes:  <5.7%     Consistent with the absence of diabetes  5.7-6.4%  Consistent with increasing risk for diabetes   (prediabetes)  >or=6.5%  Consistent with diabetes  Currently no consensus exists for use of hemoglobin A1C  for diagnosis of diabetes for children.         IMPRESSION:    Encounter Diagnoses   Name Primary?    Benign non-nodular prostatic hyperplasia without lower urinary tract symptoms Yes    Prostate cancer     Post-operative state     History of robot-assisted laparoscopic radical prostatectomy        PLAN:    I spent 40 minutes with the patient of which more than half was spent in direct consultation with the patient in regards to our treatment and plan.    Xavier  catheter removed by w/o complications.    Discussed and reviewed FAUSTINA, and ED post-op.    Discussed and reviewed may resume Kegel Exercises. May perform 4 times per day at 10 reps per session. Discussed avoiding excessive Kegel Exercises, as performing Kegel's excessively will decrease the strength of the pelvic floor muscles.    Discussed each individual recover and recuperate at different rates. Advised not to get discouraged if he is not improving as fast as he expects. If he bothered by FAUSTINA, and experiences no improvement at next f/u, we can refer him to Raquel Miller, Physical Therapist for pelvic floor strengthening. Patients usually improve greatly and most become completely continent after completing PT regimen.    Discussed importance activity restrictions. No heavy lifting. Nothing greater than 15 lbs or greater than a gallon of milk. His activity restrictions are lifted after 6 weeks of surgery.    We will see him back in clinic 1 month for penile rehab, post-op f/u w/ PSA. Thereafter, in 4 months for f/u and another PSA. Then pt will follow up every 6 months with PSAs for the first 5 years after surgery. After 5 years, pt will f/u annually with PSAs until 10 years after initial surgery.    Education sheets provided.    F/u w/ Dr. Helms 1 month for penile rehab.    F/u w/ Dr. Dixon 1 month post-op w/ PSA.    F/u w/ survivorship clinic.    Patient verbalized and expressed understanding, and agree w/ plan.

## 2019-06-11 ENCOUNTER — OFFICE VISIT (OUTPATIENT)
Dept: UROLOGY | Facility: CLINIC | Age: 69
End: 2019-06-11
Payer: MEDICARE

## 2019-06-11 VITALS
SYSTOLIC BLOOD PRESSURE: 125 MMHG | WEIGHT: 198.44 LBS | HEART RATE: 69 BPM | BODY MASS INDEX: 31.15 KG/M2 | HEIGHT: 67 IN | DIASTOLIC BLOOD PRESSURE: 75 MMHG

## 2019-06-11 DIAGNOSIS — N40.0 BENIGN NON-NODULAR PROSTATIC HYPERPLASIA WITHOUT LOWER URINARY TRACT SYMPTOMS: ICD-10-CM

## 2019-06-11 DIAGNOSIS — Z90.79 HISTORY OF ROBOT-ASSISTED LAPAROSCOPIC RADICAL PROSTATECTOMY: ICD-10-CM

## 2019-06-11 DIAGNOSIS — C61 PROSTATE CANCER: Primary | ICD-10-CM

## 2019-06-11 DIAGNOSIS — Z98.890 POST-OPERATIVE STATE: ICD-10-CM

## 2019-06-11 PROCEDURE — 99999 PR PBB SHADOW E&M-EST. PATIENT-LVL IV: ICD-10-PCS | Mod: PBBFAC,HCNC,, | Performed by: NURSE PRACTITIONER

## 2019-06-11 PROCEDURE — 99999 PR PBB SHADOW E&M-EST. PATIENT-LVL IV: CPT | Mod: PBBFAC,HCNC,, | Performed by: NURSE PRACTITIONER

## 2019-06-11 PROCEDURE — 99024 PR POST-OP FOLLOW-UP VISIT: ICD-10-PCS | Mod: HCNC,S$GLB,, | Performed by: NURSE PRACTITIONER

## 2019-06-11 PROCEDURE — 99024 POSTOP FOLLOW-UP VISIT: CPT | Mod: HCNC,S$GLB,, | Performed by: NURSE PRACTITIONER

## 2019-06-11 NOTE — LETTER
June 11, 2019      Jada Spaulding MD  [address]  [facility] [address]           [facility] 4th [facility]  [address]  [facility] [address]  Phone: [phone]          Patient: Anthony Miguel   MR Number: [id number]   YOB: 1950   Date of Visit: 6/11/2019       Dear Dr. Jada Spaulding:    Thank you for referring Anthony Miguel to me for evaluation. Attached you will find relevant portions of my assessment and plan of care.    If you have questions, please do not hesitate to call me. I look forward to following Anthony Miguel along with you.    Sincerely,    Obed Colby, [facility]    Enclosure  CC:  No Recipients    If you would like to receive this communication electronically, please contact [email or web address] or [phone] to request more information on [employer] access.    For providers and/or their staff who would like to refer a patient to [facility], please contact us through our one-stop-shop provider referral line, [facility] [place], at [phone].    If you feel you have received this communication in error or would no longer like to receive these types of communications, please e-mail [email or web address]

## 2019-06-11 NOTE — PATIENT INSTRUCTIONS
Kegel Exercises  Kegel exercises dont need special clothing or equipment. Theyre easy to learn and simple to do. And if you do them right, no one can tell youre doing them, so they can be done almost anywhere. Your healthcare provider, nurse, or physical therapist can answer any questions you have and help you get started.    A weak pelvic floor   The pelvic floor muscles may weaken due to aging, pregnancy and vaginal childbirth, injury, surgery, chronic cough, or lack of exercise. If the pelvic floor is weak, your bladder and other pelvic organs may sag out of place. The urethra may also open too easily and allow urine to leak out. Kegel exercises can help you strengthen your pelvic floor muscles. Then they can better support the pelvic organs and control urine flow.  How Kegel exercises are done  Try each of the Kegel exercises described below. When youre doing them, try not to move your leg, buttock, or stomach muscles.  · Contract as if you were stopping your urine stream. But do it when youre not urinating.  · Tighten your rectum as if trying not to pass gas. Contract your anus, but dont move your buttocks.  · You may place a finger or 2 in the vagina and squeeze your finger with your vagina to learn which muscles to tighten.  Try to hold each Kegel for a slow count to 5. You probably wont be able to hold them for that long at first. But keep practicing. It will get easier as your pelvic floor gets stronger. Eventually, special weights that you place in your vagina may be recommended to help make your Kegels even more effective. Visit your healthcare provider if you have difficulties doing Kegel exercises.  Helpful hints  Here are some tips to follow:  · Do your Kegels as often as you can. The more you do them, the faster youll feel the results.  · Pick an activity you do often as a reminder. For instance, do your Kegels every time you sit down.  · Tighten your pelvic floor before you sneeze, get up  from a chair, cough, laugh, or lift. This protects your pelvic floor from injury and can help prevent urine leakage.   Date Last Reviewed: 8/5/2015  © 4225-2129 [employer]. [address] [place] [address], [place] [address]. All rights reserved. This information is not intended as a substitute for professional medical care. Always follow your healthcare professional's instructions.

## 2019-06-11 NOTE — ANESTHESIA POSTPROCEDURE EVALUATION
Anesthesia Post Evaluation    Patient: Anthony Miguel    Procedure(s) Performed: Procedure(s) (LRB):  XI ROBOTIC PROSTATECTOMY (N/A)  LYMPHADENECTOMY, PELVIS (Bilateral)    Final Anesthesia Type: general  Patient location during evaluation: PACU  Patient participation: Yes- Able to Participate  Level of consciousness: awake and alert and oriented  Post-procedure vital signs: reviewed and stable  Pain management: adequate  Airway patency: patent  PONV status at discharge: No PONV  Anesthetic complications: no      Cardiovascular status: blood pressure returned to baseline  Respiratory status: unassisted  Hydration status: euvolemic  Follow-up not needed.          Vitals:    06/05/19 0731   BP: 111/70   Pulse: (!) 50   Resp: 18   Temp: 35.8 °C (96.4 °F)           Event Time     Out of Recovery 17:43:33          Pain/Paulette Score: No data recorded

## 2019-06-19 DIAGNOSIS — D50.0 IRON DEFICIENCY ANEMIA DUE TO CHRONIC BLOOD LOSS: ICD-10-CM

## 2019-06-19 DIAGNOSIS — N40.0 BENIGN NON-NODULAR PROSTATIC HYPERPLASIA WITHOUT LOWER URINARY TRACT SYMPTOMS: ICD-10-CM

## 2019-06-19 RX ORDER — FERROUS SULFATE 325(65) MG
TABLET ORAL
Qty: 90 TABLET | Refills: 0 | OUTPATIENT
Start: 2019-06-19

## 2019-06-19 RX ORDER — TAMSULOSIN HYDROCHLORIDE 0.4 MG/1
CAPSULE ORAL
Qty: 90 CAPSULE | Refills: 0 | OUTPATIENT
Start: 2019-06-19

## 2019-07-08 ENCOUNTER — LAB VISIT (OUTPATIENT)
Dept: LAB | Facility: HOSPITAL | Age: 69
End: 2019-07-08
Payer: MEDICARE

## 2019-07-08 ENCOUNTER — OFFICE VISIT (OUTPATIENT)
Dept: UROLOGY | Facility: CLINIC | Age: 69
End: 2019-07-08
Payer: MEDICARE

## 2019-07-08 VITALS — HEART RATE: 70 BPM | SYSTOLIC BLOOD PRESSURE: 124 MMHG | DIASTOLIC BLOOD PRESSURE: 68 MMHG

## 2019-07-08 DIAGNOSIS — N39.3 MALE STRESS INCONTINENCE: ICD-10-CM

## 2019-07-08 DIAGNOSIS — C61 PROSTATE CANCER: Primary | ICD-10-CM

## 2019-07-08 DIAGNOSIS — Z98.890 POST-OPERATIVE STATE: ICD-10-CM

## 2019-07-08 DIAGNOSIS — C61 PROSTATE CANCER: ICD-10-CM

## 2019-07-08 DIAGNOSIS — Z90.79 HISTORY OF ROBOT-ASSISTED LAPAROSCOPIC RADICAL PROSTATECTOMY: ICD-10-CM

## 2019-07-08 LAB — COMPLEXED PSA SERPL-MCNC: 0.07 NG/ML (ref 0–4)

## 2019-07-08 PROCEDURE — 99024 PR POST-OP FOLLOW-UP VISIT: ICD-10-PCS | Mod: HCNC,S$GLB,, | Performed by: UROLOGY

## 2019-07-08 PROCEDURE — 36415 COLL VENOUS BLD VENIPUNCTURE: CPT | Mod: HCNC

## 2019-07-08 PROCEDURE — 99999 PR PBB SHADOW E&M-EST. PATIENT-LVL III: CPT | Mod: PBBFAC,HCNC,, | Performed by: UROLOGY

## 2019-07-08 PROCEDURE — 84153 ASSAY OF PSA TOTAL: CPT | Mod: HCNC

## 2019-07-08 PROCEDURE — 99024 POSTOP FOLLOW-UP VISIT: CPT | Mod: HCNC,S$GLB,, | Performed by: UROLOGY

## 2019-07-08 PROCEDURE — 99999 PR PBB SHADOW E&M-EST. PATIENT-LVL III: ICD-10-PCS | Mod: PBBFAC,HCNC,, | Performed by: UROLOGY

## 2019-07-08 NOTE — PROGRESS NOTES
"Subjective:      Patient ID: Anthony Miguel is a 68 y.o. male.    Chief Complaint: Post-op Evaluation    Patient is a 68 y.o. male who is established to our clinic and referred by their PCP, Dr. Spaulding for evaluation of LUTs.     HPI  He underwent a RALP,BPLND on 6/4/19.  He did well after and was discharged the following day.  Returns today for psa results.  Reports significant incontinence.  Goes through 2-5 depends per day.   He admits to not performing kegel exercises "diligently".     Has a half-brother who was diagnosed with prostate cancer at age 88.       He was found on exam to have right-sided prostate nodules.  He subsequently underwent a transrectal ultrasound-guided prostate biopsy.  Pathology is shown below.      RALP PATHOLOGY:  SPECIMEN  1) Periprostatic fat.  2) Prostate and bilateral pelvic lymph nodes.  FINAL PATHOLOGIC DIAGNOSIS  1. GAUTAM PROSTATIC FAT:  NO CARCINOMA IDENTIFIED  2. RADICAL PROSTATECTOMY SPECIMEN:  A MODERATE TOTAL VOLUME OF ADENOCARCINOMA TOTAL AZAR SCORE 9 (4+ 5)  5 LYMPH NODES WITH NO METASTATIC CARCINOMA IDENTIFIED  Diagnosed by: Eduardo Foster M.D.  (Electronically Signed: 2019-06-10 16:34:13)  Microscopic Examination  Prostate: Radical prostatectomy with lymph node excisions  Specimen size:  Weight: 30 g.  Size: 4.5 x 3.6 x 2.9 cm.  Histologic type: Acinar adenocarcinoma  Primary Azar Pattern: 4  Secondary [place] Pattern: 5 ; this pattern consists of cribriforming sheets with central comedonecrosis  Tertiary [place] Pattern: 3  Total Azar Score: 9  Grade Group: 5  Tumor quantitation: There is a moderate total volume of adenocarcinoma present. The longest length of carcinoma in  the slide is 1.9 cm.  Extraprostatic extension: Not identified; although carcinoma does go into and bulges the capsule, extension into  perinephric adipose tissue is not identified.  Urinary Bladder Neck Invasion: No separate specimens submitted  Seminal vesicle invasion: Not " identified  Margin: No margin involvement identified  Treatment effect of carcinoma: Not identified  Lymph node samplin/5  Stage: pT2 pN0 pMX    Lab Results   Component Value Date    PSA 1.1 10/26/2018    PSA 2.0 2017    PSADIAG 0.07 2019         Review of Systems   All other systems reviewed and negative except pertinent positives noted in HPI.    Objective:     Physical Exam   Constitutional: He is oriented to person, place, and time. He appears well-developed and well-nourished. No distress.   HENT:   Head: Normocephalic and atraumatic.   Eyes: No scleral icterus.   Neck: No tracheal deviation present.   Pulmonary/Chest: Effort normal. No respiratory distress.   Neurological: He is alert and oriented to person, place, and time.   Psychiatric: He has a normal mood and affect. His behavior is normal. Judgment and thought content normal.     Assessment:     1. Prostate cancer    2. Male stress incontinence      Plan:     1. Prostate cancer    2. Male stress incontinence        Orders Placed This Encounter   Procedures    Prostate Specific Antigen, Diagnostic     Standing Status:   Future     Standing Expiration Date:   2020    Ambulatory consult to Physical Therapy     Referral Priority:   Routine     Referral Type:   Physical Medicine     Referral Reason:   Specialty Services Required     Referred to Provider:   Shannan Gregory, PT, BCB-PMD     Requested Specialty:   Physical Therapy     Number of Visits Requested:   1     1. Prostate cancer:  -psa did not chavez as hoped for despite favorable pathology (negative margins, pT2c, nodes negative) all things considering the aggressive [place] grade 9.  -will need appt with Dr. Sevilla to discuss timing of possible XRT  -PSA in 6 weeks.     2. Stress incontinence:  -refer to komal gregory  -needs work with ricci to regain continence since he likely will need XRT

## 2019-07-26 ENCOUNTER — TELEPHONE (OUTPATIENT)
Dept: INTERNAL MEDICINE | Facility: CLINIC | Age: 69
End: 2019-07-26

## 2019-07-26 NOTE — TELEPHONE ENCOUNTER
----- Message from Gloria Lockhart sent at 7/26/2019 12:41 PM CDT -----  Contact: Patient [phone]  Patient is calling to schedule an appt.    Please call and advise.    Thank You

## 2019-07-29 ENCOUNTER — TELEPHONE (OUTPATIENT)
Dept: PAIN MEDICINE | Facility: CLINIC | Age: 69
End: 2019-07-29

## 2019-07-29 ENCOUNTER — TELEPHONE (OUTPATIENT)
Dept: SPINE | Facility: CLINIC | Age: 69
End: 2019-07-29

## 2019-07-29 DIAGNOSIS — M51.36 DDD (DEGENERATIVE DISC DISEASE), LUMBAR: Primary | ICD-10-CM

## 2019-07-29 NOTE — TELEPHONE ENCOUNTER
----- Message from Claudia Tripathi sent at 7/29/2019 11:10 AM CDT -----  Contact: KYLE BOLTON [[id number]]  Name of Who is Calling: KYLE BOLTON [[id number]]      What is the request in detail: Pt is requesting to speak with clinical staff.Pt is calling to discuss having a injection done.Please contact to further discuss and advise.       Can the clinic reply by [facility]: No      What [facility] Back [facility]: [phone]

## 2019-07-29 NOTE — TELEPHONE ENCOUNTER
Called and spoke with patient.  He stated that he is interested in getting another injection.  Please place order for Pain Management to call and set up.

## 2019-07-29 NOTE — TELEPHONE ENCOUNTER
Informed patient that he will be hearing from Pain Management to be scheduled for an injection.  Appointment letter mailed to patient for follow up.

## 2019-07-29 NOTE — TELEPHONE ENCOUNTER
Bilateral L4-5 and L5-S1 facet injections ordered through the pain clinic.    Make him a fu in 2 months.    Please call and let the patient know the above.    Thanks,  Adele Vela, YESENIA, PA-C  Neurosurgery  [facility]  [facility]

## 2019-08-01 ENCOUNTER — TELEPHONE (OUTPATIENT)
Dept: INTERNAL MEDICINE | Facility: CLINIC | Age: 69
End: 2019-08-01

## 2019-08-01 NOTE — TELEPHONE ENCOUNTER
----- Message from Bibi Joya sent at 8/1/2019  9:01 AM CDT -----  Contact: [phone]  Patient requesting a call from the office to set up an appointment   . please call and advise, Thanks

## 2019-08-12 ENCOUNTER — TELEPHONE (OUTPATIENT)
Dept: INTERNAL MEDICINE | Facility: CLINIC | Age: 69
End: 2019-08-12

## 2019-08-12 NOTE — TELEPHONE ENCOUNTER
----- Message from Bibi Joya sent at 8/12/2019  9:50 AM CDT -----  Contact: [phone]  Patient requesting a call from the office to discuss setting up an appointment . Please call and advise. Thanks

## 2019-08-13 ENCOUNTER — CLINICAL SUPPORT (OUTPATIENT)
Dept: REHABILITATION | Facility: HOSPITAL | Age: 69
End: 2019-08-13
Attending: UROLOGY
Payer: MEDICARE

## 2019-08-13 DIAGNOSIS — N39.3 MALE STRESS INCONTINENCE: Primary | ICD-10-CM

## 2019-08-13 PROCEDURE — 97162 PT EVAL MOD COMPLEX 30 MIN: CPT | Mod: HCNC

## 2019-08-13 PROCEDURE — 97110 THERAPEUTIC EXERCISES: CPT | Mod: HCNC

## 2019-08-13 NOTE — PATIENT INSTRUCTIONS
Home Program: 8/13/2019    Kegels in standing in am, sitting in pm:    1. Sit/stand comfortably with legs and buttocks relaxed.  2. Squeeze and LIFT the muscles that stop the flow of urine and passage of gas.  Keep legs and buttock muscles quiet.  3. Hold lift for 10 seconds without holding breath.  4. Release and DROP for 10 seconds.  5. Repeat 10 times, 2 sets, 2 times per day.  (2 sets of 10 in am in standing, 2 sets of 10 in pm in sitting)    No Kegels while urinating!

## 2019-08-13 NOTE — PLAN OF CARE
OUTPATIENT PHYSICAL THERAPY EVALUATION        Patient: Anthony Miguel   [facility] #:  [id number]    Date of treatment: 08/13/2019   Time in: 9:02  Time out: 10:00  Billable time: 55 minutes  # Visits: authorized today only    POC expiration: 11/13/2019      HISTORY      Anthony is a 68 y.o. male evaluated on 08/13/2019    Physician:  Sergio Dixon MD   Diagnosis:   Encounter Diagnosis   Name Primary?   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    Overview:  Right side dx update      Surgical History:   Past Surgical History:   Procedure Laterality Date    BACK SURGERY      Injection, Steroid, Epidural LUMBAR/CAUDAL L5-S1 INTERLAMINAR KAYLEY N/A 4/10/2019    Per[facility] at [facility]    INJECTION, STEROID, EPIDURAL, L45-S1 [place] N/A 2/6/2019    [facility] at [facility]    LAMINECTOMY-CERVICAL/FUSION-POSTERIOR; C3-6 N/A 6/21/2017    [facility]    Larangoscopy      LYMPHADENECTOMY, PELVIS Bilateral 6/4/2019    Performed by Sergio Dixon [facility] at [facility] OR [address] [place] [address]    XI ROBOTIC PROSTATECTOMY N/A 6/4/2019    Performed by Sergio Dixon MD at [facility] OR [address] [place] [address]      Medications:   Current Outpatient Medications   Medication Sig    atorvastatin (LIPITOR) 10 MG tablet Take 1 tablet (10 mg total) by mouth once daily.    cholecalciferol, vitamin D3, (VITAMIN D3) 2,000 unit Cap Take 1 capsule (2,000 Units total) by mouth once daily.    diclofenac (VOLTAREN) 75 MG EC tablet TAKE 1 TABLET(75 MG) BY MOUTH TWICE DAILY AS NEEDED  "   ferrous sulfate 325 (65 FE) MG EC tablet Take 1 tablet (325 mg total) by mouth once daily.    finasteride (PROSCAR) 5 mg tablet TAKE 1 TABLET(5 MG) BY MOUTH EVERY DAY    gabapentin (NEURONTIN) 300 MG capsule Take 1 capsule (300 mg total) by mouth 3 (three) times daily.    oxyCODONE-acetaminophen (PERCOCET) 5-325 mg per tablet Take 1 tablet by mouth every 4 (four) hours as needed.    polyethylene glycol (GLYCOLAX) 17 gram PwPk Take 17 g by mouth once daily.    tamsulosin (FLOMAX) 0.4 mg Cap Take 0.4 mg by mouth once daily.    traZODone (DESYREL) 50 MG tablet Take 2 tablets (100 mg total) by mouth nightly as needed for Insomnia.    triamcinolone acetonide 0.1% (KENALOG) 0.1 % cream Apply 15 g topically.    walker (ULTRA-LIGHT ROLLATOR) Misc 1 Units by Misc.(Non-Drug; Combo Route) route once daily at 6am.     No current facility-administered medications for this visit.        Allergies: Review of patient's allergies indicates:  No Known Allergies     Precautions: universal    Bladder/Bowel History: nocturia x 2; constipation      SUBJECTIVE     Date of onset: 6/4/2019  History of current complaint: pt reports that he was getting up to void at night prior to surgery, and is trying to improve his fiber intake to decrease constipation.  He is voiding about every hour.  Nocturia x 4-5.  He lives in an apartment by himself, and comes in today walking with a rollator walker.  Reports that he uses it and a cane to ambulate due to back pain.  He reports a history of falls with the last one being about a year ago.  He reports that he is not being "diligent" with his exercises.  He is not quite dry at night yet.  During the day he goes through anywhere from 2-4 diapers per day.  Not always full.  He leaks with cough and sneeze.  He also reports a bit of urge leakage in that he doesn't always make it.    Patient's goals for therapy: to get "back to normal."  Pain: Patient reports 0/10, with 0 being the lowest and 10 " being the highest.    Activities that cause symptoms: with cough/sneeze/laugh/yell    Previous treatment included none    Sexually active? No    Frequency of urination:   Daytime: every hour           Nighttime: 4-5 times    Difficulty initiating urine stream: No  Urine stream: strong  Complete emptying: Yes  Bladder leakage: Yes  Frequency of incidents: daily  Amount leaked (urine): teaspoons    Frequency of bowel movements: at least once per day  Difficulty initiating BM: No  Quality/Shape of BM: soft formed stool  Colon leakage: No  Form of protection: brief  Number of pads required in 24 hours: 4      Types of fluid intake: cranberry and grape juice; some water intake.  occasional coffee  Diet:trying to add fiber  Current exercise:tries to walk as much as he can.      Occupation: Pt was a  before he retired.      OBJECTIVE     ORTHO SCREEN  Posture: stands with trunk and hips slightly flexed, with rollator.    Pelvic alignment: no sign of deviations noted in supine    ABDOMINALS  Scarring: portal scars healed  Diastasis: none    Abdominal strength: Rectus: 3/5     TA: poorly isolated but palpable contraction noted  Chaperone: declined  Consent signed: Yes    RECTAL PELVIC FLOOR EXAM  EXTERNAL ASSESSMENT  Anus: WNL  Skin condition: WNL   Scarring: none  Sensation: WNL   Pain:  none  Voluntary contraction: visible lift and accessory muscle use  Voluntary relaxation: visible drop  Involuntary contraction: visible lift  Bearing down: bulge  Anal [place]: intact  Discharge: none       INTERNAL ASSESSMENT  EAS tone: WNL   Impaction: none   Pain: none  Sensation: WNL   Muscle Bulk: WFL   Muscle Power: 2/5  Muscle Endurance: 10 sec      Quality of contraction: WNL   Specificity: patient contracts: gluts  Coordination: tends to hold breath during PFM contraction     SEMG EVALUATION: Deferred due to time constraints    TREATMENT      Therapeutic Exercise to develop  strength and endurance for 10 minutes  including: Kegels in standing (AM) and sitting (PM) per handout issued.     Education: instructed on general anatomy/physiology of urinary/bowel system; discussed plan of care with patient; instructed in benefits/risks of treatment; instructed in alternative methods of treatment; instructed in risks of refusing treatment; patient agreed to treatment plan.     Also educated in: anatomy/physiology of pelvic floor and posture/body mechanices    ASSESSMENT      This is a 68 y.o. male referred to outpatient physical therapy and presents with a medical diagnosis of prostate CA. Patient will benefit from skilled physical therapy to maximize his pelvic muscle strength and coordination to allow him more complete bladder control during ADL's and while sleeping.    Educational/Spiritual/Cultural needs: none  Abuse/Neglect: no signs  Nutritional Status: WDWN   Fall Risk: min/moderate as pt ambulates with a device    Pt's spiritual, cultural and educational needs considered and pt agreeable to plan of care and goals as stated below:     Medical necessity is demonstrated by the following IMPAIRMENTS/PROBLEMS     History  Co-morbidities and personal factors that may impact the plan of care Examination  Body Structures and Functions, activity limitations and participation restrictions that may impact the plan of care Clinical Presentation   Decision Making/ Complexity Score   Co-morbidities:   Prostate CA s/p RALP; gait instability              Personal Factors:    Body Regions/Systems/Functions:    poor knowledge of body mechanics and posture, poor trunk stability, decreased pelvic muscle strength, decreased endurance of the pelvic muscles, poor quality of pelvic muscle contraction, increased frequency of urination and poor coordination of pelvic floor muscles during ADL's leading to urinary or fecal leakage           Activity limitations:   Barriers to Learning: none  Environmental Barriers: none noted    Participation  Restrictions:  Pt cannot perform ADL's without wearing a diaper to catch leakage.  Sleep disturbed by nocturia         evolving moderate           PLAN    Frequency: once per 2 weeks  Duration: 12 weeks    Long Term Goals: 12 weeks   Pt will report improved ability to perform ADLs (ie. dressing, bathing, functional transfers) with little (drops) to no urinary leakage 5/7 days per week.  Pt will report improved ability to perform iADLs (ie. driving, home chores, grocery shopping) with little (drops) to no urinary leakage 5/7 days per week.   Pt will report improved ability to cough/sneeze/laugh/yell with little (drops) to no urinary leakage 5/7 days per week.   Pt will report a decrease in pad use to 1-2 pads per day.  Pt/family will be independent with HEP for continued self-management of symptoms.    Physical therapy will include: therapeutic exercises, therapeutic activity, neuromuscular re-education and patient/family education      Therapist: Shannan Gregory, PT, BCB-PMD  8/13/2019

## 2019-08-14 ENCOUNTER — HOSPITAL ENCOUNTER (OUTPATIENT)
Facility: OTHER | Age: 69
Discharge: HOME | End: 2019-08-14
Attending: ANESTHESIOLOGY | Admitting: ANESTHESIOLOGY
Payer: MEDICARE

## 2019-08-14 VITALS
HEIGHT: 67 IN | SYSTOLIC BLOOD PRESSURE: 135 MMHG | RESPIRATION RATE: 16 BRPM | BODY MASS INDEX: 27.47 KG/M2 | DIASTOLIC BLOOD PRESSURE: 72 MMHG | TEMPERATURE: 98 F | OXYGEN SATURATION: 92 % | WEIGHT: 175 LBS | HEART RATE: 61 BPM

## 2019-08-14 DIAGNOSIS — G89.29 CHRONIC PAIN: ICD-10-CM

## 2019-08-14 DIAGNOSIS — M47.816 OSTEOARTHRITIS OF LUMBAR SPINE, UNSPECIFIED SPINAL OSTEOARTHRITIS COMPLICATION STATUS: ICD-10-CM

## 2019-08-14 DIAGNOSIS — M47.816 LUMBAR SPONDYLOSIS: Primary | ICD-10-CM

## 2019-08-14 PROCEDURE — 64494 INJ PARAVERT F JNT L/S 2 LEV: CPT | Mod: 50,HCNC | Performed by: ANESTHESIOLOGY

## 2019-08-14 PROCEDURE — 63600175 PHARM REV CODE 636 W HCPCS: Mod: HCNC | Performed by: GENERAL PRACTICE

## 2019-08-14 PROCEDURE — 25000003 PHARM REV CODE 250: Mod: HCNC | Performed by: ANESTHESIOLOGY

## 2019-08-14 PROCEDURE — 64493 INJ PARAVERT F JNT L/S 1 LEV: CPT | Mod: 50,HCNC | Performed by: ANESTHESIOLOGY

## 2019-08-14 PROCEDURE — 99152 MOD SED SAME PHYS/QHP 5/>YRS: CPT | Mod: ,,, | Performed by: ANESTHESIOLOGY

## 2019-08-14 PROCEDURE — 64494 PR INJ DX/THER AGNT PARAVERT FACET JOINT,IMG GUIDE,LUMBAR/SAC, 2ND LEVEL: ICD-10-PCS | Mod: 50,,, | Performed by: ANESTHESIOLOGY

## 2019-08-14 PROCEDURE — 63600175 PHARM REV CODE 636 W HCPCS: Mod: HCNC | Performed by: ANESTHESIOLOGY

## 2019-08-14 PROCEDURE — 64493 INJ PARAVERT F JNT L/S 1 LEV: CPT | Mod: 50,,, | Performed by: ANESTHESIOLOGY

## 2019-08-14 PROCEDURE — 64493 PR INJ DX/THER AGNT PARAVERT FACET JOINT,IMG GUIDE,LUMBAR/SAC,1ST LVL: ICD-10-PCS | Mod: 50,,, | Performed by: ANESTHESIOLOGY

## 2019-08-14 PROCEDURE — 25500020 PHARM REV CODE 255: Mod: HCNC | Performed by: ANESTHESIOLOGY

## 2019-08-14 PROCEDURE — 99152 PR MOD CONSCIOUS SEDATION, SAME PHYS, 5+ YRS, FIRST 15 MIN: ICD-10-PCS | Mod: ,,, | Performed by: ANESTHESIOLOGY

## 2019-08-14 PROCEDURE — 64494 INJ PARAVERT F JNT L/S 2 LEV: CPT | Mod: 50,,, | Performed by: ANESTHESIOLOGY

## 2019-08-14 RX ORDER — DEXAMETHASONE SODIUM PHOSPHATE 4 MG/ML
INJECTION, SOLUTION INTRA-ARTICULAR; INTRALESIONAL; INTRAMUSCULAR; INTRAVENOUS; SOFT TISSUE
Status: DISCONTINUED | OUTPATIENT
Start: 2019-08-14 | End: 2019-08-14 | Stop reason: HOSPADM

## 2019-08-14 RX ORDER — FENTANYL CITRATE 50 UG/ML
INJECTION, SOLUTION INTRAMUSCULAR; INTRAVENOUS
Status: DISCONTINUED | OUTPATIENT
Start: 2019-08-14 | End: 2019-08-14 | Stop reason: HOSPADM

## 2019-08-14 RX ORDER — SODIUM CHLORIDE 9 MG/ML
500 INJECTION, SOLUTION INTRAVENOUS CONTINUOUS
Status: DISCONTINUED | OUTPATIENT
Start: 2019-08-14 | End: 2019-08-14 | Stop reason: HOSPADM

## 2019-08-14 RX ORDER — LIDOCAINE HYDROCHLORIDE 10 MG/ML
INJECTION INFILTRATION; PERINEURAL
Status: DISCONTINUED | OUTPATIENT
Start: 2019-08-14 | End: 2019-08-14 | Stop reason: HOSPADM

## 2019-08-14 RX ORDER — MIDAZOLAM HYDROCHLORIDE 1 MG/ML
INJECTION INTRAMUSCULAR; INTRAVENOUS
Status: DISCONTINUED | OUTPATIENT
Start: 2019-08-14 | End: 2019-08-14 | Stop reason: HOSPADM

## 2019-08-14 RX ORDER — BUPIVACAINE HYDROCHLORIDE 2.5 MG/ML
INJECTION, SOLUTION EPIDURAL; INFILTRATION; INTRACAUDAL
Status: DISCONTINUED | OUTPATIENT
Start: 2019-08-14 | End: 2019-08-14 | Stop reason: HOSPADM

## 2019-08-14 RX ADMIN — SODIUM CHLORIDE 500 ML: 0.9 INJECTION, SOLUTION INTRAVENOUS at 09:08

## 2019-08-14 NOTE — DISCHARGE INSTRUCTIONS
Adult Procedural Sedation Instructions    Recovery After Procedural Sedation (Adult)  You have been given medicine by vein to make you sleep during your surgery. This may have included both a pain medicine and sleeping medicine. Most of the effects have worn off. But you may still have some drowsiness for the next 6 to 8 hours.  Home care  Follow these guidelines when you get home:  · For the next 8 hours, you should be watched by a responsible adult. This person should make sure your condition is not getting worse.  · Don't drink any alcohol for the next 24 hours.  · Don't drive, operate dangerous machinery, or make important business or personal decisions during the next 24 hours.  Note: Your healthcare provider may tell you not to take any medicine by mouth for pain or sleep in the next 4 hours. These medicines may react with the medicines you were given in the hospital. This could cause a much stronger response than usual.  Follow-up care  Follow up with your healthcare provider if you are not alert and back to your usual level of activity within 12 hours.  When to seek medical advice  Call your healthcare provider right away if any of these occur:  · Drowsiness gets worse  · Weakness or dizziness gets worse  · Repeated vomiting  · You can't be awakened   Date Last Reviewed: 10/18/2016  © 1742-3537 [employer]. [address] [place] [address], [place] [address]. All rights reserved. This information is not intended as a substitute for professional medical care. Always follow your healthcare professional's instructions.       Thank you for allowing us to care for you today. You may receive a survey about the care we provided. Your feedback is valuable and helps us provide excellent care throughout the community.     Home Care Instructions for Pain Management:    1. DIET:   You may resume your normal diet today.   2. BATHING:   You may shower with luke warm water. No tub baths or anything that will soak  injection sites under water for the next 24 hours.  3. DRESSING:   You may remove your bandage today.   4. ACTIVITY LEVEL:   You may resume your normal activities 24 hrs after your procedure. Nothing strenuous today.  5. MEDICATIONS:   You may resume your normal medications today. To restart blood thinners, ask your doctor.  6. DRIVING    If you have received any sedatives by mouth today, you may not drive for 12 hours.    If you have received any sedation through your IV, you may not drive for 24 hrs.   7. SPECIAL INSTRUCTIONS:   No heat to the injection site for 24 hrs including, hot bath or shower, heating pad, moist heat, or hot tubs.    Use ice pack to injection site for any pain or discomfort.  Apply ice packs for 20 minute intervals as needed.    IF you have diabetes, be sure to monitor your blood sugar more closely. IF your injection contained steroids your blood sugar levels may become higher than normal.    If you are still having pain upon discharge:  Your pain may improve over the next 48 hours. The anesthetic (numbing medication) works immediately to 48 hours. IF your injection contained a steroid (anti-inflammatory medication), it takes approximately 3 days to start feeling relief and 7-10 days to see your greatest results from the medication. It is possible you may need subsequent injections. This would be discussed at your follow up appointment with pain management or your referring doctor.    Please call my office or pager at [phone] if experienced any weakness or loss of sensation, fever > 101.5, pain uncontrolled with oral medications, persistent nausea/vomiting/or diarrhea, redness or drainage from the incisions, or any other worrisome concerns. If physician on call was not reached or could not communicate with our office for any reason please go to the nearest emergency department.

## 2019-08-14 NOTE — DISCHARGE SUMMARY
Discharge Note  Short Stay      SUMMARY     Admit Date: 8/14/2019    Attending Physician: Marcel Adkins      Discharge Physician: Marcel Adkins      Discharge Date: 8/14/2019 11:20 AM    Procedure(s) (LRB):  INJECTION, FACET JOINT INJECTION (LUMBAR BLOCK) BILATERAL L4-5 AND L5-S1 (Bilateral)    Final Diagnosis: DDD (degenerative disc disease), lumbar [M51.36]    Disposition: Home or self care    Patient Instructions:   Current Discharge Medication List      CONTINUE these medications which have NOT CHANGED    Details   atorvastatin (LIPITOR) 10 MG tablet Take 1 tablet (10 mg total) by mouth once daily.  Qty: 90 tablet, Refills: 3    Associated Diagnoses: Hyperlipidemia, unspecified hyperlipidemia type      cholecalciferol, vitamin D3, (VITAMIN D3) 2,000 unit Cap Take 1 capsule (2,000 Units total) by mouth once daily.  Qty: 90 capsule, Refills: 0    Associated Diagnoses: Vitamin D deficiency      diclofenac (VOLTAREN) 75 MG EC tablet TAKE 1 TABLET(75 MG) BY MOUTH TWICE DAILY AS NEEDED  Qty: 60 tablet, Refills: 2      ferrous sulfate 325 (65 FE) MG EC tablet Take 1 tablet (325 mg total) by mouth once daily.  Qty: 90 tablet, Refills: 3    Associated Diagnoses: Iron deficiency anemia due to chronic blood loss      finasteride (PROSCAR) 5 mg tablet TAKE 1 TABLET(5 MG) BY MOUTH EVERY DAY  Qty: 90 tablet, Refills: 3    Associated Diagnoses: Benign non-nodular prostatic hyperplasia without lower urinary tract symptoms      gabapentin (NEURONTIN) 300 MG capsule Take 1 capsule (300 mg total) by mouth 3 (three) times daily.  Qty: 270 capsule, Refills: 0      oxyCODONE-acetaminophen (PERCOCET) 5-325 mg per tablet Take 1 tablet by mouth every 4 (four) hours as needed.  Qty: 11 tablet, Refills: 0      polyethylene glycol (GLYCOLAX) 17 gram PwPk Take 17 g by mouth once daily.  Qty: 30 packet, Refills: 0      tamsulosin (FLOMAX) 0.4 mg Cap Take 0.4 mg by mouth once daily.      traZODone (DESYREL) 50 MG tablet Take 2 tablets  (100 mg total) by mouth nightly as needed for Insomnia.  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      walker (ULTRA-LIGHT ROLLATOR) Misc 1 Units by Misc.(Non-Drug; Combo Route) route once daily at 6am.  Qty: 1 each, Refills: 0    Associated Diagnoses: Weakness                 Discharge Diagnosis: DDD (degenerative disc disease), lumbar [M51.36]  Condition on Discharge: Stable with no complications to procedure   Diet on Discharge: Same as before.  Activity: as per instruction sheet.  Discharge to: Home with a responsible adult.  Follow up: 2-4 weeks    Please call my office or pager at [phone] if experienced any weakness or loss of sensation, fever > 101.5, pain uncontrolled with oral medications, persistent nausea/vomiting/or diarrhea, redness or drainage from the incisions, or any other worrisome concerns. If physician on call was not reached or could not communicate with our office for any reason please go to the nearest emergency department

## 2019-08-14 NOTE — H&P
"HPI  Patient presenting for Procedure(s) (LRB):  INJECTION, FACET JOINT INJECTION (LUMBAR BLOCK) BILATERAL L4-5 AND L5-S1 (Bilateral)     Patient on Anti-coagulation No     Today patient denies any fevers, chills, nausea, vomiting, changes in health, procedures such as colonoscopy or dental procedure in the last 2 weeks, or infections.    No health changes since previous encounter    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    Overview:  Right side dx update     Past Surgical History:   Procedure Laterality Date    BACK SURGERY      Injection, Steroid, Epidural LUMBAR/CAUDAL L5-S1 INTERLAMINAR KAYLEY N/A 4/10/2019    Performed by Marcel Adkins MD at [facility] MGT    INJECTION, STEROID, EPIDURAL, L45-S1 [place] N/A 2/6/2019    Performed by Marcel Adkins MD at [facility] PAIN MGT    LAMINECTOMY-CERVICAL/FUSION-POSTERIOR; C3-6 N/A 6/21/2017    Performed by David Wolff MD at [facility] OR 2ND FLR    Larangoscopy      LYMPHADENECTOMY, PELVIS Bilateral 6/4/2019    Performed by Sergio Dixon MD at [facility] OR 2ND FLR    XI ROBOTIC PROSTATECTOMY N/A 6/4/2019    Performed by Sergio Dixon MD at [facility] OR [facility] FLR     Review of patient's allergies indicates:  No Known Allergies   Current Facility-Administered Medications   Medication    0.9%  NaCl infusion       PMHx, PSHx, Allergies, Medications reviewed in epic    ROS negative except pain complaints in HPI    OBJECTIVE:    /68 (Patient Position: Lying)   Pulse 62   Temp 98.2 °F (36.8 °C) (Oral)   Ht 5' 7" (1.702 m)   Wt 79.4 kg (175 lb)   SpO2 98%   BMI 27.41 kg/m²     PHYSICAL EXAMINATION:    GENERAL: Well appearing, in no acute distress, alert " and oriented x3.  PSYCH:  Mood and affect appropriate.  SKIN: Skin color, texture, turgor normal, no rashes or lesions which will impact the procedure.  CV: RRR with palpation of the radial artery.  PULM: No evidence of respiratory difficulty, symmetric chest rise. Clear to auscultation.  NEURO: Cranial nerves grossly intact.    Plan:    Proceed with procedure as planned Procedure(s) (LRB):  INJECTION, FACET JOINT INJECTION (LUMBAR BLOCK) BILATERAL L4-5 AND L5-S1 (Bilateral)    MARYJO Erickson  08/14/2019

## 2019-08-14 NOTE — OP NOTE
"Patient Name: Anthony Miguel  MRN: [id number]    INFORMED CONSENT: The procedure, risks, benefits and options were discussed with patient. There are no contraindications to the procedure. The patient expressed understanding and agreed to proceed. The personnel performing the procedure was discussed. I verify that I personally obtained Anthony's consent prior to the start of the procedure and the signed consent can be found on the patient's chart.    Procedure Date: 08/14/2019    Anesthesia: Topical    Pre Procedure diagnosis: DDD (degenerative disc disease), lumbar [M51.36]  1. Lumbar spondylosis    2. Osteoarthritis of lumbar spine, unspecified spinal osteoarthritis complication status    3. Chronic pain      Post-Procedure diagnosis: SAME      Sedation: Yes - Fentanyl 50 mcg and Midazolam 2 mg    PROCEDURE: bilateral L4-5/L5-S1 FACET JOINT INJECTION      DESCRIPTION OF PROCEDURE:The patient was brought to the procedure room.  After performing time out. IV access was obtained prior to the procedure. The patient was positioned prone on the fluoroscopy table. Continuous hemodynamic monitoring was initiated including blood pressure, EKG, and pulse oximetry. The area of the lumbar spine was prepped with chlorhexidine and draped into a sterile field.Fluoroscopy was used to identify the location of bilateral L4-5/L5-S1  joints respectivly. Skin anesthesia was achieved using 3 cc of Lidocaine 1% over the injection sites. A 25 gauge, 3 1/2" spinal needle was slowly inserted at each joint using APand oblique fluoroscopic imaging. Negative aspiration for blood or CSF was confirmed. 0.5 cc of Omnipaque  dye was inserted to confirm placement A combination of 4 ml bupivacaine 0.25% and 10 mg decadron was injected at all joints. The needles were removed and bleeding was nil. A sterile dressing was applied. No specimens collected. Anthony was taken back to the recovery room for further observation.     Blood Loss: " Nill  Specimen: None    Marcel Adkins MD

## 2019-08-19 ENCOUNTER — LAB VISIT (OUTPATIENT)
Dept: LAB | Facility: HOSPITAL | Age: 69
End: 2019-08-19
Attending: UROLOGY
Payer: MEDICARE

## 2019-08-19 DIAGNOSIS — C61 PROSTATE CANCER: ICD-10-CM

## 2019-08-19 LAB — COMPLEXED PSA SERPL-MCNC: 0.07 NG/ML (ref 0–4)

## 2019-08-19 PROCEDURE — 84153 ASSAY OF PSA TOTAL: CPT

## 2019-08-19 PROCEDURE — 36415 COLL VENOUS BLD VENIPUNCTURE: CPT

## 2019-08-20 ENCOUNTER — OFFICE VISIT (OUTPATIENT)
Dept: UROLOGY | Facility: CLINIC | Age: 69
End: 2019-08-20
Payer: MEDICARE

## 2019-08-20 ENCOUNTER — TELEPHONE (OUTPATIENT)
Dept: RADIATION ONCOLOGY | Facility: CLINIC | Age: 69
End: 2019-08-20

## 2019-08-20 VITALS
BODY MASS INDEX: 27.47 KG/M2 | WEIGHT: 175 LBS | HEART RATE: 71 BPM | HEIGHT: 67 IN | SYSTOLIC BLOOD PRESSURE: 133 MMHG | DIASTOLIC BLOOD PRESSURE: 75 MMHG | RESPIRATION RATE: 15 BRPM

## 2019-08-20 DIAGNOSIS — N39.3 MALE STRESS INCONTINENCE: ICD-10-CM

## 2019-08-20 DIAGNOSIS — C61 PROSTATE CANCER: Primary | ICD-10-CM

## 2019-08-20 PROCEDURE — 99999 PR PBB SHADOW E&M-EST. PATIENT-LVL III: ICD-10-PCS | Mod: PBBFAC,,, | Performed by: UROLOGY

## 2019-08-20 PROCEDURE — 99999 PR PBB SHADOW E&M-EST. PATIENT-LVL III: CPT | Mod: PBBFAC,,, | Performed by: UROLOGY

## 2019-08-20 PROCEDURE — 99024 PR POST-OP FOLLOW-UP VISIT: ICD-10-PCS | Mod: HCNC,S$GLB,, | Performed by: UROLOGY

## 2019-08-20 PROCEDURE — 99024 POSTOP FOLLOW-UP VISIT: CPT | Mod: HCNC,S$GLB,, | Performed by: UROLOGY

## 2019-08-20 NOTE — TELEPHONE ENCOUNTER
----- Message from Tosha Aguila RN sent at 8/20/2019  9:27 AM CDT -----  Dr Zack Terry would like you to schedule another appt for this pt to see Dr Sevilla.  Thanks, Tosha

## 2019-08-20 NOTE — PROGRESS NOTES
Subjective:      Patient ID: Anthony Miguel is a 68 y.o. male.    Chief Complaint: Prostate Cancer (2 months p/o - goes through 2 to 3 pull ups per day)    Patient is a 68 y.o. male who is established to our clinic and referred by their PCP, Dr. Spaulding for evaluation of LUTs.     HPI  He underwent a RALP,BPLND on 6/4/19.  He did well after and was discharged the following day.  He did not chavez after his RALP.  His initial post-op psa was 0.07.  Returns today for psa results.  His psa from 8/19/19 was stable at 0.07.  Reports significant incontinence.  Goes through 2-3 pull-ups per day which is improved.  He recently saw abiel fermin with PT.        Has a half-brother who was diagnosed with prostate cancer at age 88.           RALP PATHOLOGY:  SPECIMEN  1) Periprostatic fat.  2) Prostate and bilateral pelvic lymph nodes.  FINAL PATHOLOGIC DIAGNOSIS  1. GAUTAM PROSTATIC FAT:  NO CARCINOMA IDENTIFIED  2. RADICAL PROSTATECTOMY SPECIMEN:  A MODERATE TOTAL VOLUME OF ADENOCARCINOMA TOTAL AZAR SCORE 9 (4+ 5)  5 LYMPH NODES WITH NO METASTATIC CARCINOMA IDENTIFIED  Diagnosed by: Eduardo Foster M.D.  (Electronically Signed: 2019-06-10 16:34:13)  Microscopic Examination  Prostate: Radical prostatectomy with lymph node excisions  Specimen size:  Weight: 30 g.  Size: 4.5 x 3.6 x 2.9 cm.  Histologic type: Acinar adenocarcinoma  Primary Azar Pattern: 4  Secondary [place] Pattern: 5 ; this pattern consists of cribriforming sheets with central comedonecrosis  Tertiary [place] Pattern: 3  Total Azar Score: 9  Grade Group: 5  Tumor quantitation: There is a moderate total volume of adenocarcinoma present. The longest length of carcinoma in  the slide is 1.9 cm.  Extraprostatic extension: Not identified; although carcinoma does go into and bulges the capsule, extension into  perinephric adipose tissue is not identified.  Urinary Bladder Neck Invasion: No separate specimens submitted  Seminal vesicle invasion: Not  identified  Margin: No margin involvement identified  Treatment effect of carcinoma: Not identified  Lymph node samplin/5  Stage: pT2 pN0 pMX    Lab Results   Component Value Date    PSA 1.1 10/26/2018    PSA 2.0 2017    PSADIAG 0.07 2019    PSADIAG 0.07 2019         Review of Systems   All other systems reviewed and negative except pertinent positives noted in HPI.    Objective:     Physical Exam   Constitutional: He is oriented to person, place, and time. He appears well-developed and well-nourished. No distress.   HENT:   Head: Normocephalic and atraumatic.   Eyes: No scleral icterus.   Neck: No tracheal deviation present.   Pulmonary/Chest: Effort normal. No respiratory distress.   Neurological: He is alert and oriented to person, place, and time.   Psychiatric: He has a normal mood and affect. His behavior is normal. Judgment and thought content normal.     Assessment:     1. Prostate cancer    2. Male stress incontinence      Plan:     1. Prostate cancer    2. Male stress incontinence        No orders of the defined types were placed in this encounter.    1. Prostate cancer:  -psa did not chavez as hoped for despite favorable pathology (negative margins, pT2c, nodes negative) all things considering the aggressive Bruno grade 9.  -will need appt with Dr. Sevilla to discuss timing of possible XRT  -PSA in 3 months    2. Stress incontinence:  -continue with komal fermin  -continue to work with ricci to regain continence since he likely will need XRT

## 2019-08-20 NOTE — TELEPHONE ENCOUNTER
Called to schedule follow up appointment; no answer, left voicemail. Appointment scheduled and placed in the mail.

## 2019-08-21 ENCOUNTER — OFFICE VISIT (OUTPATIENT)
Dept: OPTOMETRY | Facility: CLINIC | Age: 69
End: 2019-08-21
Payer: MEDICARE

## 2019-08-21 DIAGNOSIS — H40.013 OAG (OPEN ANGLE GLAUCOMA) SUSPECT, LOW RISK, BILATERAL: Primary | ICD-10-CM

## 2019-08-21 DIAGNOSIS — H25.13 NUCLEAR SCLEROSIS OF BOTH EYES: ICD-10-CM

## 2019-08-21 DIAGNOSIS — H52.13 MYOPIA WITH ASTIGMATISM AND PRESBYOPIA, BILATERAL: ICD-10-CM

## 2019-08-21 DIAGNOSIS — H52.203 MYOPIA WITH ASTIGMATISM AND PRESBYOPIA, BILATERAL: ICD-10-CM

## 2019-08-21 DIAGNOSIS — H52.4 MYOPIA WITH ASTIGMATISM AND PRESBYOPIA, BILATERAL: ICD-10-CM

## 2019-08-21 PROCEDURE — 92015 PR REFRACTION: ICD-10-PCS | Mod: HCNC,S$GLB,, | Performed by: OPTOMETRIST

## 2019-08-21 PROCEDURE — 92015 DETERMINE REFRACTIVE STATE: CPT | Mod: HCNC,S$GLB,, | Performed by: OPTOMETRIST

## 2019-08-21 PROCEDURE — 99999 PR PBB SHADOW E&M-EST. PATIENT-LVL III: ICD-10-PCS | Mod: PBBFAC,HCNC,, | Performed by: OPTOMETRIST

## 2019-08-21 PROCEDURE — 99999 PR PBB SHADOW E&M-EST. PATIENT-LVL III: CPT | Mod: PBBFAC,HCNC,, | Performed by: OPTOMETRIST

## 2019-08-21 PROCEDURE — 92014 PR EYE EXAM, EST PATIENT,COMPREHESV: ICD-10-PCS | Mod: HCNC,S$GLB,, | Performed by: OPTOMETRIST

## 2019-08-21 PROCEDURE — 92014 COMPRE OPH EXAM EST PT 1/>: CPT | Mod: HCNC,S$GLB,, | Performed by: OPTOMETRIST

## 2019-08-21 NOTE — PROGRESS NOTES
HPI     STACIA:10/2018  Glasses? no  Contacts? no  H/o eye surgery, injections or laser: no  H/o eye injury: no  Known eye conditions? no  Family h/o eye conditions? Keratoconus -Father, Brother   Eye gtts?no    (-) Flashes (+) Floaters OU infrequent  (-) Mucous   (-) Tearing (-) Itching (-) Burning   (-) Headaches (-) Eye Pain/discomfort (-) Irritation   (-) Redness (+) Double vision when pt is tired  (-) Blurry vision    Diabetic? (-)  A1c?  (Hemoglobin A1C       Date                     Value               Ref Range             Status                10/26/2018               5.1                 4.0 - 5.6 %           Final              Comment:    ADA Screening Guidelines:  5.7-6.4%  Consistent with   prediabetes  >or=6.5%  Consistent with diabetes  High levels of fetal   hemoglobin interfere with the HbA1C  assay. Heterozygous hemoglobin   variants (HbS, HgC, etc)do  not significantly interfere with this assay.     However, presence of multiple variants may affect accuracy.         06/02/2017               5.3                 4.5 - 6.2 %           Final              Comment:    According to ADA guidelines, hemoglobin A1C <7.0% represents  optimal   control in non-pregnant diabetic patients.  Different  metrics may apply   to specific populations.   Standards of Medical Care in Diabetes -   2016.  For the purpose of screening for the presence of diabetes:  <5.7%       Consistent with the absence of diabetes  5.7-6.4%  Consistent with   increasing risk for diabetes   (prediabetes)  >or=6.5%  Consistent with   diabetes  Currently no consensus exists for use of hemoglobin A1C  for   diagnosis of diabetes for children.    ----------)        Last edited by Araceli Heller on 8/21/2019  1:49 PM. (History)        ROS     Negative for: Constitutional, Gastrointestinal, Neurological, Skin,   Genitourinary, Musculoskeletal, HENT, Endocrine, Cardiovascular, Eyes,   Respiratory, Psychiatric, Allergic/Imm, Heme/Lymph    Last  edited by Jennifer Sultana, OD on 8/21/2019  2:17 PM. (History)        Assessment /Plan     For exam results, see Encounter Report.    OAG (open angle glaucoma) suspect, low risk, bilateral  -     Posterior Segment OCT Optic Nerve- Both eyes; Future  -     Richardson Visual Field - OU - Extended - Both Eyes; Future    Myopia with astigmatism and presbyopia, bilateral    Nuclear sclerosis of both eyes      1.  (-) FHx. IOP 14 OD, OS. Last IOP 15 OD, OS. Pachys are thick 554 (-1) OD, 579 (-2) OS. C/d 0.8 OD, OS w/thin rim OU. (-) FHx. (-) HTN. (-) DM. Prev POAG OU suspect at [facility].  11/14/18 OCT OD WNL, OS Borderline TS  11/14/18 HVF OD sup arcuate (due to lid?), OS sup arcuate (due to lid?), early inf arcuate? Doesn't correlate w/OCT.   02/20/19 HVF OD General reduction of sensitivity, WnL. OS ONL- scattered defects but nonspecific. Perhaps early sup temp defect.   Educated patient on findings today. No tx at this time. Would like monitor OS.    RTC 1 mo IOP/OCT/HVF  2. SRx released to patient. Patient educated on lens options. Normal ocular health. RTC 1 year for routine exam.   3. Nuclear sclerotic cataract - not visually significant. Observe.

## 2019-08-27 ENCOUNTER — CLINICAL SUPPORT (OUTPATIENT)
Dept: REHABILITATION | Facility: HOSPITAL | Age: 69
End: 2019-08-27
Attending: UROLOGY
Payer: MEDICARE

## 2019-08-27 DIAGNOSIS — N39.3 MALE STRESS INCONTINENCE: ICD-10-CM

## 2019-08-27 PROCEDURE — 97110 THERAPEUTIC EXERCISES: CPT | Mod: HCNC

## 2019-08-27 NOTE — PROGRESS NOTES
OUTPATIENT PHYSICAL THERAPY DAILY NOTE       Patient: Anthony Miguel   [facility] #:  [id number]    Diagnosis:   Encounter Diagnosis   Name Primary?    Male stress incontinence       Date of treatment: 08/27/2019     Time in: 8:00  Time out: 8:43  Billable time: 40 minutes  Visit #: 2  Auth: 20 exp 12/31  POC expiration: 8/13/2019    SUBJECTIVE   Pt reports: pt reports that he is doing his Kegels more than before, but not every day.  He tends to forget.  Went through 3 pads/diapers yesterday.    Pain: 0/10 on VAS scale  Pain location: NA    OBJECTIVE     Therapeutic Exercise to develop  strength and endurance for 40 minutes including:Kegels with assist of SEMG and abdominal sets.  We worked in sitting and standing with external lead wires.  He demonstrated slightly elevated baseline resting, fair WR rise, and fair holding in 10 sec Kegels in both sitting and standing.  Derecruitment was complete and timely.  In TA sets, he demonstrated good pelvic floor overflow and low WR rise (difficult due to being measured in sitting).  Quick Flicks were introduced here as well.  We spoke about the importance of getting back to full continence prior to radiation treatment.  Please refer to pt. Instructions for revised home program.     Education: Discussed progression of plan of care with patient; educated pt in activity modification; reviewed HEP with pt. Pt demonstrated and verbalized understanding of all instruction and was provided with a handout of HEP (see Patient Instructions).      ASSESSMENT      Pt tolerated entire treatment well with no visible adverse effects. He will need to be compliant with HEP every day to make progress- will compare his performance in 2 weeks to today.    Will the patient continue to benefit from skilled PT intervention? Yes  Medical necessity demonstrated by: Skilled PT supervision required for HEP and treatment progression  Progress towards goals:  fair  New/Revised goals: none      PLAN      Continue with established Plan of Care, working toward established PT goals.

## 2019-08-27 NOTE — PATIENT INSTRUCTIONS
"Home Program: 8/27/2019    Kegels in standing :    1. Stand comfortably with legs and buttocks relaxed.  2. Squeeze and LIFT the muscles that stop the flow of urine and passage of gas.  Keep legs and buttock muscles quiet.  3. Hold lift for 10 seconds without holding breath.  4. Release and DROP for 10 seconds.  5. Repeat 10 times, 2 sets, 2 times per day.  (2 sets of 10 in am in standing, 2 sets of 10 in pm in sitting)    No Kegels while urinating!      "Quick Flicks": 10 one second contractions in a row with full release in between.  3-4 sets per day.      "

## 2019-09-02 RX ORDER — DICLOFENAC SODIUM 75 MG/1
TABLET, DELAYED RELEASE ORAL
Qty: 60 TABLET | Refills: 2 | Status: SHIPPED | OUTPATIENT
Start: 2019-09-02 | End: 2020-05-11

## 2019-09-09 ENCOUNTER — OFFICE VISIT (OUTPATIENT)
Dept: RADIATION ONCOLOGY | Facility: CLINIC | Age: 69
End: 2019-09-09
Payer: MEDICARE

## 2019-09-09 ENCOUNTER — OFFICE VISIT (OUTPATIENT)
Dept: OTOLARYNGOLOGY | Facility: CLINIC | Age: 69
End: 2019-09-09
Payer: MEDICARE

## 2019-09-09 VITALS
SYSTOLIC BLOOD PRESSURE: 133 MMHG | WEIGHT: 186.13 LBS | RESPIRATION RATE: 18 BRPM | DIASTOLIC BLOOD PRESSURE: 74 MMHG | BODY MASS INDEX: 29.21 KG/M2 | HEIGHT: 67 IN | HEART RATE: 67 BPM

## 2019-09-09 VITALS — WEIGHT: 182.13 LBS | BODY MASS INDEX: 28.52 KG/M2

## 2019-09-09 DIAGNOSIS — H61.21 IMPACTED CERUMEN OF RIGHT EAR: Primary | ICD-10-CM

## 2019-09-09 DIAGNOSIS — C61 CANCER OF PROSTATE: Primary | ICD-10-CM

## 2019-09-09 PROCEDURE — 1101F PT FALLS ASSESS-DOCD LE1/YR: CPT | Mod: HCNC,CPTII,S$GLB, | Performed by: RADIOLOGY

## 2019-09-09 PROCEDURE — 99499 NO LOS: ICD-10-PCS | Mod: HCNC,S$GLB,, | Performed by: OTOLARYNGOLOGY

## 2019-09-09 PROCEDURE — 1101F PR PT FALLS ASSESS DOC 0-1 FALLS W/OUT INJ PAST YR: ICD-10-PCS | Mod: HCNC,CPTII,S$GLB, | Performed by: RADIOLOGY

## 2019-09-09 PROCEDURE — 99999 PR PBB SHADOW E&M-EST. PATIENT-LVL III: ICD-10-PCS | Mod: PBBFAC,HCNC,, | Performed by: OTOLARYNGOLOGY

## 2019-09-09 PROCEDURE — 99999 PR PBB SHADOW E&M-EST. PATIENT-LVL III: CPT | Mod: PBBFAC,HCNC,, | Performed by: RADIOLOGY

## 2019-09-09 PROCEDURE — 99999 PR PBB SHADOW E&M-EST. PATIENT-LVL III: ICD-10-PCS | Mod: PBBFAC,HCNC,, | Performed by: RADIOLOGY

## 2019-09-09 PROCEDURE — 69210 REMOVE IMPACTED EAR WAX UNI: CPT | Mod: HCNC,S$GLB,, | Performed by: OTOLARYNGOLOGY

## 2019-09-09 PROCEDURE — 99499 UNLISTED E&M SERVICE: CPT | Mod: HCNC,S$GLB,, | Performed by: OTOLARYNGOLOGY

## 2019-09-09 PROCEDURE — 99499 UNLISTED E&M SERVICE: CPT | Mod: HCNC,S$GLB,, | Performed by: RADIOLOGY

## 2019-09-09 PROCEDURE — 99499 RISK ADDL DX/OHS AUDIT: ICD-10-PCS | Mod: HCNC,S$GLB,, | Performed by: RADIOLOGY

## 2019-09-09 PROCEDURE — 99212 OFFICE O/P EST SF 10 MIN: CPT | Mod: HCNC,S$GLB,, | Performed by: RADIOLOGY

## 2019-09-09 PROCEDURE — 99212 PR OFFICE/OUTPT VISIT, EST, LEVL II, 10-19 MIN: ICD-10-PCS | Mod: HCNC,S$GLB,, | Performed by: RADIOLOGY

## 2019-09-09 PROCEDURE — 99999 PR PBB SHADOW E&M-EST. PATIENT-LVL III: CPT | Mod: PBBFAC,HCNC,, | Performed by: OTOLARYNGOLOGY

## 2019-09-09 PROCEDURE — 69210 PR REMOVAL IMPACTED CERUMEN REQUIRING INSTRUMENTATION, UNILATERAL: ICD-10-PCS | Mod: HCNC,S$GLB,, | Performed by: OTOLARYNGOLOGY

## 2019-09-09 RX ORDER — BICALUTAMIDE 50 MG/1
50 TABLET, FILM COATED ORAL DAILY
Qty: 90 TABLET | Refills: 1 | Status: SHIPPED | OUTPATIENT
Start: 2019-09-09 | End: 2020-05-11

## 2019-09-09 NOTE — PROGRESS NOTES
"Subjective:       Patient ID: Anthony Miguel is a 68 y.o. male.    Chief Complaint: Prostate Cancer (discuss xrt)    This patient returns for follow up discussion of adjuvant radiotherapy.      Mr. Miguel has a history of BPH with LUTS currently on finasteride and tamsulosin.  His current AUA score is 23.  He was noted to have a palpable nodule in the Rt. lobe of the prostate on MARTHA in March of this year. PSA in October of 2018 was 1.1 ng/ml.  Subsequent TRUS and biopsy on 3/19/19 revealed a 22.8 cc gland.  Biopsies from the Lt. apex and Rt. base revealed Bruno 9 (4+5) adenocarcinoma involving 45% of the specimen at the apex and 65% of the specimen at the Rt. base.  There was Bruno 8 (3+5) disease at the Rt. mid gland involving 95% of the specimen.  The Lt. apex, Lt. mid gland and Lt. base revealed small amounts of Bruno score 6 (3+3) to 7 (3+4) disease.  Further work up with bone scan on 4/15/19 was negative for metastatic disease.  MRI on 4/15/19 revealed a 4.1 x 2.9 x 2.7 cm prostate corresponding to a 15.7 cc gland.  Thre was a 2.1 cm Rt. peripheral zone T2 hypointense nodule, Pi-RADS 5.  There was thought to be extraprostatic extension given the capsular abutment of greater than 1 cm with contour bulge.  There was extension of the lesion towards the base of the Rt. seminal vesicle.  The Rt. neurovascular bundle was likely involved.  There was no adenopathy.  The patient was referred for discussion of treatment options.  He subsequently elected to proceed with RALP and underwent this procedure on 6/5/19.  Pathology revealed a 30 g prostate measuring 4.5 x 3.6 x 2.9 cm.  Thre was a "moderate" total volume of Bruno 9 (4+5) adenocarcinoma.  The longest length on was 1.9 cm.  Extraprostatic extension was not identified although carcinoma did bulge the capsule.  There was no bladder neck or seminal vesicle invasion.  The margins of resection were negative.  Five pelvic nodes were negative for tumor " involvement.  The patient is recovering well from the surgery.  Still with some incontinence but improving.  1 - 2 pull ups per day.  Postoperative PSA on 7/8 and 8/19/19 returned a 0.07 ng/ml.  The patient presents for discussion of adjuvant radiotherapy.  Today, the patient states he feels well.      Review of Systems   Constitutional: Negative for activity change, appetite change, diaphoresis and fatigue.   Respiratory: Negative for cough and shortness of breath.    Cardiovascular: Negative for chest pain and palpitations.   Gastrointestinal: Negative for abdominal pain, constipation and diarrhea.   Genitourinary: Negative for difficulty urinating, dysuria, frequency, hematuria and urgency.       Objective:      Physical Exam   Constitutional: He appears well-developed and well-nourished. No distress.   Pulmonary/Chest: Effort normal. No respiratory distress.   Abdominal: Soft. He exhibits no distension.         Assessment:       1. Cancer of prostate        Plan:       Stage IIIC (pT2, pN0, cM0, PSA: 1.1, Grade Group: 5) adenocarcinoma of the prostate.     I had a long discussion with the patient.  We discussed the possibility of adjuvant radiotherapy given the adverse features of his [place] score and postoperative PSA.  Explained radiotherapy in this setting has been shown to decrease his risk of biochemical failure.  Also discussed possible hormonal deprivation therapy to combine with radiotherapy.  The patient has an underlying neuropathy which limits his mobility.  Given this, I would favor treatment with antiandrogen combined with his radiotherapy.  Suggested we consider starting Casodex 50 mg per day now and allowing the patient more time to improve his urinary function.   Once this improves, we would plan to start radiotherapy to the prostate bed and pelvic nodes with continued  Casodex for 6 months.  Will plan to discuss with Dr. Dixon.          Will discuss with Dr. Dixon.

## 2019-09-09 NOTE — PROGRESS NOTES
Has R CI.    Procedure Note:    Patient was brought to the minor procedure room and using the operating microscope the right ear canal  was cleaned of ceruminous debris. There was a significant cerumen impaction.  The forceps and suction were both used to perform this. Tympanic membrane intact. Pt tolerated well. There were no complications.    P: F/U prn.

## 2019-09-10 ENCOUNTER — CLINICAL SUPPORT (OUTPATIENT)
Dept: REHABILITATION | Facility: HOSPITAL | Age: 69
End: 2019-09-10
Attending: UROLOGY
Payer: MEDICARE

## 2019-09-10 DIAGNOSIS — N39.3 MALE STRESS INCONTINENCE: ICD-10-CM

## 2019-09-10 PROCEDURE — 97110 THERAPEUTIC EXERCISES: CPT | Mod: HCNC

## 2019-09-10 NOTE — PATIENT INSTRUCTIONS
"Home Program: 9/10/2019    Kegels in standing :    1. Stand comfortably with legs and buttocks relaxed.  2. Squeeze and LIFT the muscles that stop the flow of urine and passage of gas.  Keep legs and buttock muscles quiet.  3. Hold lift for 10 seconds without holding breath.  4. Release and DROP for 10 seconds.  5. Repeat 10 times, 2 sets, 2 times per day.     No Kegels while urinating!      "Quick Flicks": 10 one second contractions in a row with full release in between.  3-4 sets per day.        Adduction: Hip - Knees Together With Pelvic Floor (Sitting)        Sit with towel roll between knees. Squeeze knees together. Hold for _5__ seconds. Rest for _10__ seconds.  Repeat _10__ times. Do _2__ times a day.    Copyright © I. All rights reserved.   External Rotation: Hip - Knees Apart With Pelvic Floor (Sitting)        Sit, band tied just above knees. Pull knees apart against the band. Hold for _5__ seconds. Rest for _10__ seconds.  Repeat _10__ times. Do _2__ times a day.    Copyright © I. All rights reserved.     "

## 2019-09-10 NOTE — PROGRESS NOTES
OUTPATIENT PHYSICAL THERAPY DAILY NOTE       Patient: Anthony Miguel   [facility] #:  [id number]    Diagnosis:   Encounter Diagnosis   Name Primary?    Male stress incontinence       Date of treatment: 09/10/2019     Time in: 8:00  Time out: 8:55  Billable time: 55 minutes  Visit #: 3  Auth: 20 exp 12/31  POC expiration: 11/13/2019    SUBJECTIVE   Pt reports: pt reports that he is not dry at night yet, and is going through 3-4 diapers per day.  He thinks that his leakage is less than it has been.      Pain: 0/10 on VAS scale  Pain location: NA    OBJECTIVE     Therapeutic Exercise to develop strength and endurance for 40 minutes including:Kegels with assist of SEMG and abdominal sets.  We worked in supine and standing with external lead wires.  He demonstrated normal baseline resting, fair WR rise, and improved holding in 10 sec Kegels in both supine and standing.  Derecruitment was complete and timely.  In TA sets, he demonstrated good pelvic floor overflow and low WR rise.  He performed quick flicks in standing as well.  Seated adductor sets and seated OI sets with peach theraband were introduced here, with SEMG monitoring PF activity.  We spoke about the importance of getting back to full continence prior to radiation treatment.  Please refer to pt. Instructions for revised home program.     Education: Discussed progression of plan of care with patient; educated pt in activity modification; reviewed HEP with pt. Pt demonstrated and verbalized understanding of all instruction and was provided with a handout of HEP (see Patient Instructions).      ASSESSMENT      Pt tolerated entire treatment well with no visible adverse effects. Will continue to keep him coming in every 2 weeks.  He lives alone and may have difficulty making twice daily exercise part of his routine without caregiver support.     Will the patient continue to benefit from skilled PT intervention? Yes  Medical necessity demonstrated by: Skilled PT  supervision required for HEP and treatment progression  Progress towards goals:  fair  New/Revised goals: none      PLAN     Continue with established Plan of Care, working toward established PT goals.

## 2019-09-30 ENCOUNTER — PATIENT OUTREACH (OUTPATIENT)
Dept: ADMINISTRATIVE | Facility: OTHER | Age: 69
End: 2019-09-30

## 2019-09-30 ENCOUNTER — TELEPHONE (OUTPATIENT)
Dept: PRIMARY CARE CLINIC | Facility: CLINIC | Age: 69
End: 2019-09-30

## 2019-10-01 ENCOUNTER — OFFICE VISIT (OUTPATIENT)
Dept: SPINE | Facility: CLINIC | Age: 69
End: 2019-10-01
Payer: MEDICARE

## 2019-10-01 VITALS
HEART RATE: 65 BPM | TEMPERATURE: 98 F | SYSTOLIC BLOOD PRESSURE: 129 MMHG | WEIGHT: 186.06 LBS | BODY MASS INDEX: 29.2 KG/M2 | DIASTOLIC BLOOD PRESSURE: 80 MMHG | HEIGHT: 67 IN

## 2019-10-01 DIAGNOSIS — M51.36 DDD (DEGENERATIVE DISC DISEASE), LUMBAR: ICD-10-CM

## 2019-10-01 DIAGNOSIS — G89.29 CHRONIC BILATERAL LOW BACK PAIN WITHOUT SCIATICA: Primary | ICD-10-CM

## 2019-10-01 DIAGNOSIS — M47.816 SPONDYLOSIS OF LUMBAR REGION WITHOUT MYELOPATHY OR RADICULOPATHY: ICD-10-CM

## 2019-10-01 DIAGNOSIS — M54.50 CHRONIC BILATERAL LOW BACK PAIN WITHOUT SCIATICA: Primary | ICD-10-CM

## 2019-10-01 DIAGNOSIS — M43.16 SPONDYLOLISTHESIS, LUMBAR REGION: ICD-10-CM

## 2019-10-01 PROCEDURE — 99214 OFFICE O/P EST MOD 30 MIN: CPT | Mod: HCNC,S$GLB,, | Performed by: PHYSICIAN ASSISTANT

## 2019-10-01 PROCEDURE — 1101F PT FALLS ASSESS-DOCD LE1/YR: CPT | Mod: HCNC,CPTII,S$GLB, | Performed by: PHYSICIAN ASSISTANT

## 2019-10-01 PROCEDURE — 99999 PR PBB SHADOW E&M-EST. PATIENT-LVL IV: ICD-10-PCS | Mod: PBBFAC,HCNC,, | Performed by: PHYSICIAN ASSISTANT

## 2019-10-01 PROCEDURE — 99999 PR PBB SHADOW E&M-EST. PATIENT-LVL IV: CPT | Mod: PBBFAC,HCNC,, | Performed by: PHYSICIAN ASSISTANT

## 2019-10-01 PROCEDURE — 99214 PR OFFICE/OUTPT VISIT, EST, LEVL IV, 30-39 MIN: ICD-10-PCS | Mod: HCNC,S$GLB,, | Performed by: PHYSICIAN ASSISTANT

## 2019-10-01 PROCEDURE — 1101F PR PT FALLS ASSESS DOC 0-1 FALLS W/OUT INJ PAST YR: ICD-10-PCS | Mod: HCNC,CPTII,S$GLB, | Performed by: PHYSICIAN ASSISTANT

## 2019-10-01 RX ORDER — GABAPENTIN 300 MG/1
600 CAPSULE ORAL 3 TIMES DAILY
Qty: 540 CAPSULE | Refills: 0 | Status: SHIPPED | OUTPATIENT
Start: 2019-10-01 | End: 2020-06-17 | Stop reason: SDUPTHER

## 2019-10-01 RX ORDER — DICLOFENAC SODIUM 75 MG/1
75 TABLET, DELAYED RELEASE ORAL 2 TIMES DAILY PRN
Qty: 180 TABLET | Refills: 0 | Status: SHIPPED | OUTPATIENT
Start: 2019-10-01 | End: 2020-02-13

## 2019-10-01 NOTE — PATIENT INSTRUCTIONS
Take 300mg, 300mg, 600mg for three days    Then take 600mg, 300mg, 600mg for three days    Then take 600mg, 600mg, 600mg for three days and continue on that dose

## 2019-10-01 NOTE — PROGRESS NOTES
"Subjective:     Patient ID:  Anthony Miguel is a 68 y.o. male.    [facility]    Chief Complaint: Back pain and left leg numbness    HPI    Anthony Miguel is a 68 y.o. male who presents for follow up.  He had bilateral L4-5 and L5-S1 facet injections that has helped his back pain for about 3-4 weeks for 50-60%.  He still is having some relief but not as much as he was.  He gets some numbness in the left medial and lateral lower leg.  No leg pain or heaviness in the legs.      Some left shoulder pain and some neck stiffness into the bilateral arms to the elbows that just recently started.    Patient denies any recent accidents or trauma, no saddle anesthesias, and no bowel or bladder incontinence.      Review of Systems:  Constitution: Negative for chills, fever, night sweats and weight loss.   Musculoskeletal: Negative for falls.   Gastrointestinal: Negative for bowel incontinence, nausea and vomiting.   Genitourinary: Negative for bladder incontinence.   Neurological: Negative for disturbances in coordination and loss of balance.      Objective:      Vitals:    10/01/19 1051   BP: 129/80   Pulse: 65   Temp: 97.6 °F (36.4 °C)   TempSrc: Oral   Weight: 84.4 kg (186 lb 1.1 oz)   Height: 5' 7" (1.702 m)   PainSc:   3   PainLoc: Arm         Physical Exam:    General:  Anthony Miguel is well-developed, well-nourished, appears stated age, in no acute distress, alert and oriented to person, place, and time.    Patient sits comfortably in the exam room and answers questions appropriately. Grossly patient is able to move bilateral lower extremities without difficulty.     MRI Interpretation:      Lumbar spine MRI was personally reviewed today.  Grade one spondylolisthesis L4-5.  DDD L4-5 and L5-S1.  Severe central spinal stenosis at L4-5 and bilateral NFS at L4-5 and L5-S1.      Assessment:          1. Chronic bilateral low back pain without sciatica    2. DDD (degenerative disc disease), lumbar    3. 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 Spondylolisthesis, lumbar region            Plan:          Orders Placed This Encounter    Procedure Order to [facility]    diclofenac (VOLTAREN) 75 MG EC tablet    gabapentin (NEURONTIN) 300 MG capsule     Grade one spondylolisthesis L4-5.  DDD L4-5 and L5-S1.  Severe central spinal stenosis at L4-5 and bilateral NFS at L4-5 and L5-S1.     -Refilled Diclofenac BID.  Told him to stop taking it four times a day  -Increase Neurontin to 600mg TID over the next week.  -He was asking about pain medication which he would need to discuss with PCP or pain management  -Bilateral L4-5 and L5-S1 facet injections with Dr. Adkins   -FRANCOIS in three months  -Could see Dr. Wolff in the future if needed for lumbar spine surgery consult      Follow-Up:  Follow up in about 3 months (around 1/1/2020). If there are any questions prior to this, the patient was instructed to contact the office.       YESENIA Lorenzo, PA-C  Neurosurgery  [facility]  [facility]

## 2019-10-02 ENCOUNTER — TELEPHONE (OUTPATIENT)
Dept: PAIN MEDICINE | Facility: CLINIC | Age: 69
End: 2019-10-02

## 2019-10-02 DIAGNOSIS — M51.36 DDD (DEGENERATIVE DISC DISEASE), LUMBAR: Primary | ICD-10-CM

## 2019-10-08 ENCOUNTER — TELEPHONE (OUTPATIENT)
Dept: PRIMARY CARE CLINIC | Facility: CLINIC | Age: 69
End: 2019-10-08

## 2019-10-08 NOTE — TELEPHONE ENCOUNTER
Veronica- please reschedule this patient. He was not aware of his appointment tomorrow, and I can not see him at his scheduled time.    He is stable at this time, and you can push back his appointment to another 1-2 months.    Thanks,  KJ

## 2019-10-08 NOTE — TELEPHONE ENCOUNTER
Please call this patient and see if he can come to his appointment early (around 11am).    Thanks,  NAVID

## 2019-10-08 NOTE — TELEPHONE ENCOUNTER
Can you call this patient to see if he will come in early for his appointment tomorrow? Like 11am    ThanksNAVID

## 2019-10-09 ENCOUNTER — OFFICE VISIT (OUTPATIENT)
Dept: PRIMARY CARE CLINIC | Facility: CLINIC | Age: 69
End: 2019-10-09
Payer: MEDICARE

## 2019-10-09 ENCOUNTER — HOSPITAL ENCOUNTER (OUTPATIENT)
Dept: RADIOLOGY | Facility: HOSPITAL | Age: 69
Discharge: HOME | End: 2019-10-09
Attending: INTERNAL MEDICINE
Payer: MEDICARE

## 2019-10-09 VITALS
OXYGEN SATURATION: 85 % | DIASTOLIC BLOOD PRESSURE: 60 MMHG | HEART RATE: 62 BPM | HEIGHT: 67 IN | BODY MASS INDEX: 29.15 KG/M2 | WEIGHT: 185.75 LBS | SYSTOLIC BLOOD PRESSURE: 118 MMHG

## 2019-10-09 DIAGNOSIS — E78.5 HYPERLIPIDEMIA, UNSPECIFIED HYPERLIPIDEMIA TYPE: ICD-10-CM

## 2019-10-09 DIAGNOSIS — M25.512 ACUTE PAIN OF LEFT SHOULDER: ICD-10-CM

## 2019-10-09 DIAGNOSIS — J38.3 VOCAL FOLD CYST: ICD-10-CM

## 2019-10-09 DIAGNOSIS — C61 CANCER OF PROSTATE: ICD-10-CM

## 2019-10-09 DIAGNOSIS — C61 PROSTATE CANCER: Primary | ICD-10-CM

## 2019-10-09 DIAGNOSIS — M46.92 UNSPECIFIED INFLAMMATORY SPONDYLOPATHY, CERVICAL REGION: ICD-10-CM

## 2019-10-09 DIAGNOSIS — D50.0 IRON DEFICIENCY ANEMIA DUE TO CHRONIC BLOOD LOSS: ICD-10-CM

## 2019-10-09 DIAGNOSIS — E55.9 VITAMIN D DEFICIENCY: ICD-10-CM

## 2019-10-09 PROCEDURE — 99215 PR OFFICE/OUTPT VISIT, EST, LEVL V, 40-54 MIN: ICD-10-PCS | Mod: HCNC,S$GLB,, | Performed by: INTERNAL MEDICINE

## 2019-10-09 PROCEDURE — 73030 X-RAY EXAM OF SHOULDER: CPT | Mod: TC,LT

## 2019-10-09 PROCEDURE — 99499 RISK ADDL DX/OHS AUDIT: ICD-10-PCS | Mod: HCNC,S$GLB,, | Performed by: INTERNAL MEDICINE

## 2019-10-09 PROCEDURE — 1101F PT FALLS ASSESS-DOCD LE1/YR: CPT | Mod: HCNC,CPTII,S$GLB, | Performed by: INTERNAL MEDICINE

## 2019-10-09 PROCEDURE — 99499 UNLISTED E&M SERVICE: CPT | Mod: HCNC,S$GLB,, | Performed by: INTERNAL MEDICINE

## 2019-10-09 PROCEDURE — 73030 XR SHOULDER COMPLETE 2 OR MORE VIEWS LEFT: ICD-10-PCS | Mod: 26,LT,, | Performed by: RADIOLOGY

## 2019-10-09 PROCEDURE — 1101F PR PT FALLS ASSESS DOC 0-1 FALLS W/OUT INJ PAST YR: ICD-10-PCS | Mod: HCNC,CPTII,S$GLB, | Performed by: INTERNAL MEDICINE

## 2019-10-09 PROCEDURE — 73030 X-RAY EXAM OF SHOULDER: CPT | Mod: 26,LT,, | Performed by: RADIOLOGY

## 2019-10-09 PROCEDURE — 99215 OFFICE O/P EST HI 40 MIN: CPT | Mod: HCNC,S$GLB,, | Performed by: INTERNAL MEDICINE

## 2019-10-09 NOTE — PATIENT INSTRUCTIONS
TODAY:  - labs today  - appt for next PSA is on 11/19 with Dr Dixon appt the day after  - another PSA in 12/19 with Dr Sevilla the day after  - PCP messaged JENSEN Vela about your shoulder   + will call you with next steps  - shoulder XR today

## 2019-10-09 NOTE — PROGRESS NOTES
Primary Care Provider Appointment  ATTESTATION TO MEDICAL STUDENT NOTE   (see medical student note for HPI, ROS, physical exam)      Subjective:      Patient ID: Anthony Miguel is a 68 y.o. male with MSK pain, cervical stenosis (cervical fusion), prostate CA.    Chief Complaint: Arm Pain and Follow-up    Patient is s/p prostatectomy for prostate CA. Needs 6 mos of casodex before 45d of radiation therapy. Still has a PSA of 0.07. He is due for next PSA on 11/19/19, will see Dr Dixon the day after. Is doing pelvic floor therapy with PT prior to XRT and potential incontience.    Is closely followed by Neurosurgery for neck and lumbar pain. Is s/p laminectomy for neck. Continue to have radiating pain in lumbar section. Also is having L shoulder pain today.     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      He wants his TdAP today, but can't afford it. He was advised to save his money for the shingrix.    Compliant with atorvastatin.  The ASCVD Risk score (Marco GISSEL Jr., et al., 2013) failed to calculate for the following reasons:    The valid total cholesterol range is 130 to 320 mg/dL      Lab Results   Component Value Date    WBC 9.39 01/28/2019    HGB 14.4 01/28/2019    HCT 43.6 01/28/2019     01/28/2019    CHOL 116 (L) 01/28/2019    TRIG 73 01/28/2019    HDL 55 01/28/2019    ALT 17 01/28/2019    AST 19 01/28/2019     01/28/2019    K 4.5 01/28/2019     01/28/2019    CREATININE 1.0 01/28/2019    BUN 23 01/28/2019    CO2 27 01/28/2019    TSH 3.029 06/02/2017    PSA 1.1 10/26/2018    INR 1.0 06/21/2017    HGBA1C 5.1 10/26/2018       Current Outpatient Medications on File Prior to Visit   Medication Sig Dispense Refill    atorvastatin (LIPITOR) 10 MG tablet Take 1 tablet (10 mg total) by mouth once daily. 90 tablet 3    bicalutamide (CASODEX) 50 MG Tab Take 1 tablet (50 mg total) by mouth once daily. 90 tablet 1    diclofenac (VOLTAREN) 75 MG EC tablet TAKE 1 TABLET(75 MG) BY MOUTH TWICE  DAILY AS NEEDED 60 tablet 2    diclofenac (VOLTAREN) 75 MG EC tablet Take 1 tablet (75 mg total) by mouth 2 (two) times daily as needed. 180 tablet 0    ferrous sulfate 325 (65 FE) MG EC tablet Take 1 tablet (325 mg total) by mouth once daily. 90 tablet 3    gabapentin (NEURONTIN) 300 MG capsule Take 1 capsule (300 mg total) by mouth 3 (three) times daily. 270 capsule 0    gabapentin (NEURONTIN) 300 MG capsule Take 2 capsules (600 mg total) by mouth 3 (three) times daily. 540 capsule 0    polyethylene glycol (GLYCOLAX) 17 gram PwPk Take 17 g by mouth once daily. 30 packet 0    traZODone (DESYREL) 50 MG tablet Take 2 tablets (100 mg total) by mouth nightly as needed for Insomnia. 180 tablet 3    triamcinolone acetonide 0.1% (KENALOG) 0.1 % cream Apply 15 g topically.      walker (ULTRA-LIGHT ROLLATOR) Misc 1 Units by Misc.(Non-Drug; Combo Route) route once daily at 6am. 1 each 0    cholecalciferol, vitamin D3, (VITAMIN D3) 2,000 unit Cap Take 1 capsule (2,000 Units total) by mouth once daily. (Patient not taking: Reported on 10/9/2019) 90 capsule 0     No current facility-administered medications on file prior to visit.        Assessment:   68 y.o. male with multiple co-morbid illnesses here to establish care with new PCP and continue work-up of chronic issues notably with MSK pain, cervical stenosis, prostate CA.     Plan:     Problem List Items Addressed This Visit        ENT    Vocal fold cyst     Followed by ENT  · Reminded of appt            Cardiac/Vascular    HLD (hyperlipidemia)     Takes atorvastatin 10mg every 1-3 days. ASCVD risk of 7.8%, mod-intensity recommended  · Continue current therapy   · Advised to take every day  · Stop ASA due to NSAID overuse         Relevant Orders    Basic metabolic panel    Lipid panel       Oncology    Iron deficiency anemia due to chronic blood loss     Stable anemia, last colonoscopy in 2016 (next due in 7-10 years). Improved with iron  · Monitor         Relevant  Orders    CBC auto differential    Cancer of prostate     Proastatectomy on 6/4/19  · Many not need finasteride and tamsulosin  · Advised 6 mos of casodex  · Will start radiation therapy once complete         Prostate cancer - Primary    Relevant Orders    Basic metabolic panel       Endocrine    Vitamin D deficiency     Compliant with daily supplementation of 1000U Vit D  · Noncompliant with supplement  · monitor         Relevant Orders    Vitamin D       Orthopedic    Unspecified inflammatory spondylopathy, cervical region     Chronic neck pain, s/p laminectomy   · Completed OT         Acute pain of left shoulder     Pianist, 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 Total face-to-face time was 60 min, 50% of this was spent on counseling and coordination of care. The following issues were discussed: with MSK pain, cervical stenosis, prostate CA.    Jada Spaulding MD/MPH  Internal Medicine  [facility]  [facility] [phone]

## 2019-10-09 NOTE — ASSESSMENT & PLAN NOTE
Takes atorvastatin 10mg every 1-3 days. ASCVD risk of 7.8%, mod-intensity recommended  · Continue current therapy   · Advised to take every day  · Stop ASA due to NSAID overuse

## 2019-10-09 NOTE — ASSESSMENT & PLAN NOTE
, possible injury versus chronic wear and tear  · Shoulder XR  · PCP messaged JENSEN Vela for next steps

## 2019-10-09 NOTE — MEDICAL/APP STUDENT
Subjective:       Patient ID: Anthony Miguel is a 68 y.o. male.    Chief Complaint: Arm Pain and Follow-up    Pt feels well overall.  He complains of shoulder pain starting a month back.   Three months ago, he had prostectomy for prostate cancer, still taking bicalutamide. He's receiving radiotherapy for 45 days.  He has a bit of incontinence and frequency - denies dysuria, hematuria. He sees a radiologist and urologist once every 2 months.    He got flu shot 2 weeks ago.  He wants Tdap today.    He had shingle vaccine in 2016.  He's interested in getting the new shingle vaccine.       Review of Systems   Constitutional: Negative.    HENT: Positive for rhinorrhea and voice change. Negative for sore throat.    Eyes: Negative.    Respiratory: Negative.    Cardiovascular: Negative.    Gastrointestinal: Negative.    Endocrine: Negative.    Genitourinary: Negative.    Musculoskeletal: Positive for myalgias (shoulders).   Skin: Negative.    Allergic/Immunologic: Negative.    Neurological: Negative.  Negative for dizziness, tremors, facial asymmetry, speech difficulty, weakness, light-headedness, numbness and headaches.   Hematological: Negative.    Psychiatric/Behavioral: Negative.    All other systems reviewed and are negative.      Objective:      Physical Exam   Constitutional: He is oriented to person, place, and time. He appears well-developed and well-nourished. No distress.   HENT:   Head: Normocephalic and atraumatic.   Right Ear: External ear normal.   Left Ear: External ear normal.   Mouth/Throat: Oropharynx is clear and moist. No oropharyngeal exudate.   Eyes: Right eye exhibits no discharge. Left eye exhibits no discharge. No scleral icterus.   Neck: Normal range of motion. Neck supple. No JVD present. No tracheal deviation present. No thyromegaly present.   Cardiovascular: Normal rate, regular rhythm and normal heart sounds. Exam reveals no gallop and no friction rub.   No murmur heard.  Pulmonary/Chest:  Effort normal and breath sounds normal. No stridor. No respiratory distress. He has no wheezes. He has no rales. He exhibits no tenderness.   Abdominal: Soft. Bowel sounds are normal. He exhibits no distension and no mass. There is no tenderness. There is no rebound and no guarding.   Musculoskeletal: He exhibits tenderness. He exhibits no edema.   Neurological: He is alert and oriented to person, place, and time.   Skin: Skin is warm and dry. No rash noted. He is not diaphoretic. No erythema. No pallor.   Psychiatric: He has a normal mood and affect. His behavior is normal. Judgment and thought content normal.           Assessment & Plan:

## 2019-10-09 NOTE — ASSESSMENT & PLAN NOTE
Proastatectomy on 6/4/19  · Many not need finasteride and tamsulosin  · Advised 6 mos of casodex  · Will start radiation therapy once complete

## 2019-10-09 NOTE — Clinical Note
Hi JENSEN Vela,Mr Miguel has L shoulder pain today. I'm getting an XR today. Do you have recs on where to send him? I usually send to [facility], but wanted to check with you first about whether you can address.Thanks,Jada Spaulding MD/MPHInternal MedicineOchsner Center for [facility] and [phone] spectralink

## 2019-10-10 ENCOUNTER — TELEPHONE (OUTPATIENT)
Dept: PRIMARY CARE CLINIC | Facility: CLINIC | Age: 69
End: 2019-10-10

## 2019-10-10 DIAGNOSIS — M25.512 ACUTE PAIN OF LEFT SHOULDER: Primary | ICD-10-CM

## 2019-10-10 NOTE — TELEPHONE ENCOUNTER
Please set patient up with JENSEN Zaidi for his shoulder.     His shoulder XR was normal.    Thanks,    ----- Message from Adele Vela PA-C sent at 10/9/2019  2:18 PM CDT -----  Hi Dr. Spaulding,    I don't have a preference for who he would see for left shoulder pain.  Perhaps the ortho MDs/PAs at [facility] hand [facility].    I don't see shoulder pain.    Thanks,  Adele Vela [facility]  Neurosurgery  [facility]  [facility]    ----- Message -----  From: Jada Spaulding MD  Sent: 10/9/2019  11:56 AM CDT  To: NEERAJ Lorenzo,  Mr Miguel has L shoulder pain today. I'm getting an XR today. Do you have recs on where to send him? I usually send to [facility], but wanted to check with you first about whether you can address.    Thanks,  Jada Spaulding MD/MPH  Internal Medicine  [facility]  [phone] [facility]

## 2019-10-11 NOTE — TELEPHONE ENCOUNTER
Please let patient know that labs were normal, but he does have a mild anemia. There is nothing he should do about that aside from avoiding alcohol and eating a balanced diet.    Also please schedule his appt with Ayush Zaidi.    Thanks,  KJ

## 2019-10-14 ENCOUNTER — TELEPHONE (OUTPATIENT)
Dept: PRIMARY CARE CLINIC | Facility: CLINIC | Age: 69
End: 2019-10-14

## 2019-10-14 ENCOUNTER — TELEPHONE (OUTPATIENT)
Dept: SPORTS MEDICINE | Facility: CLINIC | Age: 69
End: 2019-10-14

## 2019-10-14 DIAGNOSIS — M25.512 ACUTE PAIN OF LEFT SHOULDER: Primary | ICD-10-CM

## 2019-10-14 NOTE — TELEPHONE ENCOUNTER
Dejuan team- could you assist with getting this patient scheduled with JENSEN Zaidi for shoulder pain?    Thanks,  NAVID Hong MA  You 58 minutes ago (7:48 AM)      The sports medicine referral that you put in will not allow me to schedule from it.    Routing comment       You routed conversation to Chelly Ron Staff 3 days ago      You 3 days ago         Please let patient know that labs were normal, but he does have a mild anemia. There is nothing he should do about that aside from avoiding alcohol and eating a balanced diet.     Also please schedule his appt with Ayush Zaidi.     Thanks,  NAVID

## 2019-10-16 ENCOUNTER — HOSPITAL ENCOUNTER (OUTPATIENT)
Facility: OTHER | Age: 69
Discharge: HOME | End: 2019-10-16
Attending: ANESTHESIOLOGY | Admitting: ANESTHESIOLOGY
Payer: MEDICARE

## 2019-10-16 VITALS
OXYGEN SATURATION: 99 % | HEART RATE: 62 BPM | WEIGHT: 180 LBS | TEMPERATURE: 98 F | SYSTOLIC BLOOD PRESSURE: 142 MMHG | HEIGHT: 67 IN | BODY MASS INDEX: 28.25 KG/M2 | DIASTOLIC BLOOD PRESSURE: 72 MMHG | RESPIRATION RATE: 18 BRPM

## 2019-10-16 DIAGNOSIS — M47.9 SPONDYLOSIS: ICD-10-CM

## 2019-10-16 DIAGNOSIS — M47.9 OSTEOARTHRITIS OF SPINE, UNSPECIFIED SPINAL OSTEOARTHRITIS COMPLICATION STATUS, UNSPECIFIED SPINAL REGION: Primary | ICD-10-CM

## 2019-10-16 PROCEDURE — 64493 INJ PARAVERT F JNT L/S 1 LEV: CPT | Mod: 50,HCNC | Performed by: ANESTHESIOLOGY

## 2019-10-16 PROCEDURE — 64493 PR INJ DX/THER AGNT PARAVERT FACET JOINT,IMG GUIDE,LUMBAR/SAC,1ST LVL: ICD-10-PCS | Mod: 50,HCNC,, | Performed by: ANESTHESIOLOGY

## 2019-10-16 PROCEDURE — 63600175 PHARM REV CODE 636 W HCPCS: Mod: HCNC | Performed by: ANESTHESIOLOGY

## 2019-10-16 PROCEDURE — 64494 INJ PARAVERT F JNT L/S 2 LEV: CPT | Mod: 50,HCNC,, | Performed by: ANESTHESIOLOGY

## 2019-10-16 PROCEDURE — 25000003 PHARM REV CODE 250: Mod: HCNC | Performed by: ANESTHESIOLOGY

## 2019-10-16 PROCEDURE — 64494 PR INJ DX/THER AGNT PARAVERT FACET JOINT,IMG GUIDE,LUMBAR/SAC, 2ND LEVEL: ICD-10-PCS | Mod: 50,HCNC,, | Performed by: ANESTHESIOLOGY

## 2019-10-16 PROCEDURE — 63600175 PHARM REV CODE 636 W HCPCS: Mod: HCNC | Performed by: STUDENT IN AN ORGANIZED HEALTH CARE EDUCATION/TRAINING PROGRAM

## 2019-10-16 PROCEDURE — 64494 INJ PARAVERT F JNT L/S 2 LEV: CPT | Mod: 50,HCNC | Performed by: ANESTHESIOLOGY

## 2019-10-16 PROCEDURE — 99152 PR MOD CONSCIOUS SEDATION, SAME PHYS, 5+ YRS, FIRST 15 MIN: ICD-10-PCS | Mod: HCNC,,, | Performed by: ANESTHESIOLOGY

## 2019-10-16 PROCEDURE — 99152 MOD SED SAME PHYS/QHP 5/>YRS: CPT | Mod: HCNC,,, | Performed by: ANESTHESIOLOGY

## 2019-10-16 PROCEDURE — 25500020 PHARM REV CODE 255: Mod: HCNC | Performed by: ANESTHESIOLOGY

## 2019-10-16 PROCEDURE — 64493 INJ PARAVERT F JNT L/S 1 LEV: CPT | Mod: 50,HCNC,, | Performed by: ANESTHESIOLOGY

## 2019-10-16 RX ORDER — DEXAMETHASONE SODIUM PHOSPHATE 4 MG/ML
INJECTION, SOLUTION INTRA-ARTICULAR; INTRALESIONAL; INTRAMUSCULAR; INTRAVENOUS; SOFT TISSUE
Status: DISCONTINUED | OUTPATIENT
Start: 2019-10-16 | End: 2019-10-16 | Stop reason: HOSPADM

## 2019-10-16 RX ORDER — MIDAZOLAM HYDROCHLORIDE 1 MG/ML
INJECTION INTRAMUSCULAR; INTRAVENOUS
Status: DISCONTINUED | OUTPATIENT
Start: 2019-10-16 | End: 2019-10-16 | Stop reason: HOSPADM

## 2019-10-16 RX ORDER — SODIUM CHLORIDE 9 MG/ML
500 INJECTION, SOLUTION INTRAVENOUS CONTINUOUS
Status: DISCONTINUED | OUTPATIENT
Start: 2019-10-16 | End: 2019-10-16 | Stop reason: HOSPADM

## 2019-10-16 RX ORDER — BUPIVACAINE HYDROCHLORIDE 2.5 MG/ML
INJECTION, SOLUTION EPIDURAL; INFILTRATION; INTRACAUDAL
Status: DISCONTINUED | OUTPATIENT
Start: 2019-10-16 | End: 2019-10-16 | Stop reason: HOSPADM

## 2019-10-16 RX ORDER — FENTANYL CITRATE 50 UG/ML
INJECTION, SOLUTION INTRAMUSCULAR; INTRAVENOUS
Status: DISCONTINUED | OUTPATIENT
Start: 2019-10-16 | End: 2019-10-16 | Stop reason: HOSPADM

## 2019-10-16 RX ORDER — LIDOCAINE HYDROCHLORIDE 10 MG/ML
INJECTION INFILTRATION; PERINEURAL
Status: DISCONTINUED | OUTPATIENT
Start: 2019-10-16 | End: 2019-10-16 | Stop reason: HOSPADM

## 2019-10-16 RX ADMIN — SODIUM CHLORIDE 500 ML: 0.9 INJECTION, SOLUTION INTRAVENOUS at 01:10

## 2019-10-16 NOTE — DISCHARGE SUMMARY
Discharge Note  Short Stay      SUMMARY     Admit Date: 10/16/2019    Attending Physician: Marcel Adkins      Discharge Physician: Marcel Adkins      Discharge Date: 10/16/2019 6:04 PM    Procedure(s) (LRB):  FACET JOINT INJECTION (LUMBAR BLOCK) BILATERAL L4-5 AND L5-S1 (Bilateral)    Final Diagnosis: DDD (degenerative disc disease), lumbar [M51.36]    Disposition: Home or self care    Patient Instructions:   Discharge Medication List as of 10/16/2019  3:45 PM      CONTINUE these medications which have NOT CHANGED    Details   atorvastatin (LIPITOR) 10 MG tablet Take 1 tablet (10 mg total) by mouth once daily., Starting Mon 1/28/2019, Normal      bicalutamide (CASODEX) 50 MG Tab Take 1 tablet (50 mg total) by mouth once daily., Starting Mon 9/9/2019, Until Tue 9/8/2020, Normal      cholecalciferol, vitamin D3, (VITAMIN D3) 2,000 unit Cap Take 1 capsule (2,000 Units total) by mouth once daily., Starting Mon 1/28/2019, Normal      !! diclofenac (VOLTAREN) 75 MG EC tablet TAKE 1 TABLET(75 MG) BY MOUTH TWICE DAILY AS NEEDED, Normal      !! diclofenac (VOLTAREN) 75 MG EC tablet Take 1 tablet (75 mg total) by mouth 2 (two) times daily as needed., Starting Tue 10/1/2019, Normal      ferrous sulfate 325 (65 FE) MG EC tablet Take 1 tablet (325 mg total) by mouth once daily., Starting Mon 1/28/2019, Normal      !! gabapentin (NEURONTIN) 300 MG capsule Take 1 capsule (300 mg total) by mouth 3 (three) times daily., Starting Fri 3/29/2019, Normal      !! gabapentin (NEURONTIN) 300 MG capsule Take 2 capsules (600 mg total) by mouth 3 (three) times daily., Starting Tue 10/1/2019, Normal      polyethylene glycol (GLYCOLAX) 17 gram PwPk Take 17 g by mouth once daily., Starting Wed 6/5/2019, Print      traZODone (DESYREL) 50 MG tablet Take 2 tablets (100 mg total) by mouth nightly as needed for Insomnia., Starting Thu 5/30/2019, Until Fri 5/29/2020, Normal      triamcinolone acetonide 0.1% (KENALOG) 0.1 % cream Apply 15 g  topically., Starting Wed 4/15/2015, Historical Med      walker (ULTRA-LIGHT ROLLATOR) Misc 1 Units by Misc.(Non-Drug; Combo Route) route once daily at 6am., Starting Wed 6/14/2017, Print       !! - Potential duplicate medications found. Please discuss with provider.              Discharge Diagnosis: DDD (degenerative disc disease), lumbar [M51.36]  Condition on Discharge: Stable with no complications to procedure   Diet on Discharge: Same as before.  Activity: as per instruction sheet.  Discharge to: Home with a responsible adult.  Follow up: 2-4 weeks    Please call my office or pager at [phone] if experienced any weakness or loss of sensation, fever > 101.5, pain uncontrolled with oral medications, persistent nausea/vomiting/or diarrhea, redness or drainage from the incisions, or any other worrisome concerns. If physician on call was not reached or could not communicate with our office for any reason please go to the nearest emergency department

## 2019-10-16 NOTE — H&P
HPI  Patient presenting for Procedure(s) (LRB):  FACET JOINT INJECTION (LUMBAR BLOCK) BILATERAL L4-5 AND L5-S1 (Bilateral)     Patient on Anti-coagulation No    No health changes since previous encounter    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    Overview:  Right side dx update     Past Surgical History:   Procedure Laterality Date    BACK SURGERY      EPIDURAL STEROID INJECTION N/A 2/6/2019    Procedure: INJECTION, STEROID, EPIDURAL, L45-S1 IL;  Surgeon: Marcel Adkins MD;  Location: [facility];  Service: Pain Management;  Laterality: N/A;    EPIDURAL STEROID INJECTION N/A 4/10/2019    Procedure: Injection, Steroid, Epidural LUMBAR/CAUDAL L5-S1 INTERLAMINAR KAYLEY;  Surgeon: Marcel Adkins MD;  Location: [facility];  Service: Pain Management;  Laterality: N/A;  NEEDS CONSENT    INJECTION OF FACET JOINT Bilateral 8/14/2019    Procedure: INJECTION, FACET JOINT INJECTION (LUMBAR BLOCK) BILATERAL L4-5 AND L5-S1;  Surgeon: Marcel Adkins MD;  Location: [facility];  Service: Pain Management;  Laterality: Bilateral;  NEEDS CONSENT    Larangoscopy      ROBOT-ASSISTED LAPAROSCOPIC PROSTATECTOMY USING DA SAÚL XI N/A 6/4/2019    Procedure: XI ROBOTIC PROSTATECTOMY;  Surgeon: Sergio Dixon MD;  Location: [facility] OR [address] [place] [address];  Service: Urology;  Laterality: N/A;  4hrs/ gen with regional      Review of patient's allergies indicates:  No Known Allergies   Current Facility-Administered Medications   Medication    0.9%  NaCl infusion       PMHx, PSHx, Allergies, Medications reviewed in epic    ROS negative except pain complaints in HPI    OBJECTIVE:    /78 (BP Location: Right arm,  "Patient Position: Sitting)   Pulse 70   Temp 97.7 °F (36.5 °C) (Oral)   Ht 5' 7" (1.702 m)   Wt 81.6 kg (180 lb)   SpO2 96%   BMI 28.19 kg/m²     PHYSICAL EXAMINATION:    GENERAL: Well appearing, in no acute distress, alert and oriented x3.  PSYCH:  Mood and affect appropriate.  SKIN: Skin color, texture, turgor normal, no rashes or lesions which will impact the procedure.  CV: RRR with palpation of the radial artery.  PULM: No evidence of respiratory difficulty, symmetric chest rise. Clear to auscultation.  NEURO: Cranial nerves grossly intact.    Plan:    Proceed with procedure as planned Procedure(s) (LRB):  FACET JOINT INJECTION (LUMBAR BLOCK) BILATERAL L4-5 AND L5-S1 (Bilateral)    Chalino Cheng  10/16/2019          "

## 2019-10-16 NOTE — DISCHARGE INSTRUCTIONS
Thank you for allowing us to care for you today. You may receive a survey about the care we provided. Your feedback is valuable and helps us provide excellent care throughout the community.     Home Care Instructions for Pain Management:    1. DIET:   You may resume your normal diet today.   2. BATHING:   You may shower with luke warm water. No tub baths or anything that will soak injection sites under water for the next 24 hours.  3. DRESSING:   You may remove your bandage today.   4. ACTIVITY LEVEL:   You may resume your normal activities 24 hrs after your procedure. Nothing strenuous today.  5. MEDICATIONS:   You may resume your normal medications today. To restart blood thinners, ask your doctor.  6. DRIVING    If you have received any sedatives by mouth today, you may not drive for 12 hours.    If you have received any sedation through your IV, you may not drive for 24 hrs.   7. SPECIAL INSTRUCTIONS:   No heat to the injection site for 24 hrs including, hot bath or shower, heating pad, moist heat, or hot tubs.    Use ice pack to injection site for any pain or discomfort.  Apply ice packs for 20 minute intervals as needed.    IF you have diabetes, be sure to monitor your blood sugar more closely. IF your injection contained steroids your blood sugar levels may become higher than normal.    If you are still having pain upon discharge:  Your pain may improve over the next 48 hours. The anesthetic (numbing medication) works immediately to 48 hours. IF your injection contained a steroid (anti-inflammatory medication), it takes approximately 3 days to start feeling relief and 7-10 days to see your greatest results from the medication. It is possible you may need subsequent injections. This would be discussed at your follow up appointment with pain management or your referring doctor.    Please call the PAIN MANAGEMENT office at [phone] or ON CALL pager at [phone] if you experienced any:   -Weakness or  loss of sensation  -Fever > 101.5  -Pain uncontrolled with oral medications   -Persistent nausea, vomiting, or diarrhea  -Redness or drainage from the injection sites, or any other worrisome concerns.   If physician on call was not reached or could not communicate with our office for any reason please go to the nearest emergency department.  Adult Procedural Sedation Instructions    Recovery After Procedural Sedation (Adult)  You have been given medicine by vein to make you sleep during your surgery. This may have included both a pain medicine and sleeping medicine. Most of the effects have worn off. But you may still have some drowsiness for the next 6 to 8 hours.  Home care  Follow these guidelines when you get home:  · For the next 8 hours, you should be watched by a responsible adult. This person should make sure your condition is not getting worse.  · Don't drink any alcohol for the next 24 hours.  · Don't drive, operate dangerous machinery, or make important business or personal decisions during the next 24 hours.  Note: Your healthcare provider may tell you not to take any medicine by mouth for pain or sleep in the next 4 hours. These medicines may react with the medicines you were given in the hospital. This could cause a much stronger response than usual.  Follow-up care  Follow up with your healthcare provider if you are not alert and back to your usual level of activity within 12 hours.  When to seek medical advice  Call your healthcare provider right away if any of these occur:  · Drowsiness gets worse  · Weakness or dizziness gets worse  · Repeated vomiting  · You can't be awakened   Date Last Reviewed: 10/18/2016  © 5183-2238 [employer]. [address] [place] [address], [place] [address]. All rights reserved. This information is not intended as a substitute for professional medical care. Always follow your healthcare professional's instructions.

## 2019-10-16 NOTE — OP NOTE
"Patient Name: Anthony Miguel  MRN: [id number]    INFORMED CONSENT: The procedure, risks, benefits and options were discussed with patient. There are no contraindications to the procedure. The patient expressed understanding and agreed to proceed. The personnel performing the procedure was discussed. I verify that I personally obtained Anthony's consent prior to the start of the procedure and the signed consent can be found on the patient's chart.    Procedure Date: 10/16/2019    Anesthesia: Topical    Pre Procedure diagnosis: DDD (degenerative disc disease), lumbar [M51.36]  1. Osteoarthritis of spine, unspecified spinal osteoarthritis complication status, unspecified spinal region    2. Spondylosis      Post-Procedure diagnosis: SAME      Sedation: Yes - Fentanyl 50 mcg and Midazolam 2 mg    PROCEDURE: BILATERAL L4/5 AND L5/S1 FACET JOINT INJECTION      DESCRIPTION OF PROCEDURE:The patient was brought to the procedure room.  After performing time out. IV access was obtained prior to the procedure. The patient was positioned prone on the fluoroscopy table. Continuous hemodynamic monitoring was initiated including blood pressure, EKG, and pulse oximetry. The area of the lumbar spine was prepped with chlorhexidine and draped into a sterile field.Fluoroscopy was used to identify the location of BILATERAL L4/5 AND L5/S1  joints respectivly. Skin anesthesia was achieved using 3 cc of Lidocaine 1% over the injection sites. A 25 gauge, 3 1/2" spinal needle was slowly inserted at each joint using APand oblique fluoroscopic imaging. Negative aspiration for blood or CSF was confirmed. 0.5 cc of Omnipaque  dye was inserted to confirm placement A combination of 4 ml bupivacaine 0.25% and 10 mg decadron was injected at all joints. The needles were removed and bleeding was nil. A sterile dressing was applied. No specimens collected. Anthony was taken back to the recovery room for further observation.     Blood Loss: " Nill  Specimen: None    Marcel Adkins MD

## 2019-10-17 ENCOUNTER — PATIENT OUTREACH (OUTPATIENT)
Dept: ADMINISTRATIVE | Facility: OTHER | Age: 69
End: 2019-10-17

## 2019-11-01 ENCOUNTER — PATIENT OUTREACH (OUTPATIENT)
Dept: ADMINISTRATIVE | Facility: OTHER | Age: 69
End: 2019-11-01

## 2019-11-16 ENCOUNTER — PATIENT OUTREACH (OUTPATIENT)
Dept: ADMINISTRATIVE | Facility: OTHER | Age: 69
End: 2019-11-16

## 2019-11-18 ENCOUNTER — LAB VISIT (OUTPATIENT)
Dept: LAB | Facility: HOSPITAL | Age: 69
End: 2019-11-18
Attending: UROLOGY
Payer: MEDICARE

## 2019-11-18 DIAGNOSIS — C61 PROSTATE CANCER: ICD-10-CM

## 2019-11-18 LAB — COMPLEXED PSA SERPL-MCNC: 0.02 NG/ML (ref 0–4)

## 2019-11-18 PROCEDURE — 36415 COLL VENOUS BLD VENIPUNCTURE: CPT | Mod: HCNC

## 2019-11-18 PROCEDURE — 84153 ASSAY OF PSA TOTAL: CPT | Mod: HCNC

## 2019-11-19 ENCOUNTER — TELEPHONE (OUTPATIENT)
Dept: UROLOGY | Facility: HOSPITAL | Age: 69
End: 2019-11-19

## 2019-11-19 NOTE — TELEPHONE ENCOUNTER
Patient needs a 3 month psa.  Please cancel his appointment for today and reschedule in 3 months.  Please notify him that he has a 90 day refill on his casodex.  Thank youl.

## 2019-12-03 ENCOUNTER — LAB VISIT (OUTPATIENT)
Dept: LAB | Facility: HOSPITAL | Age: 69
End: 2019-12-03
Attending: RADIOLOGY
Payer: MEDICARE

## 2019-12-03 DIAGNOSIS — C61 CANCER OF PROSTATE: ICD-10-CM

## 2019-12-03 LAB — COMPLEXED PSA SERPL-MCNC: 0.02 NG/ML (ref 0–4)

## 2019-12-03 PROCEDURE — 36415 COLL VENOUS BLD VENIPUNCTURE: CPT | Mod: HCNC

## 2019-12-03 PROCEDURE — 84153 ASSAY OF PSA TOTAL: CPT | Mod: HCNC

## 2019-12-16 ENCOUNTER — LAB VISIT (OUTPATIENT)
Dept: LAB | Facility: HOSPITAL | Age: 69
End: 2019-12-16
Attending: RADIOLOGY
Payer: MEDICARE

## 2019-12-16 ENCOUNTER — OFFICE VISIT (OUTPATIENT)
Dept: RADIATION ONCOLOGY | Facility: CLINIC | Age: 69
End: 2019-12-16
Payer: MEDICARE

## 2019-12-16 VITALS
SYSTOLIC BLOOD PRESSURE: 128 MMHG | WEIGHT: 186.88 LBS | BODY MASS INDEX: 29.27 KG/M2 | DIASTOLIC BLOOD PRESSURE: 73 MMHG | HEART RATE: 63 BPM | RESPIRATION RATE: 16 BRPM

## 2019-12-16 DIAGNOSIS — C61 CANCER OF PROSTATE: ICD-10-CM

## 2019-12-16 DIAGNOSIS — C61 CANCER OF PROSTATE: Primary | ICD-10-CM

## 2019-12-16 LAB
ALBUMIN SERPL BCP-MCNC: 3.9 G/DL (ref 3.5–5.2)
ALP SERPL-CCNC: 69 U/L (ref 55–135)
ALT SERPL W/O P-5'-P-CCNC: 11 U/L (ref 10–44)
AST SERPL-CCNC: 16 U/L (ref 10–40)
BILIRUB DIRECT SERPL-MCNC: 0.2 MG/DL (ref 0.1–0.3)
BILIRUB SERPL-MCNC: 0.6 MG/DL (ref 0.1–1)
PROT SERPL-MCNC: 7.3 G/DL (ref 6–8.4)

## 2019-12-16 PROCEDURE — 1159F MED LIST DOCD IN RCRD: CPT | Mod: HCNC,S$GLB,, | Performed by: RADIOLOGY

## 2019-12-16 PROCEDURE — 80076 HEPATIC FUNCTION PANEL: CPT | Mod: HCNC

## 2019-12-16 PROCEDURE — 36415 COLL VENOUS BLD VENIPUNCTURE: CPT | Mod: HCNC

## 2019-12-16 PROCEDURE — 99999 PR PBB SHADOW E&M-EST. PATIENT-LVL III: CPT | Mod: PBBFAC,HCNC,, | Performed by: RADIOLOGY

## 2019-12-16 PROCEDURE — 99212 PR OFFICE/OUTPT VISIT, EST, LEVL II, 10-19 MIN: ICD-10-PCS | Mod: HCNC,S$GLB,, | Performed by: RADIOLOGY

## 2019-12-16 PROCEDURE — 1101F PT FALLS ASSESS-DOCD LE1/YR: CPT | Mod: HCNC,CPTII,S$GLB, | Performed by: RADIOLOGY

## 2019-12-16 PROCEDURE — 1126F AMNT PAIN NOTED NONE PRSNT: CPT | Mod: HCNC,S$GLB,, | Performed by: RADIOLOGY

## 2019-12-16 PROCEDURE — 1126F PR PAIN SEVERITY QUANTIFIED, NO PAIN PRESENT: ICD-10-PCS | Mod: HCNC,S$GLB,, | Performed by: RADIOLOGY

## 2019-12-16 PROCEDURE — 1159F PR MEDICATION LIST DOCUMENTED IN MEDICAL RECORD: ICD-10-PCS | Mod: HCNC,S$GLB,, | Performed by: RADIOLOGY

## 2019-12-16 PROCEDURE — 1101F PR PT FALLS ASSESS DOC 0-1 FALLS W/OUT INJ PAST YR: ICD-10-PCS | Mod: HCNC,CPTII,S$GLB, | Performed by: RADIOLOGY

## 2019-12-16 PROCEDURE — 99999 PR PBB SHADOW E&M-EST. PATIENT-LVL III: ICD-10-PCS | Mod: PBBFAC,HCNC,, | Performed by: RADIOLOGY

## 2019-12-16 PROCEDURE — 99212 OFFICE O/P EST SF 10 MIN: CPT | Mod: HCNC,S$GLB,, | Performed by: RADIOLOGY

## 2019-12-16 NOTE — PROGRESS NOTES
"Subjective:       Patient ID: Anthony Miguel is a 69 y.o. male.    Chief Complaint: Prostate Cancer (3mo f/u;psa)    This patient returns for 3 month follow up.    Mr. Miguel has a history of Stage IIIC (pT2, N0, M0, PSA < 10, GG 5) adenocarcinoma of the prostate. He was noted to have a palpable nodule in the Rt. lobe of the prostate on MARTHA in March of 2019. Biopsies from the Lt. apex and Rt. base revealed Bruno 9 (4+5) adenocarcinoma involving 45% of the specimen at the apex and 65% of the specimen at the Rt. base.  There was Bruno 8 (3+5) disease at the Rt. mid gland involving 95% of the specimen.  The Lt. apex, Lt. mid gland and Lt. base revealed small amounts of Bruno score 6 (3+3) to 7 (3+4) disease.  He subsequently elected to proceed with RALP and underwent this procedure on 6/5/19.  Pathology revealed a 30 g prostate measuring 4.5 x 3.6 x 2.9 cm.  Thre was a "moderate" total volume of Egnar 9 (4+5) adenocarcinoma.  The longest length on was 1.9 cm.  Extraprostatic extension was not identified although carcinoma did bulge the capsule.  There was no bladder neck or seminal vesicle invasion.  The margins of resection were negative.  Five pelvic nodes were negative for tumor involvement.  Postoperative PSA on 7/8 and 8/19/19 returned a 0.07 ng/ml.  The patient was referred for adjuvant radiotherapy.  We elected to continue bicalutamide to allow for improvement in his continence.  Today the patient states he feels well.  Notes his incontinence has improved.  Still using one pull up per day.      Review of Systems   Constitutional: Negative for activity change, appetite change, chills and fever.   Gastrointestinal: Negative for abdominal pain, blood in stool and constipation.   Genitourinary: Negative for difficulty urinating, dysuria, frequency and hematuria.       Objective:      Physical Exam   Constitutional: He appears well-developed and well-nourished. No distress.     PSA - 0.02 " ng/ml  Assessment:       1. Cancer of prostate        Plan:       Improved incontinence  PSA decreased with Casodex.  Will check LFts today.  Plan to begin radiotherapy in February.

## 2020-01-03 ENCOUNTER — PATIENT OUTREACH (OUTPATIENT)
Dept: ADMINISTRATIVE | Facility: OTHER | Age: 70
End: 2020-01-03

## 2020-01-09 ENCOUNTER — OFFICE VISIT (OUTPATIENT)
Dept: PRIMARY CARE CLINIC | Facility: CLINIC | Age: 70
End: 2020-01-09
Payer: MEDICARE

## 2020-01-09 ENCOUNTER — LAB VISIT (OUTPATIENT)
Dept: LAB | Facility: HOSPITAL | Age: 70
End: 2020-01-09
Attending: INTERNAL MEDICINE
Payer: MEDICARE

## 2020-01-09 VITALS
HEART RATE: 75 BPM | OXYGEN SATURATION: 98 % | HEIGHT: 67 IN | BODY MASS INDEX: 28.34 KG/M2 | WEIGHT: 180.56 LBS | DIASTOLIC BLOOD PRESSURE: 70 MMHG | SYSTOLIC BLOOD PRESSURE: 110 MMHG

## 2020-01-09 DIAGNOSIS — C61 PROSTATE CANCER: ICD-10-CM

## 2020-01-09 DIAGNOSIS — D69.2 SENILE PURPURA: ICD-10-CM

## 2020-01-09 DIAGNOSIS — M46.92 UNSPECIFIED INFLAMMATORY SPONDYLOPATHY, CERVICAL REGION: ICD-10-CM

## 2020-01-09 DIAGNOSIS — F15.182 CAFFEINE-INDUCED SLEEP DISORDER WITH MILD USE DISORDER, INSOMNIA TYPE: ICD-10-CM

## 2020-01-09 DIAGNOSIS — E78.5 HYPERLIPIDEMIA, UNSPECIFIED HYPERLIPIDEMIA TYPE: ICD-10-CM

## 2020-01-09 DIAGNOSIS — G95.9 CERVICAL MYELOPATHY: ICD-10-CM

## 2020-01-09 DIAGNOSIS — M25.562 CHRONIC PAIN OF LEFT KNEE: ICD-10-CM

## 2020-01-09 DIAGNOSIS — D50.0 IRON DEFICIENCY ANEMIA DUE TO CHRONIC BLOOD LOSS: ICD-10-CM

## 2020-01-09 DIAGNOSIS — Z91.199 NONCOMPLIANCE: ICD-10-CM

## 2020-01-09 DIAGNOSIS — G89.29 CHRONIC PAIN OF LEFT KNEE: ICD-10-CM

## 2020-01-09 LAB
ANION GAP SERPL CALC-SCNC: 9 MMOL/L (ref 8–16)
BUN SERPL-MCNC: 19 MG/DL (ref 8–23)
CALCIUM SERPL-MCNC: 8.8 MG/DL (ref 8.7–10.5)
CHLORIDE SERPL-SCNC: 110 MMOL/L (ref 95–110)
CHOLEST SERPL-MCNC: 152 MG/DL (ref 120–199)
CHOLEST/HDLC SERPL: 3.1 {RATIO} (ref 2–5)
CO2 SERPL-SCNC: 25 MMOL/L (ref 23–29)
CREAT SERPL-MCNC: 0.9 MG/DL (ref 0.5–1.4)
EST. GFR  (AFRICAN AMERICAN): >60 ML/MIN/1.73 M^2
EST. GFR  (NON AFRICAN AMERICAN): >60 ML/MIN/1.73 M^2
FERRITIN SERPL-MCNC: 30 NG/ML (ref 20–300)
GLUCOSE SERPL-MCNC: 61 MG/DL (ref 70–110)
HDLC SERPL-MCNC: 49 MG/DL (ref 40–75)
HDLC SERPL: 32.2 % (ref 20–50)
LDLC SERPL CALC-MCNC: 82.6 MG/DL (ref 63–159)
NONHDLC SERPL-MCNC: 103 MG/DL
POTASSIUM SERPL-SCNC: 4.2 MMOL/L (ref 3.5–5.1)
SODIUM SERPL-SCNC: 144 MMOL/L (ref 136–145)
TRIGL SERPL-MCNC: 102 MG/DL (ref 30–150)

## 2020-01-09 PROCEDURE — 80061 LIPID PANEL: CPT | Mod: HCNC

## 2020-01-09 PROCEDURE — 1159F PR MEDICATION LIST DOCUMENTED IN MEDICAL RECORD: ICD-10-PCS | Mod: HCNC,S$GLB,, | Performed by: INTERNAL MEDICINE

## 2020-01-09 PROCEDURE — 36415 COLL VENOUS BLD VENIPUNCTURE: CPT | Mod: HCNC

## 2020-01-09 PROCEDURE — 82728 ASSAY OF FERRITIN: CPT | Mod: HCNC

## 2020-01-09 PROCEDURE — 1125F PR PAIN SEVERITY QUANTIFIED, PAIN PRESENT: ICD-10-PCS | Mod: HCNC,S$GLB,, | Performed by: INTERNAL MEDICINE

## 2020-01-09 PROCEDURE — 80048 BASIC METABOLIC PNL TOTAL CA: CPT | Mod: HCNC

## 2020-01-09 PROCEDURE — 1125F AMNT PAIN NOTED PAIN PRSNT: CPT | Mod: HCNC,S$GLB,, | Performed by: INTERNAL MEDICINE

## 2020-01-09 PROCEDURE — 99215 OFFICE O/P EST HI 40 MIN: CPT | Mod: HCNC,S$GLB,, | Performed by: INTERNAL MEDICINE

## 2020-01-09 PROCEDURE — 1101F PT FALLS ASSESS-DOCD LE1/YR: CPT | Mod: HCNC,CPTII,S$GLB, | Performed by: INTERNAL MEDICINE

## 2020-01-09 PROCEDURE — 1159F MED LIST DOCD IN RCRD: CPT | Mod: HCNC,S$GLB,, | Performed by: INTERNAL MEDICINE

## 2020-01-09 PROCEDURE — 1101F PR PT FALLS ASSESS DOC 0-1 FALLS W/OUT INJ PAST YR: ICD-10-PCS | Mod: HCNC,CPTII,S$GLB, | Performed by: INTERNAL MEDICINE

## 2020-01-09 PROCEDURE — 99215 PR OFFICE/OUTPT VISIT, EST, LEVL V, 40-54 MIN: ICD-10-PCS | Mod: HCNC,S$GLB,, | Performed by: INTERNAL MEDICINE

## 2020-01-09 NOTE — PATIENT INSTRUCTIONS
TODAY:  - bring ALL meds from home to next appointment in one week  - Reschedule with Thomas (pain management at [facility])  - appt with sports medicine (JENSEN Zaidi)  - labs today

## 2020-01-09 NOTE — PROGRESS NOTES
Primary Care Provider Appointment    Subjective:      Patient ID: Anthony Miguel is a 69 y.o. male with cervical myelopathy, HLD, chronic pain, insomnia, prostate CA    Chief Complaint: Follow-up    He was diagnosed with prostate CA in 2019. He has missed a few 2 doses in 3 mos. He is not performing his kegels, has significant urinary incontinence.    Otherwise is intermittently compliant with all meds aside from atorvastatin and iron (which he is NOT taking).    He takes trazodone for sleep PRN, but skips doses. He is trying to cut back on caffeine.     He has iron-def anemia. Did not take his iron supplement. May also have restless leg syndrome.    He requests an increase in pain meds, BUT IS NOT TAKING HIS CURRENT MEDS APPROPRIATELY. He missed his sports medicine appointment.     Past Surgical History:   Procedure Laterality Date    BACK SURGERY      EPIDURAL STEROID INJECTION N/A 2/6/2019    Procedure: INJECTION, STEROID, EPIDURAL, L45-S1 IL;  Surgeon: Marcel Adkins MD;  Location: [facility] PAIN MGT;  Service: Pain Management;  Laterality: N/A;    EPIDURAL STEROID INJECTION N/A 4/10/2019    Procedure: Injection, Steroid, Epidural LUMBAR/CAUDAL L5-S1 INTERLAMINAR KAYLEY;  Surgeon: Marcel Adkins MD;  Location: [facility] PAIN MGT;  Service: Pain Management;  Laterality: N/A;  NEEDS CONSENT    INJECTION OF FACET JOINT Bilateral 8/14/2019    Procedure: INJECTION, FACET JOINT INJECTION (LUMBAR BLOCK) BILATERAL L4-5 AND L5-S1;  Surgeon: Marcel Adkins MD;  Location: [facility] PAIN MGT;  Service: Pain Management;  Laterality: Bilateral;  NEEDS CONSENT    INJECTION OF FACET JOINT Bilateral 10/16/2019    Procedure: FACET JOINT INJECTION (LUMBAR BLOCK) BILATERAL L4-5 AND L5-S1;  Surgeon: Marcel Adkins MD;  Location: [facility] PAIN MGT;  Service: Pain Management;  Laterality: Bilateral;  NEEDS CONSENT    Larangoscopy      ROBOT-ASSISTED LAPAROSCOPIC PROSTATECTOMY USING DA SÚAL XI N/A 6/4/2019    Procedure: XI  ROBOTIC PROSTATECTOMY;  Surgeon: Sergio Dixon MD;  Location: [facility] OR [address] [place] [address];  Service: Urology;  Laterality: N/A;  4hrs/ gen with regional        Past Medical History:   Diagnosis Date    Benign non-nodular prostatic hyperplasia without lower urinary tract symptoms 2017    Compliant with finasteride    Hepatitis C     Senile purpura 2017    Ecchymoses on L UE     Unspecified vitamin D deficiency 2017    Compliant with daily supplementation of 1000U Vit D    Vocal fold cyst 2012    Overview:  Right side dx update       Social History     Socioeconomic History    Marital status: Single     Spouse name: Not on file    Number of children: 0    Years of education: Not on file    Highest education level: Not on file   Occupational History    Not on file   Social Needs    Financial resource strain: Not hard at all    Food insecurity:     Worry: Never true     Inability: Never true    Transportation needs:     Medical: Yes     Non-medical: Yes   Tobacco Use    Smoking status: Former Smoker     Packs/day: 1.00     Last attempt to quit: 2000     Years since quittin.9    Smokeless tobacco: Former User   Substance and Sexual Activity    Alcohol use: Yes     Alcohol/week: 1.0 standard drinks     Types: 1 Glasses of wine per week     Comment: once in a while    Drug use: No    Sexual activity: Not Currently   Lifestyle    Physical activity:     Days per week: Not on file     Minutes per session: Not on file    Stress: Only a little   Relationships    Social connections:     Talks on phone: Not on file     Gets together: Not on file     Attends [employer] service: Not on file     Active member of club or organization: Not on file     Attends meetings of clubs or organizations: Not on file     Relationship status: Not on file   Other Topics Concern    Not on file   Social History Narrative    No difficulty reading Rx labels        Review of Systems   Constitutional: Negative for  "activity change, appetite change and fatigue.   Respiratory: Negative for cough and shortness of breath.    Cardiovascular: Negative for leg swelling.   Gastrointestinal: Negative for constipation and diarrhea.   Genitourinary: Positive for frequency and urgency. Negative for difficulty urinating and hematuria.   Musculoskeletal: Positive for back pain, gait problem and neck pain. Negative for joint swelling.        LE weakness   Psychiatric/Behavioral: Positive for sleep disturbance. Negative for behavioral problems, decreased concentration and dysphoric mood. The patient is nervous/anxious.        Objective:   /70   Pulse 75   Ht 5' 7" (1.702 m)   Wt 81.9 kg (180 lb 8.9 oz)   SpO2 98%   BMI 28.28 kg/m²     Physical Exam   Constitutional: He is oriented to person, place, and time. He appears well-developed and well-nourished.   HENT:   Head: Normocephalic.   Eyes: EOM are normal.   Cardiovascular: Normal rate and regular rhythm.   Pulmonary/Chest: Effort normal.   Musculoskeletal: He exhibits tenderness and deformity.   Ambulating with rolling walker  Increased sensation in hands bilaterally (from last exam)  Cervical vertebrae protruding from low neck   Neurological: He is alert and oriented to person, place, and time. He displays normal reflexes. Coordination normal.   Psychiatric: He has a normal mood and affect. His behavior is normal. Thought content normal.       Lab Results   Component Value Date    WBC 9.20 10/09/2019    HGB 13.1 (L) 10/09/2019    HCT 39.1 (L) 10/09/2019     10/09/2019    CHOL 116 (L) 01/28/2019    TRIG 73 01/28/2019    HDL 55 01/28/2019    ALT 11 12/16/2019    AST 16 12/16/2019     10/09/2019    K 4.3 10/09/2019     10/09/2019    CREATININE 0.9 10/09/2019    BUN 19 10/09/2019    CO2 27 10/09/2019    TSH 3.029 06/02/2017    PSA 1.1 10/26/2018    INR 1.0 06/21/2017    HGBA1C 5.1 10/26/2018       RESULTS: Reviewed labs and images today    Assessment:   69 y.o. " male with multiple co-morbid illnesses here to continue work-up of chronic issues notably with cervical myelopathy, HLD, chronic pain, insomnia, prostate CA     Plan:     Problem List Items Addressed This Visit        Neuro    Cervical myelopathy     Severe cervical stenosis with myelopathy with fusion of C3-6.  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 ASCVD risk of 7.8%, mod-intensity recommended  · Continue current therapy   · Advised to take every day  · Stop ASA due to NSAID overuse         Relevant Orders    Lipid panel       Hematology    Senile purpura     Ecchymoses on L UE   · monitor            Oncology    Iron deficiency anemia due to chronic blood loss     Stable anemia, last colonoscopy in 2016 (next due in 7-10 years). Improved with iron  · Monitor ferritin  · Advised compliance with iron supplement due to insomnia and possible restless leg syndrome         Relevant Orders    Basic metabolic panel    Ferritin    Prostate cancer     H/o prostatectomy. Followed by Uro, possible plan for radiation therapy  · Continue casodex with downward trend in PSA  · Continue Rad Onc follow-up            Orthopedic    Unspecified inflammatory spondylopathy, cervical region     Chronic neck pain, s/p laminectomy   · Completed OT          Chronic pain of left knee     Chronic L knee pain with longstanding injury  · Refer to Sports Medicine         Relevant Orders    Ambulatory referral/consult to Sports Medicine          Health Maintenance       Date Due Completion Date    TETANUS VACCINE 12/03/1967 ---    Shingles Vaccine (2 of 3) 11/14/2016 9/19/2016    Lipid Panel 01/28/2020 1/28/2019    Colonoscopy 04/15/2026 4/15/2016          Follow up in about 1 week (around 1/16/2020). Total face-to-face time was 60 min, 50% of this was spent on counseling and coordination of care. The following issues were discussed: with cervical myelopathy, HLD, chronic pain, insomnia, prostate CA    Jada Spaulding MD/MPH  Internal Medicine  [facility]  [phone]

## 2020-01-09 NOTE — ASSESSMENT & PLAN NOTE
Noncompliant atorvastatin 10mg every 1-3 days. ASCVD risk of 7.8%, mod-intensity recommended  · Continue current therapy   · Advised to take every day  · Stop ASA due to NSAID overuse

## 2020-01-09 NOTE — ASSESSMENT & PLAN NOTE
Stable anemia, last colonoscopy in 2016 (next due in 7-10 years). Improved with iron  · Monitor ferritin  · Advised compliance with iron supplement due to insomnia and possible restless leg syndrome

## 2020-01-09 NOTE — ASSESSMENT & PLAN NOTE
Noncompliant with all meds, has basic regimen but skips doses has decreased access to pharmacy due to transportation barrier  · Pill packing next week

## 2020-01-09 NOTE — ASSESSMENT & PLAN NOTE
H/o prostatectomy. Followed by Uro, possible plan for radiation therapy  · Continue casodex with downward trend in PSA  · Continue Rad Onc follow-up

## 2020-01-14 ENCOUNTER — PATIENT OUTREACH (OUTPATIENT)
Dept: ADMINISTRATIVE | Facility: OTHER | Age: 70
End: 2020-01-14

## 2020-01-15 ENCOUNTER — OFFICE VISIT (OUTPATIENT)
Dept: SPORTS MEDICINE | Facility: CLINIC | Age: 70
End: 2020-01-15
Payer: MEDICARE

## 2020-01-15 ENCOUNTER — TELEPHONE (OUTPATIENT)
Dept: PRIMARY CARE CLINIC | Facility: CLINIC | Age: 70
End: 2020-01-15

## 2020-01-15 ENCOUNTER — HOSPITAL ENCOUNTER (OUTPATIENT)
Dept: RADIOLOGY | Facility: HOSPITAL | Age: 70
Discharge: HOME | End: 2020-01-15
Attending: PHYSICIAN ASSISTANT
Payer: MEDICARE

## 2020-01-15 VITALS
SYSTOLIC BLOOD PRESSURE: 141 MMHG | DIASTOLIC BLOOD PRESSURE: 80 MMHG | HEART RATE: 70 BPM | WEIGHT: 180 LBS | HEIGHT: 67 IN | BODY MASS INDEX: 28.25 KG/M2

## 2020-01-15 DIAGNOSIS — M25.561 RIGHT KNEE PAIN, UNSPECIFIED CHRONICITY: ICD-10-CM

## 2020-01-15 DIAGNOSIS — M17.11 PRIMARY OSTEOARTHRITIS OF RIGHT KNEE: Primary | ICD-10-CM

## 2020-01-15 DIAGNOSIS — M25.561 PAIN IN BOTH KNEES, UNSPECIFIED CHRONICITY: ICD-10-CM

## 2020-01-15 DIAGNOSIS — R29.898 WEAKNESS OF EXTREMITY: ICD-10-CM

## 2020-01-15 DIAGNOSIS — M25.562 PAIN IN BOTH KNEES, UNSPECIFIED CHRONICITY: ICD-10-CM

## 2020-01-15 PROCEDURE — 1125F PR PAIN SEVERITY QUANTIFIED, PAIN PRESENT: ICD-10-PCS | Mod: HCNC,S$GLB,, | Performed by: PHYSICIAN ASSISTANT

## 2020-01-15 PROCEDURE — 1125F AMNT PAIN NOTED PAIN PRSNT: CPT | Mod: HCNC,S$GLB,, | Performed by: PHYSICIAN ASSISTANT

## 2020-01-15 PROCEDURE — 1159F PR MEDICATION LIST DOCUMENTED IN MEDICAL RECORD: ICD-10-PCS | Mod: HCNC,S$GLB,, | Performed by: PHYSICIAN ASSISTANT

## 2020-01-15 PROCEDURE — 97110 THERAPEUTIC EXERCISES: CPT | Mod: HCNC,GP,S$GLB, | Performed by: PHYSICIAN ASSISTANT

## 2020-01-15 PROCEDURE — 1101F PR PT FALLS ASSESS DOC 0-1 FALLS W/OUT INJ PAST YR: ICD-10-PCS | Mod: HCNC,CPTII,S$GLB, | Performed by: PHYSICIAN ASSISTANT

## 2020-01-15 PROCEDURE — 99204 OFFICE O/P NEW MOD 45 MIN: CPT | Mod: HCNC,25,S$GLB, | Performed by: PHYSICIAN ASSISTANT

## 2020-01-15 PROCEDURE — 1101F PT FALLS ASSESS-DOCD LE1/YR: CPT | Mod: HCNC,CPTII,S$GLB, | Performed by: PHYSICIAN ASSISTANT

## 2020-01-15 PROCEDURE — 99999 PR PBB SHADOW E&M-EST. PATIENT-LVL IV: CPT | Mod: PBBFAC,HCNC,, | Performed by: PHYSICIAN ASSISTANT

## 2020-01-15 PROCEDURE — 1159F MED LIST DOCD IN RCRD: CPT | Mod: HCNC,S$GLB,, | Performed by: PHYSICIAN ASSISTANT

## 2020-01-15 PROCEDURE — 99204 PR OFFICE/OUTPT VISIT, NEW, LEVL IV, 45-59 MIN: ICD-10-PCS | Mod: HCNC,25,S$GLB, | Performed by: PHYSICIAN ASSISTANT

## 2020-01-15 PROCEDURE — 73564 X-RAY EXAM KNEE 4 OR MORE: CPT | Mod: TC,50,HCNC

## 2020-01-15 PROCEDURE — 99999 PR PBB SHADOW E&M-EST. PATIENT-LVL IV: ICD-10-PCS | Mod: PBBFAC,HCNC,, | Performed by: PHYSICIAN ASSISTANT

## 2020-01-15 PROCEDURE — 73564 X-RAY EXAM KNEE 4 OR MORE: CPT | Mod: 26,50,HCNC, | Performed by: RADIOLOGY

## 2020-01-15 PROCEDURE — 97110 PR THERAPEUTIC EXERCISES: ICD-10-PCS | Mod: HCNC,GP,S$GLB, | Performed by: PHYSICIAN ASSISTANT

## 2020-01-15 PROCEDURE — 73564 XR KNEE ORTHO BILAT WITH FLEXION: ICD-10-PCS | Mod: 26,50,HCNC, | Performed by: RADIOLOGY

## 2020-01-15 NOTE — LETTER
January 15, 2020      Jada Spaulding MD  [address]  [facility] [address]           [facility]  [address]  [facility] [address]  Phone: [phone]          Patient: Anthony Miguel   MR Number: [id number]   YOB: 1950   Date of Visit: 1/15/2020       Dear Dr. Jada Spaulding:    Thank you for referring Anthony Miguel to me for evaluation. Attached you will find relevant portions of my assessment and plan of care.    If you have questions, please do not hesitate to call me. I look forward to following Anthony Miguel along with you.    Sincerely,    Erich Zaidi PA-C    Enclosure  CC:  No Recipients    If you would like to receive this communication electronically, please contact [email or web address] or [phone] to request more information on [employer] access.    For providers and/or their staff who would like to refer a patient to [facility], please contact us through our one-stop-shop provider referral line, [facility], at [phone].    If you feel you have received this communication in error or would no longer like to receive these types of communications, please e-mail [email or web address]

## 2020-01-15 NOTE — TELEPHONE ENCOUNTER
Pt rescheduled 1/16 visit for 02/11 after Radiation treatment. He was aalso reminded of appt w//Dejuan for his L/knee and he said he is prepared to go to that appt.    ----- Message from Diogo Kuo sent at 1/15/2020 11:49 AM CST -----  Contact: Patient @ [phone]  FYI: patient called to cancel his appointment with you tomorrow, Thursday, 01/16/2020.  He asked that I let you know that he will call back within 2 weeks to get rescheduled.

## 2020-01-15 NOTE — PROGRESS NOTES
Subjective:          Chief Complaint: Anthony Miguel is a 69 y.o. male who had concerns including Pain of the Left Knee.    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 The pain started 4 years ago while walking his foot got caught in pothole.  Since then he has had intermittent pain and is becoming progressively worse last few weeks. Pain is located over (points to) medial joint line. He reports that the pain is a 5 /10 aching and throbbing pain today and not responding adequately to conservative measures which have included activity modifications, rest, and oral medication. Is affecting ADLs and limiting desired level of activity. Denies numbness, tingling, radiation, and inability to bear weight.  Pain is 8 /10 at its worst    He does have baseline lower back pain treated with previous in injections, ambulates with cane    Mechanical symptoms:  None  Subjective instability: (+)   Worse with ambulation  Better with rest.   Nocturnal symptoms: (--)    No previous surgeries or trauma on knees            Review of Systems   Constitution: Negative for chills and fever.   HENT: Negative for congestion and sore throat.    Eyes: Negative for discharge and double vision.   Cardiovascular: Negative for chest pain, palpitations and syncope.   Respiratory: Negative for cough and shortness of breath.    Endocrine: Negative for cold intolerance and heat intolerance.   Skin: Negative for dry skin and rash.   Musculoskeletal: Positive for joint pain.   Gastrointestinal: Negative for abdominal pain, nausea and vomiting.   Neurological: Negative for focal weakness, numbness and paresthesias.                   Objective:        General: Anthony is well-developed, well-nourished, appears stated age, in no acute distress, alert and oriented to time, place and person.     General    Vitals reviewed.  Constitutional: He is oriented to person, place, and time. He appears well-developed and well-nourished. No distress.   Eyes:  Conjunctivae and EOM are normal. Pupils are equal, round, and reactive to light.   Neck: Normal range of motion. Neck supple. No JVD present.   Cardiovascular: Normal heart sounds and intact distal pulses.    No murmur heard.  Pulmonary/Chest: Effort normal and breath sounds normal. No respiratory distress.   Abdominal: Soft. Bowel sounds are normal. He exhibits no distension. There is no tenderness.   Neurological: He is alert and oriented to person, place, and time. Coordination normal.   Psychiatric: He has a normal mood and affect. His behavior is normal. Judgment and thought content normal.     General Musculoskeletal Exam   Gait: abnormal and antalgic       Right Knee Exam     Inspection   Erythema: absent  Scars: absent  Swelling: absent  Effusion: absent  Deformity: absent  Bruising: absent    Tenderness   The patient is tender to palpation of the medial joint line and condyle.    Crepitus   The patient has crepitus of the patella.    Range of Motion   Extension: 0   Flexion:  130 (+ pain) abnormal     Tests   Meniscus   Gideon:  Medial - negative Lateral - negative  Ligament Examination Lachman: normal (-1 to 2mm) PCL-Posterior Drawer: normal (0 to 2mm)     MCL - Valgus: normal (0 to 2mm)  LCL - Varus: normalDial Test at 90 degrees: normal (< 5 degrees)  Posterolateral Corner: unstable (>15 degrees difference)  Patella   Patellar Glide (quadrants): Lateral - 1   Medial - 2  Patellar Grind: positive    Other   Popliteal (Baker's) Cyst: absent  Sensation: normal    Comments:  + squat  + bridge    Left Knee Exam     Inspection   Erythema: absent  Scars: absent  Swelling: absent  Effusion: absent  Deformity: absent  Bruising: absent    Crepitus   The patient has crepitus of the patella.    Range of Motion   Extension: 0   Flexion: 130     Tests   Meniscus   Gideon:  Medial - negative Lateral - negative  Stability Lachman: normal (-1 to 2mm) PCL-Posterior Drawer: normal (0 to 2mm)  MCL - Valgus: normal (0 to  2mm)  LCL - Varus: normal (0 to 2mm)Dial Test at 90 degrees: normal (< 5 degrees)  Posterolateral Corner: unstable (>15 degrees difference)  Patella   Patellar Glide (Quadrants): Lateral - 1 Medial - 2  Patellar Grind: negative    Other   Popliteal (Baker's) Cyst: absent  Sensation: normal    Right Hip Exam     Tests   Gonzales: negative  Left Hip Exam     Tests   Gonzales: negative          Muscle Strength   Right Lower Extremity   Hip Abduction: 5/5   Quadriceps:  4/5   Hamstrin/5   Left Lower Extremity   Hip Abduction: 5/5   Quadriceps:  4/5   Hamstrin/5     Vascular Exam     Right Pulses  Dorsalis Pedis:      2+  Posterior Tibial:      2+        Left Pulses  Dorsalis Pedis:      2+  Posterior Tibial:      2+        Edema  Right Lower Leg: absent  Left Lower Leg: absent    Radiographic Findings 01/15/2020:    Mild DJD and genu varum.  No fracture or dislocation.  No bone destruction identified.    Xrays of the knees were ordered and reviewed by me today. These findings were discussed and reviewed with the patient.          Assessment:       Encounter Diagnoses   Name Primary?    Primary osteoarthritis of right knee Yes    Weakness of extremity     Right knee pain, unspecified chronicity           Plan:       We have discussed a variety of treatment options including medications, injections, physical therapy and other alternative treatments. I also explained the indications, risks and benefits of surgery. Pt would like to proceed with visco-supplementation at this time.    I made the decision to obtain old records of the patient including previous notes and imaging. I independently reviewed and interpreted lab results today as well as prior imaging.      1. Pre-authorization placed for Euflexxa series injections.  2. Ice compress to the affected area 2-3x a day for 15-20 minutes as needed for pain management.  3. HEP 00896 - Ayush Zaidi PA-C, instructed and demonstrated a patellofemoral HEP. The patient  then demonstrated understanding of exercises and proper technique. This program was performed for 10 minutes.   4. 41525 - Gab Egan, performed a custom orthotic / brace adjustment, fitting and training with the patient. The patient demonstrated understanding and proper care. This was performed for 10 minutes.   -3D knee sleeve  5. RTC to see Ayush Zaidi PA-C for Euflexxa series injections #1 of 3.    All of the patient's questions were answered and the patient will contact us if they have any questions or concerns in the interim.        Patient questionnaires may have been collected.

## 2020-01-27 ENCOUNTER — PATIENT OUTREACH (OUTPATIENT)
Dept: ADMINISTRATIVE | Facility: OTHER | Age: 70
End: 2020-01-27

## 2020-01-29 ENCOUNTER — DOCUMENTATION ONLY (OUTPATIENT)
Dept: REHABILITATION | Facility: HOSPITAL | Age: 70
End: 2020-01-29

## 2020-01-29 ENCOUNTER — OFFICE VISIT (OUTPATIENT)
Dept: SPORTS MEDICINE | Facility: CLINIC | Age: 70
End: 2020-01-29
Payer: MEDICARE

## 2020-01-29 VITALS
HEIGHT: 67 IN | HEART RATE: 62 BPM | SYSTOLIC BLOOD PRESSURE: 122 MMHG | BODY MASS INDEX: 28.25 KG/M2 | WEIGHT: 180 LBS | DIASTOLIC BLOOD PRESSURE: 77 MMHG

## 2020-01-29 DIAGNOSIS — M17.11 PRIMARY OSTEOARTHRITIS OF RIGHT KNEE: Primary | ICD-10-CM

## 2020-01-29 PROCEDURE — 20610 PR DRAIN/INJECT LARGE JOINT/BURSA: ICD-10-PCS | Mod: HCNC,RT,S$GLB, | Performed by: PHYSICIAN ASSISTANT

## 2020-01-29 PROCEDURE — 99499 UNLISTED E&M SERVICE: CPT | Mod: HCNC,S$GLB,, | Performed by: PHYSICIAN ASSISTANT

## 2020-01-29 PROCEDURE — 99999 PR PBB SHADOW E&M-EST. PATIENT-LVL III: CPT | Mod: PBBFAC,HCNC,, | Performed by: PHYSICIAN ASSISTANT

## 2020-01-29 PROCEDURE — 20610 DRAIN/INJ JOINT/BURSA W/O US: CPT | Mod: HCNC,RT,S$GLB, | Performed by: PHYSICIAN ASSISTANT

## 2020-01-29 PROCEDURE — 99999 PR PBB SHADOW E&M-EST. PATIENT-LVL III: ICD-10-PCS | Mod: PBBFAC,HCNC,, | Performed by: PHYSICIAN ASSISTANT

## 2020-01-29 PROCEDURE — 99499 NO LOS: ICD-10-PCS | Mod: HCNC,S$GLB,, | Performed by: PHYSICIAN ASSISTANT

## 2020-01-29 NOTE — PROGRESS NOTES
Anthony Miguel is here for follow up of right knee arthritis. Pt is requesting Monovisc injection.  [facility] reviewed per encounter record. Has failed other conservative modalities including NSAIDS, activity modification, weight loss.    The prior shot was tolerated well.    PHYSICAL EXAMINATION:     General: The patient is alert and oriented x 3. Mood is pleasant.   Observation of ears, eyes and nose reveals no gross abnormalities. No   labored breathing observed.     No signs of infection or adverse reaction to knee.    Injection Procedure  A time out was performed, including verification of patient ID, procedure, site and side, availability of information and equipment, review of safety issues, and agreement with consent, the procedure site was marked.    After time out was performed, the patient was prepped aseptically with chloraprep. A diagnostic and therapeutic injection of 4cc's Monovisc was given under sterile technique using a 22g x 1.5 needle from the Superolateral aspect of the right Knee Joint in the supine position.      Anthony Miguel had no adverse reactions to the medication. Pain decreased. He was instructed to apply ice to the joint for 20 minutes and avoid strenuous activities for 24-36 hours following the injection. He was warned of possible blood sugar and/or blood pressure changes during that time. Following that time, he can resume regular activities.    He was reminded to call the clinic immediately for any adverse side effects as explained in clinic today.    RTC to see Ayush Zaidi PA-C in 3 months for follow-up.    All of the patient's questions were answered and the patient will contact us if they have any questions or concerns in the interim.

## 2020-01-29 NOTE — PROGRESS NOTES
1/29/2020    Pt has not attended PT sessions since 9/10/2019 and will be discharged from PT with goal status unknown.

## 2020-01-31 ENCOUNTER — TELEPHONE (OUTPATIENT)
Dept: PRIMARY CARE CLINIC | Facility: CLINIC | Age: 70
End: 2020-01-31

## 2020-01-31 NOTE — TELEPHONE ENCOUNTER
Please see why patient cancelled his appointment for pill packing last week. When can he come in with all of his meds?    Thanks,  KJ      The 10-year ASCVD risk score (Marco CHAUHAN Jr., et al., 2013) is: 14.8%    Values used to calculate the score:      Age: 69 years      Sex: Male      Is Non- : No      Diabetic: No      Tobacco smoker: No      Systolic Blood Pressure: 122 mmHg      Is BP treated: No      HDL Cholesterol: 49 mg/dL      Total Cholesterol: 152 mg/dL

## 2020-02-03 ENCOUNTER — APPOINTMENT (OUTPATIENT)
Dept: RADIATION THERAPY | Facility: OTHER | Age: 70
End: 2020-02-03
Attending: RADIOLOGY
Payer: MEDICARE

## 2020-02-09 ENCOUNTER — PATIENT OUTREACH (OUTPATIENT)
Dept: ADMINISTRATIVE | Facility: OTHER | Age: 70
End: 2020-02-09

## 2020-02-11 ENCOUNTER — HOSPITAL ENCOUNTER (OUTPATIENT)
Dept: RADIATION THERAPY | Facility: HOSPITAL | Age: 70
Discharge: HOME | End: 2020-02-11
Attending: RADIOLOGY
Payer: MEDICARE

## 2020-02-11 ENCOUNTER — OFFICE VISIT (OUTPATIENT)
Dept: UROLOGY | Facility: CLINIC | Age: 70
End: 2020-02-11
Payer: MEDICARE

## 2020-02-11 ENCOUNTER — OFFICE VISIT (OUTPATIENT)
Dept: PRIMARY CARE CLINIC | Facility: CLINIC | Age: 70
End: 2020-02-11
Payer: MEDICARE

## 2020-02-11 VITALS
HEART RATE: 64 BPM | OXYGEN SATURATION: 99 % | WEIGHT: 187.94 LBS | DIASTOLIC BLOOD PRESSURE: 80 MMHG | HEIGHT: 67 IN | SYSTOLIC BLOOD PRESSURE: 138 MMHG | BODY MASS INDEX: 29.5 KG/M2

## 2020-02-11 VITALS
HEIGHT: 67 IN | DIASTOLIC BLOOD PRESSURE: 71 MMHG | SYSTOLIC BLOOD PRESSURE: 165 MMHG | HEART RATE: 63 BPM | BODY MASS INDEX: 29.41 KG/M2 | WEIGHT: 187.38 LBS

## 2020-02-11 DIAGNOSIS — E55.9 VITAMIN D DEFICIENCY: ICD-10-CM

## 2020-02-11 DIAGNOSIS — G89.29 CHRONIC PAIN OF RIGHT KNEE: ICD-10-CM

## 2020-02-11 DIAGNOSIS — C61 PROSTATE CANCER: ICD-10-CM

## 2020-02-11 DIAGNOSIS — C61 CANCER OF PROSTATE: ICD-10-CM

## 2020-02-11 DIAGNOSIS — N39.3 MALE STRESS INCONTINENCE: ICD-10-CM

## 2020-02-11 DIAGNOSIS — M46.92 UNSPECIFIED INFLAMMATORY SPONDYLOPATHY, CERVICAL REGION: ICD-10-CM

## 2020-02-11 DIAGNOSIS — F15.182 CAFFEINE-INDUCED SLEEP DISORDER WITH MILD USE DISORDER, INSOMNIA TYPE: ICD-10-CM

## 2020-02-11 DIAGNOSIS — D50.0 IRON DEFICIENCY ANEMIA DUE TO CHRONIC BLOOD LOSS: ICD-10-CM

## 2020-02-11 DIAGNOSIS — D69.2 SENILE PURPURA: ICD-10-CM

## 2020-02-11 DIAGNOSIS — G95.9 CERVICAL MYELOPATHY: ICD-10-CM

## 2020-02-11 DIAGNOSIS — C61 PROSTATE CANCER: Primary | ICD-10-CM

## 2020-02-11 DIAGNOSIS — E78.5 HYPERLIPIDEMIA, UNSPECIFIED HYPERLIPIDEMIA TYPE: ICD-10-CM

## 2020-02-11 DIAGNOSIS — M25.561 CHRONIC PAIN OF RIGHT KNEE: ICD-10-CM

## 2020-02-11 DIAGNOSIS — Z91.199 NONCOMPLIANCE: ICD-10-CM

## 2020-02-11 DIAGNOSIS — G95.9 LUMBAR MYELOPATHY: ICD-10-CM

## 2020-02-11 PROCEDURE — 77334 PR  RADN TREATMENT AID(S) COMPLX: ICD-10-PCS | Mod: 26,HCNC,, | Performed by: RADIOLOGY

## 2020-02-11 PROCEDURE — 1159F MED LIST DOCD IN RCRD: CPT | Mod: HCNC,S$GLB,, | Performed by: UROLOGY

## 2020-02-11 PROCEDURE — 1101F PT FALLS ASSESS-DOCD LE1/YR: CPT | Mod: HCNC,CPTII,S$GLB, | Performed by: INTERNAL MEDICINE

## 2020-02-11 PROCEDURE — 77290 THER RAD SIMULAJ FIELD CPLX: CPT | Mod: TC,HCNC | Performed by: RADIOLOGY

## 2020-02-11 PROCEDURE — 77263 PR  RADIATION THERAPY PLAN COMPLEX: ICD-10-PCS | Mod: HCNC,,, | Performed by: RADIOLOGY

## 2020-02-11 PROCEDURE — 99999 PR PBB SHADOW E&M-EST. PATIENT-LVL III: ICD-10-PCS | Mod: PBBFAC,HCNC,, | Performed by: UROLOGY

## 2020-02-11 PROCEDURE — 1125F AMNT PAIN NOTED PAIN PRSNT: CPT | Mod: HCNC,S$GLB,, | Performed by: UROLOGY

## 2020-02-11 PROCEDURE — 99999 PR PBB SHADOW E&M-EST. PATIENT-LVL III: CPT | Mod: PBBFAC,HCNC,, | Performed by: UROLOGY

## 2020-02-11 PROCEDURE — 1101F PT FALLS ASSESS-DOCD LE1/YR: CPT | Mod: HCNC,CPTII,S$GLB, | Performed by: UROLOGY

## 2020-02-11 PROCEDURE — 99214 PR OFFICE/OUTPT VISIT, EST, LEVL IV, 30-39 MIN: ICD-10-PCS | Mod: HCNC,S$GLB,, | Performed by: UROLOGY

## 2020-02-11 PROCEDURE — 1159F PR MEDICATION LIST DOCUMENTED IN MEDICAL RECORD: ICD-10-PCS | Mod: HCNC,S$GLB,, | Performed by: INTERNAL MEDICINE

## 2020-02-11 PROCEDURE — 1126F PR PAIN SEVERITY QUANTIFIED, NO PAIN PRESENT: ICD-10-PCS | Mod: HCNC,S$GLB,, | Performed by: INTERNAL MEDICINE

## 2020-02-11 PROCEDURE — 99215 OFFICE O/P EST HI 40 MIN: CPT | Mod: HCNC,S$GLB,, | Performed by: INTERNAL MEDICINE

## 2020-02-11 PROCEDURE — 77014 HC CT GUIDANCE RADIATION THERAPY FLDS PLACEMENT: CPT | Mod: TC,HCNC | Performed by: RADIOLOGY

## 2020-02-11 PROCEDURE — 1159F MED LIST DOCD IN RCRD: CPT | Mod: HCNC,S$GLB,, | Performed by: INTERNAL MEDICINE

## 2020-02-11 PROCEDURE — 77334 RADIATION TREATMENT AID(S): CPT | Mod: TC,HCNC | Performed by: RADIOLOGY

## 2020-02-11 PROCEDURE — 77263 THER RADIOLOGY TX PLNG CPLX: CPT | Mod: HCNC,,, | Performed by: RADIOLOGY

## 2020-02-11 PROCEDURE — 77290 PR  SET RADN THERAPY FIELD COMPLEX: ICD-10-PCS | Mod: 26,HCNC,, | Performed by: RADIOLOGY

## 2020-02-11 PROCEDURE — 77290 THER RAD SIMULAJ FIELD CPLX: CPT | Mod: 26,HCNC,, | Performed by: RADIOLOGY

## 2020-02-11 PROCEDURE — 1101F PR PT FALLS ASSESS DOC 0-1 FALLS W/OUT INJ PAST YR: ICD-10-PCS | Mod: HCNC,CPTII,S$GLB, | Performed by: UROLOGY

## 2020-02-11 PROCEDURE — 77334 RADIATION TREATMENT AID(S): CPT | Mod: 26,HCNC,, | Performed by: RADIOLOGY

## 2020-02-11 PROCEDURE — 1101F PR PT FALLS ASSESS DOC 0-1 FALLS W/OUT INJ PAST YR: ICD-10-PCS | Mod: HCNC,CPTII,S$GLB, | Performed by: INTERNAL MEDICINE

## 2020-02-11 PROCEDURE — 99215 PR OFFICE/OUTPT VISIT, EST, LEVL V, 40-54 MIN: ICD-10-PCS | Mod: HCNC,S$GLB,, | Performed by: INTERNAL MEDICINE

## 2020-02-11 PROCEDURE — 1126F AMNT PAIN NOTED NONE PRSNT: CPT | Mod: HCNC,S$GLB,, | Performed by: INTERNAL MEDICINE

## 2020-02-11 PROCEDURE — 1125F PR PAIN SEVERITY QUANTIFIED, PAIN PRESENT: ICD-10-PCS | Mod: HCNC,S$GLB,, | Performed by: UROLOGY

## 2020-02-11 PROCEDURE — 99214 OFFICE O/P EST MOD 30 MIN: CPT | Mod: HCNC,S$GLB,, | Performed by: UROLOGY

## 2020-02-11 PROCEDURE — 1159F PR MEDICATION LIST DOCUMENTED IN MEDICAL RECORD: ICD-10-PCS | Mod: HCNC,S$GLB,, | Performed by: UROLOGY

## 2020-02-11 RX ORDER — FERROUS SULFATE 325(65) MG
325 TABLET, DELAYED RELEASE (ENTERIC COATED) ORAL DAILY
Qty: 90 TABLET | Refills: 3 | Status: SHIPPED | OUTPATIENT
Start: 2020-02-11 | End: 2021-02-24 | Stop reason: SDUPTHER

## 2020-02-11 RX ORDER — ACETAMINOPHEN 500 MG
2000 TABLET ORAL DAILY
Qty: 90 CAPSULE | Refills: 3 | Status: SHIPPED | OUTPATIENT
Start: 2020-02-11 | End: 2023-09-19 | Stop reason: SDUPTHER

## 2020-02-11 RX ORDER — ATORVASTATIN CALCIUM 10 MG/1
10 TABLET, FILM COATED ORAL DAILY
Qty: 90 TABLET | Refills: 3 | Status: SHIPPED | OUTPATIENT
Start: 2020-02-11 | End: 2023-01-31

## 2020-02-11 NOTE — ASSESSMENT & PLAN NOTE
H/o prostatectomy in 6/2019. Followed by Uro, undergoing radiation therapy  · Continue casodex with downward trend in PSA  · Continue Rad Onc for radiation therapy follow-up

## 2020-02-11 NOTE — Clinical Note
Dr Adkins,Can you schedule another KAYLEY for Mr Miguel? He is currently receiving radiation therapy for prostate cancer. Patient states that Dr Sevilla reported that the KAYLEY will not interfere with radiation therapy.Thanks,Jada Spaulding MD/MPHInternal MedicineOchs[facility] Center for [facility] and [phone] spectralink

## 2020-02-11 NOTE — ASSESSMENT & PLAN NOTE
Drinking caffeinated soda at bedtime, coffee in AM  · Advised to cut back on soda at bedtime  · Continue trazodone 100mg qhs PRN  · Can try OTC melatonin 3mg

## 2020-02-11 NOTE — ASSESSMENT & PLAN NOTE
Noncompliant with all meds, has basic regimen but skips doses has decreased access to pharmacy due to transportation barrier. Patient not interested in pill-packing, though encouraged to do so.  · Re-iterated importance of taking his statin and iron supplementation daily to decrease risk of stroke and improve anemia.

## 2020-02-11 NOTE — PROGRESS NOTES
Subjective:      Patient ID: Anthony Miguel is a 69 y.o. male.    Chief Complaint: prostate cancer f/u.    Patient is a 69 y.o. male who is established to our clinic and referred by their PCP, Dr. Spaulding for evaluation of LUTs.     HPI  He underwent a RALP,BPLND on 6/4/19.  He did well after and was discharged the following day.  He did not chavez after his RALP.  His initial post-op psa was 0.07.  He has seen Dr. Sevilla previously.  On casodex.  psa trend reviewed as below.   His psa from 12/3/19 was stable at 0.02.  Reports some persistent incontinence.  Goes through 1-2 pull-ups per day which is improved.  He previously saw abiel fermin with PT.        Has a half-brother who was diagnosed with prostate cancer at age 88.           RALP PATHOLOGY:  SPECIMEN  1) Periprostatic fat.  2) Prostate and bilateral pelvic lymph nodes.  FINAL PATHOLOGIC DIAGNOSIS  1. GAUTAM PROSTATIC FAT:  NO CARCINOMA IDENTIFIED  2. RADICAL PROSTATECTOMY SPECIMEN:  A MODERATE TOTAL VOLUME OF ADENOCARCINOMA TOTAL AZAR SCORE 9 (4+ 5)  5 LYMPH NODES WITH NO METASTATIC CARCINOMA IDENTIFIED  Diagnosed by: Eduardo Foster M.D.  (Electronically Signed: 2019-06-10 16:34:13)  Microscopic Examination  Prostate: Radical prostatectomy with lymph node excisions  Specimen size:  Weight: 30 g.  Size: 4.5 x 3.6 x 2.9 cm.  Histologic type: Acinar adenocarcinoma  Primary [place] Pattern: 4  Secondary [place] Pattern: 5 ; this pattern consists of cribriforming sheets with central comedonecrosis  Tertiary Azar Pattern: 3  Total [place] Score: 9  Grade Group: 5  Tumor quantitation: There is a moderate total volume of adenocarcinoma present. The longest length of carcinoma in  the slide is 1.9 cm.  Extraprostatic extension: Not identified; although carcinoma does go into and bulges the capsule, extension into  perinephric adipose tissue is not identified.  Urinary Bladder Neck Invasion: No separate specimens submitted  Seminal vesicle invasion: Not  "identified  Margin: No margin involvement identified  Treatment effect of carcinoma: Not identified  Lymph node samplin/5  Stage: pT2 pN0 pMX    Lab Results   Component Value Date    PSA 1.1 10/26/2018    PSA 2.0 2017    PSADIAG 0.02 2019    PSADIAG 0.02 2019    PSADIAG 0.07 2019    PSADIAG 0.07 2019           Review of Systems   All other systems reviewed and negative except pertinent positives noted in HPI.    Objective:     Physical Exam   Constitutional: He is oriented to person, place, and time. He appears well-developed and well-nourished. No distress.   HENT:   Head: Normocephalic and atraumatic.   Eyes: No scleral icterus.   Neck: No tracheal deviation present.   Pulmonary/Chest: Effort normal. No respiratory distress.   Neurological: He is alert and oriented to person, place, and time.   Psychiatric: He has a normal mood and affect. His behavior is normal. Judgment and thought content normal.     Assessment:     1. Prostate cancer    2. Male stress incontinence      Plan:     1. Prostate cancer    2. Male stress incontinence        No orders of the defined types were placed in this encounter.    1. Prostate cancer:  -psa did not chavez as hoped for despite favorable pathology (negative margins, pT2c, nodes negative) all things considering the aggressive [place] grade 9.  -psa today  -continue casodex  -has appt with Dr. Sevilla today for simulation  -if urinary incontinence is a significant enough concern to prevent radiation therapy, we could refer to Dr. Leonardo or Dr. Sow to discuss a sling versus AUS placement.    2. Stress incontinence:  -saw komal zander---says "I should have kept going".  He admits to not being as dilligent with his exercises.   -continue to work with ricci to regain continence since he likely will need XRT    "

## 2020-02-11 NOTE — ASSESSMENT & PLAN NOTE
Chronic R knee pain 2/2 OA. Last seen by Dejuan STYLES 1/29/2020. S/p injections during that visit with improvement.   · Continue to follow-up with sports medicine

## 2020-02-11 NOTE — ASSESSMENT & PLAN NOTE
Seen on CT, followed by pain management. Plan for lumbar KAYLEY, does not want to continue outpatient PT  · PT/OT not completed   · Plan for KAYLEY  · Message sent to Dr Coppola

## 2020-02-11 NOTE — ASSESSMENT & PLAN NOTE
Noncompliant atorvastatin 10mg every 1-3 days. ASCVD risk of 18%, mod-high intensity recommended  · Continue current therapy   · Advised to take every day

## 2020-02-11 NOTE — PROGRESS NOTES
Primary Care Provider Appointment  SHARED NOTE: Dr Maria Esther Zavala (PGY-2), Dr Spaulding (Attending)    Subjective:      Patient ID: Anthony Miguel is a 69 y.o. male with chronic pain, OA, prostate cancer on casodex    Chief Complaint: Prostate Cancer and Low-back Pain    Mr. Miguel is here today for follow-up. He was last seen 1/09/2020 and since then he has been doing well.     Prostate cancer - Last seen by urology earlier today, PSA has decreased further to 0.01 from 0.02. He is currently on biclatumide and is scheduled to initiate XRT at [facility], operated by [facility] soon. He has been urinating well, still with some incontinence issues. He is looking to get back in with physical therapy to work on kegel exercises again.     Chronic pain - seen by JENSEN Zaidi 1/29/2020 and received monovisc injection into R knee, which has provided some relief. He is still on the gabapentin and po diclofenac.    HLD - he does not take his lipitor daily. Last lipid panel 1/9/2020 HDL 49, LDL 82. ASCVD 18.0% risk - recommend moderate to high-intensity statin.     Iron def anemia - patient does not take his daily iron supplementation.    Insomnia - Does not take trazodone nightly. Is aware that he should be on a more stable sleep cycle, but finds that he enjoys watching TV late into the night. Is looking to invest in an old-fashioned alarm clock (does not know how to use his phone as one) in order to be more regulated to initiate XRT.        Past Surgical History:   Procedure Laterality Date    BACK SURGERY      EPIDURAL STEROID INJECTION N/A 2/6/2019    Procedure: INJECTION, STEROID, EPIDURAL, L45-S1 IL;  Surgeon: Marcel Adkins MD;  Location: [facility] PAIN T;  Service: Pain Management;  Laterality: N/A;    EPIDURAL STEROID INJECTION N/A 4/10/2019    Procedure: Injection, Steroid, Epidural LUMBAR/CAUDAL L5-S1 INTERLAMINAR KAYLEY;  Surgeon: Marcel Adkins MD;  Location: [facility] PAIN MGT;  Service: Pain Management;  Laterality: N/A;  NEEDS  CONSENT    INJECTION OF FACET JOINT Bilateral 2019    Procedure: INJECTION, FACET JOINT INJECTION (LUMBAR BLOCK) BILATERAL L4-5 AND L5-S1;  Surgeon: Marcel Adkins MD;  Location: [facility];  Service: Pain Management;  Laterality: Bilateral;  NEEDS CONSENT    INJECTION OF FACET JOINT Bilateral 10/16/2019    Procedure: FACET JOINT INJECTION (LUMBAR BLOCK) BILATERAL L4-5 AND L5-S1;  Surgeon: Marcel Adkins MD;  Location: [facility];  Service: Pain Management;  Laterality: Bilateral;  NEEDS CONSENT    Larangoscopy      ROBOT-ASSISTED LAPAROSCOPIC PROSTATECTOMY USING DA SAÚL XI N/A 2019    Procedure: XI ROBOTIC PROSTATECTOMY;  Surgeon: Sergio Dixon MD;  Location: [facility] OR [address] [place] [address];  Service: Urology;  Laterality: N/A;  4hrs/ gen with regional        Past Medical History:   Diagnosis Date    Benign non-nodular prostatic hyperplasia without lower urinary tract symptoms 2017    Compliant with finasteride    Hepatitis C     Senile purpura 2017    Ecchymoses on L UE     Unspecified vitamin D deficiency 2017    Compliant with daily supplementation of 1000U Vit D    Vocal fold cyst 2012    Overview:  Right side dx update       Social History     Socioeconomic History    Marital status: Single     Spouse name: Not on file    Number of children: 0    Years of education: Not on file    Highest education level: Not on file   Occupational History    Not on file   Social Needs    Financial resource strain: Not hard at all    Food insecurity:     Worry: Never true     Inability: Never true    Transportation needs:     Medical: Yes     Non-medical: Yes   Tobacco Use    Smoking status: Former Smoker     Packs/day: 1.00     Last attempt to quit: 2000     Years since quittin.0    Smokeless tobacco: Former User   Substance and Sexual Activity    Alcohol use: Yes     Alcohol/week: 1.0 standard drinks     Types: 1 Glasses of wine per week     Comment: once in a while  "   Drug use: No    Sexual activity: Not Currently   Lifestyle    Physical activity:     Days per week: Not on file     Minutes per session: Not on file    Stress: Only a little   Relationships    Social connections:     Talks on phone: Not on file     Gets together: Not on file     Attends Restorationist service: Not on file     Active member of club or organization: Not on file     Attends meetings of clubs or organizations: Not on file     Relationship status: Not on file   Other Topics Concern    Not on file   Social History Narrative    No difficulty reading Rx labels        Review of Systems   Constitutional: Negative for activity change, appetite change and fatigue.   HENT: Negative for congestion, sinus pressure and sinus pain.    Respiratory: Negative for cough and shortness of breath.    Cardiovascular: Negative for leg swelling.   Gastrointestinal: Negative for constipation and diarrhea.   Genitourinary: Positive for frequency and urgency. Negative for difficulty urinating and hematuria.   Musculoskeletal: Positive for back pain, gait problem and neck pain. Negative for joint swelling.        LE weakness   Psychiatric/Behavioral: Positive for sleep disturbance. Negative for behavioral problems, decreased concentration and dysphoric mood. The patient is not nervous/anxious.        Objective:   /80   Pulse 64   Ht 5' 7" (1.702 m)   Wt 85.2 kg (187 lb 15.1 oz)   SpO2 99%   BMI 29.44 kg/m²     Physical Exam   Constitutional: He is oriented to person, place, and time. He appears well-developed. No distress.   HENT:   Head: Normocephalic and atraumatic.   Mouth/Throat: No oropharyngeal exudate.   Eyes: Conjunctivae and EOM are normal.   Neck: Neck supple.   Cardiovascular: Normal rate, regular rhythm and normal heart sounds.   No murmur heard.  Pulmonary/Chest: Effort normal and breath sounds normal. No respiratory distress. He has no wheezes.   Musculoskeletal: He exhibits tenderness and deformity. "   Ambulating with cane   Lymphadenopathy:     He has no cervical adenopathy.   Neurological: He is alert and oriented to person, place, and time. He displays normal reflexes. Coordination normal.   Skin: He is not diaphoretic.   Psychiatric: He has a normal mood and affect. His behavior is normal. Thought content normal.   Vitals reviewed.          Lab Results   Component Value Date    WBC 9.20 10/09/2019    HGB 13.1 (L) 10/09/2019    HCT 39.1 (L) 10/09/2019     10/09/2019    CHOL 152 01/09/2020    TRIG 102 01/09/2020    HDL 49 01/09/2020    ALT 11 12/16/2019    AST 16 12/16/2019     01/09/2020    K 4.2 01/09/2020     01/09/2020    CREATININE 0.9 01/09/2020    BUN 19 01/09/2020    CO2 25 01/09/2020    TSH 3.029 06/02/2017    PSA 1.1 10/26/2018    INR 1.0 06/21/2017    HGBA1C 5.1 10/26/2018       Current Outpatient Medications on File Prior to Visit   Medication Sig Dispense Refill    bicalutamide (CASODEX) 50 MG Tab Take 1 tablet (50 mg total) by mouth once daily. 90 tablet 1    diclofenac (VOLTAREN) 75 MG EC tablet TAKE 1 TABLET(75 MG) BY MOUTH TWICE DAILY AS NEEDED 60 tablet 2    diclofenac (VOLTAREN) 75 MG EC tablet Take 1 tablet (75 mg total) by mouth 2 (two) times daily as needed. 180 tablet 0    gabapentin (NEURONTIN) 300 MG capsule Take 1 capsule (300 mg total) by mouth 3 (three) times daily. 270 capsule 0    gabapentin (NEURONTIN) 300 MG capsule Take 2 capsules (600 mg total) by mouth 3 (three) times daily. 540 capsule 0    polyethylene glycol (GLYCOLAX) 17 gram PwPk Take 17 g by mouth once daily. 30 packet 0    traZODone (DESYREL) 50 MG tablet Take 2 tablets (100 mg total) by mouth nightly as needed for Insomnia. 180 tablet 3    triamcinolone acetonide 0.1% (KENALOG) 0.1 % cream Apply 15 g topically.      walker (ULTRA-LIGHT ROLLATOR) Misc 1 Units by Misc.(Non-Drug; Combo Route) route once daily at 6am. 1 each 0    [DISCONTINUED] atorvastatin (LIPITOR) 10 MG tablet Take 1 tablet  (10 mg total) by mouth once daily. 90 tablet 3    [DISCONTINUED] cholecalciferol, vitamin D3, (VITAMIN D3) 2,000 unit Cap Take 1 capsule (2,000 Units total) by mouth once daily. 90 capsule 0    [DISCONTINUED] ferrous sulfate 325 (65 FE) MG EC tablet Take 1 tablet (325 mg total) by mouth once daily. 90 tablet 3     No current facility-administered medications on file prior to visit.          Assessment:   69 y.o. male with multiple co-morbid illnesses here to establish care with new PCP and continue work-up of chronic issues notably chronic pain, OA, prostate cancer on casodex.     Plan:     Problem List Items Addressed This Visit        Neuro    Cervical myelopathy     Severe cervical stenosis with myelopathy with fusion of C3-6.  · F/U NS   · Continue gabapentin 300mg tid (up to 600mg tid as needed)  · Follow-up with Pain Management  · Did not complete PT/OT          Lumbar myelopathy     Seen on CT, followed by pain management. 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 Patient not interested in pill-packing, though encouraged to do so.  · Re-iterated importance of taking his statin and iron supplementation daily to decrease risk of stroke and improve anemia.              Cardiac/Vascular    HLD (hyperlipidemia)     Noncompliant atorvastatin 10mg every 1-3 days. ASCVD risk of 18%, mod-high intensity recommended  · Continue current therapy   · Advised to take every day         Relevant Medications    atorvastatin (LIPITOR)  10 MG tablet       Hematology    Senile purpura     Ecchymoses on L UE   · monitor            Oncology    Iron deficiency anemia due to chronic blood loss    Relevant Medications    ferrous sulfate 325 (65 FE) MG EC tablet    Cancer of prostate     H/o prostatectomy in 6/2019. Followed by Uro, undergoing radiation therapy  · Continue casodex with downward trend in PSA  · Continue Rad Onc for radiation therapy follow-up         Prostate cancer     H/o prostatectomy. Followed by Uro, plan to proceed with XRT soon at [facility].  · Continue casodex with downward trend in PSA  · Continue Rad Onc follow-up and initiation of XRT            Endocrine    Vitamin D deficiency    Relevant Medications    cholecalciferol, vitamin D3, (VITAMIN D3) 50 mcg (2,000 unit) Cap       Orthopedic    Unspecified inflammatory spondylopathy, cervical region     Chronic neck pain, s/p laminectomy   · Completed OT         Chronic pain of right knee     Chronic R knee pain 2/2 OA. Last seen by Dejuan STYLES 1/29/2020. S/p injections during that visit with improvement.   · Continue to follow-up with sports medicine               Health Maintenance       Date Due Completion Date    TETANUS VACCINE 12/03/1967 ---    Shingles Vaccine (2 of 3) 11/14/2016 9/19/2016    Lipid Panel 01/09/2021 1/9/2020    Colonoscopy 04/15/2026 4/15/2016          Follow up in about 3 months (around 5/11/2020). Total face-to-face time was 60 min, >50% of this was spent on counseling and coordination of care. The following issues were discussed: chronic pain, OA, prostate cancer on casodex      Maria Esther Zavala M.D. PGY-2  [facility]  11:07 AM 2/11/2020  Pager [phone]    Jada Spaulding MD/MPH  Internal Medicine  [facility]  [phone] [facility]

## 2020-02-11 NOTE — ASSESSMENT & PLAN NOTE
Severe cervical stenosis with myelopathy with fusion of C3-6.  · F/U NS   · Continue gabapentin 300mg tid (up to 600mg tid as needed)  · Follow-up with Pain Management  · Did not complete PT/OT

## 2020-02-11 NOTE — PATIENT INSTRUCTIONS
TODAY:  - refills sent to [facility]  - request for epidural sent to Dr Adkins  - continue follow-up with sports medicine and pain medicine as needed  - attend your radiation therapy appointments!  - take all your meds (including statin)

## 2020-02-11 NOTE — ASSESSMENT & PLAN NOTE
H/o prostatectomy. Followed by Uro, plan to proceed with XRT soon at [facility].  · Continue casodex with downward trend in PSA  · Continue Rad Onc follow-up and initiation of XRT

## 2020-02-13 RX ORDER — DICLOFENAC SODIUM 75 MG/1
TABLET, DELAYED RELEASE ORAL
Qty: 180 TABLET | Refills: 0 | Status: SHIPPED | OUTPATIENT
Start: 2020-02-13 | End: 2020-05-11

## 2020-02-17 PROCEDURE — 77301 RADIOTHERAPY DOSE PLAN IMRT: CPT | Mod: 26,HCNC,, | Performed by: RADIOLOGY

## 2020-02-17 PROCEDURE — 77301 PR  INTEN MOD RADIOTHER PLAN W/DOSE VOL HIST: ICD-10-PCS | Mod: 26,HCNC,, | Performed by: RADIOLOGY

## 2020-02-17 PROCEDURE — 77301 RADIOTHERAPY DOSE PLAN IMRT: CPT | Mod: TC,HCNC | Performed by: RADIOLOGY

## 2020-02-18 PROCEDURE — 77338 PR  MLC IMRT DESIGN & CONSTRUCTION PER IMRT PLAN: ICD-10-PCS | Mod: 26,HCNC,, | Performed by: RADIOLOGY

## 2020-02-18 PROCEDURE — 77338 DESIGN MLC DEVICE FOR IMRT: CPT | Mod: 26,HCNC,, | Performed by: RADIOLOGY

## 2020-02-18 PROCEDURE — 77300 RADIATION THERAPY DOSE PLAN: CPT | Mod: TC,HCNC | Performed by: RADIOLOGY

## 2020-02-18 PROCEDURE — 77338 DESIGN MLC DEVICE FOR IMRT: CPT | Mod: TC,HCNC | Performed by: RADIOLOGY

## 2020-02-18 PROCEDURE — 77300 PR RADIATION THERAPY,DOSIMETRY PLAN: ICD-10-PCS | Mod: 26,HCNC,, | Performed by: RADIOLOGY

## 2020-02-18 PROCEDURE — 77300 RADIATION THERAPY DOSE PLAN: CPT | Mod: 26,HCNC,, | Performed by: RADIOLOGY

## 2020-02-18 PROCEDURE — 77014 HC CT GUIDANCE RADIATION THERAPY FLDS PLACEMENT: CPT | Mod: TC,HCNC | Performed by: RADIOLOGY

## 2020-02-19 ENCOUNTER — DOCUMENTATION ONLY (OUTPATIENT)
Dept: RADIATION ONCOLOGY | Facility: CLINIC | Age: 70
End: 2020-02-19

## 2020-02-19 PROCEDURE — 77014 HC CT GUIDANCE RADIATION THERAPY FLDS PLACEMENT: CPT | Mod: TC,HCNC | Performed by: RADIOLOGY

## 2020-02-19 PROCEDURE — 77385 HC IMRT, SIMPLE: CPT | Mod: HCNC | Performed by: RADIOLOGY

## 2020-02-20 PROCEDURE — 77014 HC CT GUIDANCE RADIATION THERAPY FLDS PLACEMENT: CPT | Mod: TC,HCNC | Performed by: RADIOLOGY

## 2020-02-20 PROCEDURE — 77385 HC IMRT, SIMPLE: CPT | Mod: HCNC | Performed by: RADIOLOGY

## 2020-02-21 PROCEDURE — 77014 HC CT GUIDANCE RADIATION THERAPY FLDS PLACEMENT: CPT | Mod: TC,HCNC | Performed by: RADIOLOGY

## 2020-02-21 PROCEDURE — 77385 HC IMRT, SIMPLE: CPT | Mod: HCNC | Performed by: RADIOLOGY

## 2020-02-24 PROCEDURE — 77014 HC CT GUIDANCE RADIATION THERAPY FLDS PLACEMENT: CPT | Mod: TC,HCNC | Performed by: RADIOLOGY

## 2020-02-24 PROCEDURE — 77385 HC IMRT, SIMPLE: CPT | Mod: HCNC | Performed by: RADIOLOGY

## 2020-02-24 PROCEDURE — 77336 RADIATION PHYSICS CONSULT: CPT | Mod: HCNC | Performed by: RADIOLOGY

## 2020-02-26 ENCOUNTER — DOCUMENTATION ONLY (OUTPATIENT)
Dept: RADIATION ONCOLOGY | Facility: CLINIC | Age: 70
End: 2020-02-26

## 2020-02-26 PROCEDURE — 77385 HC IMRT, SIMPLE: CPT | Mod: HCNC | Performed by: RADIOLOGY

## 2020-02-26 PROCEDURE — 77014 HC CT GUIDANCE RADIATION THERAPY FLDS PLACEMENT: CPT | Mod: TC,HCNC | Performed by: RADIOLOGY

## 2020-02-26 NOTE — PLAN OF CARE
Day 5 of outpatient radiation at [facility] [place], operated by [facility]   denies diarrhea notes nocturia x 3

## 2020-02-27 PROCEDURE — G6002 PR STEREOSCOPIC XRAY GUIDE FOR RADIATION TX DELIV: ICD-10-PCS | Mod: 26,HCNC,, | Performed by: RADIOLOGY

## 2020-02-27 PROCEDURE — G6002 STEREOSCOPIC X-RAY GUIDANCE: HCPCS | Mod: 26,HCNC,, | Performed by: RADIOLOGY

## 2020-02-27 PROCEDURE — 77385 HC IMRT, SIMPLE: CPT | Mod: HCNC | Performed by: RADIOLOGY

## 2020-02-27 PROCEDURE — 77417 THER RADIOLOGY PORT IMAGE(S): CPT | Mod: HCNC | Performed by: RADIOLOGY

## 2020-02-28 PROCEDURE — G6002 PR STEREOSCOPIC XRAY GUIDE FOR RADIATION TX DELIV: ICD-10-PCS | Mod: 26,HCNC,, | Performed by: RADIOLOGY

## 2020-02-28 PROCEDURE — G6002 STEREOSCOPIC X-RAY GUIDANCE: HCPCS | Mod: 26,HCNC,, | Performed by: RADIOLOGY

## 2020-02-28 PROCEDURE — 77385 HC IMRT, SIMPLE: CPT | Mod: HCNC | Performed by: RADIOLOGY

## 2020-03-02 ENCOUNTER — APPOINTMENT (OUTPATIENT)
Dept: RADIATION THERAPY | Facility: OTHER | Age: 70
End: 2020-03-02
Attending: RADIOLOGY
Payer: MEDICARE

## 2020-03-02 PROCEDURE — G6002 PR STEREOSCOPIC XRAY GUIDE FOR RADIATION TX DELIV: ICD-10-PCS | Mod: 26,HCNC,, | Performed by: RADIOLOGY

## 2020-03-02 PROCEDURE — G6002 STEREOSCOPIC X-RAY GUIDANCE: HCPCS | Mod: 26,HCNC,, | Performed by: RADIOLOGY

## 2020-03-02 PROCEDURE — 77385 HC IMRT, SIMPLE: CPT | Mod: HCNC | Performed by: RADIOLOGY

## 2020-03-03 PROCEDURE — 77385 HC IMRT, SIMPLE: CPT | Mod: HCNC | Performed by: RADIOLOGY

## 2020-03-03 PROCEDURE — 77014 HC CT GUIDANCE RADIATION THERAPY FLDS PLACEMENT: CPT | Mod: TC,HCNC | Performed by: RADIOLOGY

## 2020-03-04 ENCOUNTER — DOCUMENTATION ONLY (OUTPATIENT)
Dept: RADIATION ONCOLOGY | Facility: CLINIC | Age: 70
End: 2020-03-04

## 2020-03-04 PROCEDURE — 77336 RADIATION PHYSICS CONSULT: CPT | Mod: HCNC | Performed by: RADIOLOGY

## 2020-03-04 PROCEDURE — 77385 HC IMRT, SIMPLE: CPT | Mod: HCNC | Performed by: RADIOLOGY

## 2020-03-04 PROCEDURE — 77014 HC CT GUIDANCE RADIATION THERAPY FLDS PLACEMENT: CPT | Mod: TC,HCNC | Performed by: RADIOLOGY

## 2020-03-09 PROCEDURE — G6002 STEREOSCOPIC X-RAY GUIDANCE: HCPCS | Mod: 26,HCNC,, | Performed by: RADIOLOGY

## 2020-03-09 PROCEDURE — 77385 HC IMRT, SIMPLE: CPT | Mod: HCNC | Performed by: RADIOLOGY

## 2020-03-09 PROCEDURE — G6002 PR STEREOSCOPIC XRAY GUIDE FOR RADIATION TX DELIV: ICD-10-PCS | Mod: 26,HCNC,, | Performed by: RADIOLOGY

## 2020-03-10 PROCEDURE — 77385 HC IMRT, SIMPLE: CPT | Mod: HCNC | Performed by: RADIOLOGY

## 2020-03-10 PROCEDURE — 77014 HC CT GUIDANCE RADIATION THERAPY FLDS PLACEMENT: CPT | Mod: TC,HCNC | Performed by: RADIOLOGY

## 2020-03-11 ENCOUNTER — DOCUMENTATION ONLY (OUTPATIENT)
Dept: RADIATION ONCOLOGY | Facility: CLINIC | Age: 70
End: 2020-03-11

## 2020-03-11 PROCEDURE — 77417 THER RADIOLOGY PORT IMAGE(S): CPT | Mod: HCNC | Performed by: RADIOLOGY

## 2020-03-11 PROCEDURE — G6002 STEREOSCOPIC X-RAY GUIDANCE: HCPCS | Mod: 26,HCNC,, | Performed by: RADIOLOGY

## 2020-03-11 PROCEDURE — G6002 PR STEREOSCOPIC XRAY GUIDE FOR RADIATION TX DELIV: ICD-10-PCS | Mod: 26,HCNC,, | Performed by: RADIOLOGY

## 2020-03-11 PROCEDURE — 77385 HC IMRT, SIMPLE: CPT | Mod: HCNC | Performed by: RADIOLOGY

## 2020-03-12 PROCEDURE — 77300 RADIATION THERAPY DOSE PLAN: CPT | Mod: TC,HCNC | Performed by: RADIOLOGY

## 2020-03-12 PROCEDURE — 77338 DESIGN MLC DEVICE FOR IMRT: CPT | Mod: 26,HCNC,, | Performed by: RADIOLOGY

## 2020-03-12 PROCEDURE — 77300 PR RADIATION THERAPY,DOSIMETRY PLAN: ICD-10-PCS | Mod: 26,HCNC,, | Performed by: RADIOLOGY

## 2020-03-12 PROCEDURE — 77300 RADIATION THERAPY DOSE PLAN: CPT | Mod: 26,HCNC,, | Performed by: RADIOLOGY

## 2020-03-12 PROCEDURE — 77338 DESIGN MLC DEVICE FOR IMRT: CPT | Mod: TC,HCNC,59 | Performed by: RADIOLOGY

## 2020-03-12 PROCEDURE — 77385 HC IMRT, SIMPLE: CPT | Mod: HCNC | Performed by: RADIOLOGY

## 2020-03-12 PROCEDURE — 77014 HC CT GUIDANCE RADIATION THERAPY FLDS PLACEMENT: CPT | Mod: TC,HCNC | Performed by: RADIOLOGY

## 2020-03-12 PROCEDURE — 77338 PR  MLC IMRT DESIGN & CONSTRUCTION PER IMRT PLAN: ICD-10-PCS | Mod: 26,HCNC,, | Performed by: RADIOLOGY

## 2020-03-13 PROCEDURE — 77385 HC IMRT, SIMPLE: CPT | Mod: HCNC | Performed by: RADIOLOGY

## 2020-03-13 PROCEDURE — G6002 PR STEREOSCOPIC XRAY GUIDE FOR RADIATION TX DELIV: ICD-10-PCS | Mod: 26,HCNC,, | Performed by: RADIOLOGY

## 2020-03-13 PROCEDURE — G6002 STEREOSCOPIC X-RAY GUIDANCE: HCPCS | Mod: 26,HCNC,, | Performed by: RADIOLOGY

## 2020-03-16 PROCEDURE — 77014 HC CT GUIDANCE RADIATION THERAPY FLDS PLACEMENT: CPT | Mod: TC,HCNC | Performed by: RADIOLOGY

## 2020-03-16 PROCEDURE — 77336 RADIATION PHYSICS CONSULT: CPT | Mod: HCNC | Performed by: RADIOLOGY

## 2020-03-16 PROCEDURE — 77385 HC IMRT, SIMPLE: CPT | Mod: HCNC | Performed by: RADIOLOGY

## 2020-03-17 PROCEDURE — 77385 HC IMRT, SIMPLE: CPT | Mod: HCNC | Performed by: RADIOLOGY

## 2020-03-17 PROCEDURE — G6002 PR STEREOSCOPIC XRAY GUIDE FOR RADIATION TX DELIV: ICD-10-PCS | Mod: 26,HCNC,, | Performed by: RADIOLOGY

## 2020-03-17 PROCEDURE — G6002 STEREOSCOPIC X-RAY GUIDANCE: HCPCS | Mod: 26,HCNC,, | Performed by: RADIOLOGY

## 2020-03-18 ENCOUNTER — DOCUMENTATION ONLY (OUTPATIENT)
Dept: RADIATION ONCOLOGY | Facility: CLINIC | Age: 70
End: 2020-03-18

## 2020-03-18 PROCEDURE — G6002 PR STEREOSCOPIC XRAY GUIDE FOR RADIATION TX DELIV: ICD-10-PCS | Mod: 26,HCNC,, | Performed by: RADIOLOGY

## 2020-03-18 PROCEDURE — G6002 STEREOSCOPIC X-RAY GUIDANCE: HCPCS | Mod: 26,HCNC,, | Performed by: RADIOLOGY

## 2020-03-18 PROCEDURE — 77385 HC IMRT, SIMPLE: CPT | Mod: HCNC | Performed by: RADIOLOGY

## 2020-03-19 PROCEDURE — G6002 PR STEREOSCOPIC XRAY GUIDE FOR RADIATION TX DELIV: ICD-10-PCS | Mod: 26,HCNC,, | Performed by: RADIOLOGY

## 2020-03-19 PROCEDURE — 77385 HC IMRT, SIMPLE: CPT | Mod: HCNC | Performed by: RADIOLOGY

## 2020-03-19 PROCEDURE — G6002 STEREOSCOPIC X-RAY GUIDANCE: HCPCS | Mod: 26,HCNC,, | Performed by: RADIOLOGY

## 2020-03-20 PROCEDURE — 77014 HC CT GUIDANCE RADIATION THERAPY FLDS PLACEMENT: CPT | Mod: TC,HCNC | Performed by: RADIOLOGY

## 2020-03-20 PROCEDURE — 77385 HC IMRT, SIMPLE: CPT | Mod: HCNC | Performed by: RADIOLOGY

## 2020-03-23 PROCEDURE — 77336 RADIATION PHYSICS CONSULT: CPT | Mod: HCNC | Performed by: RADIOLOGY

## 2020-03-23 PROCEDURE — G6002 STEREOSCOPIC X-RAY GUIDANCE: HCPCS | Mod: 26,HCNC,, | Performed by: RADIOLOGY

## 2020-03-23 PROCEDURE — 77385 HC IMRT, SIMPLE: CPT | Mod: HCNC | Performed by: RADIOLOGY

## 2020-03-23 PROCEDURE — G6002 PR STEREOSCOPIC XRAY GUIDE FOR RADIATION TX DELIV: ICD-10-PCS | Mod: 26,HCNC,, | Performed by: RADIOLOGY

## 2020-03-24 PROCEDURE — G6002 PR STEREOSCOPIC XRAY GUIDE FOR RADIATION TX DELIV: ICD-10-PCS | Mod: 26,HCNC,, | Performed by: RADIOLOGY

## 2020-03-24 PROCEDURE — 77385 HC IMRT, SIMPLE: CPT | Mod: HCNC | Performed by: RADIOLOGY

## 2020-03-24 PROCEDURE — G6002 STEREOSCOPIC X-RAY GUIDANCE: HCPCS | Mod: 26,HCNC,, | Performed by: RADIOLOGY

## 2020-03-25 ENCOUNTER — PATIENT OUTREACH (OUTPATIENT)
Dept: ADMINISTRATIVE | Facility: OTHER | Age: 70
End: 2020-03-25

## 2020-03-25 ENCOUNTER — DOCUMENTATION ONLY (OUTPATIENT)
Dept: RADIATION ONCOLOGY | Facility: CLINIC | Age: 70
End: 2020-03-25

## 2020-03-25 PROCEDURE — 77385 HC IMRT, SIMPLE: CPT | Mod: HCNC | Performed by: RADIOLOGY

## 2020-03-25 PROCEDURE — 77014 HC CT GUIDANCE RADIATION THERAPY FLDS PLACEMENT: CPT | Mod: TC,HCNC | Performed by: RADIOLOGY

## 2020-03-26 ENCOUNTER — TELEPHONE (OUTPATIENT)
Dept: PAIN MEDICINE | Facility: CLINIC | Age: 70
End: 2020-03-26

## 2020-03-26 PROCEDURE — 77385 HC IMRT, SIMPLE: CPT | Mod: HCNC | Performed by: RADIOLOGY

## 2020-03-26 PROCEDURE — G6002 STEREOSCOPIC X-RAY GUIDANCE: HCPCS | Mod: 26,HCNC,, | Performed by: RADIOLOGY

## 2020-03-26 PROCEDURE — G6002 PR STEREOSCOPIC XRAY GUIDE FOR RADIATION TX DELIV: ICD-10-PCS | Mod: 26,HCNC,, | Performed by: RADIOLOGY

## 2020-03-26 RX ORDER — DICLOFENAC SODIUM 10 MG/G
2 GEL TOPICAL 4 TIMES DAILY
Qty: 1 TUBE | Refills: 2 | Status: SHIPPED | OUTPATIENT
Start: 2020-03-26 | End: 2020-06-17 | Stop reason: SDUPTHER

## 2020-03-26 RX ORDER — MELOXICAM 15 MG/1
15 TABLET ORAL
Qty: 30 TABLET | Refills: 0 | Status: SHIPPED | OUTPATIENT
Start: 2020-03-26 | End: 2020-04-20

## 2020-03-26 RX ORDER — MELOXICAM 15 MG/1
15 TABLET ORAL
Qty: 30 TABLET | Refills: 0 | Status: SHIPPED | OUTPATIENT
Start: 2020-03-26 | End: 2020-03-26

## 2020-03-26 NOTE — PROGRESS NOTES
Chart reviewed.   Immunizations: Triggered Imm Registry     Orders placed: n/a  Upcoming appts to satisfy JOSE topics: n/a

## 2020-03-26 NOTE — TELEPHONE ENCOUNTER
Pain Medicine Clinic Telephone Note:     **This encounter was carried out via telephone to avoid COVID-19 exposure. Patient opted for a telephone call per his/her preference. This telephone encounter is not associated to an office visit.**    S: Pt is a 68 yo male with Hx of cervical stenosis w/ myelopathy s/p C3-6 decompression and fusion, as well as prostate CA on radiatio therapy who we are following for chronic low back pain. We last performed lumbar facet joint injections in August and October of 2019. He reports excellent (75%) pain relief for about 3 months but his pain has since returned. He reports that the pain feels identical, but about 1-2 cm lower. Pain is worse with standing/ walking. He denies any pain shooting down his legs or numbness/ tingling/ weakness in his legs. No changes with bladder/bowel control. He is currently taking diclofenac 75mg BID and gabapentin 600mg TID with decent relief. He is curious if there is something slightly stronger than diclofenac that may help. He cannot recall any other NSAIDs or muscle relaxers that he's tried.    A/P: Pt is a 68 yo male with Hx of cervical stenosis w/ myelopathy s/p C3-6 decompression and fusion, as well as prostate CA on radiatio therapy, and chronic low back pain 2/2 lumbar spondylosis   - Patient had excellent relief from BL lumbar FJIs, although effect wore off after 2 months. Will plan to schedule patient for BL lumbar RFA in the future  - Will switch patient from diclofenac to meloxicam 15mg daily  - Add Voltaren QID PRN for local anti-inflammatory benefit   - RTC 1 month via televisit    I have personally reviewed the phone encounter with resident/fellow/NP and personally spoke with patient for over 5 min after addressing all questions and concerns   The phone call was initiated by patient who consented and verbalized understanding to the type of encounter not related to any office visit or other encounter in the past 7 days    Marcel ESCAMILLA  MD Jed

## 2020-03-27 PROCEDURE — 77385 HC IMRT, SIMPLE: CPT | Mod: HCNC | Performed by: RADIOLOGY

## 2020-03-27 PROCEDURE — 77014 HC CT GUIDANCE RADIATION THERAPY FLDS PLACEMENT: CPT | Mod: TC,HCNC | Performed by: RADIOLOGY

## 2020-03-30 PROCEDURE — G6002 PR STEREOSCOPIC XRAY GUIDE FOR RADIATION TX DELIV: ICD-10-PCS | Mod: 26,HCNC,, | Performed by: RADIOLOGY

## 2020-03-30 PROCEDURE — G6002 STEREOSCOPIC X-RAY GUIDANCE: HCPCS | Mod: 26,HCNC,, | Performed by: RADIOLOGY

## 2020-03-30 PROCEDURE — 77385 HC IMRT, SIMPLE: CPT | Mod: HCNC | Performed by: RADIOLOGY

## 2020-03-30 PROCEDURE — 77336 RADIATION PHYSICS CONSULT: CPT | Mod: HCNC | Performed by: RADIOLOGY

## 2020-03-31 PROCEDURE — 77385 HC IMRT, SIMPLE: CPT | Mod: HCNC | Performed by: RADIOLOGY

## 2020-03-31 PROCEDURE — 77014 HC CT GUIDANCE RADIATION THERAPY FLDS PLACEMENT: CPT | Mod: TC,HCNC | Performed by: RADIOLOGY

## 2020-04-01 ENCOUNTER — APPOINTMENT (OUTPATIENT)
Dept: RADIATION THERAPY | Facility: OTHER | Age: 70
End: 2020-04-01
Attending: RADIOLOGY
Payer: MEDICARE

## 2020-04-01 ENCOUNTER — DOCUMENTATION ONLY (OUTPATIENT)
Dept: RADIATION ONCOLOGY | Facility: CLINIC | Age: 70
End: 2020-04-01

## 2020-04-01 ENCOUNTER — HOSPITAL ENCOUNTER (OUTPATIENT)
Dept: RADIATION THERAPY | Facility: HOSPITAL | Age: 70
Discharge: HOME | End: 2020-04-01
Attending: RADIOLOGY
Payer: MEDICARE

## 2020-04-01 PROCEDURE — G6002 PR STEREOSCOPIC XRAY GUIDE FOR RADIATION TX DELIV: ICD-10-PCS | Mod: 26,HCNC,, | Performed by: RADIOLOGY

## 2020-04-01 PROCEDURE — G6002 STEREOSCOPIC X-RAY GUIDANCE: HCPCS | Mod: 26,HCNC,, | Performed by: RADIOLOGY

## 2020-04-01 PROCEDURE — 77385 HC IMRT, SIMPLE: CPT | Mod: HCNC | Performed by: RADIOLOGY

## 2020-04-02 PROCEDURE — 77014 HC CT GUIDANCE RADIATION THERAPY FLDS PLACEMENT: CPT | Mod: TC,HCNC | Performed by: RADIOLOGY

## 2020-04-02 PROCEDURE — 77385 HC IMRT, SIMPLE: CPT | Mod: HCNC | Performed by: RADIOLOGY

## 2020-04-03 PROCEDURE — G6002 STEREOSCOPIC X-RAY GUIDANCE: HCPCS | Mod: 26,HCNC,, | Performed by: RADIOLOGY

## 2020-04-03 PROCEDURE — G6002 PR STEREOSCOPIC XRAY GUIDE FOR RADIATION TX DELIV: ICD-10-PCS | Mod: 26,HCNC,, | Performed by: RADIOLOGY

## 2020-04-03 PROCEDURE — 77385 HC IMRT, SIMPLE: CPT | Mod: HCNC | Performed by: RADIOLOGY

## 2020-04-06 PROCEDURE — 77014 HC CT GUIDANCE RADIATION THERAPY FLDS PLACEMENT: CPT | Mod: TC,HCNC | Performed by: RADIOLOGY

## 2020-04-06 PROCEDURE — 77385 HC IMRT, SIMPLE: CPT | Mod: HCNC | Performed by: RADIOLOGY

## 2020-04-07 PROCEDURE — G6002 STEREOSCOPIC X-RAY GUIDANCE: HCPCS | Mod: 26,HCNC,, | Performed by: RADIOLOGY

## 2020-04-07 PROCEDURE — 77336 RADIATION PHYSICS CONSULT: CPT | Mod: HCNC | Performed by: RADIOLOGY

## 2020-04-07 PROCEDURE — G6002 PR STEREOSCOPIC XRAY GUIDE FOR RADIATION TX DELIV: ICD-10-PCS | Mod: 26,HCNC,, | Performed by: RADIOLOGY

## 2020-04-07 PROCEDURE — 77385 HC IMRT, SIMPLE: CPT | Mod: HCNC | Performed by: RADIOLOGY

## 2020-04-08 PROCEDURE — 77385 HC IMRT, SIMPLE: CPT | Mod: HCNC | Performed by: RADIOLOGY

## 2020-04-08 PROCEDURE — 77014 HC CT GUIDANCE RADIATION THERAPY FLDS PLACEMENT: CPT | Mod: TC,HCNC | Performed by: RADIOLOGY

## 2020-04-09 ENCOUNTER — DOCUMENTATION ONLY (OUTPATIENT)
Dept: RADIATION ONCOLOGY | Facility: CLINIC | Age: 70
End: 2020-04-09

## 2020-04-09 PROCEDURE — 77385 HC IMRT, SIMPLE: CPT | Mod: HCNC | Performed by: RADIOLOGY

## 2020-04-09 PROCEDURE — G6002 STEREOSCOPIC X-RAY GUIDANCE: HCPCS | Mod: 26,HCNC,, | Performed by: RADIOLOGY

## 2020-04-09 PROCEDURE — G6002 PR STEREOSCOPIC XRAY GUIDE FOR RADIATION TX DELIV: ICD-10-PCS | Mod: 26,HCNC,, | Performed by: RADIOLOGY

## 2020-04-09 NOTE — PLAN OF CARE
Pt. On day 34 of outpt. Xrt to prostate.  Doing well.  No dysuria.  Nocturia x4.  Intermittent episodes of diarrhea.

## 2020-04-13 PROCEDURE — 77385 HC IMRT, SIMPLE: CPT | Mod: HCNC | Performed by: RADIOLOGY

## 2020-04-13 PROCEDURE — 77014 HC CT GUIDANCE RADIATION THERAPY FLDS PLACEMENT: CPT | Mod: TC,HCNC | Performed by: RADIOLOGY

## 2020-04-14 ENCOUNTER — DOCUMENTATION ONLY (OUTPATIENT)
Dept: RADIATION ONCOLOGY | Facility: CLINIC | Age: 70
End: 2020-04-14

## 2020-04-14 DIAGNOSIS — Z13.9 SCREENING FOR CONDITION: Primary | ICD-10-CM

## 2020-04-14 PROCEDURE — G6002 STEREOSCOPIC X-RAY GUIDANCE: HCPCS | Mod: 26,HCNC,, | Performed by: RADIOLOGY

## 2020-04-14 PROCEDURE — 77385 HC IMRT, SIMPLE: CPT | Mod: HCNC | Performed by: RADIOLOGY

## 2020-04-14 PROCEDURE — G6002 PR STEREOSCOPIC XRAY GUIDE FOR RADIATION TX DELIV: ICD-10-PCS | Mod: 26,HCNC,, | Performed by: RADIOLOGY

## 2020-04-14 NOTE — PROGRESS NOTES
04/14/2020      In an effort to protect our immunocompromised patients from potential exposure to COVID-19, [facility] will now require all patients receiving an infusion, an injection, and/or radiation therapy to be tested for COVID-19 prior to their appointment.  All patients currently under treatment will be tested immediately, and patients initiating new treatment cycles or with one-time appointments (injections, transfusions, etc.) must be tested within 72 hours of their appointment.     Placed COVID-19 test order for patient.  A member of our team is to contact the patient in the near future to explain this process and the rationale behind it and to get the patient scheduled for their COVID-19 test.     The above was completed in accordance with instructions and guidelines set forth by [facility].     Signed,    Girish Gomez, IIVS     Date:  04/14/2020

## 2020-04-14 NOTE — PROGRESS NOTES
04/14/2020           Discussed the following with the patient:  In an effort to protect our immunocompromised patients from potential exposure to COVID-19, [facility] will now require all patients receiving an infusion, an injection, and/or radiation therapy to be tested for COVID-19 prior to their appointment.  All patients currently under treatment will be tested immediately, and patients initiating new treatment cycles or with one-time appointments (injections, transfusions, etc.) must be tested within 72 hours of their appointment.      Symptom Patient's Response   Fever YES/NO: no   Cough YES/NO: no   Shortness of breath  YES/NO: no   Difficulty breathing YES/NO: no   GI symptoms such as diarrhea or nausea YES/NO: no   Loss of taste YES/NO: no   Loss of smell YES/NO: no     Other Screening Patient's Response   Has the patient previously undergone COVID-19 testing? YES/NO: no     If yes to the question directly above, what was the result? not applicable   Has the patient been in close contact with someone who has undergone COVID-19 testing? YES/NO: no     If yes to the question directly above, what was the result? not applicable      The patient's 24-hour COVID-19 test was ordered and scheduled.  Reviewed the appointment date, time, and location with the patient.      Advised the patient that he can expect the results to take approximately 24 hours and that someone will reach out to him regarding his results.  Advised the patient that his treatment appointment will be rescheduled if he tests positive for COVID-19.      Questions were answered to the patient's satisfaction, and the patient verbalized understanding of information and agreement with the plan.       The above was completed in accordance with instructions and guidelines set forth by [facility].     Signed,        Adry Ortega RN     Date:  04/14/2020

## 2020-04-15 ENCOUNTER — LAB VISIT (OUTPATIENT)
Dept: INTERNAL MEDICINE | Facility: CLINIC | Age: 70
End: 2020-04-15
Payer: MEDICARE

## 2020-04-15 DIAGNOSIS — Z13.9 SCREENING FOR CONDITION: ICD-10-CM

## 2020-04-15 LAB — SARS-COV-2 RNA RESP QL NAA+PROBE: NOT DETECTED

## 2020-04-15 PROCEDURE — 77336 RADIATION PHYSICS CONSULT: CPT | Mod: HCNC | Performed by: RADIOLOGY

## 2020-04-15 PROCEDURE — 77014 HC CT GUIDANCE RADIATION THERAPY FLDS PLACEMENT: CPT | Mod: TC,HCNC | Performed by: RADIOLOGY

## 2020-04-15 PROCEDURE — U0002 COVID-19 LAB TEST NON-CDC: HCPCS | Mod: HCNC

## 2020-04-15 PROCEDURE — 77385 HC IMRT, SIMPLE: CPT | Mod: HCNC | Performed by: RADIOLOGY

## 2020-04-16 ENCOUNTER — DOCUMENTATION ONLY (OUTPATIENT)
Dept: RADIATION ONCOLOGY | Facility: CLINIC | Age: 70
End: 2020-04-16

## 2020-04-16 ENCOUNTER — TELEPHONE (OUTPATIENT)
Dept: ADMINISTRATIVE | Facility: CLINIC | Age: 70
End: 2020-04-16

## 2020-04-16 PROCEDURE — 77385 HC IMRT, SIMPLE: CPT | Mod: HCNC | Performed by: RADIOLOGY

## 2020-04-16 PROCEDURE — G6002 STEREOSCOPIC X-RAY GUIDANCE: HCPCS | Mod: 26,HCNC,, | Performed by: RADIOLOGY

## 2020-04-16 PROCEDURE — G6002 PR STEREOSCOPIC XRAY GUIDE FOR RADIATION TX DELIV: ICD-10-PCS | Mod: 26,HCNC,, | Performed by: RADIOLOGY

## 2020-04-16 NOTE — TELEPHONE ENCOUNTER
Symptom Patient's Response   Fever YES/NO: no   Cough YES/NO: no   Shortness of breath  YES/NO: no   Difficulty breathing YES/NO: no   GI symptoms such as diarrhea or nausea YES/NO: no   Loss of taste YES/NO: no   Loss of smell YES/NO: no     Other Screening Patient's Response   Has the patient previously undergone COVID-19 testing? YES/NO: no     If yes to the question directly above, what was the result? not applicable   Has the patient been in close contact with someone who has undergone COVID-19 testing? YES/NO: no     If yes to the question directly above, what was the result? not applicable     04/16/2020    - COVID-19 testing Collection Date: 4/15/2020 Collection Time:   1:38 PM  - This result was communicated to the patient by Ariela Khanna on 04/16/2020 as below.     The following was discussed with the patient via telephone call:  The test was NEGATIVE for COVID-19 (coronavirus), and based on those negative results, treatment at the cancer center can proceed as planned.  Questions were encouraged and answered to patient's satisfaction, and patient verbalized understanding of information and agreement with the plan.     Signed,    Ariela Khanna

## 2020-04-16 NOTE — PROGRESS NOTES
To:  Ariela Khanna, RN    [facility]-  Please phone this patient with his negative COVID-19 test results following the scripting and protocol we have reviewed.  If my assistance is needed, don't hesitate to reach out.  Thanks!  -Girish Gomez, DNP, APRN, FNP-BC, AOCNP  [facility], 3rd Floor  [facility] [address], [place]  [address]  [phone]

## 2020-04-16 NOTE — PLAN OF CARE
Pt. Completed outpt. Xrt to prostate bed.  Doing well.  Increased urinary frequency.  Diarrhea resolved.

## 2020-04-20 RX ORDER — MELOXICAM 15 MG/1
TABLET ORAL
Qty: 30 TABLET | Refills: 0 | Status: SHIPPED | OUTPATIENT
Start: 2020-04-20 | End: 2020-05-16

## 2020-04-23 ENCOUNTER — TELEPHONE (OUTPATIENT)
Dept: PAIN MEDICINE | Facility: CLINIC | Age: 70
End: 2020-04-23

## 2020-04-23 NOTE — TELEPHONE ENCOUNTER
Contacted patient regarding his visit on 05/4 with Dr. Adkins. As of May 1, virtual Audio is no longer an available visit type, we need to switch him to a Virtual Video or in person office visit. Requested a return amari to see which option he can accept.

## 2020-04-24 ENCOUNTER — TELEPHONE (OUTPATIENT)
Dept: PAIN MEDICINE | Facility: CLINIC | Age: 70
End: 2020-04-24

## 2020-04-24 DIAGNOSIS — C61 CANCER OF PROSTATE: Primary | ICD-10-CM

## 2020-04-24 NOTE — TELEPHONE ENCOUNTER
----- Message from Jared Feldman sent at 4/24/2020  1:02 PM CDT -----  Contact: pt  Name of Who is Calling: pt    What is the request in detail: is asking to be schedule for 05/04/20 office visit. Epic did not have availability. Please contact to further discuss and advise      Can the clinic reply by [facility]: no    [facility]: [phone]

## 2020-04-24 NOTE — TELEPHONE ENCOUNTER
Patient requested an in office visit with . Staff offered 05/11/20 in the [facility] at 2pm. Patient accepted.

## 2020-04-28 ENCOUNTER — TELEPHONE (OUTPATIENT)
Dept: SPORTS MEDICINE | Facility: CLINIC | Age: 70
End: 2020-04-28

## 2020-04-28 NOTE — TELEPHONE ENCOUNTER
Pt declined telemedicine option, would rather reschedule. Rescheduled pt appointment to 6/3/20 at 9 am.

## 2020-05-07 ENCOUNTER — PATIENT OUTREACH (OUTPATIENT)
Dept: ADMINISTRATIVE | Facility: OTHER | Age: 70
End: 2020-05-07

## 2020-05-11 ENCOUNTER — TELEPHONE (OUTPATIENT)
Dept: PRIMARY CARE CLINIC | Facility: CLINIC | Age: 70
End: 2020-05-11

## 2020-05-11 ENCOUNTER — OFFICE VISIT (OUTPATIENT)
Dept: PRIMARY CARE CLINIC | Facility: CLINIC | Age: 70
End: 2020-05-11
Payer: MEDICARE

## 2020-05-11 ENCOUNTER — OFFICE VISIT (OUTPATIENT)
Dept: SPINE | Facility: CLINIC | Age: 70
End: 2020-05-11
Attending: ANESTHESIOLOGY
Payer: MEDICARE

## 2020-05-11 DIAGNOSIS — M51.36 DDD (DEGENERATIVE DISC DISEASE), LUMBAR: ICD-10-CM

## 2020-05-11 DIAGNOSIS — G89.29 CHRONIC RIGHT-SIDED LOW BACK PAIN WITH RIGHT-SIDED SCIATICA: ICD-10-CM

## 2020-05-11 DIAGNOSIS — M47.819 SPONDYLOSIS WITHOUT MYELOPATHY: ICD-10-CM

## 2020-05-11 DIAGNOSIS — C61 CANCER OF PROSTATE: ICD-10-CM

## 2020-05-11 DIAGNOSIS — M54.41 CHRONIC RIGHT-SIDED LOW BACK PAIN WITH RIGHT-SIDED SCIATICA: ICD-10-CM

## 2020-05-11 DIAGNOSIS — G62.9 NEUROPATHY: ICD-10-CM

## 2020-05-11 DIAGNOSIS — M47.9 OSTEOARTHRITIS OF SPINE, UNSPECIFIED SPINAL OSTEOARTHRITIS COMPLICATION STATUS, UNSPECIFIED SPINAL REGION: Primary | ICD-10-CM

## 2020-05-11 DIAGNOSIS — F15.182 CAFFEINE-INDUCED SLEEP DISORDER WITH MILD USE DISORDER, INSOMNIA TYPE: ICD-10-CM

## 2020-05-11 PROCEDURE — 99999 PR PBB SHADOW E&M-EST. PATIENT-LVL I: ICD-10-PCS | Mod: PBBFAC,HCNC,, | Performed by: ANESTHESIOLOGY

## 2020-05-11 PROCEDURE — 99442 PR PHYSICIAN TELEPHONE EVALUATION 11-20 MIN: ICD-10-PCS | Mod: HCNC,95,S$GLB, | Performed by: ANESTHESIOLOGY

## 2020-05-11 PROCEDURE — 99999 PR PBB SHADOW E&M-EST. PATIENT-LVL I: CPT | Mod: PBBFAC,HCNC,, | Performed by: ANESTHESIOLOGY

## 2020-05-11 PROCEDURE — 99442 PR PHYSICIAN TELEPHONE EVALUATION 11-20 MIN: CPT | Mod: HCNC,95,S$GLB, | Performed by: ANESTHESIOLOGY

## 2020-05-11 PROCEDURE — 99443 PR PHYSICIAN TELEPHONE EVALUATION 21-30 MIN: ICD-10-PCS | Mod: HCNC,95,, | Performed by: INTERNAL MEDICINE

## 2020-05-11 PROCEDURE — 99443 PR PHYSICIAN TELEPHONE EVALUATION 21-30 MIN: CPT | Mod: HCNC,95,, | Performed by: INTERNAL MEDICINE

## 2020-05-11 RX ORDER — TRAZODONE HYDROCHLORIDE 50 MG/1
100 TABLET ORAL NIGHTLY PRN
Qty: 180 TABLET | Refills: 3 | Status: SHIPPED | OUTPATIENT
Start: 2020-05-11 | End: 2020-07-07 | Stop reason: SDUPTHER

## 2020-05-11 NOTE — ACP (ADVANCE CARE PLANNING)
Advance Care Planning     PCP (Dr Spaulding) spoke with patient (Dung Miguel) regarding advanced care planning on 5/11/20 from 5:00-5:10PM.    Advance Care Planning     Power of   I initiated the process of advance care planning today and explained the importance of this process to the patient.  I introduced the concept of advance directives to the patient, as well. Then the patient received detailed information about the importance of designating a Health Care Power of  (HCPOA). He was also instructed to communicate with this person about their wishes for future healthcare, should he become sick and lose decision-making capacity. The patient has not previously appointed a HCPOA. After our discussion, the patient has decided to complete a HCPOA and has appointed his sister and NAME: Nguyen Miguel  I spent a total time of 5 minutes discussing this issue with the patient.         Living Will  During this visit, I engaged the patient  in the advance care planning process.  The patient and I reviewed the role for advance directives and their purpose in directing future healthcare if the patient's unable to speak for him/herself.  At this point in time, the patient does have full decision-making capacity.  We discussed different extreme health states that he could experience, and reviewed what kind of medical care he would want in those situations.  The patient communicated that if he were comatose and had little chance of a meaningful recovery, he would not want machines/life-sustaining treatments used. In addition to the above preference, other important end-of-life issues for the patient include DNR/DNI. The patient has already designated a healthcare power of  to make decisions on his behalf.  I spent a total of 10 minutes engaging the patient in this advance care planning discussion.          Code Status  In light of the patients advanced and life limiting illness,I engaged the the  patient in a conversation about the patient's preferences for care  at the very end of life. The patient wishes to have a natural, peaceful death.  Along those lines, the patient does not wish to have CPR or other invasive treatments performed when his heart and/or breathing stops. I communicated to the patient that a DNR order would be placed in his medical record to reflect this preference.  I spent a total of 10 minutes engaging the patient in this advance care planning discussion.        Jada Spaulding MD/MPH  Internal Medicine  [facility]  [phone] [facility]

## 2020-05-11 NOTE — LETTER
May 11, 2020    Anthony Miguel  [address] [address]             [facility]  Primary [facility]  [address]  [place] [address]  Phone: [phone]  Fax: [phone]   May 11, 2020     Patient: Anthony Miguel   YOB: 1950   Date of Visit: 5/11/2020       To Whom it May Concern:    Anthony Miguel has been my patient for the past four years. He was evaluated be me on 5/11/2020.    Due to numerous chronic medical conditions, he is disabled and unable to obtain to participate in work.    If you have any questions or concerns, please don't hesitate to call me.      Sincerely,           Jada Spaulding MD

## 2020-05-11 NOTE — TELEPHONE ENCOUNTER
"PT STATES HE WOULD LIKE THE LETTER TO SAY "[facility] OF [facility] RENTAL PROGRAM REGARDING [id number]" ATTENTION : RANJIT JACKSON. Basically that he is disabled he stated   "

## 2020-05-11 NOTE — ASSESSMENT & PLAN NOTE
Bilateral L4-5 and L5-S1 facet injections with Dr. Adkins  · F/U pain mgt  · Patient strongly advised to have realistic expectations of chronic opiates

## 2020-05-11 NOTE — H&P (VIEW-ONLY)
Chronic Pain-Tele-Medicine-Established Note (Follow up visit)      The patient location is: home  The chief complaint leading to consultation is: low back pain  Visit type: Virtual visit with synchronous audio via telephone only  Total time spent with patient: 15 min  Each patient to whom he or she provides medical services by telemedicine is:  (1) informed of the relationship between the physician and patient and the respective role of any other health care provider with respect to management of the patient; and (2) notified that he or she may decline to receive medical services by telemedicine and may withdraw from such care at any time.      SUBJECTIVE:  Interval history 05/11/2020 (Telephone encounter)   Last encounter,we planned on scheduling for bilateral lumbar RFA in future as patient has had excellent relief from BL lumbar FJIs in the past. At last encounter, we also switched patient form diclofenac to meloxicam 15 mg daily and added voltaren gel for local anti-inflammatory benefit. Today he reports he completed his 38 day radiation trial for his prostate cancer (04/18/2020). He reports the previous facet joint injections helped tremendously (>70% pain relief for 3 months at a time) and is inquiring about RFA for his low back. Back  Pain is unchanged, located in lower back, worsened with extension. NSAIDs are not quite helpful. Gabapentin is mildly helpful.      Interval history (Telephone encounter) 03/26/20  Pt is a 68 yo male with Hx of cervical stenosis w/ myelopathy s/p C3-6 decompression and fusion, as well as prostate CA on radiatio therapy who we are following for chronic low back pain. We last performed lumbar facet joint injections in August and October of 2019. He reports excellent (75%) pain relief for about 3 months but his pain has since returned. He reports that the pain feels identical, but about 1-2 cm lower. Pain is worse with standing/ walking. He denies any pain shooting down his legs  or numbness/ tingling/ weakness in his legs. No changes with bladder/bowel control. He is currently taking diclofenac 75mg BID and gabapentin 600mg TID with decent relief. He is curious if there is something slightly stronger than diclofenac that may help. He cannot recall any other NSAIDs or muscle relaxers that he's tried.    Pain Medications:    - NSAIDs: Meloxicam, diclofenac, voltaren gel  - Anti-Depressants: trazadone  - Anti convulsants: Gabapentin 600 mg TID        Physical Therapy/Home Exercise: no       report:  Not applicable    Pain Procedures:   2/6/19- L5/S1 KAYLEY  4/01/2019- L5/S1 KAYLEY  07/29/2019- Bilateral L4/5 and L5/S1 FJI  10/02/2019- Bilateral L4/5 and L5/S1 FJI      Imaging: reviewed in EPIC    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    Overview:  Right side dx update     Past Surgical History:   Procedure Laterality Date    BACK SURGERY      EPIDURAL STEROID INJECTION N/A 2/6/2019    Procedure: INJECTION, STEROID, EPIDURAL, L45-S1 IL;  Surgeon: Marcel Adkins MD;  Location: [facility] PAIN T;  Service: Pain Management;  Laterality: N/A;    EPIDURAL STEROID INJECTION N/A 4/10/2019    Procedure: Injection, Steroid, Epidural LUMBAR/CAUDAL L5-S1 INTERLAMINAR KAYLEY;  Surgeon: Marcel Adkins MD;  Location: [facility] PAIN T;  Service: Pain Management;  Laterality: N/A;  NEEDS CONSENT    INJECTION OF FACET JOINT Bilateral 8/14/2019    Procedure: INJECTION, FACET JOINT INJECTION (LUMBAR BLOCK) BILATERAL L4-5 AND L5-S1;  Surgeon: Marcel Adkins MD;  Location: [facility] PAIN T;  Service: Pain Management;  Laterality: Bilateral;  NEEDS CONSENT    INJECTION OF FACET JOINT Bilateral  10/16/2019    Procedure: FACET JOINT INJECTION (LUMBAR BLOCK) BILATERAL L4-5 AND L5-S1;  Surgeon: Marcel Adkins MD;  Location: [facility];  Service: Pain Management;  Laterality: Bilateral;  NEEDS CONSENT    Larangoscopy      ROBOT-ASSISTED LAPAROSCOPIC PROSTATECTOMY USING DA SAÚL XI N/A 2019    Procedure: XI ROBOTIC PROSTATECTOMY;  Surgeon: Sergio Dixon MD;  Location: [facility] OR [address] [place] [address];  Service: Urology;  Laterality: N/A;  4hrs/ gen with regional      Social History     Socioeconomic History    Marital status: Single     Spouse name: Not on file    Number of children: 0    Years of education: Not on file    Highest education level: Not on file   Occupational History    Not on file   Social Needs    Financial resource strain: Not hard at all    Food insecurity:     Worry: Never true     Inability: Never true    Transportation needs:     Medical: Yes     Non-medical: Yes   Tobacco Use    Smoking status: Former Smoker     Packs/day: 1.00     Last attempt to quit: 2000     Years since quittin.2    Smokeless tobacco: Former User   Substance and Sexual Activity    Alcohol use: Yes     Alcohol/week: 1.0 standard drinks     Types: 1 Glasses of wine per week     Comment: once in a while    Drug use: No    Sexual activity: Not Currently   Lifestyle    Physical activity:     Days per week: Not on file     Minutes per session: Not on file    Stress: Only a little   Relationships    Social connections:     Talks on phone: Not on file     Gets together: Not on file     Attends Buddhist service: Not on file     Active member of club or organization: Not on file     Attends meetings of clubs or organizations: Not on file     Relationship status: Not on file   Other Topics Concern    Not on file   Social History Narrative    No difficulty reading Rx labels      Family History   Problem Relation Age of Onset    Cancer Mother     No Known Problems Father     Keratoconus Brother      Cancer Brother        Review of patient's allergies indicates:  No Known Allergies    Current Outpatient Medications   Medication Sig    atorvastatin (LIPITOR) 10 MG tablet Take 1 tablet (10 mg total) by mouth once daily.    bicalutamide (CASODEX) 50 MG Tab Take 1 tablet (50 mg total) by mouth once daily.    cholecalciferol, vitamin D3, (VITAMIN D3) 50 mcg (2,000 unit) Cap Take 1 capsule (2,000 Units total) by mouth once daily.    diclofenac (VOLTAREN) 75 MG EC tablet TAKE 1 TABLET(75 MG) BY MOUTH TWICE DAILY AS NEEDED    diclofenac (VOLTAREN) 75 MG EC tablet TAKE 1 TABLET(75 MG) BY MOUTH TWICE DAILY AS NEEDED    diclofenac sodium (VOLTAREN) 1 % Gel Apply 2 g topically 4 (four) times daily.    ferrous sulfate 325 (65 FE) MG EC tablet Take 1 tablet (325 mg total) by mouth once daily.    gabapentin (NEURONTIN) 300 MG capsule Take 1 capsule (300 mg total) by mouth 3 (three) times daily.    gabapentin (NEURONTIN) 300 MG capsule Take 2 capsules (600 mg total) by mouth 3 (three) times daily.    meloxicam (MOBIC) 15 MG tablet TAKE 1 TABLET(15 MG) BY MOUTH DAILY WITH BREAKFAST    polyethylene glycol (GLYCOLAX) 17 gram PwPk Take 17 g by mouth once daily.    traZODone (DESYREL) 50 MG tablet Take 2 tablets (100 mg total) by mouth nightly as needed for Insomnia.    triamcinolone acetonide 0.1% (KENALOG) 0.1 % cream Apply 15 g topically.    walker (ULTRA-LIGHT ROLLATOR) Misc 1 Units by Misc.(Non-Drug; Combo Route) route once daily at 6am.     No current facility-administered medications for this visit.        REVIEW OF SYSTEMS:    GENERAL:  No weight loss, malaise or fevers.  HEENT:   No recent changes in vision or hearing  NECK:  Negative for lumps, no difficulty with swallowing.  RESPIRATORY:  Negative for cough, wheezing or shortness of breath, patient denies any recent URI.  CARDIOVASCULAR:  Negative for chest pain, leg swelling or palpitations.  GI:  Negative for abdominal discomfort, blood in stools or black  stools or change in bowel habits.  MUSCULOSKELETAL:  See HPI.  SKIN:  Negative for lesions, rash, and itching.  PSYCH:  No mood disorder or recent psychosocial stressors.  Patients sleep is disturbed secondary to pain.  HEMATOLOGY/LYMPHOLOGY:  + prostate cancer s/p radiation therapy  NEURO:   No history of headaches, syncope, paralysis, seizures or tremors.  All other reviewed and negative other than HPI.    OBJECTIVE:    General appearance: Well appearing, in no acute distress, alert and oriented x3.  Psych:  Mood and affect appropriate.      ASSESSMENT: 69 y.o. year old male with chronic low back pain, consistent with     1. Osteoarthritis of spine, unspecified spinal osteoarthritis complication status, unspecified spinal region     2. Spondylosis without myelopathy     3. DDD (degenerative disc disease), lumbar           PLAN:   - I have stressed the importance of physical activity and a home exercise plan to help with pain and improve health.  - Patient can continue with medications for now since they are providing benefits, using them appropriately, and without side effects.  - Schedule for a Radiofrequency ablation of the medial branches at L3,4, and L5 for longer pain relief; will schedule for one side at a time.  - RTC for RFA at L3, L4 and L5 medial branches  - Counseled patient regarding the importance of activity modification and constant sleeping habits.    The above plan and management options were discussed at length with patient. Patient is in agreement with the above and verbalized understanding. It will be communicated with the referring physician via electronic record, fax, or mail.    Floyd Johnson DO [employer] PMR PGY2  I have personally reviewed the phone encounter with resident/fellow/NP and personally spoke with patient for over 11 min after addressing all questions and concerns   The phone call was initiated by patient who consented and verbalized understanding to the type of encounter not  related to any office visit or other encounter in the past 7 days    Marcel Adkins MD         05/11/2020

## 2020-05-11 NOTE — ASSESSMENT & PLAN NOTE
H/o prostatectomy in 6/2019. Followed by Uro, undergoing radiation therapy  · Completed casodex and Rad Onc for radiation   · Needs follow-up in 6/24/20 with Dr Sevilla and a PSA

## 2020-05-11 NOTE — H&P (VIEW-ONLY)
Chronic Pain-Tele-Medicine-Established Note (Follow up visit)      The patient location is: home  The chief complaint leading to consultation is: low back pain  Visit type: Virtual visit with synchronous audio via telephone only  Total time spent with patient: 15 min  Each patient to whom he or she provides medical services by telemedicine is:  (1) informed of the relationship between the physician and patient and the respective role of any other health care provider with respect to management of the patient; and (2) notified that he or she may decline to receive medical services by telemedicine and may withdraw from such care at any time.      SUBJECTIVE:  Interval history 05/11/2020 (Telephone encounter)   Last encounter,we planned on scheduling for bilateral lumbar RFA in future as patient has had excellent relief from BL lumbar FJIs in the past. At last encounter, we also switched patient form diclofenac to meloxicam 15 mg daily and added voltaren gel for local anti-inflammatory benefit. Today he reports he completed his 38 day radiation trial for his prostate cancer (04/18/2020). He reports the previous facet joint injections helped tremendously (>70% pain relief for 3 months at a time) and is inquiring about RFA for his low back. Back  Pain is unchanged, located in lower back, worsened with extension. NSAIDs are not quite helpful. Gabapentin is mildly helpful.      Interval history (Telephone encounter) 03/26/20  Pt is a 68 yo male with Hx of cervical stenosis w/ myelopathy s/p C3-6 decompression and fusion, as well as prostate CA on radiatio therapy who we are following for chronic low back pain. We last performed lumbar facet joint injections in August and October of 2019. He reports excellent (75%) pain relief for about 3 months but his pain has since returned. He reports that the pain feels identical, but about 1-2 cm lower. Pain is worse with standing/ walking. He denies any pain shooting down his legs  or numbness/ tingling/ weakness in his legs. No changes with bladder/bowel control. He is currently taking diclofenac 75mg BID and gabapentin 600mg TID with decent relief. He is curious if there is something slightly stronger than diclofenac that may help. He cannot recall any other NSAIDs or muscle relaxers that he's tried.    Pain Medications:    - NSAIDs: Meloxicam, diclofenac, voltaren gel  - Anti-Depressants: trazadone  - Anti convulsants: Gabapentin 600 mg TID        Physical Therapy/Home Exercise: no       report:  Not applicable    Pain Procedures:   2/6/19- L5/S1 KAYLEY  4/01/2019- L5/S1 KAYLEY  07/29/2019- Bilateral L4/5 and L5/S1 FJI  10/02/2019- Bilateral L4/5 and L5/S1 FJI      Imaging: reviewed in EPIC    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    Overview:  Right side dx update     Past Surgical History:   Procedure Laterality Date    BACK SURGERY      EPIDURAL STEROID INJECTION N/A 2/6/2019    Procedure: INJECTION, STEROID, EPIDURAL, L45-S1 IL;  Surgeon: Marcel Adkins MD;  Location: [facility] PAIN T;  Service: Pain Management;  Laterality: N/A;    EPIDURAL STEROID INJECTION N/A 4/10/2019    Procedure: Injection, Steroid, Epidural LUMBAR/CAUDAL L5-S1 INTERLAMINAR KAYLEY;  Surgeon: Marcel Adkins MD;  Location: [facility] PAIN T;  Service: Pain Management;  Laterality: N/A;  NEEDS CONSENT    INJECTION OF FACET JOINT Bilateral 8/14/2019    Procedure: INJECTION, FACET JOINT INJECTION (LUMBAR BLOCK) BILATERAL L4-5 AND L5-S1;  Surgeon: Marcel Adkins MD;  Location: [facility] PAIN T;  Service: Pain Management;  Laterality: Bilateral;  NEEDS CONSENT    INJECTION OF FACET JOINT Bilateral  10/16/2019    Procedure: FACET JOINT INJECTION (LUMBAR BLOCK) BILATERAL L4-5 AND L5-S1;  Surgeon: Marcel Adkins MD;  Location: [facility];  Service: Pain Management;  Laterality: Bilateral;  NEEDS CONSENT    Larangoscopy      ROBOT-ASSISTED LAPAROSCOPIC PROSTATECTOMY USING DA SAÚL XI N/A 2019    Procedure: XI ROBOTIC PROSTATECTOMY;  Surgeon: Sergio Dixon MD;  Location: [facility] OR [address] [place] [address];  Service: Urology;  Laterality: N/A;  4hrs/ gen with regional      Social History     Socioeconomic History    Marital status: Single     Spouse name: Not on file    Number of children: 0    Years of education: Not on file    Highest education level: Not on file   Occupational History    Not on file   Social Needs    Financial resource strain: Not hard at all    Food insecurity:     Worry: Never true     Inability: Never true    Transportation needs:     Medical: Yes     Non-medical: Yes   Tobacco Use    Smoking status: Former Smoker     Packs/day: 1.00     Last attempt to quit: 2000     Years since quittin.2    Smokeless tobacco: Former User   Substance and Sexual Activity    Alcohol use: Yes     Alcohol/week: 1.0 standard drinks     Types: 1 Glasses of wine per week     Comment: once in a while    Drug use: No    Sexual activity: Not Currently   Lifestyle    Physical activity:     Days per week: Not on file     Minutes per session: Not on file    Stress: Only a little   Relationships    Social connections:     Talks on phone: Not on file     Gets together: Not on file     Attends Buddhist service: Not on file     Active member of club or organization: Not on file     Attends meetings of clubs or organizations: Not on file     Relationship status: Not on file   Other Topics Concern    Not on file   Social History Narrative    No difficulty reading Rx labels      Family History   Problem Relation Age of Onset    Cancer Mother     No Known Problems Father     Keratoconus Brother      Cancer Brother        Review of patient's allergies indicates:  No Known Allergies    Current Outpatient Medications   Medication Sig    atorvastatin (LIPITOR) 10 MG tablet Take 1 tablet (10 mg total) by mouth once daily.    bicalutamide (CASODEX) 50 MG Tab Take 1 tablet (50 mg total) by mouth once daily.    cholecalciferol, vitamin D3, (VITAMIN D3) 50 mcg (2,000 unit) Cap Take 1 capsule (2,000 Units total) by mouth once daily.    diclofenac (VOLTAREN) 75 MG EC tablet TAKE 1 TABLET(75 MG) BY MOUTH TWICE DAILY AS NEEDED    diclofenac (VOLTAREN) 75 MG EC tablet TAKE 1 TABLET(75 MG) BY MOUTH TWICE DAILY AS NEEDED    diclofenac sodium (VOLTAREN) 1 % Gel Apply 2 g topically 4 (four) times daily.    ferrous sulfate 325 (65 FE) MG EC tablet Take 1 tablet (325 mg total) by mouth once daily.    gabapentin (NEURONTIN) 300 MG capsule Take 1 capsule (300 mg total) by mouth 3 (three) times daily.    gabapentin (NEURONTIN) 300 MG capsule Take 2 capsules (600 mg total) by mouth 3 (three) times daily.    meloxicam (MOBIC) 15 MG tablet TAKE 1 TABLET(15 MG) BY MOUTH DAILY WITH BREAKFAST    polyethylene glycol (GLYCOLAX) 17 gram PwPk Take 17 g by mouth once daily.    traZODone (DESYREL) 50 MG tablet Take 2 tablets (100 mg total) by mouth nightly as needed for Insomnia.    triamcinolone acetonide 0.1% (KENALOG) 0.1 % cream Apply 15 g topically.    walker (ULTRA-LIGHT ROLLATOR) Misc 1 Units by Misc.(Non-Drug; Combo Route) route once daily at 6am.     No current facility-administered medications for this visit.        REVIEW OF SYSTEMS:    GENERAL:  No weight loss, malaise or fevers.  HEENT:   No recent changes in vision or hearing  NECK:  Negative for lumps, no difficulty with swallowing.  RESPIRATORY:  Negative for cough, wheezing or shortness of breath, patient denies any recent URI.  CARDIOVASCULAR:  Negative for chest pain, leg swelling or palpitations.  GI:  Negative for abdominal discomfort, blood in stools or black  stools or change in bowel habits.  MUSCULOSKELETAL:  See HPI.  SKIN:  Negative for lesions, rash, and itching.  PSYCH:  No mood disorder or recent psychosocial stressors.  Patients sleep is disturbed secondary to pain.  HEMATOLOGY/LYMPHOLOGY:  + prostate cancer s/p radiation therapy  NEURO:   No history of headaches, syncope, paralysis, seizures or tremors.  All other reviewed and negative other than HPI.    OBJECTIVE:    General appearance: Well appearing, in no acute distress, alert and oriented x3.  Psych:  Mood and affect appropriate.      ASSESSMENT: 69 y.o. year old male with chronic low back pain, consistent with     1. Osteoarthritis of spine, unspecified spinal osteoarthritis complication status, unspecified spinal region     2. Spondylosis without myelopathy     3. DDD (degenerative disc disease), lumbar           PLAN:   - I have stressed the importance of physical activity and a home exercise plan to help with pain and improve health.  - Patient can continue with medications for now since they are providing benefits, using them appropriately, and without side effects.  - Schedule for a Radiofrequency ablation of the medial branches at L3,4, and L5 for longer pain relief; will schedule for one side at a time.  - RTC for RFA at L3, L4 and L5 medial branches  - Counseled patient regarding the importance of activity modification and constant sleeping habits.    The above plan and management options were discussed at length with patient. Patient is in agreement with the above and verbalized understanding. It will be communicated with the referring physician via electronic record, fax, or mail.    Floyd Johnson DO [employer] PMR PGY2  I have personally reviewed the phone encounter with resident/fellow/NP and personally spoke with patient for over 11 min after addressing all questions and concerns   The phone call was initiated by patient who consented and verbalized understanding to the type of encounter not  related to any office visit or other encounter in the past 7 days    Marcel Adkins MD         05/11/2020

## 2020-05-11 NOTE — LETTER
May 11, 2020    Anthony Miguel  [address] [facility] [address]             [facility]  Primary Care  [address]  [facility] [address]  Phone: [phone]  Fax: [phone]   Advance Care Planning     PCP (Dr Spaulding) spoke with patient (Dung Miguel) regarding advanced care planning on 5/11/20 from 5:00-5:10PM.      Power of   I initiated the process of advance care planning today and explained the importance of this process to the patient.  I introduced the concept of advance directives to the patient, as well. Then the patient received detailed information about the importance of designating a Health Care Power of  (HCPOA). He was also instructed to communicate with this person about their wishes for future healthcare, should he become sick and lose decision-making capacity. The patient has not previously appointed a HCPOA. After our discussion, the patient has decided to complete a HCPOA and has appointed his sister and NAME: Nguyen Miguel  I spent a total time of 5 minutes discussing this issue with the patient.         Living Will  During this visit, I engaged the patient  in the advance care planning process.  The patient and I reviewed the role for advance directives and their purpose in directing future healthcare if the patient's unable to speak for him/herself.  At this point in time, the patient does have full decision-making capacity.  We discussed different extreme health states that he could experience, and reviewed what kind of medical care he would want in those situations.  The patient communicated that if he were comatose and had little chance of a meaningful recovery, he would not want machines/life-sustaining treatments used. In addition to the above preference, other important end-of-life issues for the patient include DNR/DNI. The patient has already designated a healthcare power of  to make decisions on his behalf.   I spent a total of 10 minutes engaging the patient in this advance care planning discussion.          Code Status  In light of the patients advanced and life limiting illness,I engaged the the patient in a conversation about the patient's preferences for care  at the very end of life. The patient wishes to have a natural, peaceful death.  Along those lines, the patient does not wish to have CPR or other invasive treatments performed when his heart and/or breathing stops. I communicated to the patient that a DNR order would be placed in his medical record to reflect this preference.  I spent a total of 10 minutes engaging the patient in this advance care planning discussion.            Jada Spaulding MD/MPH  Internal Medicine  [facility]  [phone] [facility]

## 2020-05-11 NOTE — TELEPHONE ENCOUNTER
Please get explicit info on what the letter needs to say and to whom it should be addressed.    KJ

## 2020-05-11 NOTE — PROGRESS NOTES
Chronic Pain-Tele-Medicine-Established Note (Follow up visit)      The patient location is: home  The chief complaint leading to consultation is: low back pain  Visit type: Virtual visit with synchronous audio via telephone only  Total time spent with patient: 15 min  Each patient to whom he or she provides medical services by telemedicine is:  (1) informed of the relationship between the physician and patient and the respective role of any other health care provider with respect to management of the patient; and (2) notified that he or she may decline to receive medical services by telemedicine and may withdraw from such care at any time.      SUBJECTIVE:  Interval history 05/11/2020 (Telephone encounter)   Last encounter,we planned on scheduling for bilateral lumbar RFA in future as patient has had excellent relief from BL lumbar FJIs in the past. At last encounter, we also switched patient form diclofenac to meloxicam 15 mg daily and added voltaren gel for local anti-inflammatory benefit. Today he reports he completed his 38 day radiation trial for his prostate cancer (04/18/2020). He reports the previous facet joint injections helped tremendously (>70% pain relief for 3 months at a time) and is inquiring about RFA for his low back. Back  Pain is unchanged, located in lower back, worsened with extension. NSAIDs are not quite helpful. Gabapentin is mildly helpful.      Interval history (Telephone encounter) 03/26/20  Pt is a 70 yo male with Hx of cervical stenosis w/ myelopathy s/p C3-6 decompression and fusion, as well as prostate CA on radiatio therapy who we are following for chronic low back pain. We last performed lumbar facet joint injections in August and October of 2019. He reports excellent (75%) pain relief for about 3 months but his pain has since returned. He reports that the pain feels identical, but about 1-2 cm lower. Pain is worse with standing/ walking. He denies any pain shooting down his legs  or numbness/ tingling/ weakness in his legs. No changes with bladder/bowel control. He is currently taking diclofenac 75mg BID and gabapentin 600mg TID with decent relief. He is curious if there is something slightly stronger than diclofenac that may help. He cannot recall any other NSAIDs or muscle relaxers that he's tried.    Pain Medications:    - NSAIDs: Meloxicam, diclofenac, voltaren gel  - Anti-Depressants: trazadone  - Anti convulsants: Gabapentin 600 mg TID        Physical Therapy/Home Exercise: no       report:  Not applicable    Pain Procedures:   2/6/19- L5/S1 KAYLEY  4/01/2019- L5/S1 KAYLEY  07/29/2019- Bilateral L4/5 and L5/S1 FJI  10/02/2019- Bilateral L4/5 and L5/S1 FJI      Imaging: reviewed in EPIC    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    Overview:  Right side dx update     Past Surgical History:   Procedure Laterality Date    BACK SURGERY      EPIDURAL STEROID INJECTION N/A 2/6/2019    Procedure: INJECTION, STEROID, EPIDURAL, L45-S1 IL;  Surgeon: Marcel Adkins MD;  Location: [facility] PAIN T;  Service: Pain Management;  Laterality: N/A;    EPIDURAL STEROID INJECTION N/A 4/10/2019    Procedure: Injection, Steroid, Epidural LUMBAR/CAUDAL L5-S1 INTERLAMINAR KAYLEY;  Surgeon: Marcel Adkins MD;  Location: [facility] PAIN T;  Service: Pain Management;  Laterality: N/A;  NEEDS CONSENT    INJECTION OF FACET JOINT Bilateral 8/14/2019    Procedure: INJECTION, FACET JOINT INJECTION (LUMBAR BLOCK) BILATERAL L4-5 AND L5-S1;  Surgeon: Marcel Adkins MD;  Location: [facility] PAIN T;  Service: Pain Management;  Laterality: Bilateral;  NEEDS CONSENT    INJECTION OF FACET JOINT Bilateral  10/16/2019    Procedure: FACET JOINT INJECTION (LUMBAR BLOCK) BILATERAL L4-5 AND L5-S1;  Surgeon: Marcel Adkins MD;  Location: [facility];  Service: Pain Management;  Laterality: Bilateral;  NEEDS CONSENT    Larangoscopy      ROBOT-ASSISTED LAPAROSCOPIC PROSTATECTOMY USING DA SAÚL XI N/A 2019    Procedure: XI ROBOTIC PROSTATECTOMY;  Surgeon: Sergio Dixon MD;  Location: [facility] OR [address] [place] [address];  Service: Urology;  Laterality: N/A;  4hrs/ gen with regional      Social History     Socioeconomic History    Marital status: Single     Spouse name: Not on file    Number of children: 0    Years of education: Not on file    Highest education level: Not on file   Occupational History    Not on file   Social Needs    Financial resource strain: Not hard at all    Food insecurity:     Worry: Never true     Inability: Never true    Transportation needs:     Medical: Yes     Non-medical: Yes   Tobacco Use    Smoking status: Former Smoker     Packs/day: 1.00     Last attempt to quit: 2000     Years since quittin.2    Smokeless tobacco: Former User   Substance and Sexual Activity    Alcohol use: Yes     Alcohol/week: 1.0 standard drinks     Types: 1 Glasses of wine per week     Comment: once in a while    Drug use: No    Sexual activity: Not Currently   Lifestyle    Physical activity:     Days per week: Not on file     Minutes per session: Not on file    Stress: Only a little   Relationships    Social connections:     Talks on phone: Not on file     Gets together: Not on file     Attends Baptism service: Not on file     Active member of club or organization: Not on file     Attends meetings of clubs or organizations: Not on file     Relationship status: Not on file   Other Topics Concern    Not on file   Social History Narrative    No difficulty reading Rx labels      Family History   Problem Relation Age of Onset    Cancer Mother     No Known Problems Father     Keratoconus Brother      Cancer Brother        Review of patient's allergies indicates:  No Known Allergies    Current Outpatient Medications   Medication Sig    atorvastatin (LIPITOR) 10 MG tablet Take 1 tablet (10 mg total) by mouth once daily.    bicalutamide (CASODEX) 50 MG Tab Take 1 tablet (50 mg total) by mouth once daily.    cholecalciferol, vitamin D3, (VITAMIN D3) 50 mcg (2,000 unit) Cap Take 1 capsule (2,000 Units total) by mouth once daily.    diclofenac (VOLTAREN) 75 MG EC tablet TAKE 1 TABLET(75 MG) BY MOUTH TWICE DAILY AS NEEDED    diclofenac (VOLTAREN) 75 MG EC tablet TAKE 1 TABLET(75 MG) BY MOUTH TWICE DAILY AS NEEDED    diclofenac sodium (VOLTAREN) 1 % Gel Apply 2 g topically 4 (four) times daily.    ferrous sulfate 325 (65 FE) MG EC tablet Take 1 tablet (325 mg total) by mouth once daily.    gabapentin (NEURONTIN) 300 MG capsule Take 1 capsule (300 mg total) by mouth 3 (three) times daily.    gabapentin (NEURONTIN) 300 MG capsule Take 2 capsules (600 mg total) by mouth 3 (three) times daily.    meloxicam (MOBIC) 15 MG tablet TAKE 1 TABLET(15 MG) BY MOUTH DAILY WITH BREAKFAST    polyethylene glycol (GLYCOLAX) 17 gram PwPk Take 17 g by mouth once daily.    traZODone (DESYREL) 50 MG tablet Take 2 tablets (100 mg total) by mouth nightly as needed for Insomnia.    triamcinolone acetonide 0.1% (KENALOG) 0.1 % cream Apply 15 g topically.    walker (ULTRA-LIGHT ROLLATOR) Misc 1 Units by Misc.(Non-Drug; Combo Route) route once daily at 6am.     No current facility-administered medications for this visit.        REVIEW OF SYSTEMS:    GENERAL:  No weight loss, malaise or fevers.  HEENT:   No recent changes in vision or hearing  NECK:  Negative for lumps, no difficulty with swallowing.  RESPIRATORY:  Negative for cough, wheezing or shortness of breath, patient denies any recent URI.  CARDIOVASCULAR:  Negative for chest pain, leg swelling or palpitations.  GI:  Negative for abdominal discomfort, blood in stools or black  stools or change in bowel habits.  MUSCULOSKELETAL:  See HPI.  SKIN:  Negative for lesions, rash, and itching.  PSYCH:  No mood disorder or recent psychosocial stressors.  Patients sleep is disturbed secondary to pain.  HEMATOLOGY/LYMPHOLOGY:  + prostate cancer s/p radiation therapy  NEURO:   No history of headaches, syncope, paralysis, seizures or tremors.  All other reviewed and negative other than HPI.    OBJECTIVE:    General appearance: Well appearing, in no acute distress, alert and oriented x3.  Psych:  Mood and affect appropriate.      ASSESSMENT: 69 y.o. year old male with chronic low back pain, consistent with     1. Osteoarthritis of spine, unspecified spinal osteoarthritis complication status, unspecified spinal region     2. Spondylosis without myelopathy     3. DDD (degenerative disc disease), lumbar           PLAN:   - I have stressed the importance of physical activity and a home exercise plan to help with pain and improve health.  - Patient can continue with medications for now since they are providing benefits, using them appropriately, and without side effects.  - Schedule for a Radiofrequency ablation of the medial branches at L3,4, and L5 for longer pain relief; will schedule for one side at a time.  - RTC for RFA at L3, L4 and L5 medial branches  - Counseled patient regarding the importance of activity modification and constant sleeping habits.    The above plan and management options were discussed at length with patient. Patient is in agreement with the above and verbalized understanding. It will be communicated with the referring physician via electronic record, fax, or mail.    Floyd Johnson DO [employer] PMR PGY2  I have personally reviewed the phone encounter with resident/fellow/NP and personally spoke with patient for over 11 min after addressing all questions and concerns   The phone call was initiated by patient who consented and verbalized understanding to the type of encounter not  related to any office visit or other encounter in the past 7 days    Marcel Adkins MD         05/11/2020

## 2020-05-11 NOTE — LETTER
May 11, 2020    Anthony Miguel  [address] [address]             [facility]  Primary Care  [address]  [place] [address]  Phone: [phone]  Fax: [phone]   May 11, 2020     Patient: Anthony Miguel   YOB: 1950   Date of Visit: 5/11/2020       To Whom it May Concern:    Anthony Miguel has been my patient for the past four years. He was evaluated be me on 5/11/2020.    Due to numerous chronic medical conditions, he is disabled and unable to obtain to participate in work.    If you have any questions or concerns, please don't hesitate to call me.      Sincerely,           Jada Spaulding MD

## 2020-05-11 NOTE — ASSESSMENT & PLAN NOTE
Neuropathy in LE with acute back pain caused by cervical stenosis, treated with C3-6 fusion on 6/21  · Symptoms worsening  · Continue gabapentin   · PCP will not refill valium  · Advised to be mindful to prevent falls  · NSAIDs refilled by pain management

## 2020-05-11 NOTE — TELEPHONE ENCOUNTER
----- Message from Navdeep Jonas sent at 5/11/2020 10:05 AM CDT -----  Contact: Medical Student  Wants to talk to Veronica about some difficulty with paperwork involving his properties. Says he needs a note from Dr. SHOEMAKER saying he's limited due to his medical conditions.     Needs note sent to [email or web address]

## 2020-05-11 NOTE — PROGRESS NOTES
Established Patient - Audio Only Telehealth Visit with [facility] Provider     The patient location is: HOME  The chief complaint leading to consultation is: routine care  Visit type: Virtual visit with audio only (telephone)     The reason for the audio only service rather than synchronous audio and video virtual visit was related to technical difficulties or patient preference/necessity.     Each patient to whom I provide medical services by telemedicine is:  (1) informed of the relationship between the physician and patient and the respective role of any other health care provider with respect to management of the patient; and (2) notified that they may decline to receive medical services by telemedicine and may withdraw from such care at any time. Patient verbally consented to receive this service via voice-only telephone call.       Subjective:      Patient ID: Anthony Miguel is a 69 y.o. male.    Patient's risk score is 4. Patient is high risk for poor outcomes with COVID due to the following chronic conditions advanced age, dementia, CHF, HTN, MDD, DM    Completed XRT 2 weeks ago. Needs to get follow up labs in 2 months. Still has lumbar back pain. Has been going on for 3yrs, describes it as a band like pain across his lumbar back with some focal tenderness. Worse in the mroning, but improves with movement. Has some lower limb weakness is is very careful with ambulating with walker. Followed by pain management. Receives epidural injections every 3 months, which provide temporary relief. Using diclofenac gel and meloxicam only. Does not provide much relief. Would like to explore other options for pain, feels as if his QOL is limited by pain. Has audio appointment with Dr. Adkins today.     Refill on meloxicam needed. He is taking it twice daily. He also takes gabapentin,    Insomnia - Uses trazodone occasionally for difficulty sleeping.     He completed his casodex and radiation treatments for prostate  "CA. Needs follow-up with Dr Sevilla 2 mos after 4/24/20.    Pt is coping well with isolation precautions.  Pt has has access to food and housing; is able to access transportation if needed.     He never showed up to his follow-up appointment for pill packing. It's unclear if he is compliant with any meds.    Adv Dir status: NO LaPOST, NO POA and NO Advance Directive ON FILE.  He agrees to do this today. Separate ACP note created.    PT STATES HE WOULD LIKE THE LETTER TO SAY "[facility] RENTAL PROGRAM REGARDING [id number]" ATTENTION : RANJIT RENETTA. Basically that he is disabled he stated       Objective:     Lab Results   Component Value Date    WBC 9.20 10/09/2019    HGB 13.1 (L) 10/09/2019    HCT 39.1 (L) 10/09/2019     10/09/2019    CHOL 152 01/09/2020    TRIG 102 01/09/2020    HDL 49 01/09/2020    ALT 11 12/16/2019    AST 16 12/16/2019     01/09/2020    K 4.2 01/09/2020     01/09/2020    CREATININE 0.9 01/09/2020    BUN 19 01/09/2020    CO2 25 01/09/2020    TSH 3.029 06/02/2017    PSA 1.1 10/26/2018    INR 1.0 06/21/2017    HGBA1C 5.1 10/26/2018         Assessment:   69 y.o. male with multiple co-morbid illnesses here to continue work-up of chronic issues.     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 Followed by Uro, undergoing radiation therapy  · Completed casodex and Rad Onc for radiation   · Needs follow-up in 6/24/20 with Dr Sevilla and a PSA         "    Orthopedic    Chronic right-sided low back pain with right-sided sciatica     Bilateral L4-5 and L5-S1 facet injections with Dr. Adkins  · F/U pain mgt  · Patient strongly advised to have realistic expectations of chronic opiates               Health Maintenance       Date Due Completion Date    TETANUS VACCINE 12/03/1967 ---    Shingles Vaccine (2 of 3) 11/14/2016 9/19/2016    Lipid Panel 01/09/2021 1/9/2020    Colonoscopy 04/15/2026 4/15/2016          Follow up in about 6 weeks (around 6/22/2020). Thirty minutes spent with this patient today, including addressing advanced care planning.    Jada Spaulding MD/MPH  Internal Medicine  [facility]  Spectra [phone]    Julian Jonas  [facility] MS4    This service was not originating from a related E/M service provided within the previous 7 days nor will  to an E/M service or procedure within the next 24 hours or my soonest available appointment.  Prevailing standard of care was able to be met in this audio-only visit.

## 2020-05-14 ENCOUNTER — TELEPHONE (OUTPATIENT)
Dept: INTERNAL MEDICINE | Facility: CLINIC | Age: 70
End: 2020-05-14

## 2020-05-14 NOTE — TELEPHONE ENCOUNTER
----- Message from Yuridia Bowen sent at 5/14/2020  2:38 PM CDT -----  Contact: [phone]  Patient called to follow up on the status of the letter he requested. Please call and advise.

## 2020-05-14 NOTE — TELEPHONE ENCOUNTER
Called pt regarding letter to be maliled out , he wants it emailed to     [email or web address]    Flora .

## 2020-05-16 RX ORDER — MELOXICAM 15 MG/1
15 TABLET ORAL DAILY
Qty: 90 TABLET | Refills: 0 | Status: SHIPPED | OUTPATIENT
Start: 2020-05-16 | End: 2020-06-17 | Stop reason: SDUPTHER

## 2020-05-16 RX ORDER — MELOXICAM 15 MG/1
TABLET ORAL
Qty: 30 TABLET | Refills: 0 | Status: SHIPPED | OUTPATIENT
Start: 2020-05-16 | End: 2020-05-16

## 2020-05-25 ENCOUNTER — LAB VISIT (OUTPATIENT)
Dept: INTERNAL MEDICINE | Facility: CLINIC | Age: 70
End: 2020-05-25
Payer: MEDICARE

## 2020-05-25 ENCOUNTER — TELEPHONE (OUTPATIENT)
Dept: RADIATION ONCOLOGY | Facility: CLINIC | Age: 70
End: 2020-05-25

## 2020-05-25 DIAGNOSIS — Z01.818 PREOP TESTING: Primary | ICD-10-CM

## 2020-05-25 DIAGNOSIS — Z01.818 PREOP TESTING: ICD-10-CM

## 2020-05-25 LAB — SARS-COV-2 RNA RESP QL NAA+PROBE: NOT DETECTED

## 2020-05-25 PROCEDURE — U0003 INFECTIOUS AGENT DETECTION BY NUCLEIC ACID (DNA OR RNA); SEVERE ACUTE RESPIRATORY SYNDROME CORONAVIRUS 2 (SARS-COV-2) (CORONAVIRUS DISEASE [COVID-19]), AMPLIFIED PROBE TECHNIQUE, MAKING USE OF HIGH THROUGHPUT TECHNOLOGIES AS DESCRIBED BY CMS-2020-01-R: HCPCS | Mod: HCNC

## 2020-05-25 NOTE — TELEPHONE ENCOUNTER
Phone review s/p xrt; pt states he is doing fine. No dysuria or hematuria. Afebrile. Denies pain; no nausea, vomiting or diarrhea. Client agrees to contact us if he should have any questions or concerns.

## 2020-05-27 ENCOUNTER — HOSPITAL ENCOUNTER (OUTPATIENT)
Facility: OTHER | Age: 70
Discharge: HOME | End: 2020-05-27
Attending: ANESTHESIOLOGY | Admitting: ANESTHESIOLOGY
Payer: MEDICARE

## 2020-05-27 VITALS
SYSTOLIC BLOOD PRESSURE: 126 MMHG | BODY MASS INDEX: 29.03 KG/M2 | DIASTOLIC BLOOD PRESSURE: 69 MMHG | TEMPERATURE: 98 F | WEIGHT: 185 LBS | HEIGHT: 67 IN | RESPIRATION RATE: 16 BRPM | HEART RATE: 56 BPM | OXYGEN SATURATION: 95 %

## 2020-05-27 DIAGNOSIS — M47.816 SPONDYLOSIS OF LUMBAR REGION WITHOUT MYELOPATHY OR RADICULOPATHY: Primary | ICD-10-CM

## 2020-05-27 DIAGNOSIS — G89.29 CHRONIC PAIN: ICD-10-CM

## 2020-05-27 PROCEDURE — 64636 DESTROY L/S FACET JNT ADDL: CPT

## 2020-05-27 PROCEDURE — 64635 DESTROY LUMB/SAC FACET JNT: CPT

## 2020-05-27 PROCEDURE — 63600175 PHARM REV CODE 636 W HCPCS: Mod: HCNC | Performed by: ANESTHESIOLOGY

## 2020-05-27 PROCEDURE — 64635 PR DESTROY LUMB/SAC FACET JNT: ICD-10-PCS | Mod: HCNC,LT,, | Performed by: ANESTHESIOLOGY

## 2020-05-27 PROCEDURE — 25000003 PHARM REV CODE 250: Mod: HCNC | Performed by: PHYSICAL MEDICINE & REHABILITATION

## 2020-05-27 PROCEDURE — 64635 DESTROY LUMB/SAC FACET JNT: CPT | Mod: HCNC,LT,, | Performed by: ANESTHESIOLOGY

## 2020-05-27 PROCEDURE — 64636 DESTROY L/S FACET JNT ADDL: CPT | Mod: HCNC,LT,, | Performed by: ANESTHESIOLOGY

## 2020-05-27 PROCEDURE — 25000003 PHARM REV CODE 250: Mod: HCNC | Performed by: ANESTHESIOLOGY

## 2020-05-27 PROCEDURE — 64636 PR DESTROY L/S FACET JNT ADDL: ICD-10-PCS | Mod: HCNC,LT,, | Performed by: ANESTHESIOLOGY

## 2020-05-27 RX ORDER — LIDOCAINE HYDROCHLORIDE 10 MG/ML
INJECTION INFILTRATION; PERINEURAL
Status: DISCONTINUED | OUTPATIENT
Start: 2020-05-27 | End: 2020-05-27 | Stop reason: HOSPADM

## 2020-05-27 RX ORDER — DEXAMETHASONE SODIUM PHOSPHATE 4 MG/ML
INJECTION, SOLUTION INTRA-ARTICULAR; INTRALESIONAL; INTRAMUSCULAR; INTRAVENOUS; SOFT TISSUE
Status: DISCONTINUED | OUTPATIENT
Start: 2020-05-27 | End: 2020-05-27 | Stop reason: HOSPADM

## 2020-05-27 RX ORDER — SODIUM CHLORIDE 9 MG/ML
500 INJECTION, SOLUTION INTRAVENOUS CONTINUOUS
Status: DISCONTINUED | OUTPATIENT
Start: 2020-05-27 | End: 2020-05-27 | Stop reason: HOSPADM

## 2020-05-27 RX ORDER — BUPIVACAINE HYDROCHLORIDE 2.5 MG/ML
INJECTION, SOLUTION EPIDURAL; INFILTRATION; INTRACAUDAL
Status: DISCONTINUED | OUTPATIENT
Start: 2020-05-27 | End: 2020-05-27 | Stop reason: HOSPADM

## 2020-05-27 RX ORDER — FENTANYL CITRATE 50 UG/ML
INJECTION, SOLUTION INTRAMUSCULAR; INTRAVENOUS
Status: DISCONTINUED | OUTPATIENT
Start: 2020-05-27 | End: 2020-05-27 | Stop reason: HOSPADM

## 2020-05-27 RX ORDER — MIDAZOLAM HYDROCHLORIDE 1 MG/ML
INJECTION INTRAMUSCULAR; INTRAVENOUS
Status: DISCONTINUED | OUTPATIENT
Start: 2020-05-27 | End: 2020-05-27 | Stop reason: HOSPADM

## 2020-05-27 RX ADMIN — SODIUM CHLORIDE 500 ML: 0.9 INJECTION, SOLUTION INTRAVENOUS at 08:05

## 2020-05-27 NOTE — OP NOTE
Patient Name: Anthony Miguel  MRN: [id number]    INFORMED CONSENT: The procedure, risks, benefits and options were discussed with patient. There are no contraindications to the procedure. The patient expressed understanding and agreed to proceed. The personnel performing the procedure was discussed. I verify that I personally obtained Anthony's consent prior to the start of the procedure and the signed consent can be found on the patient's chart.    Procedure Date: 05/27/2020    Anesthesia: Topical    Pre Procedure diagnosis: Osteoarthritis of spine, unspecified spinal osteoarthritis complication status, unspecified spinal region [M47.9]  1. Spondylosis of lumbar region without myelopathy or radiculopathy    2. Chronic pain      Post-Procedure diagnosis: SAME      Moderate Sedation: Yes - Fentanyl 50 mcg and Midazolam 1 mg    PROCEDURE: left L3,4,5 FACET MEDIAL BRANCH NERVE RADIOFREQUENCY NEUROTOMY (lumbar)          DESCRIPTION OF PROCEDURE: The patient was brought to the procedure room.  After performing time out IV access was obtained prior to the procedure. The patient was positioned prone on the fluoroscopy table. Continuous hemodynamic monitoring was initiated including blood pressure and pulse oximetry. IV sedation was administered incrementally to allow the patient to remain comfortable and conversant throughout the procedure. The area of the lumbar spine was prepped chlorhexidine three times and draped into a sterile field.  Fluoroscopy was used to identify the location of the LEFT side  L3, L4, and L5 medial branch nerves at the junctions of the superior articular process and the transverse processes of  L4, L5, and the sacral ala respectively.  Skin anesthesia was achieved using 3 cc of Lidocaine 1% over the injection sites. A 18 gauge, 100mm (10mm active tip) curved RF needle was slowly inserted at each level using AP, lateral and oblique fluoroscopic imaging. Negative aspiration for blood or CSF was  confirmed.  Sensory stimulation at 50Hz below 0.5V was achieved at every level. Motor stimulation at 2Hz up to 1.5V did not cause any radicular symptoms at any level. Each level was anesthetized with 1.5 cc of lidocaine 1%.  Radiofrequency lesioning was performed for 90 seconds at 80 degrees in two different positions at each level.  Total of 3 cc of bupivacaine 0.25% and 10 mg of Decadron was injected was injected at all levels.. The needles were removed and bleeding was nil.  A sterile dressing was applied. Anthony was taken back to the recovery room for further observation.     Stimulation Results:    L3 = Sensory positive @ 0.5, Motor negative @ 1.5  L4 = Sensory positive @ 0.4, Motor negative @ 1.5  L5 = Sensory positive @ 0.7, Motor negative @ 1.5    Blood Loss: Nill  Specimen: None    Marcel Adkins MD

## 2020-05-27 NOTE — DISCHARGE INSTRUCTIONS

## 2020-05-27 NOTE — DISCHARGE SUMMARY
Discharge Note  Short Stay      SUMMARY     Admit Date: 5/27/2020    Attending Physician: Marcel Adkins      Discharge Physician: Marcel Adkins      Discharge Date: 5/27/2020 9:16 AM    Procedure(s) (LRB):  RADIOFREQUENCY ABLATION LEFT L3,4,5 1 of 2 (Left)    Final Diagnosis: Osteoarthritis of spine, unspecified spinal osteoarthritis complication status, unspecified spinal region [M47.9]    Disposition: Home or self care    Patient Instructions:   Current Discharge Medication List      CONTINUE these medications which have NOT CHANGED    Details   atorvastatin (LIPITOR) 10 MG tablet Take 1 tablet (10 mg total) by mouth once daily.  Qty: 90 tablet, Refills: 3    Associated Diagnoses: Hyperlipidemia, unspecified hyperlipidemia type      cholecalciferol, vitamin D3, (VITAMIN D3) 50 mcg (2,000 unit) Cap Take 1 capsule (2,000 Units total) by mouth once daily.  Qty: 90 capsule, Refills: 3    Associated Diagnoses: Vitamin D deficiency      diclofenac sodium (VOLTAREN) 1 % Gel Apply 2 g topically 4 (four) times daily.  Qty: 1 Tube, Refills: 2      ferrous sulfate 325 (65 FE) MG EC tablet Take 1 tablet (325 mg total) by mouth once daily.  Qty: 90 tablet, Refills: 3    Associated Diagnoses: Iron deficiency anemia due to chronic blood loss      !! gabapentin (NEURONTIN) 300 MG capsule Take 1 capsule (300 mg total) by mouth 3 (three) times daily.  Qty: 270 capsule, Refills: 0      !! gabapentin (NEURONTIN) 300 MG capsule Take 2 capsules (600 mg total) by mouth 3 (three) times daily.  Qty: 540 capsule, Refills: 0      meloxicam (MOBIC) 15 MG tablet Take 1 tablet (15 mg total) by mouth once daily.  Qty: 90 tablet, Refills: 0    Comments: **Patient requests 90 days supply**      polyethylene glycol (GLYCOLAX) 17 gram PwPk Take 17 g by mouth once daily.  Qty: 30 packet, Refills: 0      traZODone (DESYREL) 50 MG tablet Take 2 tablets (100 mg total) by mouth nightly as needed for Insomnia.  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      walker (ULTRA-LIGHT ROLLATOR) Misc 1 Units by Misc.(Non-Drug; Combo Route) route once daily at 6am.  Qty: 1 each, Refills: 0    Associated Diagnoses: Weakness       !! - Potential duplicate medications found. Please discuss with provider.              Discharge Diagnosis: Osteoarthritis of spine, unspecified spinal osteoarthritis complication status, unspecified spinal region [M47.9]  Condition on Discharge: Stable with no complications to procedure   Diet on Discharge: Same as before.  Activity: as per instruction sheet.  Discharge to: Home with a responsible adult.  Follow up: 2-4 weeks       Please call my office or pager at [phone] if experienced any weakness or loss of sensation, fever > 101.5, pain uncontrolled with oral medications, persistent nausea/vomiting/or diarrhea, redness or drainage from the incisions, or any other worrisome concerns. If physician on call was not reached or could not communicate with our office for any reason please go to the nearest emergency department

## 2020-06-01 ENCOUNTER — TELEPHONE (OUTPATIENT)
Dept: SPORTS MEDICINE | Facility: CLINIC | Age: 70
End: 2020-06-01

## 2020-06-01 NOTE — TELEPHONE ENCOUNTER
Called pt and rescheduled him for a date that works for him.   Pt would like to be seen for his left shoulder pain in addition to the f/u for the monovisc inj.     Pt requesting another monovisc injection. Told him that these are only covered every 6mos and would have to wait to August to get another injection.    Pt expressed VU        ----- Message from Erich Zaidi PA-C sent at 6/1/2020 11:36 AM CDT -----  Contact: PT Anthony Stout MRN [id number] @#       ----- Message -----  From: Sharon Valles  Sent: 6/1/2020   8:40 AM CDT  To: Erich Zaidi PA-C    Patient needed to reschedule appt with Blanco Mark and Ayush Zaidi from [facility]    APPT - # [id number] cancelled  w/Blanco Mark @ [facility] 6/3 changed to APPT#[id number] 6/17 @1PM    APPT# [id number] w/Erich Zaidi for 6/3 - unable to reschedule this appt no dates came up at all.    Patient would like to have both appts at same day/close time.  Patient can be reached PT Anthony Stout MRN [id number] @#

## 2020-06-04 DIAGNOSIS — Z01.818 PRE-OP TESTING: Primary | ICD-10-CM

## 2020-06-08 ENCOUNTER — LAB VISIT (OUTPATIENT)
Dept: SURGERY | Facility: CLINIC | Age: 70
End: 2020-06-08
Payer: MEDICARE

## 2020-06-08 DIAGNOSIS — Z01.818 PRE-OP TESTING: ICD-10-CM

## 2020-06-08 LAB — SARS-COV-2 RNA RESP QL NAA+PROBE: NOT DETECTED

## 2020-06-08 PROCEDURE — U0003 INFECTIOUS AGENT DETECTION BY NUCLEIC ACID (DNA OR RNA); SEVERE ACUTE RESPIRATORY SYNDROME CORONAVIRUS 2 (SARS-COV-2) (CORONAVIRUS DISEASE [COVID-19]), AMPLIFIED PROBE TECHNIQUE, MAKING USE OF HIGH THROUGHPUT TECHNOLOGIES AS DESCRIBED BY CMS-2020-01-R: HCPCS | Mod: HCNC

## 2020-06-09 ENCOUNTER — NURSE TRIAGE (OUTPATIENT)
Dept: ADMINISTRATIVE | Facility: CLINIC | Age: 70
End: 2020-06-09

## 2020-06-09 NOTE — TELEPHONE ENCOUNTER
No attempt to contact patient made on behalf of Post Procedural Symptom Tracker due to patient doesn't meet protocol. Had COVID-19 test done yesterday and negative and scheduled for procedure tomorrow.     Reason for Disposition   Caller has already spoken with the PCP (or office), and has no further questions    Protocols used: NO CONTACT OR DUPLICATE CONTACT CALL-A-OH

## 2020-06-10 ENCOUNTER — HOSPITAL ENCOUNTER (OUTPATIENT)
Facility: OTHER | Age: 70
Discharge: HOME | End: 2020-06-10
Attending: ANESTHESIOLOGY | Admitting: ANESTHESIOLOGY
Payer: MEDICARE

## 2020-06-10 VITALS
RESPIRATION RATE: 18 BRPM | TEMPERATURE: 98 F | DIASTOLIC BLOOD PRESSURE: 90 MMHG | SYSTOLIC BLOOD PRESSURE: 153 MMHG | HEART RATE: 62 BPM | HEIGHT: 67 IN | WEIGHT: 185 LBS | BODY MASS INDEX: 29.03 KG/M2 | OXYGEN SATURATION: 100 %

## 2020-06-10 DIAGNOSIS — M43.06 LUMBAR SPONDYLOLYSIS: ICD-10-CM

## 2020-06-10 DIAGNOSIS — G89.4 CHRONIC PAIN SYNDROME: ICD-10-CM

## 2020-06-10 DIAGNOSIS — G89.29 CHRONIC PAIN: ICD-10-CM

## 2020-06-10 DIAGNOSIS — M47.816 OSTEOARTHRITIS OF LUMBAR SPINE, UNSPECIFIED SPINAL OSTEOARTHRITIS COMPLICATION STATUS: Primary | ICD-10-CM

## 2020-06-10 PROCEDURE — 64636 DESTROY L/S FACET JNT ADDL: CPT

## 2020-06-10 PROCEDURE — 25000003 PHARM REV CODE 250: Mod: HCNC | Performed by: STUDENT IN AN ORGANIZED HEALTH CARE EDUCATION/TRAINING PROGRAM

## 2020-06-10 PROCEDURE — 64635 DESTROY LUMB/SAC FACET JNT: CPT | Mod: HCNC,RT,, | Performed by: ANESTHESIOLOGY

## 2020-06-10 PROCEDURE — 64636 PR DESTROY L/S FACET JNT ADDL: ICD-10-PCS | Mod: HCNC,RT,, | Performed by: ANESTHESIOLOGY

## 2020-06-10 PROCEDURE — 63600175 PHARM REV CODE 636 W HCPCS: Mod: HCNC | Performed by: ANESTHESIOLOGY

## 2020-06-10 PROCEDURE — 64635 DESTROY LUMB/SAC FACET JNT: CPT

## 2020-06-10 PROCEDURE — 25000003 PHARM REV CODE 250: Mod: HCNC | Performed by: ANESTHESIOLOGY

## 2020-06-10 PROCEDURE — 64636 DESTROY L/S FACET JNT ADDL: CPT | Mod: HCNC,RT,, | Performed by: ANESTHESIOLOGY

## 2020-06-10 PROCEDURE — 64635 PR DESTROY LUMB/SAC FACET JNT: ICD-10-PCS | Mod: HCNC,RT,, | Performed by: ANESTHESIOLOGY

## 2020-06-10 RX ORDER — BUPIVACAINE HYDROCHLORIDE 2.5 MG/ML
INJECTION, SOLUTION EPIDURAL; INFILTRATION; INTRACAUDAL
Status: DISCONTINUED | OUTPATIENT
Start: 2020-06-10 | End: 2020-06-10 | Stop reason: HOSPADM

## 2020-06-10 RX ORDER — MIDAZOLAM HYDROCHLORIDE 1 MG/ML
INJECTION INTRAMUSCULAR; INTRAVENOUS
Status: DISCONTINUED | OUTPATIENT
Start: 2020-06-10 | End: 2020-06-10 | Stop reason: HOSPADM

## 2020-06-10 RX ORDER — SODIUM CHLORIDE 9 MG/ML
500 INJECTION, SOLUTION INTRAVENOUS CONTINUOUS
Status: DISCONTINUED | OUTPATIENT
Start: 2020-06-10 | End: 2020-06-10 | Stop reason: HOSPADM

## 2020-06-10 RX ORDER — DEXAMETHASONE SODIUM PHOSPHATE 4 MG/ML
INJECTION, SOLUTION INTRA-ARTICULAR; INTRALESIONAL; INTRAMUSCULAR; INTRAVENOUS; SOFT TISSUE
Status: DISCONTINUED | OUTPATIENT
Start: 2020-06-10 | End: 2020-06-10 | Stop reason: HOSPADM

## 2020-06-10 RX ORDER — LIDOCAINE HYDROCHLORIDE 10 MG/ML
INJECTION INFILTRATION; PERINEURAL
Status: DISCONTINUED | OUTPATIENT
Start: 2020-06-10 | End: 2020-06-10 | Stop reason: HOSPADM

## 2020-06-10 RX ORDER — FENTANYL CITRATE 50 UG/ML
INJECTION, SOLUTION INTRAMUSCULAR; INTRAVENOUS
Status: DISCONTINUED | OUTPATIENT
Start: 2020-06-10 | End: 2020-06-10 | Stop reason: HOSPADM

## 2020-06-10 RX ADMIN — SODIUM CHLORIDE 500 ML: 0.9 INJECTION, SOLUTION INTRAVENOUS at 08:06

## 2020-06-10 NOTE — DISCHARGE INSTRUCTIONS

## 2020-06-10 NOTE — OP NOTE
Patient Name: Anthony Miguel  MRN: [id number]    INFORMED CONSENT: The procedure, risks, benefits and options were discussed with patient. There are no contraindications to the procedure. The patient expressed understanding and agreed to proceed. The personnel performing the procedure was discussed. I verify that I personally obtained Anthony's consent prior to the start of the procedure and the signed consent can be found on the patient's chart.    Procedure Date: 06/10/2020    Anesthesia: Topical    Pre Procedure diagnosis: Osteoarthritis of spine, unspecified spinal osteoarthritis complication status, unspecified spinal region [M47.9]  1. Osteoarthritis of lumbar spine, unspecified spinal osteoarthritis complication status    2. Lumbar spondylolysis    3. Chronic pain syndrome    4. Chronic pain      Post-Procedure diagnosis: SAME      Moderate Sedation: Yes - Fentanyl 100 mcg and Midazolam 2 mg    PROCEDURE: right L2,3,4,5 FACET MEDIAL BRANCH NERVE RADIOFREQUENCY NEUROTOMY (lumbar)      DESCRIPTION OF PROCEDURE: The patient was brought to the procedure room.  After performing time out IV access was obtained prior to the procedure. The patient was positioned prone on the fluoroscopy table. Continuous hemodynamic monitoring was initiated including blood pressure and pulse oximetry. IV sedation was administered incrementally to allow the patient to remain comfortable and conversant throughout the procedure. The area of the lumbar spine was prepped chlorhexidine three times and draped into a sterile field.  Fluoroscopy was used to identify the location of the RT side L2, L3, L4, and L5 medial branch nerves at the junctions of the superior articular process and the transverse processes of L3, L4, L5, and the sacral ala respectively.  Skin anesthesia was achieved using 3 cc of Lidocaine 1% over the injection sites. A 20 gauge, 100mm (10mm active tip) curved RF needle was slowly inserted at each level using AP,  lateral and oblique fluoroscopic imaging. Negative aspiration for blood or CSF was confirmed.  Sensory stimulation at 50Hz below 0.5V was achieved at every level. Motor stimulation at 2Hz up to 1.5V did not cause any radicular symptoms at any level. Each level was anesthetized with 1.5 cc of lidocaine 1%.  Radiofrequency lesioning was performed for 90 seconds at 80 degrees in two different positions at each level.  Total of 3 cc of bupivacaine 0.25% and 10 mg of Decadron was injected was injected at all levels.. The needles were removed and bleeding was nil.  A sterile dressing was applied. Anthony was taken back to the recovery room for further observation.     Stimulation Results:  L2 = Sensory positive @ 0.4, Motor negative @ 1.5  L3 = Sensory positive @ 0.5, Motor negative @ 1.5  L4 = Sensory positive @ 0.4, Motor negative @ 1.5  L5 = Sensory positive @ 0.4, Motor negative @ 1.5    Blood Loss: Nill  Specimen: None    Marcel Adkins MD

## 2020-06-10 NOTE — DISCHARGE SUMMARY
Discharge Note  Short Stay      SUMMARY     Admit Date: 6/10/2020    Attending Physician: Marcel Adkins      Discharge Physician: Marcel Adkins      Discharge Date: 6/10/2020 9:56 AM    Procedure(s) (LRB):  RADIOFREQUENCY ABLATION RIGHT L3,4,5 (Right)    Final Diagnosis: Osteoarthritis of spine, unspecified spinal osteoarthritis complication status, unspecified spinal region [M47.9]    Disposition: Home or self care    Patient Instructions:   Discharge Medication List as of 6/10/2020  9:24 AM      CONTINUE these medications which have NOT CHANGED    Details   atorvastatin (LIPITOR) 10 MG tablet Take 1 tablet (10 mg total) by mouth once daily., Starting Tue 2/11/2020, Normal      cholecalciferol, vitamin D3, (VITAMIN D3) 50 mcg (2,000 unit) Cap Take 1 capsule (2,000 Units total) by mouth once daily., Starting Tue 2/11/2020, Normal      diclofenac sodium (VOLTAREN) 1 % Gel Apply 2 g topically 4 (four) times daily., Starting Thu 3/26/2020, Until Wed 6/24/2020, Normal      ferrous sulfate 325 (65 FE) MG EC tablet Take 1 tablet (325 mg total) by mouth once daily., Starting Tue 2/11/2020, Normal      !! gabapentin (NEURONTIN) 300 MG capsule Take 1 capsule (300 mg total) by mouth 3 (three) times daily., Starting Fri 3/29/2019, Normal      !! gabapentin (NEURONTIN) 300 MG capsule Take 2 capsules (600 mg total) by mouth 3 (three) times daily., Starting Tue 10/1/2019, Normal      meloxicam (MOBIC) 15 MG tablet Take 1 tablet (15 mg total) by mouth once daily., Starting Sat 5/16/2020, Until Fri 8/14/2020, Normal      polyethylene glycol (GLYCOLAX) 17 gram PwPk Take 17 g by mouth once daily., Starting Wed 6/5/2019, Print      traZODone (DESYREL) 50 MG tablet Take 2 tablets (100 mg total) by mouth nightly as needed for Insomnia., Starting Mon 5/11/2020, Until Tue 5/11/2021, Normal      triamcinolone acetonide 0.1% (KENALOG) 0.1 % cream Apply 15 g topically., Starting Wed 4/15/2015, Historical Med      walker (ULTRA-LIGHT  ROLLATOR) Misc 1 Units by Misc.(Non-Drug; Combo Route) route once daily at 6am., Starting Wed 6/14/2017, Print       !! - Potential duplicate medications found. Please discuss with provider.              Discharge Diagnosis: Osteoarthritis of spine, unspecified spinal osteoarthritis complication status, unspecified spinal region [M47.9]  Condition on Discharge: Stable with no complications to procedure   Diet on Discharge: Same as before.  Activity: as per instruction sheet.  Discharge to: Home with a responsible adult.  Follow up: 2-4 weeks       Please call my office or pager at [phone] if experienced any weakness or loss of sensation, fever > 101.5, pain uncontrolled with oral medications, persistent nausea/vomiting/or diarrhea, redness or drainage from the incisions, or any other worrisome concerns. If physician on call was not reached or could not communicate with our office for any reason please go to the nearest emergency department

## 2020-06-16 ENCOUNTER — TELEPHONE (OUTPATIENT)
Dept: PAIN MEDICINE | Facility: CLINIC | Age: 70
End: 2020-06-16

## 2020-06-16 NOTE — TELEPHONE ENCOUNTER
Staff spoke with patient to confirm appointment scheduled 6/17/20 at 1 pm with Blanco Mark as in office visit and patient was informed of direct line to call upon arriving and also visitors policy patient verbalized understanding.

## 2020-06-17 ENCOUNTER — OFFICE VISIT (OUTPATIENT)
Dept: PAIN MEDICINE | Facility: CLINIC | Age: 70
End: 2020-06-17
Payer: MEDICARE

## 2020-06-17 VITALS
SYSTOLIC BLOOD PRESSURE: 124 MMHG | RESPIRATION RATE: 18 BRPM | WEIGHT: 180.56 LBS | HEIGHT: 67 IN | BODY MASS INDEX: 28.34 KG/M2 | HEART RATE: 68 BPM | OXYGEN SATURATION: 100 % | DIASTOLIC BLOOD PRESSURE: 80 MMHG

## 2020-06-17 DIAGNOSIS — M54.50 CHRONIC BILATERAL LOW BACK PAIN WITHOUT SCIATICA: ICD-10-CM

## 2020-06-17 DIAGNOSIS — M47.816 LUMBAR SPONDYLOSIS: ICD-10-CM

## 2020-06-17 DIAGNOSIS — G89.29 CHRONIC BILATERAL LOW BACK PAIN WITHOUT SCIATICA: ICD-10-CM

## 2020-06-17 DIAGNOSIS — M43.06 LUMBAR SPONDYLOLYSIS: ICD-10-CM

## 2020-06-17 DIAGNOSIS — M51.36 DDD (DEGENERATIVE DISC DISEASE), LUMBAR: ICD-10-CM

## 2020-06-17 DIAGNOSIS — M47.816 OSTEOARTHRITIS OF LUMBAR SPINE, UNSPECIFIED SPINAL OSTEOARTHRITIS COMPLICATION STATUS: Primary | ICD-10-CM

## 2020-06-17 DIAGNOSIS — M47.816 SPONDYLOSIS OF LUMBAR REGION WITHOUT MYELOPATHY OR RADICULOPATHY: ICD-10-CM

## 2020-06-17 PROCEDURE — 99999 PR PBB SHADOW E&M-EST. PATIENT-LVL IV: ICD-10-PCS | Mod: PBBFAC,HCNC,, | Performed by: NURSE PRACTITIONER

## 2020-06-17 PROCEDURE — 99024 PR POST-OP FOLLOW-UP VISIT: ICD-10-PCS | Mod: HCNC,S$GLB,, | Performed by: NURSE PRACTITIONER

## 2020-06-17 PROCEDURE — 99024 POSTOP FOLLOW-UP VISIT: CPT | Mod: HCNC,S$GLB,, | Performed by: NURSE PRACTITIONER

## 2020-06-17 PROCEDURE — 99999 PR PBB SHADOW E&M-EST. PATIENT-LVL IV: CPT | Mod: PBBFAC,HCNC,, | Performed by: NURSE PRACTITIONER

## 2020-06-17 RX ORDER — DICLOFENAC SODIUM 10 MG/G
2 GEL TOPICAL 4 TIMES DAILY
Qty: 1 TUBE | Refills: 2 | Status: SHIPPED | OUTPATIENT
Start: 2020-06-17 | End: 2020-10-09 | Stop reason: SDUPTHER

## 2020-06-17 RX ORDER — MELOXICAM 15 MG/1
15 TABLET ORAL DAILY
Qty: 90 TABLET | Refills: 0 | Status: SHIPPED | OUTPATIENT
Start: 2020-06-17 | End: 2020-07-07 | Stop reason: SDUPTHER

## 2020-06-17 RX ORDER — GABAPENTIN 300 MG/1
600 CAPSULE ORAL 3 TIMES DAILY
Qty: 540 CAPSULE | Refills: 0 | Status: SHIPPED | OUTPATIENT
Start: 2020-06-17 | End: 2020-10-09 | Stop reason: SDUPTHER

## 2020-06-17 NOTE — PROGRESS NOTES
Chronic Pain-Medicine-Established Note (Follow up visit)      SUBJECTIVE:    Interval History 6/17/2020:   The patient presents for follow-up of lower back pain.  Patient states he has had significant improvement of the left-sided lumbar pain status post L 3, 4, 5 RFA on 5/27/2020. He is also s/p Right side L3,4,5 RFA on 6/10/2020. He states lower back pain to right still continued and endorses he continues to do housework and believes increased activity has not allowed time for pain to ease.  He denies any radiculopathy, denies any neurological complaints, denies loss of b/b or saddle paresthesias. He continues to take Mobic, voltaren gel, neurontin and Trazodone with benefit and without adverse side effects of medication.       Interval history 05/11/2020 (Telephone encounter)   Last encounter,we planned on scheduling for bilateral lumbar RFA in future as patient has had excellent relief from BL lumbar FJIs in the past. At last encounter, we also switched patient form diclofenac to meloxicam 15 mg daily and added voltaren gel for local anti-inflammatory benefit. Today he reports he completed his 38 day radiation trial for his prostate cancer (04/18/2020). He reports the previous facet joint injections helped tremendously (>70% pain relief for 3 months at a time) and is inquiring about RFA for his low back. Back  Pain is unchanged, located in lower back, worsened with extension. NSAIDs are not quite helpful. Gabapentin is mildly helpful.      Interval history (Telephone encounter) 03/26/20  Pt is a 70 yo male with Hx of cervical stenosis w/ myelopathy s/p C3-6 decompression and fusion, as well as prostate CA on radiatio therapy who we are following for chronic low back pain. We last performed lumbar facet joint injections in August and October of 2019. He reports excellent (75%) pain relief for about 3 months but his pain has since returned. He reports that the pain feels identical, but about 1-2 cm lower. Pain  is worse with standing/ walking. He denies any pain shooting down his legs or numbness/ tingling/ weakness in his legs. No changes with bladder/bowel control. He is currently taking diclofenac 75mg BID and gabapentin 600mg TID with decent relief. He is curious if there is something slightly stronger than diclofenac that may help. He cannot recall any other NSAIDs or muscle relaxers that he's tried.    Pain Medications:    - NSAIDs: Meloxicam, diclofenac, voltaren gel  - Anti-Depressants: trazadone  - Anti convulsants: Gabapentin 600 mg TID        Physical Therapy/Home Exercise: no       report:  Not applicable    Pain Procedures:   2/6/19- L5/S1 KAYLEY  4/01/2019- L5/S1 KAYLEY  07/29/2019- Bilateral L4/5 and L5/S1 FJI  10/02/2019- Bilateral L4/5 and L5/S1 FJI  5/27/2020 Left L3,4,5 RFA  6/10/2020 Right L3,4,5 RFA       Imaging: reviewed in EPIC    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    Overview:  Right side dx update     Past Surgical History:   Procedure Laterality Date    BACK SURGERY      EPIDURAL STEROID INJECTION N/A 2/6/2019    Procedure: INJECTION, STEROID, EPIDURAL, L45-S1 IL;  Surgeon: Marcel Adkins MD;  Location: [facility];  Service: Pain Management;  Laterality: N/A;    EPIDURAL STEROID INJECTION N/A 4/10/2019    Procedure: Injection, Steroid, Epidural LUMBAR/CAUDAL L5-S1 INTERLAMINAR KAYLEY;  Surgeon: Marcel Adkins MD;  Location: [facility];  Service: Pain Management;  Laterality: N/A;  NEEDS CONSENT    INJECTION OF FACET JOINT Bilateral 8/14/2019    Procedure: INJECTION, FACET JOINT INJECTION (LUMBAR BLOCK) BILATERAL L4-5 AND L5-S1;  Surgeon: Marcel Adkins MD;   Location: [facility];  Service: Pain Management;  Laterality: Bilateral;  NEEDS CONSENT    INJECTION OF FACET JOINT Bilateral 10/16/2019    Procedure: FACET JOINT INJECTION (LUMBAR BLOCK) BILATERAL L4-5 AND L5-S1;  Surgeon: Marcel Adkins MD;  Location: [facility];  Service: Pain Management;  Laterality: Bilateral;  NEEDS CONSENT    Larangoscopy      RADIOFREQUENCY ABLATION Left 2020    Procedure: RADIOFREQUENCY ABLATION LEFT L3,4,5 1 of 2;  Surgeon: Marcel Adkins MD;  Location: [facility];  Service: Pain Management;  Laterality: Left;  Left RFA L3, 4, 5  1 of 2    RADIOFREQUENCY ABLATION Right 6/10/2020    Procedure: RADIOFREQUENCY ABLATION RIGHT L3,4,5;  Surgeon: Marcel Adkins MD;  Location: [facility];  Service: Pain Management;  Laterality: Right;  Right RFA L3,4.5  2 of 2    ROBOT-ASSISTED LAPAROSCOPIC PROSTATECTOMY USING DA SAÚL XI N/A 2019    Procedure: XI ROBOTIC PROSTATECTOMY;  Surgeon: Sergio Dixon MD;  Location: [address];  Service: Urology;  Laterality: N/A;  4hrs/ gen with regional      Social History     Socioeconomic History    Marital status: Single     Spouse name: Not on file    Number of children: 0    Years of education: Not on file    Highest education level: Not on file   Occupational History    Not on file   Social Needs    Financial resource strain: Not hard at all    Food insecurity     Worry: Never true     Inability: Never true    Transportation needs     Medical: Yes     Non-medical: Yes   Tobacco Use    Smoking status: Former Smoker     Packs/day: 1.00     Quit date: 2000     Years since quittin.3    Smokeless tobacco: Former User   Substance and Sexual Activity    Alcohol use: Yes     Alcohol/week: 1.0 standard drinks     Types: 1 Glasses of wine per week     Comment: once in a while    Drug use: No    Sexual activity: Not Currently   Lifestyle    Physical activity     Days per week: Not on file     Minutes per  session: Not on file    Stress: Only a little   Relationships    Social connections     Talks on phone: Not on file     Gets together: Not on file     Attends Zoroastrianism service: Not on file     Active member of club or organization: Not on file     Attends meetings of clubs or organizations: Not on file     Relationship status: Not on file   Other Topics Concern    Not on file   Social History Narrative    No difficulty reading Rx labels      Family History   Problem Relation Age of Onset    Cancer Mother     No Known Problems Father     Keratoconus Brother     Cancer Brother        Review of patient's allergies indicates:  No Known Allergies    Current Outpatient Medications   Medication Sig    atorvastatin (LIPITOR) 10 MG tablet Take 1 tablet (10 mg total) by mouth once daily.    cholecalciferol, vitamin D3, (VITAMIN D3) 50 mcg (2,000 unit) Cap Take 1 capsule (2,000 Units total) by mouth once daily.    diclofenac sodium (VOLTAREN) 1 % Gel Apply 2 g topically 4 (four) times daily.    ferrous sulfate 325 (65 FE) MG EC tablet Take 1 tablet (325 mg total) by mouth once daily.    gabapentin (NEURONTIN) 300 MG capsule Take 1 capsule (300 mg total) by mouth 3 (three) times daily.    gabapentin (NEURONTIN) 300 MG capsule Take 2 capsules (600 mg total) by mouth 3 (three) times daily.    meloxicam (MOBIC) 15 MG tablet Take 1 tablet (15 mg total) by mouth once daily.    polyethylene glycol (GLYCOLAX) 17 gram PwPk Take 17 g by mouth once daily.    traZODone (DESYREL) 50 MG tablet Take 2 tablets (100 mg total) by mouth nightly as needed for Insomnia.    triamcinolone acetonide 0.1% (KENALOG) 0.1 % cream Apply 15 g topically.    walker (ULTRA-LIGHT ROLLATOR) Misc 1 Units by Misc.(Non-Drug; Combo Route) route once daily at 6am.     No current facility-administered medications for this visit.        REVIEW OF SYSTEMS:    GENERAL:  No weight loss, malaise or fevers.  HEENT:   No recent changes in vision or  hearing  NECK:  Negative for lumps, no difficulty with swallowing.  RESPIRATORY:  Negative for cough, wheezing or shortness of breath, patient denies any recent URI.  CARDIOVASCULAR:  Negative for chest pain, leg swelling or palpitations.  GI:  Negative for abdominal discomfort, blood in stools or black stools or change in bowel habits.  MUSCULOSKELETAL:  See HPI.  SKIN:  Negative for lesions, rash, and itching.  PSYCH:  No mood disorder or recent psychosocial stressors.  Patients sleep is disturbed secondary to pain.  HEMATOLOGY/LYMPHOLOGY:  + prostate cancer s/p radiation therapy  NEURO:   No history of headaches, syncope, paralysis, seizures or tremors.  All other reviewed and negative other than HPI.    OBJECTIVE:        ASSESSMENT: 69 y.o. year old male with chronic low back pain, consistent with     GEN:  Well developed, well nourished.  No acute distress.   HEENT:  No trauma.  Mucous membranes moist.  Nares patent bilaterally.  PSYCH: Normal affect. Thought content appropriate.  CHEST:  Breathing symmetric.  No audible wheezing.  ABD: Soft, non-distended.  SKIN:  Warm, pink, dry.  No rash on exposed areas.    EXT:  No cyanosis, clubbing, or edema.  No color change or changes in nail or hair growth.  NEURO/MUSCULOSKELETAL:  Fully alert, oriented, and appropriate. Speech normal sean. No cranial nerve deficits.   Gait:.  Antalgic and aided with cane.   Motor Strength: 5/5 motor strength throughout lower extremities.   Sensory: no sensory deficit in the lower extremities.   Reflexes:  +2 and symmetric to patellar .  No clonus or spasticity.   Lumbar: Limited flexion and extension. No facet loading today to left, + facet loading and paraspinal muscle TTP on right. (-) slump bilaterally.         1. Osteoarthritis of lumbar spine, unspecified spinal osteoarthritis complication status  Ambulatory referral/consult to [facility]   2. Lumbar spondylolysis     3. Spondylosis of lumbar region without  myelopathy or radiculopathy     4. DDD (degenerative disc disease), lumbar     5. Lumbar spondylosis     6. Chronic bilateral low back pain without sciatica           PLAN:   - Prior records reviewed.   - He is s/p Left sided L3,4,5 RFA with significant improvement. Can repeat as needed  - He is s/p Right sided L3,4,5 RFA and still has pain. Discussed he has continued activities to exacerbate pain and he is only 7 days post procedure so he can still benefit from       this.  - Refill Neurontin, Mobic, Voltaren.  - Referred to [facility].   - RTC in 1 month for re-evaluation of pain. Can be virtual.   The above plan and management options were discussed at length with patient. Patient is in agreement with the above and verbalized understanding. It will be communicated with the referring physician via electronic record, fax, or mail.      Blanco Mark NP         06/17/2020

## 2020-06-22 ENCOUNTER — OFFICE VISIT (OUTPATIENT)
Dept: PRIMARY CARE CLINIC | Facility: CLINIC | Age: 70
End: 2020-06-22
Payer: MEDICARE

## 2020-06-22 DIAGNOSIS — M25.512 ACUTE PAIN OF LEFT SHOULDER: ICD-10-CM

## 2020-06-22 DIAGNOSIS — M54.41 CHRONIC RIGHT-SIDED LOW BACK PAIN WITH RIGHT-SIDED SCIATICA: ICD-10-CM

## 2020-06-22 DIAGNOSIS — M48.07 LUMBOSACRAL STENOSIS WITH NEUROGENIC CLAUDICATION: ICD-10-CM

## 2020-06-22 DIAGNOSIS — M24.28 LIGAMENTUM FLAVUM HYPERTROPHY: ICD-10-CM

## 2020-06-22 DIAGNOSIS — G89.29 CHRONIC RIGHT-SIDED LOW BACK PAIN WITH RIGHT-SIDED SCIATICA: ICD-10-CM

## 2020-06-22 DIAGNOSIS — C61 CANCER OF PROSTATE: ICD-10-CM

## 2020-06-22 PROCEDURE — 99443 PR PHYSICIAN TELEPHONE EVALUATION 21-30 MIN: ICD-10-PCS | Mod: HCNC,95,, | Performed by: INTERNAL MEDICINE

## 2020-06-22 PROCEDURE — 99443 PR PHYSICIAN TELEPHONE EVALUATION 21-30 MIN: CPT | Mod: HCNC,95,, | Performed by: INTERNAL MEDICINE

## 2020-06-22 NOTE — Clinical Note
Dr Sevilla and team,  Mr Lamont is due for PSA and follow-up. He completed his radiation treatments in 4/2020.    Thanks,  Jada Spaulding MD/MPH  [facility]  [phone] [facility]

## 2020-06-23 ENCOUNTER — NURSE TRIAGE (OUTPATIENT)
Dept: ADMINISTRATIVE | Facility: CLINIC | Age: 70
End: 2020-06-23

## 2020-06-23 PROBLEM — M48.07 LUMBOSACRAL STENOSIS WITH NEUROGENIC CLAUDICATION: Status: ACTIVE | Noted: 2020-06-23

## 2020-06-23 PROBLEM — M24.28 LIGAMENTUM FLAVUM HYPERTROPHY: Status: ACTIVE | Noted: 2020-06-23

## 2020-06-23 NOTE — PROGRESS NOTES
Established Patient - Audio Only Telehealth Visit with [facility] Provider     The patient location is: HOME  The chief complaint leading to consultation is: routine care  Visit type: Virtual visit with audio only (telephone)     The reason for the audio only service rather than synchronous audio and video virtual visit was related to technical difficulties or patient preference/necessity.     Each patient to whom I provide medical services by telemedicine is:  (1) informed of the relationship between the physician and patient and the respective role of any other health care provider with respect to management of the patient; and (2) notified that they may decline to receive medical services by telemedicine and may withdraw from such care at any time. Patient verbally consented to receive this service via voice-only telephone call.       Subjective:      Patient ID: Anthony Miguel is a 69 y.o. male with prostate CA, chronic pain    Patient's risk score of poor outcomes with COVID is 3. Patient is high risk for poor outcomes with COVID due to the following chronic conditions advanced age, HTN, HLD    Patient recently completed treatment for prostate CA. He is due for PSA and follow-up with Dr Sevilla. This is already scheduled for this next week.    He is followed by pain management. Recently had an KAYLEY procedure, also followed by sports medicine. Has improvement in chronic pain.     He is active in the yard in the early morning. Has good quality of life, although he does complain of pain.    Is following appropriate precautions.      Objective:     Lab Results   Component Value Date    WBC 9.20 10/09/2019    HGB 13.1 (L) 10/09/2019    HCT 39.1 (L) 10/09/2019     10/09/2019    CHOL 152 01/09/2020    TRIG 102 01/09/2020    HDL 49 01/09/2020    ALT 11 12/16/2019    AST 16 12/16/2019     01/09/2020    K 4.2 01/09/2020     01/09/2020    CREATININE 0.9 01/09/2020    BUN 19 01/09/2020    CO2 25  01/09/2020    TSH 3.029 06/02/2017    PSA 1.1 10/26/2018    INR 1.0 06/21/2017    HGBA1C 5.1 10/26/2018         Assessment:   69 y.o. male with multiple co-morbid illnesses here to continue work-up of chronic issues.     Plan:     Problem List Items Addressed This Visit        Neuro    Lumbosacral stenosis with neurogenic claudication     Low back pain with spinal stenosis on MRI, difficulty ambulating  · Continue pain management and sports medicine for supportive care            Oncology    Cancer of prostate     H/o prostatectomy in 6/2019. Followed by Uro, undergoing radiation therapy  · Completed casodex and Rad Onc for radiation for 36 treatments  · Has follow-up in 7/20 with Dr Sevilla and a PSA  · Made aware of these labs and appointment            Orthopedic    Chronic right-sided low back pain with right-sided sciatica     Bilateral L4-5 and L5-S1 facet injections with Dr. Adkins  · F/U pain mgt  · Some improvement in pain  · Patient strongly advised to have realistic expectations of chronic opiates         Acute pain of left shoulder     Pianist, possible injury versus chronic wear and tear  · F/U with sports medicine         Ligamentum flavum hypertrophy     Seen on MRI on 1/2019  · Continue care with pain management and sports medicine               Health Maintenance       Date Due Completion Date    TETANUS VACCINE [birth year]/03/1967 ---    Shingles Vaccine (2 of 3) 11/14/2016 9/19/2016    Lipid Panel 01/09/2021 1/9/2020    Colorectal Cancer Screening 04/15/2026 4/15/2016          Follow up in about 2 weeks (around 7/6/2020). Thirty minutes spent with this patient today, including addressing advanced care planning.    Jada Spaulding MD/MPH  Internal Medicine  [facility]  Spectra [phone]    This service was not originating from a related E/M service provided within the previous 7 days nor will  to an E/M service or procedure within the next 24 hours or my  soonest available appointment.  Prevailing standard of care was able to be met in this audio-only visit.

## 2020-06-23 NOTE — ASSESSMENT & PLAN NOTE
Bilateral L4-5 and L5-S1 facet injections with Dr. Adkins  · F/U pain mgt  · Some improvement in pain  · Patient strongly advised to have realistic expectations of chronic opiates  
H/o prostatectomy in 6/2019. Followed by Uro, undergoing radiation therapy  · Completed casodex and Rad Onc for radiation for 36 treatments  · Has follow-up in 7/20 with Dr Sevilla and a PSA  · Made aware of these labs and appointment  
Low back pain with spinal stenosis on MRI, difficulty ambulating  · Continue pain management and sports medicine for supportive care  
Pianist, possible injury versus chronic wear and tear  · F/U with sports medicine  
Seen on MRI on 1/2019  · Continue care with pain management and sports medicine  
No

## 2020-06-24 NOTE — TELEPHONE ENCOUNTER
Patient on [facility] post procedure call back system tracker  Patient had office visit with primary on June 23  No contact required

## 2020-06-26 ENCOUNTER — PATIENT OUTREACH (OUTPATIENT)
Dept: ADMINISTRATIVE | Facility: OTHER | Age: 70
End: 2020-06-26

## 2020-06-29 ENCOUNTER — OFFICE VISIT (OUTPATIENT)
Dept: SPORTS MEDICINE | Facility: CLINIC | Age: 70
End: 2020-06-29
Payer: MEDICARE

## 2020-06-29 ENCOUNTER — HOSPITAL ENCOUNTER (OUTPATIENT)
Dept: RADIOLOGY | Facility: HOSPITAL | Age: 70
Discharge: HOME | End: 2020-06-29
Attending: PHYSICIAN ASSISTANT
Payer: MEDICARE

## 2020-06-29 VITALS
BODY MASS INDEX: 28.25 KG/M2 | HEART RATE: 64 BPM | HEIGHT: 67 IN | DIASTOLIC BLOOD PRESSURE: 83 MMHG | SYSTOLIC BLOOD PRESSURE: 127 MMHG | WEIGHT: 180 LBS

## 2020-06-29 DIAGNOSIS — M19.019 AC JOINT ARTHROPATHY: ICD-10-CM

## 2020-06-29 DIAGNOSIS — M17.11 PRIMARY OSTEOARTHRITIS OF RIGHT KNEE: Primary | ICD-10-CM

## 2020-06-29 DIAGNOSIS — M25.512 CHRONIC LEFT SHOULDER PAIN: ICD-10-CM

## 2020-06-29 DIAGNOSIS — M25.512 LEFT SHOULDER PAIN, UNSPECIFIED CHRONICITY: ICD-10-CM

## 2020-06-29 DIAGNOSIS — M75.102 ROTATOR CUFF SYNDROME OF LEFT SHOULDER: ICD-10-CM

## 2020-06-29 DIAGNOSIS — G89.29 CHRONIC LEFT SHOULDER PAIN: ICD-10-CM

## 2020-06-29 PROCEDURE — 73030 X-RAY EXAM OF SHOULDER: CPT | Mod: 26,HCNC,LT, | Performed by: RADIOLOGY

## 2020-06-29 PROCEDURE — 1101F PT FALLS ASSESS-DOCD LE1/YR: CPT | Mod: HCNC,CPTII,S$GLB, | Performed by: PHYSICIAN ASSISTANT

## 2020-06-29 PROCEDURE — 1101F PR PT FALLS ASSESS DOC 0-1 FALLS W/OUT INJ PAST YR: ICD-10-PCS | Mod: HCNC,CPTII,S$GLB, | Performed by: PHYSICIAN ASSISTANT

## 2020-06-29 PROCEDURE — 99999 PR PBB SHADOW E&M-EST. PATIENT-LVL III: CPT | Mod: PBBFAC,HCNC,, | Performed by: PHYSICIAN ASSISTANT

## 2020-06-29 PROCEDURE — 99999 PR PBB SHADOW E&M-EST. PATIENT-LVL III: ICD-10-PCS | Mod: PBBFAC,HCNC,, | Performed by: PHYSICIAN ASSISTANT

## 2020-06-29 PROCEDURE — 3008F PR BODY MASS INDEX (BMI) DOCUMENTED: ICD-10-PCS | Mod: HCNC,CPTII,S$GLB, | Performed by: PHYSICIAN ASSISTANT

## 2020-06-29 PROCEDURE — 73030 XR SHOULDER COMPLETE 2 OR MORE VIEWS LEFT: ICD-10-PCS | Mod: 26,HCNC,LT, | Performed by: RADIOLOGY

## 2020-06-29 PROCEDURE — 1125F AMNT PAIN NOTED PAIN PRSNT: CPT | Mod: HCNC,S$GLB,, | Performed by: PHYSICIAN ASSISTANT

## 2020-06-29 PROCEDURE — 20610 DRAIN/INJ JOINT/BURSA W/O US: CPT | Mod: HCNC,LT,S$GLB, | Performed by: PHYSICIAN ASSISTANT

## 2020-06-29 PROCEDURE — 3008F BODY MASS INDEX DOCD: CPT | Mod: HCNC,CPTII,S$GLB, | Performed by: PHYSICIAN ASSISTANT

## 2020-06-29 PROCEDURE — 99214 OFFICE O/P EST MOD 30 MIN: CPT | Mod: 25,HCNC,S$GLB, | Performed by: PHYSICIAN ASSISTANT

## 2020-06-29 PROCEDURE — 73030 X-RAY EXAM OF SHOULDER: CPT | Mod: TC,HCNC,LT

## 2020-06-29 PROCEDURE — 99214 PR OFFICE/OUTPT VISIT, EST, LEVL IV, 30-39 MIN: ICD-10-PCS | Mod: 25,HCNC,S$GLB, | Performed by: PHYSICIAN ASSISTANT

## 2020-06-29 PROCEDURE — 1159F PR MEDICATION LIST DOCUMENTED IN MEDICAL RECORD: ICD-10-PCS | Mod: HCNC,S$GLB,, | Performed by: PHYSICIAN ASSISTANT

## 2020-06-29 PROCEDURE — 20610 PR DRAIN/INJECT LARGE JOINT/BURSA: ICD-10-PCS | Mod: HCNC,LT,S$GLB, | Performed by: PHYSICIAN ASSISTANT

## 2020-06-29 PROCEDURE — 1125F PR PAIN SEVERITY QUANTIFIED, PAIN PRESENT: ICD-10-PCS | Mod: HCNC,S$GLB,, | Performed by: PHYSICIAN ASSISTANT

## 2020-06-29 PROCEDURE — 1159F MED LIST DOCD IN RCRD: CPT | Mod: HCNC,S$GLB,, | Performed by: PHYSICIAN ASSISTANT

## 2020-06-29 RX ORDER — TRIAMCINOLONE ACETONIDE 40 MG/ML
40 INJECTION, SUSPENSION INTRA-ARTICULAR; INTRAMUSCULAR
Status: COMPLETED | OUTPATIENT
Start: 2020-06-29 | End: 2020-06-29

## 2020-06-29 RX ADMIN — TRIAMCINOLONE ACETONIDE 40 MG: 40 INJECTION, SUSPENSION INTRA-ARTICULAR; INTRAMUSCULAR at 09:06

## 2020-06-29 NOTE — PROGRESS NOTES
Subjective:          Chief Complaint: Anthony Miguel is a 69 y.o. male who had concerns including Pain of the Left Shoulder.    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     Patient presents 6 months s/p right knee Monovisc injection presents for follow-up and new left shoulder pain.    Right knee:  He reports significant relief from Monovisc injection, I would like to continue with current treatment.  Denies new trauma    The gradual pain started 5+ weeks ago with no clear IRENA and is becoming progressively worse last few weeks. Pain is located over (points to) anterior arm radiating down bicep muscle. He reports that the pain is a 4 /10 throbbing pain today and not responding adequately to conservative measures which have included activity modifications, rest, and oral medication. Is affecting ADLs and limiting desired level of activity. Denies numbness, tingling, radiation, and neck pain or radicular symptoms.  Pain is 6 /10 at its worst    Mechanical symptoms: none  Subjective instability: -  Worse with overhead activities and reaching behind  Better with rest.   Nocturnal symptoms: +    No previous surgeries or trauma on shoulder    Currently in [facility] program     Previous HPI: The pain started 4 years ago while walking his foot got caught in pothole.  Since then he has had intermittent pain and is becoming progressively worse last few weeks. Pain is located over (points to) medial joint line. He reports that the pain is a 5 /10 aching and throbbing pain today and not responding adequately to conservative measures which have included activity modifications, rest, and oral medication. Is affecting ADLs and limiting desired level of activity. Denies numbness, tingling, radiation, and inability to bear weight.  Pain is 8 /10 at its worst    He does have baseline lower back pain treated with previous in injections, ambulates with cane    Mechanical symptoms:  None  Subjective instability: (+)    Worse with ambulation  Better with rest.   Nocturnal symptoms: (--)    No previous surgeries or trauma on knees            Review of Systems   Constitution: Negative for chills and fever.   HENT: Negative for congestion and sore throat.    Eyes: Negative for discharge and double vision.   Cardiovascular: Negative for chest pain, palpitations and syncope.   Respiratory: Negative for cough and shortness of breath.    Endocrine: Negative for cold intolerance and heat intolerance.   Skin: Negative for dry skin and rash.   Musculoskeletal: Positive for joint pain.   Gastrointestinal: Negative for abdominal pain, nausea and vomiting.   Neurological: Negative for focal weakness, numbness and paresthesias.       Pain Related Questions  Over the past 3 days, what was your average pain during activity? (I.e. running, jogging, walking, climbing stairs, getting dressed, ect.): 5  Over the past 3 days, what was your highest pain level?: 8  Over the past 3 days, what was your lowest pain level? : 3    Other  How many nights a week are you awakened by your affected body part?: 3  Was the patient's HEIGHT measured or patient reported?: Patient Reported  Was the patient's WEIGHT measured or patient reported?: Measured      Objective:        General: Anthony is well-developed, well-nourished, appears stated age, in no acute distress, alert and oriented to time, place and person.     General    Vitals reviewed.  Constitutional: He is oriented to person, place, and time. He appears well-developed and well-nourished. No distress.   HENT:   Head: Normocephalic and atraumatic.   Nose: Nose normal.   Eyes: Conjunctivae and EOM are normal. Pupils are equal, round, and reactive to light.   Neck: Normal range of motion. Neck supple. No JVD present.   Cardiovascular: Normal rate, regular rhythm, normal heart sounds and intact distal pulses.    No murmur heard.  Pulmonary/Chest: Effort normal and breath sounds normal. No respiratory distress.    Abdominal: Soft. Bowel sounds are normal. He exhibits no distension. There is no abdominal tenderness.   Neurological: He is alert and oriented to person, place, and time. He has normal reflexes. Coordination normal.   Psychiatric: He has a normal mood and affect. His behavior is normal. Judgment and thought content normal.     General Musculoskeletal Exam   Gait: abnormal and antalgic       Right Knee Exam     Inspection   Erythema: absent  Scars: absent  Swelling: absent  Effusion: absent  Deformity: absent  Bruising: absent    Tenderness   The patient is tender to palpation of the medial joint line and condyle.    Crepitus   The patient has crepitus of the patella.    Range of Motion   Extension: 0   Flexion:  130 (+ pain) abnormal     Tests   Meniscus   Giedon:  Medial - negative Lateral - negative  Ligament Examination Lachman: normal (-1 to 2mm) PCL-Posterior Drawer: normal (0 to 2mm)     MCL - Valgus: normal (0 to 2mm)  LCL - Varus: normalDial Test at 90 degrees: normal (< 5 degrees)  Posterolateral Corner: unstable (>15 degrees difference)  Patella   Patellar Glide (quadrants): Lateral - 1   Medial - 2  Patellar Grind: positive    Other   Popliteal (Baker's) Cyst: absent  Sensation: normal    Comments:  + squat  + bridge    Left Knee Exam     Inspection   Erythema: absent  Scars: absent  Swelling: absent  Effusion: absent  Deformity: absent  Bruising: absent    Crepitus   The patient has crepitus of the patella.    Range of Motion   Extension: 0   Flexion: 130     Tests   Meniscus   Gideon:  Medial - negative Lateral - negative  Stability Lachman: normal (-1 to 2mm) PCL-Posterior Drawer: normal (0 to 2mm)  MCL - Valgus: normal (0 to 2mm)  LCL - Varus: normal (0 to 2mm)Dial Test at 90 degrees: normal (< 5 degrees)  Posterolateral Corner: unstable (>15 degrees difference)  Patella   Patellar Glide (Quadrants): Lateral - 1 Medial - 2  Patellar Grind: negative    Other   Popliteal (Baker's) Cyst:  absent  Sensation: normal    Right Hip Exam     Tests   Gonzales: negative  Left Hip Exam     Tests   Gonzales: negative      Right Shoulder Exam     Inspection/Observation   Swelling: absent  Bruising: absent  Scars: absent  Deformity: absent  Scapular Winging: absent  Scapular Dyskinesia: negative  Atrophy: absent    Crepitus   The patient has crepitus of the AC joint.    Range of Motion   Active abduction: 150   Passive abduction: 150   Extension: 50   Forward Flexion: 180   Forward Elevation: 180Adduction: 30   Internal rotation 0 degrees: T6   Internal rotation 90 degrees: 90     Tests & Signs   Apprehension: negative  Cross arm: negative  Drop arm: negative  Zaman test: negative  Impingement: negative  Lift Off Sign: negative  Belly Press: negative  Active Compression Test ([place]'s Sign): negative  Yergason's Test: negative  Speed's Test: negative  Relocation 90 degrees: negative  Jerk Test: negative    Other   Sensation: normal    Left Shoulder Exam     Inspection/Observation   Swelling: absent  Bruising: absent  Scars: absent  Deformity: absent  Scapular Winging: absent  Scapular Dyskinesia: negative  Atrophy: absent    Tenderness   The patient is tender to palpation of the acromioclavicular joint and supraspinatus.    Crepitus   The patient has crepitus of the AC joint    Range of Motion   Active abduction:  130 (+ pain) abnormal   Passive abduction:  140 abnormal   Extension: 50   Forward Flexion:  120 abnormal   Forward Elevation: 140 abnormalAdduction: 30   External Rotation 0 degrees: abnormal Left shoulder external rotation: 60.   Internal rotation 0 degrees:  T10 abnormal   Internal rotation 90 degrees: 90     Tests & Signs   Apprehension: negative  Cross arm: negative  Drop arm: negative  Zaman test: positive  Impingement: positive  Lift Off Sign: negative  Belly Press: negative  Active Compression test ([place]'s Sign): negative  Yergasons's Test: negative  Speed's Test: positive  Relocation 90  degrees: negative  Jerk Test: negative    Other   Sensation: normal       Muscle Strength   Right Upper Extremity   Shoulder Abduction: 5/5   Shoulder Internal Rotation: 5/5   Shoulder External Rotation: 5/5   Supraspinatus: 5/5/5   Subscapularis: 5/5/5   Biceps: 5/5/5   Left Upper Extremity  Shoulder Abduction: 4/5   Shoulder Internal Rotation: 4/5   Shoulder External Rotation: 4/5   Supraspinatus: 5/5/5   Subscapularis: 5/5/5   Biceps: 4/5/5   Right Lower Extremity   Hip Abduction: 5/5   Quadriceps:  4/5   Hamstrin/5   Left Lower Extremity   Hip Abduction: 5/5   Quadriceps:  4/5   Hamstrin/5     Vascular Exam     Right Pulses  Dorsalis Pedis:      2+  Posterior Tibial:      2+  Radial:                    2+      Left Pulses  Dorsalis Pedis:      2+  Posterior Tibial:      2+  Radial:                    2+      Capillary Refill  Right Hand: normal capillary refill  Left Hand: normal capillary refill    Edema  Right Lower Leg: absent  Left Lower Leg: absent    Radiographic Findings:    Mild DJD and genu varum.  No fracture or dislocation.  No bone destruction identified.    Kellgren-Roni Classification: 2    Xrays of the knees were ordered and reviewed by me today. These findings were discussed and reviewed with the patient.    Radiographic Findings 2020:    Visualized osseous structures appear unremarkable, with no evidence of recent or healing fracture, lytic destructive process, appreciable glenohumeral arthritic change, or other significant abnormality noted.  No glenohumeral dislocation.  No abnormal soft tissue calcifications.  Incidental note is made of partial visualization of instrumentation related to a prior cervical spinal fusion procedure.  No significant detrimental interval change since 10/09/2019.    Xrays of the left shoulder were ordered and reviewed by me today. These findings were discussed and reviewed with the patient.        Assessment:       Encounter Diagnoses   Name  Primary?    Primary osteoarthritis of right knee Yes    Left shoulder pain, unspecified chronicity           Plan:       We have discussed a variety of treatment options including medications, injections, physical therapy and other alternative treatments. I also explained the indications, risks and benefits of surgery. Pt would like to continue with visco-supplementation at this time for right knee.  For the left shoulder we have discussed a variety of treatment options including medications, injections, physical therapy and other alternative treatments. I also explained the indications, risks and benefits of surgery. Patient chooses to proceed with CSI and HEP at this time.    I made the decision to obtain old records of the patient including previous notes and imaging. I independently reviewed and interpreted lab results today as well as prior imaging.     1. Injection Procedure  A time out was performed, including verification of patient ID, procedure, site and side, availability of information and equipment, review of safety issues, and agreement with consent, the procedure site was marked.    After time out was performed, the patient was prepped aseptically with chloraprep swabstick. A diagnostic and therapeutic injection of 1:4cc Kenalog/Marcaine was given under sterile technique using a 22g x 1.5 needle from the Posterior  aspect of the left Subacromial in the sitting position.      Anthony Miguel had no adverse reactions to the medication. Pain decreased. He was instructed to apply ice to the joint for 20 minutes and avoid strenuous activities for 24-36 hours following the injection. He was warned of possible blood sugar and/or blood pressure changes during that time. Following that time, he can resume regular activities.    He was reminded to call the clinic immediately for any adverse side effects as explained in clinic today.    2. Pre-authorization placed right knee Monovisc injection.  3. Ice  compress to the affected area 2-3x a day for 15-20 minutes as needed for pain management.  4. HEP 47082 - Ayush Zaidi PA-C, instructed and demonstrated a periscapular and rotator cuff program. The patient then demonstrated understanding of exercises and proper technique. This program was performed for 10 minutes.   5. Continue 3D knee sleeve as needed  6. RTC to see Ayush Zaidi PA-C for right knee Monovisc injection.    All of the patient's questions were answered and the patient will contact us if they have any questions or concerns in the interim.        Patient questionnaires may have been collected.

## 2020-07-01 ENCOUNTER — LAB VISIT (OUTPATIENT)
Dept: LAB | Facility: OTHER | Age: 70
End: 2020-07-01
Attending: RADIOLOGY
Payer: MEDICARE

## 2020-07-01 DIAGNOSIS — C61 CANCER OF PROSTATE: ICD-10-CM

## 2020-07-01 LAB — COMPLEXED PSA SERPL-MCNC: 0.01 NG/ML (ref 0–4)

## 2020-07-01 PROCEDURE — 84153 ASSAY OF PSA TOTAL: CPT | Mod: HCNC

## 2020-07-01 PROCEDURE — 36415 COLL VENOUS BLD VENIPUNCTURE: CPT | Mod: HCNC

## 2020-07-06 ENCOUNTER — TELEPHONE (OUTPATIENT)
Dept: PRIMARY CARE CLINIC | Facility: CLINIC | Age: 70
End: 2020-07-06

## 2020-07-06 NOTE — TELEPHONE ENCOUNTER
Called all numbers for pt, no answer, left 1 message.  Called will, pt sister in law, out of town. She will contact pt to let him know he has appt.

## 2020-07-07 ENCOUNTER — OFFICE VISIT (OUTPATIENT)
Dept: PRIMARY CARE CLINIC | Facility: CLINIC | Age: 70
End: 2020-07-07
Payer: MEDICARE

## 2020-07-07 DIAGNOSIS — F15.182 CAFFEINE-INDUCED SLEEP DISORDER WITH MILD USE DISORDER, INSOMNIA TYPE: ICD-10-CM

## 2020-07-07 DIAGNOSIS — M25.512 ACUTE PAIN OF LEFT SHOULDER: ICD-10-CM

## 2020-07-07 DIAGNOSIS — C61 CANCER OF PROSTATE: ICD-10-CM

## 2020-07-07 PROCEDURE — 99443 PR PHYSICIAN TELEPHONE EVALUATION 21-30 MIN: CPT | Mod: HCNC,95,, | Performed by: INTERNAL MEDICINE

## 2020-07-07 PROCEDURE — 99443 PR PHYSICIAN TELEPHONE EVALUATION 21-30 MIN: ICD-10-PCS | Mod: HCNC,95,, | Performed by: INTERNAL MEDICINE

## 2020-07-07 RX ORDER — TRAZODONE HYDROCHLORIDE 50 MG/1
100 TABLET ORAL NIGHTLY PRN
Qty: 180 TABLET | Refills: 3 | Status: SHIPPED | OUTPATIENT
Start: 2020-07-07 | End: 2020-10-09 | Stop reason: SDUPTHER

## 2020-07-07 RX ORDER — MELOXICAM 15 MG/1
15 TABLET ORAL DAILY
Qty: 90 TABLET | Refills: 3 | Status: SHIPPED | OUTPATIENT
Start: 2020-07-07 | End: 2020-09-10

## 2020-07-07 NOTE — ASSESSMENT & PLAN NOTE
H/o prostatectomy in 6/2019. Followed by Uro, undergoing radiation therapy  · Completed casodex and Rad Onc for radiation for 36 treatments  · Has follow-up in 7/20 with Dr Sevilla and a PSA

## 2020-07-07 NOTE — PROGRESS NOTES
Established Patient - Audio Only Telehealth Visit with [facility] Provider     The patient location is: HOME  The chief complaint leading to consultation is: routine care  Visit type: Virtual visit with audio only (telephone)     The reason for the audio only service rather than synchronous audio and video virtual visit was related to technical difficulties or patient preference/necessity.     Each patient to whom I provide medical services by telemedicine is:  (1) informed of the relationship between the physician and patient and the respective role of any other health care provider with respect to management of the patient; and (2) notified that they may decline to receive medical services by telemedicine and may withdraw from such care at any time. Patient verbally consented to receive this service via voice-only telephone call.       Subjective:      Patient ID: Anthony Miguel is a 69 y.o. male with prostate CA, chronic pain    Patient had steroid injection in his shoulder with sports medicine.    He has appt with Dr Sevilla tomorrow for his prostate CA. His PSA is stable following prostatectomy and radiation therapy since February.    He is compliant with gabapentin, statin, MVI. Needs refills of many meds.    Objective:     Lab Results   Component Value Date    WBC 9.20 10/09/2019    HGB 13.1 (L) 10/09/2019    HCT 39.1 (L) 10/09/2019     10/09/2019    CHOL 152 01/09/2020    TRIG 102 01/09/2020    HDL 49 01/09/2020    ALT 11 12/16/2019    AST 16 12/16/2019     01/09/2020    K 4.2 01/09/2020     01/09/2020    CREATININE 0.9 01/09/2020    BUN 19 01/09/2020    CO2 25 01/09/2020    TSH 3.029 06/02/2017    PSA 1.1 10/26/2018    INR 1.0 06/21/2017    HGBA1C 5.1 10/26/2018         Assessment:   69 y.o. male with multiple co-morbid illnesses here to continue work-up of chronic issues.     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 Thirty minutes spent with this patient today, including addressing advanced care planning.    Jada Spaulding MD/MPH  Internal Medicine  [facility]  Spectra [phone]    This service was not originating from a related E/M service provided within the previous 7 days nor will  to an E/M service or procedure within the next 24 hours or my soonest available appointment.  Prevailing standard of care was able to be met in this audio-only visit.

## 2020-07-09 ENCOUNTER — OFFICE VISIT (OUTPATIENT)
Dept: RADIATION ONCOLOGY | Facility: CLINIC | Age: 70
End: 2020-07-09
Attending: RADIOLOGY
Payer: MEDICARE

## 2020-07-09 DIAGNOSIS — C61 CANCER OF PROSTATE: Primary | ICD-10-CM

## 2020-07-09 PROCEDURE — 99499 UNLISTED E&M SERVICE: CPT | Mod: HCNC,95,, | Performed by: RADIOLOGY

## 2020-07-09 PROCEDURE — 99499 NO LOS: ICD-10-PCS | Mod: HCNC,95,, | Performed by: RADIOLOGY

## 2020-07-09 NOTE — PROGRESS NOTES
Subjective:       Patient ID: Anthony Miguel is a 69 y.o. male.    Chief Complaint: No chief complaint on file.    The patient location is: home  The chief complaint leading to consultation is: follow up of prostate cancer     Visit type: audio only      10 minutes of total time spent on the encounter, which includes face to face time and non-face to face time preparing to see the patient (eg, review of tests), Obtaining and/or reviewing separately obtained history, Documenting clinical information in the electronic or other health record, Independently interpreting results (not separately reported) and communicating results to the patient/family/caregiver, or Care coordination (not separately reported).     Each patient to whom he or she provides medical services by telemedicine is:  (1) informed of the relationship between the physician and patient and the respective role of any other health care provider with respect to management of the patient; and (2) notified that he or she may decline to receive medical services by telemedicine and may withdraw from such care at any time.    Mr. Miguel has a history of pathologic stage IIIC( pT2, N0, M0, P<10, G5) adenocarcinoma of the prostate.    He presented with a palpable nodule in the Rt. lobe of the prostate. Biopsy on 3/19/19 revealed a 22.8 cc gland. Biopsies from the Lt. apex and Rt. base revealed [place] 9 (4+5) adenocarcinoma involving 45% of the specimen at the apex and 65% of the specimen at the Rt. base. There was [place] 8 (3+5) disease at the Rt. mid gland involving 95% of the specimen. The Lt. apex, Lt. mid gland and Lt. base revealed [place] score 6 (3+3) to 7 (3+4) disease. Bone scan was negative. MRI revealed a 2.1 cm Rt. peripheral zone T2 hypointense nodule, Pi-RADS 5. There was suspected extraprostatic extension. There was extension of the lesion towards the base of the Rt. seminal vesicle and Rt. neurovascular bundle. There was no adenopathy.  "He subsequently elected to proceed with RALP on 6/5/19. Pathology revealed a 30 g prostate measuring 4.5 x 3.6 x 2.9 cm. There was a "moderate" total volume of [place] 9 (4+5) adenocarcinoma. Extraprostatic extension was not identified although carcinoma did bulge the capsule. There was no bladder neck or seminal vesicle invasion. The margins of resection and 5 pelvic nodes were negative for tumor involvement. Postoperative PSA on 7/8 and 8/19/19 returned a 0.07 ng/ml.  We elected to proceed with adjuvant radiotherapy.  Hormonal therapy was not recommended secondary to his underlying neurologic problems and difficulties with ambulation.  He completed radiotherapy to the prostate bed and pelvic nodes on 4/16/20.  Today the patient states he feels well.  No urinary complaints.      Review of Systems   Constitutional: Negative for activity change, appetite change and fatigue.   Gastrointestinal: Negative for constipation and diarrhea.   Genitourinary: Negative for bladder incontinence, difficulty urinating, dysuria, frequency and hematuria.         Objective:      Physical Exam  Neurological:      Mental Status: He is alert and oriented to person, place, and time.   Psychiatric:         Mood and Affect: Mood normal.         Judgment: Judgment normal.       PSA on 7/1/20 returned at 0..01 ng/ml  Assessment:       1. Cancer of prostate        Plan:       Recovered well from the acute effects of therapy.  Good PSA response.  Plan follow up with urology in 5 - 6 months with PSA.       "

## 2020-07-10 ENCOUNTER — TELEPHONE (OUTPATIENT)
Dept: PAIN MEDICINE | Facility: CLINIC | Age: 70
End: 2020-07-10

## 2020-07-10 NOTE — TELEPHONE ENCOUNTER
Staff spoke with patient to confirm appointment scheduled 7/13/20 at 10 am with Blanco Mark as a audio call patient was informed that provider may call 15 minutes before or after patient verbalized understanding.

## 2020-07-12 ENCOUNTER — PATIENT OUTREACH (OUTPATIENT)
Dept: ADMINISTRATIVE | Facility: OTHER | Age: 70
End: 2020-07-12

## 2020-07-13 ENCOUNTER — OFFICE VISIT (OUTPATIENT)
Dept: PAIN MEDICINE | Facility: CLINIC | Age: 70
End: 2020-07-13
Payer: MEDICARE

## 2020-07-13 DIAGNOSIS — M54.16 LUMBAR RADICULOPATHY: ICD-10-CM

## 2020-07-13 DIAGNOSIS — G89.4 CHRONIC PAIN SYNDROME: Primary | ICD-10-CM

## 2020-07-13 DIAGNOSIS — M47.9 SPONDYLOSIS: ICD-10-CM

## 2020-07-13 PROCEDURE — 99442 PR PHYSICIAN TELEPHONE EVALUATION 11-20 MIN: CPT | Mod: HCNC,95,, | Performed by: NURSE PRACTITIONER

## 2020-07-13 PROCEDURE — 99442 PR PHYSICIAN TELEPHONE EVALUATION 11-20 MIN: ICD-10-PCS | Mod: HCNC,95,, | Performed by: NURSE PRACTITIONER

## 2020-07-13 NOTE — PROGRESS NOTES
Chronic Pain-Medicine-Established Note (Follow up visit)      SUBJECTIVE:    Interval History 7/13/2020:  The patient is s/p Bilateral Lumbar RFA. He states now having significant relief of approx 80% to both sided and able to perform ADLs easier. He is very please with results and will be starting healthy back program at end of month due to increased mobility and less severe pain. Pt denies any new areas of pain, denies any new neuro changes. Does not need medication refills at this time. Pain is 4/10 today.     Interval History 6/17/2020:   The patient presents for follow-up of lower back pain.  Patient states he has had significant improvement of the left-sided lumbar pain status post L 3, 4, 5 RFA on 5/27/2020. He is also s/p Right side L3,4,5 RFA on 6/10/2020. He states lower back pain to right still continued and endorses he continues to do housework and believes increased activity has not allowed time for pain to ease.  He denies any radiculopathy, denies any neurological complaints, denies loss of b/b or saddle paresthesias. He continues to take Mobic, voltaren gel, neurontin and Trazodone with benefit and without adverse side effects of medication.       Interval history 05/11/2020 (Telephone encounter)   Last encounter,we planned on scheduling for bilateral lumbar RFA in future as patient has had excellent relief from BL lumbar FJIs in the past. At last encounter, we also switched patient form diclofenac to meloxicam 15 mg daily and added voltaren gel for local anti-inflammatory benefit. Today he reports he completed his 38 day radiation trial for his prostate cancer (04/18/2020). He reports the previous facet joint injections helped tremendously (>70% pain relief for 3 months at a time) and is inquiring about RFA for his low back. Back  Pain is unchanged, located in lower back, worsened with extension. NSAIDs are not quite helpful. Gabapentin is mildly helpful.      Interval history (Telephone  encounter) 03/26/20  Pt is a 68 yo male with Hx of cervical stenosis w/ myelopathy s/p C3-6 decompression and fusion, as well as prostate CA on radiatio therapy who we are following for chronic low back pain. We last performed lumbar facet joint injections in August and October of 2019. He reports excellent (75%) pain relief for about 3 months but his pain has since returned. He reports that the pain feels identical, but about 1-2 cm lower. Pain is worse with standing/ walking. He denies any pain shooting down his legs or numbness/ tingling/ weakness in his legs. No changes with bladder/bowel control. He is currently taking diclofenac 75mg BID and gabapentin 600mg TID with decent relief. He is curious if there is something slightly stronger than diclofenac that may help. He cannot recall any other NSAIDs or muscle relaxers that he's tried.    Pain Medications:    - NSAIDs: Meloxicam, diclofenac, voltaren gel  - Anti-Depressants: trazadone  - Anti convulsants: Gabapentin 600 mg TID        Physical Therapy/Home Exercise: no       report:  Not applicable    Pain Procedures:   2/6/19- L5/S1 KAYLEY  4/01/2019- L5/S1 KAYLEY  07/29/2019- Bilateral L4/5 and L5/S1 FJI  10/02/2019- Bilateral L4/5 and L5/S1 FJI  5/27/2020 Left L3,4,5 RFA  6/10/2020 Right L3,4,5 RFA       Imaging: reviewed in EPIC    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    Overview:  Right side dx update     Past Surgical History:   Procedure Laterality Date    BACK SURGERY      EPIDURAL STEROID INJECTION N/A 2/6/2019    Procedure: INJECTION, STEROID, EPIDURAL, L45-S1 IL;  Surgeon: Marcel Adkins MD;  Location: [facility];  Service: Pain  Management;  Laterality: N/A;    EPIDURAL STEROID INJECTION N/A 4/10/2019    Procedure: Injection, Steroid, Epidural LUMBAR/CAUDAL L5-S1 INTERLAMINAR KAYLEY;  Surgeon: Marcel Adkins MD;  Location: [facility];  Service: Pain Management;  Laterality: N/A;  NEEDS CONSENT    INJECTION OF FACET JOINT Bilateral 8/14/2019    Procedure: INJECTION, FACET JOINT INJECTION (LUMBAR BLOCK) BILATERAL L4-5 AND L5-S1;  Surgeon: Marcel Adkins MD;  Location: [facility];  Service: Pain Management;  Laterality: Bilateral;  NEEDS CONSENT    INJECTION OF FACET JOINT Bilateral 10/16/2019    Procedure: FACET JOINT INJECTION (LUMBAR BLOCK) BILATERAL L4-5 AND L5-S1;  Surgeon: Marcel Adkins MD;  Location: [facility];  Service: Pain Management;  Laterality: Bilateral;  NEEDS CONSENT    Larangoscopy      RADIOFREQUENCY ABLATION Left 5/27/2020    Procedure: RADIOFREQUENCY ABLATION LEFT L3,4,5 1 of 2;  Surgeon: Marcel Adkins MD;  Location: [facility];  Service: Pain Management;  Laterality: Left;  Left RFA L3, 4, 5  1 of 2    RADIOFREQUENCY ABLATION Right 6/10/2020    Procedure: RADIOFREQUENCY ABLATION RIGHT L3,4,5;  Surgeon: Marcel Adkins MD;  Location: [facility];  Service: Pain Management;  Laterality: Right;  Right RFA L3,4.5  2 of 2    ROBOT-ASSISTED LAPAROSCOPIC PROSTATECTOMY USING DA SAÚL XI N/A 6/4/2019    Procedure: XI ROBOTIC PROSTATECTOMY;  Surgeon: Sergio Dixon MD;  Location: [facility] OR [address] [place] [address];  Service: Urology;  Laterality: N/A;  4hrs/ gen with regional      Social History     Socioeconomic History    Marital status: Single     Spouse name: Not on file    Number of children: 0    Years of education: Not on file    Highest education level: Not on file   Occupational History    Not on file   Social Needs    Financial resource strain: Not hard at all    Food insecurity     Worry: Never true     Inability: Never true    Transportation needs     Medical: Yes     Non-medical: Yes    Tobacco Use    Smoking status: Former Smoker     Packs/day: 1.00     Quit date: 2000     Years since quittin.4    Smokeless tobacco: Former User   Substance and Sexual Activity    Alcohol use: Yes     Alcohol/week: 1.0 standard drinks     Types: 1 Glasses of wine per week     Comment: once in a while    Drug use: No    Sexual activity: Not Currently   Lifestyle    Physical activity     Days per week: Not on file     Minutes per session: Not on file    Stress: Only a little   Relationships    Social connections     Talks on phone: Not on file     Gets together: Not on file     Attends Sikh service: Not on file     Active member of club or organization: Not on file     Attends meetings of clubs or organizations: Not on file     Relationship status: Not on file   Other Topics Concern    Not on file   Social History Narrative    No difficulty reading Rx labels      Family History   Problem Relation Age of Onset    Cancer Mother     No Known Problems Father     Keratoconus Brother     Cancer Brother        Review of patient's allergies indicates:  No Known Allergies    Current Outpatient Medications   Medication Sig    atorvastatin (LIPITOR) 10 MG tablet Take 1 tablet (10 mg total) by mouth once daily.    cholecalciferol, vitamin D3, (VITAMIN D3) 50 mcg (2,000 unit) Cap Take 1 capsule (2,000 Units total) by mouth once daily.    diclofenac sodium (VOLTAREN) 1 % Gel Apply 2 g topically 4 (four) times daily.    ferrous sulfate 325 (65 FE) MG EC tablet Take 1 tablet (325 mg total) by mouth once daily.    gabapentin (NEURONTIN) 300 MG capsule Take 2 capsules (600 mg total) by mouth 3 (three) times daily.    meloxicam (MOBIC) 15 MG tablet Take 1 tablet (15 mg total) by mouth once daily.    polyethylene glycol (GLYCOLAX) 17 gram PwPk Take 17 g by mouth once daily.    traZODone (DESYREL) 50 MG tablet Take 2 tablets (100 mg total) by mouth nightly as needed for Insomnia.    triamcinolone  acetonide 0.1% (KENALOG) 0.1 % cream Apply 15 g topically.    walker (ULTRA-LIGHT ROLLATOR) Misc 1 Units by Misc.(Non-Drug; Combo Route) route once daily at 6am.     Current Facility-Administered Medications   Medication    hyaluronate sodium, stabilized (MONOVISC) Syrg 4 mL       REVIEW OF SYSTEMS:    GENERAL:  No weight loss, malaise or fevers.  HEENT:   No recent changes in vision or hearing  NECK:  Negative for lumps, no difficulty with swallowing.  RESPIRATORY:  Negative for cough, wheezing or shortness of breath, patient denies any recent URI.  CARDIOVASCULAR:  Negative for chest pain, leg swelling or palpitations.  GI:  Negative for abdominal discomfort, blood in stools or black stools or change in bowel habits.  MUSCULOSKELETAL:  See HPI.  SKIN:  Negative for lesions, rash, and itching.  PSYCH:  No mood disorder or recent psychosocial stressors.  Patients sleep is disturbed secondary to pain.  HEMATOLOGY/LYMPHOLOGY:  + prostate cancer s/p radiation therapy  NEURO:   No history of headaches, syncope, paralysis, seizures or tremors.  All other reviewed and negative other than HPI.    OBJECTIVE:     PHYSICAL EXAMINATION:  Voice normal.  Normal affect without evidence of distress.        Prior Exam : ASSESSMENT: 69 y.o. year old male with chronic low back pain, consistent with     GEN:  Well developed, well nourished.  No acute distress.   HEENT:  No trauma.  Mucous membranes moist.  Nares patent bilaterally.  PSYCH: Normal affect. Thought content appropriate.  CHEST:  Breathing symmetric.  No audible wheezing.  ABD: Soft, non-distended.  SKIN:  Warm, pink, dry.  No rash on exposed areas.    EXT:  No cyanosis, clubbing, or edema.  No color change or changes in nail or hair growth.  NEURO/MUSCULOSKELETAL:  Fully alert, oriented, and appropriate. Speech normal sean. No cranial nerve deficits.   Gait:.  Antalgic and aided with cane.   Motor Strength: 5/5 motor strength throughout lower extremities.   Sensory:  no sensory deficit in the lower extremities.   Reflexes:  +2 and symmetric to patellar .  No clonus or spasticity.   Lumbar: Limited flexion and extension. No facet loading today to left, + facet loading and paraspinal muscle TTP on right. (-) slump bilaterally.         1. Chronic pain syndrome     2. Lumbar radiculopathy     3. Spondylosis           PLAN:   - Prior records reviewed.   - He is s/p Left sided L3,4,5 RFA with significant improvement. Can repeat as needed  - He is s/p Right sided L3,4,5 RFA and now having significant benefit from this Can repeat as needed  - Continue Neurontin, Mobic, Voltaren.  - He is starting Healthy Back Program in next few weeks   - RTC in 1 month for re-evaluation of pain. Can be virtual to re-evaluate after starting Healthy Back.   The above plan and management options were discussed at length with patient. Patient is in agreement with the above and verbalized understanding. It will be communicated with the referring physician via electronic record, fax, or mail.      Blanco Mark NP         07/13/2020

## 2020-07-14 ENCOUNTER — TELEPHONE (OUTPATIENT)
Dept: SPORTS MEDICINE | Facility: CLINIC | Age: 70
End: 2020-07-14

## 2020-07-14 ENCOUNTER — OFFICE VISIT (OUTPATIENT)
Dept: SPORTS MEDICINE | Facility: CLINIC | Age: 70
End: 2020-07-14
Payer: MEDICARE

## 2020-07-14 VITALS
HEART RATE: 66 BPM | BODY MASS INDEX: 28.25 KG/M2 | WEIGHT: 180 LBS | HEIGHT: 67 IN | SYSTOLIC BLOOD PRESSURE: 145 MMHG | DIASTOLIC BLOOD PRESSURE: 89 MMHG

## 2020-07-14 DIAGNOSIS — M17.11 PRIMARY OSTEOARTHRITIS OF RIGHT KNEE: Primary | ICD-10-CM

## 2020-07-14 PROCEDURE — 99499 UNLISTED E&M SERVICE: CPT | Mod: HCNC,S$GLB,, | Performed by: PHYSICIAN ASSISTANT

## 2020-07-14 PROCEDURE — 20610 PR DRAIN/INJECT LARGE JOINT/BURSA: ICD-10-PCS | Mod: HCNC,RT,S$GLB, | Performed by: PHYSICIAN ASSISTANT

## 2020-07-14 PROCEDURE — 99999 PR PBB SHADOW E&M-EST. PATIENT-LVL III: ICD-10-PCS | Mod: PBBFAC,HCNC,, | Performed by: PHYSICIAN ASSISTANT

## 2020-07-14 PROCEDURE — 99499 NO LOS: ICD-10-PCS | Mod: HCNC,S$GLB,, | Performed by: PHYSICIAN ASSISTANT

## 2020-07-14 PROCEDURE — 20610 DRAIN/INJ JOINT/BURSA W/O US: CPT | Mod: HCNC,RT,S$GLB, | Performed by: PHYSICIAN ASSISTANT

## 2020-07-14 PROCEDURE — 99999 PR PBB SHADOW E&M-EST. PATIENT-LVL III: CPT | Mod: PBBFAC,HCNC,, | Performed by: PHYSICIAN ASSISTANT

## 2020-07-14 NOTE — PROGRESS NOTES
Anthony Miguel is here for follow up of right knee arthritis. Pt is requesting Monovisc injection.  [facility] reviewed per encounter record. Has failed other conservative modalities including NSAIDS, activity modification, weight loss.    The prior shot was tolerated well.    PHYSICAL EXAMINATION:     General: The patient is alert and oriented x 3. Mood is pleasant.   Observation of ears, eyes and nose reveals no gross abnormalities. No   labored breathing observed.     No signs of infection or adverse reaction to knee.    Injection Procedure  A time out was performed, including verification of patient ID, procedure, site and side, availability of information and equipment, review of safety issues, and agreement with consent, the procedure site was marked.    After time out was performed, the patient was prepped aseptically with chloraprep. A diagnostic and therapeutic injection of 4cc's Monovisc was given under sterile technique using a 22g x 1.5 needle from the Superolateral aspect of the right Knee Joint in the supine position.      Anthony Miguel had no adverse reactions to the medication. Pain decreased. He was instructed to apply ice to the joint for 20 minutes and avoid strenuous activities for 24-36 hours following the injection. He was warned of possible blood sugar and/or blood pressure changes during that time. Following that time, he can resume regular activities.    He was reminded to call the clinic immediately for any adverse side effects as explained in clinic today.    RTC to see Ayush Zaidi PA-C in 6 months for follow-up.    All of the patient's questions were answered and the patient will contact us if they have any questions or concerns in the interim.

## 2020-08-18 ENCOUNTER — PATIENT OUTREACH (OUTPATIENT)
Dept: ADMINISTRATIVE | Facility: OTHER | Age: 70
End: 2020-08-18

## 2020-08-18 ENCOUNTER — TELEPHONE (OUTPATIENT)
Dept: PAIN MEDICINE | Facility: CLINIC | Age: 70
End: 2020-08-18

## 2020-08-18 NOTE — TELEPHONE ENCOUNTER
Staff left detailed message  regarding appointment 8/19/20 at  9 am with Blanco Mark Np as in office visit and to inform patient of direct line to call before entering the building also to arrive 15 minutes early but we ask that patient come alone unless its an essential visitor.

## 2020-09-08 ENCOUNTER — OFFICE VISIT (OUTPATIENT)
Dept: PRIMARY CARE CLINIC | Facility: CLINIC | Age: 70
End: 2020-09-08
Payer: MEDICARE

## 2020-09-08 ENCOUNTER — TELEPHONE (OUTPATIENT)
Dept: PRIMARY CARE CLINIC | Facility: CLINIC | Age: 70
End: 2020-09-08

## 2020-09-08 DIAGNOSIS — M48.07 LUMBOSACRAL STENOSIS WITH NEUROGENIC CLAUDICATION: ICD-10-CM

## 2020-09-08 PROCEDURE — 99443 PR PHYSICIAN TELEPHONE EVALUATION 21-30 MIN: ICD-10-PCS | Mod: HCNC,95,, | Performed by: INTERNAL MEDICINE

## 2020-09-08 PROCEDURE — 99443 PR PHYSICIAN TELEPHONE EVALUATION 21-30 MIN: CPT | Mod: HCNC,95,, | Performed by: INTERNAL MEDICINE

## 2020-09-08 RX ORDER — CELECOXIB 100 MG/1
100 CAPSULE ORAL DAILY
Qty: 90 CAPSULE | Refills: 3 | Status: SHIPPED | OUTPATIENT
Start: 2020-09-08 | End: 2020-09-10 | Stop reason: SDUPTHER

## 2020-09-08 NOTE — Clinical Note
Lloyd Mark,  Mr Miguel wants follow-up with you. He is interested in future injections.    He can only do a phone visit. He does not have virtual visit capabilities, and has transportation barrier.    He is taking too many NSAIDs. Do you have any objection me trying celebrex once daily? I'm sending it for a price check now.    Thanks,  Jada Spaulding MD/MPH  [facility]  [phone] [employer]

## 2020-09-08 NOTE — PROGRESS NOTES
"Established Patient - Audio Only [facility]health [facility]     The patient location is: HOME  The chief complaint leading to consultation is: routine care  Visit type: Virtual visit with audio only (telephone)     The reason for the audio only service rather than synchronous audio and video virtual visit was related to technical difficulties or patient preference/necessity.     Each patient to whom I provide medical services by telemedicine is:  (1) informed of the relationship between the physician and patient and the respective role of any other health care provider with respect to management of the patient; and (2) notified that they may decline to receive medical services by telemedicine and may withdraw from such care at any time. Patient verbally consented to receive this service via voice-only telephone call.       Subjective:      Patient ID: Anthony Miguel is a 69 y.o. male.    Patient's risk score is 3. Patient is high risk for poor outcomes with COVID due to the following chronic conditions advanced age, prostate CA, and chronic pain    Pt states he is doing well and has been coping well with Covid 19    Pt notes that he has been feeling occasional abd pain, described as "indigestion" for the last few weeks. He reports taking antacids with minimal relief.   Pt admits that due to his previous cervical and lumbar spinal issues that he is concerned about "middle" spinal issues developing.     Pt is eager to receive his Flu vaccine and would like to know when he can come in to receive it.     Pt states that he is doing well with his medications, but reports that he only has 15 days left of his Meloxicam. Pt admits that occasionally he takes double doses to deal with pain and on one day took 4-5 doses.     Per pain management note:  - He is s/p Left sided L3,4,5 RFA with significant improvement. Can repeat as needed  - He is s/p Right sided L3,4,5 RFA and still has pain. Discussed he has " continued activities to exacerbate pain and he is only 7 days post procedure so he can still benefit from       this.  - Refill Neurontin, Mobic, Voltaren.  - Referred to Healthy Back Program.   - RTC in 1 month for re-evaluation of pain. Can be virtual        Objective:     Lab Results   Component Value Date    WBC 9.20 10/09/2019    HGB 13.1 (L) 10/09/2019    HCT 39.1 (L) 10/09/2019     10/09/2019    CHOL 152 01/09/2020    TRIG 102 01/09/2020    HDL 49 01/09/2020    ALT 11 12/16/2019    AST 16 12/16/2019     01/09/2020    K 4.2 01/09/2020     01/09/2020    CREATININE 0.9 01/09/2020    BUN 19 01/09/2020    CO2 25 01/09/2020    TSH 3.029 06/02/2017    PSA 1.1 10/26/2018    INR 1.0 06/21/2017    HGBA1C 5.1 10/26/2018         Assessment:   69 y.o. male with multiple co-morbid illnesses here to continue work-up of chronic issues.     Plan:     Problem List Items Addressed This Visit        Neuro    Lumbosacral stenosis with neurogenic claudication     Low back pain with spinal stenosis on MRI, difficulty ambulating  · Continue pain management and sports medicine for supportive care  · Advised to minimize NSAID dose  · Is currently taking mobic 5 times daily!  · Advised to cut back to once daily or switch to celebrex  · Message sent to pain management for NP Jas follow-up         Relevant Medications    celecoxib (CELEBREX) 100 MG capsule          Health Maintenance       Date Due Completion Date    TETANUS VACCINE [birth year]/03/1967 ---    Shingles Vaccine (2 of 3) 11/14/2016 9/19/2016    Influenza Vaccine (1) 08/01/2020 10/1/2019    Lipid Panel 01/09/2021 1/9/2020    Colorectal Cancer Screening 04/15/2026 4/15/2016          Follow up in about 1 month (around 10/8/2020). Thirty minutes spent with this patient today, including addressing advanced care planning.    Jada Spaulding MD/MPH  Internal Medicine  [facility]  Spectra [phone]    This service was not  originating from a related E/M service provided within the previous 7 days nor will  to an E/M service or procedure within the next 24 hours or my soonest available appointment.  Prevailing standard of care was able to be met in this audio-only visit.

## 2020-09-08 NOTE — TELEPHONE ENCOUNTER
Please let patient know that the copay for celebrex is $19.60 for 90d. Does he know if this cheaper than his mobic?    Thanks,  KJ

## 2020-09-08 NOTE — ASSESSMENT & PLAN NOTE
Low back pain with spinal stenosis on MRI, difficulty ambulating  · Continue pain management and sports medicine for supportive care  · Advised to minimize NSAID dose  · Is currently taking mobic 5 times daily!  · Advised to cut back to once daily or switch to celebrex  · Message sent to pain management for NP Jas follow-up

## 2020-09-09 NOTE — TELEPHONE ENCOUNTER
ALVIN White MD             Nevermind I just read your note of how he is taking the Mobic. He definitely needs something different    Previous Messages    ----- Message -----   From: Jada Spaulding MD   Sent: 9/8/2020   3:54 PM CDT   To: Blanco Mark NP     Hi ALVIN Mark,   Mr Miguel wants follow-up with you. He is interested in future injections.     He can only do a phone visit. He does not have virtual visit capabilities, and has transportation barrier.     He is taking too many NSAIDs. Do you have any objection me trying celebrex once daily? I'm sending it for a price check now.     Thanks,   Jada Spaulding MD/MPH   [facility]   [phone] [employer]

## 2020-09-15 ENCOUNTER — TELEPHONE (OUTPATIENT)
Dept: PRIMARY CARE CLINIC | Facility: CLINIC | Age: 70
End: 2020-09-15

## 2020-09-15 NOTE — TELEPHONE ENCOUNTER
----- Message from Amari Onel sent at 9/15/2020 12:50 PM CDT -----  Regarding: flu shot  Contact: XZ  Patient reports that he already received his flu shot at his local [employer].

## 2020-09-15 NOTE — TELEPHONE ENCOUNTER
----- Message from Amari Sykes sent at 9/15/2020 12:42 PM CDT -----  Regarding: check in on pain management  Contact: ZACH  I called Mr Miguel to check on his pain. He reports that he's been taking the Celebrex instead of Mobic and that it's helping. He also uses the Voltaran gel on his arms as needed. He appreciates our office calling to check on him.

## 2020-10-09 ENCOUNTER — OFFICE VISIT (OUTPATIENT)
Dept: PRIMARY CARE CLINIC | Facility: CLINIC | Age: 70
End: 2020-10-09
Payer: MEDICARE

## 2020-10-09 DIAGNOSIS — F33.0 MILD EPISODE OF RECURRENT MAJOR DEPRESSIVE DISORDER: ICD-10-CM

## 2020-10-09 DIAGNOSIS — Z20.822 EXPOSURE TO COVID-19 VIRUS: ICD-10-CM

## 2020-10-09 DIAGNOSIS — F15.182 CAFFEINE-INDUCED SLEEP DISORDER WITH MILD USE DISORDER, INSOMNIA TYPE: ICD-10-CM

## 2020-10-09 DIAGNOSIS — M48.07 LUMBOSACRAL STENOSIS WITH NEUROGENIC CLAUDICATION: Primary | ICD-10-CM

## 2020-10-09 DIAGNOSIS — M46.92 UNSPECIFIED INFLAMMATORY SPONDYLOPATHY, CERVICAL REGION: ICD-10-CM

## 2020-10-09 PROCEDURE — 99443 PR PHYSICIAN TELEPHONE EVALUATION 21-30 MIN: CPT | Mod: HCNC,95,, | Performed by: INTERNAL MEDICINE

## 2020-10-09 PROCEDURE — 99443 PR PHYSICIAN TELEPHONE EVALUATION 21-30 MIN: ICD-10-PCS | Mod: HCNC,95,, | Performed by: INTERNAL MEDICINE

## 2020-10-09 RX ORDER — GABAPENTIN 300 MG/1
600 CAPSULE ORAL 3 TIMES DAILY
Qty: 540 CAPSULE | Refills: 3 | Status: SHIPPED | OUTPATIENT
Start: 2020-10-09 | End: 2021-11-22

## 2020-10-09 RX ORDER — SERTRALINE HYDROCHLORIDE 25 MG/1
25 TABLET, FILM COATED ORAL DAILY
Qty: 90 TABLET | Refills: 3 | Status: SHIPPED | OUTPATIENT
Start: 2020-10-09 | End: 2023-01-31

## 2020-10-09 RX ORDER — CELECOXIB 100 MG/1
100 CAPSULE ORAL DAILY
Qty: 90 CAPSULE | Refills: 3 | Status: SHIPPED | OUTPATIENT
Start: 2020-10-09 | End: 2021-12-14 | Stop reason: SDUPTHER

## 2020-10-09 RX ORDER — DICLOFENAC SODIUM 10 MG/G
2 GEL TOPICAL 4 TIMES DAILY
Qty: 1 TUBE | Refills: 11 | Status: SHIPPED | OUTPATIENT
Start: 2020-10-09 | End: 2022-07-01

## 2020-10-09 RX ORDER — TRAZODONE HYDROCHLORIDE 50 MG/1
150 TABLET ORAL NIGHTLY PRN
Qty: 270 TABLET | Refills: 3 | Status: SHIPPED | OUTPATIENT
Start: 2020-10-09 | End: 2022-06-30 | Stop reason: SDUPTHER

## 2020-10-09 NOTE — PROGRESS NOTES
Established Patient - Audio Only Telehealth Visit with [facility] Provider     The patient location is: HOME  The chief complaint leading to consultation is: routine care  Visit type: Virtual visit with audio only (telephone)     The reason for the audio only service rather than synchronous audio and video virtual visit was related to technical difficulties or patient preference/necessity.     Each patient to whom I provide medical services by telemedicine is:  (1) informed of the relationship between the physician and patient and the respective role of any other health care provider with respect to management of the patient; and (2) notified that they may decline to receive medical services by telemedicine and may withdraw from such care at any time. Patient verbally consented to receive this service via voice-only telephone call.       Subjective:      Patient ID: Anthony Miguel is a 69 y.o. male.    Patient is high risk for poor outcomes with COVID due to the following chronic conditions advanced age, prostate CA, and chronic pain.    Prior to this visit, patient's last encounter with PCP was 9/8/2020.    Mr. Miguel is still in a lot of pain. He thinks he's been doing too much work around the house, which is making his pain worse. He started on the Celebrex mid September and reports that it helps, but does not take away all his pain. He's still using the Voltaran gel, which is helping. His pain is mostly in his arms and sometimes it prevents him from sleeping. He was wondering if he could get a stronger pain medication.    He had a lumbar radiofrequency neurotomy ablation in June with Dr. Marcel Adkins and said that helped a lot. He was wondering if something similar could be done for his arms. Patient was advised to follow up with Dr. Adkins.     He is gaining weight following his prostatectomy and new sedentary lifestyle due to his chronic pain and not working.    He would like a refill for:  gabapentin, celebrex, trazodone       Objective:     Lab Results   Component Value Date    WBC 9.20 10/09/2019    HGB 13.1 (L) 10/09/2019    HCT 39.1 (L) 10/09/2019     10/09/2019    CHOL 152 01/09/2020    TRIG 102 01/09/2020    HDL 49 01/09/2020    ALT 11 12/16/2019    AST 16 12/16/2019     01/09/2020    K 4.2 01/09/2020     01/09/2020    CREATININE 0.9 01/09/2020    BUN 19 01/09/2020    CO2 25 01/09/2020    TSH 3.029 06/02/2017    PSA 1.1 10/26/2018    INR 1.0 06/21/2017    HGBA1C 5.1 10/26/2018         Assessment:   69 y.o. male with multiple co-morbid illnesses here to continue work-up of chronic issues.     Plan:     Problem List Items Addressed This Visit        Neuro    Lumbosacral stenosis with neurogenic claudication - Primary     Low back pain with spinal stenosis on MRI, difficulty ambulating  · Continue pain management and sports medicine for supportive care  · Advised to minimize NSAID dose  · Was previously taking mobic 5 times daily!  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, insomnia type    Relevant Medications    traZODone (DESYREL) 50 MG tablet    Mild episode of recurrent major depressive disorder     Uncontrolled chronic pain, sedentary lifestyle  · Start zoloft         Relevant Medications    sertraline (ZOLOFT) 25 MG tablet       Orthopedic    Unspecified inflammatory spondylopathy, cervical region     Chronic neck pain, s/p laminectomy   · Completed OT  · Now with pain with increased activity         Relevant Medications    gabapentin (NEURONTIN) 300 MG capsule      Other Visit Diagnoses     Exposure to COVID-19 virus        Relevant Orders    COVID-19 Routine Screening          Health Maintenance       Date Due Completion Date    TETANUS VACCINE [birth year]03/1968  ---    Shingles Vaccine (2 of 3) 11/14/2016 9/19/2016    Lipid Panel 01/09/2021 1/9/2020    Colorectal Cancer Screening 04/15/2026 4/15/2016          Follow up in about 3 months (around 1/9/2021). Thirty minutes spent with this patient today, including addressing advanced care planning.    Jada Spaulding MD/MPH  Internal Medicine  [facility] [phone]    This service was not originating from a related E/M service provided within the previous 7 days nor will  to an E/M service or procedure within the next 24 hours or my soonest available appointment.  Prevailing standard of care was able to be met in this audio-only visit.

## 2020-10-09 NOTE — ASSESSMENT & PLAN NOTE
Low back pain with spinal stenosis on MRI, difficulty ambulating  · Continue pain management and sports medicine for supportive care  · Advised to minimize NSAID dose  · Was previously taking mobic 5 times daily!  · Advised to only take celebrex  · NP Jas with pain management following

## 2020-10-12 ENCOUNTER — LAB VISIT (OUTPATIENT)
Dept: INTERNAL MEDICINE | Facility: CLINIC | Age: 70
End: 2020-10-12
Payer: MEDICARE

## 2020-10-12 DIAGNOSIS — Z20.822 EXPOSURE TO COVID-19 VIRUS: ICD-10-CM

## 2020-10-12 LAB — SARS-COV-2 RNA RESP QL NAA+PROBE: NOT DETECTED

## 2020-10-12 PROCEDURE — U0003 INFECTIOUS AGENT DETECTION BY NUCLEIC ACID (DNA OR RNA); SEVERE ACUTE RESPIRATORY SYNDROME CORONAVIRUS 2 (SARS-COV-2) (CORONAVIRUS DISEASE [COVID-19]), AMPLIFIED PROBE TECHNIQUE, MAKING USE OF HIGH THROUGHPUT TECHNOLOGIES AS DESCRIBED BY CMS-2020-01-R: HCPCS | Mod: HCNC

## 2020-10-13 ENCOUNTER — TELEPHONE (OUTPATIENT)
Dept: PAIN MEDICINE | Facility: CLINIC | Age: 70
End: 2020-10-13

## 2020-10-13 NOTE — TELEPHONE ENCOUNTER
----- Message from Jada Spaulding MD sent at 10/9/2020  3:57 PM CDT -----  Hi team for Dr Ledesma,Can you get Mr Miguel an appointment with Dr Adkins.ThanksKJ

## 2020-10-13 NOTE — TELEPHONE ENCOUNTER
Spoke with patient to schedule follow up appointment with Dr Adkins as requested    Patient declined appointment with Dr Adkins and appointment today with nurse practitioner    Patient scheduled for 10/13/2020 with Farida Andrew NP

## 2020-10-14 ENCOUNTER — PATIENT OUTREACH (OUTPATIENT)
Dept: ADMINISTRATIVE | Facility: OTHER | Age: 70
End: 2020-10-14

## 2020-10-14 NOTE — PROGRESS NOTES
Health Maintenance Due   Topic Date Due    TETANUS VACCINE  12/03/1968    Shingles Vaccine (2 of 3) 11/14/2016     Updates were requested from care everywhere.  Chart was reviewed for overdue Proactive [facility] Encounters (JOSE) topics (CRS, Breast Cancer Screening, Eye exam)  Health Maintenance has been updated.  LINKS immunization registry triggered.  Immunizations were reconciled.

## 2020-10-15 ENCOUNTER — OFFICE VISIT (OUTPATIENT)
Dept: PAIN MEDICINE | Facility: CLINIC | Age: 70
End: 2020-10-15
Payer: MEDICARE

## 2020-10-15 VITALS
SYSTOLIC BLOOD PRESSURE: 136 MMHG | BODY MASS INDEX: 28.72 KG/M2 | HEART RATE: 79 BPM | OXYGEN SATURATION: 100 % | DIASTOLIC BLOOD PRESSURE: 86 MMHG | HEIGHT: 67 IN | TEMPERATURE: 97 F | WEIGHT: 183 LBS | RESPIRATION RATE: 18 BRPM

## 2020-10-15 DIAGNOSIS — M51.36 DDD (DEGENERATIVE DISC DISEASE), LUMBAR: ICD-10-CM

## 2020-10-15 DIAGNOSIS — G89.4 CHRONIC PAIN SYNDROME: Primary | ICD-10-CM

## 2020-10-15 DIAGNOSIS — M47.816 SPONDYLOSIS OF LUMBAR REGION WITHOUT MYELOPATHY OR RADICULOPATHY: ICD-10-CM

## 2020-10-15 DIAGNOSIS — M25.512 CHRONIC PAIN OF BOTH SHOULDERS: ICD-10-CM

## 2020-10-15 DIAGNOSIS — M25.511 CHRONIC PAIN OF BOTH SHOULDERS: ICD-10-CM

## 2020-10-15 DIAGNOSIS — G89.29 CHRONIC PAIN OF BOTH SHOULDERS: ICD-10-CM

## 2020-10-15 PROCEDURE — 1159F PR MEDICATION LIST DOCUMENTED IN MEDICAL RECORD: ICD-10-PCS | Mod: HCNC,S$GLB,, | Performed by: NURSE PRACTITIONER

## 2020-10-15 PROCEDURE — 1125F AMNT PAIN NOTED PAIN PRSNT: CPT | Mod: HCNC,S$GLB,, | Performed by: NURSE PRACTITIONER

## 2020-10-15 PROCEDURE — 3008F BODY MASS INDEX DOCD: CPT | Mod: HCNC,CPTII,S$GLB, | Performed by: NURSE PRACTITIONER

## 2020-10-15 PROCEDURE — 1125F PR PAIN SEVERITY QUANTIFIED, PAIN PRESENT: ICD-10-PCS | Mod: HCNC,S$GLB,, | Performed by: NURSE PRACTITIONER

## 2020-10-15 PROCEDURE — 1159F MED LIST DOCD IN RCRD: CPT | Mod: HCNC,S$GLB,, | Performed by: NURSE PRACTITIONER

## 2020-10-15 PROCEDURE — 1101F PT FALLS ASSESS-DOCD LE1/YR: CPT | Mod: HCNC,CPTII,S$GLB, | Performed by: NURSE PRACTITIONER

## 2020-10-15 PROCEDURE — 99213 OFFICE O/P EST LOW 20 MIN: CPT | Mod: HCNC,S$GLB,, | Performed by: NURSE PRACTITIONER

## 2020-10-15 PROCEDURE — 1101F PR PT FALLS ASSESS DOC 0-1 FALLS W/OUT INJ PAST YR: ICD-10-PCS | Mod: HCNC,CPTII,S$GLB, | Performed by: NURSE PRACTITIONER

## 2020-10-15 PROCEDURE — 99999 PR PBB SHADOW E&M-EST. PATIENT-LVL IV: ICD-10-PCS | Mod: PBBFAC,HCNC,, | Performed by: NURSE PRACTITIONER

## 2020-10-15 PROCEDURE — 3008F PR BODY MASS INDEX (BMI) DOCUMENTED: ICD-10-PCS | Mod: HCNC,CPTII,S$GLB, | Performed by: NURSE PRACTITIONER

## 2020-10-15 PROCEDURE — 99999 PR PBB SHADOW E&M-EST. PATIENT-LVL IV: CPT | Mod: PBBFAC,HCNC,, | Performed by: NURSE PRACTITIONER

## 2020-10-15 PROCEDURE — 99213 PR OFFICE/OUTPT VISIT, EST, LEVL III, 20-29 MIN: ICD-10-PCS | Mod: HCNC,S$GLB,, | Performed by: NURSE PRACTITIONER

## 2020-10-15 NOTE — PROGRESS NOTES
Chronic Pain-Medicine-Established Note (Follow up visit)      SUBJECTIVE:    Interval History 10/15/2020:  The patient returns to clinic today for follow up of pain. He reports increased bilateral shoulder pain. He describes this pain as stiff, sharp, and aching. He had short term relief with subacromial injections in the past. This pain is worse with prolonged activity. He reports increased low back and buttock pain. He denies any radiating leg pain. He does endorse morning stiffness. He denies any other health changes. His pain today is 4/10.    Interval History 7/13/2020:  The patient is s/p Bilateral Lumbar RFA. He states now having significant relief of approx 80% to both sided and able to perform ADLs easier. He is very please with results and will be starting healthy back program at end of month due to increased mobility and less severe pain. Pt denies any new areas of pain, denies any new neuro changes. Does not need medication refills at this time. Pain is 4/10 today.     Interval History 6/17/2020:   The patient presents for follow-up of lower back pain.  Patient states he has had significant improvement of the left-sided lumbar pain status post L 3, 4, 5 RFA on 5/27/2020. He is also s/p Right side L3,4,5 RFA on 6/10/2020. He states lower back pain to right still continued and endorses he continues to do housework and believes increased activity has not allowed time for pain to ease.  He denies any radiculopathy, denies any neurological complaints, denies loss of b/b or saddle paresthesias. He continues to take Mobic, voltaren gel, neurontin and Trazodone with benefit and without adverse side effects of medication.     Interval history 05/11/2020 (Telephone encounter)   Last encounter,we planned on scheduling for bilateral lumbar RFA in future as patient has had excellent relief from BL lumbar FJIs in the past. At last encounter, we also switched patient form diclofenac to meloxicam 15 mg daily and added  voltaren gel for local anti-inflammatory benefit. Today he reports he completed his 38 day radiation trial for his prostate cancer (04/18/2020). He reports the previous facet joint injections helped tremendously (>70% pain relief for 3 months at a time) and is inquiring about RFA for his low back. Back  Pain is unchanged, located in lower back, worsened with extension. NSAIDs are not quite helpful. Gabapentin is mildly helpful.      Interval history (Telephone encounter) 03/26/20  Pt is a 68 yo male with Hx of cervical stenosis w/ myelopathy s/p C3-6 decompression and fusion, as well as prostate CA on radiatio therapy who we are following for chronic low back pain. We last performed lumbar facet joint injections in August and October of 2019. He reports excellent (75%) pain relief for about 3 months but his pain has since returned. He reports that the pain feels identical, but about 1-2 cm lower. Pain is worse with standing/ walking. He denies any pain shooting down his legs or numbness/ tingling/ weakness in his legs. No changes with bladder/bowel control. He is currently taking diclofenac 75mg BID and gabapentin 600mg TID with decent relief. He is curious if there is something slightly stronger than diclofenac that may help. He cannot recall any other NSAIDs or muscle relaxers that he's tried.    Pain Medications:    - NSAIDs: Meloxicam, diclofenac, voltaren gel  - Anti-Depressants: trazadone  - Anti convulsants: Gabapentin 600 mg TID        Physical Therapy/Home Exercise: no       report:  Not applicable    Pain Procedures:   2/6/19- L5/S1 KAYLEY  4/01/2019- L5/S1 KAYLEY  07/29/2019- Bilateral L4/5 and L5/S1 FJI  10/02/2019- Bilateral L4/5 and L5/S1 FJI  5/27/2020 Left L3,4,5 RFA  6/10/2020 Right L3,4,5 RFA       Imaging:   Xray Shoulder 6/29/2020:  COMPARISON:  Comparison is made to 10/09/2019.     FINDINGS:  Visualized osseous structures appear unremarkable, with no evidence of recent or healing fracture, lytic  destructive process, appreciable glenohumeral arthritic change, or other significant abnormality noted.  No glenohumeral dislocation.  No abnormal soft tissue calcifications.  Incidental note is made of partial visualization of instrumentation related to a prior cervical spinal fusion procedure.  No significant detrimental interval change since 10/09/2019.     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    Overview:  Right side dx update     Past Surgical History:   Procedure Laterality Date    BACK SURGERY      EPIDURAL STEROID INJECTION N/A 2/6/2019    Procedure: INJECTION, STEROID, EPIDURAL, L45-S1 IL;  Surgeon: Marcel Adkins MD;  Location: [facility];  Service: Pain Management;  Laterality: N/A;    EPIDURAL STEROID INJECTION N/A 4/10/2019    Procedure: Injection, Steroid, Epidural LUMBAR/CAUDAL L5-S1 INTERLAMINAR KAYLEY;  Surgeon: Marcel Adkins MD;  Location: [facility];  Service: Pain Management;  Laterality: N/A;  NEEDS CONSENT    INJECTION OF FACET JOINT Bilateral 8/14/2019    Procedure: INJECTION, FACET JOINT INJECTION (LUMBAR BLOCK) BILATERAL L4-5 AND L5-S1;  Surgeon: Marcel Adkins MD;  Location: [facility];  Service: Pain Management;  Laterality: Bilateral;  NEEDS CONSENT    INJECTION OF FACET JOINT Bilateral 10/16/2019    Procedure: FACET JOINT INJECTION (LUMBAR BLOCK) BILATERAL L4-5 AND L5-S1;  Surgeon: Marcel Adkins MD;  Location: [facility];  Service: Pain Management;  Laterality: Bilateral;  NEEDS CONSENT    Larangoscopy      RADIOFREQUENCY ABLATION Left 5/27/2020    Procedure: RADIOFREQUENCY ABLATION LEFT L3,4,5 1 of 2;   Surgeon: Marcel Adkins MD;  Location: [facility];  Service: Pain Management;  Laterality: Left;  Left RFA L3, 4, 5  1 of 2    RADIOFREQUENCY ABLATION Right 6/10/2020    Procedure: RADIOFREQUENCY ABLATION RIGHT L3,4,5;  Surgeon: Marcel Adkins MD;  Location: [facility];  Service: Pain Management;  Laterality: Right;  Right RFA L3,4.5  2 of 2    ROBOT-ASSISTED LAPAROSCOPIC PROSTATECTOMY USING DA SAÚL XI N/A 2019    Procedure: XI ROBOTIC PROSTATECTOMY;  Surgeon: Sergio Dixon MD;  Location: [facility] OR [address] [place] [address];  Service: Urology;  Laterality: N/A;  4hrs/ gen with [facility]      Social History     Socioeconomic History    Marital status: Single     Spouse name: Not on file    Number of children: 0    Years of education: Not on file    Highest education level: Not on file   Occupational History    Not on file   Social Needs    Financial resource strain: Not hard at all    Food insecurity     Worry: Never true     Inability: Never true    Transportation needs     Medical: Yes     Non-medical: Yes   Tobacco Use    Smoking status: Former Smoker     Packs/day: 1.00     Quit date: 2000     Years since quittin.7    Smokeless tobacco: Former User   Substance and Sexual Activity    Alcohol use: Yes     Alcohol/week: 1.0 standard drinks     Types: 1 Glasses of wine per week     Comment: once in a while    Drug use: No    Sexual activity: Not Currently   Lifestyle    Physical activity     Days per week: Not on file     Minutes per session: Not on file    Stress: Only a little   Relationships    Social connections     Talks on phone: Not on file     Gets together: Not on file     Attends Gnosticism service: Not on file     Active member of club or organization: Not on file     Attends meetings of clubs or organizations: Not on file     Relationship status: Not on file   Other Topics Concern    Not on file   Social History Narrative    No difficulty reading Rx labels      Family History    Problem Relation Age of Onset    Cancer Mother     No Known Problems Father     Keratoconus Brother     Cancer Brother        Review of patient's allergies indicates:  No Known Allergies    Current Outpatient Medications   Medication Sig    atorvastatin (LIPITOR) 10 MG tablet Take 1 tablet (10 mg total) by mouth once daily.    celecoxib (CELEBREX) 100 MG capsule Take 1 capsule (100 mg total) by mouth once daily.    cholecalciferol, vitamin D3, (VITAMIN D3) 50 mcg (2,000 unit) Cap Take 1 capsule (2,000 Units total) by mouth once daily.    diclofenac sodium (VOLTAREN) 1 % Gel Apply 2 g topically 4 (four) times daily.    ferrous sulfate 325 (65 FE) MG EC tablet Take 1 tablet (325 mg total) by mouth once daily.    gabapentin (NEURONTIN) 300 MG capsule Take 2 capsules (600 mg total) by mouth 3 (three) times daily.    polyethylene glycol (GLYCOLAX) 17 gram PwPk Take 17 g by mouth once daily.    sertraline (ZOLOFT) 25 MG tablet Take 1 tablet (25 mg total) by mouth once daily.    traZODone (DESYREL) 50 MG tablet Take 3 tablets (150 mg total) by mouth nightly as needed for Insomnia.    triamcinolone acetonide 0.1% (KENALOG) 0.1 % cream Apply 15 g topically.    walker (ULTRA-LIGHT ROLLATOR) Misc 1 Units by Misc.(Non-Drug; Combo Route) route once daily at 6am.     No current facility-administered medications for this visit.        REVIEW OF SYSTEMS:    GENERAL:  No weight loss, malaise or fevers.  HEENT:   No recent changes in vision or hearing  NECK:  Negative for lumps, no difficulty with swallowing.  RESPIRATORY:  Negative for cough, wheezing or shortness of breath, patient denies any recent URI.  CARDIOVASCULAR:  Negative for chest pain, leg swelling or palpitations.  GI:  Negative for abdominal discomfort, blood in stools or black stools or change in bowel habits.  MUSCULOSKELETAL:  See HPI.  SKIN:  Negative for lesions, rash, and itching.  PSYCH:  No mood disorder or recent psychosocial stressors.   "Patients sleep is disturbed secondary to pain.  HEMATOLOGY/LYMPHOLOGY:  + prostate cancer s/p radiation therapy  NEURO:   No history of headaches, syncope, paralysis, seizures or tremors.  All other reviewed and negative other than HPI.    OBJECTIVE:     Vitals:    10/15/20 1422   BP: 136/86   Pulse: 79   Resp: 18   Temp: 97.3 °F (36.3 °C)   SpO2: 100%   Weight: 83 kg (182 lb 15.7 oz)   Height: 5' 7" (1.702 m)   PainSc:   4     GEN:  Well developed, well nourished.  No acute distress.   HEENT:  No trauma.  Mucous membranes moist.  Nares patent bilaterally.  PSYCH: Normal affect. Thought content appropriate.  CHEST:  Breathing symmetric.  No audible wheezing.  ABD: Soft, non-distended.  SKIN:  Warm, pink, dry.  No rash on exposed areas.    EXT:  No cyanosis, clubbing, or edema.  No color change or changes in nail or hair growth.  NEURO/MUSCULOSKELETAL:  Fully alert, oriented, and appropriate. Speech normal sean. No cranial nerve deficits.   Gait:.  Antalgic and aided with cane.   Motor Strength: 5/5 motor strength throughout lower extremities.   Sensory: no sensory deficit in the lower extremities.   Reflexes:  +2 and symmetric to patellar .  No clonus or spasticity.   Lumbar: SLR negative bilaterally. Limited ROM with pain on extension. Mildly positive facet loading bilaterally.   Limited ROM of bilateral shoulders with pain on adduction.         ASSESSMENT: 69 y.o. year old male with pain, consistent with       1. Chronic pain syndrome     2. Chronic pain of both shoulders     3. Spondylosis of lumbar region without myelopathy or radiculopathy     4. DDD (degenerative disc disease), lumbar           PLAN:     - Previous imaging was reviewed and discussed with the patient today.    - Schedule for bilateral glenohumeral joint injections.     - We can repeat lumbar RFA as needed.     - Continue Neurontin, Mobic, Voltaren.    - Continue home exercise routine.     - RTC 2 weeks after above procedure.     - Dr." Jed was consulted on the patient and agrees with this plan.    The above plan and management options were discussed at length with patient. Patient is in agreement with the above and verbalized understanding.     Farida Andrew NP  10/15/2020

## 2020-10-15 NOTE — H&P (VIEW-ONLY)
Chronic Pain-Medicine-Established Note (Follow up visit)      SUBJECTIVE:    Interval History 10/15/2020:  The patient returns to clinic today for follow up of pain. He reports increased bilateral shoulder pain. He describes this pain as stiff, sharp, and aching. He had short term relief with subacromial injections in the past. This pain is worse with prolonged activity. He reports increased low back and buttock pain. He denies any radiating leg pain. He does endorse morning stiffness. He denies any other health changes. His pain today is 4/10.    Interval History 7/13/2020:  The patient is s/p Bilateral Lumbar RFA. He states now having significant relief of approx 80% to both sided and able to perform ADLs easier. He is very please with results and will be starting healthy back program at end of month due to increased mobility and less severe pain. Pt denies any new areas of pain, denies any new neuro changes. Does not need medication refills at this time. Pain is 4/10 today.     Interval History 6/17/2020:   The patient presents for follow-up of lower back pain.  Patient states he has had significant improvement of the left-sided lumbar pain status post L 3, 4, 5 RFA on 5/27/2020. He is also s/p Right side L3,4,5 RFA on 6/10/2020. He states lower back pain to right still continued and endorses he continues to do housework and believes increased activity has not allowed time for pain to ease.  He denies any radiculopathy, denies any neurological complaints, denies loss of b/b or saddle paresthesias. He continues to take Mobic, voltaren gel, neurontin and Trazodone with benefit and without adverse side effects of medication.     Interval history 05/11/2020 (Telephone encounter)   Last encounter,we planned on scheduling for bilateral lumbar RFA in future as patient has had excellent relief from BL lumbar FJIs in the past. At last encounter, we also switched patient form diclofenac to meloxicam 15 mg daily and added  voltaren gel for local anti-inflammatory benefit. Today he reports he completed his 38 day radiation trial for his prostate cancer (04/18/2020). He reports the previous facet joint injections helped tremendously (>70% pain relief for 3 months at a time) and is inquiring about RFA for his low back. Back  Pain is unchanged, located in lower back, worsened with extension. NSAIDs are not quite helpful. Gabapentin is mildly helpful.      Interval history (Telephone encounter) 03/26/20  Pt is a 70 yo male with Hx of cervical stenosis w/ myelopathy s/p C3-6 decompression and fusion, as well as prostate CA on radiatio therapy who we are following for chronic low back pain. We last performed lumbar facet joint injections in August and October of 2019. He reports excellent (75%) pain relief for about 3 months but his pain has since returned. He reports that the pain feels identical, but about 1-2 cm lower. Pain is worse with standing/ walking. He denies any pain shooting down his legs or numbness/ tingling/ weakness in his legs. No changes with bladder/bowel control. He is currently taking diclofenac 75mg BID and gabapentin 600mg TID with decent relief. He is curious if there is something slightly stronger than diclofenac that may help. He cannot recall any other NSAIDs or muscle relaxers that he's tried.    Pain Medications:    - NSAIDs: Meloxicam, diclofenac, voltaren gel  - Anti-Depressants: trazadone  - Anti convulsants: Gabapentin 600 mg TID        Physical Therapy/Home Exercise: no       report:  Not applicable    Pain Procedures:   2/6/19- L5/S1 KAYLEY  4/01/2019- L5/S1 KAYLEY  07/29/2019- Bilateral L4/5 and L5/S1 FJI  10/02/2019- Bilateral L4/5 and L5/S1 FJI  5/27/2020 Left L3,4,5 RFA  6/10/2020 Right L3,4,5 RFA       Imaging:   Xray Shoulder 6/29/2020:  COMPARISON:  Comparison is made to 10/09/2019.     FINDINGS:  Visualized osseous structures appear unremarkable, with no evidence of recent or healing fracture, lytic  destructive process, appreciable glenohumeral arthritic change, or other significant abnormality noted.  No glenohumeral dislocation.  No abnormal soft tissue calcifications.  Incidental note is made of partial visualization of instrumentation related to a prior cervical spinal fusion procedure.  No significant detrimental interval change since 10/09/2019.     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    Overview:  Right side dx update     Past Surgical History:   Procedure Laterality Date    BACK SURGERY      EPIDURAL STEROID INJECTION N/A 2/6/2019    Procedure: INJECTION, STEROID, EPIDURAL, L45-S1 IL;  Surgeon: Marcel Adkins MD;  Location: [facility];  Service: Pain Management;  Laterality: N/A;    EPIDURAL STEROID INJECTION N/A 4/10/2019    Procedure: Injection, Steroid, Epidural LUMBAR/CAUDAL L5-S1 INTERLAMINAR KAYLEY;  Surgeon: Marcel Adkins MD;  Location: [facility];  Service: Pain Management;  Laterality: N/A;  NEEDS CONSENT    INJECTION OF FACET JOINT Bilateral 8/14/2019    Procedure: INJECTION, FACET JOINT INJECTION (LUMBAR BLOCK) BILATERAL L4-5 AND L5-S1;  Surgeon: Marcel Adkins MD;  Location: [facility];  Service: Pain Management;  Laterality: Bilateral;  NEEDS CONSENT    INJECTION OF FACET JOINT Bilateral 10/16/2019    Procedure: FACET JOINT INJECTION (LUMBAR BLOCK) BILATERAL L4-5 AND L5-S1;  Surgeon: Marcel Adkins MD;  Location: [facility];  Service: Pain Management;  Laterality: Bilateral;  NEEDS CONSENT    Larangoscopy      RADIOFREQUENCY ABLATION Left 5/27/2020    Procedure: RADIOFREQUENCY ABLATION LEFT L3,4,5 1 of 2;   Surgeon: Marcel Adkins MD;  Location: [facility];  Service: Pain Management;  Laterality: Left;  Left RFA L3, 4, 5  1 of 2    RADIOFREQUENCY ABLATION Right 6/10/2020    Procedure: RADIOFREQUENCY ABLATION RIGHT L3,4,5;  Surgeon: Marcel Adkins MD;  Location: [facility];  Service: Pain Management;  Laterality: Right;  Right RFA L3,4.5  2 of 2    ROBOT-ASSISTED LAPAROSCOPIC PROSTATECTOMY USING DA SAÚL XI N/A 2019    Procedure: XI ROBOTIC PROSTATECTOMY;  Surgeon: Sergio Dixon MD;  Location: [facility] OR [address] [place] [address];  Service: Urology;  Laterality: N/A;  4hrs/ gen with regional      Social History     Socioeconomic History    Marital status: Single     Spouse name: Not on file    Number of children: 0    Years of education: Not on file    Highest education level: Not on file   Occupational History    Not on file   Social Needs    Financial resource strain: Not hard at all    Food insecurity     Worry: Never true     Inability: Never true    Transportation needs     Medical: Yes     Non-medical: Yes   Tobacco Use    Smoking status: Former Smoker     Packs/day: 1.00     Quit date: 2000     Years since quittin.7    Smokeless tobacco: Former User   Substance and Sexual Activity    Alcohol use: Yes     Alcohol/week: 1.0 standard drinks     Types: 1 Glasses of wine per week     Comment: once in a while    Drug use: No    Sexual activity: Not Currently   Lifestyle    Physical activity     Days per week: Not on file     Minutes per session: Not on file    Stress: Only a little   Relationships    Social connections     Talks on phone: Not on file     Gets together: Not on file     Attends Tenriism service: Not on file     Active member of club or organization: Not on file     Attends meetings of clubs or organizations: Not on file     Relationship status: Not on file   Other Topics Concern    Not on file   Social History Narrative    No difficulty reading Rx labels      Family History    Problem Relation Age of Onset    Cancer Mother     No Known Problems Father     Keratoconus Brother     Cancer Brother        Review of patient's allergies indicates:  No Known Allergies    Current Outpatient Medications   Medication Sig    atorvastatin (LIPITOR) 10 MG tablet Take 1 tablet (10 mg total) by mouth once daily.    celecoxib (CELEBREX) 100 MG capsule Take 1 capsule (100 mg total) by mouth once daily.    cholecalciferol, vitamin D3, (VITAMIN D3) 50 mcg (2,000 unit) Cap Take 1 capsule (2,000 Units total) by mouth once daily.    diclofenac sodium (VOLTAREN) 1 % Gel Apply 2 g topically 4 (four) times daily.    ferrous sulfate 325 (65 FE) MG EC tablet Take 1 tablet (325 mg total) by mouth once daily.    gabapentin (NEURONTIN) 300 MG capsule Take 2 capsules (600 mg total) by mouth 3 (three) times daily.    polyethylene glycol (GLYCOLAX) 17 gram PwPk Take 17 g by mouth once daily.    sertraline (ZOLOFT) 25 MG tablet Take 1 tablet (25 mg total) by mouth once daily.    traZODone (DESYREL) 50 MG tablet Take 3 tablets (150 mg total) by mouth nightly as needed for Insomnia.    triamcinolone acetonide 0.1% (KENALOG) 0.1 % cream Apply 15 g topically.    walker (ULTRA-LIGHT ROLLATOR) Misc 1 Units by Misc.(Non-Drug; Combo Route) route once daily at 6am.     No current facility-administered medications for this visit.        REVIEW OF SYSTEMS:    GENERAL:  No weight loss, malaise or fevers.  HEENT:   No recent changes in vision or hearing  NECK:  Negative for lumps, no difficulty with swallowing.  RESPIRATORY:  Negative for cough, wheezing or shortness of breath, patient denies any recent URI.  CARDIOVASCULAR:  Negative for chest pain, leg swelling or palpitations.  GI:  Negative for abdominal discomfort, blood in stools or black stools or change in bowel habits.  MUSCULOSKELETAL:  See HPI.  SKIN:  Negative for lesions, rash, and itching.  PSYCH:  No mood disorder or recent psychosocial stressors.   "Patients sleep is disturbed secondary to pain.  HEMATOLOGY/LYMPHOLOGY:  + prostate cancer s/p radiation therapy  NEURO:   No history of headaches, syncope, paralysis, seizures or tremors.  All other reviewed and negative other than HPI.    OBJECTIVE:     Vitals:    10/15/20 1422   BP: 136/86   Pulse: 79   Resp: 18   Temp: 97.3 °F (36.3 °C)   SpO2: 100%   Weight: 83 kg (182 lb 15.7 oz)   Height: 5' 7" (1.702 m)   PainSc:   4     GEN:  Well developed, well nourished.  No acute distress.   HEENT:  No trauma.  Mucous membranes moist.  Nares patent bilaterally.  PSYCH: Normal affect. Thought content appropriate.  CHEST:  Breathing symmetric.  No audible wheezing.  ABD: Soft, non-distended.  SKIN:  Warm, pink, dry.  No rash on exposed areas.    EXT:  No cyanosis, clubbing, or edema.  No color change or changes in nail or hair growth.  NEURO/MUSCULOSKELETAL:  Fully alert, oriented, and appropriate. Speech normal sean. No cranial nerve deficits.   Gait:.  Antalgic and aided with cane.   Motor Strength: 5/5 motor strength throughout lower extremities.   Sensory: no sensory deficit in the lower extremities.   Reflexes:  +2 and symmetric to patellar .  No clonus or spasticity.   Lumbar: SLR negative bilaterally. Limited ROM with pain on extension. Mildly positive facet loading bilaterally.   Limited ROM of bilateral shoulders with pain on adduction.         ASSESSMENT: 69 y.o. year old male with pain, consistent with       1. Chronic pain syndrome     2. Chronic pain of both shoulders     3. Spondylosis of lumbar region without myelopathy or radiculopathy     4. DDD (degenerative disc disease), lumbar           PLAN:     - Previous imaging was reviewed and discussed with the patient today.    - Schedule for bilateral glenohumeral joint injections.     - We can repeat lumbar RFA as needed.     - Continue Neurontin, Mobic, Voltaren.    - Continue home exercise routine.     - RTC 2 weeks after above procedure.     - Dr." Jed was consulted on the patient and agrees with this plan.    The above plan and management options were discussed at length with patient. Patient is in agreement with the above and verbalized understanding.     Farida Andrew NP  10/15/2020

## 2020-10-26 ENCOUNTER — OFFICE VISIT (OUTPATIENT)
Dept: INTERNAL MEDICINE | Facility: CLINIC | Age: 70
End: 2020-10-26
Payer: MEDICARE

## 2020-10-26 VITALS
SYSTOLIC BLOOD PRESSURE: 124 MMHG | WEIGHT: 176.38 LBS | HEIGHT: 67 IN | HEART RATE: 59 BPM | DIASTOLIC BLOOD PRESSURE: 72 MMHG | OXYGEN SATURATION: 95 % | BODY MASS INDEX: 27.68 KG/M2

## 2020-10-26 DIAGNOSIS — G95.9 LUMBAR MYELOPATHY: ICD-10-CM

## 2020-10-26 DIAGNOSIS — M47.896 OTHER OSTEOARTHRITIS OF SPINE, LUMBAR REGION: ICD-10-CM

## 2020-10-26 DIAGNOSIS — C61 CANCER OF PROSTATE: ICD-10-CM

## 2020-10-26 DIAGNOSIS — Z00.00 ENCOUNTER FOR PREVENTIVE HEALTH EXAMINATION: Primary | ICD-10-CM

## 2020-10-26 DIAGNOSIS — M47.812 CERVICAL ARTHRITIS: ICD-10-CM

## 2020-10-26 DIAGNOSIS — E55.9 VITAMIN D DEFICIENCY: ICD-10-CM

## 2020-10-26 DIAGNOSIS — H40.013 OAG (OPEN ANGLE GLAUCOMA) SUSPECT, LOW RISK, BILATERAL: ICD-10-CM

## 2020-10-26 DIAGNOSIS — D69.2 SENILE PURPURA: ICD-10-CM

## 2020-10-26 DIAGNOSIS — G89.4 CHRONIC PAIN SYNDROME: ICD-10-CM

## 2020-10-26 DIAGNOSIS — Z86.19 HISTORY OF HEPATITIS C: ICD-10-CM

## 2020-10-26 DIAGNOSIS — F33.0 MILD EPISODE OF RECURRENT MAJOR DEPRESSIVE DISORDER: ICD-10-CM

## 2020-10-26 DIAGNOSIS — G95.9 CERVICAL MYELOPATHY: ICD-10-CM

## 2020-10-26 DIAGNOSIS — M48.07 LUMBOSACRAL STENOSIS WITH NEUROGENIC CLAUDICATION: ICD-10-CM

## 2020-10-26 DIAGNOSIS — Z99.89 DEPENDENCE ON OTHER ENABLING MACHINES AND DEVICES: ICD-10-CM

## 2020-10-26 DIAGNOSIS — H53.8 BLURRED VISION, RIGHT EYE: ICD-10-CM

## 2020-10-26 DIAGNOSIS — N40.0 BENIGN NON-NODULAR PROSTATIC HYPERPLASIA WITHOUT LOWER URINARY TRACT SYMPTOMS: ICD-10-CM

## 2020-10-26 DIAGNOSIS — L60.0 INGROWING TOENAIL: ICD-10-CM

## 2020-10-26 DIAGNOSIS — M54.16 LUMBAR RADICULOPATHY: ICD-10-CM

## 2020-10-26 DIAGNOSIS — H90.3 SENSORINEURAL HEARING LOSS (SNHL) OF BOTH EARS: ICD-10-CM

## 2020-10-26 DIAGNOSIS — D50.0 IRON DEFICIENCY ANEMIA DUE TO CHRONIC BLOOD LOSS: ICD-10-CM

## 2020-10-26 DIAGNOSIS — E78.5 HYPERLIPIDEMIA, UNSPECIFIED HYPERLIPIDEMIA TYPE: ICD-10-CM

## 2020-10-26 DIAGNOSIS — G62.9 NEUROPATHY: ICD-10-CM

## 2020-10-26 PROBLEM — Z01.00 ENCOUNTER FOR ROUTINE EYE AND VISION EXAMINATION: Status: RESOLVED | Noted: 2018-10-26 | Resolved: 2020-10-26

## 2020-10-26 PROCEDURE — G0439 PPPS, SUBSEQ VISIT: HCPCS | Mod: HCNC,S$GLB,, | Performed by: NURSE PRACTITIONER

## 2020-10-26 PROCEDURE — G0439 PR MEDICARE ANNUAL WELLNESS SUBSEQUENT VISIT: ICD-10-PCS | Mod: HCNC,S$GLB,, | Performed by: NURSE PRACTITIONER

## 2020-10-26 PROCEDURE — 99499 UNLISTED E&M SERVICE: CPT | Mod: S$GLB,,, | Performed by: NURSE PRACTITIONER

## 2020-10-26 PROCEDURE — 99499 RISK ADDL DX/OHS AUDIT: ICD-10-PCS | Mod: S$GLB,,, | Performed by: NURSE PRACTITIONER

## 2020-10-26 PROCEDURE — 99999 PR PBB SHADOW E&M-EST. PATIENT-LVL V: ICD-10-PCS | Mod: PBBFAC,HCNC,, | Performed by: NURSE PRACTITIONER

## 2020-10-26 PROCEDURE — 99999 PR PBB SHADOW E&M-EST. PATIENT-LVL V: CPT | Mod: PBBFAC,HCNC,, | Performed by: NURSE PRACTITIONER

## 2020-10-26 NOTE — PROGRESS NOTES
"  I offered to discuss end of life issues, including information on how to make advance directives that the patient could use to name someone who would make medical decisions on their behalf if they became too ill to make themselves.    ___Patient declined  _X_Patient has Advanced Directives    Anthony Miguel presented for a  Medicare AWV and comprehensive Health Risk Assessment today. The following components were reviewed and updated:    · Medical history  · Family History  · Social history  · Allergies and Current Medications  · Health Risk Assessment  · Health Maintenance  · Care Team     ** See Completed Assessments for Annual Wellness Visit within the encounter summary.**         The following assessments were completed:  · Living Situation  · CAGE  · Depression Screening  · Timed Get Up and Go  · Whisper Test  · Cognitive Function Screening      ·   · Nutrition Screening  · ADL Screening  · PAQ Screening        Vitals:    10/26/20 0953 10/26/20 1005 10/26/20 1034   BP:  (!) 142/86 124/72   BP Location:   Right arm   Pulse:  (!) 59    SpO2:  95%    Weight: 80 kg (176 lb 5.9 oz)     Height: 5' 7" (1.702 m)       Body mass index is 27.62 kg/m².  Physical Exam  Vitals signs and nursing note reviewed.   Constitutional:       Appearance: He is well-developed.   HENT:      Head: Normocephalic.   Cardiovascular:      Rate and Rhythm: Normal rate and regular rhythm.   Pulmonary:      Effort: Pulmonary effort is normal.      Breath sounds: Normal breath sounds.   Abdominal:      General: Bowel sounds are normal.      Palpations: Abdomen is soft.   Musculoskeletal:      Comments: Gait antalgic, ambulates with cane   Skin:     General: Skin is warm and dry.   Neurological:      Mental Status: He is alert and oriented to person, place, and time.      Motor: No abnormal muscle tone.   Psychiatric:         Mood and Affect: Mood normal.         Behavior: Behavior normal.               Diagnoses and health risks identified " today and associated recommendations/orders:    1. Encounter for preventive health examination  Here for Health Risk Assessment/Annual Wellness Visit.  Health maintenance reviewed and updated. Follow up in one year.  Prescription given for Shingrix.    2. Sensorineural hearing loss (SNHL) of both ears  Chronic, requesting evaluation.  - Ambulatory referral/consult to ENT; Future    3. Ingrowing toenail  Chronic, reporting increased toe pain, requesting evaluation.  - Ambulatory referral/consult to Podiatry; Future    4. Blurred vision, right eye  Chronic, worsening, requesting evaluation.  - Ambulatory referral/consult to Optometry; Future    5. Dependence on other enabling machines and devices  Chronic, no reported falls, ambulates with cane. Followed by PCP.    6. Hyperlipidemia, unspecified hyperlipidemia type  Chronic, stable on current medication. Followed by PCP.    7. Benign non-nodular prostatic hyperplasia without lower urinary tract symptoms  Chronic, stable. Followed by PCP, Urology.    8. Cancer of prostate  Stable post treatment. Followed by PCP, Radiation Oncology, Urology.    9. Iron deficiency anemia due to chronic blood loss  Chronic, stable on current medication. Followed by PCP.    10. Vitamin D deficiency  Chronic, stable on current medication. Followed by PCP.    11. Senile purpura  Chronic, stable. Followed by PCP.    12. History of hepatitis C (completed treatment)  Stable post treatment. Followed by PCP.    13. OAG (open angle glaucoma) suspect, low risk, bilateral  Chronic, reporting decreased vision right eye. Consult placed. Followed by Optometry.    14. Mild episode of recurrent major depressive disorder  Chronic, stable on current medication. Followed by PCP.    15. Chronic pain syndrome  Chronic, stable on current medications. Followed by PCP, Pain Management.    16. Neuropathy  Chronic, stable on current medications. Followed by PCP, Pain Management.    17. Cervical  myelopathy  Chronic, stable on current medications. Followed by PCP, Pain Management.    18. Lumbar myelopathy  Chronic, stable on current medications. Followed by PCP, Pain Management.    19. Lumbar radiculopathy  Chronic, stable on current medications. Followed by PCP, Pain Management.    20. Other osteoarthritis of spine, lumbar region  Chronic, stable on current medications. Followed by PCP, Pain Management.    21. Lumbosacral stenosis with neurogenic claudication  Chronic, stable on current medications. Followed by PCP, Pain Management.    22. Cervical arthritis  Chronic, stable on current medications. Followed by PCP, Pain Management.      Provided Anthony with a 5-10 year written screening schedule and personal prevention plan. Recommendations were developed using the USPSTF age appropriate recommendations. Education, counseling, and referrals were provided as needed. After Visit Summary printed and given to patient which includes a list of additional screenings\tests needed.    Follow up in 3 months (on 1/12/2021).with PCP    Torrie Jones NP

## 2020-11-02 ENCOUNTER — LAB VISIT (OUTPATIENT)
Dept: LAB | Facility: OTHER | Age: 70
End: 2020-11-02
Attending: RADIOLOGY
Payer: MEDICARE

## 2020-11-02 DIAGNOSIS — C61 CANCER OF PROSTATE: ICD-10-CM

## 2020-11-02 LAB — COMPLEXED PSA SERPL-MCNC: <0.01 NG/ML (ref 0–4)

## 2020-11-02 PROCEDURE — 84153 ASSAY OF PSA TOTAL: CPT | Mod: HCNC

## 2020-11-02 PROCEDURE — 36415 COLL VENOUS BLD VENIPUNCTURE: CPT | Mod: HCNC

## 2020-11-03 ENCOUNTER — OFFICE VISIT (OUTPATIENT)
Dept: UROLOGY | Facility: CLINIC | Age: 70
End: 2020-11-03
Payer: MEDICARE

## 2020-11-03 VITALS
HEIGHT: 67 IN | HEART RATE: 68 BPM | WEIGHT: 176.38 LBS | DIASTOLIC BLOOD PRESSURE: 86 MMHG | SYSTOLIC BLOOD PRESSURE: 144 MMHG | BODY MASS INDEX: 27.68 KG/M2

## 2020-11-03 DIAGNOSIS — N40.0 BENIGN NON-NODULAR PROSTATIC HYPERPLASIA WITHOUT LOWER URINARY TRACT SYMPTOMS: Primary | ICD-10-CM

## 2020-11-03 DIAGNOSIS — R39.9 LOWER URINARY TRACT SYMPTOMS: ICD-10-CM

## 2020-11-03 DIAGNOSIS — C61 PROSTATE CANCER: Primary | ICD-10-CM

## 2020-11-03 PROCEDURE — 3008F BODY MASS INDEX DOCD: CPT | Mod: CPTII,S$GLB,, | Performed by: UROLOGY

## 2020-11-03 PROCEDURE — 1125F AMNT PAIN NOTED PAIN PRSNT: CPT | Mod: S$GLB,,, | Performed by: UROLOGY

## 2020-11-03 PROCEDURE — 3008F PR BODY MASS INDEX (BMI) DOCUMENTED: ICD-10-PCS | Mod: CPTII,S$GLB,, | Performed by: UROLOGY

## 2020-11-03 PROCEDURE — 99999 PR PBB SHADOW E&M-EST. PATIENT-LVL III: ICD-10-PCS | Mod: PBBFAC,HCNC,, | Performed by: UROLOGY

## 2020-11-03 PROCEDURE — 1101F PT FALLS ASSESS-DOCD LE1/YR: CPT | Mod: CPTII,S$GLB,, | Performed by: UROLOGY

## 2020-11-03 PROCEDURE — 99213 OFFICE O/P EST LOW 20 MIN: CPT | Mod: S$GLB,,, | Performed by: UROLOGY

## 2020-11-03 PROCEDURE — 99999 PR PBB SHADOW E&M-EST. PATIENT-LVL III: CPT | Mod: PBBFAC,HCNC,, | Performed by: UROLOGY

## 2020-11-03 PROCEDURE — 1159F MED LIST DOCD IN RCRD: CPT | Mod: S$GLB,,, | Performed by: UROLOGY

## 2020-11-03 PROCEDURE — 99213 PR OFFICE/OUTPT VISIT, EST, LEVL III, 20-29 MIN: ICD-10-PCS | Mod: S$GLB,,, | Performed by: UROLOGY

## 2020-11-03 PROCEDURE — 1125F PR PAIN SEVERITY QUANTIFIED, PAIN PRESENT: ICD-10-PCS | Mod: S$GLB,,, | Performed by: UROLOGY

## 2020-11-03 PROCEDURE — 1159F PR MEDICATION LIST DOCUMENTED IN MEDICAL RECORD: ICD-10-PCS | Mod: S$GLB,,, | Performed by: UROLOGY

## 2020-11-03 PROCEDURE — 1101F PR PT FALLS ASSESS DOC 0-1 FALLS W/OUT INJ PAST YR: ICD-10-PCS | Mod: CPTII,S$GLB,, | Performed by: UROLOGY

## 2020-11-03 NOTE — PROGRESS NOTES
History & Physical  Urology Clinic    SUBJECTIVE:     Chief complaint: frequent urination s/p RALP 6/2019    History of Present Illness:  70 yo M with a history of prostate cancer s/p RALP,BPLND on 6/4/19.  He did well after and was discharged the following day. He did not chavez after his RALP. His initial post-op psa was 0.07.  He has seen Dr. Sevilla previously. He underwent adjuvant radiotherapy and completed this 04/2020. PSA trend reviewed as below.       He presents today with frequent urination for approximately 2-3 months.   He has nocturia x4. He denies decrease FOS, urgency, or straining.   Denies dysuria, flank pain, hematuria, fevers. He is on no medications for LUTS currently.     AUA SS: 16. Mostly dissatisfied.     His PVR is 25.   UA today: trace of protein, negative for all other components.      Lab Results   Component Value Date    PSA 1.1 10/26/2018    PSA 2.0 09/12/2017    PSADIAG <0.01 11/02/2020    PSADIAG 0.01 07/01/2020    PSADIAG 0.01 02/11/2020    PSADIAG 0.02 12/03/2019    PSADIAG 0.02 11/18/2019    PSADIAG 0.07 08/19/2019    PSADIAG 0.07 07/08/2019       Reports some persistent incontinence.  Goes through 1-2 pull-ups per day which is improved.      Review of Systems   Constitutional: Negative for diaphoresis, fever and weight loss.   HENT: Negative for congestion and hearing loss.    Eyes: Negative for photophobia.   Respiratory: Negative for cough and shortness of breath.    Cardiovascular: Negative for chest pain, claudication and leg swelling.   Gastrointestinal: Negative for abdominal pain, constipation, nausea and vomiting.   Genitourinary: Negative for dysuria and frequency.   Musculoskeletal: Negative for joint pain and myalgias.   Skin: Negative for rash.   Neurological: Negative for dizziness and headaches.   Endo/Heme/Allergies: Does not bruise/bleed easily.   Psychiatric/Behavioral: Negative for depression.        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    Overview:  Right side dx update       Past Surgical History:  Past Surgical History:   Procedure Laterality Date    BACK SURGERY      EPIDURAL STEROID INJECTION N/A 2/6/2019    Procedure: INJECTION, STEROID, EPIDURAL, L45-S1 IL;  Surgeon: Marcel Adkins MD;  Location: [facility];  Service: Pain Management;  Laterality: N/A;    EPIDURAL STEROID INJECTION N/A 4/10/2019    Procedure: Injection, Steroid, Epidural LUMBAR/CAUDAL L5-S1 INTERLAMINAR KAYLEY;  Surgeon: Marcel Adkins MD;  Location: [facility];  Service: Pain Management;  Laterality: N/A;  NEEDS CONSENT    INJECTION OF FACET JOINT Bilateral 8/14/2019    Procedure: INJECTION, FACET JOINT INJECTION (LUMBAR BLOCK) BILATERAL L4-5 AND L5-S1;  Surgeon: Marcel Adkins MD;  Location: [facility];  Service: Pain Management;  Laterality: Bilateral;  NEEDS CONSENT    INJECTION OF FACET JOINT Bilateral 10/16/2019    Procedure: FACET JOINT INJECTION (LUMBAR BLOCK) BILATERAL L4-5 AND L5-S1;  Surgeon: Marcel Adkins MD;  Location: [facility];  Service: Pain Management;  Laterality: Bilateral;  NEEDS CONSENT    Larangoscopy      RADIOFREQUENCY ABLATION Left 5/27/2020    Procedure: RADIOFREQUENCY ABLATION LEFT L3,4,5 1 of 2;  Surgeon: Marcel Adkins MD;  Location: [facility];  Service: Pain Management;  Laterality: Left;  Left RFA L3, 4, 5  1 of 2    RADIOFREQUENCY ABLATION Right 6/10/2020    Procedure: RADIOFREQUENCY ABLATION RIGHT L3,4,5;  Surgeon: Marcel Adkins MD;  Location: [facility];  Service: Pain Management;  Laterality: Right;  Right RFA L3,4.5  2 of 2    ROBOT-ASSISTED LAPAROSCOPIC PROSTATECTOMY  USING DA SAÚL XI N/A 2019    Procedure: XI ROBOTIC PROSTATECTOMY;  Surgeon: Sergio Dixon MD;  Location: [facility] OR [address] [place] [address];  Service: Urology;  Laterality: N/A;  4hrs/ gen with regional     SPINE SURGERY         Family History:  Family History   Problem Relation Age of Onset    Cancer Mother         Pancreatic    No Known Problems Father     Keratoconus Brother     Cancer Brother        Social History:  Social History     Socioeconomic History    Marital status: Single     Spouse name: Not on file    Number of children: 0    Years of education: Not on file    Highest education level: Not on file   Occupational History    Not on file   Social Needs    Financial resource strain: Not hard at all    Food insecurity     Worry: Never true     Inability: Never true    Transportation needs     Medical: Yes     Non-medical: Yes   Tobacco Use    Smoking status: Former Smoker     Packs/day: 1.00     Quit date: 2000     Years since quittin.7    Smokeless tobacco: Former User   Substance and Sexual Activity    Alcohol use: Not Currently    Drug use: No    Sexual activity: Not Currently   Lifestyle    Physical activity     Days per week: Not on file     Minutes per session: Not on file    Stress: Only a little   Relationships    Social connections     Talks on phone: Not on file     Gets together: Not on file     Attends Spiritism service: Not on file     Active member of club or organization: Not on file     Attends meetings of clubs or organizations: Not on file     Relationship status: Not on file   Other Topics Concern    Not on file   Social History Narrative    No difficulty reading Rx labels        Medications:  Current Outpatient Medications   Medication Sig Dispense Refill    atorvastatin (LIPITOR) 10 MG tablet Take 1 tablet (10 mg total) by mouth once daily. 90 tablet 3    celecoxib (CELEBREX) 100 MG capsule Take 1 capsule (100 mg total) by mouth once daily. 90 capsule 3     "cholecalciferol, vitamin D3, (VITAMIN D3) 50 mcg (2,000 unit) Cap Take 1 capsule (2,000 Units total) by mouth once daily. 90 capsule 3    diclofenac sodium (VOLTAREN) 1 % Gel Apply 2 g topically 4 (four) times daily. (Patient taking differently: Apply 2 g topically 4 (four) times daily as needed. ) 1 Tube 11    ferrous sulfate 325 (65 FE) MG EC tablet Take 1 tablet (325 mg total) by mouth once daily. 90 tablet 3    gabapentin (NEURONTIN) 300 MG capsule Take 2 capsules (600 mg total) by mouth 3 (three) times daily. 540 capsule 3    multivitamin capsule Take 1 capsule by mouth once daily.      polyethylene glycol (GLYCOLAX) 17 gram PwPk Take 17 g by mouth once daily. 30 packet 0    sertraline (ZOLOFT) 25 MG tablet Take 1 tablet (25 mg total) by mouth once daily. 90 tablet 3    traZODone (DESYREL) 50 MG tablet Take 3 tablets (150 mg total) by mouth nightly as needed for Insomnia. 270 tablet 3    triamcinolone acetonide 0.1% (KENALOG) 0.1 % cream Apply 15 g topically 2 (two) times daily as needed.       walker (ULTRA-LIGHT ROLLATOR) Misc 1 Units by Misc.(Non-Drug; Combo Route) route once daily at 6am. 1 each 0     No current facility-administered medications for this visit.        Allergies:  Review of patient's allergies indicates:  No Known Allergies    OBJECTIVE:     BP (!) 144/86   Pulse 68   Ht 5' 7" (1.702 m)   Wt 80 kg (176 lb 5.9 oz)   BMI 27.62 kg/m²     Physical Exam   Constitutional: He is oriented to person, place, and time and well-developed, well-nourished, and in no distress.   HENT:   Head: Normocephalic and atraumatic.   Eyes: EOM are normal.   Neck: Neck supple.   Cardiovascular: Normal rate.   Pulmonary/Chest: Effort normal. No respiratory distress.   Abdominal: Soft. He exhibits no distension. There is no abdominal tenderness. There is no rebound.   Musculoskeletal: Normal range of motion.         General: No edema.   Neurological: He is alert and oriented to person, place, and time. "   Skin: Skin is warm and dry.         ASSESSMENT/PLAN:     69 y.o. male with with a history of prostate cancer s/p RALP in 6/2019 who now presents with worsening LUTS and urinary frequency.    - RTC with PSA 3 months.   - Discussed behavioral modifications and fluid restriction around bed time to help with nocturia and frequency symptoms.

## 2020-11-04 ENCOUNTER — HOSPITAL ENCOUNTER (OUTPATIENT)
Facility: OTHER | Age: 70
Discharge: HOME | End: 2020-11-04
Attending: ANESTHESIOLOGY | Admitting: ANESTHESIOLOGY
Payer: MEDICARE

## 2020-11-04 VITALS
SYSTOLIC BLOOD PRESSURE: 148 MMHG | OXYGEN SATURATION: 96 % | RESPIRATION RATE: 16 BRPM | DIASTOLIC BLOOD PRESSURE: 68 MMHG | TEMPERATURE: 98 F | BODY MASS INDEX: 28.72 KG/M2 | HEIGHT: 67 IN | WEIGHT: 183 LBS | HEART RATE: 67 BPM

## 2020-11-04 DIAGNOSIS — G89.29 CHRONIC PAIN OF BOTH SHOULDERS: Primary | ICD-10-CM

## 2020-11-04 DIAGNOSIS — G89.29 CHRONIC PAIN: ICD-10-CM

## 2020-11-04 DIAGNOSIS — M25.512 CHRONIC PAIN OF BOTH SHOULDERS: Primary | ICD-10-CM

## 2020-11-04 DIAGNOSIS — M25.511 CHRONIC PAIN OF BOTH SHOULDERS: Primary | ICD-10-CM

## 2020-11-04 PROCEDURE — 20610 DRAIN/INJ JOINT/BURSA W/O US: CPT | Mod: 50 | Performed by: ANESTHESIOLOGY

## 2020-11-04 PROCEDURE — 20610 PR DRAIN/INJECT LARGE JOINT/BURSA: ICD-10-PCS | Mod: 50,,, | Performed by: ANESTHESIOLOGY

## 2020-11-04 PROCEDURE — 77002 NEEDLE LOCALIZATION BY XRAY: CPT | Mod: 26,,, | Performed by: ANESTHESIOLOGY

## 2020-11-04 PROCEDURE — 77002 NEEDLE LOCALIZATION BY XRAY: CPT | Performed by: ANESTHESIOLOGY

## 2020-11-04 PROCEDURE — 25500020 PHARM REV CODE 255: Performed by: ANESTHESIOLOGY

## 2020-11-04 PROCEDURE — 77002 PR FLUOROSCOPIC GUIDANCE NEEDLE PLACEMENT: ICD-10-PCS | Mod: 26,,, | Performed by: ANESTHESIOLOGY

## 2020-11-04 PROCEDURE — 20610 DRAIN/INJ JOINT/BURSA W/O US: CPT | Mod: 50,,, | Performed by: ANESTHESIOLOGY

## 2020-11-04 PROCEDURE — 63600175 PHARM REV CODE 636 W HCPCS: Performed by: ANESTHESIOLOGY

## 2020-11-04 PROCEDURE — 25000003 PHARM REV CODE 250: Performed by: ANESTHESIOLOGY

## 2020-11-04 RX ORDER — BUPIVACAINE HYDROCHLORIDE 2.5 MG/ML
INJECTION, SOLUTION EPIDURAL; INFILTRATION; INTRACAUDAL
Status: DISCONTINUED | OUTPATIENT
Start: 2020-11-04 | End: 2020-11-04 | Stop reason: HOSPADM

## 2020-11-04 RX ORDER — LIDOCAINE HYDROCHLORIDE 10 MG/ML
INJECTION INFILTRATION; PERINEURAL
Status: DISCONTINUED | OUTPATIENT
Start: 2020-11-04 | End: 2020-11-04 | Stop reason: HOSPADM

## 2020-11-04 RX ORDER — SODIUM CHLORIDE 9 MG/ML
500 INJECTION, SOLUTION INTRAVENOUS CONTINUOUS
Status: DISCONTINUED | OUTPATIENT
Start: 2020-11-04 | End: 2020-11-04 | Stop reason: HOSPADM

## 2020-11-04 RX ORDER — TRIAMCINOLONE ACETONIDE 40 MG/ML
INJECTION, SUSPENSION INTRA-ARTICULAR; INTRAMUSCULAR
Status: DISCONTINUED | OUTPATIENT
Start: 2020-11-04 | End: 2020-11-04 | Stop reason: HOSPADM

## 2020-11-04 RX ORDER — MIDAZOLAM HYDROCHLORIDE 1 MG/ML
INJECTION INTRAMUSCULAR; INTRAVENOUS
Status: DISCONTINUED | OUTPATIENT
Start: 2020-11-04 | End: 2020-11-04 | Stop reason: HOSPADM

## 2020-11-04 RX ADMIN — SODIUM CHLORIDE 500 ML: 0.9 INJECTION, SOLUTION INTRAVENOUS at 02:11

## 2020-11-04 NOTE — OP NOTE
Patient Name: Anthony Miguel  MRN: [id number]    INFORMED CONSENT: The procedure, risks, benefits and options were discussed with patient. There are no contraindications to the procedure. The patient expressed understanding and agreed to proceed. The personnel performing the procedure was discussed. I verify that I personally obtained Anthony's consent prior to the start of the procedure and the signed consent can be found on the patient's chart.    Procedure Date: 11/04/2020    Anesthesia: Topical    Pre Procedure diagnosis: Chronic pain of both shoulders [M25.511, G89.29, M25.512]  1. Chronic pain of both shoulders    2. Chronic pain    3 BILATERAL SHOULDER DJD    Post-Procedure diagnosis: SAME      Sedation: None        PROCEDURE: BILATERAL GLENOHUMERAL JOINT INJECTION   The patient was brought to the procedure room.  After performing time out. IV access was obtained prior to the procedure.  The patient was positioned supine on the fluoroscopy table.  Continuous hemodynamic monitoring was initiated including blood pressure, EKG, and pulse oximetry. .  The skin overlying the hip was prepped with chlorhexidine and draped in a sterile fashion.    The skin and subcutaneous tissue was anesthetized using 3 cc of lidocaine 1%.  A 3.5 inch 22 gauge spinal needle introduced under fluoroscopic guidance into the RT glenohumeral joint. The needle position was confirmed using oblique, AP and lateral fluoroscopic imaging.  Negative aspiration was confirmed. 2 cc of Omnipaque injected to confirm placement under live fluoroscopy.  A combination of 5 cc of bupivacaine 0.25% and 20 mg kenalog was easily injected. The needle was removed and bleeding was nill.  A sterile dressing was applied. Same repeated on the left side.  Anthony was taken back to the recovery room for further observation.     Blood Loss: Nill  Specimen: None    aMrcel Adkins MD

## 2020-11-04 NOTE — DISCHARGE INSTRUCTIONS
Thank you for allowing us to care for you today. You may receive a survey about the care we provided. Your feedback is valuable and helps us provide excellent care throughout the community.     Home Care Instructions for Pain Management:    1. DIET:   You may resume your normal diet today.   2. BATHING:   You may shower with luke warm water. No tub baths or anything that will soak injection sites under water for the next 24 hours.  3. DRESSING:   You may remove your bandage today.   4. ACTIVITY LEVEL:   You may resume your normal activities 24 hrs after your procedure. Nothing strenuous today.  5. MEDICATIONS:   You may resume your normal medications today. To restart blood thinners, ask your doctor.  6. DRIVING    If you have received any sedatives by mouth today, you may not drive for 12 hours.    If you have received any sedation through your IV, you may not drive for 24 hrs.   7. SPECIAL INSTRUCTIONS:   No heat to the injection site for 24 hrs including, hot bath or shower, heating pad, moist heat, or hot tubs.    Use ice pack to injection site for any pain or discomfort.  Apply ice packs for 20 minute intervals as needed.    IF you have diabetes, be sure to monitor your blood sugar more closely. IF your injection contained steroids your blood sugar levels may become higher than normal.    If you are still having pain upon discharge:  Your pain may improve over the next 48 hours. The anesthetic (numbing medication) works immediately to 48 hours. IF your injection contained a steroid (anti-inflammatory medication), it takes approximately 3 days to start feeling relief and 7-10 days to see your greatest results from the medication. It is possible you may need subsequent injections. This would be discussed at your follow up appointment with pain management or your referring doctor.    Please call the PAIN MANAGEMENT office at [phone] or ON CALL pager at [phone] if you experienced any:   -Weakness or  loss of sensation  -Fever > 101.5  -Pain uncontrolled with oral medications   -Persistent nausea, vomiting, or diarrhea  -Redness or drainage from the injection sites, or any other worrisome concerns.   If physician on call was not reached or could not communicate with our office for any reason please go to the nearest emergency department.  Adult Procedural Sedation Instructions    Recovery After Procedural Sedation (Adult)  You have been given medicine by vein to make you sleep during your surgery. This may have included both a pain medicine and sleeping medicine. Most of the effects have worn off. But you may still have some drowsiness for the next 6 to 8 hours.  Home care  Follow these guidelines when you get home:  · For the next 8 hours, you should be watched by a responsible adult. This person should make sure your condition is not getting worse.  · Don't drink any alcohol for the next 24 hours.  · Don't drive, operate dangerous machinery, or make important business or personal decisions during the next 24 hours.  Note: Your healthcare provider may tell you not to take any medicine by mouth for pain or sleep in the next 4 hours. These medicines may react with the medicines you were given in the hospital. This could cause a much stronger response than usual.  Follow-up care  Follow up with your healthcare provider if you are not alert and back to your usual level of activity within 12 hours.  When to seek medical advice  Call your healthcare provider right away if any of these occur:  · Drowsiness gets worse  · Weakness or dizziness gets worse  · Repeated vomiting  · You can't be awakened   Date Last Reviewed: 10/18/2016  © 9612-9281 [employer]. [address] [place] [address], [place] [address]. All rights reserved. This information is not intended as a substitute for professional medical care. Always follow your healthcare professional's instructions.

## 2020-11-20 ENCOUNTER — TELEPHONE (OUTPATIENT)
Dept: ADMINISTRATIVE | Facility: OTHER | Age: 70
End: 2020-11-20

## 2020-11-20 ENCOUNTER — OFFICE VISIT (OUTPATIENT)
Dept: PAIN MEDICINE | Facility: CLINIC | Age: 70
End: 2020-11-20
Payer: MEDICARE

## 2020-11-20 DIAGNOSIS — M25.511 CHRONIC PAIN OF BOTH SHOULDERS: ICD-10-CM

## 2020-11-20 DIAGNOSIS — M53.3 SACROILIAC JOINT PAIN: Primary | ICD-10-CM

## 2020-11-20 DIAGNOSIS — M25.512 CHRONIC PAIN OF BOTH SHOULDERS: ICD-10-CM

## 2020-11-20 DIAGNOSIS — G89.29 CHRONIC PAIN OF BOTH SHOULDERS: ICD-10-CM

## 2020-11-20 DIAGNOSIS — M51.36 DDD (DEGENERATIVE DISC DISEASE), LUMBAR: ICD-10-CM

## 2020-11-20 DIAGNOSIS — M47.816 SPONDYLOSIS OF LUMBAR REGION WITHOUT MYELOPATHY OR RADICULOPATHY: ICD-10-CM

## 2020-11-20 PROCEDURE — 99443 PR PHYSICIAN TELEPHONE EVALUATION 21-30 MIN: ICD-10-PCS | Mod: 95,,, | Performed by: NURSE PRACTITIONER

## 2020-11-20 PROCEDURE — 99443 PR PHYSICIAN TELEPHONE EVALUATION 21-30 MIN: CPT | Mod: 95,,, | Performed by: NURSE PRACTITIONER

## 2020-11-20 RX ORDER — DICLOFENAC SODIUM 75 MG/1
75 TABLET, DELAYED RELEASE ORAL 2 TIMES DAILY PRN
Qty: 60 TABLET | Refills: 0 | Status: SHIPPED | OUTPATIENT
Start: 2020-11-20 | End: 2020-12-20

## 2020-11-20 NOTE — TELEPHONE ENCOUNTER
----- Message from Farida Andrew NP sent at 11/20/2020 12:55 PM CST -----  Please schedule patient for bilateral SI joint injections with Dr. Adkins.     Thanks,   Farida

## 2020-11-20 NOTE — H&P (VIEW-ONLY)
Chronic Pain-Medicine-Established Note (Follow up visit)  Chronic Pain-Tele-Medicine-Established Note (Follow up visit)        The patient location is: Home  The chief complaint leading to consultation is: pain  Visit type: Virtual visit with synchronous audio only. The reason for the audio only service rather than synchronous audio and video virtual visit was related to technical difficulties or patient preference/necessity.  Total time spent with patient: 25 min  Each patient to whom he or she provides medical services by telemedicine is:  (1) informed of the relationship between the physician and patient and the respective role of any other health care provider with respect to management of the patient; and (2) notified that he or she may decline to receive medical services by telemedicine and may withdraw from such care at any time.      SUBJECTIVE:    Interval History 11/20/2020:  The patient returns to clinic today for follow up of shoulder pain via audio visit. He is s/p bilateral glenohumeral joint injections on 11/4/2020. He reports 70% relief of his bilateral shoulder pain. He reports intermittent shoulder pain that is tolerable at this time. He reports low back and bilateral buttock pain, right greater than left. He denies any radiating leg pain. His pain is worse with sitting to standing. This pain is lower than previous procedure sites. He denies any other health changes. His pain today is 3/10.    Interval History 10/15/2020:  The patient returns to clinic today for follow up of pain. He reports increased bilateral shoulder pain. He describes this pain as stiff, sharp, and aching. He had short term relief with subacromial injections in the past. This pain is worse with prolonged activity. He reports increased low back and buttock pain. He denies any radiating leg pain. He does endorse morning stiffness. He denies any other health changes. His pain today is 4/10.    Interval History 7/13/2020:  The  patient is s/p Bilateral Lumbar RFA. He states now having significant relief of approx 80% to both sided and able to perform ADLs easier. He is very please with results and will be starting healthy back program at end of month due to increased mobility and less severe pain. Pt denies any new areas of pain, denies any new neuro changes. Does not need medication refills at this time. Pain is 4/10 today.     Interval History 6/17/2020:   The patient presents for follow-up of lower back pain.  Patient states he has had significant improvement of the left-sided lumbar pain status post L 3, 4, 5 RFA on 5/27/2020. He is also s/p Right side L3,4,5 RFA on 6/10/2020. He states lower back pain to right still continued and endorses he continues to do housework and believes increased activity has not allowed time for pain to ease.  He denies any radiculopathy, denies any neurological complaints, denies loss of b/b or saddle paresthesias. He continues to take Mobic, voltaren gel, neurontin and Trazodone with benefit and without adverse side effects of medication.     Interval history 05/11/2020 (Telephone encounter)   Last encounter,we planned on scheduling for bilateral lumbar RFA in future as patient has had excellent relief from BL lumbar FJIs in the past. At last encounter, we also switched patient form diclofenac to meloxicam 15 mg daily and added voltaren gel for local anti-inflammatory benefit. Today he reports he completed his 38 day radiation trial for his prostate cancer (04/18/2020). He reports the previous facet joint injections helped tremendously (>70% pain relief for 3 months at a time) and is inquiring about RFA for his low back. Back  Pain is unchanged, located in lower back, worsened with extension. NSAIDs are not quite helpful. Gabapentin is mildly helpful.      Interval history (Telephone encounter) 03/26/20  Pt is a 70 yo male with Hx of cervical stenosis w/ myelopathy s/p C3-6 decompression and fusion, as  well as prostate CA on radiatio therapy who we are following for chronic low back pain. We last performed lumbar facet joint injections in August and October of 2019. He reports excellent (75%) pain relief for about 3 months but his pain has since returned. He reports that the pain feels identical, but about 1-2 cm lower. Pain is worse with standing/ walking. He denies any pain shooting down his legs or numbness/ tingling/ weakness in his legs. No changes with bladder/bowel control. He is currently taking diclofenac 75mg BID and gabapentin 600mg TID with decent relief. He is curious if there is something slightly stronger than diclofenac that may help. He cannot recall any other NSAIDs or muscle relaxers that he's tried.    Pain Medications:    - NSAIDs: Meloxicam, diclofenac, voltaren gel  - Anti-Depressants: trazadone  - Anti convulsants: Gabapentin 600 mg TID        Physical Therapy/Home Exercise: no       report:  Not applicable    Pain Procedures:   2/6/19- L5/S1 KAYLEY  4/01/2019- L5/S1 KAYLEY  07/29/2019- Bilateral L4/5 and L5/S1 FJI  10/02/2019- Bilateral L4/5 and L5/S1 FJI  5/27/2020 Left L3,4,5 RFA  6/10/2020 Right L3,4,5 RFA   11/4/2020- Bilateral SI joint injection      Imaging:   Xray Shoulder 6/29/2020:  COMPARISON:  Comparison is made to 10/09/2019.     FINDINGS:  Visualized osseous structures appear unremarkable, with no evidence of recent or healing fracture, lytic destructive process, appreciable glenohumeral arthritic change, or other significant abnormality noted.  No glenohumeral dislocation.  No abnormal soft tissue calcifications.  Incidental note is made of partial visualization of instrumentation related to a prior cervical spinal fusion procedure.  No significant detrimental interval change since 10/09/2019.     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    Overview:  Right side dx update     Past Surgical History:   Procedure Laterality Date    BACK SURGERY      EPIDURAL STEROID INJECTION N/A 2/6/2019    Procedure: INJECTION, STEROID, EPIDURAL, L45-S1 IL;  Surgeon: Marcel Adkins MD;  Location: [facility];  Service: Pain Management;  Laterality: N/A;    EPIDURAL STEROID INJECTION N/A 4/10/2019    Procedure: Injection, Steroid, Epidural LUMBAR/CAUDAL L5-S1 INTERLAMINAR KAYLEY;  Surgeon: Marcel Adkins MD;  Location: [facility];  Service: Pain Management;  Laterality: N/A;  NEEDS CONSENT    INJECTION OF FACET JOINT Bilateral 8/14/2019    Procedure: INJECTION, FACET JOINT INJECTION (LUMBAR BLOCK) BILATERAL L4-5 AND L5-S1;  Surgeon: Marcel Adkins MD;  Location: [facility];  Service: Pain Management;  Laterality: Bilateral;  NEEDS CONSENT    INJECTION OF FACET JOINT Bilateral 10/16/2019    Procedure: FACET JOINT INJECTION (LUMBAR BLOCK) BILATERAL L4-5 AND L5-S1;  Surgeon: Marcel Adkins MD;  Location: [facility];  Service: Pain Management;  Laterality: Bilateral;  NEEDS CONSENT    INJECTION OF JOINT Bilateral 11/4/2020    Procedure: INJECTION, JOINT, GLENOHUMERAL;  Surgeon: Marcel Adkins MD;  Location: [facility];  Service: Pain Management;  Laterality: Bilateral;    Larangoscopy      RADIOFREQUENCY ABLATION Left 5/27/2020    Procedure: RADIOFREQUENCY ABLATION LEFT L3,4,5 1 of 2;  Surgeon: Marcel Adkins MD;  Location: [facility];  Service: Pain Management;  Laterality: Left;  Left RFA L3, 4, 5  1 of 2    RADIOFREQUENCY ABLATION Right 6/10/2020    Procedure: RADIOFREQUENCY ABLATION RIGHT L3,4,5;  Surgeon: Marcel Adkins MD;  Location: [facility];  Service: Pain Management;  Laterality: Right;  Right RFA  L3,4.5  2 of 2    ROBOT-ASSISTED LAPAROSCOPIC PROSTATECTOMY USING DA ASÚL XI N/A 2019    Procedure: XI ROBOTIC PROSTATECTOMY;  Surgeon: Sergio Dixon MD;  Location: [facility] OR [address] [place] [address];  Service: Urology;  Laterality: N/A;  4hrs/ gen with regional     SPINE SURGERY       Social History     Socioeconomic History    Marital status: Single     Spouse name: Not on file    Number of children: 0    Years of education: Not on file    Highest education level: Not on file   Occupational History    Not on file   Social Needs    Financial resource strain: Not hard at all    Food insecurity     Worry: Never true     Inability: Never true    Transportation needs     Medical: Yes     Non-medical: Yes   Tobacco Use    Smoking status: Former Smoker     Packs/day: 1.00     Quit date: 2000     Years since quittin.8    Smokeless tobacco: Former User   Substance and Sexual Activity    Alcohol use: Not Currently    Drug use: No    Sexual activity: Not Currently   Lifestyle    Physical activity     Days per week: Not on file     Minutes per session: Not on file    Stress: Only a little   Relationships    Social connections     Talks on phone: Not on file     Gets together: Not on file     Attends [employer] service: Not on file     Active member of club or organization: Not on file     Attends meetings of clubs or organizations: Not on file     Relationship status: Not on file   Other Topics Concern    Not on file   Social History Narrative    No difficulty reading Rx labels      Family History   Problem Relation Age of Onset    Cancer Mother         Pancreatic    No Known Problems Father     Keratoconus Brother     Cancer Brother        Review of patient's allergies indicates:  No Known Allergies    Current Outpatient Medications   Medication Sig    atorvastatin (LIPITOR) 10 MG tablet Take 1 tablet (10 mg total) by mouth once daily.    celecoxib (CELEBREX) 100 MG capsule Take 1 capsule (100 mg  total) by mouth once daily.    cholecalciferol, vitamin D3, (VITAMIN D3) 50 mcg (2,000 unit) Cap Take 1 capsule (2,000 Units total) by mouth once daily.    diclofenac sodium (VOLTAREN) 1 % Gel Apply 2 g topically 4 (four) times daily. (Patient taking differently: Apply 2 g topically 4 (four) times daily as needed. )    ferrous sulfate 325 (65 FE) MG EC tablet Take 1 tablet (325 mg total) by mouth once daily.    gabapentin (NEURONTIN) 300 MG capsule Take 2 capsules (600 mg total) by mouth 3 (three) times daily.    multivitamin capsule Take 1 capsule by mouth once daily.    polyethylene glycol (GLYCOLAX) 17 gram PwPk Take 17 g by mouth once daily.    sertraline (ZOLOFT) 25 MG tablet Take 1 tablet (25 mg total) by mouth once daily.    traZODone (DESYREL) 50 MG tablet Take 3 tablets (150 mg total) by mouth nightly as needed for Insomnia.    triamcinolone acetonide 0.1% (KENALOG) 0.1 % cream Apply 15 g topically 2 (two) times daily as needed.     walker (ULTRA-LIGHT ROLLATOR) Misc 1 Units by Misc.(Non-Drug; Combo Route) route once daily at 6am.     No current facility-administered medications for this visit.        REVIEW OF SYSTEMS:    GENERAL:  No weight loss, malaise or fevers.  HEENT:   No recent changes in vision or hearing  NECK:  Negative for lumps, no difficulty with swallowing.  RESPIRATORY:  Negative for cough, wheezing or shortness of breath, patient denies any recent URI.  CARDIOVASCULAR:  Negative for chest pain, leg swelling or palpitations.  GI:  Negative for abdominal discomfort, blood in stools or black stools or change in bowel habits.  MUSCULOSKELETAL:  See HPI.  SKIN:  Negative for lesions, rash, and itching.  PSYCH:  No mood disorder or recent psychosocial stressors.  Patient's sleep is disturbed secondary to pain.  HEMATOLOGY/LYMPHOLOGY:  + prostate cancer s/p radiation therapy  NEURO:   No history of headaches, syncope, paralysis, seizures or tremors.  All other reviewed and negative  other than HPI.    OBJECTIVE:    Exam limited due to virtual visit:  PHYSICAL EXAMINATION:  Voice normal.  Normal affect without evidence of distress.      Previous physical exam:   There were no vitals filed for this visit.  GEN:  Well developed, well nourished.  No acute distress.   HEENT:  No trauma.  Mucous membranes moist.  Nares patent bilaterally.  PSYCH: Normal affect. Thought content appropriate.  CHEST:  Breathing symmetric.  No audible wheezing.  ABD: Soft, non-distended.  SKIN:  Warm, pink, dry.  No rash on exposed areas.    EXT:  No cyanosis, clubbing, or edema.  No color change or changes in nail or hair growth.  NEURO/MUSCULOSKELETAL:  Fully alert, oriented, and appropriate. Speech normal sean. No cranial nerve deficits.   Gait:.  Antalgic and aided with cane.   Motor Strength: 5/5 motor strength throughout lower extremities.   Sensory: no sensory deficit in the lower extremities.   Reflexes:  +2 and symmetric to patellar .  No clonus or spasticity.   Lumbar: SLR negative bilaterally. Limited ROM with pain on extension. Mildly positive facet loading bilaterally.   Limited ROM of bilateral shoulders with pain on adduction.         ASSESSMENT: 69 y.o. year old male with pain, consistent with       1. Sacroiliac joint pain  diclofenac (VOLTAREN) 75 MG EC tablet   2. Spondylosis of lumbar region without myelopathy or radiculopathy     3. DDD (degenerative disc disease), lumbar     4. Chronic pain of both shoulders           PLAN:     - Previous imaging was reviewed and discussed with the patient today.    - Schedule for bilateral SI joint injections.     - He is s/p bilateral glenohumeral joint injections with benefit. We can repeat this as needed.     - We can repeat lumbar RFA as needed.     - Continue Neurontin, Mobic, Voltaren.    - Continue home exercise routine.     - RTC 2 weeks after above procedure.     - Dr. Adkins was consulted on the patient and agrees with this plan.    The above plan  and management options were discussed at length with patient. Patient is in agreement with the above and verbalized understanding.     Farida Andrew NP  11/20/2020

## 2020-11-20 NOTE — PROGRESS NOTES
Chronic Pain-Medicine-Established Note (Follow up visit)  Chronic Pain-Tele-Medicine-Established Note (Follow up visit)        The patient location is: Home  The chief complaint leading to consultation is: pain  Visit type: Virtual visit with synchronous audio only. The reason for the audio only service rather than synchronous audio and video virtual visit was related to technical difficulties or patient preference/necessity.  Total time spent with patient: 25 min  Each patient to whom he or she provides medical services by telemedicine is:  (1) informed of the relationship between the physician and patient and the respective role of any other health care provider with respect to management of the patient; and (2) notified that he or she may decline to receive medical services by telemedicine and may withdraw from such care at any time.      SUBJECTIVE:    Interval History 11/20/2020:  The patient returns to clinic today for follow up of shoulder pain via audio visit. He is s/p bilateral glenohumeral joint injections on 11/4/2020. He reports 70% relief of his bilateral shoulder pain. He reports intermittent shoulder pain that is tolerable at this time. He reports low back and bilateral buttock pain, right greater than left. He denies any radiating leg pain. His pain is worse with sitting to standing. This pain is lower than previous procedure sites. He denies any other health changes. His pain today is 3/10.    Interval History 10/15/2020:  The patient returns to clinic today for follow up of pain. He reports increased bilateral shoulder pain. He describes this pain as stiff, sharp, and aching. He had short term relief with subacromial injections in the past. This pain is worse with prolonged activity. He reports increased low back and buttock pain. He denies any radiating leg pain. He does endorse morning stiffness. He denies any other health changes. His pain today is 4/10.    Interval History 7/13/2020:  The  patient is s/p Bilateral Lumbar RFA. He states now having significant relief of approx 80% to both sided and able to perform ADLs easier. He is very please with results and will be starting healthy back program at end of month due to increased mobility and less severe pain. Pt denies any new areas of pain, denies any new neuro changes. Does not need medication refills at this time. Pain is 4/10 today.     Interval History 6/17/2020:   The patient presents for follow-up of lower back pain.  Patient states he has had significant improvement of the left-sided lumbar pain status post L 3, 4, 5 RFA on 5/27/2020. He is also s/p Right side L3,4,5 RFA on 6/10/2020. He states lower back pain to right still continued and endorses he continues to do housework and believes increased activity has not allowed time for pain to ease.  He denies any radiculopathy, denies any neurological complaints, denies loss of b/b or saddle paresthesias. He continues to take Mobic, voltaren gel, neurontin and Trazodone with benefit and without adverse side effects of medication.     Interval history 05/11/2020 (Telephone encounter)   Last encounter,we planned on scheduling for bilateral lumbar RFA in future as patient has had excellent relief from BL lumbar FJIs in the past. At last encounter, we also switched patient form diclofenac to meloxicam 15 mg daily and added voltaren gel for local anti-inflammatory benefit. Today he reports he completed his 38 day radiation trial for his prostate cancer (04/18/2020). He reports the previous facet joint injections helped tremendously (>70% pain relief for 3 months at a time) and is inquiring about RFA for his low back. Back  Pain is unchanged, located in lower back, worsened with extension. NSAIDs are not quite helpful. Gabapentin is mildly helpful.      Interval history (Telephone encounter) 03/26/20  Pt is a 68 yo male with Hx of cervical stenosis w/ myelopathy s/p C3-6 decompression and fusion, as  well as prostate CA on radiatio therapy who we are following for chronic low back pain. We last performed lumbar facet joint injections in August and October of 2019. He reports excellent (75%) pain relief for about 3 months but his pain has since returned. He reports that the pain feels identical, but about 1-2 cm lower. Pain is worse with standing/ walking. He denies any pain shooting down his legs or numbness/ tingling/ weakness in his legs. No changes with bladder/bowel control. He is currently taking diclofenac 75mg BID and gabapentin 600mg TID with decent relief. He is curious if there is something slightly stronger than diclofenac that may help. He cannot recall any other NSAIDs or muscle relaxers that he's tried.    Pain Medications:    - NSAIDs: Meloxicam, diclofenac, voltaren gel  - Anti-Depressants: trazadone  - Anti convulsants: Gabapentin 600 mg TID        Physical Therapy/Home Exercise: no       report:  Not applicable    Pain Procedures:   2/6/19- L5/S1 KAYLEY  4/01/2019- L5/S1 KAYLEY  07/29/2019- Bilateral L4/5 and L5/S1 FJI  10/02/2019- Bilateral L4/5 and L5/S1 FJI  5/27/2020 Left L3,4,5 RFA  6/10/2020 Right L3,4,5 RFA   11/4/2020- Bilateral SI joint injection      Imaging:   Xray Shoulder 6/29/2020:  COMPARISON:  Comparison is made to 10/09/2019.     FINDINGS:  Visualized osseous structures appear unremarkable, with no evidence of recent or healing fracture, lytic destructive process, appreciable glenohumeral arthritic change, or other significant abnormality noted.  No glenohumeral dislocation.  No abnormal soft tissue calcifications.  Incidental note is made of partial visualization of instrumentation related to a prior cervical spinal fusion procedure.  No significant detrimental interval change since 10/09/2019.     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    Overview:  Right side dx update     Past Surgical History:   Procedure Laterality Date    BACK SURGERY      EPIDURAL STEROID INJECTION N/A 2/6/2019    Procedure: INJECTION, STEROID, EPIDURAL, L45-S1 IL;  Surgeon: Marcel Adkins MD;  Location: [facility];  Service: Pain Management;  Laterality: N/A;    EPIDURAL STEROID INJECTION N/A 4/10/2019    Procedure: Injection, Steroid, Epidural LUMBAR/CAUDAL L5-S1 INTERLAMINAR KAYLEY;  Surgeon: Marcel Adkins MD;  Location: [facility];  Service: Pain Management;  Laterality: N/A;  NEEDS CONSENT    INJECTION OF FACET JOINT Bilateral 8/14/2019    Procedure: INJECTION, FACET JOINT INJECTION (LUMBAR BLOCK) BILATERAL L4-5 AND L5-S1;  Surgeon: Marcel Adkins MD;  Location: [facility];  Service: Pain Management;  Laterality: Bilateral;  NEEDS CONSENT    INJECTION OF FACET JOINT Bilateral 10/16/2019    Procedure: FACET JOINT INJECTION (LUMBAR BLOCK) BILATERAL L4-5 AND L5-S1;  Surgeon: Marcel Adkins MD;  Location: [facility];  Service: Pain Management;  Laterality: Bilateral;  NEEDS CONSENT    INJECTION OF JOINT Bilateral 11/4/2020    Procedure: INJECTION, JOINT, GLENOHUMERAL;  Surgeon: Marcel Adkins MD;  Location: [facility];  Service: Pain Management;  Laterality: Bilateral;    Larangoscopy      RADIOFREQUENCY ABLATION Left 5/27/2020    Procedure: RADIOFREQUENCY ABLATION LEFT L3,4,5 1 of 2;  Surgeon: Marcel Adkins MD;  Location: [facility];  Service: Pain Management;  Laterality: Left;  Left RFA L3, 4, 5  1 of 2    RADIOFREQUENCY ABLATION Right 6/10/2020    Procedure: RADIOFREQUENCY ABLATION RIGHT L3,4,5;  Surgeon: Marcel Adkins MD;  Location: [facility];  Service: Pain Management;  Laterality: Right;  Right RFA  L3,4.5  2 of 2    ROBOT-ASSISTED LAPAROSCOPIC PROSTATECTOMY USING DA SAÚL XI N/A 2019    Procedure: XI ROBOTIC PROSTATECTOMY;  Surgeon: Sergio Dixon MD;  Location: [facility] OR [address] [place] [address];  Service: Urology;  Laterality: N/A;  4hrs/ gen with regional     SPINE SURGERY       Social History     Socioeconomic History    Marital status: Single     Spouse name: Not on file    Number of children: 0    Years of education: Not on file    Highest education level: Not on file   Occupational History    Not on file   Social Needs    Financial resource strain: Not hard at all    Food insecurity     Worry: Never true     Inability: Never true    Transportation needs     Medical: Yes     Non-medical: Yes   Tobacco Use    Smoking status: Former Smoker     Packs/day: 1.00     Quit date: 2000     Years since quittin.8    Smokeless tobacco: Former User   Substance and Sexual Activity    Alcohol use: Not Currently    Drug use: No    Sexual activity: Not Currently   Lifestyle    Physical activity     Days per week: Not on file     Minutes per session: Not on file    Stress: Only a little   Relationships    Social connections     Talks on phone: Not on file     Gets together: Not on file     Attends [employer] service: Not on file     Active member of club or organization: Not on file     Attends meetings of clubs or organizations: Not on file     Relationship status: Not on file   Other Topics Concern    Not on file   Social History Narrative    No difficulty reading Rx labels      Family History   Problem Relation Age of Onset    Cancer Mother         Pancreatic    No Known Problems Father     Keratoconus Brother     Cancer Brother        Review of patient's allergies indicates:  No Known Allergies    Current Outpatient Medications   Medication Sig    atorvastatin (LIPITOR) 10 MG tablet Take 1 tablet (10 mg total) by mouth once daily.    celecoxib (CELEBREX) 100 MG capsule Take 1 capsule (100 mg  total) by mouth once daily.    cholecalciferol, vitamin D3, (VITAMIN D3) 50 mcg (2,000 unit) Cap Take 1 capsule (2,000 Units total) by mouth once daily.    diclofenac sodium (VOLTAREN) 1 % Gel Apply 2 g topically 4 (four) times daily. (Patient taking differently: Apply 2 g topically 4 (four) times daily as needed. )    ferrous sulfate 325 (65 FE) MG EC tablet Take 1 tablet (325 mg total) by mouth once daily.    gabapentin (NEURONTIN) 300 MG capsule Take 2 capsules (600 mg total) by mouth 3 (three) times daily.    multivitamin capsule Take 1 capsule by mouth once daily.    polyethylene glycol (GLYCOLAX) 17 gram PwPk Take 17 g by mouth once daily.    sertraline (ZOLOFT) 25 MG tablet Take 1 tablet (25 mg total) by mouth once daily.    traZODone (DESYREL) 50 MG tablet Take 3 tablets (150 mg total) by mouth nightly as needed for Insomnia.    triamcinolone acetonide 0.1% (KENALOG) 0.1 % cream Apply 15 g topically 2 (two) times daily as needed.     walker (ULTRA-LIGHT ROLLATOR) Misc 1 Units by Misc.(Non-Drug; Combo Route) route once daily at 6am.     No current facility-administered medications for this visit.        REVIEW OF SYSTEMS:    GENERAL:  No weight loss, malaise or fevers.  HEENT:   No recent changes in vision or hearing  NECK:  Negative for lumps, no difficulty with swallowing.  RESPIRATORY:  Negative for cough, wheezing or shortness of breath, patient denies any recent URI.  CARDIOVASCULAR:  Negative for chest pain, leg swelling or palpitations.  GI:  Negative for abdominal discomfort, blood in stools or black stools or change in bowel habits.  MUSCULOSKELETAL:  See HPI.  SKIN:  Negative for lesions, rash, and itching.  PSYCH:  No mood disorder or recent psychosocial stressors.  Patient's sleep is disturbed secondary to pain.  HEMATOLOGY/LYMPHOLOGY:  + prostate cancer s/p radiation therapy  NEURO:   No history of headaches, syncope, paralysis, seizures or tremors.  All other reviewed and negative  other than HPI.    OBJECTIVE:    Exam limited due to virtual visit:  PHYSICAL EXAMINATION:  Voice normal.  Normal affect without evidence of distress.      Previous physical exam:   There were no vitals filed for this visit.  GEN:  Well developed, well nourished.  No acute distress.   HEENT:  No trauma.  Mucous membranes moist.  Nares patent bilaterally.  PSYCH: Normal affect. Thought content appropriate.  CHEST:  Breathing symmetric.  No audible wheezing.  ABD: Soft, non-distended.  SKIN:  Warm, pink, dry.  No rash on exposed areas.    EXT:  No cyanosis, clubbing, or edema.  No color change or changes in nail or hair growth.  NEURO/MUSCULOSKELETAL:  Fully alert, oriented, and appropriate. Speech normal sean. No cranial nerve deficits.   Gait:.  Antalgic and aided with cane.   Motor Strength: 5/5 motor strength throughout lower extremities.   Sensory: no sensory deficit in the lower extremities.   Reflexes:  +2 and symmetric to patellar .  No clonus or spasticity.   Lumbar: SLR negative bilaterally. Limited ROM with pain on extension. Mildly positive facet loading bilaterally.   Limited ROM of bilateral shoulders with pain on adduction.         ASSESSMENT: 69 y.o. year old male with pain, consistent with       1. Sacroiliac joint pain  diclofenac (VOLTAREN) 75 MG EC tablet   2. Spondylosis of lumbar region without myelopathy or radiculopathy     3. DDD (degenerative disc disease), lumbar     4. Chronic pain of both shoulders           PLAN:     - Previous imaging was reviewed and discussed with the patient today.    - Schedule for bilateral SI joint injections.     - He is s/p bilateral glenohumeral joint injections with benefit. We can repeat this as needed.     - We can repeat lumbar RFA as needed.     - Continue Neurontin, Mobic, Voltaren.    - Continue home exercise routine.     - RTC 2 weeks after above procedure.     - Dr. Adkins was consulted on the patient and agrees with this plan.    The above plan  and management options were discussed at length with patient. Patient is in agreement with the above and verbalized understanding.     Farida Andrew NP  11/20/2020

## 2020-12-16 ENCOUNTER — HOSPITAL ENCOUNTER (OUTPATIENT)
Facility: OTHER | Age: 70
Discharge: HOME | End: 2020-12-16
Attending: ANESTHESIOLOGY | Admitting: ANESTHESIOLOGY
Payer: MEDICARE

## 2020-12-16 VITALS
RESPIRATION RATE: 16 BRPM | SYSTOLIC BLOOD PRESSURE: 115 MMHG | HEART RATE: 57 BPM | WEIGHT: 180 LBS | HEIGHT: 67 IN | OXYGEN SATURATION: 98 % | TEMPERATURE: 98 F | DIASTOLIC BLOOD PRESSURE: 73 MMHG | BODY MASS INDEX: 28.25 KG/M2

## 2020-12-16 DIAGNOSIS — M46.1 SACROILIITIS: Primary | ICD-10-CM

## 2020-12-16 DIAGNOSIS — G89.29 CHRONIC PAIN: ICD-10-CM

## 2020-12-16 PROCEDURE — 27096 INJECT SACROILIAC JOINT: CPT | Mod: 50,HCNC | Performed by: ANESTHESIOLOGY

## 2020-12-16 PROCEDURE — 63600175 PHARM REV CODE 636 W HCPCS: Mod: HCNC | Performed by: ANESTHESIOLOGY

## 2020-12-16 PROCEDURE — 25500020 PHARM REV CODE 255: Mod: HCNC | Performed by: ANESTHESIOLOGY

## 2020-12-16 PROCEDURE — 27096 INJECT SACROILIAC JOINT: CPT | Mod: 50,HCNC,, | Performed by: ANESTHESIOLOGY

## 2020-12-16 PROCEDURE — 27096 PR INJECTION,SACROILIAC JOINT: ICD-10-PCS | Mod: 50,HCNC,, | Performed by: ANESTHESIOLOGY

## 2020-12-16 PROCEDURE — 25000003 PHARM REV CODE 250: Mod: HCNC | Performed by: ANESTHESIOLOGY

## 2020-12-16 RX ORDER — ALPRAZOLAM 0.5 MG/1
0.5 TABLET ORAL ONCE
Status: DISCONTINUED | OUTPATIENT
Start: 2020-12-16 | End: 2020-12-16

## 2020-12-16 RX ORDER — BUPIVACAINE HYDROCHLORIDE 2.5 MG/ML
INJECTION, SOLUTION EPIDURAL; INFILTRATION; INTRACAUDAL
Status: DISCONTINUED | OUTPATIENT
Start: 2020-12-16 | End: 2020-12-16 | Stop reason: HOSPADM

## 2020-12-16 RX ORDER — TRIAMCINOLONE ACETONIDE 40 MG/ML
INJECTION, SUSPENSION INTRA-ARTICULAR; INTRAMUSCULAR
Status: DISCONTINUED | OUTPATIENT
Start: 2020-12-16 | End: 2020-12-16 | Stop reason: HOSPADM

## 2020-12-16 RX ORDER — LIDOCAINE HYDROCHLORIDE 10 MG/ML
INJECTION INFILTRATION; PERINEURAL
Status: DISCONTINUED | OUTPATIENT
Start: 2020-12-16 | End: 2020-12-16 | Stop reason: HOSPADM

## 2020-12-16 RX ORDER — SODIUM CHLORIDE 9 MG/ML
INJECTION, SOLUTION INTRAVENOUS CONTINUOUS
Status: DISCONTINUED | OUTPATIENT
Start: 2020-12-16 | End: 2020-12-16 | Stop reason: HOSPADM

## 2020-12-16 RX ORDER — ALPRAZOLAM 0.5 MG/1
1 TABLET ORAL ONCE
Status: COMPLETED | OUTPATIENT
Start: 2020-12-16 | End: 2020-12-16

## 2020-12-16 RX ADMIN — ALPRAZOLAM 1 MG: 0.5 TABLET ORAL at 09:12

## 2020-12-16 NOTE — OP NOTE
Patient Name: Anthony Miguel  MRN: [id number]    INFORMED CONSENT: The procedure, risks, benefits and options were discussed with patient. There are no contraindications to the procedure. The patient expressed understanding and agreed to proceed. The personnel performing the procedure was discussed. I verify that I personally obtained Anthony's consent prior to the start of the procedure and the signed consent can be found on the patient's chart.    Procedure Date: 12/16/2020    Anesthesia: Topical    Pre Procedure diagnosis: Bilateral sacroiliitis [M46.1]  1. Sacroiliitis    2. Chronic pain      Post-Procedure diagnosis: SAME      Sedation: None    PROCEDURE: BILATERAL SACROILIAC JOINT INJECTION  DESCRIPTION OF PROCEDURE: The patient was brought to the fluoroscopy suite. After performing time out  IV access was obtained prior to the procedure. The patient was positioned prone on the fluoroscopy table. Continuous hemodynamic monitoring was initiated including blood pressure, EKG, and pulse oximetry.  The lumbosacral area was prepped with chlorhexidine three times and draped into a sterile field. The skin overlying the both sides sacroiliac joint was anesthetized using 3cc of lidocaine 1%. The inferior pole of the sacroiliac joint was identified by cephalo-oblique fluoroscopy. A 22 gauge, 3 1/2 inch spinal needle was slowly advanced through the sacroiliac joint capsule under fluoroscopic guidance. The needle position was confirmed using oblique, AP and lateral fluoroscopic imaging.  Negative aspiration was confirmed. 0.5 cc of Omnipaque 300 was injected confirming intra-articular contrast spread. A combination of 2 cc of Bupivacaine 0.25% and 40 mg kenalog was easily injected. The needle was removed and bleeding was nil.  A sterile dressing was applied. PATIENT was taken to the Post-block Recovery Area for further observation.      Blood Loss: Nill  Specimen: None    Leora Clay MD

## 2020-12-16 NOTE — PLAN OF CARE
Pt tolerated procedure well. Pt reports0/10 pain after procedure. Assisted pt up for first time. Steady on feet. All discharge instructions given.

## 2020-12-16 NOTE — DISCHARGE SUMMARY
Discharge Note  Short Stay      SUMMARY     Admit Date: 12/16/2020    Attending Physician: Leora Clay      Discharge Physician: Leora Clay      Discharge Date: 12/16/2020 10:51 AM    Procedure(s) (LRB):  INJECTION, JOINT, SACROILIAC (SI) need consent (Bilateral)    Final Diagnosis: Bilateral sacroiliitis [M46.1]    Disposition: Home or self care    Patient Instructions:   Current Discharge Medication List      CONTINUE these medications which have NOT CHANGED    Details   atorvastatin (LIPITOR) 10 MG tablet Take 1 tablet (10 mg total) by mouth once daily.  Qty: 90 tablet, Refills: 3    Associated Diagnoses: Hyperlipidemia, unspecified hyperlipidemia type      celecoxib (CELEBREX) 100 MG capsule Take 1 capsule (100 mg total) by mouth once daily.  Qty: 90 capsule, Refills: 3    Associated Diagnoses: Lumbosacral stenosis with neurogenic claudication      cholecalciferol, vitamin D3, (VITAMIN D3) 50 mcg (2,000 unit) Cap Take 1 capsule (2,000 Units total) by mouth once daily.  Qty: 90 capsule, Refills: 3    Associated Diagnoses: Vitamin D deficiency      diclofenac (VOLTAREN) 75 MG EC tablet Take 1 tablet (75 mg total) by mouth 2 (two) times daily as needed.  Qty: 60 tablet, Refills: 0    Associated Diagnoses: Sacroiliac joint pain      diclofenac sodium (VOLTAREN) 1 % Gel Apply 2 g topically 4 (four) times daily.  Qty: 1 Tube, Refills: 11    Associated Diagnoses: Lumbosacral stenosis with neurogenic claudication      ferrous sulfate 325 (65 FE) MG EC tablet Take 1 tablet (325 mg total) by mouth once daily.  Qty: 90 tablet, Refills: 3    Associated Diagnoses: Iron deficiency anemia due to chronic blood loss      gabapentin (NEURONTIN) 300 MG capsule Take 2 capsules (600 mg total) by mouth 3 (three) times daily.  Qty: 540 capsule, Refills: 3    Associated Diagnoses: Unspecified inflammatory spondylopathy, cervical region      meloxicam (MOBIC) 15 MG tablet TAKE 1 TABLET(15 MG) BY MOUTH EVERY DAY  Qty: 90 tablet,  Refills: 0      multivitamin capsule Take 1 capsule by mouth once daily.      sertraline (ZOLOFT) 25 MG tablet Take 1 tablet (25 mg total) by mouth once daily.  Qty: 90 tablet, Refills: 3    Associated Diagnoses: Mild episode of recurrent major depressive disorder      traZODone (DESYREL) 50 MG tablet Take 3 tablets (150 mg total) by mouth nightly as needed for Insomnia.  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       walker (ULTRA-LIGHT ROLLATOR) Misc 1 Units by Misc.(Non-Drug; Combo Route) route once daily at 6am.  Qty: 1 each, Refills: 0    Associated Diagnoses: Weakness                 Discharge Diagnosis: Bilateral sacroiliitis [M46.1]  Condition on Discharge: Stable with no complications to procedure   Diet on Discharge: Same as before.  Activity: as per instruction sheet.  Discharge to: Home with a responsible adult.  Follow up: 2-4 weeks

## 2020-12-16 NOTE — DISCHARGE INSTRUCTIONS

## 2020-12-22 ENCOUNTER — PATIENT OUTREACH (OUTPATIENT)
Dept: ADMINISTRATIVE | Facility: OTHER | Age: 70
End: 2020-12-22

## 2020-12-28 ENCOUNTER — TELEPHONE (OUTPATIENT)
Dept: PRIMARY CARE CLINIC | Facility: CLINIC | Age: 70
End: 2020-12-28

## 2020-12-28 NOTE — TELEPHONE ENCOUNTER
Notified pt that he will receive an email when he can receive the COVID vaccine pt verbalized understand

## 2020-12-28 NOTE — TELEPHONE ENCOUNTER
----- Message from Gloria Lockhart sent at 12/28/2020  1:36 PM CST -----  Contact: Pt [phone]  Patient has questions regarding the vaccine.    Please call and advise.    Thank You

## 2020-12-29 ENCOUNTER — OFFICE VISIT (OUTPATIENT)
Dept: OPTOMETRY | Facility: CLINIC | Age: 70
End: 2020-12-29
Payer: MEDICARE

## 2020-12-29 DIAGNOSIS — H52.203 MYOPIA WITH ASTIGMATISM AND PRESBYOPIA, BILATERAL: Primary | ICD-10-CM

## 2020-12-29 DIAGNOSIS — H52.13 MYOPIA WITH ASTIGMATISM AND PRESBYOPIA, BILATERAL: Primary | ICD-10-CM

## 2020-12-29 DIAGNOSIS — H40.013 OAG (OPEN ANGLE GLAUCOMA) SUSPECT, LOW RISK, BILATERAL: ICD-10-CM

## 2020-12-29 DIAGNOSIS — H52.4 MYOPIA WITH ASTIGMATISM AND PRESBYOPIA, BILATERAL: Primary | ICD-10-CM

## 2020-12-29 DIAGNOSIS — H53.8 BLURRED VISION, RIGHT EYE: ICD-10-CM

## 2020-12-29 DIAGNOSIS — H25.13 NUCLEAR SCLEROSIS OF BOTH EYES: ICD-10-CM

## 2020-12-29 PROCEDURE — 92015 PR REFRACTION: ICD-10-PCS | Mod: HCNC,S$GLB,, | Performed by: OPTOMETRIST

## 2020-12-29 PROCEDURE — 99999 PR PBB SHADOW E&M-EST. PATIENT-LVL III: ICD-10-PCS | Mod: PBBFAC,HCNC,, | Performed by: OPTOMETRIST

## 2020-12-29 PROCEDURE — 92014 PR EYE EXAM, EST PATIENT,COMPREHESV: ICD-10-PCS | Mod: HCNC,S$GLB,, | Performed by: OPTOMETRIST

## 2020-12-29 PROCEDURE — 92014 COMPRE OPH EXAM EST PT 1/>: CPT | Mod: HCNC,S$GLB,, | Performed by: OPTOMETRIST

## 2020-12-29 PROCEDURE — 92015 DETERMINE REFRACTIVE STATE: CPT | Mod: HCNC,S$GLB,, | Performed by: OPTOMETRIST

## 2020-12-29 PROCEDURE — 99999 PR PBB SHADOW E&M-EST. PATIENT-LVL III: CPT | Mod: PBBFAC,HCNC,, | Performed by: OPTOMETRIST

## 2020-12-29 NOTE — PROGRESS NOTES
HPI     STACIA: 8/21/2019  Glasses? no  Contacts? 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 Father side,  Brother -keratoconus   Eye gtts? Redness and irritation relief OTC PRN     (- Flashes (-) Floaters (-) Mucous   (-) Tearing (-) Itching (-) Burning   (-) Headaches (-) Eye Pain/discomfort (-) Irritation   (-) Redness (-) Double vision (-) Blurry vision    Diabetic? (-)  A1c?  (Hemoglobin A1C       Date                     Value               Ref Range             Status                10/26/2018               5.1                 4.0 - 5.6 %           Final              Comment:    ADA Screening Guidelines:  5.7-6.4%  Consistent with   prediabetes  >or=6.5%  Consistent with diabetes  High levels of fetal   hemoglobin interfere with the HbA1C  assay. Heterozygous hemoglobin   variants (HbS, HgC, etc)do  not significantly interfere with this assay.     However, presence of multiple variants may affect accuracy.         06/02/2017               5.3                 4.5 - 6.2 %           Final              Comment:    According to ADA guidelines, hemoglobin A1C <7.0% represents  optimal   control in non-pregnant diabetic patients.  Different  metrics may apply   to specific populations.   Standards of Medical Care in Diabetes -   2016.  For the purpose of screening for the presence of diabetes:  <5.7%       Consistent with the absence of diabetes  5.7-6.4%  Consistent with   increasing risk for diabetes   (prediabetes)  >or=6.5%  Consistent with   diabetes  Currently no consensus exists for use of hemoglobin A1C  for   diagnosis of diabetes for children.    ----------)        Last edited by Jennifer Sultana, OD on 12/29/2020 11:28 AM. (History)            Assessment /Plan     For exam results, see Encounter Report.    Myopia with astigmatism and presbyopia, bilateral    Blurred vision, right eye  -      Ambulatory referral/consult to Optometry    OAG (open angle glaucoma) suspect, low risk, bilateral  -     Richardson Visual Field - OU - Extended - Both Eyes; Future  -     Posterior Segment OCT Optic Nerve- Both eyes; Future    Nuclear sclerosis of both eyes      1. SRx released to patient. Patient educated on lens options. Normal ocular health. RTC 1 year for routine exam.   3.  (-) FHx. IOP 16 OD, OS. Last 14 OD, OS. Pachys are thick 554 (-1) OD, 579 (-2) OS. C/d 0.8 OD, OS w/thin rim OU. (-) FHx. (-) HTN. (-) DM. Prev POAG OU suspect at [facility].  11/14/18 OCT OD WNL, OS Borderline TS  11/14/18 HVF OD sup arcuate (due to lid?), OS sup arcuate (due to lid?), early inf arcuate? Doesn't correlate w/OCT.   02/20/19 HVF OD General reduction of sensitivity, WnL. OS ONL- scattered defects but nonspecific. Perhaps early sup temp defect.   Educated patient on findings today. No tx at this time. Would like monitor OS.    RTC 1-2 mo IOP/OCT/HVF  4. Nuclear sclerotic cataract - not visually significant. Observe.

## 2020-12-29 NOTE — LETTER
December 29, 2020      Torrie Jones, NP  [address]  [facility] [address]           [facility] - Opt[facility] 1st Fl  [address]  [facility] [address]  Phone: [phone]  Fax: [phone]          Patient: Anthony Miguel   MR Number: [id number]   YOB: 1950   Date of Visit: 12/29/2020       Dear Torrie Jones:    Thank you for referring Anthony Miguel to me for evaluation. Attached you will find relevant portions of my assessment and plan of care.    If you have questions, please do not hesitate to call me. I look forward to following Anthony Miguel along with you.    Sincerely,    Jennifer Sultana, OD    Enclosure  CC:  No Recipients    If you would like to receive this communication electronically, please contact [email or web address] or [phone] to request more information on [employer] Link access.    For providers and/or their staff who would like to refer a patient to [facility], please contact us through our one-stop-shop provider referral line, [facility], at [phone].    If you feel you have received this communication in error or would no longer like to receive these types of communications, please e-mail [email or web address]

## 2021-01-06 ENCOUNTER — TELEPHONE (OUTPATIENT)
Dept: PAIN MEDICINE | Facility: CLINIC | Age: 71
End: 2021-01-06

## 2021-01-12 ENCOUNTER — OFFICE VISIT (OUTPATIENT)
Dept: PRIMARY CARE CLINIC | Facility: CLINIC | Age: 71
End: 2021-01-12
Payer: MEDICARE

## 2021-01-12 DIAGNOSIS — D69.2 SENILE PURPURA: ICD-10-CM

## 2021-01-12 DIAGNOSIS — R54 FRAILTY SYNDROME IN GERIATRIC PATIENT: ICD-10-CM

## 2021-01-12 DIAGNOSIS — D50.0 IRON DEFICIENCY ANEMIA DUE TO CHRONIC BLOOD LOSS: ICD-10-CM

## 2021-01-12 DIAGNOSIS — C61 CANCER OF PROSTATE: ICD-10-CM

## 2021-01-12 DIAGNOSIS — F33.0 MILD EPISODE OF RECURRENT MAJOR DEPRESSIVE DISORDER: ICD-10-CM

## 2021-01-12 DIAGNOSIS — G95.9 CERVICAL MYELOPATHY: ICD-10-CM

## 2021-01-12 DIAGNOSIS — E55.9 VITAMIN D DEFICIENCY: ICD-10-CM

## 2021-01-12 DIAGNOSIS — F15.182 CAFFEINE-INDUCED SLEEP DISORDER WITH MILD USE DISORDER, INSOMNIA TYPE: ICD-10-CM

## 2021-01-12 DIAGNOSIS — E78.5 HYPERLIPIDEMIA, UNSPECIFIED HYPERLIPIDEMIA TYPE: Primary | ICD-10-CM

## 2021-01-12 PROBLEM — N40.0 BENIGN NON-NODULAR PROSTATIC HYPERPLASIA WITHOUT LOWER URINARY TRACT SYMPTOMS: Status: RESOLVED | Noted: 2017-06-06 | Resolved: 2021-01-12

## 2021-01-12 PROCEDURE — 99443 PR PHYSICIAN TELEPHONE EVALUATION 21-30 MIN: ICD-10-PCS | Mod: HCNC,95,, | Performed by: INTERNAL MEDICINE

## 2021-01-12 PROCEDURE — 99443 PR PHYSICIAN TELEPHONE EVALUATION 21-30 MIN: CPT | Mod: HCNC,95,, | Performed by: INTERNAL MEDICINE

## 2021-01-12 PROCEDURE — 1159F MED LIST DOCD IN RCRD: CPT | Mod: HCNC,95,, | Performed by: INTERNAL MEDICINE

## 2021-01-12 PROCEDURE — 1159F PR MEDICATION LIST DOCUMENTED IN MEDICAL RECORD: ICD-10-PCS | Mod: HCNC,95,, | Performed by: INTERNAL MEDICINE

## 2021-01-19 ENCOUNTER — IMMUNIZATION (OUTPATIENT)
Dept: INTERNAL MEDICINE | Facility: CLINIC | Age: 71
End: 2021-01-19
Payer: MEDICARE

## 2021-01-19 DIAGNOSIS — Z23 NEED FOR VACCINATION: Primary | ICD-10-CM

## 2021-01-19 PROCEDURE — 91300 COVID-19, MRNA, LNP-S, PF, 30 MCG/0.3 ML DOSE VACCINE: CPT | Mod: PBBFAC | Performed by: INTERNAL MEDICINE

## 2021-02-09 ENCOUNTER — IMMUNIZATION (OUTPATIENT)
Dept: INTERNAL MEDICINE | Facility: CLINIC | Age: 71
End: 2021-02-09
Payer: MEDICARE

## 2021-02-09 ENCOUNTER — LAB VISIT (OUTPATIENT)
Dept: LAB | Facility: OTHER | Age: 71
End: 2021-02-09
Attending: UROLOGY
Payer: MEDICARE

## 2021-02-09 DIAGNOSIS — N40.0 BENIGN NON-NODULAR PROSTATIC HYPERPLASIA WITHOUT LOWER URINARY TRACT SYMPTOMS: ICD-10-CM

## 2021-02-09 DIAGNOSIS — Z23 NEED FOR VACCINATION: Primary | ICD-10-CM

## 2021-02-09 LAB — COMPLEXED PSA SERPL-MCNC: <0.01 NG/ML (ref 0–4)

## 2021-02-09 PROCEDURE — 91300 COVID-19, MRNA, LNP-S, PF, 30 MCG/0.3 ML DOSE VACCINE: CPT | Mod: PBBFAC | Performed by: INTERNAL MEDICINE

## 2021-02-09 PROCEDURE — 84153 ASSAY OF PSA TOTAL: CPT

## 2021-02-09 PROCEDURE — 0002A COVID-19, MRNA, LNP-S, PF, 30 MCG/0.3 ML DOSE VACCINE: CPT | Mod: PBBFAC | Performed by: INTERNAL MEDICINE

## 2021-02-09 PROCEDURE — 36415 COLL VENOUS BLD VENIPUNCTURE: CPT

## 2021-02-10 ENCOUNTER — TELEPHONE (OUTPATIENT)
Dept: UROLOGY | Facility: CLINIC | Age: 71
End: 2021-02-10

## 2021-02-22 ENCOUNTER — TELEPHONE (OUTPATIENT)
Dept: PAIN MEDICINE | Facility: CLINIC | Age: 71
End: 2021-02-22

## 2021-02-24 DIAGNOSIS — D50.0 IRON DEFICIENCY ANEMIA DUE TO CHRONIC BLOOD LOSS: ICD-10-CM

## 2021-02-24 RX ORDER — FERROUS SULFATE 325(65) MG
325 TABLET, DELAYED RELEASE (ENTERIC COATED) ORAL DAILY
Qty: 90 TABLET | Refills: 3 | Status: SHIPPED | OUTPATIENT
Start: 2021-02-24 | End: 2023-01-31

## 2021-03-08 ENCOUNTER — PATIENT OUTREACH (OUTPATIENT)
Dept: ADMINISTRATIVE | Facility: OTHER | Age: 71
End: 2021-03-08

## 2021-03-22 ENCOUNTER — TELEPHONE (OUTPATIENT)
Dept: PAIN MEDICINE | Facility: CLINIC | Age: 71
End: 2021-03-22

## 2021-03-29 ENCOUNTER — TELEPHONE (OUTPATIENT)
Dept: PAIN MEDICINE | Facility: CLINIC | Age: 71
End: 2021-03-29

## 2021-03-30 ENCOUNTER — TELEPHONE (OUTPATIENT)
Dept: PAIN MEDICINE | Facility: CLINIC | Age: 71
End: 2021-03-30

## 2021-03-31 ENCOUNTER — OFFICE VISIT (OUTPATIENT)
Dept: PAIN MEDICINE | Facility: CLINIC | Age: 71
End: 2021-03-31
Payer: MEDICARE

## 2021-03-31 VITALS
HEART RATE: 68 BPM | HEIGHT: 67 IN | SYSTOLIC BLOOD PRESSURE: 113 MMHG | WEIGHT: 180 LBS | BODY MASS INDEX: 28.25 KG/M2 | TEMPERATURE: 98 F | RESPIRATION RATE: 18 BRPM | DIASTOLIC BLOOD PRESSURE: 74 MMHG

## 2021-03-31 DIAGNOSIS — M46.1 SACROILIITIS: Primary | ICD-10-CM

## 2021-03-31 DIAGNOSIS — M47.816 SPONDYLOSIS OF LUMBAR REGION WITHOUT MYELOPATHY OR RADICULOPATHY: ICD-10-CM

## 2021-03-31 DIAGNOSIS — M51.36 DDD (DEGENERATIVE DISC DISEASE), LUMBAR: ICD-10-CM

## 2021-03-31 PROCEDURE — 99999 PR PBB SHADOW E&M-EST. PATIENT-LVL III: ICD-10-PCS | Mod: PBBFAC,,, | Performed by: NURSE PRACTITIONER

## 2021-03-31 PROCEDURE — 99499 UNLISTED E&M SERVICE: CPT | Mod: S$GLB,,, | Performed by: NURSE PRACTITIONER

## 2021-03-31 PROCEDURE — 99499 RISK ADDL DX/OHS AUDIT: ICD-10-PCS | Mod: S$GLB,,, | Performed by: NURSE PRACTITIONER

## 2021-03-31 PROCEDURE — 99213 OFFICE O/P EST LOW 20 MIN: CPT | Mod: S$GLB,,, | Performed by: NURSE PRACTITIONER

## 2021-03-31 PROCEDURE — 3288F PR FALLS RISK ASSESSMENT DOCUMENTED: ICD-10-PCS | Mod: CPTII,S$GLB,, | Performed by: NURSE PRACTITIONER

## 2021-03-31 PROCEDURE — 1159F MED LIST DOCD IN RCRD: CPT | Mod: S$GLB,,, | Performed by: NURSE PRACTITIONER

## 2021-03-31 PROCEDURE — 99999 PR PBB SHADOW E&M-EST. PATIENT-LVL III: CPT | Mod: PBBFAC,,, | Performed by: NURSE PRACTITIONER

## 2021-03-31 PROCEDURE — 1125F AMNT PAIN NOTED PAIN PRSNT: CPT | Mod: S$GLB,,, | Performed by: NURSE PRACTITIONER

## 2021-03-31 PROCEDURE — 3288F FALL RISK ASSESSMENT DOCD: CPT | Mod: CPTII,S$GLB,, | Performed by: NURSE PRACTITIONER

## 2021-03-31 PROCEDURE — 1101F PR PT FALLS ASSESS DOC 0-1 FALLS W/OUT INJ PAST YR: ICD-10-PCS | Mod: CPTII,S$GLB,, | Performed by: NURSE PRACTITIONER

## 2021-03-31 PROCEDURE — 3008F BODY MASS INDEX DOCD: CPT | Mod: CPTII,S$GLB,, | Performed by: NURSE PRACTITIONER

## 2021-03-31 PROCEDURE — 1125F PR PAIN SEVERITY QUANTIFIED, PAIN PRESENT: ICD-10-PCS | Mod: S$GLB,,, | Performed by: NURSE PRACTITIONER

## 2021-03-31 PROCEDURE — 1159F PR MEDICATION LIST DOCUMENTED IN MEDICAL RECORD: ICD-10-PCS | Mod: S$GLB,,, | Performed by: NURSE PRACTITIONER

## 2021-03-31 PROCEDURE — 99213 PR OFFICE/OUTPT VISIT, EST, LEVL III, 20-29 MIN: ICD-10-PCS | Mod: S$GLB,,, | Performed by: NURSE PRACTITIONER

## 2021-03-31 PROCEDURE — 3008F PR BODY MASS INDEX (BMI) DOCUMENTED: ICD-10-PCS | Mod: CPTII,S$GLB,, | Performed by: NURSE PRACTITIONER

## 2021-03-31 PROCEDURE — 1101F PT FALLS ASSESS-DOCD LE1/YR: CPT | Mod: CPTII,S$GLB,, | Performed by: NURSE PRACTITIONER

## 2021-04-07 ENCOUNTER — HOSPITAL ENCOUNTER (OUTPATIENT)
Facility: OTHER | Age: 71
Discharge: HOME | End: 2021-04-07
Attending: ANESTHESIOLOGY | Admitting: ANESTHESIOLOGY
Payer: MEDICARE

## 2021-04-07 VITALS
WEIGHT: 175 LBS | RESPIRATION RATE: 18 BRPM | BODY MASS INDEX: 27.47 KG/M2 | OXYGEN SATURATION: 98 % | SYSTOLIC BLOOD PRESSURE: 129 MMHG | TEMPERATURE: 98 F | HEART RATE: 58 BPM | HEIGHT: 67 IN | DIASTOLIC BLOOD PRESSURE: 74 MMHG

## 2021-04-07 DIAGNOSIS — M46.1 SACROILIITIS: Primary | ICD-10-CM

## 2021-04-07 DIAGNOSIS — G89.29 CHRONIC PAIN: ICD-10-CM

## 2021-04-07 PROCEDURE — 25000003 PHARM REV CODE 250: Performed by: ANESTHESIOLOGY

## 2021-04-07 PROCEDURE — 63600175 PHARM REV CODE 636 W HCPCS: Performed by: ANESTHESIOLOGY

## 2021-04-07 PROCEDURE — 27096 INJECT SACROILIAC JOINT: CPT | Mod: 50,,, | Performed by: ANESTHESIOLOGY

## 2021-04-07 PROCEDURE — 27096 INJECT SACROILIAC JOINT: CPT | Mod: 50 | Performed by: ANESTHESIOLOGY

## 2021-04-07 PROCEDURE — 27096 PR INJECTION,SACROILIAC JOINT: ICD-10-PCS | Mod: 50,,, | Performed by: ANESTHESIOLOGY

## 2021-04-07 PROCEDURE — 25500020 PHARM REV CODE 255: Performed by: ANESTHESIOLOGY

## 2021-04-07 RX ORDER — TRIAMCINOLONE ACETONIDE 40 MG/ML
INJECTION, SUSPENSION INTRA-ARTICULAR; INTRAMUSCULAR
Status: DISCONTINUED | OUTPATIENT
Start: 2021-04-07 | End: 2021-04-07 | Stop reason: HOSPADM

## 2021-04-07 RX ORDER — SODIUM CHLORIDE 9 MG/ML
INJECTION, SOLUTION INTRAVENOUS CONTINUOUS
Status: DISCONTINUED | OUTPATIENT
Start: 2021-04-07 | End: 2021-04-07 | Stop reason: HOSPADM

## 2021-04-07 RX ORDER — ALPRAZOLAM 0.5 MG/1
1 TABLET ORAL ONCE
Status: COMPLETED | OUTPATIENT
Start: 2021-04-07 | End: 2021-04-07

## 2021-04-07 RX ORDER — BUPIVACAINE HYDROCHLORIDE 2.5 MG/ML
INJECTION, SOLUTION EPIDURAL; INFILTRATION; INTRACAUDAL
Status: DISCONTINUED | OUTPATIENT
Start: 2021-04-07 | End: 2021-04-07 | Stop reason: HOSPADM

## 2021-04-07 RX ORDER — LIDOCAINE HYDROCHLORIDE 10 MG/ML
INJECTION INFILTRATION; PERINEURAL
Status: DISCONTINUED | OUTPATIENT
Start: 2021-04-07 | End: 2021-04-07 | Stop reason: HOSPADM

## 2021-04-07 RX ADMIN — ALPRAZOLAM 1 MG: 0.5 TABLET ORAL at 09:04

## 2021-04-26 ENCOUNTER — PATIENT OUTREACH (OUTPATIENT)
Dept: ADMINISTRATIVE | Facility: OTHER | Age: 71
End: 2021-04-26

## 2021-04-26 RX ORDER — MELOXICAM 15 MG/1
TABLET ORAL
Qty: 90 TABLET | Refills: 0 | Status: SHIPPED | OUTPATIENT
Start: 2021-04-26 | End: 2021-12-14 | Stop reason: SDUPTHER

## 2021-04-28 ENCOUNTER — TELEPHONE (OUTPATIENT)
Dept: PAIN MEDICINE | Facility: CLINIC | Age: 71
End: 2021-04-28

## 2021-04-29 ENCOUNTER — TELEPHONE (OUTPATIENT)
Dept: PAIN MEDICINE | Facility: CLINIC | Age: 71
End: 2021-04-29

## 2021-05-03 ENCOUNTER — OFFICE VISIT (OUTPATIENT)
Dept: PAIN MEDICINE | Facility: CLINIC | Age: 71
End: 2021-05-03
Payer: MEDICARE

## 2021-05-03 VITALS
HEART RATE: 77 BPM | BODY MASS INDEX: 25.9 KG/M2 | HEIGHT: 67 IN | RESPIRATION RATE: 18 BRPM | TEMPERATURE: 97 F | WEIGHT: 165 LBS | DIASTOLIC BLOOD PRESSURE: 86 MMHG | SYSTOLIC BLOOD PRESSURE: 141 MMHG

## 2021-05-03 DIAGNOSIS — M46.1 SACROILIITIS: ICD-10-CM

## 2021-05-03 DIAGNOSIS — M19.011 PRIMARY OSTEOARTHRITIS OF BOTH SHOULDERS: ICD-10-CM

## 2021-05-03 DIAGNOSIS — M47.816 SPONDYLOSIS OF LUMBAR REGION WITHOUT MYELOPATHY OR RADICULOPATHY: ICD-10-CM

## 2021-05-03 DIAGNOSIS — M19.012 PRIMARY OSTEOARTHRITIS OF BOTH SHOULDERS: ICD-10-CM

## 2021-05-03 DIAGNOSIS — M51.36 DDD (DEGENERATIVE DISC DISEASE), LUMBAR: ICD-10-CM

## 2021-05-03 DIAGNOSIS — M25.512 CHRONIC PAIN OF BOTH SHOULDERS: Primary | ICD-10-CM

## 2021-05-03 DIAGNOSIS — G89.29 CHRONIC PAIN OF BOTH SHOULDERS: Primary | ICD-10-CM

## 2021-05-03 DIAGNOSIS — M25.511 CHRONIC PAIN OF BOTH SHOULDERS: Primary | ICD-10-CM

## 2021-05-03 DIAGNOSIS — G89.4 CHRONIC PAIN SYNDROME: ICD-10-CM

## 2021-05-03 PROCEDURE — 1159F MED LIST DOCD IN RCRD: CPT | Mod: S$GLB,,, | Performed by: NURSE PRACTITIONER

## 2021-05-03 PROCEDURE — 3008F BODY MASS INDEX DOCD: CPT | Mod: CPTII,S$GLB,, | Performed by: NURSE PRACTITIONER

## 2021-05-03 PROCEDURE — 99499 UNLISTED E&M SERVICE: CPT | Mod: S$GLB,,, | Performed by: NURSE PRACTITIONER

## 2021-05-03 PROCEDURE — 1101F PR PT FALLS ASSESS DOC 0-1 FALLS W/OUT INJ PAST YR: ICD-10-PCS | Mod: CPTII,S$GLB,, | Performed by: NURSE PRACTITIONER

## 2021-05-03 PROCEDURE — 3008F PR BODY MASS INDEX (BMI) DOCUMENTED: ICD-10-PCS | Mod: CPTII,S$GLB,, | Performed by: NURSE PRACTITIONER

## 2021-05-03 PROCEDURE — 1125F AMNT PAIN NOTED PAIN PRSNT: CPT | Mod: S$GLB,,, | Performed by: NURSE PRACTITIONER

## 2021-05-03 PROCEDURE — 99213 PR OFFICE/OUTPT VISIT, EST, LEVL III, 20-29 MIN: ICD-10-PCS | Mod: S$GLB,,, | Performed by: NURSE PRACTITIONER

## 2021-05-03 PROCEDURE — 99999 PR PBB SHADOW E&M-EST. PATIENT-LVL III: ICD-10-PCS | Mod: PBBFAC,,, | Performed by: NURSE PRACTITIONER

## 2021-05-03 PROCEDURE — 99213 OFFICE O/P EST LOW 20 MIN: CPT | Mod: S$GLB,,, | Performed by: NURSE PRACTITIONER

## 2021-05-03 PROCEDURE — 1101F PT FALLS ASSESS-DOCD LE1/YR: CPT | Mod: CPTII,S$GLB,, | Performed by: NURSE PRACTITIONER

## 2021-05-03 PROCEDURE — 99999 PR PBB SHADOW E&M-EST. PATIENT-LVL III: CPT | Mod: PBBFAC,,, | Performed by: NURSE PRACTITIONER

## 2021-05-03 PROCEDURE — 3288F PR FALLS RISK ASSESSMENT DOCUMENTED: ICD-10-PCS | Mod: CPTII,S$GLB,, | Performed by: NURSE PRACTITIONER

## 2021-05-03 PROCEDURE — 99499 RISK ADDL DX/OHS AUDIT: ICD-10-PCS | Mod: S$GLB,,, | Performed by: NURSE PRACTITIONER

## 2021-05-03 PROCEDURE — 1125F PR PAIN SEVERITY QUANTIFIED, PAIN PRESENT: ICD-10-PCS | Mod: S$GLB,,, | Performed by: NURSE PRACTITIONER

## 2021-05-03 PROCEDURE — 1159F PR MEDICATION LIST DOCUMENTED IN MEDICAL RECORD: ICD-10-PCS | Mod: S$GLB,,, | Performed by: NURSE PRACTITIONER

## 2021-05-03 PROCEDURE — 3288F FALL RISK ASSESSMENT DOCD: CPT | Mod: CPTII,S$GLB,, | Performed by: NURSE PRACTITIONER

## 2021-05-31 ENCOUNTER — TELEPHONE (OUTPATIENT)
Dept: PAIN MEDICINE | Facility: CLINIC | Age: 71
End: 2021-05-31

## 2021-06-02 ENCOUNTER — HOSPITAL ENCOUNTER (OUTPATIENT)
Facility: OTHER | Age: 71
Discharge: HOME | End: 2021-06-02
Attending: ANESTHESIOLOGY | Admitting: ANESTHESIOLOGY
Payer: MEDICARE

## 2021-06-02 VITALS
WEIGHT: 175 LBS | TEMPERATURE: 98 F | RESPIRATION RATE: 18 BRPM | SYSTOLIC BLOOD PRESSURE: 156 MMHG | HEIGHT: 67 IN | BODY MASS INDEX: 27.47 KG/M2 | HEART RATE: 52 BPM | DIASTOLIC BLOOD PRESSURE: 68 MMHG | OXYGEN SATURATION: 98 %

## 2021-06-02 DIAGNOSIS — G89.29 CHRONIC SHOULDER PAIN, UNSPECIFIED LATERALITY: ICD-10-CM

## 2021-06-02 DIAGNOSIS — G89.29 CHRONIC SHOULDER PAIN: ICD-10-CM

## 2021-06-02 DIAGNOSIS — M25.519 CHRONIC SHOULDER PAIN, UNSPECIFIED LATERALITY: ICD-10-CM

## 2021-06-02 DIAGNOSIS — M19.019 OSTEOARTHRITIS OF SHOULDER, UNSPECIFIED LATERALITY, UNSPECIFIED OSTEOARTHRITIS TYPE: Primary | ICD-10-CM

## 2021-06-02 DIAGNOSIS — M25.519 CHRONIC SHOULDER PAIN: ICD-10-CM

## 2021-06-02 PROCEDURE — 25500020 PHARM REV CODE 255: Performed by: ANESTHESIOLOGY

## 2021-06-02 PROCEDURE — 20610 DRAIN/INJ JOINT/BURSA W/O US: CPT | Mod: 50 | Performed by: ANESTHESIOLOGY

## 2021-06-02 PROCEDURE — 77002 NEEDLE LOCALIZATION BY XRAY: CPT | Mod: 26 | Performed by: ANESTHESIOLOGY

## 2021-06-02 PROCEDURE — 77002 NEEDLE LOCALIZATION BY XRAY: CPT | Mod: 26,,, | Performed by: ANESTHESIOLOGY

## 2021-06-02 PROCEDURE — 20610 DRAIN/INJ JOINT/BURSA W/O US: CPT | Mod: 50,,, | Performed by: ANESTHESIOLOGY

## 2021-06-02 PROCEDURE — 25000003 PHARM REV CODE 250: Performed by: ANESTHESIOLOGY

## 2021-06-02 PROCEDURE — 20610 PR DRAIN/INJECT LARGE JOINT/BURSA: ICD-10-PCS | Mod: 50,,, | Performed by: ANESTHESIOLOGY

## 2021-06-02 PROCEDURE — 63600175 PHARM REV CODE 636 W HCPCS: Performed by: ANESTHESIOLOGY

## 2021-06-02 PROCEDURE — 77002 PR FLUOROSCOPIC GUIDANCE NEEDLE PLACEMENT: ICD-10-PCS | Mod: 26,,, | Performed by: ANESTHESIOLOGY

## 2021-06-02 RX ORDER — ALPRAZOLAM 0.5 MG/1
0.5 TABLET ORAL ONCE
Status: COMPLETED | OUTPATIENT
Start: 2021-06-02 | End: 2021-06-02

## 2021-06-02 RX ORDER — LIDOCAINE HYDROCHLORIDE 10 MG/ML
INJECTION INFILTRATION; PERINEURAL
Status: DISCONTINUED | OUTPATIENT
Start: 2021-06-02 | End: 2021-06-02 | Stop reason: HOSPADM

## 2021-06-02 RX ORDER — SODIUM CHLORIDE 9 MG/ML
INJECTION, SOLUTION INTRAVENOUS CONTINUOUS
Status: ACTIVE | OUTPATIENT
Start: 2021-06-02

## 2021-06-02 RX ORDER — BUPIVACAINE HYDROCHLORIDE 2.5 MG/ML
INJECTION, SOLUTION EPIDURAL; INFILTRATION; INTRACAUDAL
Status: DISCONTINUED | OUTPATIENT
Start: 2021-06-02 | End: 2021-06-02 | Stop reason: HOSPADM

## 2021-06-02 RX ORDER — TRIAMCINOLONE ACETONIDE 40 MG/ML
INJECTION, SUSPENSION INTRA-ARTICULAR; INTRAMUSCULAR
Status: DISCONTINUED | OUTPATIENT
Start: 2021-06-02 | End: 2021-06-02 | Stop reason: HOSPADM

## 2021-06-02 RX ADMIN — ALPRAZOLAM 0.5 MG: 0.5 TABLET ORAL at 08:06

## 2021-06-04 ENCOUNTER — TELEPHONE (OUTPATIENT)
Dept: PAIN MEDICINE | Facility: CLINIC | Age: 71
End: 2021-06-04

## 2021-06-04 ENCOUNTER — PATIENT OUTREACH (OUTPATIENT)
Dept: ADMINISTRATIVE | Facility: OTHER | Age: 71
End: 2021-06-04

## 2021-06-17 ENCOUNTER — OFFICE VISIT (OUTPATIENT)
Dept: PAIN MEDICINE | Facility: CLINIC | Age: 71
End: 2021-06-17
Payer: MEDICARE

## 2021-06-17 VITALS
TEMPERATURE: 98 F | RESPIRATION RATE: 18 BRPM | DIASTOLIC BLOOD PRESSURE: 82 MMHG | HEIGHT: 67 IN | WEIGHT: 160.88 LBS | HEART RATE: 69 BPM | SYSTOLIC BLOOD PRESSURE: 127 MMHG | BODY MASS INDEX: 25.25 KG/M2

## 2021-06-17 DIAGNOSIS — M25.512 CHRONIC PAIN OF BOTH SHOULDERS: ICD-10-CM

## 2021-06-17 DIAGNOSIS — M51.36 DDD (DEGENERATIVE DISC DISEASE), LUMBAR: ICD-10-CM

## 2021-06-17 DIAGNOSIS — M19.011 PRIMARY OSTEOARTHRITIS OF BOTH SHOULDERS: ICD-10-CM

## 2021-06-17 DIAGNOSIS — G89.4 CHRONIC PAIN SYNDROME: Primary | ICD-10-CM

## 2021-06-17 DIAGNOSIS — M47.816 SPONDYLOSIS OF LUMBAR REGION WITHOUT MYELOPATHY OR RADICULOPATHY: ICD-10-CM

## 2021-06-17 DIAGNOSIS — M25.511 CHRONIC PAIN OF BOTH SHOULDERS: ICD-10-CM

## 2021-06-17 DIAGNOSIS — G89.29 CHRONIC PAIN OF BOTH SHOULDERS: ICD-10-CM

## 2021-06-17 DIAGNOSIS — M19.012 PRIMARY OSTEOARTHRITIS OF BOTH SHOULDERS: ICD-10-CM

## 2021-06-17 DIAGNOSIS — M46.1 SACROILIITIS: ICD-10-CM

## 2021-06-17 PROCEDURE — 99499 RISK ADDL DX/OHS AUDIT: ICD-10-PCS | Mod: S$GLB,,, | Performed by: NURSE PRACTITIONER

## 2021-06-17 PROCEDURE — 3288F PR FALLS RISK ASSESSMENT DOCUMENTED: ICD-10-PCS | Mod: CPTII,S$GLB,, | Performed by: NURSE PRACTITIONER

## 2021-06-17 PROCEDURE — 99213 PR OFFICE/OUTPT VISIT, EST, LEVL III, 20-29 MIN: ICD-10-PCS | Mod: S$GLB,,, | Performed by: NURSE PRACTITIONER

## 2021-06-17 PROCEDURE — 1101F PT FALLS ASSESS-DOCD LE1/YR: CPT | Mod: CPTII,S$GLB,, | Performed by: NURSE PRACTITIONER

## 2021-06-17 PROCEDURE — 1125F AMNT PAIN NOTED PAIN PRSNT: CPT | Mod: S$GLB,,, | Performed by: NURSE PRACTITIONER

## 2021-06-17 PROCEDURE — 3288F FALL RISK ASSESSMENT DOCD: CPT | Mod: CPTII,S$GLB,, | Performed by: NURSE PRACTITIONER

## 2021-06-17 PROCEDURE — 99213 OFFICE O/P EST LOW 20 MIN: CPT | Mod: S$GLB,,, | Performed by: NURSE PRACTITIONER

## 2021-06-17 PROCEDURE — 1125F PR PAIN SEVERITY QUANTIFIED, PAIN PRESENT: ICD-10-PCS | Mod: S$GLB,,, | Performed by: NURSE PRACTITIONER

## 2021-06-17 PROCEDURE — 99999 PR PBB SHADOW E&M-EST. PATIENT-LVL IV: CPT | Mod: PBBFAC,,, | Performed by: NURSE PRACTITIONER

## 2021-06-17 PROCEDURE — 3008F PR BODY MASS INDEX (BMI) DOCUMENTED: ICD-10-PCS | Mod: CPTII,S$GLB,, | Performed by: NURSE PRACTITIONER

## 2021-06-17 PROCEDURE — 1101F PR PT FALLS ASSESS DOC 0-1 FALLS W/OUT INJ PAST YR: ICD-10-PCS | Mod: CPTII,S$GLB,, | Performed by: NURSE PRACTITIONER

## 2021-06-17 PROCEDURE — 99499 UNLISTED E&M SERVICE: CPT | Mod: S$GLB,,, | Performed by: NURSE PRACTITIONER

## 2021-06-17 PROCEDURE — 1159F MED LIST DOCD IN RCRD: CPT | Mod: S$GLB,,, | Performed by: NURSE PRACTITIONER

## 2021-06-17 PROCEDURE — 3008F BODY MASS INDEX DOCD: CPT | Mod: CPTII,S$GLB,, | Performed by: NURSE PRACTITIONER

## 2021-06-17 PROCEDURE — 99999 PR PBB SHADOW E&M-EST. PATIENT-LVL IV: ICD-10-PCS | Mod: PBBFAC,,, | Performed by: NURSE PRACTITIONER

## 2021-06-17 PROCEDURE — 1159F PR MEDICATION LIST DOCUMENTED IN MEDICAL RECORD: ICD-10-PCS | Mod: S$GLB,,, | Performed by: NURSE PRACTITIONER

## 2021-07-22 ENCOUNTER — TELEPHONE (OUTPATIENT)
Dept: PAIN MEDICINE | Facility: CLINIC | Age: 71
End: 2021-07-22

## 2021-07-23 ENCOUNTER — PES CALL (OUTPATIENT)
Dept: ADMINISTRATIVE | Facility: CLINIC | Age: 71
End: 2021-07-23

## 2021-07-26 ENCOUNTER — PATIENT OUTREACH (OUTPATIENT)
Dept: ADMINISTRATIVE | Facility: OTHER | Age: 71
End: 2021-07-26

## 2021-07-26 ENCOUNTER — TELEPHONE (OUTPATIENT)
Dept: PAIN MEDICINE | Facility: CLINIC | Age: 71
End: 2021-07-26

## 2021-08-19 ENCOUNTER — TELEPHONE (OUTPATIENT)
Dept: PRIMARY CARE CLINIC | Facility: CLINIC | Age: 71
End: 2021-08-19

## 2021-08-19 DIAGNOSIS — E55.9 VITAMIN D DEFICIENCY: Primary | ICD-10-CM

## 2021-08-19 DIAGNOSIS — R79.9 ABNORMAL FINDING OF BLOOD CHEMISTRY: ICD-10-CM

## 2021-08-19 DIAGNOSIS — C61 PROSTATE CANCER: ICD-10-CM

## 2021-08-19 DIAGNOSIS — D50.0 IRON DEFICIENCY ANEMIA DUE TO CHRONIC BLOOD LOSS: ICD-10-CM

## 2021-08-19 DIAGNOSIS — E78.5 HYPERLIPIDEMIA, UNSPECIFIED HYPERLIPIDEMIA TYPE: ICD-10-CM

## 2021-08-19 DIAGNOSIS — R93.89 ABNORMAL FINDINGS ON DIAGNOSTIC IMAGING OF OTHER SPECIFIED BODY STRUCTURES: ICD-10-CM

## 2021-08-23 ENCOUNTER — LAB VISIT (OUTPATIENT)
Dept: LAB | Facility: HOSPITAL | Age: 71
End: 2021-08-23
Attending: INTERNAL MEDICINE
Payer: MEDICARE

## 2021-08-23 ENCOUNTER — OFFICE VISIT (OUTPATIENT)
Dept: UROLOGY | Facility: CLINIC | Age: 71
End: 2021-08-23
Payer: MEDICARE

## 2021-08-23 VITALS
HEART RATE: 92 BPM | DIASTOLIC BLOOD PRESSURE: 78 MMHG | BODY MASS INDEX: 26.3 KG/M2 | WEIGHT: 167.56 LBS | SYSTOLIC BLOOD PRESSURE: 138 MMHG | HEIGHT: 67 IN

## 2021-08-23 DIAGNOSIS — C61 PROSTATE CANCER: ICD-10-CM

## 2021-08-23 DIAGNOSIS — C61 PROSTATE CANCER: Primary | ICD-10-CM

## 2021-08-23 LAB — COMPLEXED PSA SERPL-MCNC: <0.01 NG/ML (ref 0–4)

## 2021-08-23 PROCEDURE — 36415 COLL VENOUS BLD VENIPUNCTURE: CPT | Performed by: INTERNAL MEDICINE

## 2021-08-23 PROCEDURE — 3075F PR MOST RECENT SYSTOLIC BLOOD PRESS GE 130-139MM HG: ICD-10-PCS | Mod: CPTII,S$GLB,, | Performed by: UROLOGY

## 2021-08-23 PROCEDURE — 1159F PR MEDICATION LIST DOCUMENTED IN MEDICAL RECORD: ICD-10-PCS | Mod: CPTII,S$GLB,, | Performed by: UROLOGY

## 2021-08-23 PROCEDURE — 3078F DIAST BP <80 MM HG: CPT | Mod: CPTII,S$GLB,, | Performed by: UROLOGY

## 2021-08-23 PROCEDURE — 1160F PR REVIEW ALL MEDS BY PRESCRIBER/CLIN PHARMACIST DOCUMENTED: ICD-10-PCS | Mod: CPTII,S$GLB,, | Performed by: UROLOGY

## 2021-08-23 PROCEDURE — 1101F PR PT FALLS ASSESS DOC 0-1 FALLS W/OUT INJ PAST YR: ICD-10-PCS | Mod: CPTII,S$GLB,, | Performed by: UROLOGY

## 2021-08-23 PROCEDURE — 3288F PR FALLS RISK ASSESSMENT DOCUMENTED: ICD-10-PCS | Mod: CPTII,S$GLB,, | Performed by: UROLOGY

## 2021-08-23 PROCEDURE — 99999 PR PBB SHADOW E&M-EST. PATIENT-LVL III: CPT | Mod: PBBFAC,,, | Performed by: UROLOGY

## 2021-08-23 PROCEDURE — 99213 PR OFFICE/OUTPT VISIT, EST, LEVL III, 20-29 MIN: ICD-10-PCS | Mod: S$GLB,,, | Performed by: UROLOGY

## 2021-08-23 PROCEDURE — 84153 ASSAY OF PSA TOTAL: CPT | Performed by: INTERNAL MEDICINE

## 2021-08-23 PROCEDURE — 3288F FALL RISK ASSESSMENT DOCD: CPT | Mod: CPTII,S$GLB,, | Performed by: UROLOGY

## 2021-08-23 PROCEDURE — 3008F BODY MASS INDEX DOCD: CPT | Mod: CPTII,S$GLB,, | Performed by: UROLOGY

## 2021-08-23 PROCEDURE — 1125F PR PAIN SEVERITY QUANTIFIED, PAIN PRESENT: ICD-10-PCS | Mod: CPTII,S$GLB,, | Performed by: UROLOGY

## 2021-08-23 PROCEDURE — 1101F PT FALLS ASSESS-DOCD LE1/YR: CPT | Mod: CPTII,S$GLB,, | Performed by: UROLOGY

## 2021-08-23 PROCEDURE — 3075F SYST BP GE 130 - 139MM HG: CPT | Mod: CPTII,S$GLB,, | Performed by: UROLOGY

## 2021-08-23 PROCEDURE — 99999 PR PBB SHADOW E&M-EST. PATIENT-LVL III: ICD-10-PCS | Mod: PBBFAC,,, | Performed by: UROLOGY

## 2021-08-23 PROCEDURE — 1159F MED LIST DOCD IN RCRD: CPT | Mod: CPTII,S$GLB,, | Performed by: UROLOGY

## 2021-08-23 PROCEDURE — 99213 OFFICE O/P EST LOW 20 MIN: CPT | Mod: S$GLB,,, | Performed by: UROLOGY

## 2021-08-23 PROCEDURE — 1125F AMNT PAIN NOTED PAIN PRSNT: CPT | Mod: CPTII,S$GLB,, | Performed by: UROLOGY

## 2021-08-23 PROCEDURE — 3008F PR BODY MASS INDEX (BMI) DOCUMENTED: ICD-10-PCS | Mod: CPTII,S$GLB,, | Performed by: UROLOGY

## 2021-08-23 PROCEDURE — 3078F PR MOST RECENT DIASTOLIC BLOOD PRESSURE < 80 MM HG: ICD-10-PCS | Mod: CPTII,S$GLB,, | Performed by: UROLOGY

## 2021-08-23 PROCEDURE — 1160F RVW MEDS BY RX/DR IN RCRD: CPT | Mod: CPTII,S$GLB,, | Performed by: UROLOGY

## 2021-08-24 ENCOUNTER — LAB VISIT (OUTPATIENT)
Dept: LAB | Facility: HOSPITAL | Age: 71
End: 2021-08-24
Attending: INTERNAL MEDICINE
Payer: MEDICARE

## 2021-08-24 DIAGNOSIS — E78.5 HYPERLIPIDEMIA, UNSPECIFIED HYPERLIPIDEMIA TYPE: ICD-10-CM

## 2021-08-24 DIAGNOSIS — R93.89 ABNORMAL FINDINGS ON DIAGNOSTIC IMAGING OF OTHER SPECIFIED BODY STRUCTURES: ICD-10-CM

## 2021-08-24 DIAGNOSIS — E55.9 VITAMIN D DEFICIENCY: ICD-10-CM

## 2021-08-24 DIAGNOSIS — C61 PROSTATE CANCER: ICD-10-CM

## 2021-08-24 DIAGNOSIS — R79.9 ABNORMAL FINDING OF BLOOD CHEMISTRY: ICD-10-CM

## 2021-08-24 DIAGNOSIS — D50.0 IRON DEFICIENCY ANEMIA DUE TO CHRONIC BLOOD LOSS: ICD-10-CM

## 2021-08-24 LAB
25(OH)D3+25(OH)D2 SERPL-MCNC: 27 NG/ML (ref 30–96)
ALBUMIN SERPL BCP-MCNC: 3.5 G/DL (ref 3.5–5.2)
ALP SERPL-CCNC: 72 U/L (ref 55–135)
ALT SERPL W/O P-5'-P-CCNC: 17 U/L (ref 10–44)
ANION GAP SERPL CALC-SCNC: 13 MMOL/L (ref 8–16)
AST SERPL-CCNC: 22 U/L (ref 10–40)
BASOPHILS # BLD AUTO: 0.03 K/UL (ref 0–0.2)
BASOPHILS NFR BLD: 0.5 % (ref 0–1.9)
BILIRUB SERPL-MCNC: 0.7 MG/DL (ref 0.1–1)
BUN SERPL-MCNC: 28 MG/DL (ref 8–23)
CALCIUM SERPL-MCNC: 9 MG/DL (ref 8.7–10.5)
CHLORIDE SERPL-SCNC: 107 MMOL/L (ref 95–110)
CHOLEST SERPL-MCNC: 150 MG/DL (ref 120–199)
CHOLEST/HDLC SERPL: 2.1 {RATIO} (ref 2–5)
CO2 SERPL-SCNC: 18 MMOL/L (ref 23–29)
CREAT SERPL-MCNC: 0.8 MG/DL (ref 0.5–1.4)
DIFFERENTIAL METHOD: ABNORMAL
EOSINOPHIL # BLD AUTO: 0.2 K/UL (ref 0–0.5)
EOSINOPHIL NFR BLD: 3.8 % (ref 0–8)
ERYTHROCYTE [DISTWIDTH] IN BLOOD BY AUTOMATED COUNT: 12.7 % (ref 11.5–14.5)
EST. GFR  (AFRICAN AMERICAN): >60 ML/MIN/1.73 M^2
EST. GFR  (NON AFRICAN AMERICAN): >60 ML/MIN/1.73 M^2
ESTIMATED AVG GLUCOSE: 91 MG/DL (ref 68–131)
GLUCOSE SERPL-MCNC: 83 MG/DL (ref 70–110)
HBA1C MFR BLD: 4.8 % (ref 4–5.6)
HCT VFR BLD AUTO: 39.7 % (ref 40–54)
HDLC SERPL-MCNC: 72 MG/DL (ref 40–75)
HDLC SERPL: 48 % (ref 20–50)
HGB BLD-MCNC: 13.1 G/DL (ref 14–18)
IMM GRANULOCYTES # BLD AUTO: 0.13 K/UL (ref 0–0.04)
IMM GRANULOCYTES NFR BLD AUTO: 2 % (ref 0–0.5)
LDLC SERPL CALC-MCNC: 68.8 MG/DL (ref 63–159)
LYMPHOCYTES # BLD AUTO: 0.4 K/UL (ref 1–4.8)
LYMPHOCYTES NFR BLD: 6.1 % (ref 18–48)
MCH RBC QN AUTO: 33.1 PG (ref 27–31)
MCHC RBC AUTO-ENTMCNC: 33 G/DL (ref 32–36)
MCV RBC AUTO: 100 FL (ref 82–98)
MONOCYTES # BLD AUTO: 0.5 K/UL (ref 0.3–1)
MONOCYTES NFR BLD: 8.2 % (ref 4–15)
NEUTROPHILS # BLD AUTO: 5.1 K/UL (ref 1.8–7.7)
NEUTROPHILS NFR BLD: 79.4 % (ref 38–73)
NONHDLC SERPL-MCNC: 78 MG/DL
NRBC BLD-RTO: 0 /100 WBC
PLATELET # BLD AUTO: 254 K/UL (ref 150–450)
PMV BLD AUTO: 10.9 FL (ref 9.2–12.9)
POTASSIUM SERPL-SCNC: 4.5 MMOL/L (ref 3.5–5.1)
PROT SERPL-MCNC: 6.6 G/DL (ref 6–8.4)
RBC # BLD AUTO: 3.96 M/UL (ref 4.6–6.2)
SODIUM SERPL-SCNC: 138 MMOL/L (ref 136–145)
TRIGL SERPL-MCNC: 46 MG/DL (ref 30–150)
TSH SERPL DL<=0.005 MIU/L-ACNC: 2.56 UIU/ML (ref 0.4–4)
WBC # BLD AUTO: 6.37 K/UL (ref 3.9–12.7)

## 2021-08-24 PROCEDURE — 83036 HEMOGLOBIN GLYCOSYLATED A1C: CPT | Performed by: INTERNAL MEDICINE

## 2021-08-24 PROCEDURE — 80053 COMPREHEN METABOLIC PANEL: CPT | Performed by: INTERNAL MEDICINE

## 2021-08-24 PROCEDURE — 85025 COMPLETE CBC W/AUTO DIFF WBC: CPT | Performed by: INTERNAL MEDICINE

## 2021-08-24 PROCEDURE — 36415 COLL VENOUS BLD VENIPUNCTURE: CPT | Performed by: INTERNAL MEDICINE

## 2021-08-24 PROCEDURE — 80061 LIPID PANEL: CPT | Performed by: INTERNAL MEDICINE

## 2021-08-24 PROCEDURE — 84443 ASSAY THYROID STIM HORMONE: CPT | Performed by: INTERNAL MEDICINE

## 2021-08-24 PROCEDURE — 82306 VITAMIN D 25 HYDROXY: CPT | Performed by: INTERNAL MEDICINE

## 2021-08-25 ENCOUNTER — TELEPHONE (OUTPATIENT)
Dept: PRIMARY CARE CLINIC | Facility: CLINIC | Age: 71
End: 2021-08-25

## 2021-08-26 ENCOUNTER — PES CALL (OUTPATIENT)
Dept: ADMINISTRATIVE | Facility: CLINIC | Age: 71
End: 2021-08-26

## 2021-08-31 ENCOUNTER — HOSPITAL ENCOUNTER (OUTPATIENT)
Facility: HOSPITAL | Age: 71
Discharge: HOME | End: 2021-09-01
Attending: EMERGENCY MEDICINE | Admitting: ORTHOPAEDIC SURGERY
Payer: MEDICARE

## 2021-08-31 DIAGNOSIS — S61.209A EXTENSOR TENDON LACERATION OF FINGER WITH OPEN WOUND, INITIAL ENCOUNTER: ICD-10-CM

## 2021-08-31 DIAGNOSIS — S69.91XA HAND INJURY, RIGHT, INITIAL ENCOUNTER: ICD-10-CM

## 2021-08-31 DIAGNOSIS — S56.429A EXTENSOR TENDON LACERATION OF FINGER WITH OPEN WOUND, INITIAL ENCOUNTER: ICD-10-CM

## 2021-08-31 DIAGNOSIS — S61.219A FINGER LACERATION INVOLVING TENDON, INITIAL ENCOUNTER: Primary | ICD-10-CM

## 2021-08-31 DIAGNOSIS — Z01.810 PREOP CARDIOVASCULAR EXAM: ICD-10-CM

## 2021-08-31 DIAGNOSIS — S00.83XA CONTUSION OF FOREHEAD, INITIAL ENCOUNTER: ICD-10-CM

## 2021-08-31 PROCEDURE — 96372 THER/PROPH/DIAG INJ SC/IM: CPT

## 2021-08-31 PROCEDURE — 99284 EMERGENCY DEPT VISIT MOD MDM: CPT | Mod: CS,,, | Performed by: EMERGENCY MEDICINE

## 2021-08-31 PROCEDURE — 99284 PR EMERGENCY DEPT VISIT,LEVEL IV: ICD-10-PCS | Mod: CS,,, | Performed by: EMERGENCY MEDICINE

## 2021-08-31 PROCEDURE — 90471 IMMUNIZATION ADMIN: CPT | Performed by: EMERGENCY MEDICINE

## 2021-08-31 PROCEDURE — 25000003 PHARM REV CODE 250: Performed by: EMERGENCY MEDICINE

## 2021-08-31 PROCEDURE — 90715 TDAP VACCINE 7 YRS/> IM: CPT | Performed by: EMERGENCY MEDICINE

## 2021-08-31 PROCEDURE — 99284 EMERGENCY DEPT VISIT MOD MDM: CPT | Mod: 25

## 2021-08-31 PROCEDURE — 63600175 PHARM REV CODE 636 W HCPCS: Performed by: EMERGENCY MEDICINE

## 2021-08-31 RX ORDER — LIDOCAINE HYDROCHLORIDE 10 MG/ML
10 INJECTION INFILTRATION; PERINEURAL ONCE
Status: COMPLETED | OUTPATIENT
Start: 2021-08-31 | End: 2021-08-31

## 2021-08-31 RX ORDER — ACETAMINOPHEN 500 MG
1000 TABLET ORAL
Status: COMPLETED | OUTPATIENT
Start: 2021-08-31 | End: 2021-08-31

## 2021-08-31 RX ADMIN — ACETAMINOPHEN 1000 MG: 500 TABLET ORAL at 08:08

## 2021-08-31 RX ADMIN — LIDOCAINE HYDROCHLORIDE 10 ML: 10 INJECTION, SOLUTION INFILTRATION; PERINEURAL at 08:08

## 2021-08-31 RX ADMIN — CLOSTRIDIUM TETANI TOXOID ANTIGEN (FORMALDEHYDE INACTIVATED), CORYNEBACTERIUM DIPHTHERIAE TOXOID ANTIGEN (FORMALDEHYDE INACTIVATED), BORDETELLA PERTUSSIS TOXOID ANTIGEN (GLUTARALDEHYDE INACTIVATED), BORDETELLA PERTUSSIS FILAMENTOUS HEMAGGLUTININ ANTIGEN (FORMALDEHYDE INACTIVATED), BORDETELLA PERTUSSIS PERTACTIN ANTIGEN, AND BORDETELLA PERTUSSIS FIMBRIAE 2/3 ANTIGEN 0.5 ML: 5; 2; 2.5; 5; 3; 5 INJECTION, SUSPENSION INTRAMUSCULAR at 10:08

## 2021-09-01 ENCOUNTER — HOSPITAL ENCOUNTER (OUTPATIENT)
Dept: RADIOLOGY | Facility: HOSPITAL | Age: 71
Discharge: HOME | End: 2021-09-01
Payer: MEDICARE

## 2021-09-01 VITALS
WEIGHT: 175 LBS | HEART RATE: 67 BPM | RESPIRATION RATE: 22 BRPM | TEMPERATURE: 97 F | HEIGHT: 67 IN | SYSTOLIC BLOOD PRESSURE: 140 MMHG | OXYGEN SATURATION: 99 % | BODY MASS INDEX: 27.47 KG/M2 | DIASTOLIC BLOOD PRESSURE: 80 MMHG

## 2021-09-01 PROBLEM — S61.219A FINGER LACERATION INVOLVING TENDON: Status: ACTIVE | Noted: 2021-09-01

## 2021-09-01 PROBLEM — S66.901A: Status: ACTIVE | Noted: 2021-09-01

## 2021-09-01 LAB
CTP QC/QA: YES
SARS-COV-2 RDRP RESP QL NAA+PROBE: NEGATIVE

## 2021-09-01 PROCEDURE — 63600175 PHARM REV CODE 636 W HCPCS: Performed by: STUDENT IN AN ORGANIZED HEALTH CARE EDUCATION/TRAINING PROGRAM

## 2021-09-01 PROCEDURE — 71045 X-RAY EXAM CHEST 1 VIEW: CPT | Mod: TC,FY

## 2021-09-01 PROCEDURE — U0002 COVID-19 LAB TEST NON-CDC: HCPCS | Performed by: STUDENT IN AN ORGANIZED HEALTH CARE EDUCATION/TRAINING PROGRAM

## 2021-09-01 PROCEDURE — 93010 EKG 12-LEAD: ICD-10-PCS | Mod: ,,, | Performed by: INTERNAL MEDICINE

## 2021-09-01 PROCEDURE — G0378 HOSPITAL OBSERVATION PER HR: HCPCS

## 2021-09-01 PROCEDURE — 71045 X-RAY EXAM CHEST 1 VIEW: CPT | Mod: 26,,, | Performed by: RADIOLOGY

## 2021-09-01 PROCEDURE — 93010 ELECTROCARDIOGRAM REPORT: CPT | Mod: ,,, | Performed by: INTERNAL MEDICINE

## 2021-09-01 PROCEDURE — 25000003 PHARM REV CODE 250: Performed by: STUDENT IN AN ORGANIZED HEALTH CARE EDUCATION/TRAINING PROGRAM

## 2021-09-01 PROCEDURE — 71045 XR CHEST 1 VIEW PRE-OP: ICD-10-PCS | Mod: 26,,, | Performed by: RADIOLOGY

## 2021-09-01 PROCEDURE — 93005 ELECTROCARDIOGRAM TRACING: CPT

## 2021-09-01 RX ORDER — LIDOCAINE HYDROCHLORIDE 10 MG/ML
1 INJECTION, SOLUTION EPIDURAL; INFILTRATION; INTRACAUDAL; PERINEURAL ONCE
Status: DISCONTINUED | OUTPATIENT
Start: 2021-09-01 | End: 2021-09-01 | Stop reason: HOSPADM

## 2021-09-01 RX ORDER — SULFAMETHOXAZOLE AND TRIMETHOPRIM 800; 160 MG/1; MG/1
1 TABLET ORAL 2 TIMES DAILY
Qty: 20 TABLET | Refills: 0 | Status: SHIPPED | OUTPATIENT
Start: 2021-09-01 | End: 2021-09-11

## 2021-09-01 RX ORDER — ONDANSETRON 8 MG/1
8 TABLET, ORALLY DISINTEGRATING ORAL EVERY 8 HOURS PRN
Status: DISCONTINUED | OUTPATIENT
Start: 2021-09-01 | End: 2021-09-01 | Stop reason: HOSPADM

## 2021-09-01 RX ORDER — SODIUM CHLORIDE 0.9 % (FLUSH) 0.9 %
10 SYRINGE (ML) INJECTION
Status: DISCONTINUED | OUTPATIENT
Start: 2021-09-01 | End: 2021-09-01 | Stop reason: HOSPADM

## 2021-09-01 RX ORDER — CEFAZOLIN SODIUM 1 G/3ML
2 INJECTION, POWDER, FOR SOLUTION INTRAMUSCULAR; INTRAVENOUS
Status: CANCELLED | OUTPATIENT
Start: 2021-09-01

## 2021-09-01 RX ORDER — ACETAMINOPHEN 500 MG
1000 TABLET ORAL EVERY 6 HOURS
Status: DISCONTINUED | OUTPATIENT
Start: 2021-09-01 | End: 2021-09-01 | Stop reason: HOSPADM

## 2021-09-01 RX ORDER — VANCOMYCIN HCL IN 5 % DEXTROSE 1G/250ML
1000 PLASTIC BAG, INJECTION (ML) INTRAVENOUS
Status: CANCELLED | OUTPATIENT
Start: 2021-09-01

## 2021-09-01 RX ORDER — IBUPROFEN 400 MG/1
800 TABLET ORAL 4 TIMES DAILY
Status: DISCONTINUED | OUTPATIENT
Start: 2021-09-01 | End: 2021-09-01 | Stop reason: HOSPADM

## 2021-09-01 RX ORDER — TALC
6 POWDER (GRAM) TOPICAL NIGHTLY PRN
Status: DISCONTINUED | OUTPATIENT
Start: 2021-09-01 | End: 2021-09-01 | Stop reason: HOSPADM

## 2021-09-01 RX ORDER — GABAPENTIN 300 MG/1
600 CAPSULE ORAL 3 TIMES DAILY
Status: DISCONTINUED | OUTPATIENT
Start: 2021-09-01 | End: 2021-09-01 | Stop reason: HOSPADM

## 2021-09-01 RX ORDER — TRAZODONE HYDROCHLORIDE 50 MG/1
150 TABLET ORAL NIGHTLY PRN
Status: DISCONTINUED | OUTPATIENT
Start: 2021-09-01 | End: 2021-09-01 | Stop reason: HOSPADM

## 2021-09-01 RX ORDER — MUPIROCIN 20 MG/G
OINTMENT TOPICAL
Status: CANCELLED | OUTPATIENT
Start: 2021-09-01

## 2021-09-01 RX ORDER — CEFAZOLIN SODIUM 1 G/3ML
2 INJECTION, POWDER, FOR SOLUTION INTRAMUSCULAR; INTRAVENOUS
Status: DISCONTINUED | OUTPATIENT
Start: 2021-09-01 | End: 2021-09-01 | Stop reason: HOSPADM

## 2021-09-01 RX ORDER — ACETAMINOPHEN 325 MG/1
650 TABLET ORAL EVERY 8 HOURS PRN
Status: DISCONTINUED | OUTPATIENT
Start: 2021-09-01 | End: 2021-09-01 | Stop reason: HOSPADM

## 2021-09-01 RX ORDER — SERTRALINE HYDROCHLORIDE 25 MG/1
25 TABLET, FILM COATED ORAL DAILY
Status: DISCONTINUED | OUTPATIENT
Start: 2021-09-01 | End: 2021-09-01 | Stop reason: HOSPADM

## 2021-09-01 RX ADMIN — IBUPROFEN 800 MG: 400 TABLET, FILM COATED ORAL at 12:09

## 2021-09-01 RX ADMIN — ACETAMINOPHEN 1000 MG: 500 TABLET ORAL at 12:09

## 2021-09-01 RX ADMIN — CEFAZOLIN 2 G: 330 INJECTION, POWDER, FOR SOLUTION INTRAMUSCULAR; INTRAVENOUS at 01:09

## 2021-09-02 ENCOUNTER — TELEPHONE (OUTPATIENT)
Dept: INTERNAL MEDICINE | Facility: CLINIC | Age: 71
End: 2021-09-02

## 2021-09-07 ENCOUNTER — TELEPHONE (OUTPATIENT)
Dept: PAIN MEDICINE | Facility: CLINIC | Age: 71
End: 2021-09-07

## 2021-09-07 ENCOUNTER — TELEPHONE (OUTPATIENT)
Dept: INTERNAL MEDICINE | Facility: CLINIC | Age: 71
End: 2021-09-07

## 2021-10-15 ENCOUNTER — TELEPHONE (OUTPATIENT)
Dept: ORTHOPEDICS | Facility: CLINIC | Age: 71
End: 2021-10-15

## 2021-10-20 ENCOUNTER — PES CALL (OUTPATIENT)
Dept: ADMINISTRATIVE | Facility: CLINIC | Age: 71
End: 2021-10-20

## 2021-10-28 ENCOUNTER — TELEPHONE (OUTPATIENT)
Dept: PAIN MEDICINE | Facility: CLINIC | Age: 71
End: 2021-10-28
Payer: MEDICARE

## 2021-11-05 ENCOUNTER — TELEPHONE (OUTPATIENT)
Dept: ORTHOPEDICS | Facility: CLINIC | Age: 71
End: 2021-11-05
Payer: MEDICARE

## 2021-11-08 ENCOUNTER — TELEPHONE (OUTPATIENT)
Dept: ORTHOPEDICS | Facility: CLINIC | Age: 71
End: 2021-11-08
Payer: MEDICARE

## 2021-12-14 DIAGNOSIS — M48.07 LUMBOSACRAL STENOSIS WITH NEUROGENIC CLAUDICATION: ICD-10-CM

## 2021-12-15 ENCOUNTER — TELEPHONE (OUTPATIENT)
Dept: PRIMARY CARE CLINIC | Facility: CLINIC | Age: 71
End: 2021-12-15
Payer: MEDICARE

## 2021-12-15 RX ORDER — CELECOXIB 100 MG/1
100 CAPSULE ORAL DAILY
Qty: 90 CAPSULE | Refills: 3 | Status: SHIPPED | OUTPATIENT
Start: 2021-12-15 | End: 2023-01-31

## 2021-12-15 RX ORDER — MELOXICAM 15 MG/1
15 TABLET ORAL DAILY
Qty: 90 TABLET | Refills: 3 | Status: SHIPPED | OUTPATIENT
Start: 2021-12-15 | End: 2022-06-30 | Stop reason: SDUPTHER

## 2022-02-08 ENCOUNTER — TELEPHONE (OUTPATIENT)
Dept: PRIMARY CARE CLINIC | Facility: CLINIC | Age: 72
End: 2022-02-08
Payer: MEDICARE

## 2022-02-08 NOTE — TELEPHONE ENCOUNTER
----- Message from Melida Bustillos sent at 2/8/2022 11:56 AM CST -----  Who Called: KYLE BOLTON     What is the request in detail: Pt called in to have appt scheduled, also would like blood work. Please advise.     Can the clinic reply by [facility]? No     Best Call Back Number: [phone]    Additional Information:

## 2022-02-09 ENCOUNTER — TELEPHONE (OUTPATIENT)
Dept: PRIMARY CARE CLINIC | Facility: CLINIC | Age: 72
End: 2022-02-09
Payer: MEDICARE

## 2022-02-09 NOTE — TELEPHONE ENCOUNTER
----- Message from Maria Esther Baltazar sent at 2/8/2022  3:25 PM CST -----  Regarding: pt  Pt is returning the nurses phone call can you please call pt at [phone].    PHILLIP

## 2022-06-22 ENCOUNTER — TELEPHONE (OUTPATIENT)
Dept: PRIMARY CARE CLINIC | Facility: CLINIC | Age: 72
End: 2022-06-22
Payer: MEDICARE

## 2022-06-22 NOTE — TELEPHONE ENCOUNTER
----- Message from Darlene Urbina sent at 6/22/2022 10:35 AM CDT -----  Contact: [phone]  Pt is calling to set up an appt he states it has been awhile and he is needing a full work up please advise and give return call

## 2022-06-27 ENCOUNTER — OFFICE VISIT (OUTPATIENT)
Dept: ORTHOPEDICS | Facility: CLINIC | Age: 72
End: 2022-06-27
Payer: MEDICARE

## 2022-06-27 ENCOUNTER — HOSPITAL ENCOUNTER (OUTPATIENT)
Dept: RADIOLOGY | Facility: HOSPITAL | Age: 72
Discharge: HOME | End: 2022-06-27
Attending: PHYSICIAN ASSISTANT
Payer: MEDICARE

## 2022-06-27 VITALS — HEIGHT: 67 IN | WEIGHT: 175 LBS | BODY MASS INDEX: 27.47 KG/M2

## 2022-06-27 DIAGNOSIS — M25.512 ACUTE PAIN OF LEFT SHOULDER: ICD-10-CM

## 2022-06-27 DIAGNOSIS — M25.512 ACUTE PAIN OF LEFT SHOULDER: Primary | ICD-10-CM

## 2022-06-27 DIAGNOSIS — S46.912A SHOULDER STRAIN, LEFT, INITIAL ENCOUNTER: ICD-10-CM

## 2022-06-27 DIAGNOSIS — M25.512 LEFT SHOULDER PAIN, UNSPECIFIED CHRONICITY: ICD-10-CM

## 2022-06-27 PROCEDURE — 3008F BODY MASS INDEX DOCD: CPT | Mod: CPTII,S$GLB,, | Performed by: PHYSICIAN ASSISTANT

## 2022-06-27 PROCEDURE — 3008F PR BODY MASS INDEX (BMI) DOCUMENTED: ICD-10-PCS | Mod: CPTII,S$GLB,, | Performed by: PHYSICIAN ASSISTANT

## 2022-06-27 PROCEDURE — 73030 XR SHOULDER COMPLETE 2 OR MORE VIEWS LEFT: ICD-10-PCS | Mod: 26,LT,, | Performed by: RADIOLOGY

## 2022-06-27 PROCEDURE — 99999 PR PBB SHADOW E&M-EST. PATIENT-LVL III: ICD-10-PCS | Mod: PBBFAC,,, | Performed by: PHYSICIAN ASSISTANT

## 2022-06-27 PROCEDURE — 1125F AMNT PAIN NOTED PAIN PRSNT: CPT | Mod: CPTII,S$GLB,, | Performed by: PHYSICIAN ASSISTANT

## 2022-06-27 PROCEDURE — 1160F RVW MEDS BY RX/DR IN RCRD: CPT | Mod: CPTII,S$GLB,, | Performed by: PHYSICIAN ASSISTANT

## 2022-06-27 PROCEDURE — 73030 X-RAY EXAM OF SHOULDER: CPT | Mod: 26,LT,, | Performed by: RADIOLOGY

## 2022-06-27 PROCEDURE — 1160F PR REVIEW ALL MEDS BY PRESCRIBER/CLIN PHARMACIST DOCUMENTED: ICD-10-PCS | Mod: CPTII,S$GLB,, | Performed by: PHYSICIAN ASSISTANT

## 2022-06-27 PROCEDURE — 1125F PR PAIN SEVERITY QUANTIFIED, PAIN PRESENT: ICD-10-PCS | Mod: CPTII,S$GLB,, | Performed by: PHYSICIAN ASSISTANT

## 2022-06-27 PROCEDURE — 99203 OFFICE O/P NEW LOW 30 MIN: CPT | Mod: S$GLB,,, | Performed by: PHYSICIAN ASSISTANT

## 2022-06-27 PROCEDURE — 73030 X-RAY EXAM OF SHOULDER: CPT | Mod: TC,LT

## 2022-06-27 PROCEDURE — 1159F PR MEDICATION LIST DOCUMENTED IN MEDICAL RECORD: ICD-10-PCS | Mod: CPTII,S$GLB,, | Performed by: PHYSICIAN ASSISTANT

## 2022-06-27 PROCEDURE — 99999 PR PBB SHADOW E&M-EST. PATIENT-LVL III: CPT | Mod: PBBFAC,,, | Performed by: PHYSICIAN ASSISTANT

## 2022-06-27 PROCEDURE — 1159F MED LIST DOCD IN RCRD: CPT | Mod: CPTII,S$GLB,, | Performed by: PHYSICIAN ASSISTANT

## 2022-06-27 PROCEDURE — 99203 PR OFFICE/OUTPT VISIT, NEW, LEVL III, 30-44 MIN: ICD-10-PCS | Mod: S$GLB,,, | Performed by: PHYSICIAN ASSISTANT

## 2022-06-27 NOTE — PROGRESS NOTES
SUBJECTIVE:     Chief Complaint & History of Present Illness:  Anthony Miguel is a 71 y.o. year old male who presents today with constant left shoulder pain that started about 4-6 weeks ago.  He his left hand dominant, plays piano professionally.  He tripped and fell on a sidewalk. The pain is located in the  lateral and upper arm aspect of the shoulder.  The pain is described as achy.  It is aggravated by activity, especially lifting, overhead activity, playing piano.  He does have some numbness and tingling- this is chronic since c-spine surgery in 2017.  Previous treatments include rest, mobic, gabapentin and voltaren gel which have provided adequate relief.  He does have a history of chronic shoulder pain- history of injections in the past with good relief.  There is not a history of previous injury or surgery to the shoulder.      Review of patient's allergies indicates:  No Known Allergies      Current Outpatient Medications   Medication Sig Dispense Refill    atorvastatin (LIPITOR) 10 MG tablet Take 1 tablet (10 mg total) by mouth once daily. 90 tablet 3    celecoxib (CELEBREX) 100 MG capsule Take 1 capsule (100 mg total) by mouth once daily. 90 capsule 3    cholecalciferol, vitamin D3, (VITAMIN D3) 50 mcg (2,000 unit) Cap Take 1 capsule (2,000 Units total) by mouth once daily. 90 capsule 3    ferrous sulfate 325 (65 FE) MG EC tablet Take 1 tablet (325 mg total) by mouth once daily. 90 tablet 3    gabapentin (NEURONTIN) 300 MG capsule TAKE 2 CAPSULES(600 MG) BY MOUTH THREE TIMES DAILY 540 capsule 3    meloxicam (MOBIC) 15 MG tablet Take 1 tablet (15 mg total) by mouth once daily. 90 tablet 3    multivitamin capsule Take 1 capsule by mouth once daily.      triamcinolone acetonide 0.1% (KENALOG) 0.1 % cream Apply 15 g topically 2 (two) times daily as needed.       walker (ULTRA-LIGHT ROLLATOR) Misc 1 Units by Misc.(Non-Drug; Combo Route) route once daily at 6am. 1 each 0    diclofenac sodium  (VOLTAREN) 1 % Gel Apply 2 g topically 4 (four) times daily. (Patient not taking: Reported on 3/31/2021) 1 Tube 11    sertraline (ZOLOFT) 25 MG tablet Take 1 tablet (25 mg total) by mouth once daily. 90 tablet 3    traZODone (DESYREL) 50 MG tablet Take 3 tablets (150 mg total) by mouth nightly as needed for Insomnia. 270 tablet 3     No current facility-administered medications for this visit.     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    Overview:  Right side dx update       Past Surgical History:   Procedure Laterality Date    BACK SURGERY      EPIDURAL STEROID INJECTION N/A 2/6/2019    Procedure: INJECTION, STEROID, EPIDURAL, L45-S1 IL;  Surgeon: Marcel Adkins MD;  Location: [facility]IN T;  Service: Pain Management;  Laterality: N/A;    EPIDURAL STEROID INJECTION N/A 4/10/2019    Procedure: Injection, Steroid, Epidural LUMBAR/CAUDAL L5-S1 INTERLAMINAR KAYLEY;  Surgeon: Marcel Adkins MD;  Location: [facility];  Service: Pain Management;  Laterality: N/A;  NEEDS CONSENT    INJECTION OF FACET JOINT Bilateral 8/14/2019    Procedure: INJECTION, FACET JOINT INJECTION (LUMBAR BLOCK) BILATERAL L4-5 AND L5-S1;  Surgeon: Marcel Adkins MD;  Location: [facility];  Service: Pain Management;  Laterality: Bilateral;  NEEDS CONSENT    INJECTION OF FACET JOINT Bilateral 10/16/2019    Procedure: FACET JOINT INJECTION (LUMBAR BLOCK) BILATERAL L4-5 AND L5-S1;  Surgeon: Marcel Adkins MD;   Location: [facility]GT;  Service: Pain Management;  Laterality: Bilateral;  NEEDS CONSENT    INJECTION OF JOINT Bilateral 11/4/2020    Procedure: INJECTION, JOINT, GLENOHUMERAL;  Surgeon: Marcel Adkins MD;  Location: [facility]GT;  Service: Pain Management;  Laterality: Bilateral;    INJECTION OF JOINT Bilateral 12/16/2020    Procedure: INJECTION, JOINT, SACROILIAC (SI) need consent;  Surgeon: Marcel Adkins MD;  Location: [facility];  Service: Pain Management;  Laterality: Bilateral;    INJECTION OF JOINT Bilateral 4/7/2021    Procedure: INJECTION, JOINT, SACROILIAC (SI);  Surgeon: Marcel Adkins MD;  Location: [facility]GT;  Service: Pain Management;  Laterality: Bilateral;    INJECTION OF JOINT Bilateral 6/2/2021    Procedure: INJECTION, JOINT, GLENOHUMERAL;  Surgeon: Marcel Adkins MD;  Location: [facility];  Service: Pain Management;  Laterality: Bilateral;    Larangoscopy      RADIOFREQUENCY ABLATION Left 5/27/2020    Procedure: RADIOFREQUENCY ABLATION LEFT L3,4,5 1 of 2;  Surgeon: Marcel Adkins MD;  Location: [facility];  Service: Pain Management;  Laterality: Left;  Left RFA L3, 4, 5  1 of 2    RADIOFREQUENCY ABLATION Right 6/10/2020    Procedure: RADIOFREQUENCY ABLATION RIGHT L3,4,5;  Surgeon: Marcel Adkins MD;  Location: [facility]GT;  Service: Pain Management;  Laterality: Right;  Right RFA L3,4.5  2 of 2    ROBOT-ASSISTED LAPAROSCOPIC PROSTATECTOMY USING DA SAÚL XI N/A 6/4/2019    Procedure: XI ROBOTIC PROSTATECTOMY;  Surgeon: Sergio Dixon MD;  Location: [facility] OR [address] [place] [address];  Service: Urology;  Laterality: N/A;  4hrs/ gen with regional     SPINE SURGERY         Vital Signs (Most Recent)  There were no vitals filed for this visit.    Review of Systems:  ROS:  Constitutional: no fever or chills  Eyes: no visual changes  ENT: no nasal congestion or sore throat  Respiratory: no cough or shortness of breath  Cardiovascular: no chest pain or  palpitations  Musculoskeletal: no arthralgias or myalgias  Neurological: no seizures or tremors  Behavioral/Psych: no auditory or visual hallucinations      OBJECTIVE:     PHYSICAL EXAM:  General: Weight: 79.4 kg (175 lb) Body mass index is 27.41 kg/m².  Patient is alert, awake and oriented to time, place and person. Mood and affect are appropriate.  Patient does not appear to be in any distress, denies any constitutional symptoms and appears stated age.   HEENT: Pupils are equal and round, sclera are not injected. External examination of ears and nose reveals no abnormalities. Cranial nerves II-X are grossly intact  Neck: examination demonstrates painless active range of motion. Spurling's sign is negative  Skin: no rashes, abrasions or open wounds on the affected extremity   Resp: No respiratory distress or audible wheezing   Psych:  normal mood and behavior  CV: 2+ pulses, all extremities warm and well perfused   Left Shoulder   Skin intact, no effusion or warmth  No deformity  Tenderness: none  Range of motion is painful   ROM: forward flexion 90 passive/130 active, external rotation 40 internal rotation PSIS  Shoulder Strength: biceps 5/5, triceps 5/5, abduction 4/5, adduction 4/5  positive for impingement sign, sensory exam normal and motor exam normal  Special Tests:    Crossbody test: negative    Neer's positive  Hawkin's positive    Shahriar's positive  Drop arm negative    IMAGING:  X-rays of the left shoulder, personally reviewed by me, demonstrate abnormality at greater tuberosity concerning for possible avulsion.  No fracture or dislocation.    ASSESSMENT/PLAN:   71 y.o. year old male with left shoulder injury, acute on chronic left shoulder pain    Plan: Discussed with the patient at length all the different treatment options available for his left shoulder including anti-inflammatories, acetaminophen, rest, ice, Physical therapy, occasional cortisone injections for temporary relief, and shoulder  arthroscopy    - 6 week s/p left shoulder injury with continued pain and weakness despite conservative treatment.  MRI left shoulder to evaluate rotator cuff  - Will call with results and discuss further treatment options

## 2022-06-29 ENCOUNTER — HOSPITAL ENCOUNTER (OUTPATIENT)
Dept: RADIOLOGY | Facility: HOSPITAL | Age: 72
Discharge: HOME | End: 2022-06-29
Attending: PHYSICIAN ASSISTANT
Payer: MEDICARE

## 2022-06-29 ENCOUNTER — TELEPHONE (OUTPATIENT)
Dept: ORTHOPEDICS | Facility: CLINIC | Age: 72
End: 2022-06-29
Payer: MEDICARE

## 2022-06-29 DIAGNOSIS — M25.512 ACUTE PAIN OF LEFT SHOULDER: ICD-10-CM

## 2022-06-29 PROCEDURE — 73221 MRI JOINT UPR EXTREM W/O DYE: CPT | Mod: 26,LT,, | Performed by: RADIOLOGY

## 2022-06-29 PROCEDURE — 73221 MRI JOINT UPR EXTREM W/O DYE: CPT | Mod: TC,LT

## 2022-06-29 PROCEDURE — 73221 MRI SHOULDER WITHOUT CONTRAST LEFT: ICD-10-PCS | Mod: 26,LT,, | Performed by: RADIOLOGY

## 2022-06-29 NOTE — TELEPHONE ENCOUNTER
----- Message from Lew Gill sent at 6/29/2022  1:38 PM CDT -----  Name of Who is Calling: KYLE BOLTON          What is the request in detail: The patient states he did have a MRI on his left shoulder today. Please advise          Can the clinic reply by [facility]:No         [facility]: [phone]

## 2022-06-30 ENCOUNTER — TELEPHONE (OUTPATIENT)
Dept: ORTHOPEDICS | Facility: CLINIC | Age: 72
End: 2022-06-30
Payer: MEDICARE

## 2022-06-30 DIAGNOSIS — M46.92 UNSPECIFIED INFLAMMATORY SPONDYLOPATHY, CERVICAL REGION: ICD-10-CM

## 2022-06-30 DIAGNOSIS — F15.182 CAFFEINE-INDUCED SLEEP DISORDER WITH MILD USE DISORDER, INSOMNIA TYPE: ICD-10-CM

## 2022-06-30 NOTE — TELEPHONE ENCOUNTER
----- Message from Marli Fonseca sent at 6/30/2022 12:01 PM CDT -----  Type:  Sooner Apoointment Request    Caller is requesting a sooner appointment.  Caller declined first available appointment listed below.  Caller will not accept being placed on the waitlist and is requesting a message be sent to doctor.  Name of Caller:pt  When is the first available appointment?08/30/22  Symptoms:follow appointment  Would the patient rather a call back or a response via [facility]? call  Best Call Back Number:[phone]  Additional Information:

## 2022-06-30 NOTE — TELEPHONE ENCOUNTER
Spoke with pt, scheduled pt for appt next week.     Pended meds, pt already has gabapentin. Not able to remove from list.

## 2022-06-30 NOTE — TELEPHONE ENCOUNTER
Called patient to discuss MRI results- he would like to follow up at [facility] hand clinic- will refer to see Dr. Ma

## 2022-07-01 RX ORDER — MELOXICAM 15 MG/1
15 TABLET ORAL DAILY
Qty: 90 TABLET | Refills: 3 | Status: SHIPPED | OUTPATIENT
Start: 2022-07-01 | End: 2022-07-01

## 2022-07-01 RX ORDER — GABAPENTIN 300 MG/1
CAPSULE ORAL
Qty: 540 CAPSULE | Refills: 0 | OUTPATIENT
Start: 2022-07-01

## 2022-07-01 RX ORDER — TRIAMCINOLONE ACETONIDE 1 MG/G
15 CREAM TOPICAL 2 TIMES DAILY PRN
Qty: 1 EACH | Refills: 3 | Status: SHIPPED | OUTPATIENT
Start: 2022-07-01 | End: 2022-07-19 | Stop reason: SDUPTHER

## 2022-07-01 RX ORDER — TRAZODONE HYDROCHLORIDE 50 MG/1
150 TABLET ORAL NIGHTLY PRN
Qty: 270 TABLET | Refills: 3 | Status: SHIPPED | OUTPATIENT
Start: 2022-07-01 | End: 2022-07-19 | Stop reason: SDUPTHER

## 2022-07-11 ENCOUNTER — TELEPHONE (OUTPATIENT)
Dept: PRIMARY CARE CLINIC | Facility: CLINIC | Age: 72
End: 2022-07-11
Payer: MEDICARE

## 2022-07-11 NOTE — TELEPHONE ENCOUNTER
----- Message from Shruthi Benites sent at 7/8/2022  6:08 PM CDT -----  Type:  Patient Returning Call    Who Called:pt   Who Left Message for Patient: pt   Does the patient know what this is regarding?: pt need a call to get an appt he missed on this week  Would the patient rather a call back or a response via [facility]?  Call   Best Call Back Number: [phone]  Additional Information:  appt

## 2022-07-11 NOTE — TELEPHONE ENCOUNTER
----- Message from Oksana Mark sent at 7/11/2022  4:20 PM CDT -----  Contact: self [phone]  Patient is returning a phone call.  Who left a message for the patient: Fernando Rosario MA  Does patient know what this is regarding:    Would you like a call back, or a response through your [facility] portal?:   call back  Comments:      Please call and advise

## 2022-07-11 NOTE — TELEPHONE ENCOUNTER
Called patient back. Scheduled appointment with Dr. Bansal on Tuesday, July 19,2022 at 0930.  Patient aware.

## 2022-07-11 NOTE — TELEPHONE ENCOUNTER
Best call back number not in service.    Called number listed in the chart and Left a message for pt to return call.

## 2022-07-18 ENCOUNTER — TELEPHONE (OUTPATIENT)
Dept: ORTHOPEDICS | Facility: CLINIC | Age: 72
End: 2022-07-18
Payer: MEDICARE

## 2022-07-18 NOTE — TELEPHONE ENCOUNTER
----- Message from Marleny Pal sent at 7/18/2022  9:05 AM CDT -----  Contact: Pt  Type:  Patient Returning Call    Who Called:Pt   Would the patient rather a call back or a response via [facility]?call back  Best Call Back Number: [phone]  Additional Information:     Pt would like a call back to schedule new appt  Pt had a fall and injured his left shoulder  Pt will need 3 days in advance to let transportation know

## 2022-07-18 NOTE — TELEPHONE ENCOUNTER
Spoke with patient was able to get him scheduled for sooner appointment patient accepted appt with no additional questions or concerns.

## 2022-07-19 ENCOUNTER — PES CALL (OUTPATIENT)
Dept: ADMINISTRATIVE | Facility: CLINIC | Age: 72
End: 2022-07-19
Payer: MEDICARE

## 2022-07-19 ENCOUNTER — LAB VISIT (OUTPATIENT)
Dept: LAB | Facility: HOSPITAL | Age: 72
End: 2022-07-19
Payer: MEDICARE

## 2022-07-19 ENCOUNTER — TELEPHONE (OUTPATIENT)
Dept: PRIMARY CARE CLINIC | Facility: CLINIC | Age: 72
End: 2022-07-19
Payer: MEDICARE

## 2022-07-19 ENCOUNTER — OFFICE VISIT (OUTPATIENT)
Dept: PRIMARY CARE CLINIC | Facility: CLINIC | Age: 72
End: 2022-07-19
Payer: MEDICARE

## 2022-07-19 VITALS
DIASTOLIC BLOOD PRESSURE: 78 MMHG | SYSTOLIC BLOOD PRESSURE: 128 MMHG | WEIGHT: 179 LBS | BODY MASS INDEX: 28.09 KG/M2 | TEMPERATURE: 98 F | HEIGHT: 67 IN | OXYGEN SATURATION: 97 % | HEART RATE: 70 BPM | RESPIRATION RATE: 16 BRPM

## 2022-07-19 DIAGNOSIS — M54.16 LUMBAR RADICULOPATHY: ICD-10-CM

## 2022-07-19 DIAGNOSIS — M46.1 SACROILIITIS: ICD-10-CM

## 2022-07-19 DIAGNOSIS — D50.0 IRON DEFICIENCY ANEMIA DUE TO CHRONIC BLOOD LOSS: ICD-10-CM

## 2022-07-19 DIAGNOSIS — C80.1 MALIGNANT (PRIMARY) NEOPLASM, UNSPECIFIED: ICD-10-CM

## 2022-07-19 DIAGNOSIS — R79.9 ABNORMAL FINDING OF BLOOD CHEMISTRY: ICD-10-CM

## 2022-07-19 DIAGNOSIS — D69.2 SENILE PURPURA: ICD-10-CM

## 2022-07-19 DIAGNOSIS — Z12.5 PROSTATE CANCER SCREENING: ICD-10-CM

## 2022-07-19 DIAGNOSIS — Z13.9 SCREENING DUE: ICD-10-CM

## 2022-07-19 DIAGNOSIS — G89.4 CHRONIC PAIN SYNDROME: ICD-10-CM

## 2022-07-19 DIAGNOSIS — Z12.5 PROSTATE CANCER SCREENING: Primary | ICD-10-CM

## 2022-07-19 DIAGNOSIS — F33.0 MILD EPISODE OF RECURRENT MAJOR DEPRESSIVE DISORDER: ICD-10-CM

## 2022-07-19 DIAGNOSIS — E78.5 HYPERLIPIDEMIA, UNSPECIFIED HYPERLIPIDEMIA TYPE: ICD-10-CM

## 2022-07-19 DIAGNOSIS — F15.182 CAFFEINE-INDUCED SLEEP DISORDER WITH MILD USE DISORDER, INSOMNIA TYPE: ICD-10-CM

## 2022-07-19 DIAGNOSIS — G95.9 CERVICAL MYELOPATHY: ICD-10-CM

## 2022-07-19 LAB
ALBUMIN SERPL BCP-MCNC: 4.2 G/DL (ref 3.5–5.2)
ALP SERPL-CCNC: 91 U/L (ref 55–135)
ALT SERPL W/O P-5'-P-CCNC: 12 U/L (ref 10–44)
ANION GAP SERPL CALC-SCNC: 7 MMOL/L (ref 8–16)
AST SERPL-CCNC: 17 U/L (ref 10–40)
BASOPHILS # BLD AUTO: 0.03 K/UL (ref 0–0.2)
BASOPHILS NFR BLD: 0.4 % (ref 0–1.9)
BILIRUB SERPL-MCNC: 1 MG/DL (ref 0.1–1)
BUN SERPL-MCNC: 16 MG/DL (ref 8–23)
CALCIUM SERPL-MCNC: 10.1 MG/DL (ref 8.7–10.5)
CHLORIDE SERPL-SCNC: 104 MMOL/L (ref 95–110)
CHOLEST SERPL-MCNC: 146 MG/DL (ref 120–199)
CHOLEST/HDLC SERPL: 2.4 {RATIO} (ref 2–5)
CO2 SERPL-SCNC: 28 MMOL/L (ref 23–29)
COMPLEXED PSA SERPL-MCNC: <0.01 NG/ML (ref 0–4)
CREAT SERPL-MCNC: 0.9 MG/DL (ref 0.5–1.4)
DIFFERENTIAL METHOD: ABNORMAL
EOSINOPHIL # BLD AUTO: 0.2 K/UL (ref 0–0.5)
EOSINOPHIL NFR BLD: 2.6 % (ref 0–8)
ERYTHROCYTE [DISTWIDTH] IN BLOOD BY AUTOMATED COUNT: 12.4 % (ref 11.5–14.5)
EST. GFR  (AFRICAN AMERICAN): >60 ML/MIN/1.73 M^2
EST. GFR  (NON AFRICAN AMERICAN): >60 ML/MIN/1.73 M^2
ESTIMATED AVG GLUCOSE: 97 MG/DL (ref 68–131)
GLUCOSE SERPL-MCNC: 85 MG/DL (ref 70–110)
HBA1C MFR BLD: 5 % (ref 4–5.6)
HCT VFR BLD AUTO: 44.2 % (ref 40–54)
HDLC SERPL-MCNC: 62 MG/DL (ref 40–75)
HDLC SERPL: 42.5 % (ref 20–50)
HGB BLD-MCNC: 14.3 G/DL (ref 14–18)
IMM GRANULOCYTES # BLD AUTO: 0.05 K/UL (ref 0–0.04)
IMM GRANULOCYTES NFR BLD AUTO: 0.7 % (ref 0–0.5)
LDLC SERPL CALC-MCNC: 66.2 MG/DL (ref 63–159)
LYMPHOCYTES # BLD AUTO: 0.6 K/UL (ref 1–4.8)
LYMPHOCYTES NFR BLD: 8.8 % (ref 18–48)
MCH RBC QN AUTO: 33 PG (ref 27–31)
MCHC RBC AUTO-ENTMCNC: 32.4 G/DL (ref 32–36)
MCV RBC AUTO: 102 FL (ref 82–98)
MONOCYTES # BLD AUTO: 0.5 K/UL (ref 0.3–1)
MONOCYTES NFR BLD: 7 % (ref 4–15)
NEUTROPHILS # BLD AUTO: 5.9 K/UL (ref 1.8–7.7)
NEUTROPHILS NFR BLD: 80.5 % (ref 38–73)
NONHDLC SERPL-MCNC: 84 MG/DL
NRBC BLD-RTO: 0 /100 WBC
PLATELET # BLD AUTO: 276 K/UL (ref 150–450)
PMV BLD AUTO: 11.6 FL (ref 9.2–12.9)
POTASSIUM SERPL-SCNC: 4.6 MMOL/L (ref 3.5–5.1)
PROT SERPL-MCNC: 7.4 G/DL (ref 6–8.4)
RBC # BLD AUTO: 4.33 M/UL (ref 4.6–6.2)
SODIUM SERPL-SCNC: 139 MMOL/L (ref 136–145)
TRIGL SERPL-MCNC: 89 MG/DL (ref 30–150)
WBC # BLD AUTO: 7.27 K/UL (ref 3.9–12.7)

## 2022-07-19 PROCEDURE — 3074F SYST BP LT 130 MM HG: CPT | Mod: CPTII,S$GLB,, | Performed by: NURSE PRACTITIONER

## 2022-07-19 PROCEDURE — 99214 PR OFFICE/OUTPT VISIT, EST, LEVL IV, 30-39 MIN: ICD-10-PCS | Mod: S$GLB,,, | Performed by: NURSE PRACTITIONER

## 2022-07-19 PROCEDURE — 3044F PR MOST RECENT HEMOGLOBIN A1C LEVEL <7.0%: ICD-10-PCS | Mod: CPTII,S$GLB,, | Performed by: NURSE PRACTITIONER

## 2022-07-19 PROCEDURE — 1160F RVW MEDS BY RX/DR IN RCRD: CPT | Mod: CPTII,S$GLB,, | Performed by: NURSE PRACTITIONER

## 2022-07-19 PROCEDURE — 1160F PR REVIEW ALL MEDS BY PRESCRIBER/CLIN PHARMACIST DOCUMENTED: ICD-10-PCS | Mod: CPTII,S$GLB,, | Performed by: NURSE PRACTITIONER

## 2022-07-19 PROCEDURE — 85025 COMPLETE CBC W/AUTO DIFF WBC: CPT | Performed by: NURSE PRACTITIONER

## 2022-07-19 PROCEDURE — 3044F HG A1C LEVEL LT 7.0%: CPT | Mod: CPTII,S$GLB,, | Performed by: NURSE PRACTITIONER

## 2022-07-19 PROCEDURE — 83036 HEMOGLOBIN GLYCOSYLATED A1C: CPT | Performed by: NURSE PRACTITIONER

## 2022-07-19 PROCEDURE — 3288F PR FALLS RISK ASSESSMENT DOCUMENTED: ICD-10-PCS | Mod: CPTII,S$GLB,, | Performed by: NURSE PRACTITIONER

## 2022-07-19 PROCEDURE — 99214 OFFICE O/P EST MOD 30 MIN: CPT | Mod: S$GLB,,, | Performed by: NURSE PRACTITIONER

## 2022-07-19 PROCEDURE — 80053 COMPREHEN METABOLIC PANEL: CPT | Performed by: NURSE PRACTITIONER

## 2022-07-19 PROCEDURE — 1125F PR PAIN SEVERITY QUANTIFIED, PAIN PRESENT: ICD-10-PCS | Mod: CPTII,S$GLB,, | Performed by: NURSE PRACTITIONER

## 2022-07-19 PROCEDURE — 3078F PR MOST RECENT DIASTOLIC BLOOD PRESSURE < 80 MM HG: ICD-10-PCS | Mod: CPTII,S$GLB,, | Performed by: NURSE PRACTITIONER

## 2022-07-19 PROCEDURE — 3078F DIAST BP <80 MM HG: CPT | Mod: CPTII,S$GLB,, | Performed by: NURSE PRACTITIONER

## 2022-07-19 PROCEDURE — 80061 LIPID PANEL: CPT | Performed by: NURSE PRACTITIONER

## 2022-07-19 PROCEDURE — 36415 COLL VENOUS BLD VENIPUNCTURE: CPT | Performed by: NURSE PRACTITIONER

## 2022-07-19 PROCEDURE — 1125F AMNT PAIN NOTED PAIN PRSNT: CPT | Mod: CPTII,S$GLB,, | Performed by: NURSE PRACTITIONER

## 2022-07-19 PROCEDURE — 1159F MED LIST DOCD IN RCRD: CPT | Mod: CPTII,S$GLB,, | Performed by: NURSE PRACTITIONER

## 2022-07-19 PROCEDURE — 3008F BODY MASS INDEX DOCD: CPT | Mod: CPTII,S$GLB,, | Performed by: NURSE PRACTITIONER

## 2022-07-19 PROCEDURE — 3008F PR BODY MASS INDEX (BMI) DOCUMENTED: ICD-10-PCS | Mod: CPTII,S$GLB,, | Performed by: NURSE PRACTITIONER

## 2022-07-19 PROCEDURE — 84153 ASSAY OF PSA TOTAL: CPT | Performed by: NURSE PRACTITIONER

## 2022-07-19 PROCEDURE — 1101F PR PT FALLS ASSESS DOC 0-1 FALLS W/OUT INJ PAST YR: ICD-10-PCS | Mod: CPTII,S$GLB,, | Performed by: NURSE PRACTITIONER

## 2022-07-19 PROCEDURE — 3288F FALL RISK ASSESSMENT DOCD: CPT | Mod: CPTII,S$GLB,, | Performed by: NURSE PRACTITIONER

## 2022-07-19 PROCEDURE — 1159F PR MEDICATION LIST DOCUMENTED IN MEDICAL RECORD: ICD-10-PCS | Mod: CPTII,S$GLB,, | Performed by: NURSE PRACTITIONER

## 2022-07-19 PROCEDURE — 3074F PR MOST RECENT SYSTOLIC BLOOD PRESSURE < 130 MM HG: ICD-10-PCS | Mod: CPTII,S$GLB,, | Performed by: NURSE PRACTITIONER

## 2022-07-19 PROCEDURE — 1101F PT FALLS ASSESS-DOCD LE1/YR: CPT | Mod: CPTII,S$GLB,, | Performed by: NURSE PRACTITIONER

## 2022-07-19 RX ORDER — TRAZODONE HYDROCHLORIDE 50 MG/1
150 TABLET ORAL NIGHTLY PRN
Qty: 270 TABLET | Refills: 3 | Status: SHIPPED | OUTPATIENT
Start: 2022-07-19 | End: 2023-09-19 | Stop reason: SDUPTHER

## 2022-07-19 RX ORDER — TRIAMCINOLONE ACETONIDE 1 MG/G
15 CREAM TOPICAL 2 TIMES DAILY PRN
Qty: 1 EACH | Refills: 3 | Status: SHIPPED | OUTPATIENT
Start: 2022-07-19

## 2022-07-19 NOTE — TELEPHONE ENCOUNTER
----- Message from Gifty Soni sent at 7/15/2022 10:43 AM CDT -----  Contact: [phone] Patient  Pt states he needs to r/s due to transportation. Please call and advise.

## 2022-07-19 NOTE — ASSESSMENT & PLAN NOTE
The patient does have chronic pain from multiple orthopaedic issues.  Continue neurontin, celebrex rotating with Mobic.

## 2022-07-19 NOTE — PROGRESS NOTES
[facility]      Patient ID: Anthony Miguel is a 71 y.o. male.    Chief Complaint: Follow-up    HPI:  This is a 70 y/o  Male who is here for a regular f/u visit.  He has not been here in over a year and a half and just wants all his screenings.  He has a h/o prostate cancer and has not had a PSA in a while.  Today he has no complaints and sees himself as relatively healthy.  Most of his issues are orthopaedic issues in which he takes neurontin, mobic and or celebrex.    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    Overview:  Right side dx update       Past Surgical History:   Procedure Laterality Date    BACK SURGERY      EPIDURAL STEROID INJECTION N/A 2/6/2019    Procedure: INJECTION, STEROID, EPIDURAL, L45-S1 IL;  Surgeon: Marcel Adkins MD;  Location: [facility] PAIN T;  Service: Pain Management;  Laterality: N/A;    EPIDURAL STEROID INJECTION N/A 4/10/2019    Procedure: Injection, Steroid, Epidural LUMBAR/CAUDAL L5-S1 INTERLAMINAR KAYLEY;  Surgeon: Marcel Adkins MD;  [facility];  Service: Pain Management;  Laterality: N/A;  NEEDS CONSENT    INJECTION OF FACET JOINT Bilateral 8/14/2019    Procedure: INJECTION, FACET JOINT INJECTION (LUMBAR BLOCK) BILATERAL L4-5 AND L5-S1;  Surgeon: Marcel Adkins MD;  Location: [facility] PAIN T;  Service: Pain Management;  Laterality: Bilateral;  NEEDS CONSENT    INJECTION OF FACET JOINT Bilateral 10/16/2019    Procedure: FACET JOINT INJECTION (LUMBAR BLOCK) BILATERAL L4-5 AND L5-S1;  Surgeon: Marcel Adkins MD;  Location: [facility];  Service: Pain Management;  Laterality: Bilateral;   NEEDS CONSENT    INJECTION OF JOINT Bilateral 11/4/2020    Procedure: INJECTION, JOINT, GLENOHUMERAL;  Surgeon: Marcel Adkins MD;  Location: [facility];  Service: Pain Management;  Laterality: Bilateral;    INJECTION OF JOINT Bilateral 12/16/2020    Procedure: INJECTION, JOINT, SACROILIAC (SI) need consent;  Surgeon: Marcel Adkins MD;  Location: [facility];  Service: Pain Management;  Laterality: Bilateral;    INJECTION OF JOINT Bilateral 4/7/2021    Procedure: INJECTION, JOINT, SACROILIAC (SI);  Surgeon: Marcel Adkins MD;  Location: [facility];  Service: Pain Management;  Laterality: Bilateral;    INJECTION OF JOINT Bilateral 6/2/2021    Procedure: INJECTION, JOINT, GLENOHUMERAL;  Surgeon: Marcel Adkins MD;  Location: [facility];  Service: Pain Management;  Laterality: Bilateral;    Larangoscopy      RADIOFREQUENCY ABLATION Left 5/27/2020    Procedure: RADIOFREQUENCY ABLATION LEFT L3,4,5 1 of 2;  Surgeon: Marcel Adkins MD;  Location: [facility];  Service: Pain Management;  Laterality: Left;  Left RFA L3, 4, 5  1 of 2    RADIOFREQUENCY ABLATION Right 6/10/2020    Procedure: RADIOFREQUENCY ABLATION RIGHT L3,4,5;  Surgeon: Marcel Adkins MD;  Location: [facility];  Service: Pain Management;  Laterality: Right;  Right RFA L3,4.5  2 of 2    ROBOT-ASSISTED LAPAROSCOPIC PROSTATECTOMY USING DA SAÚL XI N/A 6/4/2019    Procedure: XI ROBOTIC PROSTATECTOMY;  Surgeon: Sergio Dixon MD;  Location: [facility] OR [address] [place] [address];  Service: Urology;  Laterality: N/A;  4hrs/ gen with regional     SPINE SURGERY         Family History   Problem Relation Age of Onset    Cancer Mother         Pancreatic    No Known Problems Father     Keratoconus Brother     Cancer Brother        Social History     Socioeconomic History    Marital status: Single    Number of children: 0   Tobacco Use    Smoking status: Former Smoker     Packs/day: 1.00     Quit date: 02/2000     Years since  quittin.4    Smokeless tobacco: Former User   Substance and Sexual Activity    Alcohol use: Not Currently    Drug use: No    Sexual activity: Not Currently   Social History Narrative    No difficulty reading Rx labels        Current Outpatient Medications   Medication Sig Dispense Refill    atorvastatin (LIPITOR) 10 MG tablet Take 1 tablet (10 mg total) by mouth once daily. 90 tablet 3    celecoxib (CELEBREX) 100 MG capsule Take 1 capsule (100 mg total) by mouth once daily. 90 capsule 3    cholecalciferol, vitamin D3, (VITAMIN D3) 50 mcg (2,000 unit) Cap Take 1 capsule (2,000 Units total) by mouth once daily. 90 capsule 3    ferrous sulfate 325 (65 FE) MG EC tablet Take 1 tablet (325 mg total) by mouth once daily. 90 tablet 3    gabapentin (NEURONTIN) 300 MG capsule TAKE 2 CAPSULES(600 MG) BY MOUTH THREE TIMES DAILY 540 capsule 3    meloxicam (MOBIC) 15 MG tablet UNKNOWN 777 tablet 0    multivitamin capsule Take 1 capsule by mouth once daily.      VOLTAREN 1 % Gel UNKNOWN 777 g 0    walker (ULTRA-LIGHT ROLLATOR) Misc 1 Units by Misc.(Non-Drug; Combo Route) route once daily at 6am. 1 each 0    sertraline (ZOLOFT) 25 MG tablet Take 1 tablet (25 mg total) by mouth once daily. 90 tablet 3    traZODone (DESYREL) 50 MG tablet Take 3 tablets (150 mg total) by mouth nightly as needed for Insomnia. 270 tablet 3    triamcinolone acetonide 0.1% (KENALOG) 0.1 % cream Apply 15 g (15,000 mg total) topically 2 (two) times daily as needed (for cuts). 1 each 3     No current facility-administered medications for this visit.     Facility-Administered Medications Ordered in Other Visits   Medication Dose Route Frequency Provider Last Rate Last Admin    0.9%  NaCl infusion   Intravenous Continuous Altaf Santana MD           Review of patient's allergies indicates:  No Known Allergies     Review of Systems   Constitutional: Negative.    HENT: Negative.    Eyes: Negative.    Respiratory: Negative.   "  Cardiovascular: Negative.    Gastrointestinal: Negative.    Genitourinary: Negative.    Musculoskeletal: Negative.    Skin: Negative.    Neurological: Negative.    Endo/Heme/Allergies: Negative.    Psychiatric/Behavioral: Negative.           Objective:   /78 (BP Location: Right arm, Patient Position: Sitting, BP Method: Medium (Manual))   Pulse 70   Temp 98 °F (36.7 °C) (Oral)   Resp 16   Ht 5' 7" (1.702 m)   Wt 81.2 kg (179 lb 0.2 oz)   SpO2 97%   BMI 28.04 kg/m²     Physical Exam  Vitals reviewed.   Constitutional:       Appearance: Normal appearance. He is normal weight.   HENT:      Head: Normocephalic and atraumatic.      Nose: Nose normal.      Mouth/Throat:      Mouth: Mucous membranes are dry.      Pharynx: Oropharynx is clear.   Eyes:      Extraocular Movements: Extraocular movements intact.      Pupils: Pupils are equal, round, and reactive to light.   Cardiovascular:      Rate and Rhythm: Normal rate and regular rhythm.      Pulses: Normal pulses.      Heart sounds: Normal heart sounds.   Pulmonary:      Effort: Pulmonary effort is normal.      Breath sounds: Normal breath sounds.   Abdominal:      General: Abdomen is flat. Bowel sounds are normal.      Palpations: Abdomen is soft.   Musculoskeletal:         General: Normal range of motion.      Cervical back: Normal range of motion and neck supple.   Skin:     General: Skin is warm and dry.      Capillary Refill: Capillary refill takes 2 to 3 seconds.   Neurological:      General: No focal deficit present.      Mental Status: He is alert and oriented to person, place, and time.   Psychiatric:         Mood and Affect: Mood normal.         Behavior: Behavior normal.         Thought Content: Thought content normal.         Judgment: Judgment normal.              Lab Results   Component Value Date    WBC 6.37 08/24/2021    HGB 13.1 (L) 08/24/2021    HCT 39.7 (L) 08/24/2021     08/24/2021    CHOL 146 07/19/2022    TRIG 89 07/19/2022    " HDL 62 07/19/2022    ALT 12 07/19/2022    AST 17 07/19/2022     07/19/2022    K 4.6 07/19/2022     07/19/2022    CREATININE 0.9 07/19/2022    BUN 16 07/19/2022    CO2 28 07/19/2022    TSH 2.555 08/24/2021    PSA <0.01 07/19/2022    INR 1.0 06/21/2017    HGBA1C 5.0 07/19/2022       Current Outpatient Medications on File Prior to Visit   Medication Sig Dispense Refill    atorvastatin (LIPITOR) 10 MG tablet Take 1 tablet (10 mg total) by mouth once daily. 90 tablet 3    celecoxib (CELEBREX) 100 MG capsule Take 1 capsule (100 mg total) by mouth once daily. 90 capsule 3    cholecalciferol, vitamin D3, (VITAMIN D3) 50 mcg (2,000 unit) Cap Take 1 capsule (2,000 Units total) by mouth once daily. 90 capsule 3    ferrous sulfate 325 (65 FE) MG EC tablet Take 1 tablet (325 mg total) by mouth once daily. 90 tablet 3    gabapentin (NEURONTIN) 300 MG capsule TAKE 2 CAPSULES(600 MG) BY MOUTH THREE TIMES DAILY 540 capsule 3    meloxicam (MOBIC) 15 MG tablet UNKNOWN 777 tablet 0    multivitamin capsule Take 1 capsule by mouth once daily.      VOLTAREN 1 % Gel UNKNOWN 777 g 0    walker (ULTRA-LIGHT ROLLATOR) Misc 1 Units by Misc.(Non-Drug; Combo Route) route once daily at 6am. 1 each 0    [DISCONTINUED] traZODone (DESYREL) 50 MG tablet Take 3 tablets (150 mg total) by mouth nightly as needed for Insomnia. 270 tablet 3    [DISCONTINUED] triamcinolone acetonide 0.1% (KENALOG) 0.1 % cream Apply 15 g (15,000 mg total) topically 2 (two) times daily as needed (for cuts). 1 each 3    sertraline (ZOLOFT) 25 MG tablet Take 1 tablet (25 mg total) by mouth once daily. 90 tablet 3     Current Facility-Administered Medications on File Prior to Visit   Medication Dose Route Frequency Provider Last Rate Last Admin    0.9%  NaCl infusion   Intravenous Continuous Altaf Satnana MD             Assessment:   71 y.o. male with multiple co-morbid illnesses here to f/u with PCP and continue work-up of chronic issues notably.      1. Prostate cancer screening  PSA, Screening    CBC Auto Differential   2. Malignant (primary) neoplasm, unspecified  Comprehensive Metabolic Panel   3. Sacroiliitis     4. Senile purpura     5. Mild episode of recurrent major depressive disorder     6. Cervical myelopathy     7. Screening due  CANCELED: Hemoglobin A1C   8. 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 Iron deficiency anemia due to chronic blood loss  CBC Auto Differential   11. Abnormal finding of blood chemistry  Hemoglobin A1C   12. Chronic pain syndrome     13. Lumbar radiculopathy          Plan:     1. Prostate cancer screening  -     PSA, Screening  -     CBC Auto Differential    2. Malignant (primary) neoplasm, unspecified  -     Comprehensive Metabolic Panel    3. Sacroiliitis    4. Senile purpura  Overview:  Ecchymoses on L UE     Assessment & Plan:  Purpura noted bilaterally to upper ext.      5. Mild episode of recurrent major depressive disorder  Overview:  Uncontrolled chronic pain, sedentary lifestyle      6. Cervical myelopathy  Overview:  Severe cervical stenosis with myelopathy with fusion of C3-6.      7. Screening due    8. Hyperlipidemia, unspecified hyperlipidemia type  Overview:  Takes atorvastatin 10mg every 3 days    Assessment & Plan:  Chol 146 and LDL 66.2    Orders:  -     Lipid Panel    9. 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    Orders:  -     traZODone (DESYREL) 50 MG tablet    10. Iron deficiency anemia due to chronic blood loss  Overview:  Stable anemia, last colonoscopy in 2016 (next due in 7-10 years)    Orders:  -     CBC Auto Differential    11. Abnormal finding of blood chemistry  -     Hemoglobin  A1C    12. Chronic pain syndrome  Assessment & Plan:  The patient does have chronic pain from multiple orthopaedic issues.  Continue neurontin, celebrex rotating with Mobic.      13. Lumbar radiculopathy  Assessment & Plan:  See chronic pain.      Other orders  -     triamcinolone acetonide 0.1% (KENALOG) 0.1 % cream     Health Maintenance       Date Due Completion Date    Shingles Vaccine (1 of 2) 11/14/2016 9/19/2016    COVID-19 Vaccine (3 - Booster for Pfizer series) 07/09/2021 2/9/2021    PROSTATE-SPECIFIC ANTIGEN 08/23/2022 8/23/2021    Lipid Panel 08/24/2022 8/24/2021    Influenza Vaccine (1) 09/01/2022 9/8/2020 (Done)    Override on 9/8/2020: Done (performed in the community)    Colorectal Cancer Screening 04/15/2026 4/15/2016    TETANUS VACCINE 08/31/2031 8/31/2021          Follow up in about 3 months (around 10/19/2022), or if symptoms worsen or fail to improve. 60 min spent with patient in face to face encounter. The following issues were addressed diagnosis, medications, labs, diagnostic test, and health maintenance.    Dr. Darlene Bansal, DNP, MSN, CHFN, ACNP, APRN, B.C.  [facility]  [facility]  [facility] [phone]

## 2022-07-22 ENCOUNTER — TELEPHONE (OUTPATIENT)
Dept: ORTHOPEDICS | Facility: CLINIC | Age: 72
End: 2022-07-22
Payer: MEDICARE

## 2022-07-25 ENCOUNTER — TELEPHONE (OUTPATIENT)
Dept: UROLOGY | Facility: CLINIC | Age: 72
End: 2022-07-25
Payer: MEDICARE

## 2022-07-25 NOTE — TELEPHONE ENCOUNTER
1825-I spoke to pt and gave him his PSA results (he does not have the Portal,  Pt will get with his PCP to review the rest of his lab results.  Pt expressed his thanks.    ----- Message from Natty Allan sent at 7/25/2022 10:16 AM CDT -----  Regarding: Results  Contact: PT @ [phone]  Pt is calling to speak to someone in Dr. Dixon's office to discuss his recent labs that he had done on 7/21/22. Pt is asking to have is PSA levels reviewed and to call him to advise. Please call pt.

## 2022-07-30 ENCOUNTER — PES CALL (OUTPATIENT)
Dept: ADMINISTRATIVE | Facility: CLINIC | Age: 72
End: 2022-07-30
Payer: MEDICARE

## 2022-08-01 ENCOUNTER — TELEPHONE (OUTPATIENT)
Dept: ORTHOPEDICS | Facility: CLINIC | Age: 72
End: 2022-08-01
Payer: MEDICARE

## 2022-08-18 ENCOUNTER — TELEPHONE (OUTPATIENT)
Dept: ORTHOPEDICS | Facility: CLINIC | Age: 72
End: 2022-08-18
Payer: MEDICARE

## 2022-08-18 NOTE — PROCEDURES
Left patient a voicemail asking them to schedule a follow up appointment to go over sleep study. Procedures     Fibroscan Procedure     Name: Anthony Miguel  Date of Procedure : 2017   :: Lilliam Ritter MD  Diagnosis: HCV  Probe: M    Fibroscan readin.8 KPa    IQR/med:6 %    Fibrosis:F 0-1

## 2022-08-18 NOTE — TELEPHONE ENCOUNTER
KARINA for pt. Requested a call back to the [facility] at [phone] for assistance scheduling an ppointment time- offered first available 08/23/22 morning or afternoon.

## 2022-08-18 NOTE — TELEPHONE ENCOUNTER
----- Message from Debra Worthington sent at 8/18/2022  8:46 AM CDT -----  Regarding: Appt  Contact: pt @ [phone]  Pt is calling to get appt, have a torn tendon l shoulder. Asking for a call back

## 2022-08-19 ENCOUNTER — TELEPHONE (OUTPATIENT)
Dept: PAIN MEDICINE | Facility: CLINIC | Age: 72
End: 2022-08-19
Payer: MEDICARE

## 2022-08-19 NOTE — TELEPHONE ENCOUNTER
Staff lvm in regards to appt on 8/22/22 @ 8:30 am with  on the 9th floor [address]. Patient has been provided with call back number via voicemail.

## 2022-08-22 ENCOUNTER — HOSPITAL ENCOUNTER (OUTPATIENT)
Dept: RADIOLOGY | Facility: OTHER | Age: 72
Discharge: HOME | End: 2022-08-22
Attending: STUDENT IN AN ORGANIZED HEALTH CARE EDUCATION/TRAINING PROGRAM
Payer: MEDICARE

## 2022-08-22 ENCOUNTER — OFFICE VISIT (OUTPATIENT)
Dept: PAIN MEDICINE | Facility: CLINIC | Age: 72
End: 2022-08-22
Attending: ANESTHESIOLOGY
Payer: MEDICARE

## 2022-08-22 ENCOUNTER — TELEPHONE (OUTPATIENT)
Dept: ADMINISTRATIVE | Facility: OTHER | Age: 72
End: 2022-08-22
Payer: MEDICARE

## 2022-08-22 VITALS
HEART RATE: 81 BPM | TEMPERATURE: 97 F | HEIGHT: 67 IN | RESPIRATION RATE: 18 BRPM | WEIGHT: 176.38 LBS | SYSTOLIC BLOOD PRESSURE: 139 MMHG | BODY MASS INDEX: 27.68 KG/M2 | DIASTOLIC BLOOD PRESSURE: 86 MMHG

## 2022-08-22 DIAGNOSIS — M25.512 CHRONIC LEFT SHOULDER PAIN: ICD-10-CM

## 2022-08-22 DIAGNOSIS — G89.29 CHRONIC LEFT SHOULDER PAIN: ICD-10-CM

## 2022-08-22 DIAGNOSIS — M43.06 SPONDYLOLYSIS OF LUMBAR REGION: ICD-10-CM

## 2022-08-22 DIAGNOSIS — M51.36 DDD (DEGENERATIVE DISC DISEASE), LUMBAR: ICD-10-CM

## 2022-08-22 DIAGNOSIS — M51.36 DDD (DEGENERATIVE DISC DISEASE), LUMBAR: Primary | ICD-10-CM

## 2022-08-22 PROCEDURE — 1159F PR MEDICATION LIST DOCUMENTED IN MEDICAL RECORD: ICD-10-PCS | Mod: CPTII,S$GLB,, | Performed by: ANESTHESIOLOGY

## 2022-08-22 PROCEDURE — 1160F RVW MEDS BY RX/DR IN RCRD: CPT | Mod: CPTII,S$GLB,, | Performed by: ANESTHESIOLOGY

## 2022-08-22 PROCEDURE — 72114 X-RAY EXAM L-S SPINE BENDING: CPT | Mod: 26,,, | Performed by: RADIOLOGY

## 2022-08-22 PROCEDURE — 72114 XR LUMBAR SPINE 5 VIEW WITH FLEX AND EXT: ICD-10-PCS | Mod: 26,,, | Performed by: RADIOLOGY

## 2022-08-22 PROCEDURE — 3079F PR MOST RECENT DIASTOLIC BLOOD PRESSURE 80-89 MM HG: ICD-10-PCS | Mod: CPTII,S$GLB,, | Performed by: ANESTHESIOLOGY

## 2022-08-22 PROCEDURE — 1100F PTFALLS ASSESS-DOCD GE2>/YR: CPT | Mod: CPTII,S$GLB,, | Performed by: ANESTHESIOLOGY

## 2022-08-22 PROCEDURE — 3075F PR MOST RECENT SYSTOLIC BLOOD PRESS GE 130-139MM HG: ICD-10-PCS | Mod: CPTII,S$GLB,, | Performed by: ANESTHESIOLOGY

## 2022-08-22 PROCEDURE — 3044F HG A1C LEVEL LT 7.0%: CPT | Mod: CPTII,S$GLB,, | Performed by: ANESTHESIOLOGY

## 2022-08-22 PROCEDURE — 3079F DIAST BP 80-89 MM HG: CPT | Mod: CPTII,S$GLB,, | Performed by: ANESTHESIOLOGY

## 2022-08-22 PROCEDURE — 3008F PR BODY MASS INDEX (BMI) DOCUMENTED: ICD-10-PCS | Mod: CPTII,S$GLB,, | Performed by: ANESTHESIOLOGY

## 2022-08-22 PROCEDURE — 3075F SYST BP GE 130 - 139MM HG: CPT | Mod: CPTII,S$GLB,, | Performed by: ANESTHESIOLOGY

## 2022-08-22 PROCEDURE — 1159F MED LIST DOCD IN RCRD: CPT | Mod: CPTII,S$GLB,, | Performed by: ANESTHESIOLOGY

## 2022-08-22 PROCEDURE — 3008F BODY MASS INDEX DOCD: CPT | Mod: CPTII,S$GLB,, | Performed by: ANESTHESIOLOGY

## 2022-08-22 PROCEDURE — 3044F PR MOST RECENT HEMOGLOBIN A1C LEVEL <7.0%: ICD-10-PCS | Mod: CPTII,S$GLB,, | Performed by: ANESTHESIOLOGY

## 2022-08-22 PROCEDURE — 1125F AMNT PAIN NOTED PAIN PRSNT: CPT | Mod: CPTII,S$GLB,, | Performed by: ANESTHESIOLOGY

## 2022-08-22 PROCEDURE — 3288F PR FALLS RISK ASSESSMENT DOCUMENTED: ICD-10-PCS | Mod: CPTII,S$GLB,, | Performed by: ANESTHESIOLOGY

## 2022-08-22 PROCEDURE — 99214 PR OFFICE/OUTPT VISIT, EST, LEVL IV, 30-39 MIN: ICD-10-PCS | Mod: GC,S$GLB,, | Performed by: ANESTHESIOLOGY

## 2022-08-22 PROCEDURE — 1100F PR PT FALLS ASSESS DOC 2+ FALLS/FALL W/INJURY/YR: ICD-10-PCS | Mod: CPTII,S$GLB,, | Performed by: ANESTHESIOLOGY

## 2022-08-22 PROCEDURE — 99214 OFFICE O/P EST MOD 30 MIN: CPT | Mod: GC,S$GLB,, | Performed by: ANESTHESIOLOGY

## 2022-08-22 PROCEDURE — 1125F PR PAIN SEVERITY QUANTIFIED, PAIN PRESENT: ICD-10-PCS | Mod: CPTII,S$GLB,, | Performed by: ANESTHESIOLOGY

## 2022-08-22 PROCEDURE — 1160F PR REVIEW ALL MEDS BY PRESCRIBER/CLIN PHARMACIST DOCUMENTED: ICD-10-PCS | Mod: CPTII,S$GLB,, | Performed by: ANESTHESIOLOGY

## 2022-08-22 PROCEDURE — 3288F FALL RISK ASSESSMENT DOCD: CPT | Mod: CPTII,S$GLB,, | Performed by: ANESTHESIOLOGY

## 2022-08-22 PROCEDURE — 99999 PR PBB SHADOW E&M-EST. PATIENT-LVL IV: ICD-10-PCS | Mod: PBBFAC,,, | Performed by: ANESTHESIOLOGY

## 2022-08-22 PROCEDURE — 72114 X-RAY EXAM L-S SPINE BENDING: CPT | Mod: TC,FY

## 2022-08-22 PROCEDURE — 99999 PR PBB SHADOW E&M-EST. PATIENT-LVL IV: CPT | Mod: PBBFAC,,, | Performed by: ANESTHESIOLOGY

## 2022-08-22 NOTE — PROGRESS NOTES
Chronic patient Established Note (Follow up visit)      Interval History 8/22/2022:    Anthony Miguel presents to the clinic for a follow-up appointment for back pain. Since the last visit, Anthony Miguel states the pain has been worsening. Current pain intensity is 5/10.    He presents for lower back pain.  He states that at the end of May, he had a fall and tore his shoulders.  He has an appointment with Dr. Ontiveros tomorrow for his shoulders. He continues to see his primary care doctor for the pain.  He continues to take Meloxicam and Celebrex which he states helps with the pain.  He states that he had good relief of his pain when he had the last RFAs back in December 2020.  He feels the back pain is the primary pain he has and wishes to have that treated first.  He continues to do home exercises.    Pain Disability Index Review:  Last 3 PDI Scores 8/22/2022 6/17/2021 5/3/2021   Pain Disability Index (PDI) 17 20 25     Interval History 6/17/2021:  The patient returns to clinic today for follow up of back and shoulder pain. He is s/p bilateral glenohumeral joint injections on 6/2/2021. He reports significant relief of his shoulder pain. He reports neck and low back pain. He denies any radicular pain. He describes his pain as stiff and aching in nature. His pain is worse with prolonged activity. He continues to perform a home exercise routine. He continues to take Gabapentin and Celebrex with benefit. He denies any other health changes. His pain today is 4/10.     Interval History 5/3/2021:  The patient returns to clinic today for follow up of back and shoulder pain. He is s/p bilateral SI joint injection on 4/7/2021. He reports 60% relief of his low back pain. He reports intermittent low back pain. He denies any radicular leg pain. He does report increased bilateral shoulder pain. He describes this pain as sharp and aching in nature. He does report intermittent radiating pain into his biceps, left greater  than right. His pain is worse with overhead activity. He also reports difficulty sleeping. He continues to take Gabapentin and Celebrex. He denies any other health changes. His pain today is 5/10.     Interval History 3/31/2021:  The patient returns to clinic today for follow up of back and shoulder pain. He reports that previous SI joint injections provided significant relief. His pain returned over the last few weeks. He reports increased low back and bilateral buttock pain. He denies any radicular leg pain. His pain is worse with moving from sitting to standing. He continues to take Gabapentin. He does take Celebrex occasionally with benefit. He denies any other health changes. His pain today is 5/10.     Interval History 11/20/2020:  The patient returns to clinic today for follow up of shoulder pain via audio visit. He is s/p bilateral glenohumeral joint injections on 11/4/2020. He reports 70% relief of his bilateral shoulder pain. He reports intermittent shoulder pain that is tolerable at this time. He reports low back and bilateral buttock pain, right greater than left. He denies any radiating leg pain. His pain is worse with sitting to standing. This pain is lower than previous procedure sites. He denies any other health changes. His pain today is 3/10.     Interval History 10/15/2020:  The patient returns to clinic today for follow up of pain. He reports increased bilateral shoulder pain. He describes this pain as stiff, sharp, and aching. He had short term relief with subacromial injections in the past. This pain is worse with prolonged activity. He reports increased low back and buttock pain. He denies any radiating leg pain. He does endorse morning stiffness. He denies any other health changes. His pain today is 4/10.     Interval History 7/13/2020:  The patient is s/p Bilateral Lumbar RFA. He states now having significant relief of approx 80% to both sided and able to perform ADLs easier. He is very  please with results and will be starting healthy back program at end of month due to increased mobility and less severe pain. Pt denies any new areas of pain, denies any new neuro changes. Does not need medication refills at this time. Pain is 4/10 today.      Interval History 6/17/2020:   The patient presents for follow-up of lower back pain.  Patient states he has had significant improvement of the left-sided lumbar pain status post L 3, 4, 5 RFA on 5/27/2020. He is also s/p Right side L3,4,5 RFA on 6/10/2020. He states lower back pain to right still continued and endorses he continues to do housework and believes increased activity has not allowed time for pain to ease.  He denies any radiculopathy, denies any neurological complaints, denies loss of b/b or saddle paresthesias. He continues to take Mobic, voltaren gel, neurontin and Trazodone with benefit and without adverse side effects of medication.      Interval history 05/11/2020 (Telephone encounter)   Last encounter,we planned on scheduling for bilateral lumbar RFA in future as patient has had excellent relief from BL lumbar FJIs in the past. At last encounter, we also switched patient form diclofenac to meloxicam 15 mg daily and added voltaren gel for local anti-inflammatory benefit. Today he reports he completed his 38 day radiation trial for his prostate cancer (04/18/2020). He reports the previous facet joint injections helped tremendously (>70% pain relief for 3 months at a time) and is inquiring about RFA for his low back. Back  Pain is unchanged, located in lower back, worsened with extension. NSAIDs are not quite helpful. Gabapentin is mildly helpful.        Interval history (Telephone encounter) 03/26/20  Pt is a 68 yo male with Hx of cervical stenosis w/ myelopathy s/p C3-6 decompression and fusion, as well as prostate CA on radiatio therapy who we are following for chronic low back pain. We last performed lumbar facet joint injections in August  and October of 2019. He reports excellent (75%) pain relief for about 3 months but his pain has since returned. He reports that the pain feels identical, but about 1-2 cm lower. Pain is worse with standing/ walking. He denies any pain shooting down his legs or numbness/ tingling/ weakness in his legs. No changes with bladder/bowel control. He is currently taking diclofenac 75mg BID and gabapentin 600mg TID with decent relief. He is curious if there is something slightly stronger than diclofenac that may help. He cannot recall any other NSAIDs or muscle relaxers that he's tried.     Pain Medications:     - NSAIDs: Meloxicam, diclofenac, voltaren gel  - Anti-Depressants: trazadone  - Anti convulsants: Gabapentin 600 mg TID        Physical Therapy/Home Exercise: no        report:  Not applicable     Pain Procedures:   2/6/19- L5/S1 KAYLEY  4/01/2019- L5/S1 KAYLEY  07/29/2019- Bilateral L4/5 and L5/S1 FJI  10/02/2019- Bilateral L4/5 and L5/S1 FJI  5/27/2020 Left L3,4,5 RFA  6/10/2020 Right L3,4,5 RFA   11/4/2020- Bilateral glenohumeral joint injection  12/16/2020- Bilateral SI joint injections  6/2/2021- Bilateral glenohumeral joint injections     Imaging:   XR SHOULDER COMPLETE 2 OR MORE VIEWS LEFT     CLINICAL HISTORY:  Pain in left shoulder     FINDINGS:  Shoulder complete three views left: There is baseline DJD.  No fracture dislocation bone destruction seen.        Electronically signed by: Lucian Silverman MD  Date:                                            06/27/2022  Time:                                           10:13    MRI SHOULDER WITHOUT CONTRAST LEFT     CLINICAL HISTORY:  Shoulder pain, rotator cuff disorder suspected, xray done;evaluate rotator cuff;  Pain in left shoulder     TECHNIQUE:  MRI left shoulder performed without contrast per routine protocol.     COMPARISON:  06/27/2022     FINDINGS:  Rotator cuff: There are full-thickness full width tears of the supraspinatus and infraspinatus tendons with  retraction to the level of the glenoid.  There is subscapularis tendinosis.  Teres minor tendon is intact.  There is severe atrophy of the teres minor muscle.  There is mild-to-moderate atrophy of the supraspinatus, infraspinatus, and subscapularis muscles.  Associated moderate muscle edema of the supraspinatus and infraspinatus muscles noted.     Labrum: Grossly unremarkable.     Biceps: Intra-articular tendinosis.     Bone: No fracture or marrow infiltrative process.     Acromioclavicular joint: Moderate arthropathy.     Cartilage: Generalized glenohumeral chondral thinning with areas of full-thickness cartilage loss over the humeral head.     Miscellaneous: Moderate glenohumeral joint effusion with synovitis, contiguous with the subacromial subdeltoid bursa and likely the acromioclavicular joint.     Impression:     1. Full-thickness full width tears of supraspinatus and infraspinatus tendons with rotator cuff muscle atrophy.  2. Glenohumeral cartilage loss.  3. Biceps tendinosis.  4. Moderate glenohumeral joint effusion with synovitis, contiguous with the subacromial subdeltoid bursa and likely the acromioclavicular joint.        Electronically signed by: Walter Pollock MD  Date:                                            06/29/2022  Time:                                           13:06     MRI Lumbar Spine 1/21/2019:  COMPARISON:  MRI lumbar spine 06/13/2017.     FINDINGS:  Alignment: There is grade 1 anterolisthesis of L4 on L5.  There is trace retrolisthesis of L5 on S1.     Vertebrae: Normal marrow signal. No fracture.     Discs: Multilevel disc desiccation with disc height loss, severe at L5-S1.     Cord: Normal.  Conus terminates at L1.     Degenerative findings:     T12-L1: Mild facet arthropathy.  No significant spinal canal stenosis or neuroforaminal narrowing.     L1-L2: Mild bilateral facet arthropathy.  Diffuse disc bulge with some loss of disc height.  No significant spinal canal stenosis or  neuroforaminal narrowing.     L2-L3: Mild bilateral facet arthropathy.  Mild ligamentum flavum thickening.  Small, diffuse disc bulge.  No significant spinal canal stenosis or neuroforaminal narrowing.     L3-L4: Mild bilateral facet arthropathy.  Mild ligamentum flavum thickening.  Small, diffuse disc bulge with a superimposed annular fissure.  Mild bilateral neuroforaminal narrowing.  No significant spinal canal stenosis.     L4-L5: Minimal anterolisthesis of L4 on L5.  Severe bilateral facet arthropathy.  Significant ligamentum flavum thickening.  Diffuse disc bulge.  Findings contribute to severe spinal canal stenosis and moderate bilateral neural foraminal narrowing.     L5-S1: Moderate bilateral facet arthropathy.  Mild ligamentum flavum thickening.  Diffuse disc bulge with small left paracentral protrusion, likely contacting the descending left S1 nerve root.  There is severe left and moderate right neuroforaminal narrowing and mild spinal canal stenosis.     Paraspinal muscles & soft tissues: Unremarkable.     Simple right renal cyst incidentally noted.     IMPRESSION:      Multilevel lumbar spondylosis detailed level by level above, resulting in varying degrees of spinal canal and neuroforaminal stenosis.  Specifically, there is severe spinal canal stenosis at L4-5 along with moderate bilateral neuroforaminal narrowing.  At L5-S1, there is severe left and moderate right neuroforaminal narrowing.      Allergies: Review of patient's allergies indicates:  No Known Allergies    Current Medications:   Current Outpatient Medications   Medication Sig Dispense Refill    atorvastatin (LIPITOR) 10 MG tablet Take 1 tablet (10 mg total) by mouth once daily. 90 tablet 3    celecoxib (CELEBREX) 100 MG capsule Take 1 capsule (100 mg total) by mouth once daily. 90 capsule 3    cholecalciferol, vitamin D3, (VITAMIN D3) 50 mcg (2,000 unit) Cap Take 1 capsule (2,000 Units total) by mouth once daily. 90 capsule 3    ferrous  sulfate 325 (65 FE) MG EC tablet Take 1 tablet (325 mg total) by mouth once daily. 90 tablet 3    gabapentin (NEURONTIN) 300 MG capsule TAKE 2 CAPSULES(600 MG) BY MOUTH THREE TIMES DAILY 540 capsule 3    meloxicam (MOBIC) 15 MG tablet UNKNOWN 777 tablet 0    multivitamin capsule Take 1 capsule by mouth once daily.      traZODone (DESYREL) 50 MG tablet Take 3 tablets (150 mg total) by mouth nightly as needed for Insomnia. 270 tablet 3    triamcinolone acetonide 0.1% (KENALOG) 0.1 % cream Apply 15 g (15,000 mg total) topically 2 (two) times daily as needed (for cuts). 1 each 3    VOLTAREN 1 % Gel UNKNOWN 777 g 0    walker (ULTRA-LIGHT ROLLATOR) Misc 1 Units by Misc.(Non-Drug; Combo Route) route once daily at 6am. 1 each 0    sertraline (ZOLOFT) 25 MG tablet Take 1 tablet (25 mg total) by mouth once daily. 90 tablet 3     No current facility-administered medications for this visit.     Facility-Administered Medications Ordered in Other Visits   Medication Dose Route Frequency Provider Last Rate Last Admin    0.9%  NaCl infusion   Intravenous Continuous Altaf Santana MD           REVIEW OF SYSTEMS:    GENERAL:  No weight loss, malaise or fevers.  HEENT:   No recent changes in vision or hearing  NECK:  Negative for lumps, no difficulty with swallowing.  RESPIRATORY:  Negative for cough, wheezing or shortness of breath, patient denies any recent URI.  CARDIOVASCULAR:  Negative for chest pain, leg swelling or palpitations.  GI:  Negative for abdominal discomfort, blood in stools or black stools or change in bowel habits.  MUSCULOSKELETAL:  See HPI.  SKIN:  Negative for lesions, rash, and itching.  PSYCH:  No mood disorder or recent psychosocial stressors.  Patient's sleep is disturbed secondary to pain.  HEMATOLOGY/LYMPHOLOGY:  + prostate cancer s/p radiation therapy  NEURO:   No history of headaches, syncope, paralysis, seizures or tremors.  All other reviewed and negative other than HPI.    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    Overview:  Right side dx update       Past Surgical History:  Past Surgical History:   Procedure Laterality Date    BACK SURGERY      EPIDURAL STEROID INJECTION N/A 2/6/2019    Procedure: INJECTION, STEROID, EPIDURAL, L45-S1 IL;  Surgeon: Marcel Adkins MD;  Location: [facility];  Service: Pain Management;  Laterality: N/A;    EPIDURAL STEROID INJECTION N/A 4/10/2019    Procedure: Injection, Steroid, Epidural LUMBAR/CAUDAL L5-S1 INTERLAMINAR KAYLEY;  Surgeon: Marcel Adkins MD;  Location: [facility];  Service: Pain Management;  Laterality: N/A;  NEEDS CONSENT    INJECTION OF FACET JOINT Bilateral 8/14/2019    Procedure: INJECTION, FACET JOINT INJECTION (LUMBAR BLOCK) BILATERAL L4-5 AND L5-S1;  Surgeon: Marcel Adkins MD;  Location: [facility];  Service: Pain Management;  Laterality: Bilateral;  NEEDS CONSENT    INJECTION OF FACET JOINT Bilateral 10/16/2019    Procedure: FACET JOINT INJECTION (LUMBAR BLOCK) BILATERAL L4-5 AND L5-S1;  Surgeon: Marcel Adkins MD;  Location: [facility];  Service: Pain Management;  Laterality: Bilateral;  NEEDS CONSENT    INJECTION OF JOINT Bilateral 11/4/2020    Procedure: INJECTION, JOINT, GLENOHUMERAL;  Surgeon: Marcel Adkins MD;  Location: [facility];  Service: Pain Management;  Laterality: Bilateral;    INJECTION OF JOINT Bilateral 12/16/2020    Procedure: INJECTION, JOINT, SACROILIAC (SI) need consent;  Surgeon: Marcel Adkins MD;  Location: [facility];  Service: Pain Management;  Laterality: Bilateral;    INJECTION OF JOINT Bilateral 4/7/2021    Procedure: INJECTION, JOINT,  "SACROILIAC (SI);  Surgeon: Marcel Adkins MD;  Location: [facility];  Service: Pain Management;  Laterality: Bilateral;    INJECTION OF JOINT Bilateral 2021    Procedure: INJECTION, JOINT, GLENOHUMERAL;  Surgeon: Marcel Adkins MD;  Location: [facility];  Service: Pain Management;  Laterality: Bilateral;    Larangoscopy      RADIOFREQUENCY ABLATION Left 2020    Procedure: RADIOFREQUENCY ABLATION LEFT L3,4,5 1 of 2;  Surgeon: Marcel Adkins MD;  Location: [facility];  Service: Pain Management;  Laterality: Left;  Left RFA L3, 4, 5  1 of 2    RADIOFREQUENCY ABLATION Right 6/10/2020    Procedure: RADIOFREQUENCY ABLATION RIGHT L3,4,5;  Surgeon: Marcel Adkins MD;  Location: [facility];  Service: Pain Management;  Laterality: Right;  Right RFA L3,4.5  2 of 2    ROBOT-ASSISTED LAPAROSCOPIC PROSTATECTOMY USING DA SAÚL XI N/A 2019    Procedure: XI ROBOTIC PROSTATECTOMY;  Surgeon: Sergio Dixon MD;  Location: [facility] OR [address] [place] [address];  Service: Urology;  Laterality: N/A;  4hrs/ gen with regional     SPINE SURGERY         Family History:  Family History   Problem Relation Age of Onset    Cancer Mother         Pancreatic    No Known Problems Father     Keratoconus Brother     Cancer Brother        Social History:  Social History     Socioeconomic History    Marital status: Single    Number of children: 0   Tobacco Use    Smoking status: Former Smoker     Packs/day: 1.00     Quit date: 2000     Years since quittin.5    Smokeless tobacco: Former User   Substance and Sexual Activity    Alcohol use: Not Currently    Drug use: No    Sexual activity: Not Currently   Social History Narrative    No difficulty reading Rx labels        OBJECTIVE:    /86   Pulse 81   Temp 97.2 °F (36.2 °C)   Resp 18   Ht 5' 7" (1.702 m)   Wt 80 kg (176 lb 5.9 oz)   BMI 27.62 kg/m²     PHYSICAL EXAMINATION:    General appearance: Well appearing, in no acute distress, alert and oriented x3.  Psych:  " Mood and affect appropriate.  Skin: Skin color, texture, turgor normal, no rashes or lesions, in both upper and lower body.  Head/face:  Atraumatic, normocephalic. No palpable lymph nodes  Neck: No pain to palpation over the cervical paraspinous muscles. No pain with neck flexion, extension, or lateral flexion. .  Cor: Regular rate  Pulm: non-labored breathing  GI: Abdomen soft and non-tender.  Back: Straight leg raising in the supine positions is negative to radicular pain. Mild pain to palpation over the lumbar spine. Mild tenderness to facet loading on the left.  Extremities: Peripheral joint ROM is full and pain free without obvious instability or laxity in all four extremities. No deformities, edema, or skin discoloration. Good capillary refill.  Musculoskeletal: Shoulder, hip, sacroiliac and knee provocative maneuvers are negative with the exception of unable to lift left shoulder greater than 90 degrees. Unable to perform empty can maneuver on left.  Unable to externally rotate left arm. Bilateral upper and lower extremity strength is normal and symmetric with the exception of left shoulder abduction 2/5.  No atrophy or tone abnormalities are noted.  Neuro: Bilateral upper and lower extremity coordination and muscle stretch reflexes are physiologic and symmetric.  Plantar response are downgoing. No loss of sensation is noted.  Gait: Antalgic.    ASSESSMENT: 71 y.o. year old male with lower back pain, consistent with:     1. DDD (degenerative disc disease), lumbar  X-Ray Lumbar Complete Including Flex And Ext    Procedure Order to Pain Management    Procedure Order to Pain Management      2. Spondylolysis of lumbar region  X-Ray Lumbar Complete Including Flex And Ext    Procedure Order to Pain Management    Procedure Order to Pain Management      3. Chronic left shoulder pain              PLAN:   - I have stressed the importance of physical activity and a home exercise plan to help with pain and improve  health.  - Patient can continue with medications for now since they are providing benefits, using them appropriately, and without side effects.  - Schedule for a bilateral Radiofrequency ablation of the medial branches at L3,4, and L5 for longer pain relief (right then left).  - Continue current medical regimen (gabapentin and NSAIDs)  - Xray lumbar spine with flexion/extension  - RTC 4 weeks after ablations  - Follow up with Dr. Ontiveros as scheduled for the left shoulder.  - Counseled patient regarding the importance of activity modification and physical therapy.    The above plan and management options were discussed at length with patient. Patient is in agreement with the above and verbalized understanding.    Arielle Clay I have personally reviewed the history and exam of this patient and agree with the resident/fellow/NPs note as stated above.    Marcel Adkins MD    08/22/2022

## 2022-08-22 NOTE — H&P (VIEW-ONLY)
Chronic patient Established Note (Follow up visit)      Interval History 8/22/2022:    Anthony Miguel presents to the clinic for a follow-up appointment for back pain. Since the last visit, Anthony Miguel states the pain has been worsening. Current pain intensity is 5/10.    He presents for lower back pain.  He states that at the end of May, he had a fall and tore his shoulders.  He has an appointment with Dr. Ontiveros tomorrow for his shoulders. He continues to see his primary care doctor for the pain.  He continues to take Meloxicam and Celebrex which he states helps with the pain.  He states that he had good relief of his pain when he had the last RFAs back in December 2020.  He feels the back pain is the primary pain he has and wishes to have that treated first.  He continues to do home exercises.    Pain Disability Index Review:  Last 3 PDI Scores 8/22/2022 6/17/2021 5/3/2021   Pain Disability Index (PDI) 17 20 25     Interval History 6/17/2021:  The patient returns to clinic today for follow up of back and shoulder pain. He is s/p bilateral glenohumeral joint injections on 6/2/2021. He reports significant relief of his shoulder pain. He reports neck and low back pain. He denies any radicular pain. He describes his pain as stiff and aching in nature. His pain is worse with prolonged activity. He continues to perform a home exercise routine. He continues to take Gabapentin and Celebrex with benefit. He denies any other health changes. His pain today is 4/10.     Interval History 5/3/2021:  The patient returns to clinic today for follow up of back and shoulder pain. He is s/p bilateral SI joint injection on 4/7/2021. He reports 60% relief of his low back pain. He reports intermittent low back pain. He denies any radicular leg pain. He does report increased bilateral shoulder pain. He describes this pain as sharp and aching in nature. He does report intermittent radiating pain into his biceps, left greater  than right. His pain is worse with overhead activity. He also reports difficulty sleeping. He continues to take Gabapentin and Celebrex. He denies any other health changes. His pain today is 5/10.     Interval History 3/31/2021:  The patient returns to clinic today for follow up of back and shoulder pain. He reports that previous SI joint injections provided significant relief. His pain returned over the last few weeks. He reports increased low back and bilateral buttock pain. He denies any radicular leg pain. His pain is worse with moving from sitting to standing. He continues to take Gabapentin. He does take Celebrex occasionally with benefit. He denies any other health changes. His pain today is 5/10.     Interval History 11/20/2020:  The patient returns to clinic today for follow up of shoulder pain via audio visit. He is s/p bilateral glenohumeral joint injections on 11/4/2020. He reports 70% relief of his bilateral shoulder pain. He reports intermittent shoulder pain that is tolerable at this time. He reports low back and bilateral buttock pain, right greater than left. He denies any radiating leg pain. His pain is worse with sitting to standing. This pain is lower than previous procedure sites. He denies any other health changes. His pain today is 3/10.     Interval History 10/15/2020:  The patient returns to clinic today for follow up of pain. He reports increased bilateral shoulder pain. He describes this pain as stiff, sharp, and aching. He had short term relief with subacromial injections in the past. This pain is worse with prolonged activity. He reports increased low back and buttock pain. He denies any radiating leg pain. He does endorse morning stiffness. He denies any other health changes. His pain today is 4/10.     Interval History 7/13/2020:  The patient is s/p Bilateral Lumbar RFA. He states now having significant relief of approx 80% to both sided and able to perform ADLs easier. He is very  please with results and will be starting healthy back program at end of month due to increased mobility and less severe pain. Pt denies any new areas of pain, denies any new neuro changes. Does not need medication refills at this time. Pain is 4/10 today.      Interval History 6/17/2020:   The patient presents for follow-up of lower back pain.  Patient states he has had significant improvement of the left-sided lumbar pain status post L 3, 4, 5 RFA on 5/27/2020. He is also s/p Right side L3,4,5 RFA on 6/10/2020. He states lower back pain to right still continued and endorses he continues to do housework and believes increased activity has not allowed time for pain to ease.  He denies any radiculopathy, denies any neurological complaints, denies loss of b/b or saddle paresthesias. He continues to take Mobic, voltaren gel, neurontin and Trazodone with benefit and without adverse side effects of medication.      Interval history 05/11/2020 (Telephone encounter)   Last encounter,we planned on scheduling for bilateral lumbar RFA in future as patient has had excellent relief from BL lumbar FJIs in the past. At last encounter, we also switched patient form diclofenac to meloxicam 15 mg daily and added voltaren gel for local anti-inflammatory benefit. Today he reports he completed his 38 day radiation trial for his prostate cancer (04/18/2020). He reports the previous facet joint injections helped tremendously (>70% pain relief for 3 months at a time) and is inquiring about RFA for his low back. Back  Pain is unchanged, located in lower back, worsened with extension. NSAIDs are not quite helpful. Gabapentin is mildly helpful.        Interval history (Telephone encounter) 03/26/20  Pt is a 70 yo male with Hx of cervical stenosis w/ myelopathy s/p C3-6 decompression and fusion, as well as prostate CA on radiatio therapy who we are following for chronic low back pain. We last performed lumbar facet joint injections in August  and October of 2019. He reports excellent (75%) pain relief for about 3 months but his pain has since returned. He reports that the pain feels identical, but about 1-2 cm lower. Pain is worse with standing/ walking. He denies any pain shooting down his legs or numbness/ tingling/ weakness in his legs. No changes with bladder/bowel control. He is currently taking diclofenac 75mg BID and gabapentin 600mg TID with decent relief. He is curious if there is something slightly stronger than diclofenac that may help. He cannot recall any other NSAIDs or muscle relaxers that he's tried.     Pain Medications:     - NSAIDs: Meloxicam, diclofenac, voltaren gel  - Anti-Depressants: trazadone  - Anti convulsants: Gabapentin 600 mg TID        Physical Therapy/Home Exercise: no        report:  Not applicable     Pain Procedures:   2/6/19- L5/S1 KAYLEY  4/01/2019- L5/S1 KAYLEY  07/29/2019- Bilateral L4/5 and L5/S1 FJI  10/02/2019- Bilateral L4/5 and L5/S1 FJI  5/27/2020 Left L3,4,5 RFA  6/10/2020 Right L3,4,5 RFA   11/4/2020- Bilateral glenohumeral joint injection  12/16/2020- Bilateral SI joint injections  6/2/2021- Bilateral glenohumeral joint injections     Imaging:   XR SHOULDER COMPLETE 2 OR MORE VIEWS LEFT     CLINICAL HISTORY:  Pain in left shoulder     FINDINGS:  Shoulder complete three views left: There is baseline DJD.  No fracture dislocation bone destruction seen.        Electronically signed by: Lucian Silverman MD  Date:                                            06/27/2022  Time:                                           10:13    MRI SHOULDER WITHOUT CONTRAST LEFT     CLINICAL HISTORY:  Shoulder pain, rotator cuff disorder suspected, xray done;evaluate rotator cuff;  Pain in left shoulder     TECHNIQUE:  MRI left shoulder performed without contrast per routine protocol.     COMPARISON:  06/27/2022     FINDINGS:  Rotator cuff: There are full-thickness full width tears of the supraspinatus and infraspinatus tendons with  retraction to the level of the glenoid.  There is subscapularis tendinosis.  Teres minor tendon is intact.  There is severe atrophy of the teres minor muscle.  There is mild-to-moderate atrophy of the supraspinatus, infraspinatus, and subscapularis muscles.  Associated moderate muscle edema of the supraspinatus and infraspinatus muscles noted.     Labrum: Grossly unremarkable.     Biceps: Intra-articular tendinosis.     Bone: No fracture or marrow infiltrative process.     Acromioclavicular joint: Moderate arthropathy.     Cartilage: Generalized glenohumeral chondral thinning with areas of full-thickness cartilage loss over the humeral head.     Miscellaneous: Moderate glenohumeral joint effusion with synovitis, contiguous with the subacromial subdeltoid bursa and likely the acromioclavicular joint.     Impression:     1. Full-thickness full width tears of supraspinatus and infraspinatus tendons with rotator cuff muscle atrophy.  2. Glenohumeral cartilage loss.  3. Biceps tendinosis.  4. Moderate glenohumeral joint effusion with synovitis, contiguous with the subacromial subdeltoid bursa and likely the acromioclavicular joint.        Electronically signed by: Walter Pollock MD  Date:                                            06/29/2022  Time:                                           13:06     MRI Lumbar Spine 1/21/2019:  COMPARISON:  MRI lumbar spine 06/13/2017.     FINDINGS:  Alignment: There is grade 1 anterolisthesis of L4 on L5.  There is trace retrolisthesis of L5 on S1.     Vertebrae: Normal marrow signal. No fracture.     Discs: Multilevel disc desiccation with disc height loss, severe at L5-S1.     Cord: Normal.  Conus terminates at L1.     Degenerative findings:     T12-L1: Mild facet arthropathy.  No significant spinal canal stenosis or neuroforaminal narrowing.     L1-L2: Mild bilateral facet arthropathy.  Diffuse disc bulge with some loss of disc height.  No significant spinal canal stenosis or  neuroforaminal narrowing.     L2-L3: Mild bilateral facet arthropathy.  Mild ligamentum flavum thickening.  Small, diffuse disc bulge.  No significant spinal canal stenosis or neuroforaminal narrowing.     L3-L4: Mild bilateral facet arthropathy.  Mild ligamentum flavum thickening.  Small, diffuse disc bulge with a superimposed annular fissure.  Mild bilateral neuroforaminal narrowing.  No significant spinal canal stenosis.     L4-L5: Minimal anterolisthesis of L4 on L5.  Severe bilateral facet arthropathy.  Significant ligamentum flavum thickening.  Diffuse disc bulge.  Findings contribute to severe spinal canal stenosis and moderate bilateral neural foraminal narrowing.     L5-S1: Moderate bilateral facet arthropathy.  Mild ligamentum flavum thickening.  Diffuse disc bulge with small left paracentral protrusion, likely contacting the descending left S1 nerve root.  There is severe left and moderate right neuroforaminal narrowing and mild spinal canal stenosis.     Paraspinal muscles & soft tissues: Unremarkable.     Simple right renal cyst incidentally noted.     IMPRESSION:      Multilevel lumbar spondylosis detailed level by level above, resulting in varying degrees of spinal canal and neuroforaminal stenosis.  Specifically, there is severe spinal canal stenosis at L4-5 along with moderate bilateral neuroforaminal narrowing.  At L5-S1, there is severe left and moderate right neuroforaminal narrowing.      Allergies: Review of patient's allergies indicates:  No Known Allergies    Current Medications:   Current Outpatient Medications   Medication Sig Dispense Refill    atorvastatin (LIPITOR) 10 MG tablet Take 1 tablet (10 mg total) by mouth once daily. 90 tablet 3    celecoxib (CELEBREX) 100 MG capsule Take 1 capsule (100 mg total) by mouth once daily. 90 capsule 3    cholecalciferol, vitamin D3, (VITAMIN D3) 50 mcg (2,000 unit) Cap Take 1 capsule (2,000 Units total) by mouth once daily. 90 capsule 3    ferrous  sulfate 325 (65 FE) MG EC tablet Take 1 tablet (325 mg total) by mouth once daily. 90 tablet 3    gabapentin (NEURONTIN) 300 MG capsule TAKE 2 CAPSULES(600 MG) BY MOUTH THREE TIMES DAILY 540 capsule 3    meloxicam (MOBIC) 15 MG tablet UNKNOWN 777 tablet 0    multivitamin capsule Take 1 capsule by mouth once daily.      traZODone (DESYREL) 50 MG tablet Take 3 tablets (150 mg total) by mouth nightly as needed for Insomnia. 270 tablet 3    triamcinolone acetonide 0.1% (KENALOG) 0.1 % cream Apply 15 g (15,000 mg total) topically 2 (two) times daily as needed (for cuts). 1 each 3    VOLTAREN 1 % Gel UNKNOWN 777 g 0    walker (ULTRA-LIGHT ROLLATOR) Misc 1 Units by Misc.(Non-Drug; Combo Route) route once daily at 6am. 1 each 0    sertraline (ZOLOFT) 25 MG tablet Take 1 tablet (25 mg total) by mouth once daily. 90 tablet 3     No current facility-administered medications for this visit.     Facility-Administered Medications Ordered in Other Visits   Medication Dose Route Frequency Provider Last Rate Last Admin    0.9%  NaCl infusion   Intravenous Continuous Altaf Santana MD           REVIEW OF SYSTEMS:    GENERAL:  No weight loss, malaise or fevers.  HEENT:   No recent changes in vision or hearing  NECK:  Negative for lumps, no difficulty with swallowing.  RESPIRATORY:  Negative for cough, wheezing or shortness of breath, patient denies any recent URI.  CARDIOVASCULAR:  Negative for chest pain, leg swelling or palpitations.  GI:  Negative for abdominal discomfort, blood in stools or black stools or change in bowel habits.  MUSCULOSKELETAL:  See HPI.  SKIN:  Negative for lesions, rash, and itching.  PSYCH:  No mood disorder or recent psychosocial stressors.  Patient's sleep is disturbed secondary to pain.  HEMATOLOGY/LYMPHOLOGY:  + prostate cancer s/p radiation therapy  NEURO:   No history of headaches, syncope, paralysis, seizures or tremors.  All other reviewed and negative other than HPI.    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    Overview:  Right side dx update       Past Surgical History:  Past Surgical History:   Procedure Laterality Date    BACK SURGERY      EPIDURAL STEROID INJECTION N/A 2/6/2019    Procedure: INJECTION, STEROID, EPIDURAL, L45-S1 IL;  Surgeon: Marcel Adkins MD;  Location: [facility]IN [facility];  Service: Pain Management;  Laterality: N/A;    EPIDURAL STEROID INJECTION N/A 4/10/2019    Procedure: Injection, Steroid, Epidural LUMBAR/CAUDAL L5-S1 INTERLAMINAR KAYLEY;  Surgeon: Marcel Adkins MD;  Location: [facility];  Service: Pain Management;  Laterality: N/A;  NEEDS CONSENT    INJECTION OF FACET JOINT Bilateral 8/14/2019    Procedure: INJECTION, FACET JOINT INJECTION (LUMBAR BLOCK) BILATERAL L4-5 AND L5-S1;  Surgeon: Marcel Adkins MD;  Location: [facility];  Service: Pain Management;  Laterality: Bilateral;  NEEDS CONSENT    INJECTION OF FACET JOINT Bilateral 10/16/2019    Procedure: FACET JOINT INJECTION (LUMBAR BLOCK) BILATERAL L4-5 AND L5-S1;  Surgeon: Marcel Adkins MD;  Location: [facility];  Service: Pain Management;  Laterality: Bilateral;  NEEDS CONSENT    INJECTION OF JOINT Bilateral 11/4/2020    Procedure: INJECTION, JOINT, GLENOHUMERAL;  Surgeon: Marcel Adkins MD;  Location: [facility];  Service: Pain Management;  Laterality: Bilateral;    INJECTION OF JOINT Bilateral 12/16/2020    Procedure: INJECTION, JOINT, SACROILIAC (SI) need consent;  Surgeon: Marcel Adkins MD;  Location: [facility];  Service: Pain Management;  Laterality: Bilateral;    INJECTION OF JOINT Bilateral 4/7/2021    Procedure: INJECTION, JOINT,  "SACROILIAC (SI);  Surgeon: Marcel Adkins MD;  Location: [facility];  Service: Pain Management;  Laterality: Bilateral;    INJECTION OF JOINT Bilateral 2021    Procedure: INJECTION, JOINT, GLENOHUMERAL;  Surgeon: Marcel Adkins MD;  Location: [facility];  Service: Pain Management;  Laterality: Bilateral;    Larangoscopy      RADIOFREQUENCY ABLATION Left 2020    Procedure: RADIOFREQUENCY ABLATION LEFT L3,4,5 1 of 2;  Surgeon: Marcel Adkins MD;  Location: [facility];  Service: Pain Management;  Laterality: Left;  Left RFA L3, 4, 5  1 of 2    RADIOFREQUENCY ABLATION Right 6/10/2020    Procedure: RADIOFREQUENCY ABLATION RIGHT L3,4,5;  Surgeon: Marcel Adkins MD;  Location: [facility];  Service: Pain Management;  Laterality: Right;  Right RFA L3,4.5  2 of 2    ROBOT-ASSISTED LAPAROSCOPIC PROSTATECTOMY USING DA SAÚL XI N/A 2019    Procedure: XI ROBOTIC PROSTATECTOMY;  Surgeon: Sergio Dixon MD;  Location: [facility] OR [address] [place] [address];  Service: Urology;  Laterality: N/A;  4hrs/ gen with regional     SPINE SURGERY         Family History:  Family History   Problem Relation Age of Onset    Cancer Mother         Pancreatic    No Known Problems Father     Keratoconus Brother     Cancer Brother        Social History:  Social History     Socioeconomic History    Marital status: Single    Number of children: 0   Tobacco Use    Smoking status: Former Smoker     Packs/day: 1.00     Quit date: 2000     Years since quittin.5    Smokeless tobacco: Former User   Substance and Sexual Activity    Alcohol use: Not Currently    Drug use: No    Sexual activity: Not Currently   Social History Narrative    No difficulty reading Rx labels        OBJECTIVE:    /86   Pulse 81   Temp 97.2 °F (36.2 °C)   Resp 18   Ht 5' 7" (1.702 m)   Wt 80 kg (176 lb 5.9 oz)   BMI 27.62 kg/m²     PHYSICAL EXAMINATION:    General appearance: Well appearing, in no acute distress, alert and oriented x3.  Psych:  " Mood and affect appropriate.  Skin: Skin color, texture, turgor normal, no rashes or lesions, in both upper and lower body.  Head/face:  Atraumatic, normocephalic. No palpable lymph nodes  Neck: No pain to palpation over the cervical paraspinous muscles. No pain with neck flexion, extension, or lateral flexion. .  Cor: Regular rate  Pulm: non-labored breathing  GI: Abdomen soft and non-tender.  Back: Straight leg raising in the supine positions is negative to radicular pain. Mild pain to palpation over the lumbar spine. Mild tenderness to facet loading on the left.  Extremities: Peripheral joint ROM is full and pain free without obvious instability or laxity in all four extremities. No deformities, edema, or skin discoloration. Good capillary refill.  Musculoskeletal: Shoulder, hip, sacroiliac and knee provocative maneuvers are negative with the exception of unable to lift left shoulder greater than 90 degrees. Unable to perform empty can maneuver on left.  Unable to externally rotate left arm. Bilateral upper and lower extremity strength is normal and symmetric with the exception of left shoulder abduction 2/5.  No atrophy or tone abnormalities are noted.  Neuro: Bilateral upper and lower extremity coordination and muscle stretch reflexes are physiologic and symmetric.  Plantar response are downgoing. No loss of sensation is noted.  Gait: Antalgic.    ASSESSMENT: 71 y.o. year old male with lower back pain, consistent with:     1. DDD (degenerative disc disease), lumbar  X-Ray Lumbar Complete Including Flex And Ext    Procedure Order to Pain Management    Procedure Order to Pain Management      2. Spondylolysis of lumbar region  X-Ray Lumbar Complete Including Flex And Ext    Procedure Order to Pain Management    Procedure Order to Pain Management      3. Chronic left shoulder pain              PLAN:   - I have stressed the importance of physical activity and a home exercise plan to help with pain and improve  health.  - Patient can continue with medications for now since they are providing benefits, using them appropriately, and without side effects.  - Schedule for a bilateral Radiofrequency ablation of the medial branches at L3,4, and L5 for longer pain relief (right then left).  - Continue current medical regimen (gabapentin and NSAIDs)  - Xray lumbar spine with flexion/extension  - RTC 4 weeks after ablations  - Follow up with Dr. Ontiveros as scheduled for the left shoulder.  - Counseled patient regarding the importance of activity modification and physical therapy.    The above plan and management options were discussed at length with patient. Patient is in agreement with the above and verbalized understanding.    Arielle Clay I have personally reviewed the history and exam of this patient and agree with the resident/fellow/NPs note as stated above.    Marcel Adkins MD    08/22/2022

## 2022-08-23 ENCOUNTER — TELEPHONE (OUTPATIENT)
Dept: ADMINISTRATIVE | Facility: OTHER | Age: 72
End: 2022-08-23
Payer: MEDICARE

## 2022-08-23 ENCOUNTER — OFFICE VISIT (OUTPATIENT)
Dept: ORTHOPEDICS | Facility: CLINIC | Age: 72
End: 2022-08-23
Payer: MEDICARE

## 2022-08-23 VITALS
HEIGHT: 67 IN | DIASTOLIC BLOOD PRESSURE: 76 MMHG | SYSTOLIC BLOOD PRESSURE: 117 MMHG | WEIGHT: 176 LBS | BODY MASS INDEX: 27.62 KG/M2 | HEART RATE: 64 BPM

## 2022-08-23 DIAGNOSIS — M43.06 SPONDYLOLYSIS OF LUMBAR REGION: Primary | ICD-10-CM

## 2022-08-23 DIAGNOSIS — M19.012 ARTHRITIS OF SHOULDER REGION, LEFT: Primary | ICD-10-CM

## 2022-08-23 DIAGNOSIS — M75.122 COMPLETE TEAR OF LEFT ROTATOR CUFF, UNSPECIFIED WHETHER TRAUMATIC: ICD-10-CM

## 2022-08-23 PROCEDURE — 99999 PR PBB SHADOW E&M-EST. PATIENT-LVL III: ICD-10-PCS | Mod: PBBFAC,,, | Performed by: ORTHOPAEDIC SURGERY

## 2022-08-23 PROCEDURE — 3074F PR MOST RECENT SYSTOLIC BLOOD PRESSURE < 130 MM HG: ICD-10-PCS | Mod: CPTII,S$GLB,, | Performed by: ORTHOPAEDIC SURGERY

## 2022-08-23 PROCEDURE — 3008F BODY MASS INDEX DOCD: CPT | Mod: CPTII,S$GLB,, | Performed by: ORTHOPAEDIC SURGERY

## 2022-08-23 PROCEDURE — 3008F PR BODY MASS INDEX (BMI) DOCUMENTED: ICD-10-PCS | Mod: CPTII,S$GLB,, | Performed by: ORTHOPAEDIC SURGERY

## 2022-08-23 PROCEDURE — 3044F HG A1C LEVEL LT 7.0%: CPT | Mod: CPTII,S$GLB,, | Performed by: ORTHOPAEDIC SURGERY

## 2022-08-23 PROCEDURE — 3288F FALL RISK ASSESSMENT DOCD: CPT | Mod: CPTII,S$GLB,, | Performed by: ORTHOPAEDIC SURGERY

## 2022-08-23 PROCEDURE — 99999 PR PBB SHADOW E&M-EST. PATIENT-LVL III: CPT | Mod: PBBFAC,,, | Performed by: ORTHOPAEDIC SURGERY

## 2022-08-23 PROCEDURE — 3288F PR FALLS RISK ASSESSMENT DOCUMENTED: ICD-10-PCS | Mod: CPTII,S$GLB,, | Performed by: ORTHOPAEDIC SURGERY

## 2022-08-23 PROCEDURE — 20610 LARGE JOINT ASPIRATION/INJECTION: L GLENOHUMERAL: ICD-10-PCS | Mod: LT,S$GLB,, | Performed by: ORTHOPAEDIC SURGERY

## 2022-08-23 PROCEDURE — 3044F PR MOST RECENT HEMOGLOBIN A1C LEVEL <7.0%: ICD-10-PCS | Mod: CPTII,S$GLB,, | Performed by: ORTHOPAEDIC SURGERY

## 2022-08-23 PROCEDURE — 3078F PR MOST RECENT DIASTOLIC BLOOD PRESSURE < 80 MM HG: ICD-10-PCS | Mod: CPTII,S$GLB,, | Performed by: ORTHOPAEDIC SURGERY

## 2022-08-23 PROCEDURE — 99204 OFFICE O/P NEW MOD 45 MIN: CPT | Mod: 25,S$GLB,, | Performed by: ORTHOPAEDIC SURGERY

## 2022-08-23 PROCEDURE — 3074F SYST BP LT 130 MM HG: CPT | Mod: CPTII,S$GLB,, | Performed by: ORTHOPAEDIC SURGERY

## 2022-08-23 PROCEDURE — 20610 DRAIN/INJ JOINT/BURSA W/O US: CPT | Mod: LT,S$GLB,, | Performed by: ORTHOPAEDIC SURGERY

## 2022-08-23 PROCEDURE — 1100F PR PT FALLS ASSESS DOC 2+ FALLS/FALL W/INJURY/YR: ICD-10-PCS | Mod: CPTII,S$GLB,, | Performed by: ORTHOPAEDIC SURGERY

## 2022-08-23 PROCEDURE — 1125F PR PAIN SEVERITY QUANTIFIED, PAIN PRESENT: ICD-10-PCS | Mod: CPTII,S$GLB,, | Performed by: ORTHOPAEDIC SURGERY

## 2022-08-23 PROCEDURE — 3078F DIAST BP <80 MM HG: CPT | Mod: CPTII,S$GLB,, | Performed by: ORTHOPAEDIC SURGERY

## 2022-08-23 PROCEDURE — 99204 PR OFFICE/OUTPT VISIT, NEW, LEVL IV, 45-59 MIN: ICD-10-PCS | Mod: 25,S$GLB,, | Performed by: ORTHOPAEDIC SURGERY

## 2022-08-23 PROCEDURE — 1100F PTFALLS ASSESS-DOCD GE2>/YR: CPT | Mod: CPTII,S$GLB,, | Performed by: ORTHOPAEDIC SURGERY

## 2022-08-23 PROCEDURE — 1125F AMNT PAIN NOTED PAIN PRSNT: CPT | Mod: CPTII,S$GLB,, | Performed by: ORTHOPAEDIC SURGERY

## 2022-08-23 RX ORDER — TRIAMCINOLONE ACETONIDE 40 MG/ML
80 INJECTION, SUSPENSION INTRA-ARTICULAR; INTRAMUSCULAR
Status: DISCONTINUED | OUTPATIENT
Start: 2022-08-23 | End: 2022-08-23 | Stop reason: HOSPADM

## 2022-08-23 RX ADMIN — TRIAMCINOLONE ACETONIDE 80 MG: 40 INJECTION, SUSPENSION INTRA-ARTICULAR; INTRAMUSCULAR at 09:08

## 2022-08-23 NOTE — PROCEDURES
Large Joint Aspiration/Injection: L glenohumeral    Date/Time: 8/23/2022 9:00 AM  Performed by: Daysi Ma MD  Authorized by: Daysi Ma MD     Consent Done?:  Yes (Verbal)  Indications:  Arthritis and pain  Timeout: prior to procedure the correct patient, procedure, and site was verified    Prep: patient was prepped and draped in usual sterile fashion      Local anesthesia used?: Yes    Anesthesia:  Local infiltration  Local anesthetic:  Lidocaine 1% without epinephrine    Details:  Needle Size:  22 G  Approach:  Posterior  Location:  Shoulder  Site:  L glenohumeral  Medications:  80 mg triamcinolone acetonide 40 mg/mL

## 2022-08-23 NOTE — PROGRESS NOTES
I have personally taken the history and examined the patient. I agree with the Hand Surgery PA's note. The plan will be :  Pt  Left shoulder pain patient has had it for some time he also plays the piano he states he can not elevate his arm for long periods of time because of the pain and weakness     MRI was performed which showed that he has a rotator cuff as well as arthritis of the glenohumeral joint     We discussed the anatomy in great detail and discuss treatment plans at this time patient is opting for injection and therapy to strengthen the muscle around his shoulder we talked about arthroplasty as well patient understands he will return to clinic in 7 weeks to discuss surgery versus the continued therapy

## 2022-08-23 NOTE — PROGRESS NOTES
Subjective:      Patient ID: Anthony Miguel is a 71 y.o. male.    Chief Complaint: Pain of the Left Shoulder      HPI  Anthony Miguel is a 71 y.o. male presenting today for evaluation of the left shoulder. He reports known arthritis in the left shoulder for years. More recently in May he had a fall he landed onto the left side. He reports increased shoulder pain and decreased motion off the shoulder. He plays piano and has a harder time lifting the arm to the keys. He did complete MRI with full thickness supraspinatus and infraspinatus tears, glenohumeral cartilage loss, biceps tendinosis. He has not yet had injection or physical therapy since recent injury though had had prior injection in the shoulder for his arthritis.         Review of patient's allergies indicates:  No Known Allergies      Current Outpatient Medications   Medication Sig Dispense Refill    atorvastatin (LIPITOR) 10 MG tablet Take 1 tablet (10 mg total) by mouth once daily. 90 tablet 3    celecoxib (CELEBREX) 100 MG capsule Take 1 capsule (100 mg total) by mouth once daily. 90 capsule 3    cholecalciferol, vitamin D3, (VITAMIN D3) 50 mcg (2,000 unit) Cap Take 1 capsule (2,000 Units total) by mouth once daily. 90 capsule 3    ferrous sulfate 325 (65 FE) MG EC tablet Take 1 tablet (325 mg total) by mouth once daily. 90 tablet 3    gabapentin (NEURONTIN) 300 MG capsule TAKE 2 CAPSULES(600 MG) BY MOUTH THREE TIMES DAILY 540 capsule 3    meloxicam (MOBIC) 15 MG tablet UNKNOWN 777 tablet 0    multivitamin capsule Take 1 capsule by mouth once daily.      traZODone (DESYREL) 50 MG tablet Take 3 tablets (150 mg total) by mouth nightly as needed for Insomnia. 270 tablet 3    triamcinolone acetonide 0.1% (KENALOG) 0.1 % cream Apply 15 g (15,000 mg total) topically 2 (two) times daily as needed (for cuts). 1 each 3    VOLTAREN 1 % Gel UNKNOWN 777 g 0    walker (ULTRA-LIGHT ROLLATOR) Misc 1 Units by Misc.(Non-Drug; Combo Route) route once  daily at 6am. 1 each 0    sertraline (ZOLOFT) 25 MG tablet Take 1 tablet (25 mg total) by mouth once daily. 90 tablet 3     No current facility-administered medications for this visit.     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    Overview:  Right side dx update       Past Surgical History:   Procedure Laterality Date    BACK SURGERY      EPIDURAL STEROID INJECTION N/A 2/6/2019    Procedure: INJECTION, STEROID, EPIDURAL, L45-S1 IL;  Surgeon: Marcel Adkins MD;  Location: [facility] PAIN MGT;  Service: Pain Management;  Laterality: N/A;    EPIDURAL STEROID INJECTION N/A 4/10/2019    Procedure: Injection, Steroid, Epidural LUMBAR/CAUDAL L5-S1 INTERLAMINAR KAYLEY;  Surgeon: Marcel Adkins MD;  Location: [facility] PAIN T;  Service: Pain Management;  Laterality: N/A;  NEEDS CONSENT    INJECTION OF FACET JOINT Bilateral 8/14/2019    Procedure: INJECTION, FACET JOINT INJECTION (LUMBAR BLOCK) BILATERAL L4-5 AND L5-S1;  Surgeon: Marcel Adkins MD;  Location: [facility] PAIN MGT;  Service: Pain Management;  Laterality: Bilateral;  NEEDS CONSENT    INJECTION OF FACET JOINT Bilateral 10/16/2019    Procedure: FACET JOINT INJECTION (LUMBAR BLOCK) BILATERAL L4-5 AND L5-S1;  Surgeon: Marcel Adkins MD;  Location: [facility] PAIN T;  Service: Pain Management;  Laterality: Bilateral;  NEEDS CONSENT    INJECTION OF JOINT Bilateral 11/4/2020    Procedure: INJECTION, JOINT, GLENOHUMERAL;  Surgeon: Marcel Adkins MD;   "Location: [facility] PAIN MGT;  Service: Pain Management;  Laterality: Bilateral;    INJECTION OF JOINT Bilateral 12/16/2020    Procedure: INJECTION, JOINT, SACROILIAC (SI) need consent;  Surgeon: Marcel Adkins MD;  Location: [facility] PAIN MGT;  Service: Pain Management;  Laterality: Bilateral;    INJECTION OF JOINT Bilateral 4/7/2021    Procedure: INJECTION, JOINT, SACROILIAC (SI);  Surgeon: Marcel Adkins MD;  Location: [facility] PAIN MGT;  Service: Pain Management;  Laterality: Bilateral;    INJECTION OF JOINT Bilateral 6/2/2021    Procedure: INJECTION, JOINT, GLENOHUMERAL;  Surgeon: Marcel Adkins MD;  Location: [facility] PAIN MGT;  Service: Pain Management;  Laterality: Bilateral;    Larangoscopy      RADIOFREQUENCY ABLATION Left 5/27/2020    Procedure: RADIOFREQUENCY ABLATION LEFT L3,4,5 1 of 2;  Surgeon: Marcel Adkins MD;  Location: [facility] PAIN MGT;  Service: Pain Management;  Laterality: Left;  Left RFA L3, 4, 5  1 of 2    RADIOFREQUENCY ABLATION Right 6/10/2020    Procedure: RADIOFREQUENCY ABLATION RIGHT L3,4,5;  Surgeon: Marcel Adkins MD;  Location: [facility] PAIN MGT;  Service: Pain Management;  Laterality: Right;  Right RFA L3,4.5  2 of 2    ROBOT-ASSISTED LAPAROSCOPIC PROSTATECTOMY USING DA SAÚL XI N/A 6/4/2019    Procedure: XI ROBOTIC PROSTATECTOMY;  Surgeon: Sergio Dixon MD;  Location: [facility] OR [address] [place] [address];  Service: Urology;  Laterality: N/A;  4hrs/ gen with regional     SPINE SURGERY         Review of Systems:  Constitutional: Negative for chills and fever.   Respiratory: Negative for cough and shortness of breath.    Gastrointestinal: Negative for nausea and vomiting.   Skin: Negative for rash.   Neurological: Negative for dizziness and headaches.   Psychiatric/Behavioral: Negative for depression.   MSK as in HPI       OBJECTIVE:     PHYSICAL EXAM:  /76   Pulse 64   Ht 5' 7.01" (1.702 m)   Wt 79.8 kg (176 lb)   BMI 27.56 kg/m²     GEN:  NAD, well-developed, well-groomed.  NEURO: " Awake, alert, and oriented. Normal attention and concentration.    PSYCH: Normal mood and affect. Behavior is normal.  HEENT: No cervical lymphadenopathy noted.  CARDIOVASCULAR: Radial pulses 2+ bilaterally. No LE edema noted.  PULMONARY: Breath sounds normal. No respiratory distress.  SKIN: Intact, no rashes.      MSK:   LUE:  Good active ROM of the wrist and fingers.limited full ROM of the left shoulder he has about 90 FF and abduction, ER about 15, IR to gluts. ttp lateral and anterior shoulder over the biceps tendon. AIN/PIN/Radial/Median/Ulnar Nerves assessed in isolation without deficit. Radial & Ulnar arteries palpated 2+. Capillary Refill <3s.      RADIOGRAPHS:  Xray left shoulder 6/27/22   FINDINGS:  Shoulder complete three views left: There is baseline DJD.  No fracture dislocation bone destruction seen.    MRI left shoulder 6/29/22     Impression:   1. Full-thickness full width tears of supraspinatus and infraspinatus tendons with rotator cuff muscle atrophy.  2. Glenohumeral cartilage loss.  3. Biceps tendinosis.  4. Moderate glenohumeral joint effusion with synovitis, contiguous with the subacromial subdeltoid bursa and likely the acromioclavicular joint.    Comments: I have personally reviewed the imaging and I agree with the above radiologist's report.    ASSESSMENT/PLAN:       ICD-10-CM ICD-9-CM   1. Arthritis of shoulder region, left  M19.012 716.91   2. Complete tear of left rotator cuff, unspecified whether traumatic  M75.122 727.61       Orders Placed This Encounter    Ambulatory referral/consult to Physical/Occupational Therapy     Plan:   MRI reviewed, treatment options discussed including conservative and surgical (reverse TSA) options. He would like to proceed with shoulder injection and physical therapy at this time, external therapy orders also provided  RTC 7 weeks       I have explained the risks, benefits, and alternatives of the procedure in detail.  The patient voices understanding  and all questions have been answered.  The patient agrees to proceed as planned. After a sterile prep of the skin in the normal the left shoulder is injected from the posterior approach using a 22 gauge needle with a combination of 8cc 1% lidocaine and 80 mg of kenalog. The patient is cautioned and immediate relief of pain is secondary to the local anesthetic and will be temporary.  After the anesthetic wears off there may be a increase in pain that may last for a few hours or a few days and they should use ice to help alleviate this flair up of pain.       The patient indicates understanding of these issues and agrees to the plan.    Gris Clark PA-C  [facility]   [facility]  [place], [place]

## 2022-08-31 ENCOUNTER — HOSPITAL ENCOUNTER (OUTPATIENT)
Facility: OTHER | Age: 72
Discharge: HOME | End: 2022-08-31
Attending: ANESTHESIOLOGY | Admitting: ANESTHESIOLOGY
Payer: MEDICARE

## 2022-08-31 VITALS
RESPIRATION RATE: 14 BRPM | HEIGHT: 67 IN | OXYGEN SATURATION: 97 % | BODY MASS INDEX: 27.47 KG/M2 | WEIGHT: 175 LBS | SYSTOLIC BLOOD PRESSURE: 131 MMHG | HEART RATE: 62 BPM | DIASTOLIC BLOOD PRESSURE: 73 MMHG | TEMPERATURE: 98 F

## 2022-08-31 DIAGNOSIS — M48.07 LUMBOSACRAL STENOSIS WITH NEUROGENIC CLAUDICATION: ICD-10-CM

## 2022-08-31 DIAGNOSIS — M24.28 LIGAMENTUM FLAVUM HYPERTROPHY: Primary | ICD-10-CM

## 2022-08-31 DIAGNOSIS — F33.0 MILD EPISODE OF RECURRENT MAJOR DEPRESSIVE DISORDER: ICD-10-CM

## 2022-08-31 DIAGNOSIS — G89.29 CHRONIC PAIN: ICD-10-CM

## 2022-08-31 DIAGNOSIS — M47.896 OTHER OSTEOARTHRITIS OF SPINE, LUMBAR REGION: ICD-10-CM

## 2022-08-31 PROCEDURE — 64635 DESTROY LUMB/SAC FACET JNT: CPT | Mod: RT,,, | Performed by: ANESTHESIOLOGY

## 2022-08-31 PROCEDURE — 64635 DESTROY LUMB/SAC FACET JNT: CPT | Mod: RT | Performed by: ANESTHESIOLOGY

## 2022-08-31 PROCEDURE — 25000003 PHARM REV CODE 250: Performed by: ANESTHESIOLOGY

## 2022-08-31 PROCEDURE — 64635 PR DESTROY LUMB/SAC FACET JNT: ICD-10-PCS | Mod: RT,,, | Performed by: ANESTHESIOLOGY

## 2022-08-31 PROCEDURE — 63600175 PHARM REV CODE 636 W HCPCS: Performed by: ANESTHESIOLOGY

## 2022-08-31 PROCEDURE — 25000003 PHARM REV CODE 250: Performed by: STUDENT IN AN ORGANIZED HEALTH CARE EDUCATION/TRAINING PROGRAM

## 2022-08-31 PROCEDURE — 64636 DESTROY L/S FACET JNT ADDL: CPT | Mod: RT | Performed by: ANESTHESIOLOGY

## 2022-08-31 PROCEDURE — 64636 PR DESTROY L/S FACET JNT ADDL: ICD-10-PCS | Mod: RT,,, | Performed by: ANESTHESIOLOGY

## 2022-08-31 PROCEDURE — 64636 DESTROY L/S FACET JNT ADDL: CPT | Mod: RT,,, | Performed by: ANESTHESIOLOGY

## 2022-08-31 RX ORDER — LIDOCAINE HYDROCHLORIDE 20 MG/ML
INJECTION, SOLUTION INFILTRATION; PERINEURAL
Status: DISCONTINUED | OUTPATIENT
Start: 2022-08-31 | End: 2022-08-31 | Stop reason: HOSPADM

## 2022-08-31 RX ORDER — MIDAZOLAM HYDROCHLORIDE 1 MG/ML
INJECTION INTRAMUSCULAR; INTRAVENOUS
Status: DISCONTINUED | OUTPATIENT
Start: 2022-08-31 | End: 2022-08-31 | Stop reason: HOSPADM

## 2022-08-31 RX ORDER — DEXAMETHASONE SODIUM PHOSPHATE 10 MG/ML
INJECTION INTRAMUSCULAR; INTRAVENOUS
Status: DISCONTINUED | OUTPATIENT
Start: 2022-08-31 | End: 2022-08-31 | Stop reason: HOSPADM

## 2022-08-31 RX ORDER — BUPIVACAINE HYDROCHLORIDE 2.5 MG/ML
INJECTION, SOLUTION EPIDURAL; INFILTRATION; INTRACAUDAL
Status: DISCONTINUED | OUTPATIENT
Start: 2022-08-31 | End: 2022-08-31 | Stop reason: HOSPADM

## 2022-08-31 RX ORDER — FENTANYL CITRATE 50 UG/ML
INJECTION, SOLUTION INTRAMUSCULAR; INTRAVENOUS
Status: DISCONTINUED | OUTPATIENT
Start: 2022-08-31 | End: 2022-08-31 | Stop reason: HOSPADM

## 2022-08-31 RX ORDER — SODIUM CHLORIDE 9 MG/ML
500 INJECTION, SOLUTION INTRAVENOUS CONTINUOUS
Status: DISCONTINUED | OUTPATIENT
Start: 2022-09-01 | End: 2022-08-31 | Stop reason: HOSPADM

## 2022-08-31 NOTE — DISCHARGE INSTRUCTIONS

## 2022-08-31 NOTE — H&P
HPI  Anthony Miguel presents for right L3, L4, and L5 radiofrequency ablation.        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    Overview:  Right side dx update     Past Surgical History:   Procedure Laterality Date    BACK SURGERY      EPIDURAL STEROID INJECTION N/A 2/6/2019    Procedure: INJECTION, STEROID, EPIDURAL, L45-S1 IL;  Surgeon: Marcel Adkins MD;  Location: [facility] PAIN MGT;  Service: Pain Management;  Laterality: N/A;    EPIDURAL STEROID INJECTION N/A 4/10/2019    Procedure: Injection, Steroid, Epidural LUMBAR/CAUDAL L5-S1 INTERLAMINAR KAYLEY;  Surgeon: Marcel Adkins MD;  Location: [facility] PAIN MGT;  Service: Pain Management;  Laterality: N/A;  NEEDS CONSENT    INJECTION OF FACET JOINT Bilateral 8/14/2019    Procedure: INJECTION, FACET JOINT INJECTION (LUMBAR BLOCK) BILATERAL L4-5 AND L5-S1;  Surgeon: Marcel Adkins MD;  Location: [facility] PAIN MGT;  Service: Pain Management;  Laterality: Bilateral;  NEEDS CONSENT    INJECTION OF FACET JOINT Bilateral 10/16/2019    Procedure: FACET JOINT INJECTION (LUMBAR BLOCK) BILATERAL L4-5 AND L5-S1;  Surgeon: Marcel Adkins MD;  Location: [facility] PAIN MGT;  Service: Pain Management;  Laterality: Bilateral;  NEEDS CONSENT    INJECTION OF JOINT Bilateral 11/4/2020    Procedure: INJECTION, JOINT, GLENOHUMERAL;  Surgeon: Marcel Adkins MD;  Location: [facility] PAIN MGT;  Service: Pain Management;  Laterality: Bilateral;    INJECTION OF JOINT Bilateral 12/16/2020    Procedure: INJECTION, JOINT, SACROILIAC (SI) need consent;  Surgeon: Marcel Adkins MD;  Location: [facility] PAIN MGT;  Service: Pain Management;  Laterality: Bilateral;    INJECTION OF JOINT  Bilateral 4/7/2021    Procedure: INJECTION, JOINT, SACROILIAC (SI);  Surgeon: Marcel Adkins MD;  Location: [facility] PAIN MGT;  Service: Pain Management;  Laterality: Bilateral;    INJECTION OF JOINT Bilateral 6/2/2021    Procedure: INJECTION, JOINT, GLENOHUMERAL;  Surgeon: Marcel Adkins MD;  Location: [facility] PAIN MGT;  Service: Pain Management;  Laterality: Bilateral;    Larangoscopy      RADIOFREQUENCY ABLATION Left 5/27/2020    Procedure: RADIOFREQUENCY ABLATION LEFT L3,4,5 1 of 2;  Surgeon: Marcel Adkins MD;  Location: [facility] PAIN MGT;  Service: Pain Management;  Laterality: Left;  Left RFA L3, 4, 5  1 of 2    RADIOFREQUENCY ABLATION Right 6/10/2020    Procedure: RADIOFREQUENCY ABLATION RIGHT L3,4,5;  Surgeon: Marcel Adkins MD;  Location: [facility] PAIN MGT;  Service: Pain Management;  Laterality: Right;  Right RFA L3,4.5  2 of 2    ROBOT-ASSISTED LAPAROSCOPIC PROSTATECTOMY USING DA SAÚL XI N/A 6/4/2019    Procedure: XI ROBOTIC PROSTATECTOMY;  Surgeon: Sergio Dixon MD;  Location: [facility] OR [address] [place] [address];  Service: Urology;  Laterality: N/A;  4hrs/ gen with regional     SPINE SURGERY       Review of patient's allergies indicates:  No Known Allergies     PMHx, PSHx, Allergies, Medications reviewed in epic      ROS negative except pain complaints in HPI    OBJECTIVE:    There were no vitals taken for this visit.    PHYSICAL EXAMINATION:    GENERAL: Well appearing, in no acute distress, alert and oriented x3.  PSYCH:  Mood and affect appropriate.  SKIN: Skin color, texture, turgor normal, no rashes or lesions.  CV: RRR with palpation of the radial artery.  PULM: No evidence of respiratory difficulty, symmetric chest rise. Clear to auscultation.  NEURO: Cranial nerves grossly intact.    Plan:    Proceed with procedure as planned    Marcel Adkins  08/31/2022

## 2022-08-31 NOTE — OP NOTE
Therapeutic Lumbar Medial Branch Radiofrequency Ablation under Fluoroscopy     The procedure, risks, benefits, and options were discussed with the patient. There are no contraindications to the procedure. The patent expressed understanding and agreed to the procedure. Informed written consent was obtained prior to the start of the procedure and can be found in the patient's chart.        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 Patient re-evaluation prior to administration of conscious sedation. No changes noted in patient's status from initial evaluation. The patient's vital signs were monitored by RN and patient remained hemodynamically stable throughout the procedure.    Event Time In   Sedation Start 1528   Sedation End 1537       ESTIMATED BLOOD LOSS:  None    COMPLICATIONS:  None     INTERVAL HISTORY: Patient has clinical and imaging findings suggestive of facet mediated pain. Patients has completed 2 previous diagnostic medial branch blocks at specified levels with at least 80% relief for the expected duration of the local anesthetic utilized.    TECHNIQUE: Time-out was performed to identify the patient and procedure to be performed. With the patient laying in a prone position, the surgical area was prepped and draped in the usual sterile fashion  using ChloraPrep and fenestrated drape. The levels were determined under fluoroscopic guidance. Skin anesthesia was achieved by injecting Lidocaine 2% over the injection sites. A 18 gauge 10mm curved active tip needle was introduced to the anatomic local of the medial branch at each of the above levels using AP, lateral and/or contralateral oblique fluoroscopic imaging. Then sensory and motor testing was performed to confirm that the needle tips were in the correct location. After negative aspiration for blood or CSF was confirmed, 1 mL of the lidocaine 2% listed above was injected slowly at each site. This was followed by thermal lesioning at 80 degrees celsius for 90 seconds. That was followed by slowly injecting 1 mL of the medication mixture listed above at each site. The needles were removed and bleeding was nil. A sterile dressing was applied. No specimens collected. The patient tolerated the procedure well and did not have any procedure related motor deficit at the conclusion of the procedure.    The patient was monitored after the procedure in the recovery area. They were given post-procedure and discharge instructions to follow at home. The patient was discharged in a stable condition.    I reviewed and edited the fellow's note. I conducted my own interview and physical examination. I agree with the findings. I was present and supervising all critical portions of the procedure.    Marcel Adkins MD

## 2022-08-31 NOTE — DISCHARGE SUMMARY
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  Qty: 90 tablet, Refills: 3    Associated Diagnoses: Hyperlipidemia, unspecified hyperlipidemia type      celecoxib (CELEBREX) 100 MG capsule Take 1 capsule (100 mg total) by mouth once daily.  Qty: 90 capsule, Refills: 3    Associated Diagnoses: Lumbosacral stenosis with neurogenic claudication      cholecalciferol, vitamin D3, (VITAMIN D3) 50 mcg (2,000 unit) Cap Take 1 capsule (2,000 Units total) by mouth once daily.  Qty: 90 capsule, Refills: 3    Associated Diagnoses: Vitamin D deficiency      ferrous sulfate 325 (65 FE) MG EC tablet Take 1 tablet (325 mg total) by mouth once daily.  Qty: 90 tablet, Refills: 3    Associated Diagnoses: Iron deficiency anemia due to chronic blood loss      gabapentin (NEURONTIN) 300 MG capsule TAKE 2 CAPSULES(600 MG) BY MOUTH THREE TIMES DAILY  Qty: 540 capsule, Refills: 3    Associated Diagnoses: Unspecified inflammatory spondylopathy, cervical region      meloxicam (MOBIC) 15 MG tablet UNKNOWN  Qty: 777 tablet, Refills: 0      multivitamin capsule Take 1 capsule by mouth once daily.      sertraline (ZOLOFT) 25 MG tablet Take 1 tablet (25 mg total) by mouth once daily.  Qty: 90 tablet, Refills: 3    Associated Diagnoses: Mild episode of recurrent major depressive disorder      traZODone (DESYREL) 50 MG tablet Take 3 tablets (150 mg total) by mouth nightly as needed for Insomnia.  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  Qty: 1 each, Refills: 3      VOLTAREN 1 % Gel UNKNOWN  Qty: 777 g, Refills: 0    Associated Diagnoses: Lumbosacral stenosis with neurogenic claudication      walker (ULTRA-LIGHT ROLLATOR) Misc 1 Units by Misc.(Non-Drug; Combo Route) route once daily at 6am.  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  Activity: as per instruction sheet.  Discharge to: Home with a responsible adult.  Follow up: 2-4 weeks       Please call my office or pager at [phone] if experienced any weakness or loss of sensation, fever > 101.5, pain uncontrolled with oral medications, persistent nausea/vomiting/or diarrhea, redness or drainage from the incisions, or any other worrisome concerns. If physician on call was not reached or could not communicate with our office for any reason please go to the nearest emergency department        Marcel Adkins MD

## 2022-09-14 ENCOUNTER — HOSPITAL ENCOUNTER (OUTPATIENT)
Facility: OTHER | Age: 72
Discharge: HOME | End: 2022-09-14
Attending: ANESTHESIOLOGY | Admitting: ANESTHESIOLOGY
Payer: MEDICARE

## 2022-09-14 VITALS
HEART RATE: 58 BPM | RESPIRATION RATE: 14 BRPM | WEIGHT: 175 LBS | OXYGEN SATURATION: 100 % | BODY MASS INDEX: 27.47 KG/M2 | SYSTOLIC BLOOD PRESSURE: 182 MMHG | HEIGHT: 67 IN | DIASTOLIC BLOOD PRESSURE: 86 MMHG | TEMPERATURE: 98 F

## 2022-09-14 DIAGNOSIS — M47.816 OSTEOARTHRITIS OF LUMBAR SPINE, UNSPECIFIED SPINAL OSTEOARTHRITIS COMPLICATION STATUS: Primary | ICD-10-CM

## 2022-09-14 DIAGNOSIS — G89.29 CHRONIC PAIN: ICD-10-CM

## 2022-09-14 DIAGNOSIS — M47.816 SPONDYLOSIS OF LUMBAR REGION WITHOUT MYELOPATHY OR RADICULOPATHY: ICD-10-CM

## 2022-09-14 PROCEDURE — 64635 DESTROY LUMB/SAC FACET JNT: CPT | Mod: LT | Performed by: ANESTHESIOLOGY

## 2022-09-14 PROCEDURE — 25000003 PHARM REV CODE 250: Performed by: ANESTHESIOLOGY

## 2022-09-14 PROCEDURE — 99152 MOD SED SAME PHYS/QHP 5/>YRS: CPT | Performed by: ANESTHESIOLOGY

## 2022-09-14 PROCEDURE — 64636 DESTROY L/S FACET JNT ADDL: CPT | Mod: LT,,, | Performed by: ANESTHESIOLOGY

## 2022-09-14 PROCEDURE — 64635 PR DESTROY LUMB/SAC FACET JNT: ICD-10-PCS | Mod: LT,,, | Performed by: ANESTHESIOLOGY

## 2022-09-14 PROCEDURE — 63600175 PHARM REV CODE 636 W HCPCS: Performed by: ANESTHESIOLOGY

## 2022-09-14 PROCEDURE — 99152 MOD SED SAME PHYS/QHP 5/>YRS: CPT | Mod: ,,, | Performed by: ANESTHESIOLOGY

## 2022-09-14 PROCEDURE — 64636 PR DESTROY L/S FACET JNT ADDL: ICD-10-PCS | Mod: LT,,, | Performed by: ANESTHESIOLOGY

## 2022-09-14 PROCEDURE — 25000003 PHARM REV CODE 250: Performed by: STUDENT IN AN ORGANIZED HEALTH CARE EDUCATION/TRAINING PROGRAM

## 2022-09-14 PROCEDURE — 99152 PR MOD CONSCIOUS SEDATION, SAME PHYS, 5+ YRS, FIRST 15 MIN: ICD-10-PCS | Mod: ,,, | Performed by: ANESTHESIOLOGY

## 2022-09-14 PROCEDURE — 64635 DESTROY LUMB/SAC FACET JNT: CPT | Mod: LT,,, | Performed by: ANESTHESIOLOGY

## 2022-09-14 PROCEDURE — 64636 DESTROY L/S FACET JNT ADDL: CPT | Mod: LT | Performed by: ANESTHESIOLOGY

## 2022-09-14 RX ORDER — BUPIVACAINE HYDROCHLORIDE 2.5 MG/ML
INJECTION, SOLUTION EPIDURAL; INFILTRATION; INTRACAUDAL
Status: DISCONTINUED | OUTPATIENT
Start: 2022-09-14 | End: 2022-09-14 | Stop reason: HOSPADM

## 2022-09-14 RX ORDER — LIDOCAINE HYDROCHLORIDE 20 MG/ML
INJECTION, SOLUTION INFILTRATION; PERINEURAL
Status: DISCONTINUED | OUTPATIENT
Start: 2022-09-14 | End: 2022-09-14 | Stop reason: HOSPADM

## 2022-09-14 RX ORDER — DEXAMETHASONE SODIUM PHOSPHATE 10 MG/ML
INJECTION INTRAMUSCULAR; INTRAVENOUS
Status: DISCONTINUED | OUTPATIENT
Start: 2022-09-14 | End: 2022-09-14 | Stop reason: HOSPADM

## 2022-09-14 RX ORDER — FENTANYL CITRATE 50 UG/ML
INJECTION, SOLUTION INTRAMUSCULAR; INTRAVENOUS
Status: DISCONTINUED | OUTPATIENT
Start: 2022-09-14 | End: 2022-09-14 | Stop reason: HOSPADM

## 2022-09-14 RX ORDER — MIDAZOLAM HYDROCHLORIDE 1 MG/ML
INJECTION INTRAMUSCULAR; INTRAVENOUS
Status: DISCONTINUED | OUTPATIENT
Start: 2022-09-14 | End: 2022-09-14 | Stop reason: HOSPADM

## 2022-09-14 RX ORDER — SODIUM CHLORIDE 9 MG/ML
500 INJECTION, SOLUTION INTRAVENOUS CONTINUOUS
Status: DISCONTINUED | OUTPATIENT
Start: 2022-09-14 | End: 2022-09-14 | Stop reason: HOSPADM

## 2022-09-14 NOTE — OP NOTE
Therapeutic Lumbar Medial Branch Radiofrequency Ablation under Fluoroscopy     The procedure, risks, benefits, and options were discussed with the patient. There are no contraindications to the procedure. The patent expressed understanding and agreed to the procedure. Informed written consent was obtained prior to the start of the procedure and can be found in the patient's chart.        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 Patient re-evaluation prior to administration of conscious sedation. No changes noted in patient's status from initial evaluation. The patient's vital signs were monitored by RN and patient remained hemodynamically stable throughout the procedure.    Event Time In   Sedation Start 1046   Sedation End 1109       ESTIMATED BLOOD LOSS:  None    COMPLICATIONS:  None     INTERVAL HISTORY: Patient has clinical and imaging findings suggestive of recurrent facet mediated pain. Patient has undergone a previous RFA at specified levels with at least 50% relief for at least 6 months. Successful diagnostic medial branch blocks have been completed within the past 2 years.    TECHNIQUE: Time-out was performed to identify the patient and procedure to be performed. With the patient laying in a prone position, the surgical area was prepped and draped  in the usual sterile fashion using ChloraPrep and fenestrated drape. The levels were determined under fluoroscopic guidance. Skin anesthesia was achieved by injecting Lidocaine 2% over the injection sites. A 18 gauge 10mm curved active tip needle was introduced to the anatomic local of the medial branch at each of the above levels using AP, lateral and/or contralateral oblique fluoroscopic imaging. Then sensory and motor testing was performed to confirm that the needle tips were in the correct location. After negative aspiration for blood or CSF was confirmed, 1 mL of the lidocaine 2% listed above was injected slowly at each site. This was followed by thermal lesioning at 80 degrees celsius for 90 seconds. That was followed by slowly injecting 1 mL of the medication mixture listed above at each site. The needles were removed and bleeding was nil. A sterile dressing was applied. No specimens collected. The patient tolerated the procedure well and did not have any procedure related motor deficit at the conclusion of the procedure.    The patient was monitored after the procedure in the recovery area. They were given post-procedure and discharge instructions to follow at home. The patient was discharged in a stable condition.    I reviewed and edited the fellow's note. I conducted my own interview and physical examination. I agree with the findings. I was present and supervising all critical portions of the procedure.    Marcel Adkins MD

## 2022-09-14 NOTE — DISCHARGE SUMMARY
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  Qty: 90 tablet, Refills: 3    Associated Diagnoses: Hyperlipidemia, unspecified hyperlipidemia type      celecoxib (CELEBREX) 100 MG capsule Take 1 capsule (100 mg total) by mouth once daily.  Qty: 90 capsule, Refills: 3    Associated Diagnoses: Lumbosacral stenosis with neurogenic claudication      cholecalciferol, vitamin D3, (VITAMIN D3) 50 mcg (2,000 unit) Cap Take 1 capsule (2,000 Units total) by mouth once daily.  Qty: 90 capsule, Refills: 3    Associated Diagnoses: Vitamin D deficiency      ferrous sulfate 325 (65 FE) MG EC tablet Take 1 tablet (325 mg total) by mouth once daily.  Qty: 90 tablet, Refills: 3    Associated Diagnoses: Iron deficiency anemia due to chronic blood loss      gabapentin (NEURONTIN) 300 MG capsule TAKE 2 CAPSULES(600 MG) BY MOUTH THREE TIMES DAILY  Qty: 540 capsule, Refills: 3    Associated Diagnoses: Unspecified inflammatory spondylopathy, cervical region      meloxicam (MOBIC) 15 MG tablet UNKNOWN  Qty: 777 tablet, Refills: 0      multivitamin capsule Take 1 capsule by mouth once daily.      sertraline (ZOLOFT) 25 MG tablet Take 1 tablet (25 mg total) by mouth once daily.  Qty: 90 tablet, Refills: 3    Associated Diagnoses: Mild episode of recurrent major depressive disorder      traZODone (DESYREL) 50 MG tablet Take 3 tablets (150 mg total) by mouth nightly as needed for Insomnia.  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  Qty: 1 each, Refills: 3      VOLTAREN 1 % Gel UNKNOWN  Qty: 777 g, Refills: 0    Associated Diagnoses: Lumbosacral stenosis with neurogenic claudication      walker (ULTRA-LIGHT ROLLATOR) Misc 1 Units by Misc.(Non-Drug; Combo Route) route once daily at 6am.  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  Activity: as per instruction sheet.  Discharge to: Home with a responsible adult.  Follow up: 2-4 weeks       Please call my office or pager at [phone] if experienced any weakness or loss of sensation, fever > 101.5, pain uncontrolled with oral medications, persistent nausea/vomiting/or diarrhea, redness or drainage from the incisions, or any other worrisome concerns. If physician on call was not reached or could not communicate with our office for any reason please go to the nearest emergency department

## 2022-09-14 NOTE — DISCHARGE INSTRUCTIONS

## 2022-09-30 ENCOUNTER — OFFICE VISIT (OUTPATIENT)
Dept: SPORTS MEDICINE | Facility: CLINIC | Age: 72
End: 2022-09-30
Payer: MEDICARE

## 2022-09-30 VITALS
BODY MASS INDEX: 27.47 KG/M2 | DIASTOLIC BLOOD PRESSURE: 87 MMHG | HEIGHT: 67 IN | SYSTOLIC BLOOD PRESSURE: 149 MMHG | WEIGHT: 175 LBS

## 2022-09-30 DIAGNOSIS — M12.812 ROTATOR CUFF TEAR ARTHROPATHY, LEFT: Primary | ICD-10-CM

## 2022-09-30 DIAGNOSIS — M75.22 BICEPS TENDINITIS OF LEFT UPPER EXTREMITY: ICD-10-CM

## 2022-09-30 DIAGNOSIS — M75.102 NONTRAUMATIC TEAR OF LEFT ROTATOR CUFF, UNSPECIFIED TEAR EXTENT: Primary | ICD-10-CM

## 2022-09-30 DIAGNOSIS — S42.252A CLOSED DISPLACED FRACTURE OF GREATER TUBEROSITY OF LEFT HUMERUS, INITIAL ENCOUNTER: ICD-10-CM

## 2022-09-30 DIAGNOSIS — S43.432A SUPERIOR GLENOID LABRUM LESION OF LEFT SHOULDER, INITIAL ENCOUNTER: ICD-10-CM

## 2022-09-30 DIAGNOSIS — M75.102 ROTATOR CUFF TEAR ARTHROPATHY, LEFT: Primary | ICD-10-CM

## 2022-09-30 PROCEDURE — 1125F PR PAIN SEVERITY QUANTIFIED, PAIN PRESENT: ICD-10-PCS | Mod: CPTII,S$GLB,, | Performed by: ORTHOPAEDIC SURGERY

## 2022-09-30 PROCEDURE — 3288F PR FALLS RISK ASSESSMENT DOCUMENTED: ICD-10-PCS | Mod: CPTII,S$GLB,, | Performed by: ORTHOPAEDIC SURGERY

## 2022-09-30 PROCEDURE — 3008F BODY MASS INDEX DOCD: CPT | Mod: CPTII,S$GLB,, | Performed by: ORTHOPAEDIC SURGERY

## 2022-09-30 PROCEDURE — 99999 PR PBB SHADOW E&M-EST. PATIENT-LVL III: CPT | Mod: PBBFAC,,, | Performed by: ORTHOPAEDIC SURGERY

## 2022-09-30 PROCEDURE — 3008F PR BODY MASS INDEX (BMI) DOCUMENTED: ICD-10-PCS | Mod: CPTII,S$GLB,, | Performed by: ORTHOPAEDIC SURGERY

## 2022-09-30 PROCEDURE — 1101F PR PT FALLS ASSESS DOC 0-1 FALLS W/OUT INJ PAST YR: ICD-10-PCS | Mod: CPTII,S$GLB,, | Performed by: ORTHOPAEDIC SURGERY

## 2022-09-30 PROCEDURE — 3077F PR MOST RECENT SYSTOLIC BLOOD PRESSURE >= 140 MM HG: ICD-10-PCS | Mod: CPTII,S$GLB,, | Performed by: ORTHOPAEDIC SURGERY

## 2022-09-30 PROCEDURE — 3288F FALL RISK ASSESSMENT DOCD: CPT | Mod: CPTII,S$GLB,, | Performed by: ORTHOPAEDIC SURGERY

## 2022-09-30 PROCEDURE — 1125F AMNT PAIN NOTED PAIN PRSNT: CPT | Mod: CPTII,S$GLB,, | Performed by: ORTHOPAEDIC SURGERY

## 2022-09-30 PROCEDURE — 99214 OFFICE O/P EST MOD 30 MIN: CPT | Mod: S$GLB,,, | Performed by: ORTHOPAEDIC SURGERY

## 2022-09-30 PROCEDURE — 1159F PR MEDICATION LIST DOCUMENTED IN MEDICAL RECORD: ICD-10-PCS | Mod: CPTII,S$GLB,, | Performed by: ORTHOPAEDIC SURGERY

## 2022-09-30 PROCEDURE — 3079F DIAST BP 80-89 MM HG: CPT | Mod: CPTII,S$GLB,, | Performed by: ORTHOPAEDIC SURGERY

## 2022-09-30 PROCEDURE — 99999 PR PBB SHADOW E&M-EST. PATIENT-LVL III: ICD-10-PCS | Mod: PBBFAC,,, | Performed by: ORTHOPAEDIC SURGERY

## 2022-09-30 PROCEDURE — 3077F SYST BP >= 140 MM HG: CPT | Mod: CPTII,S$GLB,, | Performed by: ORTHOPAEDIC SURGERY

## 2022-09-30 PROCEDURE — 99214 PR OFFICE/OUTPT VISIT, EST, LEVL IV, 30-39 MIN: ICD-10-PCS | Mod: S$GLB,,, | Performed by: ORTHOPAEDIC SURGERY

## 2022-09-30 PROCEDURE — 3044F HG A1C LEVEL LT 7.0%: CPT | Mod: CPTII,S$GLB,, | Performed by: ORTHOPAEDIC SURGERY

## 2022-09-30 PROCEDURE — 3079F PR MOST RECENT DIASTOLIC BLOOD PRESSURE 80-89 MM HG: ICD-10-PCS | Mod: CPTII,S$GLB,, | Performed by: ORTHOPAEDIC SURGERY

## 2022-09-30 PROCEDURE — 1159F MED LIST DOCD IN RCRD: CPT | Mod: CPTII,S$GLB,, | Performed by: ORTHOPAEDIC SURGERY

## 2022-09-30 PROCEDURE — 3044F PR MOST RECENT HEMOGLOBIN A1C LEVEL <7.0%: ICD-10-PCS | Mod: CPTII,S$GLB,, | Performed by: ORTHOPAEDIC SURGERY

## 2022-09-30 PROCEDURE — 1101F PT FALLS ASSESS-DOCD LE1/YR: CPT | Mod: CPTII,S$GLB,, | Performed by: ORTHOPAEDIC SURGERY

## 2022-09-30 NOTE — PROGRESS NOTES
CC: left Shoulder pain. Professional . Referred by Dr. Mandel    71 y.o. Male , left hand dominant, reports 3 months of left shoulder pain after a fall.    He reports weakness and pain in left shoulder.  Unable to play piano to full extent due to injury.    Reports that the pain is severe and not responding to any conservative care.      Pain 3/10 currently    Failed physician directed therapy x 12 weeks  Failed NSAIDs x 6 weeks  Failed RICE x 6 weeks    SANE: 30    PAST MEDICAL HISTORY:   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    Overview:  Right side dx update     PAST SURGICAL HISTORY:   Past Surgical History:   Procedure Laterality Date    BACK SURGERY      EPIDURAL STEROID INJECTION N/A 2/6/2019    Procedure: INJECTION, STEROID, EPIDURAL, L45-S1 IL;  Surgeon: Marcel Adkins MD;  Location: [facility] PAIN MGT;  Service: Pain Management;  Laterality: N/A;    EPIDURAL STEROID INJECTION N/A 4/10/2019    Procedure: Injection, Steroid, Epidural LUMBAR/CAUDAL L5-S1 INTERLAMINAR KAYLEY;  Surgeon: Marcel Adkins MD;  Location: [facility] PAIN MGT;  Service: Pain Management;  Laterality: N/A;  NEEDS CONSENT    INJECTION OF FACET JOINT Bilateral 8/14/2019    Procedure: INJECTION, FACET JOINT INJECTION (LUMBAR BLOCK) BILATERAL L4-5 AND L5-S1;  Surgeon: Marcel Adkins MD;  Location: [facility] PAIN MGT;  Service: Pain Management;  Laterality: Bilateral;  NEEDS CONSENT    INJECTION OF FACET JOINT Bilateral 10/16/2019    Procedure: FACET JOINT INJECTION (LUMBAR BLOCK) BILATERAL L4-5 AND L5-S1;  Surgeon: Marcel Adkins MD;  Location: [facility] PAIN T;  Service: Pain Management;  Laterality: Bilateral;  NEEDS  CONSENT    INJECTION OF JOINT Bilateral 11/4/2020    Procedure: INJECTION, JOINT, GLENOHUMERAL;  Surgeon: Marcel Adkins MD;  Location: [facility] PAIN MGT;  Service: Pain Management;  Laterality: Bilateral;    INJECTION OF JOINT Bilateral 12/16/2020    Procedure: INJECTION, JOINT, SACROILIAC (SI) need consent;  Surgeon: Marcel Adkins MD;  Location: [facility] PAIN MGT;  Service: Pain Management;  Laterality: Bilateral;    INJECTION OF JOINT Bilateral 4/7/2021    Procedure: INJECTION, JOINT, SACROILIAC (SI);  Surgeon: Marcel Adkins MD;  Location: [facility] PAIN MGT;  Service: Pain Management;  Laterality: Bilateral;    INJECTION OF JOINT Bilateral 6/2/2021    Procedure: INJECTION, JOINT, GLENOHUMERAL;  Surgeon: Marcel Adkins MD;  Location: [facility] PAIN MGT;  Service: Pain Management;  Laterality: Bilateral;    Larangoscopy      RADIOFREQUENCY ABLATION Left 5/27/2020    Procedure: RADIOFREQUENCY ABLATION LEFT L3,4,5 1 of 2;  Surgeon: Marcel Adkins MD;  Location: [facility] PAIN MGT;  Service: Pain Management;  Laterality: Left;  Left RFA L3, 4, 5  1 of 2    RADIOFREQUENCY ABLATION Right 6/10/2020    Procedure: RADIOFREQUENCY ABLATION RIGHT L3,4,5;  Surgeon: Marcel Adkins MD;  Location: [facility] PAIN MGT;  Service: Pain Management;  Laterality: Right;  Right RFA L3,4.5  2 of 2    RADIOFREQUENCY ABLATION Right 8/31/2022    Procedure: RADIOFREQUENCY ABLATION RIGHT L3, 4, 5 ONE OF TWO;  Surgeon: Marcel Adkins MD;  Location: [facility] PAIN MGT;  Service: Pain Management;  Laterality: Right;    RADIOFREQUENCY ABLATION Left 9/14/2022    Procedure: RADIOFREQUENCY ABLATION LEFT L3, 4, 5 TWO OF TWO;  Surgeon: Marcel Adkins MD;  Location: [facility] PAIN MGT;  Service: Pain Management;  Laterality: Left;    ROBOT-ASSISTED LAPAROSCOPIC PROSTATECTOMY USING DA SAÚL XI N/A 6/4/2019    Procedure: XI ROBOTIC PROSTATECTOMY;  Surgeon: Sergio Dixon MD;  Location: [facility] OR 2ND FLR;  Service: Urology;  Laterality: N/A;  4hrs/ gen with  regional     SPINE SURGERY       FAMILY HISTORY:   Family History   Problem Relation Age of Onset    Cancer Mother         Pancreatic    No Known Problems Father     Keratoconus Brother     Cancer Brother      SOCIAL HISTORY:   Social History     Socioeconomic History    Marital status: Single    Number of children: 0   Tobacco Use    Smoking status: Former     Packs/day: 1.00     Types: Cigarettes     Quit date: 2000     Years since quittin.6    Smokeless tobacco: Former   Substance and Sexual Activity    Alcohol use: Not Currently    Drug use: No    Sexual activity: Not Currently   Social History Narrative    No difficulty reading Rx labels      MEDICATIONS:   Current Outpatient Medications:     atorvastatin (LIPITOR) 10 MG tablet, Take 1 tablet (10 mg total) by mouth once daily., Disp: 90 tablet, Rfl: 3    celecoxib (CELEBREX) 100 MG capsule, Take 1 capsule (100 mg total) by mouth once daily., Disp: 90 capsule, Rfl: 3    cholecalciferol, vitamin D3, (VITAMIN D3) 50 mcg (2,000 unit) Cap, Take 1 capsule (2,000 Units total) by mouth once daily., Disp: 90 capsule, Rfl: 3    ferrous sulfate 325 (65 FE) MG EC tablet, Take 1 tablet (325 mg total) by mouth once daily., Disp: 90 tablet, Rfl: 3    gabapentin (NEURONTIN) 300 MG capsule, TAKE 2 CAPSULES(600 MG) BY MOUTH THREE TIMES DAILY, Disp: 540 capsule, Rfl: 3    meloxicam (MOBIC) 15 MG tablet, UNKNOWN, Disp: 777 tablet, Rfl: 0    multivitamin capsule, Take 1 capsule by mouth once daily., Disp: , Rfl:     traZODone (DESYREL) 50 MG tablet, Take 3 tablets (150 mg total) by mouth nightly as needed for Insomnia., Disp: 270 tablet, Rfl: 3    triamcinolone acetonide 0.1% (KENALOG) 0.1 % cream, Apply 15 g (15,000 mg total) topically 2 (two) times daily as needed (for cuts)., Disp: 1 each, Rfl: 3    VOLTAREN 1 % Gel, UNKNOWN, Disp: 777 g, Rfl: 0    walker (ULTRA-LIGHT ROLLATOR) Misc, 1 Units by Misc.(Non-Drug; Combo Route) route once daily at 6am., Disp: 1 each, Rfl: 0    " sertraline (ZOLOFT) 25 MG tablet, Take 1 tablet (25 mg total) by mouth once daily., Disp: 90 tablet, Rfl: 3  No current facility-administered medications for this visit.    Facility-Administered Medications Ordered in Other Visits:     0.9%  NaCl infusion, , Intravenous, Continuous, Altaf Santana MD  ALLERGIES: Review of patient's allergies indicates:  No Known Allergies  VITAL SIGNS: BP (!) 149/87   Ht 5' 7" (1.702 m)   Wt 79.4 kg (175 lb)   BMI 27.41 kg/m²     Review of Systems   Constitution: Negative. Negative for chills, fever and night sweats.   HENT: Negative for congestion and headaches.    Eyes: Negative for blurred vision, left vision loss and right vision loss.   Cardiovascular: Negative for chest pain and syncope.   Respiratory: Negative for cough and shortness of breath.    Endocrine: Negative for polydipsia, polyphagia and polyuria.   Hematologic/Lymphatic: Negative for bleeding problem. Does not bruise/bleed easily.   Skin: Negative for dry skin, itching and rash.   Musculoskeletal: Negative for falls and muscle weakness.   Gastrointestinal: Negative for abdominal pain and bowel incontinence.   Genitourinary: Negative for bladder incontinence and nocturia.   Neurological: Negative for disturbances in coordination, loss of balance and seizures.   Psychiatric/Behavioral: Negative for depression. The patient does not have insomnia.    Allergic/Immunologic: Negative for hives and persistent infections.       PHYSICAL EXAMINATION:  General:  The patient is alert and oriented x 3.  Mood is pleasant.  Observation of ears, eyes and nose reveal no gross abnormalities.  HEENT: NCAT, sclera nonicteric  Lungs: Respirations are equal and unlabored.  Gait is coordinated. Patient can toe walk and heel walk without difficulty.  Cardiovascular: There are no swelling or varicosities present.   Lymphatic: Negative for adenopathy       left Shoulder / Upper Extremity Exam    OBSERVATION:  "    Swelling  none  Deformity  none   Discoloration  none   Scapular winging none   Scars   none  Atrophy  none    TENDERNESS / CREPITUS (T/C):          T/C      T/C   Clavicle   -/-  SUPRAspinatus    -/-     AC Jt.    -/-  INFRAspinatus  -/-     SC Jt.    -/-  Deltoid    -/-     G. Tuberosity  -/-  LH BICEP groove  +/-   Acromion:  -/-  Midline Neck   -/-     Scapular Spine -/-  Trapezium   -/-   SMA Scapula  -/-  GH jt. line - post  -/-     Scapulothoracic  -/-         ROM: (* = with pain)  Right shoulder   Left shoulder        AROM (PROM)   AROM (PROM)   FE    170° (175°)     90° (175°)     ER at 0°    60°  (65°)    -20°  (45°)   ER at 90° ABD  90°  (90°)    NA   IR at 90°  ABD   NA  (40°)     NA  (40°)      IR (spine level)   T10     L3    STRENGTH: (* = with pain) RIGHT SHOULDER  LEFT SHOULDER   SCAPTION at 0   5/5    4-/5    SCAPTION at 30   5/5    4/5   IR    5/5    5/5   ER    5/5    3/5   BICEPS   5/5    5/5   Deltoid    5/5    5/5     SIGNS:  Painful side       NEER   +   OVINNIES  +     MEADOWS   +   SPEEDS  +     DROP ARM   neg   BELLY PRESS +   Superior escape none    LIFT-OFF  neg   X-Body ADD    neg    MOVING VALGUS Neg        STABILITY TESTING    RIGHT SHOULDER   LEFT SHOULDER     Translation     Anterior  up face     up face     Posterior  up face    up face    Sulcus   < 10mm    < 10 mm     Signs    Apprehension   neg      neg      Relocation   no change     no change     Jerk test  neg     neg    EXTREMITY NEURO-VASCULAR EXAM    Sensation grossly intact to light touch all dermatomal regions.    DTR 2+ Biceps, Triceps, BR and Negative Vineets sign   Grossly intact motor function at Elbow, Wrist and Hand   Distal pulses radial and ulnar 2+, brisk cap refill, symmetric.      NECK:  Painless FROM and spinous processes non-tender. Negative Spurlings sign.      OTHER FINDINGS:  +scapular dyskinesia    XRAYS:  Shoulder trauma series left,  were reviewed and interpreted by me. No convincing  fracture or dislocation is noted. The osseous structures appear well mineralized and well aligned, possible insufficiency fracture greater tuberosity/cystic changes, no degenerative changes    left   1. Shoulder pain,possible RTC tear    2.  Possible insufficiency fracture greater tuberosity    MRI Left shoulder reviewed and interpreted personally by me: Full thickness supraspinatus tear with atrophy and retraction to glenoid. SLAP, biceps tendinitis, + insufficiency fracture greater tuberosity      ASSESSMENT:    Left Shoulder Rotator Cuff Tear, SLAP, biceps tendonitis.      he would benefit from an arthroscopy, given the above.       PLAN:   Left Shoulder rotator cuff tear     All questions were answered, pt will contact us for questions or concerns in the interim.  Had thorough discussion of non-operative vs operative intervention, and risks and benefits of both.     We have discussed the surgery and recovery of arthroscopic shoulder surgery. he understands that there may be limited mobility up to several weeks after surgery depending on procedures that are performed at the time of surgery.    The spectrum of treatment options were discussed with the patient, including nonoperative and operative options.  After thorough discussion, the patient has elected to undergo surgical treatment to include:    LEFT  a. Shoulder arthroscopic tuberoplasty              b. Shoulder arthroscopic SAD              c. Shoulder arthroscopic extensive debridement              d. Shoulder arthroscopic biceps tenodesis (vs. subpect tenodesis)              e. Shoulder arthroscopic possible microfracture              f.  Shoulder open reduction internal fixation greater tuberosity     The details of the surgical procedure were explained, including the location of probable incisions and a description of likely hardware and/or grafts to be used.  The patient understands the likely convalescence after surgery.  Also, we have thoroughly  discussed the risks, benefits and alternatives to surgery, including, but not limited to, the risk of infection, joint stiffness, blood clot (including DVT and/or pulmonary embolus), neurologic and vascular injury.  It was explained that, if tissue has been repaired or reconstructed, there is a chance of failure, which may require further management.  Consent form for surgery is signed and in chart

## 2022-10-04 ENCOUNTER — TELEPHONE (OUTPATIENT)
Dept: PRIMARY CARE CLINIC | Facility: CLINIC | Age: 72
End: 2022-10-04
Payer: MEDICARE

## 2022-10-04 NOTE — TELEPHONE ENCOUNTER
----- Message from Shanell Hurley sent at 9/30/2022 10:13 AM CDT -----  Regarding: Pt advice  Contact: pt  Type:  Needs Medical Advice    Who Called:  Pt  Would the patient rather a call back or a response via [facility]? CHAR  Best Call Back Number:[phone]   Additional Information:  Pt states that he's having Surgery on Left Shoulder  on 10/11, Medical Clearance is needed

## 2022-10-07 ENCOUNTER — TELEPHONE (OUTPATIENT)
Dept: SPORTS MEDICINE | Facility: CLINIC | Age: 72
End: 2022-10-07
Payer: MEDICARE

## 2022-10-07 NOTE — TELEPHONE ENCOUNTER
I called and spoke to Mr. Miguel and informed him that he needs to obtain his clearance from his PCP asap or we will not be able to proceed with his surgery on 10/11/22. He stated he understood.Patient to reach out to his PCP.

## 2022-10-07 NOTE — TELEPHONE ENCOUNTER
I called the patient in regards to his PCP clearance for surgery scheduled on 10/11/22. The patient did not answer. Left a detailed message stating that without the PCP clearance will not be able to proceed with surgery.

## 2022-10-11 ENCOUNTER — TELEPHONE (OUTPATIENT)
Dept: SPORTS MEDICINE | Facility: CLINIC | Age: 72
End: 2022-10-11
Payer: MEDICARE

## 2022-10-11 NOTE — TELEPHONE ENCOUNTER
I spoke to  Lamont and advised him that we had to cancel his surgery scheduled for today because of not being able to contact him.

## 2022-10-12 ENCOUNTER — TELEPHONE (OUTPATIENT)
Dept: PRIMARY CARE CLINIC | Facility: CLINIC | Age: 72
End: 2022-10-12
Payer: MEDICARE

## 2022-10-12 ENCOUNTER — TELEPHONE (OUTPATIENT)
Dept: PAIN MEDICINE | Facility: CLINIC | Age: 72
End: 2022-10-12
Payer: MEDICARE

## 2022-10-12 NOTE — TELEPHONE ENCOUNTER
----- Message from Santy Marrero MA sent at 10/10/2022  9:08 AM CDT -----  Contact: [phone]  Patient needs a medical clearance for surgery. Please call and advise

## 2022-10-12 NOTE — TELEPHONE ENCOUNTER
"This message is for patient in regards to his/her appointment 10/13/22 at 9:45a       [facility] Policy: For the safety of all patients and staff members.     Patient Visitor policy: Due to social distancing and limited seating staff are requesting patient to arrive to their schedule appointments on time.     Upon arriving to facility; patients are required to wear a face mask, if patient do not have a face mask one will be provided. Upon arriving patient we ask patients to contact clinic at this number [phone] to notify staff that they have arrived or they may do so by utilizing the Mobile checked in Za(if patient have patient portal; clinical staff will send a message through there letting them know it's okay to proceed to their visit). Staff will give the patient the "okay" to come up or wait inside their vehicle until clinic is ready for patient to be seen by Dr. Marcel Adkins MD. If you have any questions or concerns please contact [phone]    Patient verbalized and confirmed appt  "

## 2022-10-13 ENCOUNTER — TELEPHONE (OUTPATIENT)
Dept: SPORTS MEDICINE | Facility: CLINIC | Age: 72
End: 2022-10-13
Payer: MEDICARE

## 2022-10-13 NOTE — TELEPHONE ENCOUNTER
I called the patient to inform him that we have not received any correspondence from his PCP regaring clearance.PATIENT DIDN'T ANSWER

## 2022-10-13 NOTE — TELEPHONE ENCOUNTER
----- Message from Hortensia Hong, Patient Care Assistant sent at 10/13/2022 10:15 AM CDT -----  Regarding: Patient Call Back  Contact: Pt  Pt is requesting a call back in regards to information on procedure. Pt would like to know if physician received clearance for his procedure from his primary care physician.      Pt @ [phone]

## 2022-10-14 ENCOUNTER — TELEPHONE (OUTPATIENT)
Dept: PRIMARY CARE CLINIC | Facility: CLINIC | Age: 72
End: 2022-10-14
Payer: MEDICARE

## 2022-10-14 NOTE — TELEPHONE ENCOUNTER
Spoke to pt and advised that we can schedule his pre-op surgery clearance once he has his new surgery date. Pt stated that he will call the surgeon's office and have them to fax over the surgical clearance request form. Fax number was given to pt.

## 2022-10-14 NOTE — TELEPHONE ENCOUNTER
----- Message from Livestefan Jacobs sent at 10/13/2022 10:03 AM CDT -----  Contact: pt  Type: Requesting to speak with nurse        Who Called: PT  Regarding: clearance for shoulder operation for [facility] needed. Please call PT   Would the patient rather a call back or a response via [facility]? Call back  Best Call Back Number: [phone]  Additional Information:

## 2022-11-02 NOTE — PLAN OF CARE
Problem: Physical Therapy Goal  Goal: Physical Therapy Goal  Goals to be met by: 9 days     Patient will increase functional independence with mobility by performin. Supine to sit with Modified Frio.  2. Sit to supine with Modified Frio.  3. Sit to stand transfer with Supervision.  4. Bed to chair transfer with Supervision using Rolling Walker.  5. Gait  x 150 feet with Supervision using Rolling Walker.   6. Ascend/descend 5 stair with left Handrails Stand-by Assistance.   7. Stand for 3 minutes with Stand-by Assistance using Rolling Walker while performing dynamic standing balance activity. MET 7/3/2017  8. Lower extremity exercise program x20 reps per handout, with assistance as needed.  9. Pt will decreased TUG time to <25s w/ RW in order to decrease fall risk.      Outcome: Ongoing (interventions implemented as appropriate)  Goals remain appropriate       [facility]  Progress Note - Lethea Show 2006, 15 y o  male MRN: [id number]  Unit/Bed#: ICU 10 Encounter: [id number]  Primary Care Provider: Dora Beltrán MD   Date and time admitted to hospital: 11/1/2022 10:41 AM    * Epidural hematoma  Assessment & Plan  Right temporal epidural hematoma  · S/p fall 10-20 ft from tree stand  · Multiple facial fractures including temporal bone  · On exam, GCS 15  Complaining of HA  Imaging:  · CT head wo, 11/1/2022: new focus of extra axial hemorrhage adjacent to the previously seen hemorrhage in the right anterior temporal lobe  Previously seen hemorrhage is stable  Small foci of extra-axial hemorrhage of the right frontal convexity stable compared to prior  Plan:  · Continue to monitor neuro exam closely  · Q1h neuro checks  · STAT CT head with decline in GCS > 2 pts in 1 hour  · OMFS- non operative facial fractures  Unasyn while inpatient with transition to augmentin when discharged for 7 days  OP f/u   · Ophthalmology- R orbital fracture with outpatient follow up after discharge  · ENT- No EAC complication or involvement  No ototopic drops needed  Outpatient follow up if symptoms persistent  · Keppra per trauma team   · Plan for repeat Ct head this morning to establish stability of epidural hematomas   · New hemorrhage on CT head was present prior, but small with less intensity on CT imaging  · Mobilize with PT/OT  · DVT ppx: SCDs, Hold pharmacologic DVT ppx  Neurosurgery will continue to follow  Please call with questions or concerns  Fall from [address] [place] [address]  · See above  Subjective/Objective   Chief Complaint: Headache     Subjective: Patient laying in bed in NAD  Resting comfortably upon entering the room  Complains of headache across the front of his head  Bruising and swelling around the right eye  Discussed plan with mother at bedside       Objective: laying in bed in NAD    I/O       10/31 0701 11/01 0700 11/01 0701 11/02 0700 11/02 0701 11/03 0700    I V  (mL/kg)  1723 3 (23 7)     IV Piggyback  200     Total Intake(mL/kg)  1923 3 (26 5)     Urine (mL/kg/hr)  1575     Total Output  1575     Net  +348  3                  Invasive Devices  Report    Peripheral Intravenous Line  Duration           Peripheral IV 11/01/22 Left Antecubital <1 day    Peripheral IV 11/01/22 Right Forearm <1 day                Physical Exam:  Vitals: Blood pressure (!) 108/55, pulse (!) 51, temperature 98 2 °F (36 8 °C), temperature source Axillary, resp  rate 13, height 6' 4" (1 93 m), weight 72 7 kg (160 lb 4 4 oz), SpO2 99 %  ,Body mass index is 19 51 kg/m²  General appearance: alert, appears stated age, cooperative and no distress  Head: Normocephalic, without obvious abnormality  Eyes: PERRL  Periorbital ecchymosis around the R eye  Swollen and difficult to open     Neck: supple, symmetrical, trachea midline   Back: no kyphosis present  Lungs: non labored breathing  Heart: regular heart rate  Neurologic:   Mental status: Alert, oriented x3, thought content appropriate  Cranial nerves: grossly intact (Cranial nerves II-XII)  Sensory: normal to light touch  Motor: moving all extremities without focal weakness 5/5  Reflexes: 2+ and symmetric    Lab Results:  Results from last 7 days   Lab Units 11/02/22  0540 11/01/22  1134 11/01/22  1053   WBC Thousand/uL 6 82 12 80*  --    HEMOGLOBIN g/dL 12 3 14 5  --    I STAT HEMOGLOBIN g/dl  --   --  15 3   HEMATOCRIT % 39 1 45 3  --    HEMATOCRIT, ISTAT %  --   --  45   PLATELETS Thousands/uL 165 254  --    NEUTROS PCT % 68 78*  --    MONOS PCT % 11 7  --      Results from last 7 days   Lab Units 11/02/22  0540 11/01/22  1134 11/01/22  1053   POTASSIUM mmol/L 3 9 3 9  --    CHLORIDE mmol/L 106 104  --    CO2 mmol/L 27 29*  --    CO2, I-STAT mmol/L  --   --  29   BUN mg/dL 12 19  --    CREATININE mg/dL 0 61* 0 95  --    CALCIUM mg/dL 8 7* 9 4  --    ALK PHOS U/L  --  148  --    ALT U/L  --  25*  --    AST U/L  --  31  --    GLUCOSE, ISTAT mg/dl  --   --  120             Results from last 7 days   Lab Units 11/01/22  1134   INR  1 19   PTT seconds 26     No results found for: TROPONINT  ABG:No results found for: PHART, RVD7LMG, PO2ART, QTN6QDH, Z1AFCPDT, BEART, SOURCE    Imaging Studies: I have personally reviewed pertinent reports  and I have personally reviewed pertinent films in PACS  CT head wo contrast    Result Date: 11/1/2022  Impression: 1  New focus of extra-axial hemorrhage anterior to the right temporal pole measuring up to 1 cm in thickness  Previously demonstrated adjacent right temporal extra-axial hemorrhage is stable  Convex appearance of both collections are concerning for epidural hemorrhage  2   Small foci of extra-axial hemorrhage or the right frontal convexity and frontal orbital region are stable to slightly more prominent  No significant mass effect  Workstation performed: [id number]     TRAUMA - CT head wo contrast    Result Date: 11/1/2022  Impression: 1  Comminuted fracture of the squamosal portion of the right temporal bone and adjacent portion of the right sphenoid bone with a maximal depression of 2 mm  2   Right temporal epidural hematoma deep to the fracture described above, with maximal thickness of 0 8 cm  Mild mass effect on the underlying right temporal lobe  3   No additional intracranial hemorrhage  4   Fractures of the right zygomatic arch and the right orbital roof  I personally discussed this study with Amish CARBAJAL on 11/1/2022 at 11:31 AM  Workstation performed: [id number]     CT facial bones wo contrast    Result Date: 11/1/2022  Impression: 1  Comminuted fracture of the squamosal portion of the right temporal bone with maximal depression of 2 mm  2   Nondisplaced fracture of the right sided zygomatic arch  3   Fracture of the right orbital roof with 3 mm fragment extending into the orbit, superficial to the right superior rectus muscle   I personally discussed this study with Dr Chuy Urena on 11/1/2022 at 12:37 PM  Workstation performed: [id number]     TRAUMA - CT spine cervical wo contrast    Result Date: 11/1/2022  Impression:  No cervical spine fracture or traumatic malalignment  I personally discussed this study with Dr Chuy Urena on 11/1/2022 at 12:37 PM  Workstation performed: [id number]     TRAUMA - CT chest abdomen pelvis w contrast    Result Date: 11/1/2022  Impression: 1  Patchy groundglass pulmonary opacities in the right upper lobe, possibly pulmonary contusion  Differential diagnosis includes pneumonia  2   Essentially nondisplaced fracture of the right scapula at the junction of the glenoid and coracoid processes  I personally discussed this study with Dr Chuy Urena on 11/1/2022 at 12:37 PM  Workstation performed: [id number]     XR Trauma multiple ([facility] trauma bay ONLY)    Result Date: 11/1/2022  Impression: No acute cardiopulmonary disease within limitations of supine imaging  No right elbow fracture  The known right scapular fracture is not well seen on these x-rays  Please see CT chest abdomen pelvis report  Workstation performed: [id number]     CT upper extremity wo contrast right    Result Date: 11/1/2022  Impression: Acute minimally displaced scapular fracture involving the coracoid process and superior glenoid articular surface, without significant articular surface incongruity  Workstation performed: [id number]       EKG, Pathology, and Other Studies: I have personally reviewed pertinent reports        VTE Pharmacologic Prophylaxis: Reason for no pharmacologic prophylaxis epidural hematoma     VTE Mechanical Prophylaxis: sequential compression device

## 2022-11-17 NOTE — PLAN OF CARE
Patient discharged in stable condition. Pt tolerated procedure well. Pt reports 0/10 pain after procedure. Assisted pt up for first time. Steady on feet. All discharge instructions given.

## 2022-11-23 ENCOUNTER — TELEPHONE (OUTPATIENT)
Dept: PAIN MEDICINE | Facility: CLINIC | Age: 72
End: 2022-11-23
Payer: MEDICARE

## 2022-11-25 ENCOUNTER — OFFICE VISIT (OUTPATIENT)
Dept: PAIN MEDICINE | Facility: CLINIC | Age: 72
End: 2022-11-25
Payer: MEDICARE

## 2022-11-25 VITALS
DIASTOLIC BLOOD PRESSURE: 83 MMHG | HEART RATE: 60 BPM | RESPIRATION RATE: 18 BRPM | SYSTOLIC BLOOD PRESSURE: 155 MMHG | BODY MASS INDEX: 28.72 KG/M2 | WEIGHT: 183 LBS | TEMPERATURE: 98 F | HEIGHT: 67 IN

## 2022-11-25 DIAGNOSIS — M51.36 DDD (DEGENERATIVE DISC DISEASE), LUMBAR: ICD-10-CM

## 2022-11-25 DIAGNOSIS — M47.816 SPONDYLOSIS OF LUMBAR REGION WITHOUT MYELOPATHY OR RADICULOPATHY: ICD-10-CM

## 2022-11-25 DIAGNOSIS — M53.3 SACROILIAC JOINT PAIN: Primary | ICD-10-CM

## 2022-11-25 PROCEDURE — 3077F PR MOST RECENT SYSTOLIC BLOOD PRESSURE >= 140 MM HG: ICD-10-PCS | Mod: CPTII,S$GLB,, | Performed by: NURSE PRACTITIONER

## 2022-11-25 PROCEDURE — 3008F PR BODY MASS INDEX (BMI) DOCUMENTED: ICD-10-PCS | Mod: CPTII,S$GLB,, | Performed by: NURSE PRACTITIONER

## 2022-11-25 PROCEDURE — 1101F PR PT FALLS ASSESS DOC 0-1 FALLS W/OUT INJ PAST YR: ICD-10-PCS | Mod: CPTII,S$GLB,, | Performed by: NURSE PRACTITIONER

## 2022-11-25 PROCEDURE — 3079F DIAST BP 80-89 MM HG: CPT | Mod: CPTII,S$GLB,, | Performed by: NURSE PRACTITIONER

## 2022-11-25 PROCEDURE — 1159F PR MEDICATION LIST DOCUMENTED IN MEDICAL RECORD: ICD-10-PCS | Mod: CPTII,S$GLB,, | Performed by: NURSE PRACTITIONER

## 2022-11-25 PROCEDURE — 1101F PT FALLS ASSESS-DOCD LE1/YR: CPT | Mod: CPTII,S$GLB,, | Performed by: NURSE PRACTITIONER

## 2022-11-25 PROCEDURE — 3044F HG A1C LEVEL LT 7.0%: CPT | Mod: CPTII,S$GLB,, | Performed by: NURSE PRACTITIONER

## 2022-11-25 PROCEDURE — 99999 PR PBB SHADOW E&M-EST. PATIENT-LVL IV: CPT | Mod: PBBFAC,,, | Performed by: NURSE PRACTITIONER

## 2022-11-25 PROCEDURE — 3079F PR MOST RECENT DIASTOLIC BLOOD PRESSURE 80-89 MM HG: ICD-10-PCS | Mod: CPTII,S$GLB,, | Performed by: NURSE PRACTITIONER

## 2022-11-25 PROCEDURE — 1125F AMNT PAIN NOTED PAIN PRSNT: CPT | Mod: CPTII,S$GLB,, | Performed by: NURSE PRACTITIONER

## 2022-11-25 PROCEDURE — 99999 PR PBB SHADOW E&M-EST. PATIENT-LVL IV: ICD-10-PCS | Mod: PBBFAC,,, | Performed by: NURSE PRACTITIONER

## 2022-11-25 PROCEDURE — 1160F RVW MEDS BY RX/DR IN RCRD: CPT | Mod: CPTII,S$GLB,, | Performed by: NURSE PRACTITIONER

## 2022-11-25 PROCEDURE — 1160F PR REVIEW ALL MEDS BY PRESCRIBER/CLIN PHARMACIST DOCUMENTED: ICD-10-PCS | Mod: CPTII,S$GLB,, | Performed by: NURSE PRACTITIONER

## 2022-11-25 PROCEDURE — 1125F PR PAIN SEVERITY QUANTIFIED, PAIN PRESENT: ICD-10-PCS | Mod: CPTII,S$GLB,, | Performed by: NURSE PRACTITIONER

## 2022-11-25 PROCEDURE — 3288F FALL RISK ASSESSMENT DOCD: CPT | Mod: CPTII,S$GLB,, | Performed by: NURSE PRACTITIONER

## 2022-11-25 PROCEDURE — 1159F MED LIST DOCD IN RCRD: CPT | Mod: CPTII,S$GLB,, | Performed by: NURSE PRACTITIONER

## 2022-11-25 PROCEDURE — 99213 OFFICE O/P EST LOW 20 MIN: CPT | Mod: S$GLB,,, | Performed by: NURSE PRACTITIONER

## 2022-11-25 PROCEDURE — 3008F BODY MASS INDEX DOCD: CPT | Mod: CPTII,S$GLB,, | Performed by: NURSE PRACTITIONER

## 2022-11-25 PROCEDURE — 3288F PR FALLS RISK ASSESSMENT DOCUMENTED: ICD-10-PCS | Mod: CPTII,S$GLB,, | Performed by: NURSE PRACTITIONER

## 2022-11-25 PROCEDURE — 3044F PR MOST RECENT HEMOGLOBIN A1C LEVEL <7.0%: ICD-10-PCS | Mod: CPTII,S$GLB,, | Performed by: NURSE PRACTITIONER

## 2022-11-25 PROCEDURE — 99213 PR OFFICE/OUTPT VISIT, EST, LEVL III, 20-29 MIN: ICD-10-PCS | Mod: S$GLB,,, | Performed by: NURSE PRACTITIONER

## 2022-11-25 PROCEDURE — 3077F SYST BP >= 140 MM HG: CPT | Mod: CPTII,S$GLB,, | Performed by: NURSE PRACTITIONER

## 2022-11-25 NOTE — PROGRESS NOTES
Chronic Pain-Medicine-Established Note (Follow up visit)        SUBJECTIVE:    Interval History 11/25/2022:  The patient returns to clinic today for low back pain. He is s/p right L3,4,5 RFA on 8/31/2022 and left L3,4,5 RFA on 9/14/2022. He reports 50-60% relief of his low back pain. He reports increased pain lower than previous injection sites. He reports low back and bilateral buttock pain. He denies any radicular leg pain. He does have neuropathy into his feet. His pain is worse with prolonged sitting and walking. He continues to rotate between Mobic and Celebrex. He continues to perform a home exercise routine. He denies any other health changes. His pain today is 4/10.    Interval History 8/22/2022:  Anthony Miguel presents to the clinic for a follow-up appointment for back pain. Since the last visit, Anthony Miguel states the pain has been worsening. Current pain intensity is 5/10.  He presents for lower back pain.  He states that at the end of May, he had a fall and tore his shoulders.  He has an appointment with Dr. Ontiveros tomorrow for his shoulders. He continues to see his primary care doctor for the pain.  He continues to take Meloxicam and Celebrex which he states helps with the pain.  He states that he had good relief of his pain when he had the last RFAs back in December 2020.  He feels the back pain is the primary pain he has and wishes to have that treated first.  He continues to do home exercises.    Interval History 6/17/2021:  The patient returns to clinic today for follow up of back and shoulder pain. He is s/p bilateral glenohumeral joint injections on 6/2/2021. He reports significant relief of his shoulder pain. He reports neck and low back pain. He denies any radicular pain. He describes his pain as stiff and aching in nature. His pain is worse with prolonged activity. He continues to perform a home exercise routine. He continues to take Gabapentin and Celebrex with benefit. He denies  any other health changes. His pain today is 4/10.    Interval History 5/3/2021:  The patient returns to clinic today for follow up of back and shoulder pain. He is s/p bilateral SI joint injection on 4/7/2021. He reports 60% relief of his low back pain. He reports intermittent low back pain. He denies any radicular leg pain. He does report increased bilateral shoulder pain. He describes this pain as sharp and aching in nature. He does report intermittent radiating pain into his biceps, left greater than right. His pain is worse with overhead activity. He also reports difficulty sleeping. He continues to take Gabapentin and Celebrex. He denies any other health changes. His pain today is 5/10.    Interval History 3/31/2021:  The patient returns to clinic today for follow up of back and shoulder pain. He reports that previous SI joint injections provided significant relief. His pain returned over the last few weeks. He reports increased low back and bilateral buttock pain. He denies any radicular leg pain. His pain is worse with moving from sitting to standing. He continues to take Gabapentin. He does take Celebrex occasionally with benefit. He denies any other health changes. His pain today is 5/10.    Interval History 11/20/2020:  The patient returns to clinic today for follow up of shoulder pain via audio visit. He is s/p bilateral glenohumeral joint injections on 11/4/2020. He reports 70% relief of his bilateral shoulder pain. He reports intermittent shoulder pain that is tolerable at this time. He reports low back and bilateral buttock pain, right greater than left. He denies any radiating leg pain. His pain is worse with sitting to standing. This pain is lower than previous procedure sites. He denies any other health changes. His pain today is 3/10.    Interval History 10/15/2020:  The patient returns to clinic today for follow up of pain. He reports increased bilateral shoulder pain. He describes this pain as  stiff, sharp, and aching. He had short term relief with subacromial injections in the past. This pain is worse with prolonged activity. He reports increased low back and buttock pain. He denies any radiating leg pain. He does endorse morning stiffness. He denies any other health changes. His pain today is 4/10.    Interval History 7/13/2020:  The patient is s/p Bilateral Lumbar RFA. He states now having significant relief of approx 80% to both sided and able to perform ADLs easier. He is very please with results and will be starting healthy back program at end of month due to increased mobility and less severe pain. Pt denies any new areas of pain, denies any new neuro changes. Does not need medication refills at this time. Pain is 4/10 today.     Interval History 6/17/2020:   The patient presents for follow-up of lower back pain.  Patient states he has had significant improvement of the left-sided lumbar pain status post L 3, 4, 5 RFA on 5/27/2020. He is also s/p Right side L3,4,5 RFA on 6/10/2020. He states lower back pain to right still continued and endorses he continues to do housework and believes increased activity has not allowed time for pain to ease.  He denies any radiculopathy, denies any neurological complaints, denies loss of b/b or saddle paresthesias. He continues to take Mobic, voltaren gel, neurontin and Trazodone with benefit and without adverse side effects of medication.     Interval history 05/11/2020 (Telephone encounter)   Last encounter,we planned on scheduling for bilateral lumbar RFA in future as patient has had excellent relief from BL lumbar FJIs in the past. At last encounter, we also switched patient form diclofenac to meloxicam 15 mg daily and added voltaren gel for local anti-inflammatory benefit. Today he reports he completed his 38 day radiation trial for his prostate cancer (04/18/2020). He reports the previous facet joint injections helped tremendously (>70% pain relief for 3  months at a time) and is inquiring about RFA for his low back. Back  Pain is unchanged, located in lower back, worsened with extension. NSAIDs are not quite helpful. Gabapentin is mildly helpful.    Interval history (Telephone encounter) 03/26/20  Pt is a 70 yo male with Hx of cervical stenosis w/ myelopathy s/p C3-6 decompression and fusion, as well as prostate CA on radiatio therapy who we are following for chronic low back pain. We last performed lumbar facet joint injections in August and October of 2019. He reports excellent (75%) pain relief for about 3 months but his pain has since returned. He reports that the pain feels identical, but about 1-2 cm lower. Pain is worse with standing/ walking. He denies any pain shooting down his legs or numbness/ tingling/ weakness in his legs. No changes with bladder/bowel control. He is currently taking diclofenac 75mg BID and gabapentin 600mg TID with decent relief. He is curious if there is something slightly stronger than diclofenac that may help. He cannot recall any other NSAIDs or muscle relaxers that he's tried.    Pain Medications:    - NSAIDs: Meloxicam, diclofenac, voltaren gel  - Anti-Depressants: trazadone  - Anti convulsants: Gabapentin 600 mg TID        Physical Therapy/Home Exercise: no       report:  Not applicable    Pain Procedures:   2/6/19- L5/S1 KAYLEY  4/01/2019- L5/S1 KAYLEY  07/29/2019- Bilateral L4/5 and L5/S1 FJI  10/02/2019- Bilateral L4/5 and L5/S1 FJI  5/27/2020 Left L3,4,5 RFA  6/10/2020 Right L3,4,5 RFA   11/4/2020- Bilateral glenohumeral joint injection  12/16/2020- Bilateral SI joint injections  6/3/2021- Bilateral glenohumeral joint injections  8/31/2022- Right L3,4,5 RFA  9/14/2022- Left L3,4,5 RFA      Imaging:   Xray Shoulder 6/29/2020:  COMPARISON:  Comparison is made to 10/09/2019.     FINDINGS:  Visualized osseous structures appear unremarkable, with no evidence of recent or healing fracture, lytic destructive process, appreciable  glenohumeral arthritic change, or other significant abnormality noted.  No glenohumeral dislocation.  No abnormal soft tissue calcifications.  Incidental note is made of partial visualization of instrumentation related to a prior cervical spinal fusion procedure.  No significant detrimental interval change since 10/09/2019.     Impression:     As above    MRI Lumbar Spine 1/21/2019:  COMPARISON:  MRI lumbar spine 06/13/2017.    FINDINGS:  Alignment: There is grade 1 anterolisthesis of L4 on L5.  There is trace retrolisthesis of L5 on S1.    Vertebrae: Normal marrow signal. No fracture.    Discs: Multilevel disc desiccation with disc height loss, severe at L5-S1.    Cord: Normal.  Conus terminates at L1.    Degenerative findings:    T12-L1: Mild facet arthropathy.  No significant spinal canal stenosis or neuroforaminal narrowing.    L1-L2: Mild bilateral facet arthropathy.  Diffuse disc bulge with some loss of disc height.  No significant spinal canal stenosis or neuroforaminal narrowing.    L2-L3: Mild bilateral facet arthropathy.  Mild ligamentum flavum thickening.  Small, diffuse disc bulge.  No significant spinal canal stenosis or neuroforaminal narrowing.    L3-L4: Mild bilateral facet arthropathy.  Mild ligamentum flavum thickening.  Small, diffuse disc bulge with a superimposed annular fissure.  Mild bilateral neuroforaminal narrowing.  No significant spinal canal stenosis.    L4-L5: Minimal anterolisthesis of L4 on L5.  Severe bilateral facet arthropathy.  Significant ligamentum flavum thickening.  Diffuse disc bulge.  Findings contribute to severe spinal canal stenosis and moderate bilateral neural foraminal narrowing.    L5-S1: Moderate bilateral facet arthropathy.  Mild ligamentum flavum thickening.  Diffuse disc bulge with small left paracentral protrusion, likely contacting the descending left S1 nerve root.  There is severe left and moderate right neuroforaminal narrowing and mild spinal canal  stenosis.    Paraspinal muscles & soft tissues: Unremarkable.    Simple right renal cyst incidentally noted.    IMPRESSION:     Multilevel lumbar spondylosis detailed level by level above, resulting in varying degrees of spinal canal and neuroforaminal stenosis.  Specifically, there is severe spinal canal stenosis at L4-5 along with moderate bilateral neuroforaminal narrowing.  At L5-S1, there is severe left and moderate right neuroforaminal narrowing.    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    Overview:  Right side dx update     Past Surgical History:   Procedure Laterality Date    BACK SURGERY      EPIDURAL STEROID INJECTION N/A 2/6/2019    Procedure: INJECTION, STEROID, EPIDURAL, L45-S1 IL;  Surgeon: Marcel Adkins MD;  Location: [facility] PAIN MGT;  Service: Pain Management;  Laterality: N/A;    EPIDURAL STEROID INJECTION N/A 4/10/2019    Procedure: Injection, Steroid, Epidural LUMBAR/CAUDAL L5-S1 INTERLAMINAR KAYLEY;  Surgeon: Marcel Adkins MD;  Location: [facility] PAIN [facility]GT;  Service: Pain Management;  Laterality: N/A;  NEEDS CONSENT    INJECTION OF FACET JOINT Bilateral 8/14/2019    Procedure: INJECTION, FACET JOINT INJECTION (LUMBAR BLOCK) BILATERAL L4-5 AND L5-S1;  Surgeon: Marcel Adkins MD;  Location: [facility] PAIN [facility]GT;  Service: Pain Management;  Laterality: Bilateral;  NEEDS CONSENT    INJECTION OF FACET JOINT Bilateral 10/16/2019    Procedure: FACET JOINT INJECTION (LUMBAR BLOCK) BILATERAL L4-5 AND L5-S1;  Surgeon: Marcel Adkins MD;  Location: [facility] PAIN [facility]GT;  Service: Pain Management;  Laterality: Bilateral;  NEEDS CONSENT    INJECTION OF JOINT Bilateral 11/4/2020    Procedure: INJECTION,  JOINT, GLENOHUMERAL;  Surgeon: Marcel Adkins MD;  Location: [facility], [place], operated [facility] PAIN [facility]GT;  Service: Pain Management;  Laterality: Bilateral;    INJECTION OF JOINT Bilateral 12/16/2020    Procedure: INJECTION, JOINT, SACROILIAC (SI) need consent;  Surgeon: Marcel Adkins MD;  Location: [facility]IN [facility];  Service: Pain Management;  Laterality: Bilateral;    INJECTION OF JOINT Bilateral 4/7/2021    Procedure: INJECTION, JOINT, SACROILIAC (SI);  Surgeon: Marcle Adkins MD;  Location: [facility], [place], operated [facility] PAIN [facility]GT;  Service: Pain Management;  Laterality: Bilateral;    INJECTION OF JOINT Bilateral 6/2/2021    Procedure: INJECTION, JOINT, GLENOHUMERAL;  Surgeon: Marcel Adkins MD;  Location: [facility], [place], operated by [facility] PAIN [facility]GT;  Service: Pain Management;  Laterality: Bilateral;    Larangoscopy      RADIOFREQUENCY ABLATION Left 5/27/2020    Procedure: RADIOFREQUENCY ABLATION LEFT L3,4,5 1 of 2;  Surgeon: Marcel Adkins MD;  Location: [facility], [place], operated [facility] PAIN [facility]GT;  Service: Pain Management;  Laterality: Left;  Left RFA L3, 4, 5  1 of 2    RADIOFREQUENCY ABLATION Right 6/10/2020    Procedure: RADIOFREQUENCY ABLATION RIGHT L3,4,5;  Surgeon: Marcel Adkins MD;  Location: [facility], [place], operated [facility] PAIN [facility]GT;  Service: Pain Management;  Laterality: Right;  Right RFA L3,4.5  2 of 2    RADIOFREQUENCY ABLATION Right 8/31/2022    Procedure: RADIOFREQUENCY ABLATION RIGHT L3, 4, 5 ONE OF TWO;  Surgeon: Marcel Adkins MD;  Location: [facility], [place], operated [facility] PAIN [facility]GT;  Service: Pain Management;  Laterality: Right;    RADIOFREQUENCY ABLATION Left 9/14/2022    Procedure: RADIOFREQUENCY ABLATION LEFT L3, 4, 5 TWO OF TWO;  Surgeon: Marcel Adkins MD;  Location: [facility], [place], operated by [facility] PAIN [facility]GT;  Service: Pain Management;  Laterality: Left;    ROBOT-ASSISTED LAPAROSCOPIC PROSTATECTOMY USING DA SAÚL XI N/A 6/4/2019    Procedure: XI ROBOTIC PROSTATECTOMY;  Surgeon: Sergio Dixon MD;  Location: [facility] OR 2ND FLR;  Service: Urology;  Laterality: N/A;  4hrs/ gen with regional     SPINE SURGERY       Social History     Socioeconomic History     Marital status: Single    Number of children: 0   Tobacco Use    Smoking status: Former     Packs/day: 1.00     Types: Cigarettes     Quit date: 2000     Years since quittin.8    Smokeless tobacco: Former   Substance and Sexual Activity    Alcohol use: Not Currently    Drug use: No    Sexual activity: Not Currently   Social History Narrative    No difficulty reading Rx labels      Family History   Problem Relation Age of Onset    Cancer Mother         Pancreatic    No Known Problems Father     Keratoconus Brother     Cancer Brother        Review of patient's allergies indicates:  No Known Allergies    Current Outpatient Medications   Medication Sig    atorvastatin (LIPITOR) 10 MG tablet Take 1 tablet (10 mg total) by mouth once daily.    celecoxib (CELEBREX) 100 MG capsule Take 1 capsule (100 mg total) by mouth once daily.    cholecalciferol, vitamin D3, (VITAMIN D3) 50 mcg (2,000 unit) Cap Take 1 capsule (2,000 Units total) by mouth once daily.    ferrous sulfate 325 (65 FE) MG EC tablet Take 1 tablet (325 mg total) by mouth once daily.    gabapentin (NEURONTIN) 300 MG capsule TAKE 2 CAPSULES(600 MG) BY MOUTH THREE TIMES DAILY    meloxicam (MOBIC) 15 MG tablet UNKNOWN    multivitamin capsule Take 1 capsule by mouth once daily.    traZODone (DESYREL) 50 MG tablet Take 3 tablets (150 mg total) by mouth nightly as needed for Insomnia.    triamcinolone acetonide 0.1% (KENALOG) 0.1 % cream Apply 15 g (15,000 mg total) topically 2 (two) times daily as needed (for cuts).    VOLTAREN 1 % Gel UNKNOWN    walker (ULTRA-LIGHT ROLLATOR) Misc 1 Units by Misc.(Non-Drug; Combo Route) route once daily at 6am.    sertraline (ZOLOFT) 25 MG tablet Take 1 tablet (25 mg total) by mouth once daily.     No current facility-administered medications for this visit.     Facility-Administered Medications Ordered in Other Visits   Medication    0.9%  NaCl infusion       REVIEW OF SYSTEMS:    GENERAL:  No weight loss, malaise or  "fevers.  HEENT:   No recent changes in vision or hearing  NECK:  Negative for lumps, no difficulty with swallowing.  RESPIRATORY:  Negative for cough, wheezing or shortness of breath, patient denies any recent URI.  CARDIOVASCULAR:  Negative for chest pain, leg swelling or palpitations.  GI:  Negative for abdominal discomfort, blood in stools or black stools or change in bowel habits.  MUSCULOSKELETAL:  See HPI.  SKIN:  Negative for lesions, rash, and itching.  PSYCH:  No mood disorder or recent psychosocial stressors.  Patient's sleep is disturbed secondary to pain.  HEMATOLOGY/LYMPHOLOGY:  + prostate cancer s/p radiation therapy  NEURO:   No history of headaches, syncope, paralysis, seizures or tremors.  All other reviewed and negative other than HPI.    OBJECTIVE:    Vitals:    11/25/22 0920   BP: (!) 155/83   Pulse: 60   Resp: 18   Temp: 98 °F (36.7 °C)   TempSrc: Oral   Weight: 83 kg (183 lb)   Height: 5' 7" (1.702 m)   PainSc:   4   PainLoc: Back         PHYSICAL EXAMINATION:     General appearance: Well appearing, in no acute distress, alert and oriented x3.  Psych:  Mood and affect appropriate.  Skin: Skin color, texture, turgor normal, no rashes or lesions, in both upper and lower body.  Head/face:  Atraumatic, normocephalic. No palpable lymph nodes  Cor: Regular rate  Pulm: Symmetric chest rise, no respiratory distress noted.   Back: Straight leg raising in the supine positions is negative to radicular pain. There is mild pain to palpation over lumbar facet joints bilaterally. Limited ROM with mild pain on extension. Negative facet loading bilaterally.    Extremities:  No deformities, edema, or skin discoloration. Good capillary refill.  Musculoskeletal: There is pain with palpation over bilateral SI joints. FABERs is positive bilaterally. Gaenslen's positive bilaterally. Bilateral lower extremity strength is normal and symmetric.  No atrophy or tone abnormalities are noted.  Neuro: Bilateral lower " extremity coordination and muscle stretch reflexes are physiologic and symmetric.  Plantar response are downgoing. No loss of sensation is noted.  Gait: Antalgic- ambulates without assistance.         ASSESSMENT: 71 y.o. year old male with pain, consistent with       1. Sacroiliac joint pain  Procedure Order to Pain Management      2. Spondylosis of lumbar region without myelopathy or radiculopathy        3. DDD (degenerative disc disease), lumbar                PLAN:     - Previous imaging was reviewed and discussed with the patient today.    - He is s/p lumbar RFA with benefit. We can repeat this as needed.     - Schedule for bilateral SI joint injections.     - Continue current medications.      - Continue home exercise routine.     - RTC 2 weeks after above procedure.     The above plan and management options were discussed at length with patient. Patient is in agreement with the above and verbalized understanding.     Farida Andrew NP  11/25/2022

## 2022-12-16 ENCOUNTER — TELEPHONE (OUTPATIENT)
Dept: SPORTS MEDICINE | Facility: CLINIC | Age: 72
End: 2022-12-16
Payer: MEDICARE

## 2022-12-16 NOTE — TELEPHONE ENCOUNTER
----- Message from Leny Deluca sent at 12/16/2022 11:29 AM CST -----  Regarding: Patient advice  Contact: Pt [phone]  Patient requesting call back from the office in regards to shoulder Stating he was suppose to have a procedure preformed on right shoulder that did not happen now having an issues with left shoulder Please call to discuss further

## 2022-12-21 ENCOUNTER — OFFICE VISIT (OUTPATIENT)
Dept: SPORTS MEDICINE | Facility: CLINIC | Age: 72
End: 2022-12-21
Payer: MEDICARE

## 2022-12-21 ENCOUNTER — HOSPITAL ENCOUNTER (OUTPATIENT)
Dept: RADIOLOGY | Facility: HOSPITAL | Age: 72
Discharge: HOME | End: 2022-12-21
Attending: ORTHOPAEDIC SURGERY
Payer: MEDICARE

## 2022-12-21 VITALS
WEIGHT: 183 LBS | DIASTOLIC BLOOD PRESSURE: 86 MMHG | BODY MASS INDEX: 28.72 KG/M2 | SYSTOLIC BLOOD PRESSURE: 173 MMHG | HEART RATE: 73 BPM | HEIGHT: 67 IN

## 2022-12-21 DIAGNOSIS — M12.811 ROTATOR CUFF ARTHROPATHY OF RIGHT SHOULDER: ICD-10-CM

## 2022-12-21 DIAGNOSIS — M25.519 SHOULDER PAIN, UNSPECIFIED CHRONICITY, UNSPECIFIED LATERALITY: ICD-10-CM

## 2022-12-21 DIAGNOSIS — M25.511 RIGHT SHOULDER PAIN, UNSPECIFIED CHRONICITY: ICD-10-CM

## 2022-12-21 DIAGNOSIS — M25.511 RIGHT SHOULDER PAIN, UNSPECIFIED CHRONICITY: Primary | ICD-10-CM

## 2022-12-21 PROCEDURE — 73030 X-RAY EXAM OF SHOULDER: CPT | Mod: 26,RT,, | Performed by: RADIOLOGY

## 2022-12-21 PROCEDURE — 1160F RVW MEDS BY RX/DR IN RCRD: CPT | Mod: HCNC,CPTII,S$GLB, | Performed by: ORTHOPAEDIC SURGERY

## 2022-12-21 PROCEDURE — 3288F FALL RISK ASSESSMENT DOCD: CPT | Mod: HCNC,CPTII,S$GLB, | Performed by: ORTHOPAEDIC SURGERY

## 2022-12-21 PROCEDURE — 3008F BODY MASS INDEX DOCD: CPT | Mod: HCNC,CPTII,S$GLB, | Performed by: ORTHOPAEDIC SURGERY

## 2022-12-21 PROCEDURE — 99999 PR PBB SHADOW E&M-EST. PATIENT-LVL IV: ICD-10-PCS | Mod: PBBFAC,,, | Performed by: ORTHOPAEDIC SURGERY

## 2022-12-21 PROCEDURE — 1101F PT FALLS ASSESS-DOCD LE1/YR: CPT | Mod: HCNC,CPTII,S$GLB, | Performed by: ORTHOPAEDIC SURGERY

## 2022-12-21 PROCEDURE — 3077F SYST BP >= 140 MM HG: CPT | Mod: HCNC,CPTII,S$GLB, | Performed by: ORTHOPAEDIC SURGERY

## 2022-12-21 PROCEDURE — 1159F PR MEDICATION LIST DOCUMENTED IN MEDICAL RECORD: ICD-10-PCS | Mod: HCNC,CPTII,S$GLB, | Performed by: ORTHOPAEDIC SURGERY

## 2022-12-21 PROCEDURE — 3079F DIAST BP 80-89 MM HG: CPT | Mod: HCNC,CPTII,S$GLB, | Performed by: ORTHOPAEDIC SURGERY

## 2022-12-21 PROCEDURE — 1101F PR PT FALLS ASSESS DOC 0-1 FALLS W/OUT INJ PAST YR: ICD-10-PCS | Mod: HCNC,CPTII,S$GLB, | Performed by: ORTHOPAEDIC SURGERY

## 2022-12-21 PROCEDURE — 3077F PR MOST RECENT SYSTOLIC BLOOD PRESSURE >= 140 MM HG: ICD-10-PCS | Mod: HCNC,CPTII,S$GLB, | Performed by: ORTHOPAEDIC SURGERY

## 2022-12-21 PROCEDURE — 99214 PR OFFICE/OUTPT VISIT, EST, LEVL IV, 30-39 MIN: ICD-10-PCS | Mod: HCNC,S$GLB,, | Performed by: ORTHOPAEDIC SURGERY

## 2022-12-21 PROCEDURE — 99214 OFFICE O/P EST MOD 30 MIN: CPT | Mod: HCNC,S$GLB,, | Performed by: ORTHOPAEDIC SURGERY

## 2022-12-21 PROCEDURE — 3044F PR MOST RECENT HEMOGLOBIN A1C LEVEL <7.0%: ICD-10-PCS | Mod: HCNC,CPTII,S$GLB, | Performed by: ORTHOPAEDIC SURGERY

## 2022-12-21 PROCEDURE — 1125F AMNT PAIN NOTED PAIN PRSNT: CPT | Mod: HCNC,CPTII,S$GLB, | Performed by: ORTHOPAEDIC SURGERY

## 2022-12-21 PROCEDURE — 3008F PR BODY MASS INDEX (BMI) DOCUMENTED: ICD-10-PCS | Mod: HCNC,CPTII,S$GLB, | Performed by: ORTHOPAEDIC SURGERY

## 2022-12-21 PROCEDURE — 73030 X-RAY EXAM OF SHOULDER: CPT | Mod: TC,RT

## 2022-12-21 PROCEDURE — 1160F PR REVIEW ALL MEDS BY PRESCRIBER/CLIN PHARMACIST DOCUMENTED: ICD-10-PCS | Mod: HCNC,CPTII,S$GLB, | Performed by: ORTHOPAEDIC SURGERY

## 2022-12-21 PROCEDURE — 1125F PR PAIN SEVERITY QUANTIFIED, PAIN PRESENT: ICD-10-PCS | Mod: HCNC,CPTII,S$GLB, | Performed by: ORTHOPAEDIC SURGERY

## 2022-12-21 PROCEDURE — 73030 XR SHOULDER COMPLETE 2 OR MORE VIEWS RIGHT: ICD-10-PCS | Mod: 26,RT,, | Performed by: RADIOLOGY

## 2022-12-21 PROCEDURE — 3288F PR FALLS RISK ASSESSMENT DOCUMENTED: ICD-10-PCS | Mod: HCNC,CPTII,S$GLB, | Performed by: ORTHOPAEDIC SURGERY

## 2022-12-21 PROCEDURE — 3079F PR MOST RECENT DIASTOLIC BLOOD PRESSURE 80-89 MM HG: ICD-10-PCS | Mod: HCNC,CPTII,S$GLB, | Performed by: ORTHOPAEDIC SURGERY

## 2022-12-21 PROCEDURE — 1159F MED LIST DOCD IN RCRD: CPT | Mod: HCNC,CPTII,S$GLB, | Performed by: ORTHOPAEDIC SURGERY

## 2022-12-21 PROCEDURE — 99999 PR PBB SHADOW E&M-EST. PATIENT-LVL IV: CPT | Mod: PBBFAC,,, | Performed by: ORTHOPAEDIC SURGERY

## 2022-12-21 PROCEDURE — 3044F HG A1C LEVEL LT 7.0%: CPT | Mod: HCNC,CPTII,S$GLB, | Performed by: ORTHOPAEDIC SURGERY

## 2022-12-21 NOTE — PROGRESS NOTES
CC: left Shoulder pain. Professional . Referred by Dr. Mandel    72 y.o. Male , left hand dominant, reports 3 months of left shoulder pain after a fall.    He reports weakness and pain in left shoulder.  Unable to play piano to full extent due to injury.    Reports that the pain is severe and not responding to any conservative care.      Pain 3/10 currently    Failed physician directed therapy x 12 weeks  Failed NSAIDs x 6 weeks  Failed RICE x 6 weeks    SANE: 30    Interval Hx 12/21/22: Patient endorses that he ended up not get clearance from his PCP for his L shoulder and since has now started having R shoulder pain and is his primarily complaint at this time.   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   Has not improved and has gotten slightly worse.  Taking voltaren, mobic, pain medication, and gabapentin. They help.  Otherwise has not tried any treatments for this including, PT, or Sx.     SANE on R: 30  SANE on L: 30    PAST MEDICAL HISTORY:   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    Overview:  Right side dx update     PAST SURGICAL HISTORY:   Past Surgical History:   Procedure Laterality Date    BACK SURGERY      EPIDURAL STEROID INJECTION N/A 2/6/2019    Procedure: INJECTION, STEROID, EPIDURAL, L45-S1 IL;  Surgeon: Marcel Adkins MD;  Location: [facility] PAIN T;  Service: Pain Management;  Laterality: N/A;    EPIDURAL STEROID INJECTION N/A 4/10/2019    Procedure: Injection, Steroid, Epidural LUMBAR/CAUDAL L5-S1 INTERLAMINAR KAYLEY;  Surgeon: Marcel SALAS  MD Jed;  Location: [facility] PAIN MGT;  Service: Pain Management;  Laterality: N/A;  NEEDS CONSENT    INJECTION OF FACET JOINT Bilateral 8/14/2019    Procedure: INJECTION, FACET JOINT INJECTION (LUMBAR BLOCK) BILATERAL L4-5 AND L5-S1;  Surgeon: Marcel Adkins MD;  Location: [facility] PAIN MGT;  Service: Pain Management;  Laterality: Bilateral;  NEEDS CONSENT    INJECTION OF FACET JOINT Bilateral 10/16/2019    Procedure: FACET JOINT INJECTION (LUMBAR BLOCK) BILATERAL L4-5 AND L5-S1;  Surgeon: Marcel Adkins MD;  Location: [facility] PAIN MGT;  Service: Pain Management;  Laterality: Bilateral;  NEEDS CONSENT    INJECTION OF JOINT Bilateral 11/4/2020    Procedure: INJECTION, JOINT, GLENOHUMERAL;  Surgeon: Marcel Adkins MD;  Location: [facility] PAIN MGT;  Service: Pain Management;  Laterality: Bilateral;    INJECTION OF JOINT Bilateral 12/16/2020    Procedure: INJECTION, JOINT, SACROILIAC (SI) need consent;  Surgeon: Marcel Adkins MD;  Location: [facility] PAIN MGT;  Service: Pain Management;  Laterality: Bilateral;    INJECTION OF JOINT Bilateral 4/7/2021    Procedure: INJECTION, JOINT, SACROILIAC (SI);  Surgeon: Marcel Adkins MD;  Location: [facility] PAIN MGT;  Service: Pain Management;  Laterality: Bilateral;    INJECTION OF JOINT Bilateral 6/2/2021    Procedure: INJECTION, JOINT, GLENOHUMERAL;  Surgeon: Marcel Adkins MD;  Location: [facility] PAIN MGT;  Service: Pain Management;  Laterality: Bilateral;    Larangoscopy      RADIOFREQUENCY ABLATION Left 5/27/2020    Procedure: RADIOFREQUENCY ABLATION LEFT L3,4,5 1 of 2;  Surgeon: Marcel Adkins MD;  Location: [facility] PAIN MGT;  Service: Pain Management;  Laterality: Left;  Left RFA L3, 4, 5  1 of 2    RADIOFREQUENCY ABLATION Right 6/10/2020    Procedure: RADIOFREQUENCY ABLATION RIGHT L3,4,5;  Surgeon: Marcel Adkins MD;  Location: [facility] PAIN MGT;  Service: Pain Management;  Laterality: Right;  Right RFA L3,4.5  2 of 2    RADIOFREQUENCY ABLATION Right 8/31/2022     Procedure: RADIOFREQUENCY ABLATION RIGHT L3, 4, 5 ONE OF TWO;  Surgeon: Marcel Adkins MD;  Location: [facility] PAIN MGT;  Service: Pain Management;  Laterality: Right;    RADIOFREQUENCY ABLATION Left 2022    Procedure: RADIOFREQUENCY ABLATION LEFT L3, 4, 5 TWO OF TWO;  Surgeon: Marcel Adkins MD;  Location: [facility] PAIN MGT;  Service: Pain Management;  Laterality: Left;    ROBOT-ASSISTED LAPAROSCOPIC PROSTATECTOMY USING DA SAÚL XI N/A 2019    Procedure: XI ROBOTIC PROSTATECTOMY;  Surgeon: Sergio Dixon MD;  Location: [facility] OR 2ND FLR;  Service: Urology;  Laterality: N/A;  4hrs/ gen with regional     SPINE SURGERY       FAMILY HISTORY:   Family History   Problem Relation Age of Onset    Cancer Mother         Pancreatic    No Known Problems Father     Keratoconus Brother     Cancer Brother      SOCIAL HISTORY:   Social History     Socioeconomic History    Marital status: Single    Number of children: 0   Tobacco Use    Smoking status: Former     Packs/day: 1.00     Types: Cigarettes     Quit date: 2000     Years since quittin.9    Smokeless tobacco: Former   Substance and Sexual Activity    Alcohol use: Not Currently    Drug use: No    Sexual activity: Not Currently   Social History Narrative    No difficulty reading Rx labels      MEDICATIONS:   Current Outpatient Medications:     atorvastatin (LIPITOR) 10 MG tablet, Take 1 tablet (10 mg total) by mouth once daily., Disp: 90 tablet, Rfl: 3    celecoxib (CELEBREX) 100 MG capsule, Take 1 capsule (100 mg total) by mouth once daily., Disp: 90 capsule, Rfl: 3    cholecalciferol, vitamin D3, (VITAMIN D3) 50 mcg (2,000 unit) Cap, Take 1 capsule (2,000 Units total) by mouth once daily., Disp: 90 capsule, Rfl: 3    ferrous sulfate 325 (65 FE) MG EC tablet, Take 1 tablet (325 mg total) by mouth once daily., Disp: 90 tablet, Rfl: 3    gabapentin (NEURONTIN) 300 MG capsule, TAKE 2 CAPSULES BY MOUTH THREE TIMES DAILY, Disp: 540 capsule, Rfl: 3    meloxicam  "(MOBIC) 15 MG tablet, UNKNOWN, Disp: 777 tablet, Rfl: 0    multivitamin capsule, Take 1 capsule by mouth once daily., Disp: , Rfl:     traZODone (DESYREL) 50 MG tablet, Take 3 tablets (150 mg total) by mouth nightly as needed for Insomnia., Disp: 270 tablet, Rfl: 3    triamcinolone acetonide 0.1% (KENALOG) 0.1 % cream, Apply 15 g (15,000 mg total) topically 2 (two) times daily as needed (for cuts)., Disp: 1 each, Rfl: 3    VOLTAREN 1 % Gel, UNKNOWN, Disp: 777 g, Rfl: 0    walker (ULTRA-LIGHT ROLLATOR) Misc, 1 Units by Misc.(Non-Drug; Combo Route) route once daily at 6am., Disp: 1 each, Rfl: 0    sertraline (ZOLOFT) 25 MG tablet, Take 1 tablet (25 mg total) by mouth once daily., Disp: 90 tablet, Rfl: 3  No current facility-administered medications for this visit.    Facility-Administered Medications Ordered in Other Visits:     0.9%  NaCl infusion, , Intravenous, Continuous, Altaf Santana MD  ALLERGIES: Review of patient's allergies indicates:  No Known Allergies  VITAL SIGNS: BP (!) 173/86   Pulse 73   Ht 5' 7" (1.702 m)   Wt 83 kg (183 lb)   BMI 28.66 kg/m²     Review of Systems   Constitution: Negative. Negative for chills, fever and night sweats.   HENT: Negative for congestion and headaches.    Eyes: Negative for blurred vision, left vision loss and right vision loss.   Cardiovascular: Negative for chest pain and syncope.   Respiratory: Negative for cough and shortness of breath.    Endocrine: Negative for polydipsia, polyphagia and polyuria.   Hematologic/Lymphatic: Negative for bleeding problem. Does not bruise/bleed easily.   Skin: Negative for dry skin, itching and rash.   Musculoskeletal: Negative for falls and muscle weakness.   Gastrointestinal: Negative for abdominal pain and bowel incontinence.   Genitourinary: Negative for bladder incontinence and nocturia.   Neurological: Negative for disturbances in coordination, loss of balance and seizures.   Psychiatric/Behavioral: Negative for " depression. The patient does not have insomnia.    Allergic/Immunologic: Negative for hives and persistent infections.       PHYSICAL EXAMINATION:  General:  The patient is alert and oriented x 3.  Mood is pleasant.  Observation of ears, eyes and nose reveal no gross abnormalities.  HEENT: NCAT, sclera nonicteric  Lungs: Respirations are equal and unlabored.  Gait is coordinated. Patient can toe walk and heel walk without difficulty.  Cardiovascular: There are no swelling or varicosities present.   Lymphatic: Negative for adenopathy       left Shoulder / Upper Extremity Exam    OBSERVATION:     Swelling  none  Deformity  none   Discoloration  none   Scapular winging none   Scars   none  Atrophy  none    TENDERNESS / CREPITUS (T/C):          T/C      T/C   Clavicle   -/-  SUPRAspinatus    -/-     AC Jt.    -/-  INFRAspinatus  -/-     SC Jt.    -/-  Deltoid    -/-     G. Tuberosity  -/-  LH BICEP groove  +/-   Acromion:  -/-  Midline Neck   -/-     Scapular Spine -/-  Trapezium   -/-   SMA Scapula  -/-  GH jt. line - post  -/-     Scapulothoracic  -/-         ROM: (* = with pain)  Right shoulder   Left shoulder        AROM (PROM)   AROM (PROM)   FE    170° (175°)     90° (175°)     ER at 0°    60°  (65°)    -20°  (45°)   ER at 90° ABD  90°  (90°)    NA   IR at 90°  ABD   NA  (40°)     NA  (40°)      IR (spine level)   T10     L3    STRENGTH: (* = with pain) RIGHT SHOULDER  LEFT SHOULDER   SCAPTION at 0   5/5    4-/5    SCAPTION at 30   5/5    4/5   IR    5/5    5/5   ER    5/5    3/5   BICEPS   5/5    5/5   Deltoid    5/5    5/5     SIGNS:  Painful side       NEER   +   OVINNIES  +     MEADOWS   +   SPEEDS  +     DROP ARM   neg   BELLY PRESS +   Superior escape none    LIFT-OFF  neg   X-Body ADD    neg    MOVING VALGUS Neg        STABILITY TESTING    RIGHT SHOULDER   LEFT SHOULDER     Translation     Anterior  up face     up face     Posterior  up face    up face    Sulcus   < 10mm    < 10  mm     Signs    Apprehension   neg      neg      Relocation   no change     no change     Jerk test  neg     neg    EXTREMITY NEURO-VASCULAR EXAM    Sensation grossly intact to light touch all dermatomal regions.    DTR 2+ Biceps, Triceps, BR and Negative Vineets sign   Grossly intact motor function at Elbow, Wrist and Hand   Distal pulses radial and ulnar 2+, brisk cap refill, symmetric.      NECK:  Painless FROM and spinous processes non-tender. Negative Spurlings sign.      OTHER FINDINGS:  +scapular dyskinesia    Right Shoulder / Upper Extremity Exam    OBSERVATION:     Swelling  none  Deformity  none   Discoloration  none   Scapular winging none   Scars   none  Atrophy  none    TENDERNESS / CREPITUS (T/C):          T/C      T/C   Clavicle   -/-  SUPRAspinatus    -/-     AC Jt.    +/-  INFRAspinatus  -/-     SC Jt.    -/-  Deltoid    -/-     G. Tuberosity  -/-  LH BICEP groove  +/-   Acromion:  -/-  Midline Neck   -/-     Scapular Spine -/-  Trapezium   -/-   SMA Scapula  -/-  GH jt. line - post  -/-     Scapulothoracic  -/-         ROM: (* = with pain)  Right shoulder   Left shoulder        AROM (PROM)   AROM (PROM)   FE   50° (175°)     90° (175°)     ER at 0°    30°  (65°)    -20°  (45°)   ER at 90° ABD  NA   NA   IR at 90°  ABD   NA  (40°)     NA  (40°)      IR (spine level)   L3     L3    STRENGTH: (* = with pain) RIGHT SHOULDER  LEFT SHOULDER   SCAPTION at 0   *3-/5    *4-/5    SCAPTION at 30   *4-/5    4+/5   IR    5/5    5/5   ER    5/5    3/5   BICEPS   5/5    5/5   Deltoid    5/5    5/5     SIGNS:  Painful side       NEER   +   OVINNIES  +     MEADOWS   +   SPEEDS  +     DROP ARM   neg   BELLY PRESS +   Superior escape none    LIFT-OFF  neg   X-Body ADD    neg    MOVING VALGUS Neg        STABILITY TESTING    RIGHT SHOULDER   LEFT SHOULDER     Translation     Anterior  up face     up face     Posterior  up face    up face    Sulcus   < 10mm    < 10 mm     Signs    Apprehension   neg       neg      Relocation   no change     no change     Jerk test  neg     neg    EXTREMITY NEURO-VASCULAR EXAM    Sensation grossly intact to light touch all dermatomal regions.    DTR 2+ Biceps, Triceps, BR and Negative Vineets sign   Grossly intact motor function at Elbow, Wrist and Hand   Distal pulses radial and ulnar 2+, brisk cap refill, symmetric.      NECK:  Painless FROM and spinous processes non-tender. Negative Spurlings sign.      OTHER FINDINGS:  +scapular dyskinesia    XRAYS:  Shoulder trauma series left,  were reviewed and interpreted by me. No convincing fracture or dislocation is noted. The osseous structures appear well mineralized and well aligned, possible insufficiency fracture greater tuberosity/cystic changes, no degenerative changes    left   1. Shoulder pain,possible RTC tear, Right   2.  Possible insufficiency fracture greater tuberosity, Right  3. Left shoulder pain, full thickness RTC tear  4. Insufficiency fracture greater tuberosity, Left     MRI Left shoulder reviewed and interpreted personally by me: Full thickness supraspinatus tear with atrophy and retraction to glenoid. SLAP, biceps tendinitis, + insufficiency fracture greater tuberosity      ASSESSMENT:    Right shoulder pain.    I do think that this is likely a rotator cuff tear.    I have recommended we check an MRI of the shoulder to evaluate this.    Depending on the results of the MRI, we may consider a cortisone   injection and physical therapy and/or arthroscopic intervention and treatment   depending on what we find.  I will see him back upon its completion or PHREV and we will consider the above.       Per prior recommendations:  Left Shoulder Rotator Cuff Tear, SLAP, biceps tendonitis.      he would benefit from an arthroscopy, given the above.       PLAN:   Left Shoulder rotator cuff tear     All questions were answered, pt will contact us for questions or concerns in the interim.  Had thorough discussion of  non-operative vs operative intervention, and risks and benefits of both.     We have discussed the surgery and recovery of arthroscopic shoulder surgery. he understands that there may be limited mobility up to several weeks after surgery depending on procedures that are performed at the time of surgery.    The spectrum of treatment options were discussed with the patient, including nonoperative and operative options.  After thorough discussion, the patient has elected to undergo surgical treatment to include:    LEFT  a. Shoulder arthroscopic tuberoplasty              b. Shoulder arthroscopic SAD              c. Shoulder arthroscopic extensive debridement              d. Shoulder arthroscopic biceps tenodesis (vs. subpect tenodesis)              e. Shoulder arthroscopic possible microfracture              f.  Shoulder open reduction internal fixation greater tuberosity     The details of the surgical procedure were explained, including the location of probable incisions and a description of likely hardware and/or grafts to be used.  The patient understands the likely convalescence after surgery.  Also, we have thoroughly discussed the risks, benefits and alternatives to surgery, including, but not limited to, the risk of infection, joint stiffness, blood clot (including DVT and/or pulmonary embolus), neurologic and vascular injury.  It was explained that, if tissue has been repaired or reconstructed, there is a chance of failure, which may require further management.  Consent form for surgery is signed and in chart

## 2022-12-27 ENCOUNTER — HOSPITAL ENCOUNTER (OUTPATIENT)
Dept: RADIOLOGY | Facility: HOSPITAL | Age: 72
Discharge: HOME | End: 2022-12-27
Attending: STUDENT IN AN ORGANIZED HEALTH CARE EDUCATION/TRAINING PROGRAM
Payer: MEDICARE

## 2022-12-27 DIAGNOSIS — M25.519 SHOULDER PAIN, UNSPECIFIED CHRONICITY, UNSPECIFIED LATERALITY: ICD-10-CM

## 2022-12-27 PROCEDURE — 73221 MRI SHOULDER WITHOUT CONTRAST RIGHT: ICD-10-PCS | Mod: 26,HCNC,RT, | Performed by: INTERNAL MEDICINE

## 2022-12-27 PROCEDURE — 73221 MRI JOINT UPR EXTREM W/O DYE: CPT | Mod: 26,HCNC,RT, | Performed by: INTERNAL MEDICINE

## 2022-12-27 PROCEDURE — 73221 MRI JOINT UPR EXTREM W/O DYE: CPT | Mod: TC,HCNC,RT

## 2022-12-29 ENCOUNTER — TELEPHONE (OUTPATIENT)
Dept: ORTHOPEDICS | Facility: HOSPITAL | Age: 72
End: 2022-12-29
Payer: MEDICARE

## 2022-12-29 NOTE — TELEPHONE ENCOUNTER
Attempted to call patient x2 to discuss MRI results of R shoulder and left voicemail.     See below for plan upon the patient calling back:    MRI Right shoulder reviewed and interpreted personally by me: Full thickness supraspinatus/infraspinatus tear with mild (G1) atrophy and minimal retraction. SLAP, biceps tendinitis, + insufficiency fracture greater tuberosity    ASSESSMENT:    Right Shoulder Rotator Cuff Tear, SLAP, biceps tendonitis.      he would benefit from an arthroscopy, given the above.       PLAN:   Right Shoulder rotator cuff tear     All questions will be answered, pt will contact us for questions or concerns in the interim.  Will have thorough discussion of non-operative vs operative intervention, and risks and benefits of both.     We will have discussed the surgery and recovery of arthroscopic shoulder surgery. he understands that there may be limited mobility up to several weeks after surgery depending on procedures that are performed at the time of surgery.    The spectrum of treatment options will be discussed with the patient, including nonoperative and operative options.  After thorough discussion, if the patient elects to undergo surgical treatment this will include:    right   a. Shoulder arthroscopic rotator cuff repair with Cuffmend vs. Debridement with tuberoplasty   b. Shoulder arthroscopic SAD   c. Shoulder arthroscopic extensive debridement   d. Shoulder arthroscopic biceps tenodesis   e. Shoulder arthroscopic possible microfracture   f.  Shoulder open reduction internal fixation greater tuberosity    The details of the surgical procedure will be explained, including the location of probable incisions and a description of likely hardware and/or grafts to be used.  The patient will be educated on the likely convalescence after surgery.  Also, we will thoroughly discussed the risks, benefits and alternatives to surgery, including, but not limited to, the risk of infection, joint  stiffness, blood clot (including DVT and/or pulmonary embolus), neurologic and vascular injury.  It was explained that, if tissue has been repaired or reconstructed, there is a chance of failure, which may require further management.

## 2022-12-30 ENCOUNTER — TELEPHONE (OUTPATIENT)
Dept: SPORTS MEDICINE | Facility: CLINIC | Age: 72
End: 2022-12-30
Payer: MEDICARE

## 2022-12-30 NOTE — TELEPHONE ENCOUNTER
I called the patient and left a voicemail encouraging him to return the call to discuss scheduling surgery. Will have to look at February

## 2022-12-30 NOTE — TELEPHONE ENCOUNTER
----- Message from Hosea Duke sent at 12/30/2022 10:22 AM CST -----  Regarding: Appt  Contact: pt [phone]  Pt is returning call to confirm that pt is avail Tue or Thurs last week of January for surgery. Please call

## 2023-01-04 ENCOUNTER — HOSPITAL ENCOUNTER (OUTPATIENT)
Facility: OTHER | Age: 73
Discharge: HOME | End: 2023-01-04
Attending: ANESTHESIOLOGY | Admitting: ANESTHESIOLOGY
Payer: MEDICARE

## 2023-01-04 VITALS
WEIGHT: 185 LBS | RESPIRATION RATE: 16 BRPM | DIASTOLIC BLOOD PRESSURE: 88 MMHG | BODY MASS INDEX: 29.03 KG/M2 | OXYGEN SATURATION: 95 % | SYSTOLIC BLOOD PRESSURE: 162 MMHG | HEART RATE: 58 BPM | HEIGHT: 67 IN

## 2023-01-04 DIAGNOSIS — G89.29 CHRONIC PAIN: ICD-10-CM

## 2023-01-04 DIAGNOSIS — M46.1 SACROILIITIS: Primary | ICD-10-CM

## 2023-01-04 PROCEDURE — 27096 PR INJECTION,SACROILIAC JOINT: ICD-10-PCS | Mod: 50,HCNC,, | Performed by: ANESTHESIOLOGY

## 2023-01-04 PROCEDURE — 25500020 PHARM REV CODE 255: Mod: HCNC | Performed by: ANESTHESIOLOGY

## 2023-01-04 PROCEDURE — 27096 INJECT SACROILIAC JOINT: CPT | Mod: 50,HCNC | Performed by: ANESTHESIOLOGY

## 2023-01-04 PROCEDURE — 25000003 PHARM REV CODE 250: Mod: HCNC | Performed by: ANESTHESIOLOGY

## 2023-01-04 PROCEDURE — 27096 INJECT SACROILIAC JOINT: CPT | Mod: 50,HCNC,, | Performed by: ANESTHESIOLOGY

## 2023-01-04 PROCEDURE — 63600175 PHARM REV CODE 636 W HCPCS: Mod: HCNC | Performed by: ANESTHESIOLOGY

## 2023-01-04 RX ORDER — BUPIVACAINE HYDROCHLORIDE 2.5 MG/ML
INJECTION, SOLUTION EPIDURAL; INFILTRATION; INTRACAUDAL
Status: DISCONTINUED | OUTPATIENT
Start: 2023-01-04 | End: 2023-01-04 | Stop reason: HOSPADM

## 2023-01-04 RX ORDER — SODIUM CHLORIDE 9 MG/ML
500 INJECTION, SOLUTION INTRAVENOUS CONTINUOUS
Status: DISCONTINUED | OUTPATIENT
Start: 2023-01-04 | End: 2023-01-04 | Stop reason: HOSPADM

## 2023-01-04 RX ORDER — TRIAMCINOLONE ACETONIDE 40 MG/ML
INJECTION, SUSPENSION INTRA-ARTICULAR; INTRAMUSCULAR
Status: DISCONTINUED | OUTPATIENT
Start: 2023-01-04 | End: 2023-01-04 | Stop reason: HOSPADM

## 2023-01-04 RX ORDER — LIDOCAINE HYDROCHLORIDE 20 MG/ML
INJECTION, SOLUTION INFILTRATION; PERINEURAL
Status: DISCONTINUED | OUTPATIENT
Start: 2023-01-04 | End: 2023-01-04 | Stop reason: HOSPADM

## 2023-01-04 RX ORDER — ALPRAZOLAM 0.5 MG/1
0.5 TABLET ORAL ONCE
Status: COMPLETED | OUTPATIENT
Start: 2023-01-04 | End: 2023-01-04

## 2023-01-04 RX ADMIN — ALPRAZOLAM 0.5 MG: 0.5 TABLET ORAL at 02:01

## 2023-01-04 NOTE — OP NOTE
Sacroiliac Joint Injection under Fluoroscopic Guidance    The procedure, risks, benefits, and options were discussed with the patient. There are no contraindications to the procedure. The patent expressed understanding and agreed to the procedure. Informed written consent was obtained prior to the start of the procedure and can be found in the patient's chart.    PATIENT NAME: Anthony Miguel   MRN: [id number]     DATE OF PROCEDURE: 01/04/2023    PROCEDURE: Bilateral Sacroiliac Joint Injection under Fluoroscopic Guidance    PRE-OP DIAGNOSIS: Sacroiliac joint pain [M53.3]    POST-OP DIAGNOSIS: Same    PHYSICIAN: Marcel Adkins MD    ASSISTANTS: Arielle Clay MD [facility]       MEDICATIONS INJECTED: Preservative-free Kenalog 40mg with 3cc of Bupivacine 0.25%     LOCAL ANESTHETIC INJECTED: Xylocaine 2%     SEDATION: None    ESTIMATED BLOOD LOSS: None    COMPLICATIONS: None    TECHNIQUE: Time-out was performed to identify the patient and procedure to be performed. With the patient laying in a prone position, the surgical area was prepped and draped in the usual sterile fashion using ChloraPrep and a fenestrated drape. The sacroiliac joint was determined under fluoroscopy guidance. Skin anesthesia was achieved by injecting Lidocaine 2% over the injection site. The sacroiliac joint was  then approached with a 22 gauge, 3.5 inch spinal quinke needle that was introduced under fluoroscopic guidance in the AP and Lateral views. Once the needle tip was in the area of the joint, and there was no blood, contrast dye Omnipaque (240mg/mL) was injected to confirm placement and there was no vascular runoff. Fluoroscopic imaging in the AP and lateral views revealed a clear outline of the joint space. 4 mL of the medication mixture listed above was injected slowly intraarticular and gisel-articular. Displacement of the radio opaque contrast after injection of the medication confirmed that the medication went into the area of  the joint. The needles were removed and bleeding was nil. The same procedure was repeated on the contralateral side. A sterile dressing was applied. No specimens collected. The patient tolerated the procedure well.       The patient was monitored after the procedure in the recovery area. They were given post-procedure and discharge instructions to follow at home. The patient was discharged in a stable condition.    Arielle Clay MD [facility]    I reviewed and edited the fellow's note. I conducted my own interview and physical examination. I agree with the findings. I was present and supervising all critical portions of the procedure.    Marcel Adkins MD

## 2023-01-04 NOTE — H&P
HPI  Anthony Miguel presents for Bilateral SI joint injection.        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    Overview:  Right side dx update     Past Surgical History:   Procedure Laterality Date    BACK SURGERY      EPIDURAL STEROID INJECTION N/A 2/6/2019    Procedure: INJECTION, STEROID, EPIDURAL, L45-S1 IL;  Surgeon: Marcel Adkins MD;  [place] [facility], [place] [facility];  Service: Pain Management;  Laterality: N/A;    EPIDURAL STEROID INJECTION N/A 4/10/2019    Procedure: Injection, Steroid, Epidural LUMBAR/CAUDAL L5-S1 INTERLAMINAR KAYLEY;  Surgeon: Marcel Adkins MD;  Location: [facility], [place], [place] [facility];  Service: Pain Management;  Laterality: N/A;  NEEDS CONSENT    INJECTION OF FACET JOINT Bilateral 8/14/2019    Procedure: INJECTION, FACET JOINT INJECTION (LUMBAR BLOCK) BILATERAL L4-5 AND L5-S1;  Surgeon: Marcel Adkins MD;  Location: [facility], [place] [facility];  Service: Pain Management;  Laterality: Bilateral;  NEEDS CONSENT    INJECTION OF FACET JOINT Bilateral 10/16/2019    Procedure: FACET JOINT INJECTION (LUMBAR BLOCK) BILATERAL L4-5 AND L5-S1;  Surgeon: Marcel Adkins MD;  Location: [facility], [place] [facility];  Service: Pain Management;  Laterality: Bilateral;  NEEDS CONSENT    INJECTION OF JOINT Bilateral 11/4/2020    Procedure: INJECTION, JOINT, GLENOHUMERAL;  Surgeon: Marcel Adkins MD;  Location: [facility], [place] [facility];  Service: Pain Management;  Laterality: Bilateral;    INJECTION OF JOINT Bilateral 12/16/2020    Procedure: INJECTION, JOINT, SACROILIAC (SI) need consent;  Surgeon: Marcel Adkins MD;  Location: [facility], [place] [facility];  Service: Pain Management;  Laterality: Bilateral;    INJECTION OF JOINT Bilateral 4/7/2021     Procedure: INJECTION, JOINT, SACROILIAC (SI);  Surgeon: Marcel Adkins MD;  Location: [facility];  Service: Pain Management;  Laterality: Bilateral;    INJECTION OF JOINT Bilateral 6/2/2021    Procedure: INJECTION, JOINT, GLENOHUMERAL;  Surgeon: Marcel Adkins MD;  Location: [facility];  Service: Pain Management;  Laterality: Bilateral;    Larangoscopy      RADIOFREQUENCY ABLATION Left 5/27/2020    Procedure: RADIOFREQUENCY ABLATION LEFT L3,4,5 1 of 2;  Surgeon: Marcel Adkins MD;  Location: [facility];  Service: Pain Management;  Laterality: Left;  Left RFA L3, 4, 5  1 of 2    RADIOFREQUENCY ABLATION Right 6/10/2020    Procedure: RADIOFREQUENCY ABLATION RIGHT L3,4,5;  Surgeon: Marcel Adkins MD;  Location: [facility];  Service: Pain Management;  Laterality: Right;  Right RFA L3,4.5  2 of 2    RADIOFREQUENCY ABLATION Right 8/31/2022    Procedure: RADIOFREQUENCY ABLATION RIGHT L3, 4, 5 ONE OF TWO;  Surgeon: Marcel Adkins MD;  Location: [facility];  Service: Pain Management;  Laterality: Right;    RADIOFREQUENCY ABLATION Left 9/14/2022    Procedure: RADIOFREQUENCY ABLATION LEFT L3, 4, 5 TWO OF TWO;  Surgeon: Marcel Adkins MD;  Location: [facility];  Service: Pain Management;  Laterality: Left;    ROBOT-ASSISTED LAPAROSCOPIC PROSTATECTOMY USING DA SAÚL XI N/A 6/4/2019    Procedure: XI ROBOTIC PROSTATECTOMY;  Surgeon: Sergio Dixon MD;  Location: [facility] OR [address] [place] [address];  Service: Urology;  Laterality: N/A;  4hrs/ gen with regional     SPINE SURGERY       Review of patient's allergies indicates:  No Known Allergies     PMHx, PSHx, Allergies, Medications reviewed in epic      ROS negative except pain complaints in HPI    OBJECTIVE:    There were no vitals taken for this visit.    PHYSICAL EXAMINATION:    GENERAL: Well appearing, in no acute distress, alert and oriented x3.  PSYCH:  Mood and affect appropriate.  SKIN: Skin color, texture, turgor normal, no rashes or  lesions.  CV: RRR with palpation of the radial artery.  PULM: No evidence of respiratory difficulty, symmetric chest rise. Clear to auscultation.  NEURO: Cranial nerves grossly intact.    Plan:    Proceed with procedure as planned    Arielle Clay  01/04/2023

## 2023-01-04 NOTE — DISCHARGE INSTRUCTIONS

## 2023-01-04 NOTE — DISCHARGE SUMMARY
Discharge Note  Short Stay      SUMMARY     Admit Date: 1/4/2023    Attending Physician: Marcel Adkins    Discharge Physician: Arielle Clay      Discharge Date: 1/4/2023 2:39 PM    Procedure(s) (LRB):  INJECTION,SACROILIAC JOINT BILATERAL CONTRAST NEEDS CONSENT (Bilateral)    Final Diagnosis: Sacroiliac joint pain [M53.3]    Disposition: Home or self care    Patient Instructions:   Current Discharge Medication List        CONTINUE these medications which have NOT CHANGED    Details   atorvastatin (LIPITOR) 10 MG tablet Take 1 tablet (10 mg total) by mouth once daily.  Qty: 90 tablet, Refills: 3    Associated Diagnoses: Hyperlipidemia, unspecified hyperlipidemia type      celecoxib (CELEBREX) 100 MG capsule Take 1 capsule (100 mg total) by mouth once daily.  Qty: 90 capsule, Refills: 3    Associated Diagnoses: Lumbosacral stenosis with neurogenic claudication      cholecalciferol, vitamin D3, (VITAMIN D3) 50 mcg (2,000 unit) Cap Take 1 capsule (2,000 Units total) by mouth once daily.  Qty: 90 capsule, Refills: 3    Associated Diagnoses: Vitamin D deficiency      ferrous sulfate 325 (65 FE) MG EC tablet Take 1 tablet (325 mg total) by mouth once daily.  Qty: 90 tablet, Refills: 3    Associated Diagnoses: Iron deficiency anemia due to chronic blood loss      gabapentin (NEURONTIN) 300 MG capsule TAKE 2 CAPSULES BY MOUTH THREE TIMES DAILY  Qty: 540 capsule, Refills: 3    Associated Diagnoses: Unspecified inflammatory spondylopathy, cervical region      meloxicam (MOBIC) 15 MG tablet UNKNOWN  Qty: 777 tablet, Refills: 0      multivitamin capsule Take 1 capsule by mouth once daily.      sertraline (ZOLOFT) 25 MG tablet Take 1 tablet (25 mg total) by mouth once daily.  Qty: 90 tablet, Refills: 3    Associated Diagnoses: Mild episode of recurrent major depressive disorder      traZODone (DESYREL) 50 MG tablet Take 3 tablets (150 mg total) by mouth nightly as needed for Insomnia.  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  Qty: 1 each, Refills: 3      VOLTAREN 1 % Gel UNKNOWN  Qty: 777 g, Refills: 0    Associated Diagnoses: Lumbosacral stenosis with neurogenic claudication      walker (ULTRA-LIGHT ROLLATOR) Misc 1 Units by Misc.(Non-Drug; Combo Route) route once daily at 6am.  Qty: 1 each, Refills: 0    Associated Diagnoses: Weakness                 Discharge Diagnosis: Sacroiliac joint pain [M53.3]  Condition on Discharge: Stable with no complications to procedure   Diet on Discharge: Same as before.  Activity: as per instruction sheet.  Discharge to: Home with a responsible adult.  Follow up: 2-4 weeks       Please call my office or pager at [phone] if experienced any weakness or loss of sensation, fever > 101.5, pain uncontrolled with oral medications, persistent nausea/vomiting/or diarrhea, redness or drainage from the incisions, or any other worrisome concerns. If physician on call was not reached or could not communicate with our office for any reason please go to the nearest emergency department        Marcel Adkins

## 2023-01-06 ENCOUNTER — TELEPHONE (OUTPATIENT)
Dept: SPORTS MEDICINE | Facility: CLINIC | Age: 73
End: 2023-01-06
Payer: MEDICARE

## 2023-01-06 NOTE — TELEPHONE ENCOUNTER
----- Message from Kristina Chacon sent at 1/5/2023  2:52 PM CST -----  Contact: @[phone]    ----- Message -----  From: Willie Hendrickson  Sent: 1/5/2023   2:14 PM CST  To: Shilpa Rocha Staff    Pt is calling in stating that he was supposed to have an appt schedule the last week in September a Tuesday or Thursday. Pt would like to have it scheduled. Please call to discuss further.

## 2023-01-10 ENCOUNTER — TELEPHONE (OUTPATIENT)
Dept: SPORTS MEDICINE | Facility: CLINIC | Age: 73
End: 2023-01-10
Payer: MEDICARE

## 2023-01-10 NOTE — TELEPHONE ENCOUNTER
I called the patient and scheduled him for surgery on 2/14/23. He was informed that he would need clearance from his PCP, he verbalized understanding

## 2023-01-10 NOTE — TELEPHONE ENCOUNTER
----- Message from Pelon Joya sent at 1/10/2023 10:03 AM CST -----  Regarding: SCHEDULE SURGERY  Contact: SELF  Pt stated he need to speak to Dr Josefina Massey to schedule his surgery, ask for a call      Contact info  [phone] (home)     Note: Pt was informed that the nurse/medical assistant schedule the surgery. Pt insist on speak to Dr Josefina Massey to schedule his surgery

## 2023-01-13 ENCOUNTER — TELEPHONE (OUTPATIENT)
Dept: PAIN MEDICINE | Facility: CLINIC | Age: 73
End: 2023-01-13
Payer: MEDICARE

## 2023-01-13 NOTE — TELEPHONE ENCOUNTER
Staff left pt a voicemail informing pt that his appt that's scheduled on 1/20/23 at 9:40a with Farida Andrew NP has to be rescheduled due to provider not being in clinic that day

## 2023-01-17 DIAGNOSIS — M75.101 NON-TRAUMATIC ROTATOR CUFF TEAR, RIGHT: Primary | ICD-10-CM

## 2023-01-17 DIAGNOSIS — S42.254A CLOSED NONDISPLACED FRACTURE OF GREATER TUBEROSITY OF RIGHT HUMERUS, INITIAL ENCOUNTER: ICD-10-CM

## 2023-01-17 DIAGNOSIS — S43.431A SLAP LESION OF RIGHT SHOULDER: ICD-10-CM

## 2023-01-17 DIAGNOSIS — M75.21 BICEPS TENDONITIS ON RIGHT: ICD-10-CM

## 2023-01-19 ENCOUNTER — TELEPHONE (OUTPATIENT)
Dept: PRIMARY CARE CLINIC | Facility: CLINIC | Age: 73
End: 2023-01-19
Payer: MEDICARE

## 2023-01-19 NOTE — TELEPHONE ENCOUNTER
----- Message from Pippa Mark sent at 1/17/2023 10:39 AM CST -----  Regarding: Pt advice  Pt is Requesting a Call back Regarding , [facility] Operation  for Shoulder Per  ()  Pt is Requesting Ari Prior to Procedure ,pt is Requesting a confirmation once information is received Please call to discuss further .      [phone]

## 2023-01-20 ENCOUNTER — OFFICE VISIT (OUTPATIENT)
Dept: URGENT CARE | Facility: CLINIC | Age: 73
End: 2023-01-20
Payer: MEDICARE

## 2023-01-20 VITALS
SYSTOLIC BLOOD PRESSURE: 129 MMHG | HEART RATE: 61 BPM | DIASTOLIC BLOOD PRESSURE: 76 MMHG | TEMPERATURE: 99 F | OXYGEN SATURATION: 97 % | WEIGHT: 185 LBS | BODY MASS INDEX: 29.03 KG/M2 | HEIGHT: 67 IN | RESPIRATION RATE: 18 BRPM

## 2023-01-20 DIAGNOSIS — U07.1 COVID-19 VIRUS DETECTED: ICD-10-CM

## 2023-01-20 DIAGNOSIS — R05.9 COUGH, UNSPECIFIED TYPE: Primary | ICD-10-CM

## 2023-01-20 DIAGNOSIS — U07.1 COVID: ICD-10-CM

## 2023-01-20 LAB
CTP QC/QA: YES
SARS-COV-2 AG RESP QL IA.RAPID: POSITIVE

## 2023-01-20 PROCEDURE — 3074F PR MOST RECENT SYSTOLIC BLOOD PRESSURE < 130 MM HG: ICD-10-PCS | Mod: CPTII,S$GLB,, | Performed by: FAMILY MEDICINE

## 2023-01-20 PROCEDURE — 1159F PR MEDICATION LIST DOCUMENTED IN MEDICAL RECORD: ICD-10-PCS | Mod: CPTII,S$GLB,, | Performed by: FAMILY MEDICINE

## 2023-01-20 PROCEDURE — 3008F BODY MASS INDEX DOCD: CPT | Mod: CPTII,S$GLB,, | Performed by: FAMILY MEDICINE

## 2023-01-20 PROCEDURE — 99214 OFFICE O/P EST MOD 30 MIN: CPT | Mod: S$GLB,CS,, | Performed by: FAMILY MEDICINE

## 2023-01-20 PROCEDURE — 1125F AMNT PAIN NOTED PAIN PRSNT: CPT | Mod: CPTII,S$GLB,, | Performed by: FAMILY MEDICINE

## 2023-01-20 PROCEDURE — 3074F SYST BP LT 130 MM HG: CPT | Mod: CPTII,S$GLB,, | Performed by: FAMILY MEDICINE

## 2023-01-20 PROCEDURE — 3078F DIAST BP <80 MM HG: CPT | Mod: CPTII,S$GLB,, | Performed by: FAMILY MEDICINE

## 2023-01-20 PROCEDURE — 87811 SARS CORONAVIRUS 2 ANTIGEN POCT, MANUAL READ: ICD-10-PCS | Mod: QW,S$GLB,, | Performed by: FAMILY MEDICINE

## 2023-01-20 PROCEDURE — 1125F PR PAIN SEVERITY QUANTIFIED, PAIN PRESENT: ICD-10-PCS | Mod: CPTII,S$GLB,, | Performed by: FAMILY MEDICINE

## 2023-01-20 PROCEDURE — 3078F PR MOST RECENT DIASTOLIC BLOOD PRESSURE < 80 MM HG: ICD-10-PCS | Mod: CPTII,S$GLB,, | Performed by: FAMILY MEDICINE

## 2023-01-20 PROCEDURE — 99214 PR OFFICE/OUTPT VISIT, EST, LEVL IV, 30-39 MIN: ICD-10-PCS | Mod: S$GLB,CS,, | Performed by: FAMILY MEDICINE

## 2023-01-20 PROCEDURE — 1159F MED LIST DOCD IN RCRD: CPT | Mod: CPTII,S$GLB,, | Performed by: FAMILY MEDICINE

## 2023-01-20 PROCEDURE — 3008F PR BODY MASS INDEX (BMI) DOCUMENTED: ICD-10-PCS | Mod: CPTII,S$GLB,, | Performed by: FAMILY MEDICINE

## 2023-01-20 PROCEDURE — 87811 SARS-COV-2 COVID19 W/OPTIC: CPT | Mod: QW,S$GLB,, | Performed by: FAMILY MEDICINE

## 2023-01-20 RX ORDER — PROMETHAZINE HYDROCHLORIDE AND DEXTROMETHORPHAN HYDROBROMIDE 6.25; 15 MG/5ML; MG/5ML
5 SYRUP ORAL EVERY 8 HOURS PRN
Qty: 180 ML | Refills: 0 | Status: SHIPPED | OUTPATIENT
Start: 2023-01-20 | End: 2023-01-30

## 2023-01-20 NOTE — PROGRESS NOTES
"Subjective:       Patient ID: Anthony Miguel is a 72 y.o. male.    Vitals:  height is 5' 7" (1.702 m) and weight is 83.9 kg (185 lb). His temperature is 98.6 °F (37 °C). His blood pressure is 129/76 and his pulse is 61. His respiration is 18 and oxygen saturation is 97%.     Chief Complaint: Sinus Problem    Pt states he has a deeper cough than usual and that's what he's concerned about, fatigue     Sinus Problem  This is a new problem. The current episode started in the past 7 days. The problem has been gradually worsening since onset. There has been no fever. His pain is at a severity of 5/10. Associated symptoms include congestion, coughing, sinus pressure and a sore throat. Pertinent negatives include no chills, diaphoresis, ear pain, headaches, hoarse voice, neck pain, shortness of breath, sneezing or swollen glands. Past treatments include nothing. The treatment provided no relief.     Constitution: Negative for chills and sweating.   HENT:  Positive for congestion, sinus pressure and sore throat. Negative for ear pain.    Neck: Negative for neck pain.   Respiratory:  Positive for cough. Negative for shortness of breath.    Allergic/Immunologic: Negative for sneezing.   Neurological:  Negative for headaches.     Objective:      Physical Exam   Constitutional: He is oriented to person, place, and time. He appears well-developed. He is cooperative.  Non-toxic appearance. He does not appear ill. No distress.   HENT:   Head: Normocephalic and atraumatic.   Ears:   Right Ear: Hearing, tympanic membrane, external ear and ear canal normal.   Left Ear: Hearing, tympanic membrane, external ear and ear canal normal.   Nose: Nose normal. No mucosal edema, rhinorrhea or nasal deformity. No epistaxis. Right sinus exhibits no maxillary sinus tenderness and no frontal sinus tenderness. Left sinus exhibits no maxillary sinus tenderness and no frontal sinus tenderness.   Mouth/Throat: Uvula is midline, oropharynx is clear " and moist and mucous membranes are normal. No trismus in the jaw. Normal dentition. No uvula swelling. No oropharyngeal exudate, posterior oropharyngeal edema or posterior oropharyngeal erythema.   Eyes: Conjunctivae and lids are normal. No scleral icterus.   Neck: Trachea normal and phonation normal. Neck supple. No edema present. No erythema present. No neck rigidity present.   Cardiovascular: Normal rate, regular rhythm, normal heart sounds and normal pulses.   Pulmonary/Chest: Effort normal and breath sounds normal. No respiratory distress. He has no decreased breath sounds. He has no wheezes. He has no rhonchi. He has no rales.   Abdominal: Normal appearance.   Musculoskeletal: Normal range of motion.         General: No deformity. Normal range of motion.   Neurological: He is alert and oriented to person, place, and time. He exhibits normal muscle tone. Coordination normal.   Skin: Skin is warm, dry, intact, not diaphoretic and not pale.   Psychiatric: His speech is normal and behavior is normal. Judgment and thought content normal.   Nursing note and vitals reviewed.    Results for orders placed or performed in visit on 01/20/23   SARS Coronavirus 2 Antigen, POCT Manual Read   Result Value Ref Range    SARS Coronavirus 2 Antigen Positive (A) Negative     Acceptable Yes            Assessment:       1. Cough, unspecified type    2. COVID          Plan:         Cough, unspecified type  -     SARS Coronavirus 2 Antigen, POCT Manual Read    COVID    Other orders  -     nirmatrelvir-ritonavir 300 mg (150 mg x 2)-100 mg copackaged tablets (EUA); Take 3 tablets by mouth 2 (two) times daily for 5 days. Each dose contains 2 nirmatrelvir (pink tablets) and 1 ritonavir (white tablet). Take all 3 tablets together  Dispense: 30 tablet; Refill: 0  -     promethazine-dextromethorphan (PROMETHAZINE-DM) 6.25-15 mg/5 mL Syrp; Take 5 mLs by mouth every 8 (eight) hours as needed.  Dispense: 180 mL; Refill: 0    Pt  advised to use tylenol and ibuprofen alternatively every 4 hours to help with fever suppression and body aches. Pt also advised to make sure they drink plenty of fluids.

## 2023-01-20 NOTE — PATIENT INSTRUCTIONS
Stay home for 5 days.  If you have no symptoms or your symptoms are resolving after 5 days, you can leave your house.  Continue to wear a mask around others for 5 additional days.  If you have a fever, continue to stay home until your fever resolves.    Use ibuprofen and tylenol for aches/pains fever. Mucinex can be used as an expectorant to help relieve mucus buildup. Antihistamines and flonase can be helpful for nasal congestion and runny nose. Drink lots of fluids. I have enrolled you in the home symptom monitoring program who will call or text you periodically over the next 14 days to monitor your symptoms and triage you to the appropriate location if you are not feeling well. If you are having difficulty breathing, inability to speak in full sentences, go to the ER immediately.     Patient Education       COVID-19 Discharge Instructions   About this topic   Coronavirus disease 2019 is also known as COVID-19. It is a viral illness that infects the lungs. It is caused by a virus called SARS-associated coronavirus (SARS-CoV-2).  The signs of COVID-19 most often start a few days after you have been infected. In some people, it takes longer to show signs. Others never show signs of the infection. You may have a cough, fever, shaking chills and it may be hard to breathe. You may be very tired, have muscle aches, a headache or sore throat. Some people have an upset stomach or loose stools. Others lose their sense of smell or taste. You may not have these signs all the time and they may come and go while you are sick.  The virus spreads easily through droplets when you talk, sneeze, or cough. You can pass the virus to others when you are talking close together, singing, hugging, sharing food, or shaking hands. Doctors believe the germs also survive on surfaces like tables, door handles, and telephones. However, this is not a common way that COVID-19 spreads. Doctors believe you can also spread the infection even if you  dont have any symptoms, but they do not know how that happens. This is why getting vaccinated is one of the best ways to keep you healthy and slow the spread of the virus.  Some people have a mild case of COVID-19 and are able to stay at home and away from others until they feel better. Others may need to be in the hospital if they are very sick. Some people with COVID-19 can have some symptoms for weeks or months. People with COVID-19 must isolate themselves. You can start to be around others when your doctor says it is safe to do so.       What care is needed at home?   Ask your doctor what you need to do when you go home. Make sure you ask questions if you do not understand what the doctor says.  Drink lots of water, juice, or broth to replace fluids lost from a fever.  You may use cool mist humidifiers to help ease congestion and coughing.  Use 2 to 3 pillows to prop yourself up when you lie down to make it easier to breathe and sleep.  Do not smoke and do not drink beer, wine, and mixed drinks (alcohol).  To lower the chance of passing the infection to others, get a COVID-19 vaccine after your infection has resolved.  If you have not been fully vaccinated:  Wear a mask over your mouth and nose if you are around others who are not sick. Cloth masks work best if they have more than one layer of fabric.  Wash your hands often.  Stay home in a separate room, if possible, away from others. Only go out to get medical care.  Use a separate bathroom if possible.  Do not make food for others.  What follow-up care is needed?   Your doctor may ask you to make visits to the office to check on your progress. Be sure to keep these visits. Make sure you wear a mask at these visits.  If you can, tell the staff you have COVID-19 ahead of time so they can take extra care to stop the disease from spreading.  It may take a few weeks before your health returns to normal.  What drugs may be needed?   The doctor may order drugs  to:  Help with breathing  Help with fever  Help with swelling in your airways and lungs  Control coughing  Ease a sore throat  Help a runny or stuffy nose  Will physical activity be limited?   You may have to limit your physical activity. Talk to your doctor about the right amount of activity for you. If you have been very sick with COVID-19, it can take some time to get your strength back.  Will there be any other care needed?   Doctors do not know how long you can pass the virus on to others after you are sick. This is why it is important to stay in a separate room, if possible, when you are sick. For now, doctors are giving general guidelines for you to follow after you have been sick. Before you go around other people, you should:  Be fever free for 24 hours without taking any drugs to lower the fever  Have no symptoms of cough or shortness of breath  Wait at least 10 days after first having symptoms or your first positive test, and you need to be symptom free as above. Some experts suggest waiting 20 days if you have had a more severe infection.  Talk with your doctor about getting a COVID-19 vaccine.  What problems could happen?   Fluid loss. This is dehydration.  Short-term or long-term lung damage  Heart problems  Death  When do I need to call the doctor?   You are having so much trouble breathing that you can only say one or two words at a time.  You need to sit upright at all times to be able to breathe and/or cannot lie down.  You are very confused or cannot stay awake.  Your lips or skin start to turn blue or grey.  You think you might be having a medical emergency. Some examples of medical emergencies are:  Severe chest pain.  Not able to speak or move normally.  You have trouble breathing when talking or sitting still.  You have new shortness of breath.  You become weak or dizzy.  You have very dark urine or do not pass urine for more than 8 hours.  You have new or worsening COVID-19 symptoms  like:  Fever  Cough  Feeling very tired  Shaking chills  Headache  Trouble swallowing  Throwing up  Loose stools  Reddish purple spots on your fingers or toes  Teach Back: Helping You Understand   The Teach Back Method helps you understand the information we are giving you. After you talk with the staff, tell them in your own words what you learned. This helps to make sure the staff has described each thing clearly. It also helps to explain things that may have been confusing. Before going home, make sure you can do these:  I can tell you about my condition.  I can tell you what may help ease my breathing.  I can tell you what I can do to help avoid passing the infection to others.  I can tell you what I will do if I have trouble breathing; feel sleepy or confused; or my fingertips, fingernails, skin, or lips are blue.  Where can I learn more?   Centers for Disease Control and Prevention  https://www.cdc.gov/coronavirus/2019-ncov/about/index.html   Centers for Disease Control and Prevention  https://www.cdc.gov/coronavirus/2019-ncov/hcp/disposition-in-home-patients.html   World Health Organization  https://www.who.int/news-room/q-a-detail/g-x-ojaqugmawxpka   Last Reviewed Date   2021-10-05  Consumer Information Use and Disclaimer   This information is not specific medical advice and does not replace information you receive from your health care provider. This is only a brief summary of general information. It does NOT include all information about conditions, illnesses, injuries, tests, procedures, treatments, therapies, discharge instructions or life-style choices that may apply to you. You must talk with your health care provider for complete information about your health and treatment options. This information should not be used to decide whether or not to accept your health care providers advice, instructions or recommendations. Only your health care provider has the knowledge and training to provide advice  that is right for you.  Copyright   Copyright © 2021 [employer] and its affiliates and/or licensors. All rights reserved.

## 2023-01-21 ENCOUNTER — TELEPHONE (OUTPATIENT)
Dept: PRIMARY CARE CLINIC | Facility: CLINIC | Age: 73
End: 2023-01-21

## 2023-01-22 NOTE — TELEPHONE ENCOUNTER
Please call patient, was seen in Urgent Care and tested postitive for COVID. How are they doing?     Was he able to tolerate the treatment? Is he improving?    Thanks,  KJ

## 2023-01-23 ENCOUNTER — TELEPHONE (OUTPATIENT)
Dept: PRIMARY CARE CLINIC | Facility: CLINIC | Age: 73
End: 2023-01-23
Payer: MEDICARE

## 2023-01-23 NOTE — TELEPHONE ENCOUNTER
Spoke with pt stated that he,s doing well . Pt also stated that he is taking a 30 day supply of paxlovid.

## 2023-01-26 ENCOUNTER — TELEPHONE (OUTPATIENT)
Dept: PAIN MEDICINE | Facility: CLINIC | Age: 73
End: 2023-01-26
Payer: MEDICARE

## 2023-01-26 NOTE — TELEPHONE ENCOUNTER
Staff left pt a voicemail informing pt that we will have to reschedule his appt due to pt being  covid positive staff informed pt to reach back out to us to reschedule this appt

## 2023-01-31 ENCOUNTER — TELEPHONE (OUTPATIENT)
Dept: PRIMARY CARE CLINIC | Facility: CLINIC | Age: 73
End: 2023-01-31
Payer: MEDICARE

## 2023-01-31 ENCOUNTER — OFFICE VISIT (OUTPATIENT)
Dept: PRIMARY CARE CLINIC | Facility: CLINIC | Age: 73
End: 2023-01-31
Payer: MEDICARE

## 2023-01-31 VITALS
SYSTOLIC BLOOD PRESSURE: 142 MMHG | DIASTOLIC BLOOD PRESSURE: 88 MMHG | WEIGHT: 172.94 LBS | OXYGEN SATURATION: 99 % | TEMPERATURE: 97 F | BODY MASS INDEX: 27.14 KG/M2 | HEART RATE: 68 BPM | HEIGHT: 67 IN

## 2023-01-31 DIAGNOSIS — R93.89 ABNORMAL FINDINGS ON DIAGNOSTIC IMAGING OF OTHER SPECIFIED BODY STRUCTURES: ICD-10-CM

## 2023-01-31 DIAGNOSIS — F33.0 MILD EPISODE OF RECURRENT MAJOR DEPRESSIVE DISORDER: ICD-10-CM

## 2023-01-31 DIAGNOSIS — D69.2 SENILE PURPURA: ICD-10-CM

## 2023-01-31 DIAGNOSIS — G62.9 NEUROPATHY: ICD-10-CM

## 2023-01-31 DIAGNOSIS — F15.182 CAFFEINE-INDUCED SLEEP DISORDER WITH MILD USE DISORDER, INSOMNIA TYPE: ICD-10-CM

## 2023-01-31 DIAGNOSIS — M48.07 LUMBOSACRAL STENOSIS WITH NEUROGENIC CLAUDICATION: ICD-10-CM

## 2023-01-31 DIAGNOSIS — R97.20 ELEVATED PROSTATE SPECIFIC ANTIGEN (PSA): ICD-10-CM

## 2023-01-31 DIAGNOSIS — M54.16 LUMBAR RADICULOPATHY: Primary | ICD-10-CM

## 2023-01-31 DIAGNOSIS — Z01.818 PREOP EXAM FOR INTERNAL MEDICINE: ICD-10-CM

## 2023-01-31 DIAGNOSIS — C80.1 MALIGNANT (PRIMARY) NEOPLASM, UNSPECIFIED: ICD-10-CM

## 2023-01-31 DIAGNOSIS — S49.90XD SHOULDER INJURY, UNSPECIFIED LATERALITY, SUBSEQUENT ENCOUNTER: ICD-10-CM

## 2023-01-31 DIAGNOSIS — D75.89 MACROCYTOSIS: ICD-10-CM

## 2023-01-31 PROCEDURE — 99499 UNLISTED E&M SERVICE: CPT | Mod: HCNC,S$GLB,, | Performed by: INTERNAL MEDICINE

## 2023-01-31 PROCEDURE — 1100F PTFALLS ASSESS-DOCD GE2>/YR: CPT | Mod: HCNC,CPTII,S$GLB, | Performed by: INTERNAL MEDICINE

## 2023-01-31 PROCEDURE — 96372 PR INJECTION,THERAP/PROPH/DIAG2ST, IM OR SUBCUT: ICD-10-PCS | Mod: 59,HCNC,S$GLB, | Performed by: INTERNAL MEDICINE

## 2023-01-31 PROCEDURE — 3288F PR FALLS RISK ASSESSMENT DOCUMENTED: ICD-10-PCS | Mod: HCNC,CPTII,S$GLB, | Performed by: INTERNAL MEDICINE

## 2023-01-31 PROCEDURE — 1159F PR MEDICATION LIST DOCUMENTED IN MEDICAL RECORD: ICD-10-PCS | Mod: HCNC,CPTII,S$GLB, | Performed by: INTERNAL MEDICINE

## 2023-01-31 PROCEDURE — 3288F FALL RISK ASSESSMENT DOCD: CPT | Mod: HCNC,CPTII,S$GLB, | Performed by: INTERNAL MEDICINE

## 2023-01-31 PROCEDURE — 1100F PR PT FALLS ASSESS DOC 2+ FALLS/FALL W/INJURY/YR: ICD-10-PCS | Mod: HCNC,CPTII,S$GLB, | Performed by: INTERNAL MEDICINE

## 2023-01-31 PROCEDURE — 3077F PR MOST RECENT SYSTOLIC BLOOD PRESSURE >= 140 MM HG: ICD-10-PCS | Mod: HCNC,CPTII,S$GLB, | Performed by: INTERNAL MEDICINE

## 2023-01-31 PROCEDURE — 1159F MED LIST DOCD IN RCRD: CPT | Mod: HCNC,CPTII,S$GLB, | Performed by: INTERNAL MEDICINE

## 2023-01-31 PROCEDURE — 90694 FLU VACCINE - QUADRIVALENT - ADJUVANTED: ICD-10-PCS | Mod: HCNC,S$GLB,, | Performed by: INTERNAL MEDICINE

## 2023-01-31 PROCEDURE — 3079F PR MOST RECENT DIASTOLIC BLOOD PRESSURE 80-89 MM HG: ICD-10-PCS | Mod: HCNC,CPTII,S$GLB, | Performed by: INTERNAL MEDICINE

## 2023-01-31 PROCEDURE — G0008 FLU VACCINE - QUADRIVALENT - ADJUVANTED: ICD-10-PCS | Mod: HCNC,S$GLB,, | Performed by: INTERNAL MEDICINE

## 2023-01-31 PROCEDURE — 3079F DIAST BP 80-89 MM HG: CPT | Mod: HCNC,CPTII,S$GLB, | Performed by: INTERNAL MEDICINE

## 2023-01-31 PROCEDURE — 3077F SYST BP >= 140 MM HG: CPT | Mod: HCNC,CPTII,S$GLB, | Performed by: INTERNAL MEDICINE

## 2023-01-31 PROCEDURE — 96372 THER/PROPH/DIAG INJ SC/IM: CPT | Mod: 59,HCNC,S$GLB, | Performed by: INTERNAL MEDICINE

## 2023-01-31 PROCEDURE — 1125F AMNT PAIN NOTED PAIN PRSNT: CPT | Mod: HCNC,CPTII,S$GLB, | Performed by: INTERNAL MEDICINE

## 2023-01-31 PROCEDURE — 99215 PR OFFICE/OUTPT VISIT, EST, LEVL V, 40-54 MIN: ICD-10-PCS | Mod: HCNC,25,S$GLB, | Performed by: INTERNAL MEDICINE

## 2023-01-31 PROCEDURE — 1125F PR PAIN SEVERITY QUANTIFIED, PAIN PRESENT: ICD-10-PCS | Mod: HCNC,CPTII,S$GLB, | Performed by: INTERNAL MEDICINE

## 2023-01-31 PROCEDURE — 90694 VACC AIIV4 NO PRSRV 0.5ML IM: CPT | Mod: HCNC,S$GLB,, | Performed by: INTERNAL MEDICINE

## 2023-01-31 PROCEDURE — G0008 ADMIN INFLUENZA VIRUS VAC: HCPCS | Mod: HCNC,S$GLB,, | Performed by: INTERNAL MEDICINE

## 2023-01-31 PROCEDURE — 99499 RISK ADDL DX/OHS AUDIT: ICD-10-PCS | Mod: HCNC,S$GLB,, | Performed by: INTERNAL MEDICINE

## 2023-01-31 PROCEDURE — 3008F PR BODY MASS INDEX (BMI) DOCUMENTED: ICD-10-PCS | Mod: HCNC,CPTII,S$GLB, | Performed by: INTERNAL MEDICINE

## 2023-01-31 PROCEDURE — 99215 OFFICE O/P EST HI 40 MIN: CPT | Mod: HCNC,25,S$GLB, | Performed by: INTERNAL MEDICINE

## 2023-01-31 PROCEDURE — 3008F BODY MASS INDEX DOCD: CPT | Mod: HCNC,CPTII,S$GLB, | Performed by: INTERNAL MEDICINE

## 2023-01-31 RX ORDER — CYANOCOBALAMIN 1000 UG/ML
1000 INJECTION, SOLUTION INTRAMUSCULAR; SUBCUTANEOUS
Status: COMPLETED | OUTPATIENT
Start: 2023-01-31 | End: 2023-01-31

## 2023-01-31 RX ADMIN — CYANOCOBALAMIN 1000 MCG: 1000 INJECTION, SOLUTION INTRAMUSCULAR; SUBCUTANEOUS at 04:01

## 2023-01-31 NOTE — TELEPHONE ENCOUNTER
----- Message from Bindu Chicas sent at 1/31/2023  9:43 AM CST -----  Contact: [phone]  the patient is calling to get rescheduled for there appt.  the patient would like a call back at your earliest convenience.  the patient can be reached at.[phone]

## 2023-01-31 NOTE — ASSESSMENT & PLAN NOTE
Prostate cancer, treated with total prostatectomy  · Monitor with PSA  · Refer to Uro for 6mo follow-up Dr Dixon

## 2023-01-31 NOTE — PROGRESS NOTES
Primary Care Provider Appointment - [facility]  SHARED NOTE: Petr Frazier (MD Trainee), Dr Spaulding (Attending)    Subjective:      Patient ID: Anthony Miguel is a 72 y.o. male with hx of c3-c6 fusion in 2019, hx of TBI, chronic pain and lumbar radiculopathy      Chief Complaint: Pre-Op    Prior to this visit, patient's last encounter with PCP was Visit date not found (over 1 year ago).    Patient presents today for pre -op evaluation and clearance. Patient is scheduled for an RC repair on 2/14 with Dr. Massey for supraspinatus and infraspinatus full thickness tears. Recent MRI showed the following    Patient has no previous history of complications from anesthesia. No hx of CVA or MI. No hx of anticoagulation. Patient has had 2 falls in the past 3 months.    Patient was diagnosed with covid-19 on 1/20/2023. Patient was treated with paxlovid. He reports symptoms have resolved and has no complaints at this point.     Discussed in detail how serious of a consideration a surgery of this magnitude is. Explained to patient the importance of being compliant with all post op instruction and giving maximal effort in physical therapy. Warned patient about the risk of adhesive capsulitis if non-compliant    Clinical assessment: has 0 clinical risk factor  Surgery-specific risk:  intermediate (orthopedic)  Functional Capacity:  1-4 METS (ADLs, walks indoors, walk 1-2 level)    As of my last clinical evaluation on 1/12/2021 Mr. Miguel had no active cardiac complaints. In relation to this specific surgery, Revised Cardiac Risk Index (RCRI) factors include they have no risk factors. which gives gives him a 3.9% risk of major adverse cardiac events (MACE). However the most recent validation cohort of the RCRI risk factors include asymptomatic postoperative troponin elevation defined as perioperative myocardial infarction (MI) as the majority of their MACE. The risk of clinically diagnosed MI or cardiac arrest within 30  days of surgery is more accurately represented by the WU score, which gives this patient a risk of 0.2% .  As of my last evaluation Mr. Miguel was medically optimized, and no additional preoperative testing indicated. Please proceed on risk benefit basis.    Patient should hold all NSAIDs (voltaren cream) and laxatives/stool softeners on day before and day of surgery, and hold vitamin D for one week before. No additional recommendations at this time prior to surgery.    REF:  https://www.mdcalc.com/yrrcpit-axmxqvj-ckvy-index-pre-operative-risk  https://www.mdcalc.com/calc/4038/lfccf-rwkenwgknktma-fmkq-myocardial-infarction-cardiac-arrest-den        After obtaining consent, and per orders of Dr. Jada Spaulding, injection of High-dose flu vaccine given by Petr Frazier. Patient instructed to remain in clinic for 20 minutes afterwards, and to report any adverse reaction to me immediately.      NDC:50198-052-19  Lot: [id number]  Expiration: 6/30/2023  Location: Left Deltoid  Method: Intramuscular Injection  Time: 5:07 PM  Date: 01/31/2023       - Petr Frazier  MS4        4Ms for Medical Decision-Making in Older Adults    Last Completed EAWV: 10/26/2020    MOBILITY:  Get Up and Go:  No flowsheet data found.  Activities of Daily Living:  Activities of Daily Living 10/26/2020   Ambulation Assistance Required   Ambulation Assistance Cane   Dressing Independent   Transfers Independent   Toileting Incontinent of bladder;Continent of bowel   Feeding Independent   Cleaning home/Chores Independent   Telephone use Independent   Shopping Independent   Paying bills Independent   Taking meds Independent     Whisper Test:  Whisper Test 10/26/2020   Whisper Test Abnormal     Disability Status:  Disability Status 10/26/2020   Are you deaf or do you have serious difficulty hearing? Yes   Are you blind or do you have serious difficulty seeing, even when wearing glasses? Yes   Because of a physical, mental, or emotional  condition, do you have serious difficulty concentrating, remembering, or making decisions? No   Do you have serious difficulty walking or climbing stairs? No   Do you have difficulty dressing or bathing? No   Because of a physical, mental, or emotional condition, do you have difficulty doing errands alone such as visiting a doctor's office or shopping? No     Nutrition Screening:  Nutrition Screening 10/26/2020   Has food intake declined over the past three months due to loss of appetite, digestive problems, chewing or swallowing difficulties? No decrease in food intake   Involuntary weight loss during the last 3 months? No weight loss   Mobility? Goes out   Has the patient suffered psychological stress or acute disease in the past three months? No   Neuropsychological problems? No psychological problems   Body Mass Index (BMI)?  BMI 23 or greater   Screening Score 14    Screening Score: 0-7 Malnourished, 8-11 At Risk, 12-14 Normal    MENTATION:   Depression Patient Health Questionnaire:  Depression Patient Health Questionnaire 1/31/2023   Over the last two weeks how often have you been bothered by little interest or pleasure in doing things Not at all   Over the last two weeks how often have you been bothered by feeling down, depressed or hopeless Not at all   PHQ-2 Total Score 0   PHQ-4 Total Score -   PHQ-9 Total Score -     Has Dementia Dx: No    Cognitive Function Screening:  Cognitive Function Screening 10/26/2020   Clock Drawing Test 2   Mini-Cog 3 Minute Recall 3   Cognitive Function Screening 5     Cognitive Function Screening Total - Less than 4 = Abnormal,  Greater than or equal to 4 = Normal    MEDICATIONS:  High Risk Medications:  Total Active Medications: 1  gabapentin - 300 MG    WHAT MATTERS MOST:  Advance Care Planning   ACP Status:   Patient has had an ACP conversation  Living Will: No  Power of : No  [place]: No                     Social History     Socioeconomic History    Marital status:  "Single    Number of children: 0   Tobacco Use    Smoking status: Former     Packs/day: 1.00     Types: Cigarettes     Quit date: 2000     Years since quittin.0    Smokeless tobacco: Former   Substance and Sexual Activity    Alcohol use: Not Currently    Drug use: No    Sexual activity: Not Currently   Social History Narrative    No difficulty reading Rx labels        Review of Systems   Constitutional:  Positive for activity change and fatigue.   Musculoskeletal:  Positive for arthralgias, gait problem and myalgias.   Hematological:  Bruises/bleeds easily.   Psychiatric/Behavioral:  Positive for dysphoric mood. The patient is nervous/anxious.      Objective:   BP (!) 142/88 (BP Location: Left arm, Patient Position: Sitting, BP Method: Medium (Manual))   Pulse 68   Temp 97.3 °F (36.3 °C) (Oral)   Ht 5' 7" (1.702 m)   Wt 78.4 kg (172 lb 15.2 oz)   SpO2 99%   BMI 27.09 kg/m²     Physical Exam  Constitutional:       Comments: Ambulating with rolling walker   Neck:      Comments: Decreased range of motion  Cardiovascular:      Rate and Rhythm: Normal rate.   Pulmonary:      Effort: Pulmonary effort is normal.   Musculoskeletal:         General: Swelling, tenderness, deformity and signs of injury present.      Comments: Decreased range of motion   Skin:     Findings: Bruising present.   Neurological:      Mental Status: He is alert.   Psychiatric:      Comments: Anxious           Lab Results   Component Value Date    WBC 7.27 2022    HGB 14.3 2022    HCT 44.2 2022     2022    CHOL 146 2022    TRIG 89 2022    HDL 62 2022    ALT 12 2022    AST 17 2022     2022    K 4.6 2022     2022    CREATININE 0.9 2022    BUN 16 2022    CO2 28 2022    TSH 2.555 2021    PSA <0.01 2022    INR 1.0 2017    HGBA1C 5.0 2022       Current Outpatient Medications on File Prior to Visit   Medication Sig " Dispense Refill    cholecalciferol, vitamin D3, (VITAMIN D3) 50 mcg (2,000 unit) Cap Take 1 capsule (2,000 Units total) by mouth once daily. 90 capsule 3    gabapentin (NEURONTIN) 300 MG capsule TAKE 2 CAPSULES BY MOUTH THREE TIMES DAILY 540 capsule 3    meloxicam (MOBIC) 15 MG tablet UNKNOWN 777 tablet 0    multivitamin capsule Take 1 capsule by mouth once daily.      traZODone (DESYREL) 50 MG tablet Take 3 tablets (150 mg total) by mouth nightly as needed for Insomnia. 270 tablet 3    triamcinolone acetonide 0.1% (KENALOG) 0.1 % cream Apply 15 g (15,000 mg total) topically 2 (two) times daily as needed (for cuts). 1 each 3    VOLTAREN 1 % Gel UNKNOWN 777 g 0    walker (ULTRA-LIGHT ROLLATOR) Misc 1 Units by Misc.(Non-Drug; Combo Route) route once daily at 6am. 1 each 0    [DISCONTINUED] atorvastatin (LIPITOR) 10 MG tablet Take 1 tablet (10 mg total) by mouth once daily. 90 tablet 3    [DISCONTINUED] celecoxib (CELEBREX) 100 MG capsule Take 1 capsule (100 mg total) by mouth once daily. 90 capsule 3    [DISCONTINUED] ferrous sulfate 325 (65 FE) MG EC tablet Take 1 tablet (325 mg total) by mouth once daily. 90 tablet 3    [] promethazine-dextromethorphan (PROMETHAZINE-DM) 6.25-15 mg/5 mL Syrp Take 5 mLs by mouth every 8 (eight) hours as needed. 180 mL 0    [DISCONTINUED] sertraline (ZOLOFT) 25 MG tablet Take 1 tablet (25 mg total) by mouth once daily. 90 tablet 3     Current Facility-Administered Medications on File Prior to Visit   Medication Dose Route Frequency Provider Last Rate Last Admin    0.9%  NaCl infusion   Intravenous Continuous Altaf Santana MD             Assessment:   72 y.o. male with multiple co-morbid illnesses here to follow-up with PCP and continue work-up of chronic issues:    Plan:     Problem List Items Addressed This Visit          Neuro    Neuropathy    Relevant Orders    TSH    Lumbar radiculopathy - Primary     Low back pain with spinal stenosis on MRI, difficulty  ambulating  Followed closely by pain management  Advised to minimize NSAID dose  He has restarted Mobic daily  Advised against this for long-term use         Lumbosacral stenosis with neurogenic claudication     Seen on CT, followed by pain management.  He has had multiple procedures with pain management including lumbar KAYLEY, does not want to continue outpatient PT  PT/OT not completed   Patient advised of the harm that can happen with this pattern historically in regards to his pain.  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, insomnia type     Drinking caffeinated soda at bedtime, coffee in AM  Advised to cut back on soda at bedtime  Continue trazodone 150mg qhs PRN  Patient uses the 50 mg tablet as needed         Mild episode of recurrent major depressive disorder     Uncontrolled chronic pain, sedentary lifestyle  Not taking zoloft            Hematology    Senile purpura     Ecchymoses on L UE   monitor            Oncology    Malignant (primary) neoplasm, unspecified     Prostate cancer, treated with total prostatectomy  Monitor with PSA  Refer to Uro for 6mo follow-up Dr Dixon         Relevant Orders    Ambulatory referral/consult to Urology    PROSTATE SPECIFIC ANTIGEN, DIAGNOSTIC       Other    Preop exam for internal medicine     Clinical assessment: has 0 clinical risk factor   Surgery-specific risk:  intermediate (orthopedic)   Functional Capacity:  1-4 METS (ADLs, walks indoors, walk 1-2 level)    As of my last clinical evaluation on 1/12/2021 Mr. Miguel had no active cardiac complaints. In relation to this specific surgery, Revised Cardiac Risk Index (RCRI) factors include they have no risk factors. which gives gives him a 3.9% risk of major adverse cardiac events (MACE). However the most recent validation  cohort of the RCRI risk factors include asymptomatic postoperative troponin elevation defined as perioperative myocardial infarction (MI) as the majority of their MACE. The risk of clinically diagnosed MI or cardiac arrest within 30 days of surgery is more accurately represented by the WU score, which gives this patient a risk of 0.2% .  As of my last evaluation Mr. Miguel was medically optimized, and no additional preoperative testing indicated. Please proceed on risk benefit basis.    Patient should hold all NSAIDs (voltaren cream) and laxatives/stool softeners on day before and day of surgery, and hold vitamin D for one week before. No additional recommendations at this time prior to surgery.    Discussed in detail how serious of a consideration a surgery of this magnitude is. Explained to patient the importance of being compliant with all post op instruction and giving maximal effort in physical therapy. Warned patient about the risk of adhesive capsulitis if non-compliant    one week before your surgery, stop the Vit D, multivitamin, meloxicam, voltaren gel          Other Visit Diagnoses       Shoulder injury, unspecified laterality, subsequent encounter        Relevant Orders    CBC Auto Differential    Comprehensive Metabolic Panel    Abnormal findings on diagnostic imaging of other specified body structures        Relevant Orders    [facility]    Ambulatory referral/consult to Urology    Elevated prostate specific antigen (PSA)        Relevant Orders    PROSTATE SPECIFIC ANTIGEN, DIAGNOSTIC    Macrocytosis        Relevant Medications    cyanocobalamin injection 1,000 mcg            Health Maintenance         Date Due Completion Date    Shingles Vaccine (1 of 2) 11/14/2016 9/19/2016    COVID-19 Vaccine (3 - Booster for Pfizer series) 04/06/2021 2/9/2021    Influenza Vaccine (1) 09/01/2022 9/8/2020 (Done)- TODAY    Override on 9/8/2020: Done (performed in the community)    PROSTATE-SPECIFIC ANTIGEN 07/19/2023  7/19/2022- TODAY    Lipid Panel 07/19/2023 7/19/2022    Colorectal Cancer Screening 04/15/2026 4/15/2016    TETANUS VACCINE 08/31/2031 8/31/2021            Future Appointments   Date Time Provider Department Center   2/8/2023  1:30 PM Petr Horner III, PA-C [facility]   3/1/2023  9:45 AM Petr Horner III, PA-C [facility]   3/7/2023  1:30 PM [facility]   3/7/2023  2:15 PM Sergio Dixon MD [facility] UROLOG Cristiano Suarez   3/29/2023 10:15 AM Josefina Massey MD [facility]   8/15/2023  1:30 PM [facility]aulding [facility]         Follow up ROUTINE. Total clinical care time was 60 min, issues addressed include pre-op, annual exam    Jada Spaulding MD/MPH  [facility]  [phone] spectralink

## 2023-01-31 NOTE — PATIENT INSTRUCTIONS
TODAY:  - you need to go to physical and occupational therapy or you will get worse  - I am worried about you with this surgery because you have a tendency to not attend your therapy appointments in the past when you need to  - you will get worse with this surgery if you don't go to therapy  - COVID vaccine  - labs today  - Dr Dixon appt  - eat more fruits and vegetables  - continue your multivitamin  - one week before your surgery, stop the Vit D, multivitamin, meloxicam, voltaren gel

## 2023-01-31 NOTE — TELEPHONE ENCOUNTER
Patient called by Mateus Deluca.  Patient  to call and will be rescheduled for later today.  At another appointment at present

## 2023-01-31 NOTE — TELEPHONE ENCOUNTER
----- Message from Minna Walker sent at 1/31/2023  7:54 AM CST -----  Contact: pt @ [phone]  Anthony Miguel calling regarding Patient Advice (message) for #pt is calling that his ride has never showed up and will like for someone from office to call him about appt pt will like to still come to do blood work . Asking for urgent call back

## 2023-01-31 NOTE — ASSESSMENT & PLAN NOTE
Seen on CT, followed by pain management.  He has had multiple procedures with pain management including lumbar KAYLEY, does not want to continue outpatient PT  · PT/OT not completed   · Patient advised of the harm that can happen with this pattern historically in regards to his pain.  · Advised against pursuing additional surgeries and procedures without following up with his personal work of physical and occupational therapy that is needed

## 2023-01-31 NOTE — ASSESSMENT & PLAN NOTE
Drinking caffeinated soda at bedtime, coffee in AM  · Advised to cut back on soda at bedtime  · Continue trazodone 150mg qhs PRN  · Patient uses the 50 mg tablet as needed

## 2023-01-31 NOTE — ASSESSMENT & PLAN NOTE
· Clinical assessment: has 0 clinical risk factor   Surgery-specific risk:  intermediate (orthopedic)   Functional Capacity:  1-4 METS (ADLs, walks indoors, walk 1-2 level)    · As of my last clinical evaluation on 1/12/2021 Mr. Miguel had no active cardiac complaints. In relation to this specific surgery, Revised Cardiac Risk Index (RCRI) factors include they have no risk factors. which gives gives him a 3.9% risk of major adverse cardiac events (MACE). However the most recent validation cohort of the RCRI risk factors include asymptomatic postoperative troponin elevation defined as perioperative myocardial infarction (MI) as the majority of their MACE. The risk of clinically diagnosed MI or cardiac arrest within 30 days of surgery is more accurately represented by the WU score, which gives this patient a risk of 0.2% .  As of my last evaluation Mr. Miguel was medically optimized, and no additional preoperative testing indicated. Please proceed on risk benefit basis.    · Patient should hold all NSAIDs (voltaren cream) and laxatives/stool softeners on day before and day of surgery, and hold vitamin D for one week before. No additional recommendations at this time prior to surgery.    · Discussed in detail how serious of a consideration a surgery of this magnitude is. Explained to patient the importance of being compliant with all post op instruction and giving maximal effort in physical therapy. Warned patient about the risk of adhesive capsulitis if non-compliant    · one week before your surgery, stop the Vit D, multivitamin, meloxicam, voltaren gel

## 2023-01-31 NOTE — ASSESSMENT & PLAN NOTE
Low back pain with spinal stenosis on MRI, difficulty ambulating  · Followed closely by pain management  · Advised to minimize NSAID dose  · He has restarted Mobic daily  · Advised against this for long-term use

## 2023-02-01 ENCOUNTER — TELEPHONE (OUTPATIENT)
Dept: PRIMARY CARE CLINIC | Facility: CLINIC | Age: 73
End: 2023-02-01

## 2023-02-01 NOTE — TELEPHONE ENCOUNTER
Patient's labs were normal.  PSA was less than 0.01.    Please remind him of the importance of doing his occupational therapy after this surgery.  I can not express enough how worried I am about him not doing occupational therapy and his range of motion worsening after that intervention.        Thanks,  KJ

## 2023-02-08 ENCOUNTER — TELEPHONE (OUTPATIENT)
Dept: SPORTS MEDICINE | Facility: CLINIC | Age: 73
End: 2023-02-08
Payer: MEDICARE

## 2023-02-08 NOTE — TELEPHONE ENCOUNTER
I called the patient twice and left a voicemail encouraging him to return the call to discuss rescheduling his pre op appointment

## 2023-02-08 NOTE — TELEPHONE ENCOUNTER
----- Message from Erica Allison sent at 2/8/2023 11:17 AM CST -----  Regarding: reschedule appt  Contact: [phone]  Pt calling to reschedule appt pt stated he is not feeling well . Please call to discuss further

## 2023-02-09 ENCOUNTER — TELEPHONE (OUTPATIENT)
Dept: SPORTS MEDICINE | Facility: CLINIC | Age: 73
End: 2023-02-09
Payer: MEDICARE

## 2023-02-09 NOTE — TELEPHONE ENCOUNTER
I called the patient since he missed his pre op appointment yesterday. I spoke with him and he notes he injured his back and does not want to proceed with surgery at this time and wants to call back to get rescheduled once his back is feeling better.

## 2023-05-13 NOTE — DISCHARGE SUMMARY
Discharge Note  Short Stay      SUMMARY     Admit Date: 11/4/2020    Attending Physician: Marcel Adkins      Discharge Physician: Marcel Adkins      Discharge Date: 11/4/2020 3:20 PM    Procedure(s) (LRB):  INJECTION, JOINT, GLENOHUMERAL (Bilateral)    Final Diagnosis: Chronic pain of both shoulders [M25.511, G89.29, M25.512]    Disposition: Home or self care    Patient Instructions:   Current Discharge Medication List      CONTINUE these medications which have NOT CHANGED    Details   atorvastatin (LIPITOR) 10 MG tablet Take 1 tablet (10 mg total) by mouth once daily.  Qty: 90 tablet, Refills: 3    Associated Diagnoses: Hyperlipidemia, unspecified hyperlipidemia type      celecoxib (CELEBREX) 100 MG capsule Take 1 capsule (100 mg total) by mouth once daily.  Qty: 90 capsule, Refills: 3    Associated Diagnoses: Lumbosacral stenosis with neurogenic claudication      cholecalciferol, vitamin D3, (VITAMIN D3) 50 mcg (2,000 unit) Cap Take 1 capsule (2,000 Units total) by mouth once daily.  Qty: 90 capsule, Refills: 3    Associated Diagnoses: Vitamin D deficiency      diclofenac sodium (VOLTAREN) 1 % Gel Apply 2 g topically 4 (four) times daily.  Qty: 1 Tube, Refills: 11    Associated Diagnoses: Lumbosacral stenosis with neurogenic claudication      ferrous sulfate 325 (65 FE) MG EC tablet Take 1 tablet (325 mg total) by mouth once daily.  Qty: 90 tablet, Refills: 3    Associated Diagnoses: Iron deficiency anemia due to chronic blood loss      gabapentin (NEURONTIN) 300 MG capsule Take 2 capsules (600 mg total) by mouth 3 (three) times daily.  Qty: 540 capsule, Refills: 3    Associated Diagnoses: Unspecified inflammatory spondylopathy, cervical region      multivitamin capsule Take 1 capsule by mouth once daily.      polyethylene glycol (GLYCOLAX) 17 gram PwPk Take 17 g by mouth once daily.  Qty: 30 packet, Refills: 0      sertraline (ZOLOFT) 25 MG tablet Take 1 tablet (25 mg total) by mouth once daily.  Qty: 90  Pt presents to the ED via EMS c/o chest pain radiating to the back starting 45 minutes ago while at work. Pt was given 81 mg of aspirin and 1 nitro which relieved his chest pain from a 9 to a 5. Ekg done pta by ems showed ST elevations in leads 2, 3, 4, 5, and 6. Upon arrival pt has even RR with a mild headache and mid-sternal chest pain radiating to the back. Pt placed on lifepak and full cardiac monitor, ekg completed, lines established and labs sent.      Hilda Keyes RN  05/13/23 0968 tablet, Refills: 3    Associated Diagnoses: Mild episode of recurrent major depressive disorder      traZODone (DESYREL) 50 MG tablet Take 3 tablets (150 mg total) by mouth nightly as needed for Insomnia.  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       walker (ULTRA-LIGHT ROLLATOR) Misc 1 Units by Misc.(Non-Drug; Combo Route) route once daily at 6am.  Qty: 1 each, Refills: 0    Associated Diagnoses: Weakness                 Discharge Diagnosis: Chronic pain of both shoulders [M25.511, G89.29, M25.512]  Condition on Discharge: Stable with no complications to procedure   Diet on Discharge: Same as before.  Activity: as per instruction sheet.  Discharge to: Home with a responsible adult.  Follow up: 2-4 weeks       Please call my office or pager at [phone] if experienced any weakness or loss of sensation, fever > 101.5, pain uncontrolled with oral medications, persistent nausea/vomiting/or diarrhea, redness or drainage from the incisions, or any other worrisome concerns. If physician on call was not reached or could not communicate with our office for any reason please go to the nearest emergency department

## 2023-05-24 ENCOUNTER — TELEPHONE (OUTPATIENT)
Dept: FAMILY MEDICINE | Facility: CLINIC | Age: 73
End: 2023-05-24
Payer: MEDICARE

## 2023-05-25 ENCOUNTER — TELEPHONE (OUTPATIENT)
Dept: UROLOGY | Facility: CLINIC | Age: 73
End: 2023-05-25
Payer: MEDICARE

## 2023-05-25 NOTE — TELEPHONE ENCOUNTER
I SWP Anthony now and let him know that based on his last PSA lab he is not due for another until at least end of July 2023 perhaps yearly.  I will cancel Dixon OV for 6/6/2023 now.He will contact us in mid-late July and will schedule with an SARAH per Dixon last progress note 8/23/2021.  Thank you.    ----- Message from Mara Mora sent at 5/25/2023  8:25 AM CDT -----  Regarding: Lab  Contact: Pt [phone]  Pt has a appt scheduled on 6/6 and states before appt he want to have labs done please call

## 2023-05-29 ENCOUNTER — TELEPHONE (OUTPATIENT)
Dept: PAIN MEDICINE | Facility: CLINIC | Age: 73
End: 2023-05-29
Payer: MEDICARE

## 2023-05-29 NOTE — TELEPHONE ENCOUNTER
Staff left a voicemail reminding pt of their schedule 140p appointment on tomorrow with ALVIN Andrew.

## 2023-07-27 ENCOUNTER — TELEPHONE (OUTPATIENT)
Dept: OPTOMETRY | Facility: CLINIC | Age: 73
End: 2023-07-27
Payer: MEDICARE

## 2023-07-27 NOTE — TELEPHONE ENCOUNTER
----- Message from Maureen Bansal sent at 7/27/2023 11:49 AM CDT -----  Regarding: Appt request  Contact: [phone]  Hi, pt called to request an appt to see a provider. Pls call the pt at [phone] to help the pt get scheduled. Pt last saw the provider in 2019.

## 2023-08-14 ENCOUNTER — TELEPHONE (OUTPATIENT)
Dept: PRIMARY CARE CLINIC | Facility: CLINIC | Age: 73
End: 2023-08-14
Payer: MEDICARE

## 2023-08-14 NOTE — TELEPHONE ENCOUNTER
Please find out from patient whether he is taking meloxicam still.    How are his arms? Is he still planning to come back to clinic?    Thanks,  Dr SHOEMAKER

## 2023-09-05 ENCOUNTER — TELEPHONE (OUTPATIENT)
Dept: PRIMARY CARE CLINIC | Facility: CLINIC | Age: 73
End: 2023-09-05
Payer: MEDICARE

## 2023-09-05 NOTE — TELEPHONE ENCOUNTER
Did we hear back from [facility] about his wellness check? Can you call his friend, Chu Worthington?    Chu Worthington  Friend  [phone]

## 2023-09-05 NOTE — TELEPHONE ENCOUNTER
----- Message from Kavya Morrison sent at 9/5/2023  2:13 PM CDT -----  Contact: [employer] @ [phone]  Returning a phone call.    Who left a message for the patient:  Mateus Deluca    Do they know what this is regarding:  Yes    Would they like a phone call back or a response via [employer]:   Neither      [employer] department is calling to let the staff and provider know that the pt is fine, they do not understand why he was not answering his phone but the pt is okay.

## 2023-09-05 NOTE — TELEPHONE ENCOUNTER
Pt has missed several appts. JON called his next of kin and her name is Nguyen Miguel. She stated that she has been trying to call him but there have been no response. SW R/S him for next month and will put a letter in the mail.     JON called [facility] non emergency number to have a wellness check conducted. JON spoke with  [phone] at approximately  1:30 pm

## 2023-09-06 RX ORDER — MELOXICAM 15 MG/1
TABLET ORAL
Qty: 777 TABLET | Refills: 0 | OUTPATIENT
Start: 2023-09-06

## 2023-09-06 RX ORDER — MELOXICAM 15 MG/1
15 TABLET ORAL
Qty: 90 TABLET | Refills: 3 | Status: SHIPPED | OUTPATIENT
Start: 2023-09-06

## 2023-09-06 NOTE — TELEPHONE ENCOUNTER
Call made to pt states that he is doing okay states that his phone keep going in and out states that he been at home doing some work around the house but he is doing okay upcoming appointment sept 14 , 2023 @ 1:30 pm

## 2023-09-06 NOTE — TELEPHONE ENCOUNTER
Spoke with pt stated that  he is taking  Meloxicam  , states that he is doing okay pt is coming to his next appointment  Sept 19 , 2023 @ 1:30 pm

## 2023-09-08 ENCOUNTER — OFFICE VISIT (OUTPATIENT)
Dept: OPTOMETRY | Facility: CLINIC | Age: 73
End: 2023-09-08
Payer: MEDICARE

## 2023-09-08 DIAGNOSIS — H52.203 MYOPIA WITH ASTIGMATISM AND PRESBYOPIA, BILATERAL: Primary | ICD-10-CM

## 2023-09-08 DIAGNOSIS — H40.013 OAG (OPEN ANGLE GLAUCOMA) SUSPECT, LOW RISK, BILATERAL: ICD-10-CM

## 2023-09-08 DIAGNOSIS — H25.13 SENILE NUCLEAR SCLEROSIS, BILATERAL: ICD-10-CM

## 2023-09-08 DIAGNOSIS — H52.4 MYOPIA WITH ASTIGMATISM AND PRESBYOPIA, BILATERAL: Primary | ICD-10-CM

## 2023-09-08 DIAGNOSIS — H52.13 MYOPIA WITH ASTIGMATISM AND PRESBYOPIA, BILATERAL: Primary | ICD-10-CM

## 2023-09-08 DIAGNOSIS — H53.2 TRANSIENT DIPLOPIA: ICD-10-CM

## 2023-09-08 PROCEDURE — 3288F PR FALLS RISK ASSESSMENT DOCUMENTED: ICD-10-PCS | Mod: HCNC,CPTII,S$GLB, | Performed by: OPTOMETRIST

## 2023-09-08 PROCEDURE — 1159F MED LIST DOCD IN RCRD: CPT | Mod: HCNC,CPTII,S$GLB, | Performed by: OPTOMETRIST

## 2023-09-08 PROCEDURE — 92014 PR EYE EXAM, EST PATIENT,COMPREHESV: ICD-10-PCS | Mod: HCNC,S$GLB,, | Performed by: OPTOMETRIST

## 2023-09-08 PROCEDURE — 1101F PR PT FALLS ASSESS DOC 0-1 FALLS W/OUT INJ PAST YR: ICD-10-PCS | Mod: HCNC,CPTII,S$GLB, | Performed by: OPTOMETRIST

## 2023-09-08 PROCEDURE — 3288F FALL RISK ASSESSMENT DOCD: CPT | Mod: HCNC,CPTII,S$GLB, | Performed by: OPTOMETRIST

## 2023-09-08 PROCEDURE — 99999 PR PBB SHADOW E&M-EST. PATIENT-LVL III: ICD-10-PCS | Mod: PBBFAC,HCNC,, | Performed by: OPTOMETRIST

## 2023-09-08 PROCEDURE — 92014 COMPRE OPH EXAM EST PT 1/>: CPT | Mod: HCNC,S$GLB,, | Performed by: OPTOMETRIST

## 2023-09-08 PROCEDURE — 1160F PR REVIEW ALL MEDS BY PRESCRIBER/CLIN PHARMACIST DOCUMENTED: ICD-10-PCS | Mod: HCNC,CPTII,S$GLB, | Performed by: OPTOMETRIST

## 2023-09-08 PROCEDURE — 1101F PT FALLS ASSESS-DOCD LE1/YR: CPT | Mod: HCNC,CPTII,S$GLB, | Performed by: OPTOMETRIST

## 2023-09-08 PROCEDURE — 92015 PR REFRACTION: ICD-10-PCS | Mod: HCNC,S$GLB,, | Performed by: OPTOMETRIST

## 2023-09-08 PROCEDURE — 99999 PR PBB SHADOW E&M-EST. PATIENT-LVL III: CPT | Mod: PBBFAC,HCNC,, | Performed by: OPTOMETRIST

## 2023-09-08 PROCEDURE — 1160F RVW MEDS BY RX/DR IN RCRD: CPT | Mod: HCNC,CPTII,S$GLB, | Performed by: OPTOMETRIST

## 2023-09-08 PROCEDURE — 1126F PR PAIN SEVERITY QUANTIFIED, NO PAIN PRESENT: ICD-10-PCS | Mod: HCNC,CPTII,S$GLB, | Performed by: OPTOMETRIST

## 2023-09-08 PROCEDURE — 1126F AMNT PAIN NOTED NONE PRSNT: CPT | Mod: HCNC,CPTII,S$GLB, | Performed by: OPTOMETRIST

## 2023-09-08 PROCEDURE — 92015 DETERMINE REFRACTIVE STATE: CPT | Mod: HCNC,S$GLB,, | Performed by: OPTOMETRIST

## 2023-09-08 PROCEDURE — 1159F PR MEDICATION LIST DOCUMENTED IN MEDICAL RECORD: ICD-10-PCS | Mod: HCNC,CPTII,S$GLB, | Performed by: OPTOMETRIST

## 2023-09-08 NOTE — PROGRESS NOTES
JACY    STACIA: [birth year]  Chief complaint (CC): Patient is here for annual ey exam today.  Patient   thinks his vision has gradually gotten worse OD>OS over the past few   years.  Patient had prescription glasses but they broke and he needs new   ones. Patient states he has had horizontal diplopia when he is fatigued   for the past couple of years.  Glasses? -  Contacts? -  H/o eye surgery, injections or laser: -  H/o eye injury: -  Known eye conditions? -  Family h/o eye conditions? Brother with keratoconus  Eye gtts? -      (-) Flashes (-)  Floaters (-) Mucous   (-)  Tearing (-) Itching (-) Burning   (-) Headaches (-) Eye Pain/discomfort (-) Irritation   (-)  Redness (+) Double vision (-) Blurry vision    Diabetic? -  A1c? -      Last edited by Debbie Sosa on 9/8/2023 10:13 AM.            Assessment /Plan     For exam results, see Encounter Report.      Myopia with astigmatism and presbyopia, bilateral  SRx released to patient. Patient educated on lens options. Normal ocular health. RTC 1 year for routine exam.     OAG (open angle glaucoma) suspect, low risk, bilateral  (-) FHx. IOP 17 OD, 16 OS. Last 16 OD, OS. Pachys are thick 554 (-1) OD, 579 (-2) OS. C/d 0.8 OD, OS w/thin rim OU. (-) FHx. (-) HTN. (-) DM. Prev POAG OU suspect at [facility].  11/14/18 OCT OD WNL, OS Borderline TS  11/14/18 HVF OD sup arcuate (due to lid?), OS sup arcuate (due to lid?), early inf arcuate? Doesn't correlate w/OCT.   02/20/19 HVF OD General reduction of sensitivity, WnL. OS ONL- scattered defects but nonspecific. Perhaps early sup temp defect.   Educated patient on findings today. No tx at this time. Would like monitor OS.    RTC 1-2 moOCT/HVF 24-2 Arabella faster. Will call w/results.    Senile nuclear sclerosis, bilateral  Nuclear sclerotic cataract - not visually significant. Observe.    Transient diplopia  Corrects w/Rx correction.

## 2023-09-19 ENCOUNTER — OFFICE VISIT (OUTPATIENT)
Dept: PRIMARY CARE CLINIC | Facility: CLINIC | Age: 73
End: 2023-09-19
Payer: MEDICARE

## 2023-09-19 VITALS
HEIGHT: 67 IN | BODY MASS INDEX: 25.97 KG/M2 | HEART RATE: 70 BPM | DIASTOLIC BLOOD PRESSURE: 76 MMHG | WEIGHT: 165.44 LBS | OXYGEN SATURATION: 98 % | SYSTOLIC BLOOD PRESSURE: 142 MMHG | TEMPERATURE: 98 F

## 2023-09-19 DIAGNOSIS — E55.9 VITAMIN D DEFICIENCY: ICD-10-CM

## 2023-09-19 DIAGNOSIS — M46.92 UNSPECIFIED INFLAMMATORY SPONDYLOPATHY, CERVICAL REGION: ICD-10-CM

## 2023-09-19 DIAGNOSIS — G95.9 CERVICAL MYELOPATHY: ICD-10-CM

## 2023-09-19 DIAGNOSIS — M48.07 LUMBOSACRAL STENOSIS WITH NEUROGENIC CLAUDICATION: ICD-10-CM

## 2023-09-19 DIAGNOSIS — C61 CANCER OF PROSTATE: ICD-10-CM

## 2023-09-19 DIAGNOSIS — F15.182 CAFFEINE-INDUCED SLEEP DISORDER WITH MILD USE DISORDER, INSOMNIA TYPE: ICD-10-CM

## 2023-09-19 DIAGNOSIS — E78.5 HYPERLIPIDEMIA, UNSPECIFIED HYPERLIPIDEMIA TYPE: Primary | ICD-10-CM

## 2023-09-19 PROCEDURE — 1101F PT FALLS ASSESS-DOCD LE1/YR: CPT | Mod: HCNC,CPTII,S$GLB, | Performed by: INTERNAL MEDICINE

## 2023-09-19 PROCEDURE — 3077F SYST BP >= 140 MM HG: CPT | Mod: HCNC,CPTII,S$GLB, | Performed by: INTERNAL MEDICINE

## 2023-09-19 PROCEDURE — 3288F FALL RISK ASSESSMENT DOCD: CPT | Mod: HCNC,CPTII,S$GLB, | Performed by: INTERNAL MEDICINE

## 2023-09-19 PROCEDURE — 90694 FLU VACCINE - QUADRIVALENT - ADJUVANTED: ICD-10-PCS | Mod: HCNC,S$GLB,, | Performed by: INTERNAL MEDICINE

## 2023-09-19 PROCEDURE — 90694 VACC AIIV4 NO PRSRV 0.5ML IM: CPT | Mod: HCNC,S$GLB,, | Performed by: INTERNAL MEDICINE

## 2023-09-19 PROCEDURE — 3008F BODY MASS INDEX DOCD: CPT | Mod: HCNC,CPTII,S$GLB, | Performed by: INTERNAL MEDICINE

## 2023-09-19 PROCEDURE — 3077F PR MOST RECENT SYSTOLIC BLOOD PRESSURE >= 140 MM HG: ICD-10-PCS | Mod: HCNC,CPTII,S$GLB, | Performed by: INTERNAL MEDICINE

## 2023-09-19 PROCEDURE — 3078F PR MOST RECENT DIASTOLIC BLOOD PRESSURE < 80 MM HG: ICD-10-PCS | Mod: HCNC,CPTII,S$GLB, | Performed by: INTERNAL MEDICINE

## 2023-09-19 PROCEDURE — 1125F PR PAIN SEVERITY QUANTIFIED, PAIN PRESENT: ICD-10-PCS | Mod: HCNC,CPTII,S$GLB, | Performed by: INTERNAL MEDICINE

## 2023-09-19 PROCEDURE — 1159F MED LIST DOCD IN RCRD: CPT | Mod: HCNC,CPTII,S$GLB, | Performed by: INTERNAL MEDICINE

## 2023-09-19 PROCEDURE — G0008 ADMIN INFLUENZA VIRUS VAC: HCPCS | Mod: HCNC,S$GLB,, | Performed by: INTERNAL MEDICINE

## 2023-09-19 PROCEDURE — 1125F AMNT PAIN NOTED PAIN PRSNT: CPT | Mod: HCNC,CPTII,S$GLB, | Performed by: INTERNAL MEDICINE

## 2023-09-19 PROCEDURE — 99211 OFF/OP EST MAY X REQ PHY/QHP: CPT | Mod: HCNC,25,S$GLB, | Performed by: INTERNAL MEDICINE

## 2023-09-19 PROCEDURE — G0008 FLU VACCINE - QUADRIVALENT - ADJUVANTED: ICD-10-PCS | Mod: HCNC,S$GLB,, | Performed by: INTERNAL MEDICINE

## 2023-09-19 PROCEDURE — 3008F PR BODY MASS INDEX (BMI) DOCUMENTED: ICD-10-PCS | Mod: HCNC,CPTII,S$GLB, | Performed by: INTERNAL MEDICINE

## 2023-09-19 PROCEDURE — 3288F PR FALLS RISK ASSESSMENT DOCUMENTED: ICD-10-PCS | Mod: HCNC,CPTII,S$GLB, | Performed by: INTERNAL MEDICINE

## 2023-09-19 PROCEDURE — 99211 PR OFFICE/OUTPT VISIT, EST, LEVL I: ICD-10-PCS | Mod: HCNC,25,S$GLB, | Performed by: INTERNAL MEDICINE

## 2023-09-19 PROCEDURE — 1101F PR PT FALLS ASSESS DOC 0-1 FALLS W/OUT INJ PAST YR: ICD-10-PCS | Mod: HCNC,CPTII,S$GLB, | Performed by: INTERNAL MEDICINE

## 2023-09-19 PROCEDURE — 1159F PR MEDICATION LIST DOCUMENTED IN MEDICAL RECORD: ICD-10-PCS | Mod: HCNC,CPTII,S$GLB, | Performed by: INTERNAL MEDICINE

## 2023-09-19 PROCEDURE — 3078F DIAST BP <80 MM HG: CPT | Mod: HCNC,CPTII,S$GLB, | Performed by: INTERNAL MEDICINE

## 2023-09-19 RX ORDER — TRAZODONE HYDROCHLORIDE 50 MG/1
50 TABLET ORAL NIGHTLY PRN
Qty: 270 TABLET | Refills: 3
Start: 2023-09-19 | End: 2023-12-22 | Stop reason: SDUPTHER

## 2023-09-19 RX ORDER — ACETAMINOPHEN 500 MG
2000 TABLET ORAL DAILY
Qty: 90 CAPSULE | Refills: 3 | Status: SHIPPED | OUTPATIENT
Start: 2023-09-19

## 2023-09-19 NOTE — ASSESSMENT & PLAN NOTE
Low back pain with spinal stenosis on MRI, difficulty ambulating  · Followed closely by pain management  · Has upcoming appointment with possible KAYLEY  · Advised to minimize NSAID dose  · Is taking Mobic daily  · Advised against this for long-term use

## 2023-09-19 NOTE — ASSESSMENT & PLAN NOTE
Severe cervical stenosis with myelopathy with fusion of C3-6.  · Continue gabapentin 300mg bid - tid  · Taking mobic 15mg daily, needs monitoring with labs  · Followed by Pain Management  · Did not complete PT/OT   · PCP advising against surgeries given his tendency to not do post-operative rehab

## 2023-09-19 NOTE — ASSESSMENT & PLAN NOTE
Prostate cancer, treated with total prostatectomy and daily radiation  · Monitor with PSA  · Completed 6mo follow-up Dr Dixon  · Now following up with SARAH

## 2023-09-19 NOTE — ASSESSMENT & PLAN NOTE
Chronic neck pain, s/p laminectomy   · Completed OT  · Now with pain with increased activity  · Is not pursuing surgery for this

## 2023-09-19 NOTE — PROGRESS NOTES
Primary Care Provider Appointment- [facility]    Subjective:      Patient ID: Anthony Miguel is a 72 y.o. male.    Chief Complaint: Follow-up    Patient with multiple co-morbid illnesses here to receive chronic care management with PCP. Last seen in this office Visit date not found.    Patient recently did not answer the phone for an extensive amount of town. This clinic performed a wellness check, and the police went to his home for a wellness check. He was scheduled for his follow-up at that time.    He had a surgery on his neck about 8 years ago. Planned to have a rotator cuff repair in 2/2023, which he did not follow up with. He has historically not attended follow-up PT/OT, which would be a problem for him.    He has all his specialist appointments scheduled.      Assessment/ Plan:     Problem List Items Addressed This Visit          Neuro    Cervical myelopathy     Severe cervical stenosis with myelopathy with fusion of C3-6.  Continue gabapentin 300mg bid - tid  Taking mobic 15mg daily, needs monitoring with labs  Followed by Pain Management  Did not complete PT/OT   PCP advising against surgeries given his tendency to not do post-operative rehab         Lumbosacral stenosis with neurogenic claudication     Low back pain with spinal stenosis on MRI, difficulty ambulating  Followed closely by pain management  Has upcoming appointment with possible KAYLEY  Advised to minimize NSAID dose  Is taking Mobic daily  Advised against this for long-term use         Relevant Orders    CBC Auto Differential (Completed)    Comprehensive Metabolic Panel (Completed)       Psychiatric    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, insomnia type    Relevant Medications    traZODone (DESYREL) 50 MG tablet       Cardiac/Vascular    HLD (hyperlipidemia) - Primary    Relevant Orders    Lipid Panel (Completed)       Oncology    Cancer of prostate     Prostate cancer, treated with total prostatectomy and daily  "radiation  Monitor with PSA  Completed 6mo follow-up Dr Dixon  Now following up with SARAH         Relevant Orders    Influenza - Quadrivalent (Adjuvanted) (Completed)       Endocrine    Vitamin D deficiency    Relevant Medications    cholecalciferol, vitamin D3, (VITAMIN D3) 50 mcg (2,000 unit) Cap capsule       Orthopedic    Unspecified inflammatory spondylopathy, cervical region     Chronic neck pain, s/p laminectomy   Completed OT  Now with pain with increased activity  Is not pursuing surgery for this            Health Maintenance         Date Due Completion Date    Shingles Vaccine (1 of 2) 11/14/2016 9/19/2016    COVID-19 Vaccine (3 - Pfizer series) 04/06/2021 2/9/2021    Lipid Panel 07/19/2023 7/19/2022- TODAY    Influenza Vaccine (1) 09/01/2023 1/31/2023- TODAY    Override on 9/8/2020: Done (performed in the community)    PROSTATE-SPECIFIC ANTIGEN 01/31/2024 1/31/2023    Colorectal Cancer Screening 04/15/2026 4/15/2016    TETANUS VACCINE 08/31/2031 8/31/2021        FLU VACCINE TODAY    Review of Systems   Constitutional:  Positive for activity change.   Musculoskeletal:  Positive for arthralgias, back pain and gait problem.   Hematological:  Bruises/bleeds easily.   Psychiatric/Behavioral:  The patient is nervous/anxious.        Objective:   BP (!) 142/76 (BP Location: Right arm, Patient Position: Sitting, BP Method: Large (Automatic))   Pulse 70   Temp 98.3 °F (36.8 °C) (Oral)   Ht 5' 7" (1.702 m)   Wt 75 kg (165 lb 6.6 oz)   SpO2 98%   BMI 25.91 kg/m²     Physical Exam  Constitutional:       Comments: Ambulates with cane   Neck:      Comments: Decreased ROM  Musculoskeletal:         General: Swelling, tenderness, deformity and signs of injury present.      Comments: Decreased ROM of shoulder R>L   Skin:     Findings: Bruising present.   Neurological:      Mental Status: He is alert and oriented to person, place, and time.      Motor: Weakness present.      Gait: Gait abnormal.         4Ms for Medical " Decision-Making in Older Adults    Last Completed EAWV: 10/26/2020    MOBILITY:  Get Up and Go:       No data to display              Activities of Daily Living:      10/26/2020    10:01 AM   Activities of Daily Living   Ambulation Assistance Required   Ambulation Assistance Cane   Dressing Independent   Transfers Independent   Toileting Incontinent of bladder;Continent of bowel   Feeding Independent   Cleaning home/Chores Independent   Telephone use Independent   Shopping Independent   Paying bills Independent   Taking meds Independent     Whisper Test:      10/26/2020     9:57 AM   Whisper Test   Whisper Test Abnormal     Disability Status:      10/26/2020    10:03 AM   Disability Status   Are you deaf or do you have serious difficulty hearing? Y   Are you blind or do you have serious difficulty seeing, even when wearing glasses? Y   Because of a physical, mental, or emotional condition, do you have serious difficulty concentrating, remembering, or making decisions? N   Do you have serious difficulty walking or climbing stairs? N   Do you have difficulty dressing or bathing? N   Because of a physical, mental, or emotional condition, do you have difficulty doing errands alone such as visiting a doctor's office or shopping? N     Nutrition Screening:      10/26/2020    10:00 AM   Nutrition Screening   Has food intake declined over the past three months due to loss of appetite, digestive problems, chewing or swallowing difficulties? No decrease in food intake   Involuntary weight loss during the last 3 months? No weight loss   Mobility? Goes out   Has the patient suffered psychological stress or acute disease in the past three months? No   Neuropsychological problems? No psychological problems   Body Mass Index (BMI)?  BMI 23 or greater   Screening Score 14    Screening Score: 0-7 Malnourished, 8-11 At Risk, 12-14 Normal    MENTATION:   Depression Patient Health Questionnaire:      1/31/2023     2:10 PM   Depression  Patient Health Questionnaire   Over the last two weeks how often have you been bothered by little interest or pleasure in doing things Not at all   Over the last two weeks how often have you been bothered by feeling down, depressed or hopeless Not at all   PHQ-2 Total Score 0     Has Dementia Dx: No    Cognitive Function Screening:      10/26/2020     9:58 AM   Cognitive Function Screening   Clock Drawing Test 2   Mini-Cog 3 Minute Recall 3   Cognitive Function Screening 5     Cognitive Function Screening Total - Less than 4 = Abnormal,  Greater than or equal to 4 = Normal    MEDICATIONS:  High Risk Medications:  Total Active Medications: 1  gabapentin - 300 MG    WHAT MATTERS MOST:  Advance Care Planning   ACP Status:   Patient has had an ACP conversation  Living Will: No  Power of : No  [place]: No    What is most important right now is to focus on remaining as independent as possible and symptom/pain control    Accordingly, we have decided that the best plan to meet the patient's goals includes continuing with treatment      What matters most to patient today is: continuing chronic care           RESULTS: Reviewed labs and images today  Lab Results   Component Value Date    WBC 5.93 09/19/2023    HGB 12.9 (L) 09/19/2023    HCT 38.1 (L) 09/19/2023     09/19/2023    CHOL 142 09/19/2023    TRIG 135 09/19/2023    HDL 52 09/19/2023    ALT 26 09/19/2023    AST 24 09/19/2023     09/19/2023    K 4.2 09/19/2023     09/19/2023    CREATININE 1.1 09/19/2023    BUN 19 09/19/2023    CO2 26 09/19/2023    TSH 3.711 01/31/2023    PSA <0.01 07/19/2022    INR 1.0 06/21/2017    HGBA1C 5.0 07/19/2022       Follow up ROUTINE F2F. 30min spent with patient today addressing chronic care issues including vaccines and preventative assessments    Jada Spaulding MD/MPH  Internal Medicine  [facility]  [phone]

## 2023-09-20 ENCOUNTER — TELEPHONE (OUTPATIENT)
Dept: PRIMARY CARE CLINIC | Facility: CLINIC | Age: 73
End: 2023-09-20
Payer: MEDICARE

## 2023-09-20 NOTE — TELEPHONE ENCOUNTER
----- Message from Jada Spaulding MD sent at 9/20/2023  2:36 PM CDT -----  Please let patient know that their bloodwork looks fine and does not require any change in treatment. Please see if they have any additional concerns.

## 2023-09-21 ENCOUNTER — TELEPHONE (OUTPATIENT)
Dept: PODIATRY | Facility: CLINIC | Age: 73
End: 2023-09-21
Payer: MEDICARE

## 2023-09-21 NOTE — TELEPHONE ENCOUNTER
Left vm for pt with callback number of [phone] in regards to r/s 9/21/2023 appt due to provider not being in clinic

## 2023-09-25 NOTE — PATIENT INSTRUCTIONS
Counseling and Referral of Other Preventative  (Italic type indicates deductible and co-insurance are waived)    Patient Name: Anthony Miguel  Today's Date: 10/26/2020    Health Maintenance       Date Due Completion Date    Shingles Vaccine (2 of 3) 11/14/2016 Obtain new shingles vaccine - SHINGRIX - when available    TETANUS VACCINE 12/15/2020 (Originally 12/3/1968) Consider obtaining    Lipid Panel 01/09/2021 1/9/2020    Colorectal Cancer Screening 04/15/2026 4/15/2016        Orders Placed This Encounter   Procedures    Ambulatory referral/consult to ENT    Ambulatory referral/consult to Podiatry    Ambulatory referral/consult to Optometry     The following information is provided to all patients.  This information is to help you find resources for any of the problems found today that may be affecting your health:                Living healthy guide: www.[facility].[place].gov      Understanding Diabetes: www.diabetes.org      Eating healthy: www.cdc.gov/healthyweight      [facility] home safety checklist: www.cdc.gov/steadi/patient.html      Agency on Aging: www.goea.[place].[facility]      Alcoholics anonymous (AA): www.aa.org      Physical Activity: www.francesco.nih.gov/oj0gwoi      Tobacco use: www.quitwithusla.org      1-year-old male presents with tactile fevers for 2 days accompanied with ear pain.  Denies any cough, congestion, runny nose, vomiting, diarrhea, abdominal pain.  Tolerating p.o.  On exam patient well-appearing in no acute distress.  Fussy but consolable by mother.  Noted to have bulging and erythema of the right tympanic membrane.  We will treat for likely acute otitis media. Mother at bedside and participated in shared decision making. Mother counselled and anticipatory guidance provided. Advised follow up with PMD.

## 2023-10-17 ENCOUNTER — HOSPITAL ENCOUNTER (OUTPATIENT)
Dept: RADIOLOGY | Facility: OTHER | Age: 73
Discharge: HOME | End: 2023-10-17
Attending: NURSE PRACTITIONER
Payer: MEDICARE

## 2023-10-17 ENCOUNTER — OFFICE VISIT (OUTPATIENT)
Dept: PAIN MEDICINE | Facility: CLINIC | Age: 73
End: 2023-10-17
Payer: MEDICARE

## 2023-10-17 VITALS
WEIGHT: 170.88 LBS | DIASTOLIC BLOOD PRESSURE: 78 MMHG | BODY MASS INDEX: 26.76 KG/M2 | HEART RATE: 75 BPM | SYSTOLIC BLOOD PRESSURE: 130 MMHG | OXYGEN SATURATION: 98 % | TEMPERATURE: 98 F | RESPIRATION RATE: 18 BRPM

## 2023-10-17 DIAGNOSIS — M51.36 DDD (DEGENERATIVE DISC DISEASE), LUMBAR: ICD-10-CM
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
## 2023-10-17 DIAGNOSIS — M53.3 SACROILIAC JOINT PAIN: ICD-10-CM
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
## 2023-10-17 DIAGNOSIS — M47.816 SPONDYLOSIS OF LUMBAR REGION WITHOUT MYELOPATHY OR RADICULOPATHY: ICD-10-CM

## 2023-10-17 DIAGNOSIS — M79.18 MYOFASCIAL PAIN: ICD-10-CM

## 2023-10-17 PROCEDURE — 1159F PR MEDICATION LIST DOCUMENTED IN MEDICAL RECORD: ICD-10-PCS | Mod: HCNC,CPTII,S$GLB, | Performed by: NURSE PRACTITIONER

## 2023-10-17 PROCEDURE — 3078F DIAST BP <80 MM HG: CPT | Mod: HCNC,CPTII,S$GLB, | Performed by: NURSE PRACTITIONER

## 2023-10-17 PROCEDURE — 1160F RVW MEDS BY RX/DR IN RCRD: CPT | Mod: HCNC,CPTII,S$GLB, | Performed by: NURSE PRACTITIONER

## 2023-10-17 PROCEDURE — 73030 X-RAY EXAM OF SHOULDER: CPT | Mod: TC,HCNC,FY,LT

## 2023-10-17 PROCEDURE — 99999 PR PBB SHADOW E&M-EST. PATIENT-LVL IV: ICD-10-PCS | Mod: PBBFAC,HCNC,, | Performed by: NURSE PRACTITIONER

## 2023-10-17 PROCEDURE — 3008F PR BODY MASS INDEX (BMI) DOCUMENTED: ICD-10-PCS | Mod: HCNC,CPTII,S$GLB, | Performed by: NURSE PRACTITIONER

## 2023-10-17 PROCEDURE — 1100F PTFALLS ASSESS-DOCD GE2>/YR: CPT | Mod: HCNC,CPTII,S$GLB, | Performed by: NURSE PRACTITIONER

## 2023-10-17 PROCEDURE — 3288F PR FALLS RISK ASSESSMENT DOCUMENTED: ICD-10-PCS | Mod: HCNC,CPTII,S$GLB, | Performed by: NURSE PRACTITIONER

## 2023-10-17 PROCEDURE — 1160F PR REVIEW ALL MEDS BY PRESCRIBER/CLIN PHARMACIST DOCUMENTED: ICD-10-PCS | Mod: HCNC,CPTII,S$GLB, | Performed by: NURSE PRACTITIONER

## 2023-10-17 PROCEDURE — 72052 XR CERVICAL SPINE 5 VIEW WITH FLEX AND EXT: ICD-10-PCS | Mod: 26,HCNC,, | Performed by: RADIOLOGY

## 2023-10-17 PROCEDURE — 3288F FALL RISK ASSESSMENT DOCD: CPT | Mod: HCNC,CPTII,S$GLB, | Performed by: NURSE PRACTITIONER

## 2023-10-17 PROCEDURE — 72052 X-RAY EXAM NECK SPINE 6/>VWS: CPT | Mod: 26,HCNC,, | Performed by: RADIOLOGY

## 2023-10-17 PROCEDURE — 73030 X-RAY EXAM OF SHOULDER: CPT | Mod: 26,HCNC,LT, | Performed by: RADIOLOGY

## 2023-10-17 PROCEDURE — 1125F PR PAIN SEVERITY QUANTIFIED, PAIN PRESENT: ICD-10-PCS | Mod: HCNC,CPTII,S$GLB, | Performed by: NURSE PRACTITIONER

## 2023-10-17 PROCEDURE — 1125F AMNT PAIN NOTED PAIN PRSNT: CPT | Mod: HCNC,CPTII,S$GLB, | Performed by: NURSE PRACTITIONER

## 2023-10-17 PROCEDURE — 3078F PR MOST RECENT DIASTOLIC BLOOD PRESSURE < 80 MM HG: ICD-10-PCS | Mod: HCNC,CPTII,S$GLB, | Performed by: NURSE PRACTITIONER

## 2023-10-17 PROCEDURE — 3075F SYST BP GE 130 - 139MM HG: CPT | Mod: HCNC,CPTII,S$GLB, | Performed by: NURSE PRACTITIONER

## 2023-10-17 PROCEDURE — 73070 X-RAY EXAM OF ELBOW: CPT | Mod: 26,HCNC,LT, | Performed by: RADIOLOGY

## 2023-10-17 PROCEDURE — 73070 XR ELBOW 2 VIEWS LEFT: ICD-10-PCS | Mod: 26,HCNC,LT, | Performed by: RADIOLOGY

## 2023-10-17 PROCEDURE — 72052 X-RAY EXAM NECK SPINE 6/>VWS: CPT | Mod: TC,HCNC,FY

## 2023-10-17 PROCEDURE — 99213 OFFICE O/P EST LOW 20 MIN: CPT | Mod: HCNC,S$GLB,, | Performed by: NURSE PRACTITIONER

## 2023-10-17 PROCEDURE — 1100F PR PT FALLS ASSESS DOC 2+ FALLS/FALL W/INJURY/YR: ICD-10-PCS | Mod: HCNC,CPTII,S$GLB, | Performed by: NURSE PRACTITIONER

## 2023-10-17 PROCEDURE — 99999 PR PBB SHADOW E&M-EST. PATIENT-LVL IV: CPT | Mod: PBBFAC,HCNC,, | Performed by: NURSE PRACTITIONER

## 2023-10-17 PROCEDURE — 3008F BODY MASS INDEX DOCD: CPT | Mod: HCNC,CPTII,S$GLB, | Performed by: NURSE PRACTITIONER

## 2023-10-17 PROCEDURE — 99213 PR OFFICE/OUTPT VISIT, EST, LEVL III, 20-29 MIN: ICD-10-PCS | Mod: HCNC,S$GLB,, | Performed by: NURSE PRACTITIONER

## 2023-10-17 PROCEDURE — 73030 XR SHOULDER COMPLETE 2 OR MORE VIEWS LEFT: ICD-10-PCS | Mod: 26,HCNC,LT, | Performed by: RADIOLOGY

## 2023-10-17 PROCEDURE — 73070 X-RAY EXAM OF ELBOW: CPT | Mod: TC,HCNC,FY,LT

## 2023-10-17 PROCEDURE — 3075F PR MOST RECENT SYSTOLIC BLOOD PRESS GE 130-139MM HG: ICD-10-PCS | Mod: HCNC,CPTII,S$GLB, | Performed by: NURSE PRACTITIONER

## 2023-10-17 PROCEDURE — 1159F MED LIST DOCD IN RCRD: CPT | Mod: HCNC,CPTII,S$GLB, | Performed by: NURSE PRACTITIONER

## 2023-10-17 RX ORDER — METHOCARBAMOL 500 MG/1
500 TABLET, FILM COATED ORAL 4 TIMES DAILY
Qty: 40 TABLET | Refills: 0 | Status: SHIPPED | OUTPATIENT
Start: 2023-10-17 | End: 2023-10-27
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
## 2023-10-30 ENCOUNTER — OFFICE VISIT (OUTPATIENT)
Dept: OTOLARYNGOLOGY | Facility: CLINIC | Age: 73
End: 2023-10-30
Payer: MEDICARE

## 2023-10-30 ENCOUNTER — CLINICAL SUPPORT (OUTPATIENT)
Dept: AUDIOLOGY | Facility: CLINIC | Age: 73
End: 2023-10-30
Payer: MEDICARE

## 2023-10-30 DIAGNOSIS — H61.23 BILATERAL IMPACTED CERUMEN: Primary | ICD-10-CM

## 2023-10-30 DIAGNOSIS — H90.3 SENSORINEURAL HEARING LOSS OF BOTH EARS: Primary | ICD-10-CM

## 2023-10-30 PROCEDURE — 92567 PR TYMPA2METRY: ICD-10-PCS | Mod: HCNC,S$GLB,, | Performed by: AUDIOLOGIST

## 2023-10-30 PROCEDURE — 92557 COMPREHENSIVE HEARING TEST: CPT | Mod: HCNC,S$GLB,, | Performed by: AUDIOLOGIST

## 2023-10-30 PROCEDURE — 69210 REMOVE IMPACTED EAR WAX UNI: CPT | Mod: HCNC,S$GLB,, | Performed by: OTOLARYNGOLOGY

## 2023-10-30 PROCEDURE — 69210 PR REMOVAL IMPACTED CERUMEN REQUIRING INSTRUMENTATION, UNILATERAL: ICD-10-PCS | Mod: HCNC,S$GLB,, | Performed by: OTOLARYNGOLOGY

## 2023-10-30 PROCEDURE — 92557 PR COMPREHENSIVE HEARING TEST: ICD-10-PCS | Mod: HCNC,S$GLB,, | Performed by: AUDIOLOGIST

## 2023-10-30 PROCEDURE — 99499 UNLISTED E&M SERVICE: CPT | Mod: HCNC,S$GLB,, | Performed by: OTOLARYNGOLOGY

## 2023-10-30 PROCEDURE — 99499 NO LOS: ICD-10-PCS | Mod: HCNC,S$GLB,, | Performed by: OTOLARYNGOLOGY

## 2023-10-30 PROCEDURE — 92567 TYMPANOMETRY: CPT | Mod: HCNC,S$GLB,, | Performed by: AUDIOLOGIST

## 2023-10-30 NOTE — PROGRESS NOTES
Mr. Anthony Miguel was seen in the clinic today for an audiological evaluation.  Mr. Miguel reported bilateral tinnitus.    Audiological testing revealed normal hearing sensitivity sloping to a mild to moderately-severe sensorineural hearing loss for the right ear and normal hearing sloping to a mild to moderately-severe sensorineural hearing loss for the left ear.  A speech reception threshold was obtained at 25 dBHL for the right ear and at 25 dBHL for the left ear.  Speech discrimination was 88% for the right ear and 96% for the left ear.      Tympanometry testing revealed a Type A tympanogram for the right ear and a Type A tympanogram for the left ear.      Recommendations:  1. Otologic evaluation  2. Annual audiological evaluation  3. Hearing protection when in noise   4. Hearing aid consultation (Mr. Miguel to contact his insurance company for hearing aid benefit)

## 2023-10-30 NOTE — PROGRESS NOTES
Has Shaliesh CI.    Procedure Note:    Patient was brought to the minor procedure room and using the operating microscope the right ear canal  was cleaned of ceruminous debris. There was a significant cerumen impaction.  The forceps and suction were both used to perform this. Tympanic membrane intact. Pt tolerated well. There were no complications.    Procedure Note:    The patient was brought to the minor procedure room and placed under the operating microscope. Using a combination of suction, curettes and cup forceps the patient's cerumen impaction was removed. The tympanic membrane was evaluated and was unremarkable. The patient tolerated the procedure well. There were no complications.    P: FU Prn

## 2023-12-12 NOTE — PROGRESS NOTES
Advance Directives:   Living Will: No    [place]: No    Medical Power of : No    Goals of Care: What is most important right now is to focus on remaining as independent as possible, symptom/pain control. Accordingly, we have decided that the best plan to meet the patient's goals includes continuing with treatment.

## 2023-12-20 ENCOUNTER — OFFICE VISIT (OUTPATIENT)
Dept: URGENT CARE | Facility: CLINIC | Age: 73
End: 2023-12-20
Payer: MEDICARE

## 2023-12-20 VITALS
HEART RATE: 54 BPM | SYSTOLIC BLOOD PRESSURE: 154 MMHG | OXYGEN SATURATION: 99 % | BODY MASS INDEX: 26.82 KG/M2 | TEMPERATURE: 98 F | RESPIRATION RATE: 16 BRPM | WEIGHT: 170.88 LBS | HEIGHT: 67 IN | DIASTOLIC BLOOD PRESSURE: 87 MMHG

## 2023-12-20 DIAGNOSIS — B96.89 BACTERIAL UPPER RESPIRATORY INFECTION: Primary | ICD-10-CM

## 2023-12-20 DIAGNOSIS — R05.9 COUGH, UNSPECIFIED TYPE: ICD-10-CM

## 2023-12-20 DIAGNOSIS — J06.9 BACTERIAL UPPER RESPIRATORY INFECTION: Primary | ICD-10-CM

## 2023-12-20 LAB
CTP QC/QA: YES
POC MOLECULAR INFLUENZA A AGN: NEGATIVE
POC MOLECULAR INFLUENZA B AGN: NEGATIVE
RSV RAPID ANTIGEN: NEGATIVE
SARS-COV-2 AG RESP QL IA.RAPID: NEGATIVE

## 2023-12-20 PROCEDURE — 99214 OFFICE O/P EST MOD 30 MIN: CPT | Mod: S$GLB,,, | Performed by: NURSE PRACTITIONER

## 2023-12-20 PROCEDURE — 87807 POCT RESPIRATORY SYNCYTIAL VIRUS: ICD-10-PCS | Mod: QW,S$GLB,, | Performed by: NURSE PRACTITIONER

## 2023-12-20 PROCEDURE — 71046 XR CHEST PA AND LATERAL: ICD-10-PCS | Mod: S$GLB,,, | Performed by: RADIOLOGY

## 2023-12-20 PROCEDURE — 99214 PR OFFICE/OUTPT VISIT, EST, LEVL IV, 30-39 MIN: ICD-10-PCS | Mod: S$GLB,,, | Performed by: NURSE PRACTITIONER

## 2023-12-20 PROCEDURE — 87502 POCT INFLUENZA A/B MOLECULAR: ICD-10-PCS | Mod: QW,S$GLB,, | Performed by: NURSE PRACTITIONER

## 2023-12-20 PROCEDURE — 71046 X-RAY EXAM CHEST 2 VIEWS: CPT | Mod: S$GLB,,, | Performed by: RADIOLOGY

## 2023-12-20 PROCEDURE — 87502 INFLUENZA DNA AMP PROBE: CPT | Mod: QW,S$GLB,, | Performed by: NURSE PRACTITIONER

## 2023-12-20 PROCEDURE — 87807 RSV ASSAY W/OPTIC: CPT | Mod: QW,S$GLB,, | Performed by: NURSE PRACTITIONER

## 2023-12-20 PROCEDURE — 87811 SARS-COV-2 COVID19 W/OPTIC: CPT | Mod: QW,S$GLB,, | Performed by: NURSE PRACTITIONER

## 2023-12-20 PROCEDURE — 87811 SARS CORONAVIRUS 2 ANTIGEN POCT, MANUAL READ: ICD-10-PCS | Mod: QW,S$GLB,, | Performed by: NURSE PRACTITIONER

## 2023-12-20 RX ORDER — AMOXICILLIN AND CLAVULANATE POTASSIUM 875; 125 MG/1; MG/1
1 TABLET, FILM COATED ORAL 2 TIMES DAILY
Qty: 14 TABLET | Refills: 0 | Status: SHIPPED | OUTPATIENT
Start: 2023-12-20 | End: 2023-12-27

## 2023-12-20 NOTE — PROGRESS NOTES
"Subjective:      Patient ID: Anthony Miguel is a 73 y.o. male.    Vitals:  height is 5' 7" (1.702 m) and weight is 77.5 kg (170 lb 13.7 oz). His oral temperature is 97.6 °F (36.4 °C). His blood pressure is 154/87 (abnormal) and his pulse is 54 (abnormal). His respiration is 16 and oxygen saturation is 99%.     Chief Complaint: Cough    Pt is a 72 yo male w/ c/o cough for 3 weeks. Pt also reports left lung pain. Sx began 3 weeks ago. Pt had walking pneumonia a few years ago, and had success with the medication rx. Pt is requesting covid, flu, and RSV tests today.     Cough  This is a new problem. The current episode started 1 to 4 weeks ago. The problem has been unchanged. The cough is Productive of sputum. Associated symptoms include nasal congestion and rhinorrhea. Treatments tried: saltwater gargles and vics. His past medical history is significant for pneumonia.       Respiratory:  Positive for cough.       Objective:     Physical Exam   Constitutional: He is oriented to person, place, and time. He appears well-developed. He is cooperative.  Non-toxic appearance. He does not appear ill. No distress.   HENT:   Head: Normocephalic and atraumatic.   Ears:   Right Ear: Hearing, tympanic membrane, external ear and ear canal normal.   Left Ear: Hearing, tympanic membrane, external ear and ear canal normal.   Nose: Nose normal. No mucosal edema, rhinorrhea or nasal deformity. No epistaxis. Right sinus exhibits no maxillary sinus tenderness and no frontal sinus tenderness. Left sinus exhibits no maxillary sinus tenderness and no frontal sinus tenderness.   Mouth/Throat: Uvula is midline, oropharynx is clear and moist and mucous membranes are normal. No trismus in the jaw. Normal dentition. No uvula swelling. No oropharyngeal exudate, posterior oropharyngeal edema or posterior oropharyngeal erythema.   Eyes: Conjunctivae and lids are normal. No scleral icterus.   Neck: Trachea normal and phonation normal. Neck supple. " No edema present. No erythema present. No neck rigidity present.   Cardiovascular: Normal rate, regular rhythm, normal heart sounds and normal pulses.   Pulmonary/Chest: Effort normal and breath sounds normal. No tachypnea. No respiratory distress. He has no decreased breath sounds. He has no wheezes. He has no rhonchi. He has no rales.   cough         Comments: cough    Abdominal: Normal appearance.   Musculoskeletal: Normal range of motion.         General: No deformity. Normal range of motion.   Neurological: He is alert and oriented to person, place, and time. He exhibits normal muscle tone. Coordination normal.   Skin: Skin is warm, dry, intact, not diaphoretic and not pale.   Psychiatric: His speech is normal and behavior is normal. Judgment and thought content normal.   Nursing note and vitals reviewed.    Results for orders placed or performed in visit on 12/20/23   SARS Coronavirus 2 Antigen, POCT Manual Read   Result Value Ref Range    SARS Coronavirus 2 Antigen Negative Negative     Acceptable Yes    POCT Influenza A/B MOLECULAR   Result Value Ref Range    POC Molecular Influenza A Ag Negative Negative, Not Reported    POC Molecular Influenza B Ag Negative Negative, Not Reported     Acceptable Yes    POCT respiratory syncytial virus   Result Value Ref Range    RSV Rapid Ag Negative Negative     Acceptable Yes      XR CHEST PA AND LATERAL    Result Date: 12/20/2023  EXAMINATION: XR CHEST PA AND LATERAL CLINICAL HISTORY: Cough, unspecified TECHNIQUE: PA and lateral views of the chest were performed. COMPARISON: N 09/01/2021 one FINDINGS: Heart size normal.  The lungs are clear.  No pleural effusion     See above Electronically signed by: Obed Grey MD Date:    12/20/2023 Time:    12:42     Assessment:     1. Bacterial upper respiratory infection    2. Cough, unspecified type        Plan:       Bacterial upper respiratory infection  -     amoxicillin-clavulanate  875-125mg (AUGMENTIN) 875-125 mg per tablet; Take 1 tablet by mouth 2 (two) times daily. for 7 days  Dispense: 14 tablet; Refill: 0    Cough, unspecified type  -     SARS Coronavirus 2 Antigen, POCT Manual Read  -     POCT Influenza A/B MOLECULAR  -     POCT respiratory syncytial virus  -     XR CHEST PA AND LATERAL; Future; Expected date: 12/20/2023      Patient Instructions   - You must understand that you have received an Urgent Care treatment only and that you may be released before all of your medical problems are known or treated.   - You, the patient, will arrange for follow up care as instructed.   - If your condition worsens or fails to improve we recommend that you receive another evaluation at the ER immediately or contact your PCP to discuss your concerns.   - You can call [phone] or [phone] to help schedule an appointment with the appropriate provider.    Drink plenty of fluids   Get lots of rest  Tylenol or ibuprofen for pain/fever  Mucinex DM for cough  Saline nasal rinses to irrigate sinus cavities  Warm salt water gargles for sore throat

## 2023-12-20 NOTE — PATIENT INSTRUCTIONS
- You must understand that you have received an Urgent Care treatment only and that you may be released before all of your medical problems are known or treated.   - You, the patient, will arrange for follow up care as instructed.   - If your condition worsens or fails to improve we recommend that you receive another evaluation at the ER immediately or contact your PCP to discuss your concerns.   - You can call [phone] or [phone] to help schedule an appointment with the appropriate provider.    Drink plenty of fluids   Get lots of rest  Tylenol or ibuprofen for pain/fever  Mucinex DM for cough  Saline nasal rinses to irrigate sinus cavities  Warm salt water gargles for sore throat

## 2023-12-22 DIAGNOSIS — F15.182 CAFFEINE-INDUCED SLEEP DISORDER WITH MILD USE DISORDER, INSOMNIA TYPE: ICD-10-CM

## 2023-12-22 RX ORDER — TRAZODONE HYDROCHLORIDE 50 MG/1
50 TABLET ORAL NIGHTLY PRN
Qty: 90 TABLET | Refills: 3 | Status: SHIPPED | OUTPATIENT
Start: 2023-12-22 | End: 2024-02-27 | Stop reason: DRUGHIGH

## 2023-12-22 NOTE — TELEPHONE ENCOUNTER
Called and spoke to patient, requesting Trazodone refill. Pended med for [facility] pharmacy to Dr. GALLARDO

## 2023-12-22 NOTE — TELEPHONE ENCOUNTER
Called and spoke to patient, he states that he takes 1 pill of the 50 mg tablet about 3-4 times a week as needed.

## 2023-12-23 DIAGNOSIS — M46.92 UNSPECIFIED INFLAMMATORY SPONDYLOPATHY, CERVICAL REGION: ICD-10-CM

## 2023-12-26 RX ORDER — GABAPENTIN 300 MG/1
CAPSULE ORAL
Qty: 540 CAPSULE | Refills: 3 | Status: SHIPPED | OUTPATIENT
Start: 2023-12-26 | End: 2024-01-09 | Stop reason: SDUPTHER

## 2023-12-29 ENCOUNTER — TELEPHONE (OUTPATIENT)
Dept: PAIN MEDICINE | Facility: CLINIC | Age: 73
End: 2023-12-29
Payer: MEDICARE

## 2023-12-29 DIAGNOSIS — M47.816 LUMBAR SPONDYLOSIS: Primary | ICD-10-CM

## 2023-12-29 NOTE — TELEPHONE ENCOUNTER
""- Schedule for right then left L3,4,5 RFA, one side at a time, two weeks apart "    Can we get orders placed for this patient?   "

## 2023-12-29 NOTE — TELEPHONE ENCOUNTER
----- Message from Loerlei Jane MA sent at 12/29/2023  8:15 AM CST -----  Name of Who is Calling:KYLE BOLTON [[id number]]                What is the request in detail: Pt wants to get a call back to discuss getting scheduled for a procedure with Dr Whitten for a lumbar region in the lower back. Please assist.                Can the clinic reply by [facility]: No                What Number to Call Back if not in [facility]: [phone]

## 2023-12-30 DIAGNOSIS — M47.816 LUMBAR SPONDYLOSIS: Primary | ICD-10-CM

## 2024-01-09 DIAGNOSIS — M46.92 UNSPECIFIED INFLAMMATORY SPONDYLOPATHY, CERVICAL REGION: ICD-10-CM

## 2024-01-09 RX ORDER — GABAPENTIN 300 MG/1
600 CAPSULE ORAL 3 TIMES DAILY
Qty: 540 CAPSULE | Refills: 3 | Status: SHIPPED | OUTPATIENT
Start: 2024-01-09

## 2024-01-09 NOTE — TELEPHONE ENCOUNTER
Spoke with stated that he lost his Gabapentin 300 need refill  sent to [facility]  [address]

## 2024-01-22 ENCOUNTER — TELEPHONE (OUTPATIENT)
Dept: PAIN MEDICINE | Facility: CLINIC | Age: 74
End: 2024-01-22
Payer: MEDICARE

## 2024-01-22 NOTE — TELEPHONE ENCOUNTER
----- Message from Katarzyna Eisenberg sent at 1/22/2024  8:10 AM CST -----  Contact: Patient  Type:  Patient Call          Who Called: Patient         Does the patient know what this is regarding?: Requesting a call back for his arrival time for his upcoming procedure ; please advise           Would the patient rather a call back or a response via [facility]? Call           Best Call Back Number: [phone]             Additional Information:

## 2024-01-22 NOTE — TELEPHONE ENCOUNTER
Staff is forwarding the pt message tot he procedure dept for arrival time.      ----- Message from Katarzyna Eisenberg sent at 1/22/2024  8:10 AM CST -----  Contact: Patient  Type:  Patient Call          Who Called: Patient         Does the patient know what this is regarding?: Requesting a call back for his arrival time for his upcoming procedure ; please advise           Would the patient rather a call back or a response via [facility]? Call           Best Call Back Number: [phone]             Additional Information:

## 2024-01-23 ENCOUNTER — TELEPHONE (OUTPATIENT)
Dept: PAIN MEDICINE | Facility: CLINIC | Age: 74
End: 2024-01-23
Payer: MEDICARE

## 2024-01-24 ENCOUNTER — TELEPHONE (OUTPATIENT)
Dept: PAIN MEDICINE | Facility: CLINIC | Age: 74
End: 2024-01-24
Payer: MEDICARE

## 2024-01-24 ENCOUNTER — HOSPITAL ENCOUNTER (OUTPATIENT)
Facility: OTHER | Age: 74
Discharge: HOME | End: 2024-01-24
Attending: ANESTHESIOLOGY | Admitting: ANESTHESIOLOGY
Payer: MEDICARE

## 2024-01-24 VITALS
HEART RATE: 62 BPM | SYSTOLIC BLOOD PRESSURE: 132 MMHG | BODY MASS INDEX: 27.47 KG/M2 | RESPIRATION RATE: 16 BRPM | OXYGEN SATURATION: 97 % | DIASTOLIC BLOOD PRESSURE: 75 MMHG | TEMPERATURE: 98 F | WEIGHT: 175 LBS | HEIGHT: 67 IN

## 2024-01-24 DIAGNOSIS — M47.896 OTHER OSTEOARTHRITIS OF SPINE, LUMBAR REGION: Primary | ICD-10-CM

## 2024-01-24 DIAGNOSIS — M47.819 SPONDYLOSIS WITHOUT MYELOPATHY: ICD-10-CM

## 2024-01-24 DIAGNOSIS — G89.29 CHRONIC PAIN: ICD-10-CM

## 2024-01-24 PROCEDURE — 64635 DESTROY LUMB/SAC FACET JNT: CPT | Mod: 50,HCNC,, | Performed by: ANESTHESIOLOGY

## 2024-01-24 PROCEDURE — 99152 MOD SED SAME PHYS/QHP 5/>YRS: CPT | Mod: HCNC | Performed by: ANESTHESIOLOGY

## 2024-01-24 PROCEDURE — 63600175 PHARM REV CODE 636 W HCPCS: Mod: JZ,JG,HCNC | Performed by: ANESTHESIOLOGY

## 2024-01-24 PROCEDURE — 25000003 PHARM REV CODE 250: Mod: HCNC | Performed by: ANESTHESIOLOGY

## 2024-01-24 PROCEDURE — 64635 DESTROY LUMB/SAC FACET JNT: CPT | Mod: 50,HCNC | Performed by: ANESTHESIOLOGY

## 2024-01-24 PROCEDURE — 64636 DESTROY L/S FACET JNT ADDL: CPT | Mod: 50,HCNC | Performed by: ANESTHESIOLOGY

## 2024-01-24 PROCEDURE — 64636 DESTROY L/S FACET JNT ADDL: CPT | Mod: 50,HCNC,, | Performed by: ANESTHESIOLOGY

## 2024-01-24 PROCEDURE — 99152 MOD SED SAME PHYS/QHP 5/>YRS: CPT | Mod: ,,, | Performed by: ANESTHESIOLOGY

## 2024-01-24 PROCEDURE — 25000003 PHARM REV CODE 250: Mod: HCNC | Performed by: STUDENT IN AN ORGANIZED HEALTH CARE EDUCATION/TRAINING PROGRAM

## 2024-01-24 RX ORDER — LIDOCAINE HYDROCHLORIDE 20 MG/ML
INJECTION, SOLUTION INFILTRATION; PERINEURAL
Status: DISCONTINUED | OUTPATIENT
Start: 2024-01-24 | End: 2024-01-24 | Stop reason: HOSPADM

## 2024-01-24 RX ORDER — DEXAMETHASONE SODIUM PHOSPHATE 10 MG/ML
INJECTION INTRAMUSCULAR; INTRAVENOUS
Status: DISCONTINUED | OUTPATIENT
Start: 2024-01-24 | End: 2024-01-24 | Stop reason: HOSPADM

## 2024-01-24 RX ORDER — SODIUM CHLORIDE 9 MG/ML
INJECTION, SOLUTION INTRAVENOUS CONTINUOUS
Status: DISCONTINUED | OUTPATIENT
Start: 2024-01-24 | End: 2024-01-24 | Stop reason: HOSPADM

## 2024-01-24 RX ORDER — MIDAZOLAM HYDROCHLORIDE 1 MG/ML
INJECTION INTRAMUSCULAR; INTRAVENOUS
Status: DISCONTINUED | OUTPATIENT
Start: 2024-01-24 | End: 2024-01-24 | Stop reason: HOSPADM

## 2024-01-24 RX ORDER — BUPIVACAINE HYDROCHLORIDE 2.5 MG/ML
INJECTION, SOLUTION EPIDURAL; INFILTRATION; INTRACAUDAL
Status: DISCONTINUED | OUTPATIENT
Start: 2024-01-24 | End: 2024-01-24 | Stop reason: HOSPADM

## 2024-01-24 RX ORDER — FENTANYL CITRATE 50 UG/ML
INJECTION, SOLUTION INTRAMUSCULAR; INTRAVENOUS
Status: DISCONTINUED | OUTPATIENT
Start: 2024-01-24 | End: 2024-01-24 | Stop reason: HOSPADM

## 2024-01-24 NOTE — DISCHARGE INSTRUCTIONS

## 2024-01-24 NOTE — DISCHARGE SUMMARY
Discharge Note  Short Stay      SUMMARY     Admit Date: 1/24/2024    Attending Physician: Marcel Adkins MD    Discharge Physician: Rebel Sylvester MD      Discharge Date: 1/24/2024 9:49 AM    Procedure(s) (LRB):  RADIOFREQUENCY ABLATION BILATERAL L3, 4, 5 (Bilateral)    Final Diagnosis: Lumbar spondylosis [M47.816]    Disposition: Home or self care    Patient Instructions:   Current Discharge Medication List        CONTINUE these medications which have NOT CHANGED    Details   cholecalciferol, vitamin D3, (VITAMIN D3) 50 mcg (2,000 unit) Cap capsule Take 1 capsule (2,000 Units total) by mouth once daily.  Qty: 90 capsule, Refills: 3    Associated Diagnoses: Vitamin D deficiency      gabapentin (NEURONTIN) 300 MG capsule Take 2 capsules (600 mg total) by mouth 3 (three) times daily.  Qty: 540 capsule, Refills: 3    Associated Diagnoses: Unspecified inflammatory spondylopathy, cervical region      meloxicam (MOBIC) 15 MG tablet TAKE 1 TABLET(15 MG) BY MOUTH EVERY DAY  Qty: 90 tablet, Refills: 3      multivitamin capsule Take 1 capsule by mouth once daily.      traZODone (DESYREL) 50 MG tablet Take 1 tablet (50 mg total) by mouth nightly as needed for Insomnia.  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  Qty: 1 each, Refills: 3      VOLTAREN 1 % Gel UNKNOWN  Qty: 777 g, Refills: 0    Associated Diagnoses: Lumbosacral stenosis with neurogenic claudication      walker (ULTRA-LIGHT ROLLATOR) Misc 1 Units by Misc.(Non-Drug; Combo Route) route once daily at 6am.  Qty: 1 each, Refills: 0    Associated Diagnoses: Weakness                 Discharge Diagnosis: Lumbar spondylosis [M47.816]  Condition on Discharge: Stable with no complications to procedure   Diet on Discharge: Same as before.  Activity: as per  instruction sheet.  Discharge to: Home with a responsible adult.  Follow up: 2-4 weeks       Please call my office or pager at [phone] if experienced any weakness or loss of sensation, fever > 101.5, pain uncontrolled with oral medications, persistent nausea/vomiting/or diarrhea, redness or drainage from the incisions, or any other worrisome concerns. If physician on call was not reached or could not communicate with our office for any reason please go to the nearest emergency department      Lance Peters M.D.  PGY-5  Interventional Pain Management Fellow  [facility]  Pager: [phone]    01/24/2024

## 2024-01-24 NOTE — OP NOTE
Therapeutic Lumbar Medial Branch Radiofrequency Ablation under Fluoroscopy     The procedure, risks, benefits, and options were discussed with the patient. There are no contraindications to the procedure. The patent expressed understanding and agreed to the procedure. Informed written consent was obtained prior to the start of the procedure and can be found in the patient's chart.        PATIENT NAME: Anthony Miguel   MRN: [id number]     DATE OF PROCEDURE: 01/24/2024     PROCEDURE:  Bilateral L3, L4, and L5 Lumbar Radiofrequency Ablation under Fluoroscopy    PRE-OP DIAGNOSIS: Lumbar spondylosis [M47.816] Lumbar spondylosis [M47.816]    POST-OP DIAGNOSIS: Same    PHYSICIAN: Marcel Adkins MD    ASSISTANTS: Lance Peters MD     MEDICATIONS INJECTED:  Preservative-free Decadron 10mg with 9cc of Bupivicaine 0.25%    LOCAL ANESTHETIC INJECTED:   Xylocaine 2%    SEDATION: Versed 2mg and Fentanyl 100mcg                                                                                                                                                                                     Conscious sedation ordered by M.D. Patient re-evaluation prior to administration of conscious sedation. No changes noted in patient's status from initial evaluation. The patient's vital signs were monitored by RN and patient remained hemodynamically stable throughout the procedure.    Event Time In   Sedation Start 0928   Sedation End 0947       ESTIMATED BLOOD LOSS:  None    COMPLICATIONS:  None     INTERVAL HISTORY: Patient has clinical and imaging findings suggestive of recurrent facet mediated pain. Patient has undergone a previous RFA at specified levels with at least 50% relief for at least 6 months. Successful diagnostic medial branch blocks have been completed within the past 2 years.    TECHNIQUE: Time-out was performed to identify the patient and procedure to be performed. With the patient laying in a prone position, the surgical  area was prepped and draped in the usual sterile fashion using ChloraPrep and fenestrated drape. The levels were determined under fluoroscopic guidance. Skin anesthesia was achieved by injecting Lidocaine 2% over the injection sites. A 20 gauge ANUM VENOM 10mm curved active tip needle was introduced to the anatomic local of the medial branch at each of the above levels using AP, lateral and/or contralateral oblique fluoroscopic imaging. Then sensory and motor testing was performed to confirm that the needle tips were in the correct location. After negative aspiration for blood or CSF was confirmed, 1 mL of the lidocaine 2% listed above was injected slowly at each site. This was followed by thermal lesioning at 80 degrees celsius for 90 seconds. That was followed by slowly injecting 1 mL of the medication mixture listed above at each site. The needles were removed and bleeding was nil. A sterile dressing was applied. No specimens collected. The patient tolerated the procedure well and did not have any procedure related motor deficit at the conclusion of the procedure.    The patient was monitored after the procedure in the recovery area. They were given post-procedure and discharge instructions to follow at home. The patient was discharged in a stable condition.    Rebel Sylvester MD    I reviewed and edited the fellow's note. I conducted my own interview and physical examination. I agree with the findings. I was present and supervising all critical portions of the procedure.  Marcel Adkins MD

## 2024-01-24 NOTE — TELEPHONE ENCOUNTER
Duplicate.    ----- Message from Lisa Villalta sent at 1/24/2024  8:22 AM CST -----  Regarding: Same Day Appointment                Name of Who is Calling:  Anthony Miguel    Who Left The Message:  Anthony Miguel    What is the request in detail:   Patient called stating his ride hasn't come to pick him up for his scheduled procedure with Dr. Adkins today Wednesday 01/24/2024. Patient states he does intend to keep his appointment .  Please further advise.   Thank you      Reply by [facility]: NO      Preferred Call Back :  [phone] ([place]

## 2024-01-24 NOTE — TELEPHONE ENCOUNTER
Message already responded.    ----- Message from Hortensia Mg sent at 1/24/2024  8:25 AM CST -----  Regarding: confirming  Name of caller:       What is the requesting detail: Pt is in a lyft on his way to his appt.       Can the clinic reply by [facility]:       What number to call back:

## 2024-01-24 NOTE — H&P
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 PAIN MGT;  Service: Pain Management;  Laterality: N/A;    EPIDURAL STEROID INJECTION N/A 4/10/2019    Procedure: Injection, Steroid, Epidural LUMBAR/CAUDAL L5-S1 INTERLAMINAR KAYLEY;  Surgeon: Marcel Adkins MD;  Location: [facility] PAIN MGT;  Service: Pain Management;  Laterality: N/A;  NEEDS CONSENT    INJECTION OF FACET JOINT Bilateral 8/14/2019    Procedure: INJECTION, FACET JOINT INJECTION (LUMBAR BLOCK) BILATERAL L4-5 AND L5-S1;  Surgeon: Marcel Adkins MD;  Location: [facility] PAIN MGT;  Service: Pain Management;  Laterality: Bilateral;  NEEDS CONSENT    INJECTION OF FACET JOINT Bilateral 10/16/2019    Procedure: FACET JOINT INJECTION (LUMBAR BLOCK) BILATERAL L4-5 AND L5-S1;  Surgeon: Marcel Adkins MD;  Location: [facility] PAIN MGT;  Service: Pain Management;  Laterality: Bilateral;  NEEDS CONSENT    INJECTION OF JOINT Bilateral 11/4/2020    Procedure: INJECTION, JOINT, GLENOHUMERAL;  Surgeon: Marcel Adkins MD;  Location: [facility] PAIN MGT;  Service: Pain Management;  Laterality: Bilateral;    INJECTION OF JOINT Bilateral 12/16/2020    Procedure: INJECTION, JOINT, SACROILIAC (SI) need consent;  Surgeon: Marcel Adkins MD;   Location: [facility] PAIN MGT;  Service: Pain Management;  Laterality: Bilateral;    INJECTION OF JOINT Bilateral 4/7/2021    Procedure: INJECTION, JOINT, SACROILIAC (SI);  Surgeon: Marcel Adkins MD;  Location: [facility] PAIN MGT;  Service: Pain Management;  Laterality: Bilateral;    INJECTION OF JOINT Bilateral 6/2/2021    Procedure: INJECTION, JOINT, GLENOHUMERAL;  Surgeon: Marcel Adkins MD;  Location: [facility] PAIN MGT;  Service: Pain Management;  Laterality: Bilateral;    INJECTION, SACROILIAC JOINT Bilateral 1/4/2023    Procedure: INJECTION,SACROILIAC JOINT BILATERAL CONTRAST NEEDS CONSENT;  Surgeon: Marcel Adkins MD;  Location: [facility] PAIN MGT;  Service: Pain Management;  Laterality: Bilateral;    Larangoscopy      RADIOFREQUENCY ABLATION Left 5/27/2020    Procedure: RADIOFREQUENCY ABLATION LEFT L3,4,5 1 of 2;  Surgeon: Marcel Adkins MD;  Location: [facility] PAIN MGT;  Service: Pain Management;  Laterality: Left;  Left RFA L3, 4, 5  1 of 2    RADIOFREQUENCY ABLATION Right 6/10/2020    Procedure: RADIOFREQUENCY ABLATION RIGHT L3,4,5;  Surgeon: Marcel Adkins MD;  Location: [facility] PAIN MGT;  Service: Pain Management;  Laterality: Right;  Right RFA L3,4.5  2 of 2    RADIOFREQUENCY ABLATION Right 8/31/2022    Procedure: RADIOFREQUENCY ABLATION RIGHT L3, 4, 5 ONE OF TWO;  Surgeon: Marcel Adkins MD;  Location: [facility] PAIN MGT;  Service: Pain Management;  Laterality: Right;    RADIOFREQUENCY ABLATION Left 9/14/2022    Procedure: RADIOFREQUENCY ABLATION LEFT L3, 4, 5 TWO OF TWO;  Surgeon: Marcel Adkins MD;  Location: [facility] PAIN MGT;  Service: Pain Management;  Laterality: Left;    ROBOT-ASSISTED LAPAROSCOPIC PROSTATECTOMY USING DA SAÚL XI N/A 6/4/2019    Procedure: XI ROBOTIC PROSTATECTOMY;  Surgeon: Sergio Dixon MD;  Location: [facility] OR 2ND FLR;  Service: Urology;  Laterality: N/A;  4hrs/ gen with regional     SPINE SURGERY       Review of patient's allergies indicates:  No Known Allergies   Current  Facility-Administered Medications   Medication    0.9%  NaCl infusion     Facility-Administered Medications Ordered in Other Encounters   Medication    0.9%  NaCl infusion       PMHx, PSHx, Allergies, Medications reviewed in epic    ROS negative except pain complaints in HPI    OBJECTIVE:    There were no vitals taken for this visit.    PHYSICAL EXAMINATION:    GENERAL: Well appearing, in no acute distress, alert and oriented x3.  PSYCH:  Mood and affect appropriate.  SKIN: Skin color, texture, turgor normal, no rashes or lesions which will impact the procedure.  CV: RRR with palpation of the radial artery.  PULM: No evidence of respiratory difficulty, symmetric chest rise. Clear to auscultation.  NEURO: Cranial nerves grossly intact.    Plan:    Proceed with procedure as planned Procedure(s) (LRB):  RADIOFREQUENCY ABLATION BILATERAL L3, 4, 5 (Bilateral)    Lance Peters M.D.  PGY-5  Interventional Pain Management Fellow  [facility]  Pager: [phone]    01/24/2024

## 2024-01-24 NOTE — TELEPHONE ENCOUNTER
----- Message from Lisa Villalta sent at 1/24/2024  8:22 AM CST -----  Regarding: Same Day Appointment                Name of Who is Calling:  Anthony Miguel    Who Left The Message:  Anthony Miguel    What is the request in detail:   Patient called stating his ride hasn't come to pick him up for his scheduled procedure with Dr. Adkins today Wednesday 01/24/2024. Patient states he does intend to keep his appointment .  Please further advise.   Thank you      Reply by [facility]: NO      Preferred Call Back :  [phone] ALY

## 2024-01-24 NOTE — TELEPHONE ENCOUNTER
----- Message from Hortensia Mg sent at 1/24/2024  8:25 AM CST -----  Regarding: confirming  Name of caller:       What is the requesting detail: Pt is in a lyft on his way to his appt.       Can the clinic reply by [facility]:       What number to call back:

## 2024-02-19 ENCOUNTER — TELEPHONE (OUTPATIENT)
Dept: ADMINISTRATIVE | Facility: CLINIC | Age: 74
End: 2024-02-19
Payer: MEDICARE

## 2024-02-20 ENCOUNTER — OFFICE VISIT (OUTPATIENT)
Dept: INTERNAL MEDICINE | Facility: CLINIC | Age: 74
End: 2024-02-20
Payer: MEDICARE

## 2024-02-20 VITALS
WEIGHT: 171.31 LBS | HEIGHT: 67 IN | SYSTOLIC BLOOD PRESSURE: 136 MMHG | OXYGEN SATURATION: 99 % | HEART RATE: 63 BPM | DIASTOLIC BLOOD PRESSURE: 82 MMHG | BODY MASS INDEX: 26.89 KG/M2

## 2024-02-20 DIAGNOSIS — Z85.46 HISTORY OF PROSTATE CANCER: ICD-10-CM

## 2024-02-20 DIAGNOSIS — E78.5 HYPERLIPIDEMIA, UNSPECIFIED HYPERLIPIDEMIA TYPE: ICD-10-CM

## 2024-02-20 DIAGNOSIS — F33.0 MILD EPISODE OF RECURRENT MAJOR DEPRESSIVE DISORDER: ICD-10-CM

## 2024-02-20 DIAGNOSIS — M48.07 LUMBOSACRAL STENOSIS WITH NEUROGENIC CLAUDICATION: ICD-10-CM

## 2024-02-20 DIAGNOSIS — G95.9 CERVICAL MYELOPATHY: ICD-10-CM

## 2024-02-20 DIAGNOSIS — Z00.00 ENCOUNTER FOR PREVENTIVE HEALTH EXAMINATION: Primary | ICD-10-CM

## 2024-02-20 DIAGNOSIS — Z12.5 PROSTATE CANCER SCREENING: ICD-10-CM

## 2024-02-20 DIAGNOSIS — D69.2 SENILE PURPURA: ICD-10-CM

## 2024-02-20 DIAGNOSIS — F15.182 CAFFEINE-INDUCED SLEEP DISORDER WITH MILD USE DISORDER, INSOMNIA TYPE: ICD-10-CM

## 2024-02-20 PROBLEM — M25.512 ACUTE PAIN OF LEFT SHOULDER: Status: RESOLVED | Noted: 2019-10-09 | Resolved: 2024-02-20

## 2024-02-20 PROBLEM — Z01.818 PREOP EXAM FOR INTERNAL MEDICINE: Status: RESOLVED | Noted: 2023-01-31 | Resolved: 2024-02-20

## 2024-02-20 PROCEDURE — 1125F AMNT PAIN NOTED PAIN PRSNT: CPT | Mod: HCNC,CPTII,S$GLB, | Performed by: PHYSICIAN ASSISTANT

## 2024-02-20 PROCEDURE — 1160F RVW MEDS BY RX/DR IN RCRD: CPT | Mod: HCNC,CPTII,S$GLB, | Performed by: PHYSICIAN ASSISTANT

## 2024-02-20 PROCEDURE — 99999 PR PBB SHADOW E&M-EST. PATIENT-LVL IV: CPT | Mod: PBBFAC,HCNC,, | Performed by: PHYSICIAN ASSISTANT

## 2024-02-20 PROCEDURE — 1100F PTFALLS ASSESS-DOCD GE2>/YR: CPT | Mod: HCNC,CPTII,S$GLB, | Performed by: PHYSICIAN ASSISTANT

## 2024-02-20 PROCEDURE — 1159F MED LIST DOCD IN RCRD: CPT | Mod: HCNC,CPTII,S$GLB, | Performed by: PHYSICIAN ASSISTANT

## 2024-02-20 PROCEDURE — 3079F DIAST BP 80-89 MM HG: CPT | Mod: HCNC,CPTII,S$GLB, | Performed by: PHYSICIAN ASSISTANT

## 2024-02-20 PROCEDURE — 3288F FALL RISK ASSESSMENT DOCD: CPT | Mod: HCNC,CPTII,S$GLB, | Performed by: PHYSICIAN ASSISTANT

## 2024-02-20 PROCEDURE — 1170F FXNL STATUS ASSESSED: CPT | Mod: HCNC,CPTII,S$GLB, | Performed by: PHYSICIAN ASSISTANT

## 2024-02-20 PROCEDURE — G0439 PPPS, SUBSEQ VISIT: HCPCS | Mod: HCNC,S$GLB,, | Performed by: PHYSICIAN ASSISTANT

## 2024-02-20 PROCEDURE — 3075F SYST BP GE 130 - 139MM HG: CPT | Mod: HCNC,CPTII,S$GLB, | Performed by: PHYSICIAN ASSISTANT

## 2024-02-20 NOTE — PATIENT INSTRUCTIONS
Counseling and Referral of Other Preventative  (Italic type indicates deductible and co-insurance are waived)    Patient Name: Anthony Miguel  Today's Date: 2/20/2024    Health Maintenance       Date Due Completion Date    RSV Vaccine (Age 60+ and Pregnant patients) (1 - 1-dose 60+ series) Never done ---    Shingles Vaccine (1 of 2) 11/14/2016 9/19/2016    PROSTATE-SPECIFIC ANTIGEN 01/31/2024 1/31/2023    Lipid Panel 09/19/2024 9/19/2023    Colorectal Cancer Screening 04/15/2026 4/15/2016    TETANUS VACCINE 08/31/2031 8/31/2021        No orders of the defined types were placed in this encounter.      The following information is provided to all patients.  This information is to help you find resources for any of the problems found today that may be affecting your health:                  Living healthy guide: www.[facility].[place].gov      Understanding Diabetes: www.diabetes.org      Eating healthy: www.cdc.gov/healthyweight      [facility] home safety checklist: www.cdc.gov/steadi/patient.html      Agency on Aging: www.goea.[place].[facility]      Alcoholics anonymous (AA): www.aa.org      Physical Activity: www.francesco.nih.gov/ys8almq      Tobacco use: www.quitwithusla.org

## 2024-02-20 NOTE — PROGRESS NOTES
"  Anthony Miguel presented for a  Medicare AWV and comprehensive Health Risk Assessment today. The following components were reviewed and updated:    Medical history  Family History  Social history  Allergies and Current Medications  Health Risk Assessment  Health Maintenance  Care Team         ** See Completed Assessments for Annual Wellness Visit within the encounter summary.**         The following assessments were completed:  Living Situation  CAGE  Depression Screening  Timed Get Up and Go  Whisper Test  Cognitive Function Screening    Nutrition Screening  ADL Screening  PAQ Screening        Vitals:    02/20/24 0805   BP: 136/82   BP Location: Left arm   Patient Position: Sitting   BP Method: Medium (Manual)   Pulse: 63   SpO2: 99%   Weight: 77.7 kg (171 lb 4.8 oz)   Height: 5' 7" (1.702 m)     Body mass index is 26.83 kg/m².  Physical Exam  Vitals reviewed.   Constitutional:       General: He is not in acute distress.     Appearance: He is well-developed.   HENT:      Head: Normocephalic and atraumatic.      Right Ear: Tympanic membrane, ear canal and external ear normal.      Left Ear: Tympanic membrane, ear canal and external ear normal.   Cardiovascular:      Rate and Rhythm: Normal rate and regular rhythm.      Heart sounds: No murmur heard.  Pulmonary:      Effort: Pulmonary effort is normal.      Breath sounds: Normal breath sounds. No wheezing or rales.   Abdominal:      General: Bowel sounds are normal.      Palpations: Abdomen is soft.      Tenderness: There is no abdominal tenderness.   Musculoskeletal:      Right lower leg: No edema.      Left lower leg: No edema.      Comments: Ambulating with cane   Skin:     General: Skin is warm and dry.      Findings: No rash.   Neurological:      Mental Status: He is alert.   Psychiatric:         Mood and Affect: Mood normal.               Diagnoses and health risks identified today and associated recommendations/orders:    1. Encounter for preventive health " examination  Exam and Assessments performed  Chart Review Complete  Health Maintenance Reviewed and updated      2. Mild episode of recurrent major depressive disorder  Stable on current meds  Followed by PCP    3. 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 Senile purpura  Stable  None noted on exam today  Followed by PCP    5. Cervical myelopathy  Stable on current meds  Followed by Pain Clinic and PCP    6. Lumbosacral stenosis with neurogenic claudication  Stable on current meds  Followed by Pain Clinic and PCP    7. Hyperlipidemia, unspecified hyperlipidemia type  Stable  Followed by PCP    8. History of prostate cancer  S/p RALP(2019) and radiotherapy(2020) with Dr. Sevilla  - PROSTATE SPECIFIC ANTIGEN, DIAGNOSTIC; Future    9. Prostate cancer screening  - PROSTATE SPECIFIC ANTIGEN, DIAGNOSTIC; Future      Provided Anthony with a 5-10 year written screening schedule and personal prevention plan. Recommendations were developed using the USPSTF age appropriate recommendations. Education, counseling, and referrals were provided as needed. After Visit Summary printed and given to patient which includes a list of additional screenings\tests needed.    Follow up in about 1 week (around 2/27/2024) for PCP follow up and 1 year for next Medicare AWV.    Martha Bagley PA-C    Future Appointments   Date Time Provider Department Center   2/27/2024  9:30 AM Jada Spaulding MD [facility] [address]

## 2024-02-27 ENCOUNTER — OFFICE VISIT (OUTPATIENT)
Dept: PRIMARY CARE CLINIC | Facility: CLINIC | Age: 74
End: 2024-02-27
Payer: MEDICARE

## 2024-02-27 ENCOUNTER — LAB VISIT (OUTPATIENT)
Dept: LAB | Facility: HOSPITAL | Age: 74
End: 2024-02-27
Payer: MEDICARE

## 2024-02-27 VITALS
BODY MASS INDEX: 27.16 KG/M2 | HEART RATE: 79 BPM | SYSTOLIC BLOOD PRESSURE: 128 MMHG | WEIGHT: 173.06 LBS | DIASTOLIC BLOOD PRESSURE: 76 MMHG | OXYGEN SATURATION: 96 % | HEIGHT: 67 IN | TEMPERATURE: 98 F

## 2024-02-27 DIAGNOSIS — E55.9 VITAMIN D DEFICIENCY: ICD-10-CM

## 2024-02-27 DIAGNOSIS — C61 CANCER OF PROSTATE: ICD-10-CM

## 2024-02-27 DIAGNOSIS — E83.10 DISORDER OF IRON METABOLISM, UNSPECIFIED: ICD-10-CM

## 2024-02-27 DIAGNOSIS — E78.5 HYPERLIPIDEMIA, UNSPECIFIED HYPERLIPIDEMIA TYPE: ICD-10-CM

## 2024-02-27 DIAGNOSIS — G47.00 INSOMNIA, UNSPECIFIED TYPE: ICD-10-CM

## 2024-02-27 DIAGNOSIS — G89.29 CHRONIC LEFT SHOULDER PAIN: Primary | ICD-10-CM

## 2024-02-27 DIAGNOSIS — M25.512 CHRONIC LEFT SHOULDER PAIN: Primary | ICD-10-CM

## 2024-02-27 LAB
25(OH)D3+25(OH)D2 SERPL-MCNC: 25 NG/ML (ref 30–96)
ALBUMIN SERPL BCP-MCNC: 3.8 G/DL (ref 3.5–5.2)
ALP SERPL-CCNC: 88 U/L (ref 55–135)
ALT SERPL W/O P-5'-P-CCNC: 14 U/L (ref 10–44)
ANION GAP SERPL CALC-SCNC: 10 MMOL/L (ref 8–16)
AST SERPL-CCNC: 34 U/L (ref 10–40)
BASOPHILS # BLD AUTO: 0.03 K/UL (ref 0–0.2)
BASOPHILS NFR BLD: 0.4 % (ref 0–1.9)
BILIRUB SERPL-MCNC: 0.7 MG/DL (ref 0.1–1)
BUN SERPL-MCNC: 25 MG/DL (ref 8–23)
CALCIUM SERPL-MCNC: 9.5 MG/DL (ref 8.7–10.5)
CHLORIDE SERPL-SCNC: 109 MMOL/L (ref 95–110)
CHOLEST SERPL-MCNC: 158 MG/DL (ref 120–199)
CHOLEST/HDLC SERPL: 2.5 {RATIO} (ref 2–5)
CO2 SERPL-SCNC: 23 MMOL/L (ref 23–29)
COMPLEXED PSA SERPL-MCNC: <0.01 NG/ML (ref 0–4)
CREAT SERPL-MCNC: 1.1 MG/DL (ref 0.5–1.4)
DIFFERENTIAL METHOD BLD: ABNORMAL
EOSINOPHIL # BLD AUTO: 0.2 K/UL (ref 0–0.5)
EOSINOPHIL NFR BLD: 3.4 % (ref 0–8)
ERYTHROCYTE [DISTWIDTH] IN BLOOD BY AUTOMATED COUNT: 13 % (ref 11.5–14.5)
EST. GFR  (NO RACE VARIABLE): >60 ML/MIN/1.73 M^2
ESTIMATED AVG GLUCOSE: 97 MG/DL (ref 68–131)
GLUCOSE SERPL-MCNC: 76 MG/DL (ref 70–110)
HBA1C MFR BLD: 5 % (ref 4–5.6)
HCT VFR BLD AUTO: 38.7 % (ref 40–54)
HDLC SERPL-MCNC: 64 MG/DL (ref 40–75)
HDLC SERPL: 40.5 % (ref 20–50)
HGB BLD-MCNC: 12.7 G/DL (ref 14–18)
IMM GRANULOCYTES # BLD AUTO: 0.12 K/UL (ref 0–0.04)
IMM GRANULOCYTES NFR BLD AUTO: 1.8 % (ref 0–0.5)
LDLC SERPL CALC-MCNC: 55 MG/DL (ref 63–159)
LYMPHOCYTES # BLD AUTO: 0.8 K/UL (ref 1–4.8)
LYMPHOCYTES NFR BLD: 11.4 % (ref 18–48)
MCH RBC QN AUTO: 32.5 PG (ref 27–31)
MCHC RBC AUTO-ENTMCNC: 32.8 G/DL (ref 32–36)
MCV RBC AUTO: 99 FL (ref 82–98)
MONOCYTES # BLD AUTO: 0.6 K/UL (ref 0.3–1)
MONOCYTES NFR BLD: 8.8 % (ref 4–15)
NEUTROPHILS # BLD AUTO: 5.1 K/UL (ref 1.8–7.7)
NEUTROPHILS NFR BLD: 74.2 % (ref 38–73)
NONHDLC SERPL-MCNC: 94 MG/DL
NRBC BLD-RTO: 0 /100 WBC
PLATELET # BLD AUTO: 293 K/UL (ref 150–450)
PMV BLD AUTO: 11.2 FL (ref 9.2–12.9)
POTASSIUM SERPL-SCNC: 4.4 MMOL/L (ref 3.5–5.1)
PROT SERPL-MCNC: 7.4 G/DL (ref 6–8.4)
RBC # BLD AUTO: 3.91 M/UL (ref 4.6–6.2)
SODIUM SERPL-SCNC: 142 MMOL/L (ref 136–145)
TRIGL SERPL-MCNC: 195 MG/DL (ref 30–150)
WBC # BLD AUTO: 6.84 K/UL (ref 3.9–12.7)

## 2024-02-27 PROCEDURE — 80061 LIPID PANEL: CPT | Mod: HCNC

## 2024-02-27 PROCEDURE — 1160F RVW MEDS BY RX/DR IN RCRD: CPT | Mod: HCNC,CPTII,S$GLB, | Performed by: INTERNAL MEDICINE

## 2024-02-27 PROCEDURE — 1159F MED LIST DOCD IN RCRD: CPT | Mod: HCNC,CPTII,S$GLB, | Performed by: INTERNAL MEDICINE

## 2024-02-27 PROCEDURE — 3044F HG A1C LEVEL LT 7.0%: CPT | Mod: HCNC,CPTII,S$GLB, | Performed by: INTERNAL MEDICINE

## 2024-02-27 PROCEDURE — 83036 HEMOGLOBIN GLYCOSYLATED A1C: CPT | Mod: HCNC

## 2024-02-27 PROCEDURE — 3078F DIAST BP <80 MM HG: CPT | Mod: HCNC,CPTII,S$GLB, | Performed by: INTERNAL MEDICINE

## 2024-02-27 PROCEDURE — 84153 ASSAY OF PSA TOTAL: CPT | Mod: HCNC

## 2024-02-27 PROCEDURE — 3074F SYST BP LT 130 MM HG: CPT | Mod: HCNC,CPTII,S$GLB, | Performed by: INTERNAL MEDICINE

## 2024-02-27 PROCEDURE — 3008F BODY MASS INDEX DOCD: CPT | Mod: HCNC,CPTII,S$GLB, | Performed by: INTERNAL MEDICINE

## 2024-02-27 PROCEDURE — 1101F PT FALLS ASSESS-DOCD LE1/YR: CPT | Mod: HCNC,CPTII,S$GLB, | Performed by: INTERNAL MEDICINE

## 2024-02-27 PROCEDURE — 36415 COLL VENOUS BLD VENIPUNCTURE: CPT | Mod: HCNC

## 2024-02-27 PROCEDURE — 80053 COMPREHEN METABOLIC PANEL: CPT | Mod: HCNC

## 2024-02-27 PROCEDURE — 3288F FALL RISK ASSESSMENT DOCD: CPT | Mod: HCNC,CPTII,S$GLB, | Performed by: INTERNAL MEDICINE

## 2024-02-27 PROCEDURE — 99214 OFFICE O/P EST MOD 30 MIN: CPT | Mod: HCNC,S$GLB,, | Performed by: INTERNAL MEDICINE

## 2024-02-27 PROCEDURE — 85025 COMPLETE CBC W/AUTO DIFF WBC: CPT | Mod: HCNC

## 2024-02-27 PROCEDURE — 1125F AMNT PAIN NOTED PAIN PRSNT: CPT | Mod: HCNC,CPTII,S$GLB, | Performed by: INTERNAL MEDICINE

## 2024-02-27 PROCEDURE — 82306 VITAMIN D 25 HYDROXY: CPT | Mod: HCNC

## 2024-02-27 RX ORDER — MIRTAZAPINE 7.5 MG/1
7.5 TABLET, FILM COATED ORAL NIGHTLY
Qty: 30 TABLET | Refills: 11 | Status: SHIPPED | OUTPATIENT
Start: 2024-02-27 | End: 2024-04-05

## 2024-02-27 NOTE — PATIENT INSTRUCTIONS
TODAY:  - 4/16 for 2nd dose of shingrix  - 3 mos face to face  - labs today  - sports med with Dr Massey for your shoulder  - Talk to your insurance company about your hearing aid benefits  - No more mobic/meloxicam twice a day. Try to use it once a day or less. Use tylenol to supplement for pain instead.   - Stop trazodone and use mirtazapine instead

## 2024-02-27 NOTE — PROGRESS NOTES
Primary Care Provider Appointment - [facility]  SHARED NOTE: Edilma Doll (PGY III Internal Medicine), Dr Spaulding (Attending)    Subjective:      Patient ID: Anthony Miguel is a 73 y.o. male with prior prostate cancer, lumbar spondylosis, RC tear      Chief Complaint: Follow-up    Prior to this visit, patient's last encounter with PCP was 9/19/2023.    Patient reports he feels well today. His complaints are chronic, reports that his bilateral shoulders are hurting, but L shoulder has been worse lately. He was supposed to undergo a L rotator cuff repair with Dr. Massey (Sports Medicine) in Feb 2023 but he did not go through with it. He is now re-considering it and reports he would go through with the PT/OT rehab he would complete it. He's now having 4th and 5th digit numbness and weakness.     Since his last visit, he has completed bilateral RFA of L3, L4, L5 with Dr. Adkins on 1/24/24. He reports it has helped his back pain tremendously. He is still using Mobic BID and Robaxin qhs, although he has been using these largely for his shoulder rather than his back. The mobic will help him sleep. He has been avoiding the Trazodone since it gives him vivid dreams. He also takes voltaren gel and Gabapentin for his shoulder.     Regarding his Vitamin D deficiency, he is not taking a dedicated vitamin D supplementation, instead taking a daily multivitamin (one-a-day Men's/Senior's).     Has a history of prostate cancer. Underwent RALP/BPLND on 6/4/19 with Dr. Sevilla. Currently follows with [facility] in Urology. Last PSA < 0.01 in 1/31/23.     Not taking any statins for HLD. Last Lipid panel with LDL 63, TChol 142 (9/19/23)    Health Maintenance:  Due for second shingles vaccine in April, otherwise up to date on everything.       Mental Health  - Currently taking: Trazodone for sleep  - Does not see any therapists or psychiatrists  - Lives by himself. Has friends he can call if he needs support.  - No longer drives. Takes  uber and public transportation.   - Hobbies include hearing music and writing piano music. He was previously a  and       Medications: Does not have pill packs  - Gabapentin 300mg TID  - Mobic 15mg BID  - Voltaren gel prn  - Trazodone 50mg qhs    FU in 3 months for second shingles dose and follow up of chronic conditions, check on how mirtazapine is working for sleep instead of trazodone.       4Ms for Medical Decision-Making in Older Adults    Last Completed EAWV: 2024    MOBILITY:  Get Up and Go:       No data to display              Activities of Daily Livin/20/2024     8:17 AM   Activities of Daily Living   Ambulation Independent   Dressing Independent   Transfers Independent   Toileting Incontinent of bladder;Continent of bowel   Feeding Independent   Cleaning home/Chores Independent   Telephone use Independent   Shopping Independent   Paying bills Independent   Taking meds Independent     Whisper Test:      2024     8:12 AM   Whisper Test   Whisper Test N/A- Hearing Impairment     Disability Status:      2024     8:19 AM   Disability Status   Are you deaf or do you have serious difficulty hearing? N   Are you blind or do you have serious difficulty seeing, even when wearing glasses? N   Because of a physical, mental, or emotional condition, do you have serious difficulty concentrating, remembering, or making decisions? N   Do you have serious difficulty walking or climbing stairs? N   Do you have difficulty dressing or bathing? N   Because of a physical, mental, or emotional condition, do you have difficulty doing errands alone such as visiting a doctor's office or shopping? N     Nutrition Screenin/20/2024     8:15 AM   Nutrition Screening   Has food intake declined over the past three months due to loss of appetite, digestive problems, chewing or swallowing difficulties? No decrease in food intake   Involuntary weight loss during the last 3 months?  No weight loss   Mobility? Goes out   Has the patient suffered psychological stress or acute disease in the past three months? No   Neuropsychological problems? No psychological problems   Body Mass Index (BMI)?  BMI 23 or greater   Screening Score 14   Interpretation Normal nutritional status    Screening Score: 0-7 Malnourished, 8-11 At Risk, 12-14 Normal    MENTATION:   Depression Patient Health Questionnaire:      2024     9:32 AM   Depression Patient Health Questionnaire   PHQ-4 Total Score 0     Has Dementia Dx: No    Cognitive Function Screenin/20/2024     8:17 AM   Cognitive Function Screening   Clock Drawing Test 2   Mini-Cog 3 Minute Recall 3   Cognitive Function Screening 5     Cognitive Function Screening Total - Less than 4 = Abnormal,  Greater than or equal to 4 = Normal    MEDICATIONS:  High Risk Medications:  Total Active Medications: 1  gabapentin - 300 MG    WHAT MATTERS MOST:  Advance Care Planning   ACP Status:   Patient has had an ACP conversation  Living Will: No  Power of : No  LaPOST: No    What is most important right now is to focus on remaining as independent as possiblesymptom/pain control    Accordingly, we have decided that the best plan to meet the patient's goals includes continuing with treatment    Patient reports he has a living will. There is an ACP note by Dr. Spaulding in which patient appointed his sister in law Nguyen as his MPOA and reported that he would like his code status to be DNR.     Patient reports what's most important to him is to remain independent and not go to assisted living. This gives him anxiety sometimes.            PHQ-4 Score: 0     Social History     Socioeconomic History    Marital status: Single    Number of children: 0   Tobacco Use    Smoking status: Former     Current packs/day: 0.00     Types: Cigarettes     Quit date: 2000     Years since quittin.0    Smokeless tobacco: Former   Substance and Sexual Activity    Alcohol  use: Not Currently    Drug use: No    Sexual activity: Not Currently   Social History Narrative    No difficulty reading Rx labels      Social Determinants of Health     Financial Resource Strain: Low Risk  (2/20/2024)    Overall Financial Resource Strain (CARDIA)     Difficulty of Paying Living Expenses: Not hard at all   Food Insecurity: Food Insecurity Present (2/20/2024)    Hunger Vital Sign     Worried About Running Out of Food in the Last Year: Sometimes true     Ran Out of Food in the Last Year: Never true   Transportation Needs: No Transportation Needs (2/20/2024)    PRAPARE - Transportation     Lack of Transportation (Medical): No     Lack of Transportation (Non-Medical): No   Physical Activity: Inactive (2/20/2024)    Exercise Vital Sign     Days of Exercise per Week: 0 days     Minutes of Exercise per Session: 0 min   Stress: No Stress Concern Present (2/20/2024)    [employer] [place] [employer] - Occupational Stress Questionnaire     Feeling of Stress : Only a little   Social Connections: Moderately Integrated (2/20/2024)    Social Connection and Isolation Panel [NHANES]     Frequency of Communication with Friends and Family: More than three times a week     Frequency of Social Gatherings with Friends and Family: Once a week     Attends [employer] Services: More than 4 times per year     Active Member of Clubs or Organizations: Yes     Attends Club or Organization Meetings: Never     Marital Status: Never    Housing Stability: Low Risk  (2/20/2024)    Housing Stability Vital Sign     Unable to Pay for Housing in the Last Year: No     Number of Places Lived in the Last Year: 1     Unstable Housing in the Last Year: No       Review of Systems   Constitutional:  Positive for activity change.   Musculoskeletal:  Positive for arthralgias, back pain and gait problem.   Hematological:  Bruises/bleeds easily.   Psychiatric/Behavioral:  The patient is nervous/anxious.        Objective:   BP  "128/76 (BP Location: Left arm, Patient Position: Sitting, BP Method: Medium (Manual))   Pulse 79   Temp 97.8 °F (36.6 °C) (Oral)   Ht 5' 7" (1.702 m)   Wt 78.5 kg (173 lb 1 oz)   SpO2 96%   BMI 27.11 kg/m²     Physical Exam  Constitutional:       Comments: Ambulates with cane   Neck:      Comments: Decreased ROM  Cardiovascular:      Rate and Rhythm: Normal rate and regular rhythm.   Pulmonary:      Effort: Pulmonary effort is normal.      Breath sounds: Normal breath sounds.   Abdominal:      General: Abdomen is flat. Bowel sounds are normal.      Palpations: Abdomen is soft.   Musculoskeletal:         General: Swelling, tenderness, deformity and signs of injury present.      Comments: Decreased ROM of shoulder R>L   Skin:     Findings: Bruising present.   Neurological:      Mental Status: He is alert and oriented to person, place, and time.      Motor: Weakness present.      Gait: Gait abnormal.             Lab Results   Component Value Date    WBC 6.84 02/27/2024    HGB 12.7 (L) 02/27/2024    HCT 38.7 (L) 02/27/2024     02/27/2024    CHOL 158 02/27/2024    TRIG 195 (H) 02/27/2024    HDL 64 02/27/2024    ALT 14 02/27/2024    AST 34 02/27/2024     02/27/2024    K 4.4 02/27/2024     02/27/2024    CREATININE 1.1 02/27/2024    BUN 25 (H) 02/27/2024    CO2 23 02/27/2024    TSH 3.711 01/31/2023    PSA <0.01 07/19/2022    INR 1.0 06/21/2017    HGBA1C 5.0 02/27/2024       Current Outpatient Medications on File Prior to Visit   Medication Sig Dispense Refill    cholecalciferol, vitamin D3, (VITAMIN D3) 50 mcg (2,000 unit) Cap capsule Take 1 capsule (2,000 Units total) by mouth once daily. 90 capsule 3    gabapentin (NEURONTIN) 300 MG capsule Take 2 capsules (600 mg total) by mouth 3 (three) times daily. 540 capsule 3    meloxicam (MOBIC) 15 MG tablet TAKE 1 TABLET(15 MG) BY MOUTH EVERY DAY 90 tablet 3    multivitamin capsule Take 1 capsule by mouth once daily.      triamcinolone acetonide 0.1% " (KENALOG) 0.1 % cream Apply 15 g (15,000 mg total) topically 2 (two) times daily as needed (for cuts). 1 each 3    VOLTAREN 1 % Gel UNKNOWN 777 g 0    walker (ULTRA-LIGHT ROLLATOR) Misc 1 Units by Misc.(Non-Drug; Combo Route) route once daily at 6am. 1 each 0     Current Facility-Administered Medications on File Prior to Visit   Medication Dose Route Frequency Provider Last Rate Last Admin    0.9%  NaCl infusion   Intravenous Continuous Altaf Santana MD             Assessment:   73 y.o. male with multiple co-morbid illnesses here to follow-up with PCP and continue work-up of chronic issues    Plan:   1. Chronic left shoulder pain  -     Ambulatory referral/consult to Sports Medicine; Future; Expected date: 03/05/2024  - We discussed importance of cutting down on Mobic as much as possible to avoid NSAID induced nephropathy and PUD. Asked him to supplement with Tylenol instead if he has pain. Will send him back to Dr. Massey for consideration of RC repair. He is willing to participate with PT/OT now.     2. Cancer of prostate  Overview:  Proastatectomy on 6/4/19    Orders:  -     PROSTATE SPECIFIC ANTIGEN, DIAGNOSTIC; Future; Expected date: 02/27/2024    3. Vitamin D deficiency  Overview:  Compliant with daily supplementation of 2000U Vit D    Orders:  -     Vitamin D; Future; Expected date: 02/27/2024    4. Hyperlipidemia, unspecified hyperlipidemia type  Overview:  No longer takes atorvastatin. Last LDL and TChol WNL    Orders:  -     CBC W/ AUTO DIFFERENTIAL; Future; Expected date: 02/27/2024  -     COMPREHENSIVE METABOLIC PANEL; Future; Expected date: 02/27/2024  -     HEMOGLOBIN A1C; Future; Expected date: 02/27/2024  -     LIPID PANEL; Future; Expected date: 02/27/2024    5. Disorder of iron metabolism, unspecified  -     HEMOGLOBIN A1C; Future; Expected date: 02/27/2024    6. Insomnia, unspecified type  -     mirtazapine (REMERON) 7.5 MG Tab; Take 1 tablet (7.5 mg total) by mouth every evening.  Dispense:  30 tablet; Refill: 11  - Will trial mirtazapine instead of Trazodone since the trazodone was giving him vivid dreams.          Health Maintenance         Date Due Completion Date    Shingles Vaccine (2 of 2) 04/16/2024 2/20/2024    PROSTATE-SPECIFIC ANTIGEN 02/27/2025 2/27/2024    Lipid Panel 02/27/2025 2/27/2024    Colorectal Cancer Screening 04/15/2026 4/15/2016    TETANUS VACCINE 08/31/2031 8/31/2021            Future Appointments   Date Time Provider Department Center   3/20/2024  2:00 PM Josefina Massey MD [facility] SP[facility] [place]   4/17/2024  9:30 AM NURSE, [facility] CLINIC [facility]N [address] [facility]   5/27/2024  9:00 AM Jada Spaulding MD [facility]       Follow up in about 3 months (around 5/27/2024). Total clinical care time was 35 min      Jada Spaulding MD/MPH  [facility]  [phone] spectralink

## 2024-02-29 ENCOUNTER — TELEPHONE (OUTPATIENT)
Dept: PRIMARY CARE CLINIC | Facility: CLINIC | Age: 74
End: 2024-02-29
Payer: MEDICARE

## 2024-02-29 NOTE — TELEPHONE ENCOUNTER
----- Message from Jada Spaulding MD sent at 2/28/2024  6:00 PM CST -----  Please let patient know that their bloodwork was stable. The only thing abnormal was that he had a low Vit D. Could you advise him to supplement with Vit D3 1000U daily? Does he want us to order it from [facility]?    His cholesterol was elevated. Given his age, it isn't clear whether starting a statin will help him prevent a stroke or heart attack. There is a clinical trial going on that tracks patients to see if they develop any diseases with age while they take a statin or placebo. Is he interested?    The 10-year ASCVD risk score (Marielos HAGER, et al., 2019) is: 19.8%    Values used to calculate the score:      Age: 73 years      Sex: Male      Is Non- : No      Diabetic: No      Tobacco smoker: No      Systolic Blood Pressure: 128 mmHg      Is BP treated: No      HDL Cholesterol: 64 mg/dL      Total Cholesterol: 158 mg/dL    Please see if they have any additional concerns.

## 2024-02-29 NOTE — PROGRESS NOTES
Please let patient know that their bloodwork was stable. The only thing abnormal was that he had a low Vit D. Could you advise him to supplement with Vit D3 1000U daily? Does he want us to order it from [facility]?    His cholesterol was elevated. Given his age, it isn't clear whether starting a statin will help him prevent a stroke or heart attack. There is a clinical trial going on that tracks patients to see if they develop any diseases with age while they take a statin or placebo. Is he interested?    The 10-year ASCVD risk score (Marielos HAGER, et al., 2019) is: 19.8%    Values used to calculate the score:      Age: 73 years      Sex: Male      Is Non- : No      Diabetic: No      Tobacco smoker: No      Systolic Blood Pressure: 128 mmHg      Is BP treated: No      HDL Cholesterol: 64 mg/dL      Total Cholesterol: 158 mg/dL    Please see if they have any additional concerns.

## 2024-03-11 ENCOUNTER — TELEPHONE (OUTPATIENT)
Dept: RESEARCH | Facility: HOSPITAL | Age: 74
End: 2024-03-11
Payer: MEDICARE

## 2024-03-11 NOTE — TELEPHONE ENCOUNTER
Preventable (2020-192)    I called Mr. Miguel after receiving his name via Dr. Spualding that he might be interested in participation in the preventable research trial. The call went to , I left my number for a return call.

## 2024-03-13 NOTE — TELEPHONE ENCOUNTER
Preventable (2020-192)    Study handouts were dropped off for mail delivery to Mr. Miguel's home address. My contact information was left inside for him to reach out for possible enrollment in the research trial.

## 2024-03-20 ENCOUNTER — OFFICE VISIT (OUTPATIENT)
Dept: SPORTS MEDICINE | Facility: CLINIC | Age: 74
End: 2024-03-20
Payer: MEDICARE

## 2024-03-20 VITALS
SYSTOLIC BLOOD PRESSURE: 124 MMHG | DIASTOLIC BLOOD PRESSURE: 73 MMHG | BODY MASS INDEX: 27.47 KG/M2 | HEIGHT: 67 IN | HEART RATE: 69 BPM | WEIGHT: 175 LBS

## 2024-03-20 DIAGNOSIS — M75.122 NONTRAUMATIC COMPLETE TEAR OF ROTATOR CUFF, LEFT: Primary | ICD-10-CM

## 2024-03-20 DIAGNOSIS — M25.512 CHRONIC LEFT SHOULDER PAIN: ICD-10-CM

## 2024-03-20 DIAGNOSIS — G89.29 CHRONIC LEFT SHOULDER PAIN: ICD-10-CM

## 2024-03-20 PROCEDURE — 1159F MED LIST DOCD IN RCRD: CPT | Mod: HCNC,CPTII,S$GLB, | Performed by: ORTHOPAEDIC SURGERY

## 2024-03-20 PROCEDURE — 99214 OFFICE O/P EST MOD 30 MIN: CPT | Mod: HCNC,S$GLB,, | Performed by: ORTHOPAEDIC SURGERY

## 2024-03-20 PROCEDURE — 1101F PT FALLS ASSESS-DOCD LE1/YR: CPT | Mod: HCNC,CPTII,S$GLB, | Performed by: ORTHOPAEDIC SURGERY

## 2024-03-20 PROCEDURE — 3078F DIAST BP <80 MM HG: CPT | Mod: HCNC,CPTII,S$GLB, | Performed by: ORTHOPAEDIC SURGERY

## 2024-03-20 PROCEDURE — 99999 PR PBB SHADOW E&M-EST. PATIENT-LVL III: CPT | Mod: PBBFAC,HCNC,, | Performed by: ORTHOPAEDIC SURGERY

## 2024-03-20 PROCEDURE — 3288F FALL RISK ASSESSMENT DOCD: CPT | Mod: HCNC,CPTII,S$GLB, | Performed by: ORTHOPAEDIC SURGERY

## 2024-03-20 PROCEDURE — 3074F SYST BP LT 130 MM HG: CPT | Mod: HCNC,CPTII,S$GLB, | Performed by: ORTHOPAEDIC SURGERY

## 2024-03-20 PROCEDURE — 3044F HG A1C LEVEL LT 7.0%: CPT | Mod: HCNC,CPTII,S$GLB, | Performed by: ORTHOPAEDIC SURGERY

## 2024-03-20 PROCEDURE — 3008F BODY MASS INDEX DOCD: CPT | Mod: HCNC,CPTII,S$GLB, | Performed by: ORTHOPAEDIC SURGERY

## 2024-03-20 PROCEDURE — 1125F AMNT PAIN NOTED PAIN PRSNT: CPT | Mod: HCNC,CPTII,S$GLB, | Performed by: ORTHOPAEDIC SURGERY

## 2024-03-20 NOTE — PROGRESS NOTES
CC: left Shoulder pain. Professional .     73 y.o. Male , left hand dominant, reports 5 months of left shoulder pain after a fall. He was scheduled for surgery with us but wanted to delay because he hurt his back. His back pain has improved       He reports weakness and pain in left shoulder.  Unable to play piano to full extent due to injury.  Reports that the pain is severe and not responding to any conservative care.    Failed physician directed therapy x 12 weeks  Failed NSAIDs x 6 weeks  Failed RICE x 6 weeks    He had an injury to the right shoulder with an MRI that shows full thickness retracted supraspinatus tear, but he would like to deal with the left shoulder first as he is left handed.     SANE today: 30  VAS: 2    PAST MEDICAL HISTORY:   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    Overview:  Right side dx update     PAST SURGICAL HISTORY:   Past Surgical History:   Procedure Laterality Date    BACK SURGERY      EPIDURAL STEROID INJECTION N/A 2/6/2019    Procedure: INJECTION, STEROID, EPIDURAL, L45-S1 IL;  Surgeon: Marcel Adkins MD;  Location: [facility], [place] PAIN T;  Service: Pain Management;  Laterality: N/A;    EPIDURAL STEROID INJECTION N/A 4/10/2019    Procedure: Injection, Steroid, Epidural LUMBAR/CAUDAL L5-S1 INTERLAMINAR KAYLEY;  Surgeon: Marcel Adkins MD;  Location: [facility], [place] PAIN T;  Service: Pain Management;  Laterality: N/A;  NEEDS CONSENT    INJECTION OF FACET JOINT Bilateral 8/14/2019    Procedure: INJECTION, FACET JOINT INJECTION (LUMBAR BLOCK) BILATERAL L4-5 AND L5-S1;  Surgeon: Marcel Adkins MD;  Location: [facility], [place] PAIN T;  Service: Pain Management;  Laterality:  Bilateral;  NEEDS CONSENT    INJECTION OF FACET JOINT Bilateral 10/16/2019    Procedure: FACET JOINT INJECTION (LUMBAR BLOCK) BILATERAL L4-5 AND L5-S1;  Surgeon: Marcel Adkins MD;  Location: [facility] PAIN MGT;  Service: Pain Management;  Laterality: Bilateral;  NEEDS CONSENT    INJECTION OF JOINT Bilateral 11/4/2020    Procedure: INJECTION, JOINT, GLENOHUMERAL;  Surgeon: Marcel Adkisn MD;  Location: [facility] PAIN MGT;  Service: Pain Management;  Laterality: Bilateral;    INJECTION OF JOINT Bilateral 12/16/2020    Procedure: INJECTION, JOINT, SACROILIAC (SI) need consent;  Surgeon: Marcel Adkins MD;  Location: [facility] PAIN MGT;  Service: Pain Management;  Laterality: Bilateral;    INJECTION OF JOINT Bilateral 4/7/2021    Procedure: INJECTION, JOINT, SACROILIAC (SI);  Surgeon: Marcel Adkins MD;  Location: [facility] PAIN MGT;  Service: Pain Management;  Laterality: Bilateral;    INJECTION OF JOINT Bilateral 6/2/2021    Procedure: INJECTION, JOINT, GLENOHUMERAL;  Surgeon: Marcel Adkins MD;  Location: [facility] PAIN MGT;  Service: Pain Management;  Laterality: Bilateral;    INJECTION, SACROILIAC JOINT Bilateral 1/4/2023    Procedure: INJECTION,SACROILIAC JOINT BILATERAL CONTRAST NEEDS CONSENT;  Surgeon: Marcel Adkins MD;  Location: [facility] PAIN MGT;  Service: Pain Management;  Laterality: Bilateral;    Larangoscopy      RADIOFREQUENCY ABLATION Left 5/27/2020    Procedure: RADIOFREQUENCY ABLATION LEFT L3,4,5 1 of 2;  Surgeon: Marcel Adkins MD;  Location: [facility] PAIN MGT;  Service: Pain Management;  Laterality: Left;  Left RFA L3, 4, 5  1 of 2    RADIOFREQUENCY ABLATION Right 6/10/2020    Procedure: RADIOFREQUENCY ABLATION RIGHT L3,4,5;  Surgeon: Marcel Adkins MD;  Location: [facility] PAIN MGT;  Service: Pain Management;  Laterality: Right;  Right RFA L3,4.5  2 of 2    RADIOFREQUENCY ABLATION Right 8/31/2022    Procedure: RADIOFREQUENCY ABLATION RIGHT L3, 4, 5 ONE OF TWO;  Surgeon: Marcel Adkins MD;   Location: [facility] PAIN MGT;  Service: Pain Management;  Laterality: Right;    RADIOFREQUENCY ABLATION Left 2022    Procedure: RADIOFREQUENCY ABLATION LEFT L3, 4, 5 TWO OF TWO;  Surgeon: Marcel Adkins MD;  Location: [facility] PAIN T;  Service: Pain Management;  Laterality: Left;    RADIOFREQUENCY ABLATION Bilateral 2024    Procedure: RADIOFREQUENCY ABLATION BILATERAL L3, 4, 5;  Surgeon: Marcel Adkins MD;  [place] [facility] [place] [facility]  Service: Pain Management;  Laterality: Bilateral;  [phone]  3 WK F/U MILO    ROBOT-ASSISTED LAPAROSCOPIC PROSTATECTOMY USING DA SAÚL XI N/A 2019    Procedure: XI ROBOTIC PROSTATECTOMY;  Surgeon: Sergio Dixon MD;  Location: [facility] OR [address] [place] [address];  Service: Urology;  Laterality: N/A;  4hrs/ gen with regional     SPINE SURGERY       FAMILY HISTORY:   Family History   Problem Relation Age of Onset    Cancer Mother         Pancreatic    No Known Problems Father     Keratoconus Brother     Cancer Brother      SOCIAL HISTORY:   Social History     Socioeconomic History    Marital status: Single    Number of children: 0   Tobacco Use    Smoking status: Former     Current packs/day: 0.00     Types: Cigarettes     Quit date: 2000     Years since quittin.1    Smokeless tobacco: Former   Substance and Sexual Activity    Alcohol use: Not Currently    Drug use: No    Sexual activity: Not Currently   Social History Narrative    No difficulty reading Rx labels      Social Determinants of Health     Financial Resource Strain: Low Risk  (2024)    Overall Financial Resource Strain (CARDIA)     Difficulty of Paying Living Expenses: Not hard at all   Food Insecurity: Food Insecurity Present (2024)    Hunger Vital Sign     Worried About Running Out of Food in the Last Year: Sometimes true     Ran Out of Food in the Last Year: Never true   Transportation Needs: No Transportation Needs (2024)    PRAPARE - Transportation     Lack of Transportation (Medical): No     Lack  of Transportation (Non-Medical): No   Physical Activity: Inactive (2/20/2024)    Exercise Vital Sign     Days of Exercise per Week: 0 days     Minutes of Exercise per Session: 0 min   Stress: No Stress Concern Present (2/20/2024)    [employer] [place] - Occupational Stress Questionnaire     Feeling of Stress : Only a little   Social Connections: Moderately Integrated (2/20/2024)    Social Connection and Isolation Panel [NHANES]     Frequency of Communication with Friends and Family: More than three times a week     Frequency of Social Gatherings with Friends and Family: Once a week     Attends Sabianism Services: More than 4 times per year     Active Member of Clubs or Organizations: Yes     Attends Club or Organization Meetings: Never     Marital Status: Never    Housing Stability: Low Risk  (2/20/2024)    Housing Stability Vital Sign     Unable to Pay for Housing in the Last Year: No     Number of Places Lived in the Last Year: 1     Unstable Housing in the Last Year: No     MEDICATIONS:   Current Outpatient Medications:     cholecalciferol, vitamin D3, (VITAMIN D3) 50 mcg (2,000 unit) Cap capsule, Take 1 capsule (2,000 Units total) by mouth once daily., Disp: 90 capsule, Rfl: 3    gabapentin (NEURONTIN) 300 MG capsule, Take 2 capsules (600 mg total) by mouth 3 (three) times daily., Disp: 540 capsule, Rfl: 3    meloxicam (MOBIC) 15 MG tablet, TAKE 1 TABLET(15 MG) BY MOUTH EVERY DAY, Disp: 90 tablet, Rfl: 3    mirtazapine (REMERON) 7.5 MG Tab, Take 1 tablet (7.5 mg total) by mouth every evening., Disp: 30 tablet, Rfl: 11    multivitamin capsule, Take 1 capsule by mouth once daily., Disp: , Rfl:     triamcinolone acetonide 0.1% (KENALOG) 0.1 % cream, Apply 15 g (15,000 mg total) topically 2 (two) times daily as needed (for cuts)., Disp: 1 each, Rfl: 3    VOLTAREN 1 % Gel, UNKNOWN, Disp: 777 g, Rfl: 0    walker (ULTRA-LIGHT ROLLATOR) Misc, 1 Units by Misc.(Non-Drug; Combo Route) route  "once daily at 6am., Disp: 1 each, Rfl: 0  No current facility-administered medications for this visit.    Facility-Administered Medications Ordered in Other Visits:     0.9%  NaCl infusion, , Intravenous, Continuous, Altaf Santana MD  ALLERGIES: Review of patient's allergies indicates:  No Known Allergies  VITAL SIGNS: /73   Pulse 69   Ht 5' 7" (1.702 m)   Wt 79.4 kg (175 lb)   BMI 27.41 kg/m²     Review of Systems   Constitution: Negative. Negative for chills, fever and night sweats.   HENT: Negative for congestion and headaches.    Eyes: Negative for blurred vision, left vision loss and right vision loss.   Cardiovascular: Negative for chest pain and syncope.   Respiratory: Negative for cough and shortness of breath.    Endocrine: Negative for polydipsia, polyphagia and polyuria.   Hematologic/Lymphatic: Negative for bleeding problem. Does not bruise/bleed easily.   Skin: Negative for dry skin, itching and rash.   Musculoskeletal: Negative for falls and muscle weakness.   Gastrointestinal: Negative for abdominal pain and bowel incontinence.   Genitourinary: Negative for bladder incontinence and nocturia.   Neurological: Negative for disturbances in coordination, loss of balance and seizures.   Psychiatric/Behavioral: Negative for depression. The patient does not have insomnia.    Allergic/Immunologic: Negative for hives and persistent infections.       PHYSICAL EXAMINATION:  General:  The patient is alert and oriented x 3.  Mood is pleasant.  Observation of ears, eyes and nose reveal no gross abnormalities.  HEENT: NCAT, sclera nonicteric  Lungs: Respirations are equal and unlabored.  Gait is coordinated. Patient can toe walk and heel walk without difficulty.  Cardiovascular: There are no swelling or varicosities present.   Lymphatic: Negative for adenopathy       left Shoulder / Upper Extremity Exam    OBSERVATION:     Swelling  none  Deformity  none   Discoloration  none   Scapular " winging none   Scars   none  Atrophy  none    TENDERNESS / CREPITUS (T/C):          T/C      T/C   Clavicle   -/-  SUPRAspinatus    -/-     AC Jt.    -/-  INFRAspinatus  -/-     SC Jt.    -/-  Deltoid    -/-     G. Tuberosity  -/-  LH BICEP groove  +/-   Acromion:  -/-  Midline Neck   -/-     Scapular Spine -/-  Trapezium   -/-   SMA Scapula  -/-  GH jt. line - post  -/-     Scapulothoracic  -/-         ROM: (* = with pain)  Right shoulder   Left shoulder        AROM (PROM)   AROM (PROM)   FE    170° (175°) *     110° (160°) *     ER at 0°    60°  (65°)    10°  (45°) *   ER at 90° ABD  90°  (90°)    NA   IR at 90°  ABD   NA  (40°)     NA        IR (spine level)   T10     L3    STRENGTH: (* = with pain) RIGHT SHOULDER  LEFT SHOULDER   SCAPTION at 0   4-/5 *    4-/5 *    SCAPTION at 30   4/5 *    4/5*   IR    5/5    5/5   ER    5/5    3/5   BICEPS   5/5    5/5   Deltoid    5/5    5/5     SIGNS:  Painful side       NEER   +   OVINNIES  +     MEADOWS   +   SPEEDS  +     DROP ARM   neg   BELLY PRESS +   Superior escape none    LIFT-OFF  neg   X-Body ADD    neg    MOVING VALGUS Neg        STABILITY TESTING    RIGHT SHOULDER   LEFT SHOULDER     Translation     Anterior  up face     up face     Posterior  up face    up face    Sulcus   < 10mm    < 10 mm     Signs    Apprehension   neg      neg      Relocation   no change     no change     Jerk test  neg     neg    EXTREMITY NEURO-VASCULAR EXAM    Sensation grossly intact to light touch all dermatomal regions.    DTR 2+ Biceps, Triceps, BR and Negative Vineets sign   Grossly intact motor function at Elbow, Wrist and Hand   Distal pulses radial and ulnar 2+, brisk cap refill, symmetric.      NECK:  Painless FROM and spinous processes non-tender. Negative Spurlings sign.      OTHER FINDINGS:  +scapular dyskinesia  +bear hug       XRAYS:  Shoulder trauma series left,  were reviewed and interpreted by me. No convincing fracture or dislocation is noted. The osseous  structures appear well mineralized and well aligned, possible insufficiency fracture greater tuberosity/cystic changes, no degenerative changes    left   1. Shoulder pain,possible RTC tear, Right   2.  Possible insufficiency fracture greater tuberosity, Right  3. Left shoulder pain, full thickness retracted atrophic RTC tear  4. Insufficiency fracture greater tuberosity, Left     MRI Left shoulder reviewed and interpreted personally by me: Full thickness supraspinatus tear with atrophy and retraction to glenoid. SLAP, biceps tendinitis, + insufficiency fracture greater tuberosity      ASSESSMENT:     Left Shoulder Rotator Cuff Tear, SLAP, biceps tendonitis.      he would benefit from an arthroscopy, given the above.       PLAN:   Left Shoulder rotator cuff tear     All questions were answered, pt will contact us for questions or concerns in the interim.  Had thorough discussion of non-operative vs operative intervention, and risks and benefits of both.     We have discussed the surgery and recovery of arthroscopic shoulder surgery. he understands that there may be limited mobility up to several weeks after surgery depending on procedures that are performed at the time of surgery.    The spectrum of treatment options were discussed with the patient, including nonoperative and operative options.  After thorough discussion, the patient has elected to undergo surgical treatment to include:    LEFT  a. Shoulder arthroscopic tuberoplasty              b. Shoulder arthroscopic SAD              c. Shoulder arthroscopic extensive debridement              d. Shoulder arthroscopic biceps tenodesis (vs. subpect tenodesis)              e. Shoulder arthroscopic possible microfracture              f.  Shoulder open reduction internal fixation greater tuberosity     The details of the surgical procedure were explained, including the location of probable incisions and a description of likely hardware and/or grafts to be used.  The  patient understands the likely convalescence after surgery.  Also, we have thoroughly discussed the risks, benefits and alternatives to surgery, including, but not limited to, the risk of infection, joint stiffness, blood clot (including DVT and/or pulmonary embolus), neurologic and vascular injury.  It was explained that, if tissue has been repaired or reconstructed, there is a chance of failure, which may require further management.  Consent form for surgery is signed and in chart

## 2024-03-21 ENCOUNTER — TELEPHONE (OUTPATIENT)
Dept: SPORTS MEDICINE | Facility: CLINIC | Age: 74
End: 2024-03-21
Payer: MEDICARE

## 2024-03-21 NOTE — TELEPHONE ENCOUNTER
----- Message from Corrine Bustillos sent at 3/21/2024  3:00 PM CDT -----  Regarding: Advise  Contact: [phone]  Pt is calling stating that he needs information regarding procedure sent to PCP in order to get medical clearance to have procedure done. Pt's PCP fax number: [phone] Jada Spaulding MD

## 2024-03-22 DIAGNOSIS — M75.22 BICEPS TENDINITIS OF LEFT UPPER EXTREMITY: ICD-10-CM

## 2024-03-22 DIAGNOSIS — S43.432A SUPERIOR GLENOID LABRUM LESION OF LEFT SHOULDER, INITIAL ENCOUNTER: ICD-10-CM

## 2024-03-22 DIAGNOSIS — S42.252A CLOSED DISPLACED FRACTURE OF GREATER TUBEROSITY OF LEFT HUMERUS, INITIAL ENCOUNTER: ICD-10-CM

## 2024-03-22 DIAGNOSIS — M75.122 NONTRAUMATIC COMPLETE TEAR OF ROTATOR CUFF, LEFT: Primary | ICD-10-CM

## 2024-03-25 ENCOUNTER — TELEPHONE (OUTPATIENT)
Dept: SPORTS MEDICINE | Facility: CLINIC | Age: 74
End: 2024-03-25
Payer: MEDICARE

## 2024-03-25 NOTE — TELEPHONE ENCOUNTER
I called the patient and left a voicemail asking him to return the call to discuss needing to reschedule his surgery

## 2024-03-26 ENCOUNTER — TELEPHONE (OUTPATIENT)
Dept: SPORTS MEDICINE | Facility: CLINIC | Age: 74
End: 2024-03-26
Payer: MEDICARE

## 2024-03-26 ENCOUNTER — HOSPITAL ENCOUNTER (OUTPATIENT)
Dept: RADIOLOGY | Facility: HOSPITAL | Age: 74
Discharge: HOME | End: 2024-03-26
Attending: ORTHOPAEDIC SURGERY
Payer: MEDICARE

## 2024-03-26 DIAGNOSIS — M25.512 CHRONIC LEFT SHOULDER PAIN: ICD-10-CM

## 2024-03-26 DIAGNOSIS — M75.122 NONTRAUMATIC COMPLETE TEAR OF ROTATOR CUFF, LEFT: ICD-10-CM

## 2024-03-26 DIAGNOSIS — G89.29 CHRONIC LEFT SHOULDER PAIN: ICD-10-CM

## 2024-03-26 PROCEDURE — 73221 MRI JOINT UPR EXTREM W/O DYE: CPT | Mod: 26,HCNC,LT, | Performed by: RADIOLOGY

## 2024-03-26 PROCEDURE — 73221 MRI JOINT UPR EXTREM W/O DYE: CPT | Mod: TC,HCNC,LT

## 2024-03-26 NOTE — TELEPHONE ENCOUNTER
I called the patient and left a voicemail encouraging him to return the call to reschedule surgery

## 2024-03-27 ENCOUNTER — TELEPHONE (OUTPATIENT)
Dept: SPORTS MEDICINE | Facility: CLINIC | Age: 74
End: 2024-03-27
Payer: MEDICARE

## 2024-03-27 NOTE — TELEPHONE ENCOUNTER
----- Message from Naye Arriaga MA sent at 3/27/2024  8:22 AM CDT -----  Regarding: FW: Appt Access  Contact: pt [phone]    ----- Message -----  From: Irma Mark  Sent: 3/27/2024   8:07 AM CDT  To: Shilpa Rocha Staff  Subject: Appt Access                                      Pt returning call from missed call regarding surgery date change. Pls call

## 2024-04-01 ENCOUNTER — TELEPHONE (OUTPATIENT)
Dept: SPORTS MEDICINE | Facility: CLINIC | Age: 74
End: 2024-04-01
Payer: MEDICARE

## 2024-04-01 NOTE — TELEPHONE ENCOUNTER
Left a voicemail for the patient to call the office at [phone] in regard to scheduling pre-op appointment that was canceled today.

## 2024-04-02 ENCOUNTER — TELEPHONE (OUTPATIENT)
Dept: SPORTS MEDICINE | Facility: CLINIC | Age: 74
End: 2024-04-02
Payer: MEDICARE

## 2024-04-02 NOTE — TELEPHONE ENCOUNTER
I called the patient with no answer and left a voicemail encouraging him to return the call to discuss his MRI results and why he canceled his pre op appointment as he will not be able to proceed with surgery without a pre op appointment.

## 2024-04-02 NOTE — TELEPHONE ENCOUNTER
I called the patient twice with no answer and left a voicemail encouraging him to return the call to discuss his MRI results and why he canceled his pre op appointment as he will not be able to proceed with surgery without a pre op appointment.

## 2024-04-03 ENCOUNTER — TELEPHONE (OUTPATIENT)
Dept: SPORTS MEDICINE | Facility: CLINIC | Age: 74
End: 2024-04-03
Payer: MEDICARE

## 2024-04-03 ENCOUNTER — TELEPHONE (OUTPATIENT)
Dept: PRIMARY CARE CLINIC | Facility: CLINIC | Age: 74
End: 2024-04-03
Payer: MEDICARE

## 2024-04-03 NOTE — TELEPHONE ENCOUNTER
----- Message from Naye Arriaga MA sent at 4/3/2024 11:54 AM CDT -----  Regarding: FW: Appt  Contact: pt [phone]    ----- Message -----  From: Hosea Duke  Sent: 4/3/2024  11:48 AM CDT  To: Shilpa Rocha Staff  Subject: Appt                                             Pt is calling to reschedule surgery 4/11 due to PCP not avail to have appt for medical clearance, please call pt @[phone]

## 2024-04-03 NOTE — TELEPHONE ENCOUNTER
Lvm for pt to return call to schedule pre op appt. Pt is scheduled for surgery and will need to have PCP clearance

## 2024-04-03 NOTE — TELEPHONE ENCOUNTER
----- Message from Erica Allison sent at 4/3/2024  9:35 AM CDT -----  Regarding: apt  Contact: [phone]  Pt calling in requesting call back regarding pre op apt please call to discuss Further

## 2024-04-03 NOTE — TELEPHONE ENCOUNTER
The pt called and asked about his pre op clearance for his surgery that was scheduled for 4/4/24 and it has not been completed. Pt reported that they are trying to get it scheduled for the beginning of next week. It was faxed last week or so and it was in your review pile

## 2024-04-05 ENCOUNTER — OFFICE VISIT (OUTPATIENT)
Dept: PRIMARY CARE CLINIC | Facility: CLINIC | Age: 74
End: 2024-04-05
Payer: MEDICARE

## 2024-04-05 VITALS
OXYGEN SATURATION: 98 % | BODY MASS INDEX: 27.02 KG/M2 | SYSTOLIC BLOOD PRESSURE: 126 MMHG | DIASTOLIC BLOOD PRESSURE: 72 MMHG | HEART RATE: 94 BPM | TEMPERATURE: 98 F | HEIGHT: 67 IN | WEIGHT: 172.19 LBS

## 2024-04-05 DIAGNOSIS — R51.9 HEAD ACHE: ICD-10-CM

## 2024-04-05 DIAGNOSIS — M46.92 UNSPECIFIED INFLAMMATORY SPONDYLOPATHY, CERVICAL REGION: ICD-10-CM

## 2024-04-05 DIAGNOSIS — F33.0 MILD EPISODE OF RECURRENT MAJOR DEPRESSIVE DISORDER: Primary | ICD-10-CM

## 2024-04-05 DIAGNOSIS — R11.0 NAUSEA: ICD-10-CM

## 2024-04-05 DIAGNOSIS — G47.00 INSOMNIA, UNSPECIFIED TYPE: ICD-10-CM

## 2024-04-05 DIAGNOSIS — S49.90XD SHOULDER INJURY, UNSPECIFIED LATERALITY, SUBSEQUENT ENCOUNTER: ICD-10-CM

## 2024-04-05 DIAGNOSIS — F15.182 CAFFEINE-INDUCED SLEEP DISORDER WITH MILD USE DISORDER, INSOMNIA TYPE: ICD-10-CM

## 2024-04-05 DIAGNOSIS — R54 FRAILTY SYNDROME IN GERIATRIC PATIENT: ICD-10-CM

## 2024-04-05 DIAGNOSIS — Z79.1 NSAID LONG-TERM USE: ICD-10-CM

## 2024-04-05 DIAGNOSIS — M25.512 CHRONIC PAIN OF BOTH SHOULDERS: ICD-10-CM

## 2024-04-05 DIAGNOSIS — G89.29 CHRONIC PAIN OF BOTH SHOULDERS: ICD-10-CM

## 2024-04-05 DIAGNOSIS — M25.511 CHRONIC PAIN OF BOTH SHOULDERS: ICD-10-CM

## 2024-04-05 DIAGNOSIS — R05.9 COUGH: ICD-10-CM

## 2024-04-05 DIAGNOSIS — Z01.818 PREOP EXAM FOR INTERNAL MEDICINE: ICD-10-CM

## 2024-04-05 LAB — SARS-COV-2 RNA RESP QL NAA+PROBE: NOT DETECTED

## 2024-04-05 PROCEDURE — 1126F AMNT PAIN NOTED NONE PRSNT: CPT | Mod: HCNC,CPTII,S$GLB, | Performed by: INTERNAL MEDICINE

## 2024-04-05 PROCEDURE — 3078F DIAST BP <80 MM HG: CPT | Mod: HCNC,CPTII,S$GLB, | Performed by: INTERNAL MEDICINE

## 2024-04-05 PROCEDURE — 99215 OFFICE O/P EST HI 40 MIN: CPT | Mod: HCNC,S$GLB,, | Performed by: INTERNAL MEDICINE

## 2024-04-05 PROCEDURE — 3074F SYST BP LT 130 MM HG: CPT | Mod: HCNC,CPTII,S$GLB, | Performed by: INTERNAL MEDICINE

## 2024-04-05 PROCEDURE — 87635 SARS-COV-2 COVID-19 AMP PRB: CPT | Mod: HCNC | Performed by: NURSE PRACTITIONER

## 2024-04-05 PROCEDURE — 1159F MED LIST DOCD IN RCRD: CPT | Mod: HCNC,CPTII,S$GLB, | Performed by: INTERNAL MEDICINE

## 2024-04-05 PROCEDURE — 3044F HG A1C LEVEL LT 7.0%: CPT | Mod: HCNC,CPTII,S$GLB, | Performed by: INTERNAL MEDICINE

## 2024-04-05 PROCEDURE — 3288F FALL RISK ASSESSMENT DOCD: CPT | Mod: HCNC,CPTII,S$GLB, | Performed by: INTERNAL MEDICINE

## 2024-04-05 PROCEDURE — 3008F BODY MASS INDEX DOCD: CPT | Mod: HCNC,CPTII,S$GLB, | Performed by: INTERNAL MEDICINE

## 2024-04-05 PROCEDURE — 1100F PTFALLS ASSESS-DOCD GE2>/YR: CPT | Mod: HCNC,CPTII,S$GLB, | Performed by: INTERNAL MEDICINE

## 2024-04-05 RX ORDER — TRAZODONE HYDROCHLORIDE 50 MG/1
50 TABLET ORAL NIGHTLY
Qty: 90 TABLET | Refills: 3 | Status: SHIPPED | OUTPATIENT
Start: 2024-04-05 | End: 2025-04-05

## 2024-04-05 NOTE — PROGRESS NOTES
Primary Care Provider Appointment - [facility]  SHARED NOTE: Radha Roberts ([facility] Fellow), Dr Spaulding (Attending)    Subjective:      Patient ID: Anthony Miguel is a 73 y.o. male with prior prostate cancer, lumbar spondylosis, RC tear      Chief Complaint: Follow-up and Pre-op Exam    Prior to this visit, patient's last encounter with PCP was 2/27/2024.    Has had cold symptoms (cough + 1 day of fever) since Monday. Worst on Wedensday and improving since then.     Pt is here for pre-op clearance for L rotator cuff repair on 4/18/24. After thorough discussion and shared decision making, pt decided to do OT prior to getting the surgery to trial conservative management and maximize surgical benefit if he gets surgery in the future. His main goal is to be able to play piano without pain and have better range of motion in his shoulder. [facility] to reduce [facility] barrier.     Plan for L shoulder surgery with Dr. Massey was:  LEFT  a. Shoulder arthroscopic tuberoplasty              b. Shoulder arthroscopic SAD              c. Shoulder arthroscopic extensive debridement              d. Shoulder arthroscopic biceps tenodesis (vs. subpect tenodesis)              e. Shoulder arthroscopic possible microfracture              f.  Shoulder open reduction internal fixation greater tuberosity    Pre-Op assessment  Clinical assessment: has clinical risk factor of HLD.  Surgery-specific risk: intermediate (orthopedic),   Functional Capacity: 4-10 METS?? (climbs flights, walks briskly, golf, doubles tennis)    As of my last clinical evaluation on 4/5/24 Mr. Miguel had no active cardiac complaints and was reportedly able to exercise to greater than 4 METs, but is limited by his orthopedic pain. In relation to this specific surgery, Revised Cardiac Risk Index (RCRI) factors include they have no risk factors. which gives gives him a 3.9% risk of major adverse cardiac events (MACE). However the most  recent validation cohort of the RCRI risk factors include asymptomatic postoperative troponin elevation defined as perioperative myocardial infarction (MI) as the majority of their MACE. The risk of clinically diagnosed MI or cardiac arrest within 30 days of surgery is more accurately represented by the WU score, which gives this patient a risk of 0.2% .  As of my last evaluation Mr. Miguel was medically optimized, and no additional preoperative testing indicated. Please proceed on risk benefit basis.    Patient should hold all NSAIDs (voltaren cream) and laxatives/stool softeners on day before and day of surgery, and hold vitamin D for one week before. No additional recommendations at this time prior to surgery.    REF:  https://www.mdcalc.com/ykoedmc-ggtfipe-cbiz-index-pre-operative-risk  https://www.mdcalc.com/calc/4038/gncgv-etbvppnxpqmqp-vsio-myocardial-infarction-cardiac-arrest-den    Back pain  - has chronic back pain due to stenosis and osteoarthritis   - well managed with mobic 15 mg and voltaren gel  - had L 3-5 lumbar radiofrequency ablation on 1/24/24      Mental Health  - Currently taking: Mirtazapine 7.5mg  - gets weird dreams with Mirtazapine, wants to go back to trazodone 50mg   - Does not see any therapists or psychiatrists  - Lives by himself. Has friends he can call if he needs support.   - No longer drives. Takes ub[employer] and public transportation. Request for [employer] paratransit due to mobility issues. Had a [place] [employer] bus due to bus moving too fast.  - Hobbies include hearing music and writing piano music. He was previously a  and         Medications: Does not have pill packs  - Gabapentin 300mg TID  - Mobic 15mg BID  - Voltaren gel prn  - Trazodone 50mg qhs    Care Gaps:  - covid vaccine      FU in 2-3 months to specifically check OT progress on shoulder.      4Ms for Medical Decision-Making in Older Adults    Last Completed EAWV: 2/20/2024    MOBILITY:  Get Up and  Go:       No data to display              Activities of Daily Livin/20/2024     8:17 AM   Activities of Daily Living   Ambulation Independent   Dressing Independent   Transfers Independent   Toileting Incontinent of bladder;Continent of bowel   Feeding Independent   Cleaning home/Chores Independent   Telephone use Independent   Shopping Independent   Paying bills Independent   Taking meds Independent     Whisper Test:      2024     8:12 AM   Whisper Test   Whisper Test N/A- Hearing Impairment     Disability Status:      2024     8:19 AM   Disability Status   Are you deaf or do you have serious difficulty hearing? N   Are you blind or do you have serious difficulty seeing, even when wearing glasses? N   Because of a physical, mental, or emotional condition, do you have serious difficulty concentrating, remembering, or making decisions? N   Do you have serious difficulty walking or climbing stairs? N   Do you have difficulty dressing or bathing? N   Because of a physical, mental, or emotional condition, do you have difficulty doing errands alone such as visiting a doctor's office or shopping? N     Nutrition Screenin/20/2024     8:15 AM   Nutrition Screening   Has food intake declined over the past three months due to loss of appetite, digestive problems, chewing or swallowing difficulties? No decrease in food intake   Involuntary weight loss during the last 3 months? No weight loss   Mobility? Goes out   Has the patient suffered psychological stress or acute disease in the past three months? No   Neuropsychological problems? No psychological problems   Body Mass Index (BMI)?  BMI 23 or greater   Screening Score 14   Interpretation Normal nutritional status    Screening Score: 0-7 Malnourished, 8-11 At Risk, 12-14 Normal    MENTATION:   Depression Patient Health Questionnaire:      2024    11:30 AM   Depression Patient Health Questionnaire   PHQ-4 Total Score 0     Has Dementia Dx:  No    Cognitive Function Screenin/20/2024     8:17 AM   Cognitive Function Screening   Clock Drawing Test 2   Mini-Cog 3 Minute Recall 3   Cognitive Function Screening 5     Cognitive Function Screening Total - Less than 4 = Abnormal,  Greater than or equal to 4 = Normal    MEDICATIONS:  High Risk Medications:  Total Active Medications: 1  gabapentin - 300 MG    WHAT MATTERS MOST:  Advance Care Planning   ACP Status:   Patient has had an ACP conversation  Living Will: No  Power of : No  LaPOST: No    What is most important right now is to focus on remaining as independent as possiblesymptom/pain control    Accordingly, we have decided that the best plan to meet the patient's goals includes continuing with treatment      What matters most to patient today is: increasing shoulder range of motion and being able to play piano           PHQ-4 Score: 0     Social History     Socioeconomic History    Marital status: Single    Number of children: 0   Tobacco Use    Smoking status: Former     Current packs/day: 0.00     Types: Cigarettes     Quit date: 2000     Years since quittin.1    Smokeless tobacco: Former   Substance and Sexual Activity    Alcohol use: Not Currently    Drug use: No    Sexual activity: Not Currently   Social History Narrative    No difficulty reading Rx labels      Social Determinants of Health     Financial Resource Strain: Low Risk  (2024)    Overall Financial Resource Strain (CARDIA)     Difficulty of Paying Living Expenses: Not hard at all   Food Insecurity: Food Insecurity Present (2024)    Hunger Vital Sign     Worried About Running Out of Food in the Last Year: Sometimes true     Ran Out of Food in the Last Year: Never true   Transportation Needs: No Transportation Needs (2024)    PRAPARE - Transportation     Lack of Transportation (Medical): No     Lack of Transportation (Non-Medical): No   Physical Activity: Inactive (2024)    Exercise Vital Sign      "Days of Exercise per Week: 0 days     Minutes of Exercise per Session: 0 min   Stress: No Stress Concern Present (2/20/2024)    [place] [employer] - Occupational Stress Questionnaire     Feeling of Stress : Only a little   Social Connections: Moderately Integrated (2/20/2024)    Social Connection and Isolation Panel [NHANES]     Frequency of Communication with Friends and Family: More than three times a week     Frequency of Social Gatherings with Friends and Family: Once a week     Attends Denominational Services: More than 4 times per year     Active Member of Clubs or Organizations: Yes     Attends Club or Organization Meetings: Never     Marital Status: Never    Housing Stability: Low Risk  (2/20/2024)    Housing Stability Vital Sign     Unable to Pay for Housing in the Last Year: No     Number of Places Lived in the Last Year: 1     Unstable Housing in the Last Year: No       Review of Systems   Constitutional:  Positive for fever.   HENT:  Positive for sore throat. Negative for congestion, rhinorrhea, sinus pressure, sinus pain and sneezing.    Respiratory:  Positive for cough. Negative for chest tightness and shortness of breath.    Cardiovascular:  Negative for chest pain, palpitations and leg swelling.   Gastrointestinal:  Positive for diarrhea, nausea and vomiting. Negative for blood in stool and constipation.   Genitourinary:  Negative for difficulty urinating and dysuria.   Musculoskeletal:  Positive for arthralgias and back pain.   Neurological:  Positive for headaches. Negative for weakness and light-headedness.   Psychiatric/Behavioral:  Positive for sleep disturbance.        Objective:   /72 (BP Location: Left arm, Patient Position: Sitting, BP Method: Small (Manual))   Pulse 94   Temp 98.2 °F (36.8 °C) (Oral)   Ht 5' 7" (1.702 m)   Wt 78.1 kg (172 lb 2.9 oz)   SpO2 98%   BMI 26.97 kg/m²     Physical Exam  Vitals and nursing note reviewed.   Constitutional:       " General: He is in acute distress.      Appearance: Normal appearance. He is normal weight. He is not ill-appearing.   HENT:      Mouth/Throat:      Mouth: Mucous membranes are moist.      Pharynx: Oropharynx is clear.   Eyes:      Extraocular Movements: Extraocular movements intact.      Conjunctiva/sclera: Conjunctivae normal.      Pupils: Pupils are equal, round, and reactive to light.   Cardiovascular:      Rate and Rhythm: Normal rate and regular rhythm.      Pulses: Normal pulses.      Heart sounds: Normal heart sounds.   Pulmonary:      Effort: Pulmonary effort is normal.      Breath sounds: Normal breath sounds.   Abdominal:      General: Abdomen is flat. Bowel sounds are normal.      Palpations: Abdomen is soft.      Tenderness: There is no abdominal tenderness.   Musculoskeletal:         General: No tenderness.      Cervical back: Normal range of motion and neck supple.      Right lower leg: No edema.      Left lower leg: No edema.      Comments: Reduced ROM in shoulders   Skin:     General: Skin is warm and dry.      Capillary Refill: Capillary refill takes less than 2 seconds.   Neurological:      General: No focal deficit present.      Mental Status: He is alert and oriented to person, place, and time.      Cranial Nerves: No cranial nerve deficit.   Psychiatric:         Mood and Affect: Mood normal.         Behavior: Behavior normal.             Lab Results   Component Value Date    WBC 6.84 02/27/2024    HGB 12.7 (L) 02/27/2024    HCT 38.7 (L) 02/27/2024     02/27/2024    CHOL 158 02/27/2024    TRIG 195 (H) 02/27/2024    HDL 64 02/27/2024    ALT 14 02/27/2024    AST 34 02/27/2024     02/27/2024    K 4.4 02/27/2024     02/27/2024    CREATININE 1.1 02/27/2024    BUN 25 (H) 02/27/2024    CO2 23 02/27/2024    TSH 3.711 01/31/2023    PSA <0.01 07/19/2022    INR 1.0 06/21/2017    HGBA1C 5.0 02/27/2024       Current Outpatient Medications on File Prior to Visit   Medication Sig Dispense  Refill    cholecalciferol, vitamin D3, (VITAMIN D3) 50 mcg (2,000 unit) Cap capsule Take 1 capsule (2,000 Units total) by mouth once daily. 90 capsule 3    gabapentin (NEURONTIN) 300 MG capsule Take 2 capsules (600 mg total) by mouth 3 (three) times daily. 540 capsule 3    meloxicam (MOBIC) 15 MG tablet TAKE 1 TABLET(15 MG) BY MOUTH EVERY DAY 90 tablet 3    multivitamin capsule Take 1 capsule by mouth once daily.      triamcinolone acetonide 0.1% (KENALOG) 0.1 % cream Apply 15 g (15,000 mg total) topically 2 (two) times daily as needed (for cuts). 1 each 3    VOLTAREN 1 % Gel UNKNOWN 777 g 0    walker (ULTRA-LIGHT ROLLATOR) Misc 1 Units by Misc.(Non-Drug; Combo Route) route once daily at 6am. 1 each 0    [DISCONTINUED] mirtazapine (REMERON) 7.5 MG Tab Take 1 tablet (7.5 mg total) by mouth every evening. 30 tablet 11     Current Facility-Administered Medications on File Prior to Visit   Medication Dose Route Frequency Provider Last Rate Last Admin    0.9%  NaCl infusion   Intravenous Continuous Altaf Santana MD             Assessment:   73 y.o. 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, insomnia type    Relevant Medications    traZODone (DESYREL) 50 MG tablet    Mild episode of recurrent major depressive disorder - Primary    Relevant Medications    traZODone (DESYREL) 50 MG tablet       Orthopedic    Unspecified inflammatory spondylopathy, cervical region    Relevant Orders    Ambulatory referral/consult to Physical/Occupational Therapy    Chronic shoulder pain     Refer to OT  Patient has never done OT consistently  Is high-risk for not completing OT after the surger  This clinic will provide rides to his OT sessions if they are at an [facility] facility            Other    NSAID long-term use     Needs to hold NSAIDs  prior to surgery         Frailty syndrome in geriatric patient     High risk for worsening of functional status with periods of immobility.   Continue management of chronic diseases  Advised OT  MVC to help with transportation         Preop exam for internal medicine     Discussed in detail that surgery for older adults have risks. of this magnitude is. Explained to patient the importance of being compliant with all post op instruction and giving maximal effort in physical therapy. Warned patient about the risk of adhesive capsulitis if non-compliant    Patient should hold all NSAIDs (voltaren cream) and laxatives/stool softeners on day before and day of surgery, and hold vitamin D for one week before. No additional recommendations at this time prior to surgery.  one week before your surgery, stop the Vit D, multivitamin, meloxicam, voltaren gel    Patient and PCP in agreement that a trial of OT may improve his quality of life, and patient has never consistently participated in OT  OT ordered today          Other Visit Diagnoses       Cough        Relevant Orders    COVID-19 Routine Screening    Head ache        Relevant Orders    COVID-19 Routine Screening    Nausea        Relevant Orders    COVID-19 Routine Screening    Shoulder injury, unspecified laterality, subsequent encounter        Insomnia, unspecified type        Relevant Medications    traZODone (DESYREL) 50 MG tablet            Health Maintenance         Date Due Completion Date    COVID-19 Vaccine (4 - 2023-24 season) 12/18/2023 10/23/2023    Shingles Vaccine (2 of 2) 04/16/2024 2/20/2024    PROSTATE-SPECIFIC ANTIGEN 02/27/2025 2/27/2024    Lipid Panel 02/27/2025 2/27/2024    Colorectal Cancer Screening 04/15/2026 4/15/2016    TETANUS VACCINE 08/31/2031 8/31/2021            Future Appointments   Date Time Provider Department Center   4/15/2024  9:30 AM Petr Horner III, PA-C [facility] [place]   4/17/2024  9:30 AM NURSE, [facility]  [facility] SHEFF[facility]   5/3/2024  9:45 AM Petr Horner III, PA-C [facility]   5/6/2024 10:45 AM BALBINAE, [facility]   5/29/2024  2:00 PM Josefina Massey MD [facility]   7/5/2024 10:30 AM Jada Spaulding MD [facility] PC[facility]         Follow up in about 3 months (around 7/5/2024). Total clinical care time was 40 min    Jada Spaulding MD/MPH  [facility]  [phone] [employer]              infection

## 2024-04-05 NOTE — ASSESSMENT & PLAN NOTE
Refer to OT  Patient has never done OT consistently  Is high-risk for not completing OT after the surger  This clinic will provide rides to his OT sessions if they are at an [facility] facility

## 2024-04-05 NOTE — ASSESSMENT & PLAN NOTE
· Discussed in detail that surgery for older adults have risks. of this magnitude is. Explained to patient the importance of being compliant with all post op instruction and giving maximal effort in physical therapy. Warned patient about the risk of adhesive capsulitis if non-compliant    · Patient should hold all NSAIDs (voltaren cream) and laxatives/stool softeners on day before and day of surgery, and hold vitamin D for one week before. No additional recommendations at this time prior to surgery.  · one week before your surgery, stop the Vit D, multivitamin, meloxicam, voltaren gel    · Patient and PCP in agreement that a trial of OT may improve his quality of life, and patient has never consistently participated in OT  · OT ordered today

## 2024-04-05 NOTE — PATIENT INSTRUCTIONS
TODAY:  - please participate in OT for the recommended amount of time  - we can help you with the rides to OT  - restart trazodone for sleep  - discontinue mirtazipine  - keep taking your Vitamin D3 2000U every day   - due for shingrix #2 at your next appointment

## 2024-04-05 NOTE — Clinical Note
Hi Team Shilpa, Mr Lamont has never done OT for his shoulders. I am encouraging him to do that. This clinic will help with transport to the appointments. I would like for him to try this to see if it will provide some improvement with his range of motion without the risks of surgery. He wanted to try this first, but has transportation as a barrier.  Is it ok to postpone the surgery again, and reassess his goals after the surgery? Otherwise his pre-op is performed and he would need to hold NSAIDs in advance of the surgery.  Thanks, Dr NAVID Spaulding MD/MPH [facility] [phone] [facility]

## 2024-04-05 NOTE — ASSESSMENT & PLAN NOTE
High risk for worsening of functional status with periods of immobility.   · Continue management of chronic diseases  · Advised OT  · MVC to help with transportation

## 2024-04-08 ENCOUNTER — TELEPHONE (OUTPATIENT)
Dept: SPORTS MEDICINE | Facility: CLINIC | Age: 74
End: 2024-04-08
Payer: MEDICARE

## 2024-04-08 NOTE — TELEPHONE ENCOUNTER
I called the patient twice with no answer, I left a voicemail encouraging him to return the call ASAP after receiving a message form his PCP that he would like to try physical therapy prior to having surgery.

## 2024-04-09 ENCOUNTER — TELEPHONE (OUTPATIENT)
Dept: SPORTS MEDICINE | Facility: CLINIC | Age: 74
End: 2024-04-09
Payer: MEDICARE

## 2024-04-09 ENCOUNTER — TELEPHONE (OUTPATIENT)
Dept: PRIMARY CARE CLINIC | Facility: CLINIC | Age: 74
End: 2024-04-09
Payer: MEDICARE

## 2024-04-09 NOTE — TELEPHONE ENCOUNTER
Kristina Chacon Kathy Jo, MD  Thank you for the update, I will call the patient to discuss his options and proceed from there.     Thanks again!     Kristina Chacon   Clinical assistant to Dr. Josefina Massey           Previous Messages       ----- Message -----   From: Jada Spaulding MD   Sent: 4/5/2024   2:12 PM CDT   To: Josefina Massey MD; Shilpa Rocha Staff     Hi Team Shilpa,   Mr Miguel has never done OT for his shoulders. I am encouraging him to do that. This clinic will help with transport to the appointments. I would like for him to try this to see if it will provide some improvement with his range of motion without the risks of surgery. He wanted to try this first, but has transportation as a barrier.     Is it ok to postpone the surgery again, and reassess his goals after the surgery? Otherwise his pre-op is performed and he would need to hold NSAIDs in advance of the surgery.     Thanks,   Dr NAVID Spaulding MD/MPH   [facility]   [phone] [facility]

## 2024-04-09 NOTE — TELEPHONE ENCOUNTER
----- Message from Naye Arriaga MA sent at 4/9/2024  9:38 AM CDT -----  Regarding: FW: Appt  Contact: pt [phone]    ----- Message -----  From: Hosea Duke  Sent: 4/9/2024   9:35 AM CDT  To: Shilpa Rocha Staff  Subject: Appt                                             Pt is calling to cancel surgery 4/18 with Dr. Massey, pt was advised by PCP to start therapy, pt would like to cancel so another pt can have a sooner date. Please call pt [phone] if needed

## 2024-04-09 NOTE — TELEPHONE ENCOUNTER
I called the patient multiple times with no answer. I left a voicemail letting him know that we need to hear back from him ASAP and whether or not he would like to proceed with surgery still.

## 2024-04-24 ENCOUNTER — TELEPHONE (OUTPATIENT)
Dept: PRIMARY CARE CLINIC | Facility: CLINIC | Age: 74
End: 2024-04-24
Payer: MEDICARE

## 2024-04-24 NOTE — TELEPHONE ENCOUNTER
The pt called and stated that when he was at [facility], you did him a letter that stated that he had a permanent disability of arthritis and that he walks with a cane. 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 He will be here tomorrow to get his shingles vaccination.

## 2024-05-29 ENCOUNTER — TELEPHONE (OUTPATIENT)
Dept: PRIMARY CARE CLINIC | Facility: CLINIC | Age: 74
End: 2024-05-29
Payer: MEDICARE

## 2024-05-29 DIAGNOSIS — R05.3 CHRONIC COUGH: Primary | ICD-10-CM

## 2024-05-29 NOTE — TELEPHONE ENCOUNTER
Pt called with a concern with lungs. He says he went to an urgent care a few months back for cough and phlegm. This is now progressively worse. He would like a referral to pulmonology, referral pended, or please advise.

## 2024-05-30 NOTE — TELEPHONE ENCOUNTER
Please see if he'd be willing to see ALVIN Barker for his cough, she has extensive experience in pulm. But if he wants to go to the pulm clinic to see a provider there, I can sign that referral.    Thanks,  Dr SHOEMAKER

## 2024-05-31 ENCOUNTER — OFFICE VISIT (OUTPATIENT)
Dept: PRIMARY CARE CLINIC | Facility: CLINIC | Age: 74
End: 2024-05-31
Payer: MEDICARE

## 2024-05-31 ENCOUNTER — HOSPITAL ENCOUNTER (OUTPATIENT)
Dept: PULMONOLOGY | Facility: CLINIC | Age: 74
Discharge: HOME | End: 2024-05-31
Payer: MEDICARE

## 2024-05-31 ENCOUNTER — HOSPITAL ENCOUNTER (OUTPATIENT)
Dept: RADIOLOGY | Facility: HOSPITAL | Age: 74
Discharge: HOME | End: 2024-05-31
Attending: NURSE PRACTITIONER
Payer: MEDICARE

## 2024-05-31 DIAGNOSIS — R09.89 PHLEGM IN THROAT: ICD-10-CM

## 2024-05-31 DIAGNOSIS — R05.8 OTHER COUGH: ICD-10-CM

## 2024-05-31 DIAGNOSIS — R05.8 OTHER COUGH: Primary | ICD-10-CM

## 2024-05-31 PROBLEM — R05.9 COUGH: Status: ACTIVE | Noted: 2024-05-31

## 2024-05-31 LAB
DLCO SINGLE BREATH LLN: 15.25
DLCO SINGLE BREATH PRE REF: 82 %
DLCO SINGLE BREATH REF: 22.18
DLCOC SBVA LLN: 2.3
DLCOC SBVA REF: 3.63
DLCOC SINGLE BREATH LLN: 15.25
DLCOC SINGLE BREATH REF: 22.18
DLCOCSBVAULN: 4.96
DLCOCSINGLEBREATHULN: 29.11
DLCOSINGLEBREATHULN: 29.11
DLCOVA LLN: 2.3
DLCOVA PRE REF: 89.8 %
DLCOVA PRE: 3.26 ML/(MIN*MMHG*L) (ref 2.3–4.96)
DLCOVA REF: 3.63
DLCOVAULN: 4.96
ERV LLN: -16449.12
ERV PRE REF: 163.5 %
ERV REF: 0.88
ERVULN: ABNORMAL
FEF 25 75 LLN: 0.81
FEF 25 75 PRE REF: 107.8 %
FEF 25 75 REF: 1.98
FET100 CHG: 5.3 %
FEV05 LLN: 0.97
FEV05 REF: 2.1
FEV1 CHG: 3.5 %
FEV1 FVC LLN: 62
FEV1 FVC PRE REF: 95.3 %
FEV1 FVC REF: 76
FEV1 LLN: 1.84
FEV1 PRE REF: 118.1 %
FEV1 REF: 2.59
FEV1 VOL CHG: 0.11
FRCPLETH LLN: 2.45
FRCPLETH PREREF: 99 %
FRCPLETH REF: 3.44
FRCPLETHULN: 4.43
FVC CHG: -5.4 %
FVC LLN: 2.5
FVC PRE REF: 123.4 %
FVC REF: 3.41
FVC VOL CHG: -0.23
IVC PRE: 4.08 L (ref 2.5–4.34)
IVC SINGLE BREATH LLN: 2.5
IVC SINGLE BREATH PRE REF: 119.6 %
IVC SINGLE BREATH REF: 3.41
IVCSINGLEBREATHULN: 4.34
LLN IC: -9999997.53
PEF LLN: 4.86
PEF PRE REF: 92.1 %
PEF REF: 6.85
PHYSICIAN COMMENT: ABNORMAL
POST FEF 25 75: 3.27 L/S (ref 0.81–3.66)
POST FET 100: 7.41 SEC
POST FEV1 FVC: 79.54 % (ref 62.07–88.92)
POST FEV1: 3.17 L (ref 1.84–3.3)
POST FEV5: 2.58 L (ref 0.97–3.24)
POST FVC: 3.99 L (ref 2.5–4.34)
POST PEF: 6.84 L/S (ref 4.86–8.85)
PRE DLCO: 18.19 ML/(MIN*MMHG) (ref 15.25–29.11)
PRE ERV: 1.44 L (ref -16449.12–16450.88)
PRE FEF 25 75: 2.13 L/S (ref 0.81–3.66)
PRE FET 100: 7.04 SEC
PRE FEV05 REF: 106.7 %
PRE FEV1 FVC: 72.7 % (ref 62.07–88.92)
PRE FEV1: 3.06 L (ref 1.84–3.3)
PRE FEV5: 2.25 L (ref 0.97–3.24)
PRE FRC PL: 3.4 L (ref 2.45–4.43)
PRE FVC: 4.21 L (ref 2.5–4.34)
PRE IC: 2.82 L (ref -9999997.53–#######.####)
PRE PEF: 6.32 L/S (ref 4.86–8.85)
PRE REF IC: 114 %
PRE RV: 1.97 L (ref 1.89–3.24)
PRE TLC: 6.22 L (ref 4.96–7.26)
RAW PRE REF: 91.8 %
RAW PRE: 2.81 CMH2O*S/L (ref 3.06–3.06)
RAW REF: 3.06
REF IC: 2.47
RV LLN: 1.89
RV PRE REF: 76.9 %
RV REF: 2.56
RVTLC LLN: 34
RVTLC PRE REF: 73.9 %
RVTLC PRE: 31.65 % (ref 33.84–51.8)
RVTLC REF: 43
RVTLCULN: 52
RVULN: 3.24
SGAW PRE REF: 102.4 %
SGAW PRE: 0.09 1/(CMH2O*S) (ref 0.08–0.08)
SGAW REF: 0.08
TLC LLN: 4.96
TLC PRE REF: 101.8 %
TLC REF: 6.11
TLC ULN: 7.26
ULN IC: ABNORMAL
VA PRE: 5.58 L (ref 5.96–5.96)
VA SINGLE BREATH LLN: 5.96
VA SINGLE BREATH PRE REF: 93.7 %
VA SINGLE BREATH REF: 5.96
VASINGLEBREATHULN: 5.96
VC LLN: 2.5
VC PRE REF: 124.5 %
VC PRE: 4.25 L (ref 2.5–4.34)
VC REF: 3.41
VC ULN: 4.34

## 2024-05-31 PROCEDURE — 94726 PLETHYSMOGRAPHY LUNG VOLUMES: CPT | Mod: S$GLB,,, | Performed by: INTERNAL MEDICINE

## 2024-05-31 PROCEDURE — 3044F HG A1C LEVEL LT 7.0%: CPT | Mod: CPTII,S$GLB,, | Performed by: NURSE PRACTITIONER

## 2024-05-31 PROCEDURE — 1159F MED LIST DOCD IN RCRD: CPT | Mod: CPTII,S$GLB,, | Performed by: NURSE PRACTITIONER

## 2024-05-31 PROCEDURE — 1160F RVW MEDS BY RX/DR IN RCRD: CPT | Mod: CPTII,S$GLB,, | Performed by: NURSE PRACTITIONER

## 2024-05-31 PROCEDURE — 94060 EVALUATION OF WHEEZING: CPT | Mod: S$GLB,,, | Performed by: INTERNAL MEDICINE

## 2024-05-31 PROCEDURE — 71046 X-RAY EXAM CHEST 2 VIEWS: CPT | Mod: TC

## 2024-05-31 PROCEDURE — 94729 DIFFUSING CAPACITY: CPT | Mod: S$GLB,,, | Performed by: INTERNAL MEDICINE

## 2024-05-31 PROCEDURE — 3288F FALL RISK ASSESSMENT DOCD: CPT | Mod: CPTII,S$GLB,, | Performed by: NURSE PRACTITIONER

## 2024-05-31 PROCEDURE — 99214 OFFICE O/P EST MOD 30 MIN: CPT | Mod: S$GLB,,, | Performed by: NURSE PRACTITIONER

## 2024-05-31 PROCEDURE — 71046 X-RAY EXAM CHEST 2 VIEWS: CPT | Mod: 26,,, | Performed by: RADIOLOGY

## 2024-05-31 PROCEDURE — 1101F PT FALLS ASSESS-DOCD LE1/YR: CPT | Mod: CPTII,S$GLB,, | Performed by: NURSE PRACTITIONER

## 2024-05-31 NOTE — ASSESSMENT & PLAN NOTE
Await imaging  Await PFTs- discomfort in back could be due to bronchospasm- will see what the post reveals on PFT  Consider referred pain from shoulder as potential contributing factor to discomfort  Consider that symptoms may correlate with dysphagia and seeing speech therapy again may be helpful

## 2024-05-31 NOTE — PROGRESS NOTES
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, insomnia type    Lumbar myelopathy    Head trauma    Seasonal allergic rhinitis due to pollen    Iron deficiency anemia due to chronic blood loss    Chronic right-sided low back pain with right-sided sciatica    Difficulty walking    Noncompliance    Injury of right elbow    OAG (open angle glaucoma) suspect, low risk, bilateral    Nuclear sclerosis of both eyes    Ptosis of eyelid, right    NSAID long-term use    Lumbar radiculopathy    Chronic pain    Cancer of prostate    Prostate cancer    History of robot-assisted laparoscopic radical prostatectomy    Male stress incontinence    Unspecified inflammatory spondylopathy, cervical region    Osteoarthritis of spine    Chronic pain of right knee    Ligamentum flavum hypertrophy    Lumbosacral stenosis with neurogenic claudication    Mild episode of recurrent major depressive disorder    Sensorineural hearing loss (SNHL) of both ears    Frailty syndrome in geriatric patient    Chronic shoulder pain    Finger laceration involving tendon    Injury of extensor tendon of hand, right, initial encounter    Preop exam for internal medicine    Malignant (primary) neoplasm, unspecified       CC:  Productive cough    HPI:  Has "heezing", which he describes as feeling phlegm in his airways.  Has had dysphagia in the past if he doesn't chew well.  Has phlegm that sticks sometimes and sometimes comes up. Quit smoking around 2000- smoked a pack every 4 days.  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  Has a discomfort in his back mid shoulder blades.  Had " "COVID- doesn't have any SoB really. Coughs occasionally .  Has a "lung sensation" in his back, like a pressure. Was having ths same type of symptoms last December when he went to , but symptoms are slightly worse.  Had laryngoscopy  several years ago, had a barium swallow test and had speech therapy.   Knows he isn;t eating correctly- takes Tums occasionally.     Has a "sort of fainting" epiosde that can happen but is very rare. The last time it happened was October 2023  Has neuropathy    Medications  Current Outpatient Medications   Medication Sig Dispense Refill    cholecalciferol, vitamin D3, (VITAMIN D3) 50 mcg (2,000 unit) Cap capsule Take 1 capsule (2,000 Units total) by mouth once daily. 90 capsule 3    gabapentin (NEURONTIN) 300 MG capsule Take 2 capsules (600 mg total) by mouth 3 (three) times daily. 540 capsule 3    meloxicam (MOBIC) 15 MG tablet TAKE 1 TABLET(15 MG) BY MOUTH EVERY DAY 90 tablet 3    multivitamin capsule Take 1 capsule by mouth once daily.      traZODone (DESYREL) 50 MG tablet Take 1 tablet (50 mg total) by mouth every evening. 90 tablet 3    triamcinolone acetonide 0.1% (KENALOG) 0.1 % cream Apply 15 g (15,000 mg total) topically 2 (two) times daily as needed (for cuts). 1 each 3    VOLTAREN 1 % Gel UNKNOWN 777 g 0    walker (ULTRA-LIGHT ROLLATOR) Misc 1 Units by Misc.(Non-Drug; Combo Route) route once daily at 6am. 1 each 0     No current facility-administered medications for this visit.     Facility-Administered Medications Ordered in Other Visits   Medication Dose Route Frequency Provider Last Rate Last Admin    0.9%  NaCl infusion   Intravenous Continuous Altaf Santana MD          Prior to Admission medications    Medication Sig Start Date End Date Taking? Authorizing Provider   cholecalciferol, vitamin D3, (VITAMIN D3) 50 mcg (2,000 unit) Cap capsule Take 1 capsule (2,000 Units total) by mouth once daily. 9/19/23  Yes Jada Spaulding MD   gabapentin (NEURONTIN) 300 " MG capsule Take 2 capsules (600 mg total) by mouth 3 (three) times daily. 1/9/24  Yes Jada Spaulding MD   meloxicam (MOBIC) 15 MG tablet TAKE 1 TABLET(15 MG) BY MOUTH EVERY DAY 9/6/23  Yes Jada Spaulding MD   multivitamin capsule Take 1 capsule by mouth once daily.   Yes Provider, Historical   traZODone (DESYREL) 50 MG tablet Take 1 tablet (50 mg total) by mouth every evening. 4/5/24 4/5/25 Yes Jada Spaulding MD   triamcinolone acetonide 0.1% (KENALOG) 0.1 % cream Apply 15 g (15,000 mg total) topically 2 (two) times daily as needed (for cuts). 7/19/22  Yes Darlene Bansal DNP   VOLTAREN 1 % Gel UNKNOWN 7/1/22  Yes Darlene Bansal DNP   walker (ULTRA-LIGHT ROLLATOR) Misc 1 Units by Misc.(Non-Drug; Combo Route) route once daily at 6am. 6/14/17  Yes Jada Spaulding MD         History  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    Overview:  Right side dx update     Past Surgical History:   Procedure Laterality Date    BACK SURGERY      EPIDURAL STEROID INJECTION N/A 2/6/2019    Procedure: INJECTION, STEROID, EPIDURAL, L45-S1 IL;  Surgeon: Marcel Adkins MD;  Location: [facility];  Service: Pain Management;  Laterality: N/A;    EPIDURAL STEROID INJECTION N/A 4/10/2019    Procedure: Injection, Steroid, Epidural LUMBAR/CAUDAL L5-S1 INTERLAMINAR KAYLEY;  Surgeon: Marcel Adkins MD;  Location: [facility];  Service: Pain Management;  Laterality: N/A;  NEEDS CONSENT    INJECTION OF FACET JOINT Bilateral 8/14/2019    Procedure: INJECTION, FACET JOINT INJECTION (LUMBAR BLOCK) BILATERAL L4-5 AND L5-S1;  Surgeon: Marcel Adkins MD;  Location: [facility];  Service: Pain  Management;  Laterality: Bilateral;  NEEDS CONSENT    INJECTION OF FACET JOINT Bilateral 10/16/2019    Procedure: FACET JOINT INJECTION (LUMBAR BLOCK) BILATERAL L4-5 AND L5-S1;  Surgeon: Marcel Adkins MD;  Location: [facility];  Service: Pain Management;  Laterality: Bilateral;  NEEDS CONSENT    INJECTION OF JOINT Bilateral 11/4/2020    Procedure: INJECTION, JOINT, GLENOHUMERAL;  Surgeon: Marcel Adkins MD;  Location: [facility];  Service: Pain Management;  Laterality: Bilateral;    INJECTION OF JOINT Bilateral 12/16/2020    Procedure: INJECTION, JOINT, SACROILIAC (SI) need consent;  Surgeon: Marcel Adkins MD;  Location: [facility];  Service: Pain Management;  Laterality: Bilateral;    INJECTION OF JOINT Bilateral 4/7/2021    Procedure: INJECTION, JOINT, SACROILIAC (SI);  Surgeon: Marcel Adkins MD;  Location: [facility];  Service: Pain Management;  Laterality: Bilateral;    INJECTION OF JOINT Bilateral 6/2/2021    Procedure: INJECTION, JOINT, GLENOHUMERAL;  Surgeon: Marcel Adkins MD;  Location: [facility];  Service: Pain Management;  Laterality: Bilateral;    INJECTION, SACROILIAC JOINT Bilateral 1/4/2023    Procedure: INJECTION,SACROILIAC JOINT BILATERAL CONTRAST NEEDS CONSENT;  Surgeon: Marcel Adkins MD;  Location: [facility];  Service: Pain Management;  Laterality: Bilateral;    Larangoscopy      RADIOFREQUENCY ABLATION Left 5/27/2020    Procedure: RADIOFREQUENCY ABLATION LEFT L3,4,5 1 of 2;  Surgeon: Marcel Adkins MD;  Location: [facility];  Service: Pain Management;  Laterality: Left;  Left RFA L3, 4, 5  1 of 2    RADIOFREQUENCY ABLATION Right 6/10/2020    Procedure: RADIOFREQUENCY ABLATION RIGHT L3,4,5;  Surgeon: Marcel Adkins MD;  Location: [facility];  Service: Pain Management;  Laterality: Right;  Right RFA L3,4.5  2 of 2    RADIOFREQUENCY ABLATION Right 8/31/2022    Procedure: RADIOFREQUENCY ABLATION RIGHT L3, 4, 5 ONE OF TWO;  Surgeon: Marcel  MARITZA Adkins MD;  Location: [facility];  Service: Pain Management;  Laterality: Right;    RADIOFREQUENCY ABLATION Left 2022    Procedure: RADIOFREQUENCY ABLATION LEFT L3, 4, 5 TWO OF TWO;  Surgeon: Marcel Adkins MD;  Location: [facility];  Service: Pain Management;  Laterality: Left;    RADIOFREQUENCY ABLATION Bilateral 2024    Procedure: RADIOFREQUENCY ABLATION BILATERAL L3, 4, 5;  Surgeon: Marcel Adkins MD;  Location: [facility];  Service: Pain Management;  Laterality: Bilateral;  [phone]  3 WK F/U MILO    ROBOT-ASSISTED LAPAROSCOPIC PROSTATECTOMY USING DA SAÚL XI N/A 2019    Procedure: XI ROBOTIC PROSTATECTOMY;  Surgeon: Sergio Dixon MD;  Location: [address];  Service: Urology;  Laterality: N/A;  4hrs/ gen with regional     SPINE SURGERY       Social History     Socioeconomic History    Marital status: Single    Number of children: 0   Tobacco Use    Smoking status: Former     Current packs/day: 0.00     Types: Cigarettes     Quit date: 2000     Years since quittin.3    Smokeless tobacco: Former   Substance and Sexual Activity    Alcohol use: Not Currently    Drug use: No    Sexual activity: Not Currently   Social History Narrative    No difficulty reading Rx labels      Social Determinants of Health     Financial Resource Strain: Low Risk  (2024)    Overall Financial Resource Strain (CARDIA)     Difficulty of Paying Living Expenses: Not hard at all   Food Insecurity: Food Insecurity Present (2024)    Hunger Vital Sign     Worried About Running Out of Food in the Last Year: Sometimes true     Ran Out of Food in the Last Year: Never true   Transportation Needs: No Transportation Needs (2024)    PRAPARE - Transportation     Lack of Transportation (Medical): No     Lack of Transportation (Non-Medical): No   Physical Activity: Inactive (2024)    Exercise Vital Sign     Days of Exercise per Week: 0 days     Minutes of Exercise per Session:  0 min   Stress: No Stress Concern Present (2/20/2024)    [place] [employer] [place] [employer] - Occupational Stress Questionnaire     Feeling of Stress : Only a little   Housing Stability: Low Risk  (2/20/2024)    Housing Stability Vital Sign     Unable to Pay for Housing in the Last Year: No     Number of Places Lived in the Last Year: 1     Unstable Housing in the Last Year: No         Allergies  Review of patient's allergies indicates:  No Known Allergies      Review of Systems   ROS      Physical Exam  Wt Readings from Last 1 Encounters:   04/05/24 78.1 kg (172 lb 2.9 oz)     BP Readings from Last 3 Encounters:   04/05/24 126/72   03/20/24 124/73   02/27/24 128/76     Pulse Readings from Last 1 Encounters:   04/05/24 94     There is no height or weight on file to calculate BMI.    Physical Exam  Vitals and nursing note reviewed.   Constitutional:       Appearance: Normal appearance.      Comments: Anxious appearing   HENT:      Head: Normocephalic and atraumatic.      Mouth/Throat:      Mouth: Mucous membranes are moist.   Eyes:      Pupils: Pupils are equal, round, and reactive to light.   Cardiovascular:      Rate and Rhythm: Normal rate and regular rhythm.      Pulses:           Radial pulses are 2+ on the right side and 2+ on the left side.        Dorsalis pedis pulses are 2+ on the right side and 2+ on the left side.        Posterior tibial pulses are 2+ on the right side and 2+ on the left side.      Heart sounds: No murmur heard.  Pulmonary:      Effort: Pulmonary effort is normal. No respiratory distress.      Breath sounds: Normal breath sounds.   Abdominal:      General: There is no distension.      Tenderness: There is no abdominal tenderness.   Musculoskeletal:      Cervical back: Normal range of motion.      Right lower leg: No edema.      Left lower leg: No edema.   Skin:     General: Skin is warm and dry.      Findings: No erythema.   Neurological:      General: No focal deficit present.       Mental Status: He is alert.   Psychiatric:         Mood and Affect: Mood normal.         Behavior: Behavior normal.                 Problem List Items Addressed This Visit          Other    Phlegm in throat    Overview     Has used Flonase in the past but cost prevents him from continuing to use.   Symptoms ongoing since Dec 2023, imaging at that time clear  Repeat CXR  May be due to PND/Seasonal allergies           Current Assessment & Plan     Await imaging  Await PFTs- discomfort in back could be due to bronchospasm- will see what the post reveals on PFT  Consider that symptoms may correlate with dysphagia and seeing speech therapy again may be helpful          Other Visit Diagnoses       Other cough    -  Primary    Relevant Orders    X-Ray Chest PA And Lateral (Completed)    Complete PFT w/ bronchodilator                  @Phyllis Barker DNP

## 2024-06-03 ENCOUNTER — TELEPHONE (OUTPATIENT)
Dept: PRIMARY CARE CLINIC | Facility: CLINIC | Age: 74
End: 2024-06-03
Payer: MEDICARE

## 2024-06-17 ENCOUNTER — TELEPHONE (OUTPATIENT)
Dept: PODIATRY | Facility: CLINIC | Age: 74
End: 2024-06-17
Payer: MEDICARE

## 2024-06-17 NOTE — TELEPHONE ENCOUNTER
Left vm message for patient regarding an appointment request and to contact office at [phone] to schedule an appointment with Dr. Villalta(Podiatry)            ----- Message from Shanell Hurley sent at 6/17/2024 11:41 AM CDT -----  Regarding: appt req  Contact: Pt  Type: Appointment Request    Caller is requesting an appointment     Name of Caller: Pt  Reason for appointment:  Infected Toe/left foot/ neuropathy  Would the patient rather a call back or a response via [facility]? Call back  Best Call Back Number:   [phone]  Additional Information: Please call , Thank You

## 2024-06-18 ENCOUNTER — OFFICE VISIT (OUTPATIENT)
Dept: PRIMARY CARE CLINIC | Facility: CLINIC | Age: 74
End: 2024-06-18
Payer: MEDICARE

## 2024-06-18 VITALS
HEIGHT: 67 IN | TEMPERATURE: 98 F | DIASTOLIC BLOOD PRESSURE: 84 MMHG | HEART RATE: 76 BPM | OXYGEN SATURATION: 97 % | BODY MASS INDEX: 27.2 KG/M2 | SYSTOLIC BLOOD PRESSURE: 150 MMHG | WEIGHT: 173.31 LBS

## 2024-06-18 DIAGNOSIS — R13.10 DYSPHAGIA, UNSPECIFIED TYPE: Primary | ICD-10-CM

## 2024-06-18 DIAGNOSIS — R09.89 PHLEGM IN THROAT: ICD-10-CM

## 2024-06-18 PROCEDURE — 99213 OFFICE O/P EST LOW 20 MIN: CPT | Mod: S$GLB,,, | Performed by: NURSE PRACTITIONER

## 2024-06-18 PROCEDURE — 3288F FALL RISK ASSESSMENT DOCD: CPT | Mod: CPTII,S$GLB,, | Performed by: NURSE PRACTITIONER

## 2024-06-18 PROCEDURE — 3044F HG A1C LEVEL LT 7.0%: CPT | Mod: CPTII,S$GLB,, | Performed by: NURSE PRACTITIONER

## 2024-06-18 PROCEDURE — 3008F BODY MASS INDEX DOCD: CPT | Mod: CPTII,S$GLB,, | Performed by: NURSE PRACTITIONER

## 2024-06-18 PROCEDURE — 1125F AMNT PAIN NOTED PAIN PRSNT: CPT | Mod: CPTII,S$GLB,, | Performed by: NURSE PRACTITIONER

## 2024-06-18 PROCEDURE — 3077F SYST BP >= 140 MM HG: CPT | Mod: CPTII,S$GLB,, | Performed by: NURSE PRACTITIONER

## 2024-06-18 PROCEDURE — 1160F RVW MEDS BY RX/DR IN RCRD: CPT | Mod: CPTII,S$GLB,, | Performed by: NURSE PRACTITIONER

## 2024-06-18 PROCEDURE — 1101F PT FALLS ASSESS-DOCD LE1/YR: CPT | Mod: CPTII,S$GLB,, | Performed by: NURSE PRACTITIONER

## 2024-06-18 PROCEDURE — 1159F MED LIST DOCD IN RCRD: CPT | Mod: CPTII,S$GLB,, | Performed by: NURSE PRACTITIONER

## 2024-06-18 PROCEDURE — 3079F DIAST BP 80-89 MM HG: CPT | Mod: CPTII,S$GLB,, | Performed by: NURSE PRACTITIONER

## 2024-06-18 NOTE — PATIENT INSTRUCTIONS
Have referred to speech therapy to have them look at your swallowing    Stay hydrated!  Add some water to your juice    We will follow up with you after you see Speech    We can consider an albuterol

## 2024-06-18 NOTE — PROGRESS NOTES
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, insomnia type    Lumbar myelopathy    Head trauma    Seasonal allergic rhinitis due to pollen    Iron deficiency anemia due to chronic blood loss    Chronic right-sided low back pain with right-sided sciatica    Difficulty walking    Noncompliance    Injury of right elbow    OAG (open angle glaucoma) suspect, low risk, bilateral    Nuclear sclerosis of both eyes    Ptosis of eyelid, right    NSAID long-term use    Lumbar radiculopathy    Chronic pain    Cancer of prostate    Prostate cancer    History of robot-assisted laparoscopic radical prostatectomy    Male stress incontinence    Unspecified inflammatory spondylopathy, cervical region    Osteoarthritis of spine    Chronic pain of right knee    Ligamentum flavum hypertrophy    Lumbosacral stenosis with neurogenic claudication    Mild episode of recurrent major depressive disorder    Sensorineural hearing loss (SNHL) of both ears    Frailty syndrome in geriatric patient    Chronic shoulder pain    Finger laceration involving tendon    Injury of extensor tendon of hand, right, initial encounter    Preop exam for internal medicine    Malignant (primary) neoplasm, unspecified    Phlegm in throat    Cough       CC:  Results review, cough    HPI:  10 years ago got PNA had white foam that he was spitting at that time. Got a prescription of a red colored cough medication and it went away.  Just a while ago he had phlegm that he could feel.  Yesterday every 10 minutes was coughing up a small amount of phlegm. He can feel something- he had an endoscopy, does have  episodes of apspiration periodically      Gave up soft drinks, drinking cranberry and cran apple.     Unclear if he is following the tips recommended by speech.     Repeat /80     Patient is anxious about getting PNA again, concerned about the phlegm he is coughing up is a S/S of PNA.    Medications  Current Outpatient Medications   Medication Sig Dispense Refill    cholecalciferol, vitamin D3, (VITAMIN D3) 50 mcg (2,000 unit) Cap capsule Take 1 capsule (2,000 Units total) by mouth once daily. 90 capsule 3    gabapentin (NEURONTIN) 300 MG capsule Take 2 capsules (600 mg total) by mouth 3 (three) times daily. 540 capsule 3    meloxicam (MOBIC) 15 MG tablet TAKE 1 TABLET(15 MG) BY MOUTH EVERY DAY 90 tablet 3    multivitamin capsule Take 1 capsule by mouth once daily.      traZODone (DESYREL) 50 MG tablet Take 1 tablet (50 mg total) by mouth every evening. 90 tablet 3    triamcinolone acetonide 0.1% (KENALOG) 0.1 % cream Apply 15 g (15,000 mg total) topically 2 (two) times daily as needed (for cuts). 1 each 3    VOLTAREN 1 % Gel UNKNOWN 777 g 0    walker (ULTRA-LIGHT ROLLATOR) Misc 1 Units by Misc.(Non-Drug; Combo Route) route once daily at 6am. 1 each 0     No current facility-administered medications for this visit.     Facility-Administered Medications Ordered in Other Visits   Medication Dose Route Frequency Provider Last Rate Last Admin    0.9%  NaCl infusion   Intravenous Continuous Altaf Santana MD          Prior to Admission medications    Medication Sig Start Date End Date Taking? Authorizing Provider   cholecalciferol, vitamin D3, (VITAMIN D3) 50 mcg (2,000 unit) Cap capsule Take 1 capsule (2,000 Units total) by mouth once daily. 9/19/23  Yes Jada Spaulding MD   gabapentin (NEURONTIN) 300 MG capsule Take 2 capsules (600 mg total) by mouth 3 (three) times daily. 1/9/24  Yes Jada Spaulding MD   meloxicam (MOBIC) 15 MG tablet TAKE 1 TABLET(15 MG) BY MOUTH EVERY DAY 9/6/23  Yes  Jada Spaulding MD   multivitamin capsule Take 1 capsule by mouth once daily.   Yes Provider, Historical   traZODone (DESYREL) 50 MG tablet Take 1 tablet (50 mg total) by mouth every evening. 4/5/24 4/5/25 Yes Jada Spaulding MD   triamcinolone acetonide 0.1% (KENALOG) 0.1 % cream Apply 15 g (15,000 mg total) topically 2 (two) times daily as needed (for cuts). 7/19/22  Yes Darlene Bansal DNP   VOLTAREN 1 % Gel UNKNOWN 7/1/22  Yes Darlene Bansal DNP   walker (ULTRA-LIGHT ROLLATOR) Misc 1 Units by Misc.(Non-Drug; Combo Route) route once daily at 6am. 6/14/17  Yes Jada Spaulding MD         History  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    Overview:  Right side dx update     Past Surgical History:   Procedure Laterality Date    BACK SURGERY      EPIDURAL STEROID INJECTION N/A 2/6/2019    Procedure: INJECTION, STEROID, EPIDURAL, L45-S1 IL;  Surgeon: Marcel Adkins MD;  Location: [facility], [place] [facility]IN T;  Service: Pain Management;  Laterality: N/A;    EPIDURAL STEROID INJECTION N/A 4/10/2019    Procedure: Injection, Steroid, Epidural LUMBAR/CAUDAL L5-S1 INTERLAMINAR KAYLEY;  Surgeon: Marcel Adkins MD;  Location: [facility], [place] [facility]IN MGT;  Service: Pain Management;  Laterality: N/A;  NEEDS CONSENT    INJECTION OF FACET JOINT Bilateral 8/14/2019    Procedure: INJECTION, FACET JOINT INJECTION (LUMBAR BLOCK) BILATERAL L4-5 AND L5-S1;  Surgeon: Marcel Adkins MD;  Location: [facility] [place] [facility]IN MGT;  Service: Pain Management;  Laterality: Bilateral;  NEEDS CONSENT    INJECTION OF FACET JOINT Bilateral 10/16/2019    Procedure: FACET JOINT INJECTION (LUMBAR BLOCK) BILATERAL L4-5 AND L5-S1;  Surgeon: Marcel SALAS  MD Jed;  Location: [facility] PAIN MGT;  Service: Pain Management;  Laterality: Bilateral;  NEEDS CONSENT    INJECTION OF JOINT Bilateral 11/4/2020    Procedure: INJECTION, JOINT, GLENOHUMERAL;  Surgeon: Marcel Adkins MD;  Location: [facility] PAIN MGT;  Service: Pain Management;  Laterality: Bilateral;    INJECTION OF JOINT Bilateral 12/16/2020    Procedure: INJECTION, JOINT, SACROILIAC (SI) need consent;  Surgeon: Marcel Adkins MD;  Location: [facility] PAIN MGT;  Service: Pain Management;  Laterality: Bilateral;    INJECTION OF JOINT Bilateral 4/7/2021    Procedure: INJECTION, JOINT, SACROILIAC (SI);  Surgeon: Marcel Adkins MD;  Location: [facility] PAIN MGT;  Service: Pain Management;  Laterality: Bilateral;    INJECTION OF JOINT Bilateral 6/2/2021    Procedure: INJECTION, JOINT, GLENOHUMERAL;  Surgeon: Marcel Adkins MD;  Location: [facility] PAIN MGT;  Service: Pain Management;  Laterality: Bilateral;    INJECTION, SACROILIAC JOINT Bilateral 1/4/2023    Procedure: INJECTION,SACROILIAC JOINT BILATERAL CONTRAST NEEDS CONSENT;  Surgeon: Marcel Adkins MD;  Location: [facility] PAIN MGT;  Service: Pain Management;  Laterality: Bilateral;    Larangoscopy      RADIOFREQUENCY ABLATION Left 5/27/2020    Procedure: RADIOFREQUENCY ABLATION LEFT L3,4,5 1 of 2;  Surgeon: Marcel Adknis MD;  Location: [facility] PAIN MGT;  Service: Pain Management;  Laterality: Left;  Left RFA L3, 4, 5  1 of 2    RADIOFREQUENCY ABLATION Right 6/10/2020    Procedure: RADIOFREQUENCY ABLATION RIGHT L3,4,5;  Surgeon: Marcel Adkins MD;  Location: [facility] PAIN MGT;  Service: Pain Management;  Laterality: Right;  Right RFA L3,4.5  2 of 2    RADIOFREQUENCY ABLATION Right 8/31/2022    Procedure: RADIOFREQUENCY ABLATION RIGHT L3, 4, 5 ONE OF TWO;  Surgeon: Marcel Adkins MD;  Location: [facility] PAIN MGT;  Service: Pain Management;  Laterality: Right;    RADIOFREQUENCY ABLATION Left 9/14/2022    Procedure: RADIOFREQUENCY ABLATION LEFT L3, 4, 5 TWO OF TWO;   Surgeon: Marcel Adkins MD;  Location: [facility];  Service: Pain Management;  Laterality: Left;    RADIOFREQUENCY ABLATION Bilateral 2024    Procedure: RADIOFREQUENCY ABLATION BILATERAL L3, 4, 5;  Surgeon: Marcel Adkins MD;  Location: [facility];  Service: Pain Management;  Laterality: Bilateral;  [phone]  3 WK F/U MILO    ROBOT-ASSISTED LAPAROSCOPIC PROSTATECTOMY USING DA SAÚL XI N/A 2019    Procedure: XI ROBOTIC PROSTATECTOMY;  Surgeon: Sergio Dixon MD;  Location: [facility] [address] [place] [address];  Service: Urology;  Laterality: N/A;  4hrs/ gen with regional     SPINE SURGERY       Social History     Socioeconomic History    Marital status: Single    Number of children: 0   Tobacco Use    Smoking status: Former     Current packs/day: 0.00     Types: Cigarettes     Quit date: 2000     Years since quittin.4    Smokeless tobacco: Former   Substance and Sexual Activity    Alcohol use: Not Currently    Drug use: No    Sexual activity: Not Currently   Social History Narrative    No difficulty reading Rx labels      Social Determinants of Health     Financial Resource Strain: Low Risk  (2024)    Overall Financial Resource Strain (CARDIA)     Difficulty of Paying Living Expenses: Not hard at all   Food Insecurity: Food Insecurity Present (2024)    Hunger Vital Sign     Worried About Running Out of Food in the Last Year: Sometimes true     Ran Out of Food in the Last Year: Never true   Transportation Needs: No Transportation Needs (2024)    PRAPARE - Transportation     Lack of Transportation (Medical): No     Lack of Transportation (Non-Medical): No   Physical Activity: Inactive (2024)    Exercise Vital Sign     Days of Exercise per Week: 0 days     Minutes of Exercise per Session: 0 min   Stress: No Stress Concern Present (2024)    [employer] - Occupational Stress Questionnaire     Feeling of Stress : Only a little   Housing Stability: Low  Risk  (2/20/2024)    Housing Stability Vital Sign     Unable to Pay for Housing in the Last Year: No     Number of Places Lived in the Last Year: 1     Unstable Housing in the Last Year: No         Allergies  Review of patient's allergies indicates:  No Known Allergies      Review of Systems   Review of Systems   Constitutional: Negative for diaphoresis and malaise/fatigue.   HENT: Negative.     Cardiovascular:  Negative for chest pain, claudication, dyspnea on exertion, irregular heartbeat, leg swelling, near-syncope, orthopnea, palpitations, paroxysmal nocturnal dyspnea and syncope.   Respiratory:  Positive for cough, sputum production and wheezing. Negative for shortness of breath.    Endocrine: Negative for polydipsia, polyphagia and polyuria.   Hematologic/Lymphatic: Does not bruise/bleed easily.   Gastrointestinal:  Positive for dysphagia. Negative for bloating, nausea and vomiting.   Genitourinary: Negative.    Neurological:  Negative for excessive daytime sleepiness, dizziness, light-headedness, loss of balance and weakness.   Psychiatric/Behavioral:  The patient is nervous/anxious.    Allergic/Immunologic: Negative.          Physical Exam  Wt Readings from Last 1 Encounters:   06/18/24 78.6 kg (173 lb 4.5 oz)     BP Readings from Last 3 Encounters:   06/18/24 (!) 150/84   04/05/24 126/72   03/20/24 124/73     Pulse Readings from Last 1 Encounters:   06/18/24 76     Body mass index is 27.14 kg/m².    Physical Exam  Vitals and nursing note reviewed.   Constitutional:       Appearance: Normal appearance.   HENT:      Head: Normocephalic and atraumatic.      Mouth/Throat:      Mouth: Mucous membranes are moist.   Eyes:      Pupils: Pupils are equal, round, and reactive to light.   Cardiovascular:      Rate and Rhythm: Normal rate and regular rhythm.      Pulses:           Radial pulses are 2+ on the right side and 2+ on the left side.        Dorsalis pedis pulses are 2+ on the right side and 2+ on the left  side.        Posterior tibial pulses are 2+ on the right side and 2+ on the left side.      Heart sounds: No murmur heard.  Pulmonary:      Effort: Pulmonary effort is normal. No respiratory distress.      Breath sounds: Normal breath sounds.   Abdominal:      General: There is no distension.      Tenderness: There is no abdominal tenderness.   Musculoskeletal:      Cervical back: Normal range of motion.      Right lower leg: No edema.      Left lower leg: No edema.   Skin:     General: Skin is warm and dry.      Findings: No erythema.   Neurological:      General: No focal deficit present.      Mental Status: He is alert.   Psychiatric:         Mood and Affect: Mood normal.         Behavior: Behavior normal.         Problem List Items Addressed This Visit          Other    Phlegm in throat    Overview     Has used Flonase in the past but cost prevents him from continuing to use.   Symptoms ongoing since Dec 2023, imaging at that time clear  Repeat CXR clear, stable.  We reviewed the images today  May be due to PND/Seasonal allergies or aspiration           Current Assessment & Plan     No PNA on CXR  PFTs normal  Refer to speech as phlegm may be from aspiration secondary to dysphagia  Consider PRN REGGIE as he may have gotten some relief during post on PFT (although no change shown)  Also consider trial of PPI          Other Visit Diagnoses       Dysphagia, unspecified type    -  Primary    Relevant Orders    Ambulatory referral/consult to Speech Therapy                    Follow Up  2 weeks      @Phyllis Barker DNP

## 2024-06-21 NOTE — ASSESSMENT & PLAN NOTE
No PNA on CXR  PFTs normal  Refer to speech as phlegm may be from aspiration secondary to dysphagia  Consider PRN REGGIE as he may have gotten some relief during post on PFT (although no change shown)  Also consider trial of PPI

## 2024-07-01 ENCOUNTER — TELEPHONE (OUTPATIENT)
Dept: REHABILITATION | Facility: HOSPITAL | Age: 74
End: 2024-07-01
Payer: MEDICARE

## 2024-07-01 NOTE — TELEPHONE ENCOUNTER
Left voicemail to remind patient of Fiberoptic Endoscopic Evaluation of Swallowing 7/2/24. Provided call back number.    DANILO Ojeda, L-SLP, CCC-SLP  Speech Language Pathologist   7/1/2024

## 2024-07-19 ENCOUNTER — TELEPHONE (OUTPATIENT)
Dept: PODIATRY | Facility: CLINIC | Age: 74
End: 2024-07-19
Payer: MEDICARE

## 2024-08-06 ENCOUNTER — TELEPHONE (OUTPATIENT)
Dept: REHABILITATION | Facility: HOSPITAL | Age: 74
End: 2024-08-06

## 2024-08-26 ENCOUNTER — OFFICE VISIT (OUTPATIENT)
Dept: URGENT CARE | Facility: CLINIC | Age: 74
End: 2024-08-26
Payer: MEDICARE

## 2024-08-26 VITALS
DIASTOLIC BLOOD PRESSURE: 74 MMHG | RESPIRATION RATE: 14 BRPM | BODY MASS INDEX: 27.51 KG/M2 | OXYGEN SATURATION: 97 % | SYSTOLIC BLOOD PRESSURE: 130 MMHG | HEIGHT: 67 IN | HEART RATE: 63 BPM | WEIGHT: 175.25 LBS | TEMPERATURE: 98 F

## 2024-08-26 DIAGNOSIS — R09.89 CHEST CONGESTION: ICD-10-CM

## 2024-08-26 DIAGNOSIS — J06.9 VIRAL URI: Primary | ICD-10-CM

## 2024-08-26 LAB
CTP QC/QA: YES
SARS-COV-2 AG RESP QL IA.RAPID: NEGATIVE

## 2024-08-26 PROCEDURE — 99212 OFFICE O/P EST SF 10 MIN: CPT | Mod: S$GLB,,,

## 2024-08-26 PROCEDURE — 87811 SARS-COV-2 COVID19 W/OPTIC: CPT | Mod: QW,S$GLB,,

## 2024-08-26 NOTE — PATIENT INSTRUCTIONS
Negative for COVID. Symptoms are likely viral at this time.     Try a decongestant and corticosteroid nasal spray like flonase for the next few days for sinus relief. Initial: 2 sprays in each nostril once daily for 1 week. Reduce to 1 spray in each nostril once per day. Stop taking if you develop a nose bleed. Nasal saline spray can be used together with flonase to help moisten nostrils. An antihistamine like zyrtec, claritin, allegra can also be helpful for sinus relief and will help dry nasal passages.     Regular (Guaifenesin) Mucinex 1200 mg twice per day for 10 days can help thin secretions for better clearance. Drink plenty of fluids with this.     Honey is a natural cough suppressant.     -If you do NOT have high blood pressure, you may use a decongestant form (D)  of this medication (ie. Claritin- D, zyrtec-D, allegra-D, Mucinex-D) or if you do not take the D form, you can take sudafed (pseudoephedrine) over the counter, which is a powerful decongestant. Do NOT take two decongestant (D) medications at the same time (such as mucinex-D and claritin-D or plain sudafed and claritin D). Dextromethorphan (DM) is a cough suppressant over the counter (ie. mucinex DM, robitussin, delsym; dayquil/nyquil has DM as well.) Try Afrin for nasal congestion at bedtime. Only use for 3 days as this can cause a rebound of congestion. Try cepacol for sore throat.     If you do have Hypertension or palpitations, it is safe to take Coricidin HBP (multi-symptom flu) for relief of sinus symptoms.  Try DASH diet to help lower BP and buy a blood pressure cuff for home monitoring. Check blood pressure at least 2 times per day and create a log. Avoid eating foods that are high in salt. Eat more foods with potassium, magnesium and calcium which will help dilate your vessels and decrease your BP.     Warm tea/warm liquids will help soothe the back of your throat. Warm water salt gurgles can also be helpful. A dry throat will cause pain.  Make sure to stay hydrated. Water and pedilyte are the best to drink. Neti pot irrigation, humidifier in your room, avoiding fans, warm compresses to face, eating/drinking hot soups, hot shower before bedtime can help.     The recommended daily fluid intake for men is 3.7 liters (seven 16 oz bottles).    Alternate Tylenol and ibuprofen every 4 hours as needed for fever and body aches.  Please take NSAIDs with a full glass of water and food to avoid GI upset.      Please only use over the counter cough and cold medications for 3-5 days at a time to avoid rebound symptoms.     Getting plenty of rest can aid in a faster recovery of illnesses.     Please follow-up with your primary care provider or return to the clinic if not better/worsening symptoms in 1 week.     Report to the ER if you have chest pain, shortness of breath, palpitations.

## 2024-08-26 NOTE — PROGRESS NOTES
"Subjective:      Patient ID: Anthony Miguel is a 73 y.o. male.    Vitals:  height is 5' 7" (1.702 m) and weight is 79.5 kg (175 lb 4.3 oz). His oral temperature is 97.9 °F (36.6 °C). His blood pressure is 130/74 and his pulse is 63. His respiration is 14 and oxygen saturation is 97%.     Chief Complaint: Sinus Problem    Pt reports that he has been having this congestion and coughing up mucus from ingesting mold a while ago. Pt states that he has been here before for this same issue. Pt states that he feels a bit weak and achy. Pt reports that he is having a lot of chest congestion. Mucous is clear. No CP, SOB, fatigue, fevers.     Sinus Problem  This is a recurrent problem. The current episode started 1 to 4 weeks ago. The problem has been waxing and waning since onset. There has been no fever. His pain is at a severity of 3/10. The pain is mild. Associated symptoms include congestion, coughing, headaches and sneezing. Pertinent negatives include no chills, diaphoresis, ear pain, hoarse voice, neck pain, shortness of breath, sinus pressure, sore throat or swollen glands. Past treatments include nothing. The treatment provided no relief.       Constitution: Negative for chills, sweating, fatigue and fever.   HENT:  Positive for congestion and postnasal drip. Negative for ear pain, sinus pressure and sore throat.    Neck: Negative for neck pain.   Cardiovascular:  Negative for chest pain.   Respiratory:  Positive for cough. Negative for shortness of breath and wheezing.    Gastrointestinal:  Negative for abdominal pain, nausea and diarrhea.   Musculoskeletal:  Negative for muscle ache.   Allergic/Immunologic: Positive for sneezing.   Neurological:  Positive for headaches.      Objective:     Physical Exam   Constitutional: He is oriented to person, place, and time. He appears well-developed. He is cooperative.  Non-toxic appearance. He does not appear ill. No distress.   HENT:   Head: Normocephalic and " atraumatic.   Ears:   Right Ear: Hearing, tympanic membrane, external ear and ear canal normal.   Left Ear: Hearing, tympanic membrane, external ear and ear canal normal.   Nose: Nose normal. No mucosal edema, rhinorrhea, nasal deformity or congestion. No epistaxis. Right sinus exhibits no maxillary sinus tenderness and no frontal sinus tenderness. Left sinus exhibits no maxillary sinus tenderness and no frontal sinus tenderness.   Mouth/Throat: Uvula is midline, oropharynx is clear and moist and mucous membranes are normal. No trismus in the jaw. Normal dentition. No uvula swelling. No oropharyngeal exudate, posterior oropharyngeal edema or posterior oropharyngeal erythema.   Eyes: Conjunctivae and lids are normal. Pupils are equal, round, and reactive to light. No scleral icterus.   Neck: Trachea normal and phonation normal. Neck supple. No edema present. No erythema present. No neck rigidity present.   Cardiovascular: Normal rate, regular rhythm, normal heart sounds and normal pulses.   Pulmonary/Chest: Effort normal and breath sounds normal. No stridor. No respiratory distress. He has no decreased breath sounds. He has no wheezes. He has no rhonchi. He has no rales.   Abdominal: Normal appearance.   Musculoskeletal: Normal range of motion.         General: No deformity. Normal range of motion.   Lymphadenopathy:     He has no cervical adenopathy.   Neurological: He is alert and oriented to person, place, and time. He exhibits normal muscle tone. Coordination normal.   Skin: Skin is warm, dry, intact, not diaphoretic and not pale.   Psychiatric: His speech is normal and behavior is normal. Judgment and thought content normal.   Nursing note and vitals reviewed.      Assessment:     1. Viral URI    2. Chest congestion        Plan:     Results for orders placed or performed in visit on 08/26/24   SARS Coronavirus 2 Antigen, POCT Manual Read   Result Value Ref Range    SARS Coronavirus 2 Antigen Negative Negative      Acceptable Yes        Viral URI    Chest congestion  -     SARS Coronavirus 2 Antigen, POCT Manual Read            Discussed results/diagnosis/plan with patient in clinic. Strict precautions given to patient to monitor for worsening signs and symptoms. Advised to follow up with PCP or specialist.  Explained side effects of medications prescribed with patient and informed him/her to discontinue use if he/she has any side effects and to inform UC or PCP if this occurs. All questions answered. Strict ED verses clinic return precautions stressed and given in depth. Advised if symptoms worsens of fail to improve he/she should go to the Emergency Room. Discharge and follow-up instructions given verbally/printed with the patient who expressed understanding and willingness to comply with my recommendations. Patient voiced understanding and in agreement with current treatment plan. Patient exits the exam room in no acute distress. Conversant and engaged during discharge discussion, verbalized understanding.

## 2024-08-27 NOTE — PROGRESS NOTES
HPI     New pt    Gtts: [place] prn    Pt comes in today to establish ocular health care. Pt states he has been   with out his rx gls for more than 6 mos. Pt complains of occasional HA's.   Denies any flashes or floaters.    STACIA w/Dr Sultana about 2 years ago.  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 Plays salsa and   meringue w/[employer] at [employer].     Last edited by Jennifer Sultana, OD on 10/31/2018  3:22 PM. (History)        ROS     Negative for: Constitutional, Gastrointestinal, Neurological, Skin,   Genitourinary, Musculoskeletal, HENT, Endocrine, Cardiovascular, Eyes,   Respiratory, Psychiatric, Allergic/Imm, Heme/Lymph    Last edited by Jennifer Sultana, OD on 10/31/2018  2:51 PM. (History)        Assessment /Plan     For exam results, see Encounter Report.    Myopia of both eyes with astigmatism and presbyopia    OAG (open angle glaucoma) suspect, low risk, bilateral  -     Posterior Segment OCT Optic Nerve- Both eyes; Future  -     Richardson Visual Field - OU - Extended - Both Eyes; Future    Nuclear sclerosis of both eyes    Ptosis of eyelid, right      1. SRx updated. H/o high cyl OD. Pt plays music (piano and flute). Suggest PAL. Consider intermediate Rx if needed in the future.   2. Previously testing was negative w/me via Dr Hong's office and [facility] back 2 years ago. High IOp OD today. Large C/d's OU. RTC IOP/Pachy/OCT/HVF.   3. Mildly visually significant OD>OS. Monitor.   4. Slight ptosis noted OD. Pt doesn't notice and has no visual complaints in reference to it.               [facility] ambulatory encounter  URGENT CARE OFFICE VISIT      ASSESSMENT AND PLAN    Acute URI  - predniSONE (DELTASONE) 20 MG tablet; Take 1 tablet by mouth 2 times daily for 3 days.  Dispense: 6 tablet; Refill: 0      Viral upper respiratory infection.  Supportive treatment.  Declined COVID and flu testing.  Follow-up here as needed if symptoms getting worse    - encouraged sinus irrigation around 3- 4 x per day  - Fever and body aches:   -tylenol 500mg every 6hrs.  - Congestion:   -guaifenesin: follow instructions on box. Do not exceed 2.4g per day  -loratidine 10mg per day or cetirizine 10mg per day  -flonase nasal spray: follow instructions in box              All questions were answered accordingly explained to the patient.    Patient was advised to follow up with urgent care sooner if symptoms get worse or if new symptoms appear.    This is a provisional diagnosis that can and may change. This is based from the current symptoms and information available.        REVIEWED PREVIOUS LABS  -History:        SUBJECTIVE:      Queenie Stanley is a 37 year old female     Chief Complaint   Patient presents with   • Ear Pain     Both ears hurt 2 days ago and had an elevated temp just Sunday night, she ddi report swimming in a river prior to the ear pain. Will need a work note       Patient is being seen here for symptoms as stated above.    2 days of symptoms.  Episodes of fever, bilateral ear pain, scratchy throat, mild cough.    -no sob, no chest pain  - no ear discharge  -no abd pain, no nausea, no vomiting, no diarrhea, no constipation, no bloody stools      Review of systems:    A review of systems was performed and findings relevant to these symptoms are included in the HPI.         OBJECTIVE:  Physical Exam:    Visit Vitals  Pulse 84   Temp 98.4 °F (36.9 °C) (Oral)   Wt 77.1 kg (170 lb)   LMP 08/27/2024 (Exact Date)   SpO2 96%   BMI 27.44 kg/m²      Body mass index is 27.44 kg/m².    CONSTITUTIONAL: Well-hydrated,  well-nourished female who appears to be in no acute distress. Awake, alert and cooperative.  HEENT:  Hearing is grossly normal. Nose without deformities. There is moist nasal mucosa. Throat is mildly erythematous with postnasal drip.  Mild tenderness bilateral maxillary area  Right Ear   -external ear: grossly normal   -tragal tenderness: negative   -mastoid tenderness: negative   -ear canal: WNL   -TM: clear and bulging  Left Ear              -external ear: grossly normal   -tragal tenderness: negative   -mastoid tenderness: negative   -ear canal: WNL   -TM: clear and bulging   NECK: No lymphadenopathy.   LUNGS: Clear to auscultation bilaterally. No wheezes, rales or rhonchi noted.   CARDIOVASCULAR: Regular rate and rhythm with normal S1 and S2. There are no murmurs auscultated.           Ulysses Boco MD  Urgent Care  [facility]

## 2024-08-28 ENCOUNTER — TELEPHONE (OUTPATIENT)
Dept: PRIMARY CARE CLINIC | Facility: CLINIC | Age: 74
End: 2024-08-28
Payer: MEDICARE

## 2024-08-28 ENCOUNTER — TELEPHONE (OUTPATIENT)
Dept: PODIATRY | Facility: CLINIC | Age: 74
End: 2024-08-28
Payer: MEDICARE

## 2024-08-28 NOTE — TELEPHONE ENCOUNTER
----- Message from Bradley Benitez MD sent at 8/27/2024  9:14 AM CDT -----  Seen at  yesterday; needs f/u w/in 1 week.

## 2024-09-04 ENCOUNTER — TELEPHONE (OUTPATIENT)
Dept: PODIATRY | Facility: CLINIC | Age: 74
End: 2024-09-04
Payer: MEDICARE

## 2024-09-06 ENCOUNTER — OFFICE VISIT (OUTPATIENT)
Dept: PRIMARY CARE CLINIC | Facility: CLINIC | Age: 74
End: 2024-09-06
Payer: MEDICARE

## 2024-09-06 VITALS
BODY MASS INDEX: 27.27 KG/M2 | SYSTOLIC BLOOD PRESSURE: 118 MMHG | OXYGEN SATURATION: 98 % | HEIGHT: 67 IN | TEMPERATURE: 98 F | WEIGHT: 173.75 LBS | HEART RATE: 62 BPM | DIASTOLIC BLOOD PRESSURE: 80 MMHG

## 2024-09-06 DIAGNOSIS — Z79.1 NSAID LONG-TERM USE: Primary | ICD-10-CM

## 2024-09-06 DIAGNOSIS — M25.519 CHRONIC SHOULDER PAIN, UNSPECIFIED LATERALITY: ICD-10-CM

## 2024-09-06 DIAGNOSIS — G89.29 CHRONIC SHOULDER PAIN, UNSPECIFIED LATERALITY: ICD-10-CM

## 2024-09-06 DIAGNOSIS — R09.89 PHLEGM IN THROAT: ICD-10-CM

## 2024-09-06 RX ORDER — MELOXICAM 15 MG/1
15 TABLET ORAL DAILY
Qty: 30 TABLET | Refills: 3 | Status: SHIPPED | OUTPATIENT
Start: 2024-09-06

## 2024-09-06 NOTE — PROGRESS NOTES
Primary Care Provider Appointment - [facility]  SHARED NOTE: Diana Crow (PA-S2), Phyllis Barker (DNP)    Subjective:      Patient ID: Anthony Miguel is a 73 y.o. male with h/o prostate cancer, lumbar spondylosis, and rotator cuff tear.      Chief Complaint: Follow-up    Prior to this visit, patient's last encounter with PCP was 6/18/2024.    Patient reports mild chest congestion and ongoing dysphagia, main complaint today is Meloxicam refill.    Patient was seen at [facility] on 8/26/24 for chest congestion and productive cough with phlegm, and was diagnosed with a viral URI. COVID-19 negative. He was recommended Flonase nasal spray, antihistamines, and Mucinex with relief of symptoms. Currently denies of any fever, chills, CP, cough, myalgias, SOB, wheezing. Still c/o chest congestion.    Patient currently c/o chronic, lumbar stiffness and pain (rated 3/10). Reports relief of symptoms after taking Meloxicam. Patient in need of refill. Denies recent falls. He had a L 3-5 lumbar radiofrequency ablation on 1/24/24. Declines referral to see physical therapy. Patient plays the piano at [facility] every Tuesday.    Referral was made to speech therapy during the last clinic visit on 6/18/24 for dysphagia. Patient did not attend his Fiberoptic Endoscopic Evaluation of Swallowing appointment scheduled for 8/6/2024. Still c/o mild dysphagia. Reports episodes of aspiration periodically. Anxious about getting PNA again. Still drinking cranberry and cran apple juice.      Back Pain  - Chronic lower back pain d/t stenosis and osteoarthritis  - Well managed with Meloxicam 15 mg (in need of refill) and Voltaren 1% gel  - Had a L 3-5 lumbar radiofrequency ablation on 1/24/24  - Declines physical therapy at this time    Mental Health  - Currently taking: Trazodone 50 mg, no longer takes Mirtazapine d/t experiencing weird dreams  - Does not see a therapist or psychiatrist   - Lives by himself, has support from  his friends if needed  - No longer drives, uses Uber and public transportation, requests [employer] para-transit d/t mobility issues  - Hobbies include hearing and writing piano music, was previously a  and , currently plays the piano on  at [facility]     Specialty notes (if they see heme/onc, GI, ortho, ophth, derm, etc...) Pulmonology  - Last seen on 24 with Pulmonology for a complete PFT w/ bronchodilator. Normal spirometry and airway resistance/conductance.       Care Gaps:  - Influenza vaccine  - COVID-19 booster    Medications: Does not have pill packs      FU in 2 months       4Ms for Medical Decision-Making in Older Adults    Last Completed EAWV: 2024    MOBILITY:  Get Up and Go:       No data to display              Activities of Daily Livin/20/2024     8:17 AM   Activities of Daily Living   Ambulation Independent   Dressing Independent   Transfers Independent   Toileting Incontinent of bladder;Continent of bowel   Feeding Independent   Cleaning home/Chores Independent   Telephone use Independent   Shopping Independent   Paying bills Independent   Taking meds Independent     Whisper Test:      2024     8:12 AM   Whisper Test   Whisper Test N/A- Hearing Impairment     Disability Status:      2024     8:19 AM   Disability Status   Are you deaf or do you have serious difficulty hearing? N   Are you blind or do you have serious difficulty seeing, even when wearing glasses? N   Because of a physical, mental, or emotional condition, do you have serious difficulty concentrating, remembering, or making decisions? N   Do you have serious difficulty walking or climbing stairs? N   Do you have difficulty dressing or bathing? N   Because of a physical, mental, or emotional condition, do you have difficulty doing errands alone such as visiting a doctor's office or shopping? N     Nutrition Screenin/20/2024     8:15 AM   Nutrition Screening   Has  food intake declined over the past three months due to loss of appetite, digestive problems, chewing or swallowing difficulties? No decrease in food intake   Involuntary weight loss during the last 3 months? No weight loss   Mobility? Goes out   Has the patient suffered psychological stress or acute disease in the past three months? No   Neuropsychological problems? No psychological problems   Body Mass Index (BMI)?  BMI 23 or greater   Screening Score 14   Interpretation Normal nutritional status    Screening Score: 0-7 Malnourished, 8-11 At Risk, 12-14 Normal        MENTATION:   Depression Patient Health Questionnaire:      2024     9:26 AM   Depression Patient Health Questionnaire   PHQ-4 Total Score 2     Has Dementia Dx: No    Cognitive Function Screenin/20/2024     8:17 AM   Cognitive Function Screening   Clock Drawing Test 2   Mini-Cog 3 Minute Recall 3   Cognitive Function Screening 5     Cognitive Function Screening Total - Less than 4 = Abnormal,  Greater than or equal to 4 = Normal        MEDICATIONS:  High Risk Medications:  Total Active Medications: 1  gabapentin - 300 MG    WHAT MATTERS MOST:  Advance Care Planning   ACP Status:   Patient has had an ACP conversation  Living Will: No  Power of : No  La[place]: No    What is most important right now is to focus on remaining as independent as possiblesymptom/pain control    Accordingly, we have decided that the best plan to meet the patient's goals includes continuing with treatment      What matters most to patient today is: continuing care                 Social History     Socioeconomic History    Marital status: Single    Number of children: 0   Tobacco Use    Smoking status: Former     Current packs/day: 0.00     Types: Cigarettes     Quit date: 2000     Years since quittin.6     Passive exposure: Never    Smokeless tobacco: Former   Substance and Sexual Activity    Alcohol use: Not Currently    Drug use: No    Sexual  "activity: Not Currently   Social History Narrative    No difficulty reading Rx labels      Social Determinants of Health     Financial Resource Strain: Low Risk  (2/20/2024)    Overall Financial Resource Strain (CARDIA)     Difficulty of Paying Living Expenses: Not hard at all   Food Insecurity: Food Insecurity Present (2/20/2024)    Hunger Vital Sign     Worried About Running Out of Food in the Last Year: Sometimes true     Ran Out of Food in the Last Year: Never true   Transportation Needs: No Transportation Needs (2/20/2024)    PRAPARE - Transportation     Lack of Transportation (Medical): No     Lack of Transportation (Non-Medical): No   Physical Activity: Inactive (2/20/2024)    Exercise Vital Sign     Days of Exercise per Week: 0 days     Minutes of Exercise per Session: 0 min   Stress: No Stress Concern Present (2/20/2024)    [employer] [place] [employer] - Occupational Stress Questionnaire     Feeling of Stress : Only a little   Housing Stability: Low Risk  (2/20/2024)    Housing Stability Vital Sign     Unable to Pay for Housing in the Last Year: No     Number of Places Lived in the Last Year: 1     Unstable Housing in the Last Year: No       Review of Systems   Constitutional:  Negative for chills and fatigue.   HENT:  Positive for congestion (chest). Negative for postnasal drip, rhinorrhea and sore throat.    Respiratory:  Negative for cough, chest tightness, shortness of breath and wheezing.    Cardiovascular:  Negative for chest pain, palpitations and leg swelling.   Gastrointestinal:  Negative for vomiting.        (+) Dysphagia.   Musculoskeletal:  Positive for back pain (lumbar). Negative for gait problem.   Neurological:  Negative for headaches.   Psychiatric/Behavioral:  The patient is not nervous/anxious.        Objective:   /80 (BP Location: Left arm, Patient Position: Sitting, BP Method: Medium (Manual))   Pulse 62   Temp 98 °F (36.7 °C) (Oral)   Ht 5' 7" (1.702 m)   Wt 78.8 " kg (173 lb 11.6 oz)   SpO2 98%   BMI 27.21 kg/m²     Physical Exam  Constitutional:       General: He is not in acute distress.     Appearance: Normal appearance.   HENT:      Head: Normocephalic and atraumatic.   Cardiovascular:      Rate and Rhythm: Normal rate and regular rhythm.      Pulses: Normal pulses.           Radial pulses are 2+ on the right side and 2+ on the left side.      Heart sounds: Normal heart sounds. No murmur heard.     No friction rub. No gallop.   Pulmonary:      Effort: Pulmonary effort is normal. No respiratory distress.      Breath sounds: Normal breath sounds. No wheezing, rhonchi or rales.   Musculoskeletal:         General: Normal range of motion.      Cervical back: Normal range of motion and neck supple.      Right lower leg: No edema.      Left lower leg: No edema.   Skin:     General: Skin is warm and dry.      Capillary Refill: Capillary refill takes less than 2 seconds.   Neurological:      General: No focal deficit present.      Mental Status: He is alert and oriented to person, place, and time. Mental status is at baseline.   Psychiatric:         Mood and Affect: Mood normal.         Behavior: Behavior normal.             Lab Results   Component Value Date    WBC 6.84 02/27/2024    HGB 12.7 (L) 02/27/2024    HCT 38.7 (L) 02/27/2024     02/27/2024    CHOL 158 02/27/2024    TRIG 195 (H) 02/27/2024    HDL 64 02/27/2024    ALT 14 02/27/2024    AST 34 02/27/2024     02/27/2024    K 4.4 02/27/2024     02/27/2024    CREATININE 1.1 02/27/2024    BUN 25 (H) 02/27/2024    CO2 23 02/27/2024    TSH 3.711 01/31/2023    PSA <0.01 07/19/2022    INR 1.0 06/21/2017    HGBA1C 5.0 02/27/2024       Current Outpatient Medications on File Prior to Visit   Medication Sig Dispense Refill    cholecalciferol, vitamin D3, (VITAMIN D3) 50 mcg (2,000 unit) Cap capsule Take 1 capsule (2,000 Units total) by mouth once daily. 90 capsule 3    gabapentin (NEURONTIN) 300 MG capsule Take 2  capsules (600 mg total) by mouth 3 (three) times daily. 540 capsule 3    multivitamin capsule Take 1 capsule by mouth once daily.      traZODone (DESYREL) 50 MG tablet Take 1 tablet (50 mg total) by mouth every evening. 90 tablet 3    triamcinolone acetonide 0.1% (KENALOG) 0.1 % cream Apply 15 g (15,000 mg total) topically 2 (two) times daily as needed (for cuts). 1 each 3    VOLTAREN 1 % Gel UNKNOWN 777 g 0    walker (ULTRA-LIGHT ROLLATOR) Misc 1 Units by Misc.(Non-Drug; Combo Route) route once daily at 6am. 1 each 0    [DISCONTINUED] meloxicam (MOBIC) 15 MG tablet TAKE 1 TABLET(15 MG) BY MOUTH EVERY DAY 90 tablet 3     Current Facility-Administered Medications on File Prior to Visit   Medication Dose Route Frequency Provider Last Rate Last Admin    0.9%  NaCl infusion   Intravenous Continuous Altaf Santana MD             Assessment:   73 y.o. male with multiple co-morbid illnesses here to follow-up with PCP and continue work-up of chronic issues    Plan:   1. NSAID long-term use  Overview:  Using voltaren TID (although prescribed BID) for chronic pain    Orders:  -     BASIC METABOLIC PANEL; Future; Expected date: 09/06/2024    2. Chronic shoulder pain, unspecified laterality  Overview:  Plan for surgery with Dr Massey to repair rotator cuff on hold  Has been managing pain with voltaren gel and meloxicam    Assessment & Plan:  Recommend OT  Patient declines  Refill meloxicam, recommend chemistry to assess RF (last in Feb 2024 normal)      3. Phlegm in throat  Overview:  Has used Flonase in the past but cost prevents him from continuing to use.   Symptoms ongoing since Dec 2023, imaging at that time clear  Repeat CXR clear, stable.  We reviewed the images today  May be due to PND/Seasonal allergies or aspiration      Assessment & Plan:  Unfortunately did not make it to his speech therapy appt.   He will consider going if symptoms worsen-- he currently feels like his phlegm/dysphagia is near baseline and  managable        Other orders  -     meloxicam (MOBIC) 15 MG tablet; Take 1 tablet (15 mg total) by mouth once daily.  Dispense: 30 tablet; Refill: 3         Health Maintenance         Date Due Completion Date    COVID-19 Vaccine (4 - 2023-24 season) 09/01/2024 10/23/2023    PROSTATE-SPECIFIC ANTIGEN 02/27/2025 2/27/2024    Lipid Panel 02/27/2025 2/27/2024    Colorectal Cancer Screening 04/15/2026 4/15/2016    TETANUS VACCINE 08/31/2031 8/31/2021            Future Appointments   Date Time Provider Department Center   11/6/2024 10:00 AM Phyllis Barker DNP [facility]         Follow up in about 2 months (around 11/6/2024), or if symptoms worsen or fail to improve. Total clinical care time was 20 min      Jada Spaulding MD/MPH  [facility]  [phone] [employer]

## 2024-09-06 NOTE — ASSESSMENT & PLAN NOTE
Unfortunately did not make it to his speech therapy appt.   He will consider going if symptoms worsen-- he currently feels like his phlegm/dysphagia is near baseline and managable

## 2024-09-06 NOTE — ASSESSMENT & PLAN NOTE
Recommend OT  Patient declines  Refill meloxicam, recommend chemistry to assess RF (last in Feb 2024 normal)

## 2024-10-29 ENCOUNTER — TELEPHONE (OUTPATIENT)
Dept: OPTOMETRY | Facility: CLINIC | Age: 74
End: 2024-10-29
Payer: MEDICARE

## 2024-11-07 ENCOUNTER — LAB VISIT (OUTPATIENT)
Dept: LAB | Facility: HOSPITAL | Age: 74
End: 2024-11-07
Payer: MEDICARE

## 2024-11-07 ENCOUNTER — OFFICE VISIT (OUTPATIENT)
Dept: PRIMARY CARE CLINIC | Facility: CLINIC | Age: 74
End: 2024-11-07
Payer: MEDICARE

## 2024-11-07 VITALS
HEIGHT: 67 IN | TEMPERATURE: 98 F | OXYGEN SATURATION: 98 % | DIASTOLIC BLOOD PRESSURE: 88 MMHG | BODY MASS INDEX: 27.61 KG/M2 | HEART RATE: 59 BPM | WEIGHT: 175.94 LBS | SYSTOLIC BLOOD PRESSURE: 142 MMHG

## 2024-11-07 DIAGNOSIS — R13.10 DYSPHAGIA, UNSPECIFIED TYPE: Primary | ICD-10-CM
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
## 2024-11-07 DIAGNOSIS — K21.9 GASTROESOPHAGEAL REFLUX DISEASE WITHOUT ESOPHAGITIS: ICD-10-CM

## 2024-11-07 LAB — HIV 1+2 AB+HIV1 P24 AG SERPL QL IA: NORMAL

## 2024-11-07 PROCEDURE — 36415 COLL VENOUS BLD VENIPUNCTURE: CPT | Mod: HCNC | Performed by: NURSE PRACTITIONER

## 2024-11-07 PROCEDURE — 87389 HIV-1 AG W/HIV-1&-2 AB AG IA: CPT | Mod: HCNC | Performed by: NURSE PRACTITIONER

## 2024-11-07 RX ORDER — FAMOTIDINE 20 MG/1
20 TABLET, FILM COATED ORAL 2 TIMES DAILY
Qty: 60 TABLET | Refills: 11 | Status: SHIPPED | OUTPATIENT
Start: 2024-11-07 | End: 2025-11-07

## 2024-11-07 NOTE — PROGRESS NOTES
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, insomnia type    Lumbar myelopathy    Head trauma    Seasonal allergic rhinitis due to pollen    Iron deficiency anemia due to chronic blood loss    Chronic right-sided low back pain with right-sided sciatica    Difficulty walking    Noncompliance    Injury of right elbow    OAG (open angle glaucoma) suspect, low risk, bilateral    Nuclear sclerosis of both eyes    Ptosis of eyelid, right    NSAID long-term use    Lumbar radiculopathy    Chronic pain    Cancer of prostate    Prostate cancer    History of robot-assisted laparoscopic radical prostatectomy    Male stress incontinence    Unspecified inflammatory spondylopathy, cervical region    Osteoarthritis of spine    Chronic pain of right knee    Ligamentum flavum hypertrophy    Lumbosacral stenosis with neurogenic claudication    Mild episode of recurrent major depressive disorder    Sensorineural hearing loss (SNHL) of both ears    Frailty syndrome in geriatric patient    Chronic shoulder pain    Finger laceration involving tendon    Injury of extensor tendon of hand, right, initial encounter    Preop exam for internal medicine    Malignant (primary) neoplasm, unspecified    Phlegm in throat    Cough       CC:  Cough, reflux, needle stick    HPI:  Was stuck by a needle while cleaning out a gutter in front of his house  Had been treated for Hep C in the past  Would like hep C test  Reports having the latest COVID booster at [facility]  Having GERD symptoms that are disruptive    Medications  Current Outpatient Medications   Medication Sig Dispense Refill     cholecalciferol, vitamin D3, (VITAMIN D3) 50 mcg (2,000 unit) Cap capsule Take 1 capsule (2,000 Units total) by mouth once daily. 90 capsule 3    gabapentin (NEURONTIN) 300 MG capsule Take 2 capsules (600 mg total) by mouth 3 (three) times daily. 540 capsule 3    meloxicam (MOBIC) 15 MG tablet Take 1 tablet (15 mg total) by mouth once daily. 30 tablet 3    multivitamin capsule Take 1 capsule by mouth once daily.      traZODone (DESYREL) 50 MG tablet Take 1 tablet (50 mg total) by mouth every evening. 90 tablet 3    triamcinolone acetonide 0.1% (KENALOG) 0.1 % cream Apply 15 g (15,000 mg total) topically 2 (two) times daily as needed (for cuts). 1 each 3    VOLTAREN 1 % Gel UNKNOWN 777 g 0    walker (ULTRA-LIGHT ROLLATOR) Misc 1 Units by Misc.(Non-Drug; Combo Route) route once daily at 6am. 1 each 0    famotidine (PEPCID) 20 MG tablet Take 1 tablet (20 mg total) by mouth 2 (two) times daily. 60 tablet 11     No current facility-administered medications for this visit.     Facility-Administered Medications Ordered in Other Visits   Medication Dose Route Frequency Provider Last Rate Last Admin    0.9%  NaCl infusion   Intravenous Continuous Altaf Santana MD          Prior to Admission medications    Medication Sig Start Date End Date Taking? Authorizing Provider   cholecalciferol, vitamin D3, (VITAMIN D3) 50 mcg (2,000 unit) Cap capsule Take 1 capsule (2,000 Units total) by mouth once daily. 9/19/23  Yes Jada Spaulding MD   gabapentin (NEURONTIN) 300 MG capsule Take 2 capsules (600 mg total) by mouth 3 (three) times daily. 1/9/24  Yes Jada Spaulding MD   meloxicam (MOBIC) 15 MG tablet Take 1 tablet (15 mg total) by mouth once daily. 9/6/24  Yes Phyllis Barker DNP   multivitamin capsule Take 1 capsule by mouth once daily.   Yes Provider, Historical   traZODone (DESYREL) 50 MG tablet Take 1 tablet (50 mg total) by mouth every evening. 4/5/24 4/5/25 Yes Jada Spaulding MD   triamcinolone  acetonide 0.1% (KENALOG) 0.1 % cream Apply 15 g (15,000 mg total) topically 2 (two) times daily as needed (for cuts). 7/19/22  Yes Darlene Bansal DNP   VOLTAREN 1 % Gel UNKNOWN 7/1/22  Yes Darlene Bansal DNP   walker (ULTRA-LIGHT ROLLATOR) Misc 1 Units by Misc.(Non-Drug; Combo Route) route once daily at 6am. 6/14/17  Yes Jada Spaulding MD   famotidine (PEPCID) 20 MG tablet Take 1 tablet (20 mg total) by mouth 2 (two) times daily. 11/7/24 11/7/25  Phyllis Barker DNP         History  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    Overview:  Right side dx update     Past Surgical History:   Procedure Laterality Date    BACK SURGERY      EPIDURAL STEROID INJECTION N/A 2/6/2019    Procedure: INJECTION, STEROID, EPIDURAL, L45-S1 IL;  Surgeon: Marcel Adkins MD;  [facility];  Service: Pain Management;  Laterality: N/A;    EPIDURAL STEROID INJECTION N/A 4/10/2019    Procedure: Injection, Steroid, Epidural LUMBAR/CAUDAL L5-S1 INTERLAMINAR KAYLEY;  Surgeon: Marcel Adkins MD;  [facility]: [facility];  Service: Pain Management;  Laterality: N/A;  NEEDS CONSENT    INJECTION OF FACET JOINT Bilateral 8/14/2019    Procedure: INJECTION, FACET JOINT INJECTION (LUMBAR BLOCK) BILATERAL L4-5 AND L5-S1;  Surgeon: Marcel Adkins MD;  [facility]: [facility];  Service: Pain Management;  Laterality: Bilateral;  NEEDS CONSENT    INJECTION OF FACET JOINT Bilateral 10/16/2019    Procedure: FACET JOINT INJECTION (LUMBAR BLOCK) BILATERAL L4-5 AND L5-S1;  Surgeon: Marcel Adkins MD;  [facility];  Service: Pain Management;  Laterality: Bilateral;  NEEDS CONSENT    INJECTION OF JOINT  Bilateral 11/4/2020    Procedure: INJECTION, JOINT, GLENOHUMERAL;  Surgeon: Marcel Adkins MD;  Location: [facility] PAIN MGT;  Service: Pain Management;  Laterality: Bilateral;    INJECTION OF JOINT Bilateral 12/16/2020    Procedure: INJECTION, JOINT, SACROILIAC (SI) need consent;  Surgeon: Marcel Adkins MD;  Location: [facility] PAIN MGT;  Service: Pain Management;  Laterality: Bilateral;    INJECTION OF JOINT Bilateral 4/7/2021    Procedure: INJECTION, JOINT, SACROILIAC (SI);  Surgeon: Marcel Adkins MD;  Location: [facility]IN [facility]GT;  Service: Pain Management;  Laterality: Bilateral;    INJECTION OF JOINT Bilateral 6/2/2021    Procedure: INJECTION, JOINT, GLENOHUMERAL;  Surgeon: Marcel Adkins MD;  Location: [facility]IN [facility]GT;  Service: Pain Management;  Laterality: Bilateral;    INJECTION, SACROILIAC JOINT Bilateral 1/4/2023    Procedure: INJECTION,SACROILIAC JOINT BILATERAL CONTRAST NEEDS CONSENT;  Surgeon: Marcel Adkins MD;  Location: [facility] PAIN MGT;  Service: Pain Management;  Laterality: Bilateral;    Larangoscopy      RADIOFREQUENCY ABLATION Left 5/27/2020    Procedure: RADIOFREQUENCY ABLATION LEFT L3,4,5 1 of 2;  Surgeon: Marcel Adkins MD;  Location: [facility] PAIN MGT;  Service: Pain Management;  Laterality: Left;  Left RFA L3, 4, 5  1 of 2    RADIOFREQUENCY ABLATION Right 6/10/2020    Procedure: RADIOFREQUENCY ABLATION RIGHT L3,4,5;  Surgeon: Marcel Adkins MD;  Location: [facility] PAIN MGT;  Service: Pain Management;  Laterality: Right;  Right RFA L3,4.5  2 of 2    RADIOFREQUENCY ABLATION Right 8/31/2022    Procedure: RADIOFREQUENCY ABLATION RIGHT L3, 4, 5 ONE OF TWO;  Surgeon: Marcel Adkins MD;  Location: [facility] PAIN MGT;  Service: Pain Management;  Laterality: Right;    RADIOFREQUENCY ABLATION Left 9/14/2022    Procedure: RADIOFREQUENCY ABLATION LEFT L3, 4, 5 TWO OF TWO;  Surgeon: Marcel Adkins MD;  Location: [facility] PAIN MGT;  Service: Pain Management;  Laterality: Left;    RADIOFREQUENCY  ABLATION Bilateral 2024    Procedure: RADIOFREQUENCY ABLATION BILATERAL L3, 4, 5;  Surgeon: Marcel Adkins MD;  Location: [facility];  Service: Pain Management;  Laterality: Bilateral;  [phone]  3 WK F/U MILO    ROBOT-ASSISTED LAPAROSCOPIC PROSTATECTOMY USING DA SAÚL XI N/A 2019    Procedure: XI ROBOTIC PROSTATECTOMY;  Surgeon: Sergio Dixon MD;  Location: [facility] OR [address] [place] [address];  Service: Urology;  Laterality: N/A;  4hrs/ gen with regional     SPINE SURGERY       Social History     Socioeconomic History    Marital status: Single    Number of children: 0   Tobacco Use    Smoking status: Former     Current packs/day: 0.00     Types: Cigarettes     Quit date: 2000     Years since quittin.7     Passive exposure: Never    Smokeless tobacco: Former   Substance and Sexual Activity    Alcohol use: Not Currently    Drug use: No    Sexual activity: Not Currently   Social History Narrative    No difficulty reading Rx labels      Social Drivers of Health     Financial Resource Strain: Low Risk  (2024)    Overall Financial Resource Strain (CARDIA)     Difficulty of Paying Living Expenses: Not hard at all   Food Insecurity: Food Insecurity Present (2024)    Hunger Vital Sign     Worried About Running Out of Food in the Last Year: Sometimes true     Ran Out of Food in the Last Year: Never true   Transportation Needs: No Transportation Needs (2024)    PRAPARE - Transportation     Lack of Transportation (Medical): No     Lack of Transportation (Non-Medical): No   Physical Activity: Inactive (2024)    Exercise Vital Sign     Days of Exercise per Week: 0 days     Minutes of Exercise per Session: 0 min   Stress: No Stress Concern Present (2024)    [employer] - Occupational Stress Questionnaire     Feeling of Stress : Only a little   Housing Stability: Low Risk  (2024)    Housing Stability Vital Sign     Unable to Pay for Housing in the Last Year:  No     Number of Places Lived in the Last Year: 1     Unstable Housing in the Last Year: No         Allergies  Review of patient's allergies indicates:  No Known Allergies      Review of Systems   Review of Systems   Constitutional: Negative for diaphoresis and malaise/fatigue.   HENT: Negative.     Cardiovascular:  Negative for chest pain, claudication, dyspnea on exertion, irregular heartbeat, leg swelling, near-syncope, orthopnea, palpitations, paroxysmal nocturnal dyspnea and syncope.   Respiratory:  Negative for shortness of breath.    Endocrine: Negative for polydipsia, polyphagia and polyuria.   Hematologic/Lymphatic: Does not bruise/bleed easily.   Skin:         Needle stick right hand   Gastrointestinal:  Negative for bloating, nausea and vomiting.   Genitourinary: Negative.    Neurological:  Negative for excessive daytime sleepiness, dizziness, light-headedness, loss of balance and weakness.   Psychiatric/Behavioral:  The patient is not nervous/anxious.    Allergic/Immunologic: Negative.          Physical Exam  Wt Readings from Last 1 Encounters:   11/07/24 79.8 kg (175 lb 14.8 oz)     BP Readings from Last 3 Encounters:   11/07/24 (!) 142/88   09/06/24 118/80   08/26/24 130/74     Pulse Readings from Last 1 Encounters:   11/07/24 (!) 59     Body mass index is 27.55 kg/m².    Physical Exam  Vitals and nursing note reviewed.   Constitutional:       Appearance: Normal appearance.   HENT:      Head: Normocephalic and atraumatic.      Mouth/Throat:      Mouth: Mucous membranes are moist.   Eyes:      Pupils: Pupils are equal, round, and reactive to light.   Cardiovascular:      Rate and Rhythm: Normal rate and regular rhythm.      Pulses:           Radial pulses are 2+ on the right side and 2+ on the left side.        Dorsalis pedis pulses are 2+ on the right side and 2+ on the left side.        Posterior tibial pulses are 2+ on the right side and 2+ on the left side.      Heart sounds: No murmur  heard.  Pulmonary:      Effort: Pulmonary effort is normal. No respiratory distress.      Breath sounds: Normal breath sounds.   Abdominal:      General: There is no distension.      Tenderness: There is no abdominal tenderness.   Musculoskeletal:      Cervical back: Normal range of motion.      Right lower leg: No edema.      Left lower leg: No edema.   Skin:     General: Skin is warm and dry.      Findings: No erythema.   Neurological:      General: No focal deficit present.      Mental Status: He is alert.   Psychiatric:         Mood and Affect: Mood normal.         Behavior: Behavior normal.         1. Dysphagia, unspecified type  -     Ambulatory referral/consult to Gastroenterology; Future; Expected date: 11/14/2024    2. Accident caused by hypodermic needle, initial encounter  Overview:  Accidental stick while cleaning out a gutter    Assessment & Plan:  Check Hep C and HIV 1/2  No skin changes, rash, fever, chills or night sweats    Orders:  -     Hepatitis C Virus Quantitative; Future; Expected date: 11/07/2024  -     HIV 1/2 Ag/Ab (4th Gen); Future; Expected date: 11/07/2024    3. Gastroesophageal reflux disease without esophagitis  Overview:  Worse with lying down after eating  Has tried to adjust diet     Assessment & Plan:  Trial famotidine 20 mg BID  Lifestyle mods (elevate HOB)  Adjust diet  Refer to GI per patient request, interested in manometry testing      Other orders  -     famotidine (PEPCID) 20 MG tablet; Take 1 tablet (20 mg total) by mouth 2 (two) times daily.  Dispense: 60 tablet; Refill: 11                Follow Up        @Phyllis Barker DNP   no

## 2024-11-07 NOTE — PATIENT INSTRUCTIONS
We will start famotidine 20 mg twice daily this was sent to [facility].  Try to adjust your diet- see the information I have attached.  Avoid lying down after eating or drinking for 2-3 hours!    Labs today    I have referred you to GI so they can help us figure out if you have any narrowing in your esophagus
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
## 2024-11-08 PROBLEM — K21.9 GASTROESOPHAGEAL REFLUX DISEASE WITHOUT ESOPHAGITIS: Status: ACTIVE | Noted: 2024-11-08

## 2024-11-08 NOTE — ASSESSMENT & PLAN NOTE
Trial famotidine 20 mg BID  Lifestyle mods (elevate HOB)  Adjust diet  Refer to GI per patient request, interested in manometry testing

## 2024-11-25 ENCOUNTER — CLINICAL SUPPORT (OUTPATIENT)
Dept: OPHTHALMOLOGY | Facility: CLINIC | Age: 74
End: 2024-11-25
Payer: MEDICARE

## 2024-11-25 ENCOUNTER — OFFICE VISIT (OUTPATIENT)
Dept: OPTOMETRY | Facility: CLINIC | Age: 74
End: 2024-11-25
Payer: COMMERCIAL

## 2024-11-25 DIAGNOSIS — H52.203 MYOPIA WITH ASTIGMATISM AND PRESBYOPIA, BILATERAL: ICD-10-CM

## 2024-11-25 DIAGNOSIS — H52.13 MYOPIA WITH ASTIGMATISM AND PRESBYOPIA, BILATERAL: ICD-10-CM

## 2024-11-25 DIAGNOSIS — H40.013 OAG (OPEN ANGLE GLAUCOMA) SUSPECT, LOW RISK, BILATERAL: Primary | ICD-10-CM

## 2024-11-25 DIAGNOSIS — H25.13 SENILE NUCLEAR SCLEROSIS, BILATERAL: ICD-10-CM

## 2024-11-25 DIAGNOSIS — H53.2 TRANSIENT DIPLOPIA: ICD-10-CM

## 2024-11-25 DIAGNOSIS — H52.4 MYOPIA WITH ASTIGMATISM AND PRESBYOPIA, BILATERAL: ICD-10-CM

## 2024-11-25 PROCEDURE — 99999 PR PBB SHADOW E&M-EST. PATIENT-LVL II: CPT | Mod: PBBFAC,,, | Performed by: OPTOMETRIST

## 2024-11-25 NOTE — PROGRESS NOTES
Oct/hvf done ou./ rel/fix/coop. Good ou./ chart checked for latex allergy./ -2.25 + 4.00 x 150/od -.50 + .75 x 155/os-h

## 2024-11-29 NOTE — PROGRESS NOTES
JACY    STACIA: 09/23  Chief complaint (CC): Patient is here for annual eye exam today.  Patient   had glasses but they broke and he needs new ones.  Patient hadn't noticed   any vision changes since the last exam. Patient only has horizontal   diplopia when he is fatigued.  Glasses? +  Contacts? -  H/o eye surgery, injections or laser: -  H/o eye injury: -  Known eye conditions? See above  Family h/o eye conditions? Brother with keratoconus  Eye gtts? -      (-) Flashes (-)  Floaters (-) Mucous   (-)  Tearing (-) Itching (-) Burning   (-) Headaches (-) Eye Pain/discomfort (-) Irritation   (-)  Redness (+) Double vision (-) Blurry vision    Diabetic? -  A1c? -      Last edited by Debbie Sosa on 11/25/2024 10:23 AM.            Assessment /Plan     For exam results, see Encounter Report.      OAG (open angle glaucoma) suspect, low risk, bilateral  -     Posterior Segment OCT Optic Nerve- Both eyes  -     Richardson Visual Field - OU - Extended - Both Eyes  (-) FHx. IOP 19 OD, OS. Last 17 OD, 16 OS. Pachys are thick 554 (-1) OD, 579 (-2) OS. C/d 0.8 OD, OS w/thin rim OU. (-) FHx. (-) HTN. (-) DM. Prev POAG OU suspect at [facility].  11/14/18 OCT OD WNL, OS Borderline TS  11/25/2024 OD thick TS and NS, OS thin G, borderline TS-- OD appears normal w/fundus exam. Will see if OD is repeatable  11/14/18 HVF OD sup arcuate (due to lid?), OS sup arcuate (due to lid?), early inf arcuate? Doesn't correlate w/OCT.   02/20/19 HVF OD General reduction of sensitivity, WnL. OS ONL- scattered defects but nonspecific. Perhaps early sup temp defect.   11/25/2024 HVF nonspecific defect OU  Educated patient on findings today. No tx at this time. Would like monitor OS.    RTC 1 year Routine     Myopia with astigmatism and presbyopia, bilateral  SRx released to patient. Patient educated on lens options. Normal ocular health. RTC 1 year for routine exam.     Senile nuclear sclerosis, bilateral  Nuclear sclerotic cataract - not visually  significant. Observe.    Transient diplopia  Pt reports diplopia only when tired. Not exhibited in clinic today.

## 2024-12-10 DIAGNOSIS — E55.9 VITAMIN D DEFICIENCY: ICD-10-CM

## 2024-12-11 RX ORDER — MELOXICAM 15 MG/1
15 TABLET ORAL
Qty: 90 TABLET | Refills: 3 | Status: SHIPPED | OUTPATIENT
Start: 2024-12-11

## 2024-12-11 RX ORDER — ACETAMINOPHEN 500 MG
2000 TABLET ORAL
Qty: 90 CAPSULE | Refills: 3 | Status: SHIPPED | OUTPATIENT
Start: 2024-12-11

## 2025-01-14 NOTE — PROGRESS NOTES
Gastroenterology Clinic Consultation Note    Reason for Visit:  The primary encounter diagnosis was Dysphagia, unspecified type. A diagnosis of Colon cancer screening was also pertinent to this visit.    PCP:   Jada Spaulding   [address] / [place] [id number]      Initial HPI   This is a 74 y.o. male presenting for dysphagia. Feeling like his foods is getting stuck. Points to substernal notch. This is more problematic if he eats fast and does not chew. If he eats slow and chews his food thoroughly he does ok. Does ok with drinking water. Denies issues initiating a swallow. Denies pain.     Patient inquiring regarding getting his screening colonoscopy as well. Last done in 2016 at [facility]. No polyps removed. He would like to get this done while getting his EGD.       ROS:  Review of Systems   Constitutional:  Negative for chills, fever, malaise/fatigue and weight loss.   HENT:  Negative for sore throat.    Eyes:  Negative for redness.   Respiratory:  Positive for sputum production (this has improved). Negative for cough and wheezing.    Cardiovascular:  Negative for chest pain.   Gastrointestinal:  Positive for heartburn. Negative for abdominal pain, blood in stool, constipation, diarrhea, melena, nausea and vomiting.   Skin:  Negative for rash.   Neurological:  Negative for dizziness, seizures, loss of consciousness and weakness.        Medical History:  has a past medical history of Benign non-nodular prostatic hyperplasia without lower urinary tract symptoms (6/6/2017), Hepatitis C, Senile purpura (6/6/2017), Unspecified vitamin D deficiency (6/6/2017), and Vocal fold cyst (9/12/2012).    Surgical History:  has a past surgical history that includes Larangoscopy; Back surgery; Epidural steroid injection (N/A, 2/6/2019); Epidural steroid injection (N/A, 4/10/2019); Robot-assisted laparoscopic prostatectomy using da Ede Xi  (N/A, 6/4/2019); Injection of facet joint (Bilateral, 8/14/2019); Injection of facet joint (Bilateral, 10/16/2019); Radiofrequency ablation (Left, 5/27/2020); Radiofrequency ablation (Right, 6/10/2020); Spine surgery; Injection of joint (Bilateral, 11/4/2020); Injection of joint (Bilateral, 12/16/2020); Injection of joint (Bilateral, 4/7/2021); Injection of joint (Bilateral, 6/2/2021); Radiofrequency ablation (Right, 8/31/2022); Radiofrequency ablation (Left, 9/14/2022); injection, sacroiliac joint (Bilateral, 1/4/2023); and Radiofrequency ablation (Bilateral, 1/24/2024).    Family History: family history includes Cancer in his brother and mother; Keratoconus in his brother; No Known Problems in his father..       Review of patient's allergies indicates:  No Known Allergies    Current Outpatient Medications on File Prior to Visit   Medication Sig Dispense Refill    cholecalciferol, vitamin D3, (VITAMIN D3) 50 mcg (2,000 unit) Cap capsule TAKE 1 CAPSULE ONE TIME DAILY 90 capsule 3    gabapentin (NEURONTIN) 300 MG capsule Take 2 capsules (600 mg total) by mouth 3 (three) times daily. 540 capsule 3    meloxicam (MOBIC) 15 MG tablet TAKE 1 TABLET ONE TIME DAILY 90 tablet 3    multivitamin capsule Take 1 capsule by mouth once daily.      traZODone (DESYREL) 50 MG tablet Take 1 tablet (50 mg total) by mouth every evening. 90 tablet 3    triamcinolone acetonide 0.1% (KENALOG) 0.1 % cream Apply 15 g (15,000 mg total) topically 2 (two) times daily as needed (for cuts). 1 each 3    VOLTAREN 1 % Gel UNKNOWN 777 g 0    walker (ULTRA-LIGHT ROLLATOR) Misc 1 Units by Misc.(Non-Drug; Combo Route) route once daily at 6am. 1 each 0    [DISCONTINUED] famotidine (PEPCID) 20 MG tablet Take 1 tablet (20 mg total) by mouth 2 (two) times daily. (Patient not taking: Reported on 1/15/2025) 60 tablet 11     Current Facility-Administered Medications on File Prior to Visit   Medication Dose Route Frequency Provider Last Rate Last Admin    0.9%   "NaCl infusion   Intravenous Continuous Altaf Santana MD             Objective Findings:    Vital Signs:  /85 (Patient Position: Sitting)   Pulse 64   Ht 5' 7" (1.702 m)   Wt 81 kg (178 lb 9.2 oz)   BMI 27.97 kg/m²   Body mass index is 27.97 kg/m².    Physical Exam:  Physical Exam  Vitals and nursing note reviewed.   Constitutional:       Appearance: He is normal weight. He is not ill-appearing.   HENT:      Mouth/Throat:      Mouth: Mucous membranes are moist.      Pharynx: Oropharynx is clear.   Eyes:      General: No scleral icterus.  Abdominal:      General: Abdomen is flat. Bowel sounds are normal. There is no distension.      Palpations: Abdomen is soft. There is no mass.      Tenderness: There is no abdominal tenderness.      Hernia: No hernia is present.   Skin:     General: Skin is warm and dry.      Capillary Refill: Capillary refill takes less than 2 seconds.      Coloration: Skin is not jaundiced or pale.   Neurological:      Mental Status: He is alert and oriented to person, place, and time. Mental status is at baseline.             Labs:  Lab Results   Component Value Date    WBC 6.84 02/27/2024    HGB 12.7 (L) 02/27/2024    HCT 38.7 (L) 02/27/2024     02/27/2024    CRP 1.8 06/06/2017    CHOL 158 02/27/2024    TRIG 195 (H) 02/27/2024    HDL 64 02/27/2024    ALKPHOS 88 02/27/2024    ALT 14 02/27/2024    AST 34 02/27/2024     02/27/2024    K 4.4 02/27/2024     02/27/2024    CREATININE 1.1 02/27/2024    BUN 25 (H) 02/27/2024    CO2 23 02/27/2024    TSH 3.711 01/31/2023    PSA <0.01 07/19/2022    INR 1.0 06/21/2017    HGBA1C 5.0 02/27/2024       Imaging reviewed: No pertinent imaging reviewed       Endoscopy reviewed: Colonoscopy 4/15/2016      Assessment:  1. Dysphagia, unspecified type    2. Colon cancer screening      Orders Placed This Encounter    omeprazole (PRILOSEC) 40 MG capsule         Plan:  Referral for EGD for further evaluation. PPI once daily.   Referral " for colonoscopy.   RTC post procedure.       Thank you for allowing me to participate in this patient's care.    Sincerely,     Lanette Wilkinson NP  Gastroenterology Department  [facility]

## 2025-01-15 ENCOUNTER — OFFICE VISIT (OUTPATIENT)
Dept: GASTROENTEROLOGY | Facility: CLINIC | Age: 75
End: 2025-01-15
Payer: MEDICARE

## 2025-01-15 VITALS
HEART RATE: 64 BPM | DIASTOLIC BLOOD PRESSURE: 85 MMHG | HEIGHT: 67 IN | WEIGHT: 178.56 LBS | BODY MASS INDEX: 28.02 KG/M2 | SYSTOLIC BLOOD PRESSURE: 134 MMHG

## 2025-01-15 DIAGNOSIS — Z12.11 COLON CANCER SCREENING: ICD-10-CM

## 2025-01-15 DIAGNOSIS — R13.10 DYSPHAGIA, UNSPECIFIED TYPE: Primary | ICD-10-CM

## 2025-01-15 PROCEDURE — 3288F FALL RISK ASSESSMENT DOCD: CPT | Mod: HCNC,CPTII,S$GLB,

## 2025-01-15 PROCEDURE — 99214 OFFICE O/P EST MOD 30 MIN: CPT | Mod: HCNC,S$GLB,,

## 2025-01-15 PROCEDURE — 1159F MED LIST DOCD IN RCRD: CPT | Mod: HCNC,CPTII,S$GLB,

## 2025-01-15 PROCEDURE — 3079F DIAST BP 80-89 MM HG: CPT | Mod: HCNC,CPTII,S$GLB,

## 2025-01-15 PROCEDURE — 1100F PTFALLS ASSESS-DOCD GE2>/YR: CPT | Mod: HCNC,CPTII,S$GLB,

## 2025-01-15 PROCEDURE — 99999 PR PBB SHADOW E&M-EST. PATIENT-LVL III: CPT | Mod: PBBFAC,HCNC,,

## 2025-01-15 PROCEDURE — 3075F SYST BP GE 130 - 139MM HG: CPT | Mod: HCNC,CPTII,S$GLB,

## 2025-01-15 PROCEDURE — 1125F AMNT PAIN NOTED PAIN PRSNT: CPT | Mod: HCNC,CPTII,S$GLB,

## 2025-01-15 RX ORDER — OMEPRAZOLE 40 MG/1
40 CAPSULE, DELAYED RELEASE ORAL DAILY
Qty: 90 CAPSULE | Refills: 3 | Status: SHIPPED | OUTPATIENT
Start: 2025-01-15 | End: 2026-01-15

## 2025-01-15 NOTE — PROGRESS NOTES
"GENERAL GI PATIENT INTAKE:    COVID symptoms in the last 7 days (runny nose, sore throat, congestion, cough, fever): No  PCP: Jada Spaulding  If not PCP-  number given to establish [phone]: N/A    ALLERGIES REVIEWED:  Yes    CHIEF COMPLAINT:    Chief Complaint   Patient presents with    Dysphagia    Abdominal Cramping    Initial Visit       VITAL SIGNS:  /85 (Patient Position: Sitting)   Pulse 64   Ht 5' 7" (1.702 m)   Wt 81 kg (178 lb 9.2 oz)   BMI 27.97 kg/m²      Change in medical, surgical, family or social history:  Reviewed by NP      REVIEWED MEDICATION LIST RECONCILED INCLUDING ABOVE MEDS:  Yes     "

## 2025-02-20 ENCOUNTER — TELEPHONE (OUTPATIENT)
Dept: ENDOSCOPY | Facility: HOSPITAL | Age: 75
End: 2025-02-20
Payer: MEDICARE

## 2025-02-20 NOTE — TELEPHONE ENCOUNTER
"----- Message from Hillary sent at 2025  2:49 PM CST -----  Regarding: FW: EGD/Colonoscopy    ----- Message -----  From: Lanette Wilkinson NP  Sent: 1/15/2025   9:45 AM CST  To: [facility]  Subject: EGD/Colonoscopy                                  Procedure: EGD/Colonoscopy    Diagnosis: Screening colonoscopy and Dysphagia    Procedure Timin-12 weeks    #If within 4 weeks selected, please miller as high priority#    #If greater than 12 weeks, please select "5-12 weeks" and delay sending until 3 months prior to requested date#     Location: Any Site    Additional Scheduling Information: No scheduling concerns    Prep Specifications:Standard prep    Is the patient taking a GLP-1 Agonist:no    Have you attached a patient to this message: yes  "

## 2025-02-21 DIAGNOSIS — Z00.00 ENCOUNTER FOR MEDICARE ANNUAL WELLNESS EXAM: ICD-10-CM

## 2025-02-27 ENCOUNTER — TELEPHONE (OUTPATIENT)
Dept: ENDOSCOPY | Facility: HOSPITAL | Age: 75
End: 2025-02-27
Payer: MEDICARE

## 2025-02-27 VITALS — BODY MASS INDEX: 27.94 KG/M2 | WEIGHT: 178 LBS | HEIGHT: 67 IN

## 2025-02-27 DIAGNOSIS — R13.10 DYSPHAGIA, UNSPECIFIED TYPE: ICD-10-CM

## 2025-02-27 DIAGNOSIS — Z12.11 COLON CANCER SCREENING: ICD-10-CM

## 2025-02-27 RX ORDER — SODIUM, POTASSIUM,MAG SULFATES 17.5-3.13G
1 SOLUTION, RECONSTITUTED, ORAL ORAL DAILY
Qty: 1 KIT | Refills: 0 | Status: SHIPPED | OUTPATIENT
Start: 2025-02-27 | End: 2025-03-01

## 2025-02-27 NOTE — TELEPHONE ENCOUNTER
Referral for procedure from [facility]      Spoke to pt to schedule procedure(s) Colonoscopy/EGD       Physician to perform procedure(s) Dr. KOMAL Fernandez  Date of Procedure (s) 3/25/25  Arrival Time 12:45 PM  Time of Procedure(s) 1:45 PM   Location of Procedure(s) [address]  Type of Rx Prep sent to patient: Suprep  Instructions provided to patient via Postal Mail    Patient was informed on the following information and verbalized understanding. Screening questionnaire reviewed with patient and complete. If procedure requires anesthesia, a responsible adult needs to be present to accompany the patient home, patient cannot drive after receiving anesthesia. Appointment details are tentative, especially check-in time. Patient will receive a prep-op call 7 days prior to confirm check-in time for procedure. If applicable the patient should contact their pharmacy to verify Rx for procedure prep is ready for pick-up. Patient was advised to call the scheduling department at [phone] if pharmacy states no Rx is available. Patient was advised to call the endoscopy scheduling department if any questions or concerns arise.       Endoscopy Scheduling Department        Colonoscopy Procedure Prep Instructions      Date of procedure: 3/25/25 Arrive at: 12:45PM    Location of Department:   [facility] [address] [place] [address]  Take the [facility] Elevators to 4th Floor Endoscopy Lab    As soon as possible:   your prep from pharmacy and over the counter DULCOLAX LAXATIVE TABLETS     On the day before your procedure   What You CAN do:   You may have clear liquids ONLY -see below for list.     Liquids That Are OK to Drink:   Water  Sports drinks (Gatorade, Power-Aid)  Coffee or tea (no cream or nondairy creamer)  Clear juices without pulp (apple, white grape)  Gelatin desserts (no fruit or toppings)  Clear soda (sprite, coke, ginger ale)  Chicken broth (until 12 midnight the night  before procedure)      What You CANNOT do:   Do not EAT solid food, drink milk or anything   colored red.  Do not drink alcohol.  Do not take oral medications within 1 hour of starting   each dose of SUPREP.  No gum chewing or candy morning of procedure.      Note:   (Please disregard the insert instructions from pharmacy).  SUPREP Bowel Prep Kit is indicated for cleansing of the colon as a preparation for colonoscopy in adults.   Be sure to tell your doctor about all the medicines you take, including prescription and non-prescription medicines, vitamins, and herbal supplements. SUPREP Bowel Prep Kit may affect how other medicines work.  Medication taken by mouth may not be absorbed properly when taken within 1 hour before the start of each dose of SUPREP Bowel Prep Kit.    It is not uncommon to experience some abdominal cramping, nausea and/or vomiting when taking the prep. If you have nausea and/or vomiting while taking the prep, stop drinking for 20 to 30 minutes then continue.    How to take prep:    SUPREP Bowel Prep Kit is a (2-day) prep.   Both 6-ounce bottles are required for a complete preparation for colonoscopy. Dilute the solution concentrate as directed prior to use. You must drink water with each dose of SUPREP, and additional water after each dose.    DOSE 1--Day Before Colonoscopy 3/24/25    Drink at least 6 to 8 glasses of clear liquids from time you wake up until you begin your prep and then continue until bedtime to avoid dehydration.     12:00 pm (NOON) Take four (4) Dulcolax (Bisacodyl) tablets with at least 8 ounces or more of clear liquids.      6:00 pm:    You must complete Steps 1 through 4 using one (1) 6-ounce bottle before going to bed as shown below:    Step 1-Pour ONE (1) 6-ounce bottle of SUPREP liquid into the mixing container.  Step 2-Add cool drinking water to the 16-ounce line on the container and mix.  Step 3-Drink ALL the liquid in the container.  Step 4-You must drink two (2)  more 16-ounce containers of water over the next 1 hour.  IMPORTANT: If you experience preparation-related symptoms (for example, nausea, bloating, or cramping), stop, or slow the rate of drinking the additional water until your symptoms decrease.    DOSE 2--Day of the Colonoscopy 3/25/25 at 2-3 AM.    For this dose, repeat Steps 1 through 4 shown above using the other 6-ounce bottle.   You may continue drinking water/clear liquids until   2 hours before your colonoscopy or as directed by the scheduling nurse  11:45AM.    For information about your procedure, two (2) things to view prior to colonoscopy:  Please watch this informational video. It is important to watch this animated consent video prior to your arrival. If you haven't watched the video prior to arriving, you are required to watch it during admission which can causes delays.    Options for viewing:   Using a keyboard:  press and hold the control tab (Ctrl) and left mouse click to follow links.           Colonoscopy Instructional Video                                                                                   OR    Type link address into your web browser's address bar:  [email or web address]      Educational Booklet with pictures:      Colonoscopy Prep - Liquid      Comments:        IMPORTANT INFORMATION TO KNOW BEFORE YOUR PROCEDURE    [facility] [place] 4th Floor         If your procedure requires the administration of anesthesia, it is necessary for a responsible adult to drive you home. (Medical Transportation, Uber, Lyft, Taxi, etc. may ONLY be used if a responsible adult is present to accompany you home.  The responsible adult CAN'T be the  of the service).      person must be available to return to pick you up within 15 minutes of being notified of discharge.       Please bring a picture ID, insurance card, & copayment      Take Medications as directed below:        If you begin taking any  "blood thinning medications, injectable weight loss/diabetes medications (other than insulin) , or Adipex (Phentermine) please contact the endoscopy scheduling department listed below as soon as possible.    If you are diabetic see the attached instruction sheet regarding your medication.     If you take HEART, BLOOD PRESSURE, SEIZURE, PAIN, LUNG (including inhalers/nebulizers), ANTI-REJECTION (transplant patients), or PSYCHIATRIC medications, please take at your regular times with a sip of water or as directed by the scheduling nurse.     Important contact information:    Endoscopy [phone] Hours of operation Monday-Friday 8:00-4:30pm.    Questions about insurance or financial obligations call [phone] or [phone].    If you have questions regarding the prep or need to reschedule, please call [phone]. After hours questions requiring immediate assistance, contact [facility] nurse line at [phone] or [phone].   NOTE:     On occasion, unforeseen circumstances may cause a delay in your procedure start time. We respect your time and appreciate your patience during these circumstances.      Comments:           ----- Message from Hillary sent at 2025  2:49 PM CST -----  Regarding: FW: EGD/Colonoscopy     ----- Message -----  From: Lanette Wilkinson NP  Sent: 1/15/2025   9:45 AM CST  To: [facility]  Subject: EGD/Colonoscopy                                   Procedure: EGD/Colonoscopy     Diagnosis: Screening colonoscopy and Dysphagia     Procedure Timin-12 weeks     #If within 4 weeks selected, please miller as high priority#     #If greater than 12 weeks, please select "5-12 weeks" and delay sending until 3 months prior to requested date#      Location: Any Site     Additional Scheduling Information: No scheduling concerns     Prep Specifications:Standard prep     Is the patient taking a GLP-1 Agonist:no     Have you attached a patient to " this message: yes

## 2025-03-20 ENCOUNTER — TELEPHONE (OUTPATIENT)
Dept: ENDOSCOPY | Facility: HOSPITAL | Age: 75
End: 2025-03-20
Payer: MEDICARE

## 2025-03-20 NOTE — TELEPHONE ENCOUNTER
Referral for procedure from Telephone call     Spoke to Anthony Miguel to reschedule Colonoscopy/EGD       Physician to perform procedure(s) Dr. KOMAL Fernandez  Date of Procedure (s) 4/15/25  Arrival Time 12:30 PM  Time of Procedure(s) 1:30 PM   Location of Procedure(s) [address]  Type of Rx Prep sent to patient's pharmacy: Suprep  Instructions provided to patient via Postal Mail  Patient denies use of blood thinners, GLP-1 medications, and weight loss medications.  The following information was discussed with patient, and patient verbalized understanding:  Screening questionnaire reviewed with patient and complete. If procedure requires anesthesia, a responsible adult needs to be present to accompany the patient home. Appointment details are tentative, especially check-in time. Patient will receive a pre-op call 7 days prior to appointment to confirm check-in time for procedure. If applicable the patient should contact their pharmacy to verify Rx for procedure prep is ready for pick-up. Patient was instructed to call the scheduling department at [phone] if pharmacy states no Rx is available. Patient was also advised to call the endoscopy scheduling department if any questions or concerns arise.       Endoscopy Scheduling Department        Colonoscopy Procedure Prep Instructions  SUPREP (sodium sulfate/potassium sulfate/magnesium sulfate)    Date of procedure: 4/15/25 - Arrive at 12:30 PM    Location of Department:   [facility] [address] [place] [address]  Take the [facility] Elevators to 4th Floor Endoscopy Lab    As soon as possible:   your prep from pharmacy and over the counter DULCOLAX LAXATIVE TABLETS (Bisacodyl 5 mg oral tab)    On the day before your procedure   What You CAN do:   You may have clear liquids ONLY -see below for list.     Liquids That Are OK to Drink:   Water  Sports drinks (Gatorade, Power-Aid)  Coffee or tea (no cream or nondairy  creamer)  Clear juices without pulp (apple, white grape)  Gelatin desserts (no fruit or toppings)  Clear soda (sprite, coke, ginger ale)  Chicken broth (until 12 midnight the night before procedure)    What You CANNOT do:   Do not EAT solid food, drink milk or anything colored red.  Do not drink alcohol.  Do not take oral medications within 1 hour of starting each dose of SUPREP.  No gum chewing or candy morning of procedure.    Note:   (Please disregard the insert instructions from pharmacy).  SUPREP Bowel Prep Kit is indicated for cleansing of the colon as a preparation for colonoscopy in adults.   Be sure to tell your doctor about all the medicines you take, including prescription and non-prescription medicines, vitamins, and herbal supplements. SUPREP Bowel Prep Kit may affect how other medicines work.  Medication taken by mouth may not be absorbed properly when taken within 1 hour before the start of each dose of SUPREP Bowel Prep Kit.    It is not uncommon to experience some abdominal cramping, nausea and/or vomiting when taking the prep. If you have nausea and/or vomiting while taking the prep, stop drinking for 20 to 30 minutes then continue.    How to take prep:    SUPREP Bowel Prep Kit is a (2-day) prep.   Both 6-ounce bottles are required for a complete preparation for colonoscopy. Dilute the solution concentrate as directed prior to use. You must drink water with each dose of SUPREP, and additional water after each dose.    DOSE 1--Day Before Colonoscopy 4/15/25    Drink at least 6 to 8 glasses of clear liquids from time you wake up until you begin your prep and then continue until bedtime to avoid dehydration.     12:00 pm (NOON) Take four (4) Dulcolax (Bisacodyl) tablets with at least 8 ounces or more of clear liquids.      6:00 pm:    You must complete Steps 1 through 4 using one (1) 6-ounce bottle before going to bed as shown below:    Step 1-Pour ONE (1) 6-ounce bottle of SUPREP liquid into the  mixing container.  Step 2-Add cool drinking water to the 16-ounce line on the container and mix.  Step 3-Drink ALL the liquid in the container.  Step 4-You must drink two (2) more 16-ounce containers of water over the next 1 hour.  IMPORTANT: If you experience preparation-related symptoms (for example, nausea, bloating, or cramping), stop, or slow the rate of drinking the additional water until your symptoms decrease.    DOSE 2--Day of the Colonoscopy 4/15/25 at 2-3 AM    For this dose, repeat Steps 1 through 4 shown above using the other 6-ounce bottle.   You may continue drinking water/clear liquids until (9:30 AM).    For more information about your procedure, please note the two items below:    Please watch this informational video. It is important to watch this animated consent video prior to your arrival. If you haven't watched the video prior to arriving, you will be asked to watch it during admission, which can cause delays.     Options for viewing:  Using a keyboard:  press and hold the control tab (Ctrl) and left mouse click to follow link          Colonoscopy Instructional Video                                                        OR    Type link address into your web browser's address bar:  [email or web address]    2. Educational Booklet Colonoscopy Prep - Liquid          IMPORTANT INFORMATION TO KNOW BEFORE YOUR PROCEDURE  [facility] [place] 4th Floor    If your procedure requires the administration of anesthesia, it is necessary for a responsible adult to drive you home. (Medical Transportation, Uber, Lyft, Taxi, etc. may ONLY be used if a responsible adult is present to accompany you home.  The responsible adult CANNOT be the  of the service).   person must be available to return to pick you up within 15 minutes of being notified of discharge.    Please bring a picture ID, insurance card, & copayment    Take Medications as directed below:    If you  begin taking any new blood thinning medications, injectable weight loss/diabetes medications (other than insulin), or Adipex (phentermine) please contact the endoscopy scheduling department as soon as possible. [phone]    If you are diabetic, see the attached instructions sheet regarding your medication(s).    If you take HEART, BLOOD PRESSURE, SEIZURE, PAIN, LUNG (including inhalers/nebulizers), ANTI-REJECTION (transplant patients) OR PSYCHIATRIC medications, please take at your regular times with a sip of water, unless otherwise directed by the scheduling nurse.    Important contact information:    Endoscopy Scheduling [phone] / Hours of operation Monday-Friday 8:00 AM-4:30 PM    Questions about insurance or financial obligations call [phone] or [phone]    After hours questions requiring immediate assistance, contact [facility] Line at [phone] or [phone]    Cancellations: Please call [phone] to cancel/reschedule if the need arises. We understand that unpredictable situations may occur that cause you to need to reschedule, but we ask that you let us know as far ahead of time as possible since there is a high demand for appointments.    NOTE - On occasion, unforeseen circumstances may cause a delay in your procedure start time. We respect your time and appreciate your patience during these circumstances.

## 2025-04-15 ENCOUNTER — TELEPHONE (OUTPATIENT)
Dept: ENDOSCOPY | Facility: HOSPITAL | Age: 75
End: 2025-04-15
Payer: MEDICARE

## 2025-04-15 NOTE — TELEPHONE ENCOUNTER
Called pt. To reschedule EGD/Colon that he was scheduled for today .pt. Did not answer call. Left Msg. To return call.

## 2025-04-24 ENCOUNTER — TELEPHONE (OUTPATIENT)
Dept: PAIN MEDICINE | Facility: CLINIC | Age: 75
End: 2025-04-24
Payer: MEDICARE

## 2025-04-24 NOTE — TELEPHONE ENCOUNTER
Staff spoke with patient and informed him theres no sooner appointments and added him to the wait list

## 2025-04-24 NOTE — TELEPHONE ENCOUNTER
----- Message from Nicolasa sent at 4/24/2025  4:53 PM CDT -----  Type:  Appointment Request Name of Caller:Karol is the first available appointment?No accessSymptoms:arthritis pain Would the patient rather a call back or a response via [facility]? call[facility]s Call Back Number:[phone] Additional Information: Pt would like to verify if provider in network. Pt is scheduled for 06/04.

## 2025-05-01 ENCOUNTER — TELEPHONE (OUTPATIENT)
Dept: PAIN MEDICINE | Facility: CLINIC | Age: 75
End: 2025-05-01
Payer: MEDICARE

## 2025-05-01 NOTE — TELEPHONE ENCOUNTER
Staff spoke with patient to assist with scheduling a sooner appointment. Patient is scheduled with next availability.

## 2025-05-01 NOTE — TELEPHONE ENCOUNTER
----- Message from [place] sent at 5/1/2025  7:58 AM CDT -----  Regarding: Appt  Contact: [phone]  Patient is calling to schedule a sooner appt he received a text message but the nurse name doesn't show up on provides list. Please contact pt

## 2025-05-22 ENCOUNTER — RESULTS FOLLOW-UP (OUTPATIENT)
Dept: PRIMARY CARE CLINIC | Facility: CLINIC | Age: 75
End: 2025-05-22

## 2025-05-22 ENCOUNTER — OFFICE VISIT (OUTPATIENT)
Dept: PRIMARY CARE CLINIC | Facility: CLINIC | Age: 75
End: 2025-05-22
Payer: MEDICARE

## 2025-05-22 ENCOUNTER — HOSPITAL ENCOUNTER (OUTPATIENT)
Dept: RADIOLOGY | Facility: HOSPITAL | Age: 75
Discharge: HOME | End: 2025-05-22
Attending: NURSE PRACTITIONER
Payer: MEDICARE

## 2025-05-22 VITALS
DIASTOLIC BLOOD PRESSURE: 80 MMHG | SYSTOLIC BLOOD PRESSURE: 124 MMHG | OXYGEN SATURATION: 99 % | HEART RATE: 63 BPM | HEIGHT: 67 IN | WEIGHT: 177.56 LBS | BODY MASS INDEX: 27.87 KG/M2 | TEMPERATURE: 98 F

## 2025-05-22 DIAGNOSIS — F15.182 CAFFEINE-INDUCED SLEEP DISORDER WITH MILD USE DISORDER, INSOMNIA TYPE: ICD-10-CM

## 2025-05-22 DIAGNOSIS — Z12.5 ENCOUNTER FOR SCREENING FOR MALIGNANT NEOPLASM OF PROSTATE: ICD-10-CM

## 2025-05-22 DIAGNOSIS — M25.519 SHOULDER PAIN, UNSPECIFIED CHRONICITY, UNSPECIFIED LATERALITY: Primary | ICD-10-CM

## 2025-05-22 DIAGNOSIS — G89.29 CHRONIC RIGHT SHOULDER PAIN: ICD-10-CM

## 2025-05-22 DIAGNOSIS — K21.9 GASTROESOPHAGEAL REFLUX DISEASE WITHOUT ESOPHAGITIS: ICD-10-CM

## 2025-05-22 DIAGNOSIS — E78.2 MIXED HYPERLIPIDEMIA: ICD-10-CM
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
## 2025-05-22 DIAGNOSIS — R13.10 DYSPHAGIA, UNSPECIFIED TYPE: ICD-10-CM

## 2025-05-22 DIAGNOSIS — M25.511 CHRONIC RIGHT SHOULDER PAIN: ICD-10-CM

## 2025-05-22 DIAGNOSIS — G95.9 CERVICAL MYELOPATHY: ICD-10-CM

## 2025-05-22 DIAGNOSIS — C61 CANCER OF PROSTATE: ICD-10-CM

## 2025-05-22 PROCEDURE — 73030 X-RAY EXAM OF SHOULDER: CPT | Mod: TC,HCNC,RT

## 2025-05-22 PROCEDURE — 99214 OFFICE O/P EST MOD 30 MIN: CPT | Mod: S$GLB,,, | Performed by: NURSE PRACTITIONER

## 2025-05-22 PROCEDURE — G2211 COMPLEX E/M VISIT ADD ON: HCPCS | Mod: S$GLB,,, | Performed by: NURSE PRACTITIONER

## 2025-05-22 RX ORDER — OMEPRAZOLE 40 MG/1
40 CAPSULE, DELAYED RELEASE ORAL DAILY
Qty: 90 CAPSULE | Refills: 3 | Status: SHIPPED | OUTPATIENT
Start: 2025-05-22 | End: 2026-05-22

## 2025-05-22 NOTE — PROGRESS NOTES
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, insomnia type    Lumbar myelopathy    Head trauma    Seasonal allergic rhinitis due to pollen    Iron deficiency anemia due to chronic blood loss    Chronic right-sided low back pain with right-sided sciatica    Difficulty walking    Noncompliance    Injury of right elbow    OAG (open angle glaucoma) suspect, low risk, bilateral    Nuclear sclerosis of both eyes    Ptosis of eyelid, right    NSAID long-term use    Lumbar radiculopathy    Chronic pain    Cancer of prostate    Prostate cancer    History of robot-assisted laparoscopic radical prostatectomy    Male stress incontinence    Unspecified inflammatory spondylopathy, cervical region    Osteoarthritis of spine    Chronic pain of right knee    Ligamentum flavum hypertrophy    Lumbosacral stenosis with neurogenic claudication    Mild episode of recurrent major depressive disorder    Sensorineural hearing loss (SNHL) of both ears    Frailty syndrome in geriatric patient    Chronic shoulder pain    Finger laceration involving tendon    Injury of extensor tendon of hand, right, initial encounter    Preop exam for internal medicine    Malignant (primary) neoplasm, unspecified    Phlegm in throat    Cough    Needle stick, hypodermic, accidental    Gastroesophageal reflux disease without esophagitis       CC:  Cough, reflux, needle stick    HPI:  Was stuck by a needle while cleaning out a gutter in front of his house- we had ordered a Hep C test but it was not completed along with the other blood work unfortunately- he is open to getting one today.  Had been treated  for Hep C in the past  Would like hep C test    Having GERD symptoms that are disruptive    Slipped off his bed- hurt his shoulder pain under right shoulder pain, cough worsens is.  Takes 1 G of tylenol q 6 hours, sometimes can go 8 hours  Unable to comb gavi, ROM impaired, had been followed by pain management.  Fell 2.5 weeks ago, slid off of bed    Needs to get back in with GI- has an EGD that needs to be completed that he has had to postpone twice.  Did not get the omeprazole 40 mg in January. Was unsure about this medications    Has neuropathy at baseline but worse since fall    Medications  Current Outpatient Medications   Medication Sig Dispense Refill    cholecalciferol, vitamin D3, (VITAMIN D3) 50 mcg (2,000 unit) Cap capsule TAKE 1 CAPSULE ONE TIME DAILY 90 capsule 3    gabapentin (NEURONTIN) 300 MG capsule Take 2 capsules (600 mg total) by mouth 3 (three) times daily. 540 capsule 3    meloxicam (MOBIC) 15 MG tablet TAKE 1 TABLET ONE TIME DAILY 90 tablet 3    multivitamin capsule Take 1 capsule by mouth once daily.      omeprazole (PRILOSEC) 40 MG capsule Take 1 capsule (40 mg total) by mouth once daily. 90 capsule 3    triamcinolone acetonide 0.1% (KENALOG) 0.1 % cream Apply 15 g (15,000 mg total) topically 2 (two) times daily as needed (for cuts). 1 each 3    VOLTAREN 1 % Gel UNKNOWN 777 g 0    walker (ULTRA-LIGHT ROLLATOR) Misc 1 Units by Misc.(Non-Drug; Combo Route) route once daily at 6am. 1 each 0    traZODone (DESYREL) 50 MG tablet Take 1 tablet (50 mg total) by mouth every evening. 90 tablet 3     No current facility-administered medications for this visit.     Facility-Administered Medications Ordered in Other Visits   Medication Dose Route Frequency Provider Last Rate Last Admin    0.9%  NaCl infusion   Intravenous Continuous Altaf Santana MD          Prior to Admission medications    Medication Sig Start Date End Date Taking? Authorizing Provider   cholecalciferol, vitamin D3, (VITAMIN D3)  50 mcg (2,000 unit) Cap capsule Take 1 capsule (2,000 Units total) by mouth once daily. 9/19/23  Yes Jada Spaulding MD   gabapentin (NEURONTIN) 300 MG capsule Take 2 capsules (600 mg total) by mouth 3 (three) times daily. 1/9/24  Yes Jada Spaulding MD   meloxicam (MOBIC) 15 MG tablet Take 1 tablet (15 mg total) by mouth once daily. 9/6/24  Yes Phyllis Barker DNP   multivitamin capsule Take 1 capsule by mouth once daily.   Yes Provider, Historical   traZODone (DESYREL) 50 MG tablet Take 1 tablet (50 mg total) by mouth every evening. 4/5/24 4/5/25 Yes Jada Spaulding MD   triamcinolone acetonide 0.1% (KENALOG) 0.1 % cream Apply 15 g (15,000 mg total) topically 2 (two) times daily as needed (for cuts). 7/19/22  Yes Darlene Bansal DNP   VOLTAREN 1 % Gel UNKNOWN 7/1/22  Yes Darlene Bansal DNP   walker (ULTRA-LIGHT ROLLATOR) Misc 1 Units by Misc.(Non-Drug; Combo Route) route once daily at 6am. 6/14/17  Yes Jada Spaulding MD   famotidine (PEPCID) 20 MG tablet Take 1 tablet (20 mg total) by mouth 2 (two) times daily. 11/7/24 11/7/25  Phyllis Barker DNP         History  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    Overview:  Right side dx update     Past Surgical History:   Procedure Laterality Date    BACK SURGERY      COLONOSCOPY, SCREENING, LOW RISK PATIENT N/A 4/15/2025    Procedure: COLONOSCOPY, SCREENING, LOW RISK PATIENT;  Surgeon: Scott Fernandez MD;  Location: [address];  Service: Endoscopy;  Laterality: N/A;  Referral: MAYITO WALKER / suprep / lorrie mailed - LW  3/17 precall attempted. lvm. telly  3/19 - precall attempted, [facility] -   3/20/25- r/s, instr  mailed. DBM  4/7 precall attempted. lvm. kw  4/10 - precall attempted, LVM - [facility]  4/14 - precall at    EPIDURAL STEROID INJECTION N/A 2/6/2019    Procedure: INJECTION, STEROID, EPIDURAL, L45-S1 IL;  Surgeon: Marcel Adkins MD;  Location: [facility];  Service: Pain Management;  Laterality: N/A;    EPIDURAL STEROID INJECTION N/A 4/10/2019    Procedure: Injection, Steroid, Epidural LUMBAR/CAUDAL L5-S1 INTERLAMINAR KAYLEY;  Surgeon: Marcel Adkins MD;  Location: [facility];  Service: Pain Management;  Laterality: N/A;  NEEDS CONSENT    ESOPHAGOGASTRODUODENOSCOPY N/A 4/15/2025    Procedure: EGD (ESOPHAGOGASTRODUODENOSCOPY);  Surgeon: Scott Fernandez MD;  Location: [address]);  Service: Endoscopy;  Laterality: N/A;    INJECTION OF FACET JOINT Bilateral 8/14/2019    Procedure: INJECTION, FACET JOINT INJECTION (LUMBAR BLOCK) BILATERAL L4-5 AND L5-S1;  Surgeon: Marcel Adkins MD;  Location: [facility];  Service: Pain Management;  Laterality: Bilateral;  NEEDS CONSENT    INJECTION OF FACET JOINT Bilateral 10/16/2019    Procedure: FACET JOINT INJECTION (LUMBAR BLOCK) BILATERAL L4-5 AND L5-S1;  Surgeon: Marcel Adkins MD;  Location: [facility];  Service: Pain Management;  Laterality: Bilateral;  NEEDS CONSENT    INJECTION OF JOINT Bilateral 11/4/2020    Procedure: INJECTION, JOINT, GLENOHUMERAL;  Surgeon: Marcel Adkins MD;  Location: [facility];  Service: Pain Management;  Laterality: Bilateral;    INJECTION OF JOINT Bilateral 12/16/2020    Procedure: INJECTION, JOINT, SACROILIAC (SI) need consent;  Surgeon: Marcel Adkins MD;  Location: [facility];  Service: Pain Management;  Laterality: Bilateral;    INJECTION OF JOINT Bilateral 4/7/2021    Procedure: INJECTION, JOINT, SACROILIAC (SI);  Surgeon: Marcel Adkins MD;  Location: [facility];  Service: Pain Management;  Laterality: Bilateral;    INJECTION OF JOINT Bilateral 6/2/2021    Procedure: INJECTION, JOINT,  GLENOHUMERAL;  Surgeon: Marcel Adkins MD;  Location: [facility];  Service: Pain Management;  Laterality: Bilateral;    INJECTION, SACROILIAC JOINT Bilateral 1/4/2023    Procedure: INJECTION,SACROILIAC JOINT BILATERAL CONTRAST NEEDS CONSENT;  Surgeon: Marcel Adkins MD;  Location: [facility];  Service: Pain Management;  Laterality: Bilateral;    Larangoscopy      RADIOFREQUENCY ABLATION Left 5/27/2020    Procedure: RADIOFREQUENCY ABLATION LEFT L3,4,5 1 of 2;  Surgeon: Marcel Adkins MD;  Location: [facility];  Service: Pain Management;  Laterality: Left;  Left RFA L3, 4, 5  1 of 2    RADIOFREQUENCY ABLATION Right 6/10/2020    Procedure: RADIOFREQUENCY ABLATION RIGHT L3,4,5;  Surgeon: Marcel Adkins MD;  Location: [facility];  Service: Pain Management;  Laterality: Right;  Right RFA L3,4.5  2 of 2    RADIOFREQUENCY ABLATION Right 8/31/2022    Procedure: RADIOFREQUENCY ABLATION RIGHT L3, 4, 5 ONE OF TWO;  Surgeon: Marcel Adkins MD;  Location: [facility];  Service: Pain Management;  Laterality: Right;    RADIOFREQUENCY ABLATION Left 9/14/2022    Procedure: RADIOFREQUENCY ABLATION LEFT L3, 4, 5 TWO OF TWO;  Surgeon: Marcel Adkins MD;  Location: [facility];  Service: Pain Management;  Laterality: Left;    RADIOFREQUENCY ABLATION Bilateral 1/24/2024    Procedure: RADIOFREQUENCY ABLATION BILATERAL L3, 4, 5;  Surgeon: Marcel Adkins MD;  Location: [facility];  Service: Pain Management;  Laterality: Bilateral;  [phone]  3 WK F/U MILO    ROBOT-ASSISTED LAPAROSCOPIC PROSTATECTOMY USING DA SAÚL XI N/A 6/4/2019    Procedure: XI ROBOTIC PROSTATECTOMY;  Surgeon: Sergio Dixon MD;  Location: [facility] OR [address] [place] [address];  Service: Urology;  Laterality: N/A;  4hrs/ gen with regional     SPINE SURGERY       Social History     Socioeconomic History    Marital status: Single    Number of children: 0   Tobacco Use    Smoking status: Former     Current packs/day: 0.00     Types: Cigarettes      Quit date: 2000     Years since quittin.3     Passive exposure: Never    Smokeless tobacco: Former   Substance and Sexual Activity    Alcohol use: Not Currently    Drug use: No    Sexual activity: Not Currently   Social History Narrative    No difficulty reading Rx labels      Social Drivers of Health     Financial Resource Strain: Low Risk  (2024)    Overall Financial Resource Strain (CARDIA)     Difficulty of Paying Living Expenses: Not hard at all   Food Insecurity: Food Insecurity Present (2024)    Hunger Vital Sign     Worried About Running Out of Food in the Last Year: Sometimes true     Ran Out of Food in the Last Year: Never true   Transportation Needs: No Transportation Needs (2024)    PRAPARE - Transportation     Lack of Transportation (Medical): No     Lack of Transportation (Non-Medical): No   Physical Activity: Inactive (2024)    Exercise Vital Sign     Days of Exercise per Week: 0 days     Minutes of Exercise per Session: 0 min   Stress: No Stress Concern Present (2024)    [employer] - Occupational Stress Questionnaire     Feeling of Stress : Only a little   Housing Stability: Low Risk  (2024)    Housing Stability Vital Sign     Unable to Pay for Housing in the Last Year: No     Number of Places Lived in the Last Year: 1     Unstable Housing in the Last Year: No         Allergies  Review of patient's allergies indicates:  No Known Allergies      Review of Systems   Review of Systems   Constitutional: Negative for diaphoresis and malaise/fatigue.   HENT: Negative.     Cardiovascular:  Negative for chest pain, claudication, dyspnea on exertion, irregular heartbeat, leg swelling, near-syncope, orthopnea, palpitations, paroxysmal nocturnal dyspnea and syncope.   Respiratory:  Negative for shortness of breath.    Endocrine: Negative for polydipsia, polyphagia and polyuria.   Hematologic/Lymphatic: Does not bruise/bleed easily.   Skin:          Needle stick right hand   Gastrointestinal:  Negative for bloating, nausea and vomiting.   Genitourinary: Negative.    Neurological:  Negative for excessive daytime sleepiness, dizziness, light-headedness, loss of balance and weakness.   Psychiatric/Behavioral:  The patient is not nervous/anxious.    Allergic/Immunologic: Negative.          Physical Exam  Wt Readings from Last 1 Encounters:   05/22/25 80.6 kg (177 lb 9.3 oz)     BP Readings from Last 3 Encounters:   05/22/25 124/80   01/15/25 134/85   11/07/24 (!) 142/88     Pulse Readings from Last 1 Encounters:   05/22/25 63     Body mass index is 27.81 kg/m².    Physical Exam  Vitals and nursing note reviewed.   Constitutional:       Appearance: Normal appearance.   HENT:      Head: Normocephalic and atraumatic.      Mouth/Throat:      Mouth: Mucous membranes are moist.   Eyes:      Pupils: Pupils are equal, round, and reactive to light.   Cardiovascular:      Rate and Rhythm: Normal rate and regular rhythm.      Pulses:           Radial pulses are 2+ on the right side and 2+ on the left side.        Dorsalis pedis pulses are 2+ on the right side and 2+ on the left side.        Posterior tibial pulses are 2+ on the right side and 2+ on the left side.      Heart sounds: No murmur heard.  Pulmonary:      Effort: Pulmonary effort is normal. No respiratory distress.      Breath sounds: Normal breath sounds.   Abdominal:      General: There is no distension.      Tenderness: There is no abdominal tenderness.   Musculoskeletal:      Cervical back: Normal range of motion.      Right lower leg: No edema.      Left lower leg: No edema.   Skin:     General: Skin is warm and dry.      Findings: No erythema.   Neurological:      General: No focal deficit present.      Mental Status: He is alert.   Psychiatric:         Mood and Affect: Mood normal.         Behavior: Behavior normal.           1. Shoulder pain, unspecified chronicity, unspecified laterality  -      Ambulatory Referral/Consult to Physical Therapy    2. Fall, initial encounter  -     X-Ray Shoulder Trauma 3 view Right; Future; Expected date: 05/22/2025    3. Cancer of prostate  Overview:  Proastatectomy on 6/4/19    Orders:  -     PSA, Screening; Future; Expected date: 05/22/2025    4. Mixed hyperlipidemia  Overview:  Currently not on statin  Due for lipid panel    Assessment & Plan:  Repeat lipid panel with LDL 64, no need for medication at thsi time  Monitor    Orders:  -     Lipid Panel; Future; Expected date: 05/22/2025    5. Encounter for screening for malignant neoplasm of prostate  -     PSA, Screening; Future; Expected date: 05/22/2025    6. 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 Cervical myelopathy  Overview:  Severe cervical stenosis with myelopathy with fusion of C3-6.      8. Dysphagia, unspecified type  -     omeprazole (PRILOSEC) 40 MG capsule; Take 1 capsule (40 mg total) by mouth once daily.  Dispense: 90 capsule; Refill: 3    9. Accident caused by hypodermic needle, sequela  Overview:  Accidental stick while cleaning out a gutter    Assessment & Plan:  Await hep C test      10. Chronic right shoulder pain  Overview:  Plan for surgery with Dr Massey to repair rotator cuff on hold  Has been managing pain with voltaren gel and meloxicam    Assessment & Plan:  Slid out of bed and re injured shoulder  Stable abnormalities on imaging  Refer to PT      11. Gastroesophageal reflux disease without esophagitis  Overview:  Worse with lying down after eating  Has tried to adjust diet     Assessment & Plan:  Will reach out to GI to assist with setting up appt  Never got prescription for PPI- resend  Continue lifestyle mods                    Follow Up        @Phyllis Barker DNP

## 2025-05-22 NOTE — PATIENT INSTRUCTIONS
Continue your current medications    We will update your lab work today    I will reach out to GI about getting you set up for a EGD    I have reordered the Omeprazole 40 mg, this is once daily

## 2025-05-23 ENCOUNTER — TELEPHONE (OUTPATIENT)
Dept: PRIMARY CARE CLINIC | Facility: CLINIC | Age: 75
End: 2025-05-23
Payer: MEDICARE

## 2025-05-23 NOTE — TELEPHONE ENCOUNTER
----- Message from Phyllis Barker DNP sent at 5/22/2025  2:42 PM CDT -----  No acute changes on xray but still has some chronic tears in some of the tendons.   ----- Message -----  From: Interface, Rad Results In  Sent: 5/22/2025  11:51 AM CDT  To: Phyllis Barker DNP

## 2025-05-26 ENCOUNTER — TELEPHONE (OUTPATIENT)
Dept: PRIMARY CARE CLINIC | Facility: CLINIC | Age: 75
End: 2025-05-26
Payer: MEDICARE

## 2025-05-26 NOTE — ASSESSMENT & PLAN NOTE
Will reach out to GI to assist with setting up appt  Never got prescription for PPI- resend  Continue lifestyle mods

## 2025-05-26 NOTE — TELEPHONE ENCOUNTER
----- Message from Phyllis Barker DNP sent at 5/26/2025  9:26 AM CDT -----  Labs are normal  ----- Message -----  From: Lab, Background User  Sent: 5/22/2025   8:40 PM CDT  To: Phyllis Barker DNP

## 2025-06-25 ENCOUNTER — OFFICE VISIT (OUTPATIENT)
Dept: PRIMARY CARE CLINIC | Facility: CLINIC | Age: 75
End: 2025-06-25
Payer: MEDICARE

## 2025-06-25 VITALS
TEMPERATURE: 98 F | OXYGEN SATURATION: 97 % | HEIGHT: 67 IN | DIASTOLIC BLOOD PRESSURE: 81 MMHG | BODY MASS INDEX: 28.52 KG/M2 | SYSTOLIC BLOOD PRESSURE: 162 MMHG | HEART RATE: 59 BPM | WEIGHT: 181.69 LBS
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
## 2025-06-25 DIAGNOSIS — M46.92 UNSPECIFIED INFLAMMATORY SPONDYLOPATHY, CERVICAL REGION: ICD-10-CM

## 2025-06-25 DIAGNOSIS — G62.9 NEUROPATHY: ICD-10-CM

## 2025-06-25 DIAGNOSIS — M48.07 LUMBOSACRAL STENOSIS WITH NEUROGENIC CLAUDICATION: ICD-10-CM

## 2025-06-25 NOTE — PROGRESS NOTES
Patient ID: Anthony Miguel is a 74 y.o. male     Chief Complaint: Fall    Prior to this visit, patient's last encounter with PCP was 2025.    Pt reports having a hematoma to the L forehead after having a mechanical trip and fall 10 days ago. Small hematoma noted with associated abrasions to the surrounding area. Pt denies Loc at the time of injury. Denies dizziness, blurry vision, headache, or N/V.         FU in as previously scheduled       4Ms for Medical Decision-Making in Older Adults    Last Completed EAWV: 2024    MEDICATIONS:  High Risk Medications:  Total Active Medications: 1  gabapentin - 300 MG    MOBILITY:  Activities of Daily Livin/20/2024     8:17 AM   Activities of Daily Living   Ambulation Independent   Dressing Independent   Transfers Independent   Toileting Incontinent of bladder;Continent of bowel   Feeding Independent   Cleaning home/Chores Independent   Telephone use Independent   Shopping Independent   Paying bills Independent   Taking meds Independent     Fall Risk:      2025     1:40 PM 2025    11:00 AM 1/15/2025     9:30 AM   Fall Risk Assessment - Outpatient   Mobility Status Ambulatory w/ assistance Ambulatory w/ assistance Ambulatory w/ assistance   Number of falls 1 with injury 1 2+   Identified as fall risk False True True   Wrist band refused   True     Disability Status:      2024     8:19 AM   Disability Status   Are you deaf or do you have serious difficulty hearing? N   Are you blind or do you have serious difficulty seeing, even when wearing glasses? N   Because of a physical, mental, or emotional condition, do you have serious difficulty concentrating, remembering, or making decisions? N   Do you have serious difficulty walking or climbing stairs? N   Do you have difficulty dressing or bathing? N   Because of a physical, mental, or emotional condition, do you have difficulty doing errands alone such as visiting a doctor's office or shopping?  N     Nutrition Screenin/20/2024     8:15 AM   Nutrition Screening   Has food intake declined over the past three months due to loss of appetite, digestive problems, chewing or swallowing difficulties? No decrease in food intake   Involuntary weight loss during the last 3 months? No weight loss   Mobility? Goes out   Has the patient suffered psychological stress or acute disease in the past three months? No   Neuropsychological problems? No psychological problems   Body Mass Index (BMI)?  BMI 23 or greater   Screening Score 14   Interpretation Normal nutritional status    Screening Score: 0-7 Malnourished, 8-11 At Risk, 12-14 Normal  Get Up and Go:      2024     8:12 AM 10/26/2020     9:56 AM   Get Up and Go   Trial 1 -- --     Whisper Test:      2024     8:12 AM   Whisper Test   Whisper Test N/A- Hearing Impairment           MENTATION:   Has Dementia Dx: No  Has Anxiety Dx: No    Depression Patient Health Questionnaire:      2025     1:41 PM   Depression Patient Health Questionnaire   PHQ-4 Total Score 0     Cognitive Function Screenin/20/2024     8:17 AM   Cognitive Function Screening   Clock Drawing Test 2    Mini-Cog 3 Minute Recall 3   Cognitive Function Screening 5       Data saved with a previous flowsheet row definition     Cognitive Function Screening Total - Less than 4 = Abnormal,  Greater than or equal to 4 = Normal        WHAT MATTERS MOST:  Advance Care Planning   ACP Status:   Patient has had an ACP conversation  Living Will: No  Power of : No  [place]: No    What is most important right now is to focus on avoiding the hospital, remaining as independent as possible, and symptom/pain control    Accordingly, we have decided that the best plan to meet the patient's goals includes continuing with treatment      What matters most to patient today is: make sure his head is okay post fall           PHQ-4 Score: 0     Social History[1]    Review of Systems  "  Constitutional: Negative.    HENT:  Positive for facial swelling. Negative for ear discharge.         Hematoma to L forehead   Eyes: Negative.    Respiratory: Negative.     Cardiovascular: Negative.    Gastrointestinal: Negative.    Endocrine: Negative.    Genitourinary: Negative.    Musculoskeletal: Negative.    Skin:         Bruising and Hematoma to L forehead   Allergic/Immunologic: Negative.    Neurological: Negative.    Psychiatric/Behavioral: Negative.         Objective:  BP (!) 162/81 (BP Location: Left arm, Patient Position: Sitting)   Pulse (!) 59   Temp 98 °F (36.7 °C) (Oral)   Ht 5' 7" (1.702 m)   Wt 82.4 kg (181 lb 10.5 oz)   SpO2 97%   BMI 28.45 kg/m²     Physical Exam  Constitutional:       Appearance: Normal appearance.   HENT:      Head: Normocephalic.   Eyes:      Pupils: Pupils are equal, round, and reactive to light.   Cardiovascular:      Rate and Rhythm: Normal rate and regular rhythm.   Pulmonary:      Effort: Pulmonary effort is normal.   Musculoskeletal:         General: Normal range of motion.      Cervical back: Normal range of motion.   Skin:     General: Skin is warm and dry.      Findings: Bruising present.             Comments: L side of face. Hematoma noted to L forehead.   Neurological:      Mental Status: He is alert and oriented to person, place, and time. Mental status is at baseline.   Psychiatric:         Mood and Affect: Mood normal.             Lab Results   Component Value Date    WBC 6.84 02/27/2024    HGB 12.7 (L) 02/27/2024    HCT 38.7 (L) 02/27/2024     02/27/2024    CHOL 140 05/22/2025    TRIG 93 05/22/2025    HDL 57 05/22/2025    ALT 14 02/27/2024    AST 34 02/27/2024     02/27/2024    K 4.4 02/27/2024     02/27/2024    CREATININE 1.1 02/27/2024    BUN 25 (H) 02/27/2024    CO2 23 02/27/2024    TSH 3.711 01/31/2023    PSA 0.01 05/22/2025    INR 1.0 06/21/2017    HGBA1C 5.0 02/27/2024       Medications Ordered Prior to " Encounter[2]      Assessment:  74 y.o. male with multiple co-morbid illnesses here to follow-up with PCP and continue work-up of chronic issues    Plan:       1. Fall, initial encounter  Overview:  Pt sustained mechanical trip an fall 10 days ago, no LOC. Has had some improvement of hematoma but not completely resolved at this time     Assessment & Plan:  Discussed merits of CT scan of head due to the absence of any neurological deficits, vision changes, N/V, headache, we will hold off on any scan at this time. Pt is agreeable to plan. Discussed s&s to watch for that would warrant an ED eval including but not limited due, increased swelling of hematoma, ataxia, vision changes, severe onset of headache.        2. Lumbosacral stenosis with neurogenic claudication  Overview:  Low back pain with spinal stenosis on MRI, difficulty ambulating    Assessment & Plan:  Low back pain with spinal stenosis on MRI, difficulty ambulating  Advised to minimize NSAID dose  Is taking Mobic daily  Advised against this for long-term use, requesting refill today  RF normal      3. Neuropathy  Overview:  Neuropathy in LE with acute back pain caused by cervical stenosis, treated with C3-6 fusion on 6/21    Assessment & Plan:  Refill gabapentin today  Neuropathy in LE with acute back pain caused by cervical stenosis, treated with C3-6 fusion on 6/21  Symptoms worsening  Continue gabapentin   PCP will not refill valium  Advised to be mindful to prevent falls  NSAIDs refilled by pain management          4. Unspecified inflammatory spondylopathy, cervical region  Overview:  Chronic neck pain, s/p laminectomy     Orders:  -     gabapentin (NEURONTIN) 300 MG capsule; Take 2 capsules (600 mg total) by mouth 3 (three) times daily.  Dispense: 540 capsule; Refill: 3    Other orders  -     meloxicam (MOBIC) 15 MG tablet; Take 1 tablet (15 mg total) by mouth daily as needed for Pain.  Dispense: 90 tablet; Refill: 3          Health Maintenance          Date Due Completion Date    COVID-19 Vaccine ( season) 2024    Colorectal Cancer Screening 04/15/2026 4/15/2016    PROSTATE-SPECIFIC ANTIGEN 2026    Lipid Panel 2026    TETANUS VACCINE 2031            Future Appointments   Date Time Provider Department Center   2025 10:20 AM Phyllis Barker DNP [facility]         No follow-ups on file. Total clinical care time was 30 min      Jada Spaulding MD/MPH  [facility]  [phone] [facility]            [1]   Social History  Socioeconomic History    Marital status: Single    Number of children: 0   Tobacco Use    Smoking status: Former     Current packs/day: 0.00     Types: Cigarettes     Quit date: 2000     Years since quittin.4     Passive exposure: Never    Smokeless tobacco: Former   Substance and Sexual Activity    Alcohol use: Not Currently    Drug use: No    Sexual activity: Not Currently   Social History Narrative    No difficulty reading Rx labels      Social Drivers of Health     Financial Resource Strain: Low Risk  (2024)    Overall Financial Resource Strain (CARDIA)     Difficulty of Paying Living Expenses: Not hard at all   Food Insecurity: Food Insecurity Present (2024)    Hunger Vital Sign     Worried About Running Out of Food in the Last Year: Sometimes true     Ran Out of Food in the Last Year: Never true   Transportation Needs: No Transportation Needs (2024)    PRAPARE - Transportation     Lack of Transportation (Medical): No     Lack of Transportation (Non-Medical): No   Physical Activity: Inactive (2024)    Exercise Vital Sign     Days of Exercise per Week: 0 days     Minutes of Exercise per Session: 0 min   Stress: No Stress Concern Present (2024)    [employer] - Occupational Stress Questionnaire     Feeling of Stress : Only a little    Housing Stability: Low Risk  (2/20/2024)    Housing Stability Vital Sign     Unable to Pay for Housing in the Last Year: No     Number of Places Lived in the Last Year: 1     Unstable Housing in the Last Year: No   [2]   Current Outpatient Medications on File Prior to Visit   Medication Sig Dispense Refill    cholecalciferol, vitamin D3, (VITAMIN D3) 50 mcg (2,000 unit) Cap capsule TAKE 1 CAPSULE ONE TIME DAILY 90 capsule 3    multivitamin capsule Take 1 capsule by mouth once daily.      omeprazole (PRILOSEC) 40 MG capsule Take 1 capsule (40 mg total) by mouth once daily. 90 capsule 3    triamcinolone acetonide 0.1% (KENALOG) 0.1 % cream Apply 15 g (15,000 mg total) topically 2 (two) times daily as needed (for cuts). 1 each 3    VOLTAREN 1 % Gel UNKNOWN 777 g 0    walker (ULTRA-LIGHT ROLLATOR) Misc 1 Units by Misc.(Non-Drug; Combo Route) route once daily at 6am. 1 each 0    [DISCONTINUED] gabapentin (NEURONTIN) 300 MG capsule Take 2 capsules (600 mg total) by mouth 3 (three) times daily. 540 capsule 3    [DISCONTINUED] meloxicam (MOBIC) 15 MG tablet TAKE 1 TABLET ONE TIME DAILY 90 tablet 3    traZODone (DESYREL) 50 MG tablet Take 1 tablet (50 mg total) by mouth every evening. 90 tablet 3     Current Facility-Administered Medications on File Prior to Visit   Medication Dose Route Frequency Provider Last Rate Last Admin    0.9%  NaCl infusion   Intravenous Continuous Altaf Santana MD
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
## 2025-06-27 RX ORDER — MELOXICAM 15 MG/1
15 TABLET ORAL DAILY PRN
Qty: 90 TABLET | Refills: 3 | Status: SHIPPED | OUTPATIENT
Start: 2025-06-27

## 2025-06-27 RX ORDER — GABAPENTIN 300 MG/1
600 CAPSULE ORAL 3 TIMES DAILY
Qty: 540 CAPSULE | Refills: 3 | Status: SHIPPED | OUTPATIENT
Start: 2025-06-27

## 2025-06-27 NOTE — ASSESSMENT & PLAN NOTE
Refill gabapentin today  Neuropathy in LE with acute back pain caused by cervical stenosis, treated with C3-6 fusion on 6/21  Symptoms worsening  Continue gabapentin   PCP will not refill valium  Advised to be mindful to prevent falls  NSAIDs refilled by pain management

## 2025-06-27 NOTE — ASSESSMENT & PLAN NOTE
Discussed merits of CT scan of head due to the absence of any neurological deficits, vision changes, N/V, headache, we will hold off on any scan at this time. Pt is agreeable to plan. Discussed s&s to watch for that would warrant an ED eval including but not limited due, increased swelling of hematoma, ataxia, vision changes, severe onset of headache.

## 2025-06-27 NOTE — ASSESSMENT & PLAN NOTE
Low back pain with spinal stenosis on MRI, difficulty ambulating  Advised to minimize NSAID dose  Is taking Mobic daily  Advised against this for long-term use, requesting refill today  RF normal

## 2025-07-01 DIAGNOSIS — M46.92 UNSPECIFIED INFLAMMATORY SPONDYLOPATHY, CERVICAL REGION: ICD-10-CM

## 2025-07-02 RX ORDER — GABAPENTIN 300 MG/1
600 CAPSULE ORAL 3 TIMES DAILY
Qty: 540 CAPSULE | Refills: 3 | Status: SHIPPED | OUTPATIENT
Start: 2025-07-02

## 2025-07-08 DIAGNOSIS — F33.0 MILD EPISODE OF RECURRENT MAJOR DEPRESSIVE DISORDER: ICD-10-CM

## 2025-07-08 DIAGNOSIS — G47.00 INSOMNIA, UNSPECIFIED TYPE: ICD-10-CM

## 2025-07-08 DIAGNOSIS — F15.182 CAFFEINE-INDUCED SLEEP DISORDER WITH MILD USE DISORDER, INSOMNIA TYPE: ICD-10-CM

## 2025-07-09 RX ORDER — MELOXICAM 15 MG/1
TABLET ORAL
Refills: 0 | OUTPATIENT
Start: 2025-07-09

## 2025-07-09 RX ORDER — TRAZODONE HYDROCHLORIDE 50 MG/1
50 TABLET ORAL NIGHTLY PRN
Qty: 90 TABLET | Refills: 1 | Status: SHIPPED | OUTPATIENT
Start: 2025-07-09

## 2025-07-24 ENCOUNTER — TELEPHONE (OUTPATIENT)
Dept: PULMONOLOGY | Facility: CLINIC | Age: 75
End: 2025-07-24
Payer: MEDICARE

## 2025-07-24 DIAGNOSIS — R05.9 COUGH, UNSPECIFIED TYPE: Primary | ICD-10-CM

## 2025-07-25 ENCOUNTER — TELEPHONE (OUTPATIENT)
Dept: PRIMARY CARE CLINIC | Facility: CLINIC | Age: 75
End: 2025-07-25
Payer: MEDICARE

## 2025-07-25 ENCOUNTER — HOSPITAL ENCOUNTER (OUTPATIENT)
Dept: RADIOLOGY | Facility: HOSPITAL | Age: 75
Discharge: HOME | End: 2025-07-25
Payer: MEDICARE

## 2025-07-25 ENCOUNTER — OFFICE VISIT (OUTPATIENT)
Dept: PRIMARY CARE CLINIC | Facility: CLINIC | Age: 75
End: 2025-07-25
Payer: MEDICARE

## 2025-07-25 VITALS
TEMPERATURE: 98 F | DIASTOLIC BLOOD PRESSURE: 92 MMHG | SYSTOLIC BLOOD PRESSURE: 160 MMHG | HEART RATE: 66 BPM | WEIGHT: 174.5 LBS | BODY MASS INDEX: 27.39 KG/M2 | HEIGHT: 67 IN | OXYGEN SATURATION: 97 %

## 2025-07-25 DIAGNOSIS — R05.8 RESPIRATORY TRACT CONGESTION WITH COUGH: ICD-10-CM

## 2025-07-25 DIAGNOSIS — R05.8 RESPIRATORY TRACT CONGESTION WITH COUGH: Primary | ICD-10-CM

## 2025-07-25 DIAGNOSIS — S49.92XS BILATERAL SHOULDER INJURY, SEQUELA: ICD-10-CM

## 2025-07-25 DIAGNOSIS — S49.91XS BILATERAL SHOULDER INJURY, SEQUELA: ICD-10-CM

## 2025-07-25 LAB
CTP QC/QA: YES
CTP QC/QA: YES
FLUAV AG NPH QL: NEGATIVE
FLUBV AG NPH QL: NEGATIVE
SARS-COV+SARS-COV-2 AG RESP QL IA.RAPID: NEGATIVE

## 2025-07-25 PROCEDURE — 87811 SARS-COV-2 COVID19 W/OPTIC: CPT | Mod: QW,HCNC,S$GLB,

## 2025-07-25 PROCEDURE — 99214 OFFICE O/P EST MOD 30 MIN: CPT | Mod: HCNC,S$GLB,,

## 2025-07-25 PROCEDURE — 71046 X-RAY EXAM CHEST 2 VIEWS: CPT | Mod: 26,HCNC,, | Performed by: RADIOLOGY

## 2025-07-25 PROCEDURE — 1159F MED LIST DOCD IN RCRD: CPT | Mod: CPTII,HCNC,S$GLB,

## 2025-07-25 PROCEDURE — 71046 X-RAY EXAM CHEST 2 VIEWS: CPT | Mod: TC,HCNC

## 2025-07-25 PROCEDURE — 1160F RVW MEDS BY RX/DR IN RCRD: CPT | Mod: CPTII,HCNC,S$GLB,

## 2025-07-25 PROCEDURE — 3288F FALL RISK ASSESSMENT DOCD: CPT | Mod: CPTII,HCNC,S$GLB,

## 2025-07-25 PROCEDURE — 3077F SYST BP >= 140 MM HG: CPT | Mod: CPTII,HCNC,S$GLB,

## 2025-07-25 PROCEDURE — 87804 INFLUENZA ASSAY W/OPTIC: CPT | Mod: QW,HCNC,,

## 2025-07-25 PROCEDURE — 1125F AMNT PAIN NOTED PAIN PRSNT: CPT | Mod: CPTII,HCNC,S$GLB,

## 2025-07-25 PROCEDURE — 3080F DIAST BP >= 90 MM HG: CPT | Mod: CPTII,HCNC,S$GLB,

## 2025-07-25 PROCEDURE — 1101F PT FALLS ASSESS-DOCD LE1/YR: CPT | Mod: CPTII,HCNC,S$GLB,

## 2025-07-25 RX ORDER — LEVOCETIRIZINE DIHYDROCHLORIDE 5 MG/1
5 TABLET, FILM COATED ORAL NIGHTLY
Qty: 30 TABLET | Refills: 11 | Status: SHIPPED | OUTPATIENT
Start: 2025-07-25 | End: 2026-07-25

## 2025-07-25 RX ORDER — FLUTICASONE PROPIONATE 50 MCG
1 SPRAY, SUSPENSION (ML) NASAL DAILY
Qty: 11.1 ML | Refills: 0 | Status: SHIPPED | OUTPATIENT
Start: 2025-07-25

## 2025-07-25 NOTE — PATIENT INSTRUCTIONS
For Pain Management:  - Meloxicam 15mg daily for three weeks. Then off one week before restarting  - Start acetaminophen 1000mg (two tablets) every 8 hours.\    For Sinus Congestion:  - Start flonase intranasal spray one nostril each daily  - Start xyzal (antihistamine) 5mg daily

## 2025-07-25 NOTE — PROGRESS NOTES
Primary Care Provider Appointment - [facility]      Subjective:      Patient Name: Anthony Miguel  YOB: 1950  Patient Age: 74 y.o.  Patient Sex: male  Patient Phone: [phone]  PCP: Jada Spaulding MD  Last PCP Appointment: 4/5/2024    History of Present Illness    CHIEF COMPLAINT:  Mr. Miguel presents today for evaluation of congestion and phlegm.    RESPIRATORY:  He reports persistent congestion for approximately 4 months with predominantly clear phlegm that is occasionally cloudy and thick. He experiences mouth breathing during sleep with associated difficulty sleeping. Phlegm production is regular in frequency. He denies fever or other respiratory symptoms.    MUSCULOSKELETAL:  He reports ongoing right shoulder pain described as sharp when reaching into refrigerator and during coughing. Pain intensity increases when Meloxicam wears off. He has a documented history of bilateral shoulder injuries including a complete supraspinatus tear with severe muscle atrophy on left shoulder (MRI 2024) and a massive full-thickness retracted supraspinatus tear on right shoulder (MRI 2022). He is planning to consult with sports medicine physician regarding potential procedural intervention. He experiences intermittent right shoulder pain that is exacerbated by specific movements.    MEDICATIONS:  He reports taking Meloxicam more frequently than prescribed, approximately every four hours instead of the recommended daily dosage. He acknowledges potential liver strain from this medication overuse. He also takes Trazodone for sleep but expresses discomfort with side effects, specifically reporting unusual daydreams that make him reluctant to take the medication.    MEDICAL HISTORY:  He has a history of laryngoscopy performed approximately 8 years ago at [facility]. He also has a history of neuropathy with reduced sensation in the upper extremities.    ROS:  ROS is negative unless  otherwise indicated in HPI.         Health Maintenance and Care Gaps  Covid is not UTD.  RSV is UTD.  Influenza is UTD.  Shingles  is UTD.     Pneumovax is UTD.   TDap is UTD.   Colonoscopy is UTD.   DEXA  is UTD.  Hepatitis C is UTD in pts born between 1106-8993.   HIV is UTD     Medications: Does not have pill packs    ROS  Comprehensive review of systems otherwise negative. See history/subjective section for more details.    Health Maintenance Summary            Current Care Gaps       COVID-19 Vaccine (5 - 2024-25 season) Overdue since 11/21/2024 09/26/2024  Outside Immunization: COVID-19, mRNA, LNP-S, PF, ivette-sucrose, 30 mcg/0.3 mL    10/23/2023  Outside Immunization: COVID-19, mRNA, LNP-S, PF, ivette-sucrose, 30 mcg/0.3 mL    02/09/2021  Imm Admin: COVID-19, MRNA, LN-S, PF (Pfizer) (Purple Cap)    01/19/2021  Imm Admin: COVID-19, MRNA, LN-S, PF (Pfizer) (Purple Cap)                      Upcoming       Influenza Vaccine (1) Next due on 9/1/2025 09/06/2024  Imm Admin: Influenza (FLUAD) - Trivalent - Adjuvanted - PF (65+)    09/19/2023  Imm Admin: Influenza (FLUAD) - Quadrivalent - Adjuvanted - PF *Preferred* (65+)    01/31/2023  Imm Admin: Influenza (FLUAD) - Quadrivalent - Adjuvanted - PF *Preferred* (65+)    09/12/2020  Imm Admin: Influenza    09/08/2020  Done - performed in the community     Only the first 5 history entries have been loaded, but more history exists.            Colorectal Cancer Screening (Colonoscopy - Every 10 Years) Next due on 4/15/2026      04/15/2016   COLONOSCOPY    11/24/2009  Outside Procedure:                PROSTATE-SPECIFIC ANTIGEN (Yearly) Next due on 5/22/2026 05/22/2025  PSA, Screening    02/27/2024  PROSTATE SPECIFIC ANTIGEN, DIAGNOSTIC    01/31/2023  PROSTATE SPECIFIC ANTIGEN, DIAGNOSTIC    07/19/2022  PSA, Screening    08/23/2021  PROSTATE SPECIFIC ANTIGEN, DIAGNOSTIC      Only the first 5 history entries have been loaded, but more history exists.               Lipid Panel (Yearly) Next due on 2025  Lipid Panel    2024  Cholesterol Total component of LIPID PANEL    2023  Cholesterol Total component of Lipid Panel    2022  Cholesterol Total component of Lipid Panel    2021  Cholesterol Total component of Lipid Panel      Only the first 5 history entries have been loaded, but more history exists.              TETANUS VACCINE (Every 10 Years) Next due on 2021  Imm Admin: Tdap                      Completed or No Longer Recommended       Hepatitis C Screening  Completed      2017  Hepatitis C Ab component of Hepatitis panel, acute    2017  Done - hep C viral load negative, h/o hep C treated with interferon with ribavarin              Shingles Vaccine (Series Information) Completed      2024  Imm Admin: Zoster Recombinant    2024  Imm Admin: Zoster Recombinant    2016  Imm Admin: Zoster              RSV Vaccine (Age 60+ and Pregnant patients) (Series Information) Completed      2024  Imm Admin: RSVpreF (Arexvy)              Pneumococcal Vaccines (Age 50+) (Series Information) Completed      10/07/2017  Imm Admin: Pneumococcal Polysaccharide - 23 Valent    10/03/2017  Imm Admin: Pneumococcal Polysaccharide - 23 Valent    04/15/2015  Imm Admin: Pneumococcal Conjugate - 13 Valent              Abdominal Aortic Aneurysm Screening  Completed      10/15/2015  Done - performed at [facility]- negative    02/15/2011  US ABDOMEN LIMITED                            4Ms for Medical Decision-Making in Older Adults    Last Completed EAWV: 2024    MEDICATIONS:  High Risk Medications:  Total Active Medications: 1  gabapentin - 300 MG    MOBILITY:  Activities of Daily Livin/20/2024     8:17 AM   Activities of Daily Living   Ambulation Independent   Dressing Independent   Transfers Independent   Toileting Incontinent of bladder;Continent of bowel   Feeding Independent   Cleaning  home/Chores Independent   Telephone use Independent   Shopping Independent   Paying bills Independent   Taking meds Independent     Fall Risk:      2025     1:40 PM 2025     1:40 PM 2025    11:00 AM   Fall Risk Assessment - Outpatient   Mobility Status Ambulatory w/ assistance Ambulatory w/ assistance Ambulatory w/ assistance   Number of falls 0 1 with injury 1   Identified as fall risk True False True     Disability Status:      2024     8:19 AM   Disability Status   Are you deaf or do you have serious difficulty hearing? N   Are you blind or do you have serious difficulty seeing, even when wearing glasses? N   Because of a physical, mental, or emotional condition, do you have serious difficulty concentrating, remembering, or making decisions? N   Do you have serious difficulty walking or climbing stairs? N   Do you have difficulty dressing or bathing? N   Because of a physical, mental, or emotional condition, do you have difficulty doing errands alone such as visiting a doctor's office or shopping? N     Nutrition Screenin/20/2024     8:15 AM   Nutrition Screening   Has food intake declined over the past three months due to loss of appetite, digestive problems, chewing or swallowing difficulties? No decrease in food intake   Involuntary weight loss during the last 3 months? No weight loss   Mobility? Goes out   Has the patient suffered psychological stress or acute disease in the past three months? No   Neuropsychological problems? No psychological problems   Body Mass Index (BMI)?  BMI 23 or greater   Screening Score 14   Interpretation Normal nutritional status    Screening Score: 0-7 Malnourished, 8-11 At Risk, 12-14 Normal  Get Up and Go:      2024     8:12 AM 10/26/2020     9:56 AM   Get Up and Go   Trial 1 -- --     Whisper Test:      2024     8:12 AM   Whisper Test   Whisper Test N/A- Hearing Impairment           MENTATION:   Has Dementia Dx: No  Has Anxiety Dx:  "No    Depression Patient Health Questionnaire:      2025     1:41 PM   Depression Patient Health Questionnaire   PHQ-4 Total Score 0     Cognitive Function Screenin/20/2024     8:17 AM   Cognitive Function Screening   Clock Drawing Test 2    Mini-Cog 3 Minute Recall 3   Cognitive Function Screening 5       Data saved with a previous flowsheet row definition     Cognitive Function Screening Total - Less than 4 = Abnormal,  Greater than or equal to 4 = Normal        WHAT MATTERS MOST:  Advance Care Planning   ACP Status:   Patient has had an ACP conversation  Living Will: No  Power of : No  POLST: No                     Social History[1]    Objective:   BP (!) 160/92 (BP Location: Right arm, Patient Position: Sitting)   Pulse 66   Temp 98.4 °F (36.9 °C) (Oral)   Ht 5' 7" (1.702 m)   Wt 79.2 kg (174 lb 7.9 oz)   SpO2 97%   BMI 27.33 kg/m²     PHYSICAL EXAM  GEN - A+OX4, NAD, congestion with cough   HEENT - PERRL, EOMI  Neck - Non-tender, No swelling, No JVD   CV - RRR, no m/r   Chest - CTAB, no wheezing or rhonchi  Abd - S/ND/+BS. Non tender    Ext - 2+BDP and 2+ BR pulses. No BLE edema.  MSK - decreased ROM of BUE, bicepts roxane sign on RUE, unable to abduct shoulder >90 degrees  Neuro - 5/5 BLE Decreased strength in BUE 2/2 shoulder pain,  LN - No LAD appreciated.  Skin - No rash.       Lab Results   Component Value Date    WBC 6.84 2024    HGB 12.7 (L) 2024    HCT 38.7 (L) 2024     2024    CHOL 140 2025    TRIG 93 2025    HDL 57 2025    ALT 14 2024    AST 34 2024     2024    K 4.4 2024     2024    CREATININE 1.1 2024    BUN 25 (H) 2024    CO2 23 2024    TSH 3.711 2023    PSA 0.01 2025    INR 1.0 2017    HGBA1C 5.0 2024       Medications Ordered Prior to Encounter[2]    Assessment and Plan:   Mr. Anthony Miguel is a 74 y.o. male who was seen in clinic " today for >14 days of cough and sinus congestion. Assessed respiratory concerns; lungs clear, chest XR normal, COVID and flu tests negative. Suspect sinus congestion or reflux as potential causes of symptoms. Evaluated shoulder pain; noted previous MRI findings of bilateral biceps tendinosis and complete tears of the supraspinatus muscles. Considered risks and benefits of current pain management regimen. Recs to reduce dosage of meloxicam to daily only, with < 3wks/month. Pt scheduled to follow up with Orhopedics and Endoscopy (EGD + Colonoscopy). Clinic follow up scheduled with ESTHER Barker on 8/22/25.         1. Respiratory tract congestion with cough  Overview:  - Mr. Miguel reports 4-month history of congestion with clear to cloudy phlegm and intermittent hoarseness, especially upon waking or after excessive talking.  - No respiratory distress, chest pain, fevers, soar throat  - PMHx of GERD with hoarseness s/p laryngoscopy performed 8 years ago.  - No OTC treatment attempted  - He has concern for possible viral infection     Assessment & Plan:  - Rapid Covd and Influenza ordered; both results were negative   - Ordered CXR is clear with no evidence of respiratory infection or signs of edema  - Discussed possible reflux or sinus problems as contributing factors.  - Prescribed Flonase (fluticasone) nasal spray, 1 spray in each nostril daily to reduce inflammation, and Xyzal (levocetirizine) to reduce congestion.  - Provided education on proper intranasal spray technique.  - Recommend sleeping position modification to encourage nasal breathing and referred for ENT evaluation for chronic sinus problems.    Orders:  -     Cancel: COVID-19 Rapid Screening; Future; Expected date: 07/25/2025  -     POCT Influenza A/B Rapid Antigen  -     fluticasone propionate (FLONASE) 50 mcg/actuation nasal spray; 1 spray (50 mcg total) by Each Nostril route once daily.  Dispense: 11.1 mL; Refill: 0  -     levocetirizine (XYZAL) 5 MG  tablet; Take 1 tablet (5 mg total) by mouth every evening.  Dispense: 30 tablet; Refill: 11  -     SARS Coronavirus 2 Antigen, POCT Manual Read    2. Bilateral shoulder injury, sequela  Overview:  - Mr. Miguel reports sharp right shoulder pain, especially when coughing or reaching.  - Similar but less sevcer pain experienced in left shouder  - Previously evaluated by Dr Massey, Ortho/SportsMed, with various treatment options discussed including surgical intervention  - MRI confirms complete supraspinatus tear with severe muscle atrophy.    Assessment & Plan:  - For pain management, prescribed Meloxicam 15 mg daily for 3 weeks, then a 1-week break before restarting.  - Also recommended acetaminophen 1000 mg (2 tablets) every 8 hours.  - Educated patient on differences between pain relief and anti-inflammatory effects of medications and discussed cardiovascular risks associated with long-term NSAID use.  - Follow up with Dr. Massey regarding treatment options.  - Recommend continued stretching and PT meanwhile           Health Maintenance         Date Due Completion Date    COVID-19 Vaccine (5 - 2024-25 season) 11/21/2024 9/26/2024    Influenza Vaccine (1) 09/01/2025 9/6/2024    Override on 9/8/2020: Done (performed in the community)    Colorectal Cancer Screening 04/15/2026 4/15/2016    PROSTATE-SPECIFIC ANTIGEN 05/22/2026 5/22/2025    Lipid Panel 05/22/2026 5/22/2025    TETANUS VACCINE 08/31/2031 8/31/2021            Follow up in about 4 weeks (around 8/22/2025).     All questions answered. Pt to call/message the Primary Clinic for any additional concerns    I spent a total of 39 minutes on the day of the visit.      This includes face to face time and non-face to face time preparing to see the patient (eg, review of tests), obtaining and/or reviewing separately obtained history, documenting clinical information in the electronic or other health record, independently interpreting results and communicating results to  the patient/family/caregiver, or care coordinator.     Ji Graham MD  [facility]  [phone] spectral[employer]     This note was generated with the assistance of ambient listening technology. Verbal consent was obtained by the patient and accompanying visitor(s) for the recording of patient appointment to facilitate this note. I attest to having reviewed and edited the generated note for accuracy, though some syntax or spelling errors may persist. Please contact the author of this note for any clarification.               [1]   Social History  Socioeconomic History    Marital status: Single    Number of children: 0   Tobacco Use    Smoking status: Former     Current packs/day: 0.00     Types: Cigarettes     Quit date: 2000     Years since quittin.4     Passive exposure: Never    Smokeless tobacco: Former   Substance and Sexual Activity    Alcohol use: Not Currently    Drug use: No    Sexual activity: Not Currently   Social History Narrative    No difficulty reading Rx labels      Social Drivers of Health     Financial Resource Strain: Low Risk  (2024)    Overall Financial Resource Strain (CARDIA)     Difficulty of Paying Living Expenses: Not hard at all   Food Insecurity: Food Insecurity Present (2024)    Hunger Vital Sign     Worried About Running Out of Food in the Last Year: Sometimes true     Ran Out of Food in the Last Year: Never true   Transportation Needs: No Transportation Needs (2024)    PRAPARE - Transportation     Lack of Transportation (Medical): No     Lack of Transportation (Non-Medical): No   Physical Activity: Inactive (2024)    Exercise Vital Sign     Days of Exercise per Week: 0 days     Minutes of Exercise per Session: 0 min   Stress: No Stress Concern Present (2024)    [employer] [place] [employer] - Occupational Stress Questionnaire     Feeling of Stress : Only a little   Housing Stability: Low Risk   (2/20/2024)    Housing Stability Vital Sign     Unable to Pay for Housing in the Last Year: No     Number of Places Lived in the Last Year: 1     Unstable Housing in the Last Year: No   [2]   Current Outpatient Medications on File Prior to Visit   Medication Sig Dispense Refill    cholecalciferol, vitamin D3, (VITAMIN D3) 50 mcg (2,000 unit) Cap capsule TAKE 1 CAPSULE ONE TIME DAILY 90 capsule 3    gabapentin (NEURONTIN) 300 MG capsule Take 2 capsules (600 mg total) by mouth 3 (three) times daily. 540 capsule 3    meloxicam (MOBIC) 15 MG tablet Take 1 tablet (15 mg total) by mouth daily as needed for Pain. 90 tablet 3    multivitamin capsule Take 1 capsule by mouth once daily.      omeprazole (PRILOSEC) 40 MG capsule Take 1 capsule (40 mg total) by mouth once daily. 90 capsule 3    traZODone (DESYREL) 50 MG tablet Take 1 tablet (50 mg total) by mouth nightly as needed for Insomnia. 90 tablet 1    triamcinolone acetonide 0.1% (KENALOG) 0.1 % cream Apply 15 g (15,000 mg total) topically 2 (two) times daily as needed (for cuts). 1 each 3    VOLTAREN 1 % Gel UNKNOWN 777 g 0    walker (ULTRA-LIGHT ROLLATOR) Misc 1 Units by Misc.(Non-Drug; Combo Route) route once daily at 6am. 1 each 0     Current Facility-Administered Medications on File Prior to Visit   Medication Dose Route Frequency Provider Last Rate Last Admin    0.9%  NaCl infusion   Intravenous Continuous Altaf Santana MD

## 2025-07-25 NOTE — TELEPHONE ENCOUNTER
Pt called, c/o ongoing cough, causing pain to chest, no fever but cough is worsening. Not currently taking anything for cough. He has an appt with pulmonology and PFT but not until October. Should he come in to see you or could you do a phone call visit with him? He is also asking to do a covid test. Please advise.

## 2025-07-26 PROBLEM — S49.92XS: Status: ACTIVE | Noted: 2025-07-26

## 2025-07-26 PROBLEM — R05.8 RESPIRATORY TRACT CONGESTION WITH COUGH: Status: ACTIVE | Noted: 2024-05-31

## 2025-07-26 PROBLEM — S49.91XS: Status: ACTIVE | Noted: 2025-07-26

## 2025-07-26 NOTE — ASSESSMENT & PLAN NOTE
- Rapid Covd and Influenza ordered; both results were negative   - Ordered CXR is clear with no evidence of respiratory infection or signs of edema  - Discussed possible reflux or sinus problems as contributing factors.  - Prescribed Flonase (fluticasone) nasal spray, 1 spray in each nostril daily to reduce inflammation, and Xyzal (levocetirizine) to reduce congestion.  - Provided education on proper intranasal spray technique.  - Recommend sleeping position modification to encourage nasal breathing and referred for ENT evaluation for chronic sinus problems.

## 2025-07-26 NOTE — ASSESSMENT & PLAN NOTE
- For pain management, prescribed Meloxicam 15 mg daily for 3 weeks, then a 1-week break before restarting.  - Also recommended acetaminophen 1000 mg (2 tablets) every 8 hours.  - Educated patient on differences between pain relief and anti-inflammatory effects of medications and discussed cardiovascular risks associated with long-term NSAID use.  - Follow up with Dr. Massey regarding treatment options.  - Recommend continued stretching and PT meanwhile

## 2025-08-25 ENCOUNTER — OFFICE VISIT (OUTPATIENT)
Dept: SPORTS MEDICINE | Facility: CLINIC | Age: 75
End: 2025-08-25
Payer: MEDICARE

## 2025-08-25 VITALS
BODY MASS INDEX: 27.34 KG/M2 | TEMPERATURE: 66 F | SYSTOLIC BLOOD PRESSURE: 117 MMHG | HEIGHT: 67 IN | WEIGHT: 174.19 LBS | DIASTOLIC BLOOD PRESSURE: 77 MMHG

## 2025-08-25 DIAGNOSIS — M75.122 NONTRAUMATIC COMPLETE TEAR OF ROTATOR CUFF, LEFT: Primary | ICD-10-CM

## 2025-08-25 PROCEDURE — 99999 PR PBB SHADOW E&M-EST. PATIENT-LVL III: CPT | Mod: PBBFAC,HCNC,, | Performed by: ORTHOPAEDIC SURGERY

## 2025-08-25 RX ORDER — TRIAMCINOLONE ACETONIDE 40 MG/ML
80 INJECTION, SUSPENSION INTRA-ARTICULAR; INTRAMUSCULAR
Status: DISCONTINUED | OUTPATIENT
Start: 2025-08-25 | End: 2025-08-25 | Stop reason: HOSPADM

## 2025-08-25 RX ADMIN — TRIAMCINOLONE ACETONIDE 80 MG: 40 INJECTION, SUSPENSION INTRA-ARTICULAR; INTRAMUSCULAR at 09:08

## 2025-08-27 ENCOUNTER — TELEPHONE (OUTPATIENT)
Dept: SPORTS MEDICINE | Facility: CLINIC | Age: 75
End: 2025-08-27
Payer: MEDICARE

## (undated) DEVICE — BAG TISS RETRV MONARCH 10MM

## (undated) DEVICE — BANDAGE ADHESIVE

## (undated) DEVICE — DRESSING MEPILEX BORDR AG 4X12

## (undated) DEVICE — SEALER AQUAMANTYS 2.3 BIPOLAR

## (undated) DEVICE — TROCAR ENDOPATH XCEL 5X100MM

## (undated) DEVICE — SUT MONOCRYL 3-0 RB1

## (undated) DEVICE — LEGGINGS 48X31 INCH

## (undated) DEVICE — Device

## (undated) DEVICE — SUT VICRYL PLUS 0 CT1 18IN

## (undated) DEVICE — BURR RND FLUT SFT TOUCH 2.0MM

## (undated) DEVICE — DIFFUSER

## (undated) DEVICE — BLADE 4IN EDGE INSULATED

## (undated) DEVICE — TUBE FRAZIER 5MM 2FT SOFT TIP

## (undated) DEVICE — COVER LIGHT HANDLE 80/CA

## (undated) DEVICE — SOL ELECTROLUBE ANTI-STIC

## (undated) DEVICE — CLIP HEMO-LOK MLX LARGE LF

## (undated) DEVICE — BLADE MILL BONE MEDIUM

## (undated) DEVICE — DRESSING LEUKOPLAST FLEX 1X3IN

## (undated) DEVICE — CLIPPER BLADE MOD 4406 (CAREF)

## (undated) DEVICE — CORD BIPOLAR 12 FOOT

## (undated) DEVICE — SOL NS 1000CC

## (undated) DEVICE — BUR BONE CUT MICRO TPS 3X3.8MM

## (undated) DEVICE — SYR 50ML CATH TIP

## (undated) DEVICE — SUT PDS II 0 CT-1 VIL MONO

## (undated) DEVICE — SOL WATER STRL IRR 1000ML

## (undated) DEVICE — SUT CTD VICRYL CT-1 27

## (undated) DEVICE — PORT AIRSEAL 12/120MM LPI

## (undated) DEVICE — DRAPE COLUMN DAVINCI XI

## (undated) DEVICE — SET TRI-LUMEN FILTERED TUBE

## (undated) DEVICE — SUT MCRYL PLUS 4-0 PS2 27IN

## (undated) DEVICE — IRRIGATOR ENDOSCOPY DISP.

## (undated) DEVICE — KIT SURGIFLO HEMOSTATIC MATRIX

## (undated) DEVICE — TAPE SILK 3IN

## (undated) DEVICE — STAPLER SKIN ROTATING HEAD

## (undated) DEVICE — SUT ABS CLIP LAPRA-TY CTD

## (undated) DEVICE — DRAPE ARM DAVINCI XI

## (undated) DEVICE — COVER TIP CURVED SCISSORS XI

## (undated) DEVICE — MARKER SKIN STND TIP BLUE BARR

## (undated) DEVICE — TRAY CATH UM FOLEY SIL W 16FR

## (undated) DEVICE — PACK SET UP CONVERTORS

## (undated) DEVICE — DRAPE STERI INSTRUMENT 1018

## (undated) DEVICE — DRESSING SURGICAL 1/2X1/2

## (undated) DEVICE — SYR SLIP TIP 20CC

## (undated) DEVICE — SEE MEDLINE ITEM 156905

## (undated) DEVICE — BLADE CLIPPER SENICLIP SURGICA

## (undated) DEVICE — NDL SPINAL 18GX3.5 SPINOCAN

## (undated) DEVICE — SCISSOR 5MMX35CM DIRECT DRIVE

## (undated) DEVICE — KIT EVACUATOR 3-SPRING 1/8 DRN

## (undated) DEVICE — KIT EVACUATOR FLAT DRAIN 100CC

## (undated) DEVICE — SUT CTD VICRYL 2-0 CR/CT-2

## (undated) DEVICE — ROUTER TAPERED 3.0MM

## (undated) DEVICE — SEAL UNIVERSAL 5MM-8MM XI

## (undated) DEVICE — SUT STRATAFIX PDS PLUS VIO

## (undated) DEVICE — HEMOSTAT SURGICEL 4X8IN

## (undated) DEVICE — TRAY FOLEY 16FR INFECTION CONT

## (undated) DEVICE — ADHESIVE DERMABOND ADVANCED

## (undated) DEVICE — ELECTRODE REM PLYHSV RETURN 9

## (undated) DEVICE — DRAPE C ARM 42 X 120 10/BX

## (undated) DEVICE — NDL HYPO A BEVEL 22X1 1/2

## (undated) DEVICE — TRAY MINOR GEN SURG

## (undated) DEVICE — DRAPE INCISE IOBAN 2 23X17IN

## (undated) DEVICE — CARTRIDGE OIL

## (undated) DEVICE — DRAPE C-ARMOR EQUIPMENT COVER

## (undated) DEVICE — GOWN SURGICAL X-LARGE

## (undated) DEVICE — DRAPE SCOPE PILLOW WARMER

## (undated) DEVICE — SUT MONOCRYL 3-0 UNDYED RB1

## (undated) DEVICE — ELECTRODE BLADE INSULATED 1 IN

## (undated) DEVICE — DRAPE THYROID WITH ARMBOARD

## (undated) DEVICE — NDL INSUF ULTRA VERESS 120MM

## (undated) DEVICE — DRAPE ABDOMINAL TIBURON 14X11

## (undated) DEVICE — BANDAID GARFIELD

## (undated) DEVICE — SEE MEDLINE ITEM 146347

## (undated) DEVICE — 3.5MM TAP

## (undated) DEVICE — COVER LIGHT HANDLE

## (undated) DEVICE — KIT VUETIP TROCAR SWAB